# Patient Record
Sex: FEMALE | Race: WHITE | NOT HISPANIC OR LATINO | Employment: OTHER | URBAN - NONMETROPOLITAN AREA
[De-identification: names, ages, dates, MRNs, and addresses within clinical notes are randomized per-mention and may not be internally consistent; named-entity substitution may affect disease eponyms.]

---

## 2023-11-05 ENCOUNTER — HOSPITAL ENCOUNTER (EMERGENCY)
Facility: HOSPITAL | Age: 63
Discharge: HOME | End: 2023-11-05
Attending: EMERGENCY MEDICINE
Payer: MEDICARE

## 2023-11-05 ENCOUNTER — APPOINTMENT (EMERGENCY)
Dept: RADIOLOGY | Facility: HOSPITAL | Age: 63
End: 2023-11-05
Payer: MEDICARE

## 2023-11-05 VITALS
RESPIRATION RATE: 23 BRPM | SYSTOLIC BLOOD PRESSURE: 113 MMHG | OXYGEN SATURATION: 99 % | TEMPERATURE: 97.8 F | HEART RATE: 93 BPM | DIASTOLIC BLOOD PRESSURE: 59 MMHG

## 2023-11-05 DIAGNOSIS — J44.1 COPD EXACERBATION (HCC): Primary | ICD-10-CM

## 2023-11-05 LAB
ALBUMIN SERPL BCP-MCNC: 3.8 G/DL (ref 3.5–5)
ALP SERPL-CCNC: 107 U/L (ref 34–104)
ALT SERPL W P-5'-P-CCNC: 21 U/L (ref 7–52)
ANION GAP SERPL CALCULATED.3IONS-SCNC: 9 MMOL/L
AST SERPL W P-5'-P-CCNC: 17 U/L (ref 13–39)
BASOPHILS # BLD AUTO: 0.06 THOUSANDS/ÂΜL (ref 0–0.1)
BASOPHILS NFR BLD AUTO: 0 % (ref 0–1)
BILIRUB SERPL-MCNC: 0.5 MG/DL (ref 0.2–1)
BNP SERPL-MCNC: 86 PG/ML (ref 0–100)
BUN SERPL-MCNC: 5 MG/DL (ref 5–25)
CALCIUM SERPL-MCNC: 8.9 MG/DL (ref 8.4–10.2)
CARDIAC TROPONIN I PNL SERPL HS: 7 NG/L
CHLORIDE SERPL-SCNC: 101 MMOL/L (ref 96–108)
CO2 SERPL-SCNC: 29 MMOL/L (ref 21–32)
CREAT SERPL-MCNC: 0.46 MG/DL (ref 0.6–1.3)
EOSINOPHIL # BLD AUTO: 0.01 THOUSAND/ÂΜL (ref 0–0.61)
EOSINOPHIL NFR BLD AUTO: 0 % (ref 0–6)
ERYTHROCYTE [DISTWIDTH] IN BLOOD BY AUTOMATED COUNT: 14.6 % (ref 11.6–15.1)
FLUAV RNA RESP QL NAA+PROBE: NEGATIVE
FLUBV RNA RESP QL NAA+PROBE: NEGATIVE
GFR SERPL CREATININE-BSD FRML MDRD: 104 ML/MIN/1.73SQ M
GLUCOSE SERPL-MCNC: 172 MG/DL (ref 65–140)
HCT VFR BLD AUTO: 38 % (ref 34.8–46.1)
HGB BLD-MCNC: 12.1 G/DL (ref 11.5–15.4)
IMM GRANULOCYTES # BLD AUTO: 0.03 THOUSAND/UL (ref 0–0.2)
IMM GRANULOCYTES NFR BLD AUTO: 0 % (ref 0–2)
LYMPHOCYTES # BLD AUTO: 1.94 THOUSANDS/ÂΜL (ref 0.6–4.47)
LYMPHOCYTES NFR BLD AUTO: 14 % (ref 14–44)
MCH RBC QN AUTO: 29.4 PG (ref 26.8–34.3)
MCHC RBC AUTO-ENTMCNC: 31.8 G/DL (ref 31.4–37.4)
MCV RBC AUTO: 93 FL (ref 82–98)
MONOCYTES # BLD AUTO: 1.13 THOUSAND/ÂΜL (ref 0.17–1.22)
MONOCYTES NFR BLD AUTO: 8 % (ref 4–12)
NEUTROPHILS # BLD AUTO: 10.94 THOUSANDS/ÂΜL (ref 1.85–7.62)
NEUTS SEG NFR BLD AUTO: 78 % (ref 43–75)
NRBC BLD AUTO-RTO: 0 /100 WBCS
PLATELET # BLD AUTO: 235 THOUSANDS/UL (ref 149–390)
PMV BLD AUTO: 11.8 FL (ref 8.9–12.7)
POTASSIUM SERPL-SCNC: 2.7 MMOL/L (ref 3.5–5.3)
PROT SERPL-MCNC: 6.5 G/DL (ref 6.4–8.4)
RBC # BLD AUTO: 4.11 MILLION/UL (ref 3.81–5.12)
RSV RNA RESP QL NAA+PROBE: NEGATIVE
SARS-COV-2 RNA RESP QL NAA+PROBE: NEGATIVE
SODIUM SERPL-SCNC: 139 MMOL/L (ref 135–147)
WBC # BLD AUTO: 14.11 THOUSAND/UL (ref 4.31–10.16)

## 2023-11-05 PROCEDURE — 96365 THER/PROPH/DIAG IV INF INIT: CPT

## 2023-11-05 PROCEDURE — 87040 BLOOD CULTURE FOR BACTERIA: CPT | Performed by: EMERGENCY MEDICINE

## 2023-11-05 PROCEDURE — 36415 COLL VENOUS BLD VENIPUNCTURE: CPT | Performed by: EMERGENCY MEDICINE

## 2023-11-05 PROCEDURE — 96368 THER/DIAG CONCURRENT INF: CPT

## 2023-11-05 PROCEDURE — 99285 EMERGENCY DEPT VISIT HI MDM: CPT | Performed by: EMERGENCY MEDICINE

## 2023-11-05 PROCEDURE — 93005 ELECTROCARDIOGRAM TRACING: CPT

## 2023-11-05 PROCEDURE — 80053 COMPREHEN METABOLIC PANEL: CPT | Performed by: EMERGENCY MEDICINE

## 2023-11-05 PROCEDURE — 83880 ASSAY OF NATRIURETIC PEPTIDE: CPT | Performed by: EMERGENCY MEDICINE

## 2023-11-05 PROCEDURE — 96366 THER/PROPH/DIAG IV INF ADDON: CPT

## 2023-11-05 PROCEDURE — 84484 ASSAY OF TROPONIN QUANT: CPT | Performed by: EMERGENCY MEDICINE

## 2023-11-05 PROCEDURE — 85025 COMPLETE CBC W/AUTO DIFF WBC: CPT | Performed by: EMERGENCY MEDICINE

## 2023-11-05 PROCEDURE — 0241U HB NFCT DS VIR RESP RNA 4 TRGT: CPT | Performed by: EMERGENCY MEDICINE

## 2023-11-05 PROCEDURE — 71046 X-RAY EXAM CHEST 2 VIEWS: CPT

## 2023-11-05 PROCEDURE — 96375 TX/PRO/DX INJ NEW DRUG ADDON: CPT

## 2023-11-05 PROCEDURE — 99285 EMERGENCY DEPT VISIT HI MDM: CPT

## 2023-11-05 RX ORDER — MAGNESIUM SULFATE HEPTAHYDRATE 40 MG/ML
2 INJECTION, SOLUTION INTRAVENOUS ONCE
Status: COMPLETED | OUTPATIENT
Start: 2023-11-05 | End: 2023-11-05

## 2023-11-05 RX ORDER — IPRATROPIUM BROMIDE AND ALBUTEROL SULFATE 2.5; .5 MG/3ML; MG/3ML
3 SOLUTION RESPIRATORY (INHALATION) ONCE
Status: COMPLETED | OUTPATIENT
Start: 2023-11-05 | End: 2023-11-05

## 2023-11-05 RX ORDER — METHYLPREDNISOLONE SODIUM SUCCINATE 125 MG/2ML
125 INJECTION, POWDER, LYOPHILIZED, FOR SOLUTION INTRAMUSCULAR; INTRAVENOUS ONCE
Status: COMPLETED | OUTPATIENT
Start: 2023-11-05 | End: 2023-11-05

## 2023-11-05 RX ORDER — AZITHROMYCIN 250 MG/1
TABLET, FILM COATED ORAL
Qty: 6 TABLET | Refills: 0 | Status: SHIPPED | OUTPATIENT
Start: 2023-11-05 | End: 2023-11-09

## 2023-11-05 RX ORDER — AMOXICILLIN AND CLAVULANATE POTASSIUM 500; 125 MG/1; MG/1
1 TABLET, FILM COATED ORAL EVERY 12 HOURS SCHEDULED
Qty: 14 TABLET | Refills: 0 | Status: SHIPPED | OUTPATIENT
Start: 2023-11-05 | End: 2023-11-12

## 2023-11-05 RX ORDER — AZITHROMYCIN 250 MG/1
500 TABLET, FILM COATED ORAL ONCE
Status: COMPLETED | OUTPATIENT
Start: 2023-11-05 | End: 2023-11-05

## 2023-11-05 RX ADMIN — IPRATROPIUM BROMIDE AND ALBUTEROL SULFATE 3 ML: 2.5; .5 SOLUTION RESPIRATORY (INHALATION) at 20:51

## 2023-11-05 RX ADMIN — MAGNESIUM SULFATE HEPTAHYDRATE 2 G: 40 INJECTION, SOLUTION INTRAVENOUS at 19:06

## 2023-11-05 RX ADMIN — METHYLPREDNISOLONE SODIUM SUCCINATE 125 MG: 125 INJECTION, POWDER, FOR SOLUTION INTRAMUSCULAR; INTRAVENOUS at 19:06

## 2023-11-05 RX ADMIN — IPRATROPIUM BROMIDE AND ALBUTEROL SULFATE 3 ML: 2.5; .5 SOLUTION RESPIRATORY (INHALATION) at 19:06

## 2023-11-05 RX ADMIN — AZITHROMYCIN 500 MG: 250 TABLET, FILM COATED ORAL at 20:51

## 2023-11-05 RX ADMIN — AMPICILLIN SODIUM AND SULBACTAM SODIUM 3 G: 2; 1 INJECTION, POWDER, FOR SOLUTION INTRAMUSCULAR; INTRAVENOUS at 20:54

## 2023-11-05 NOTE — ED PROVIDER NOTES
History  Chief Complaint   Patient presents with    Shortness of Breath     Pt reports worsening sob with intermittent fevers and body aches for a few days, hx of copd, wears 2L O2 at all times     Hx COPD on 2L NC c/o flu like sx as below x 2 days      History provided by:  Patient   used: No    Flu Symptoms  Presenting symptoms: cough, fatigue, fever, myalgias, nausea and shortness of breath    Presenting symptoms: no diarrhea, no headaches, no rhinorrhea, no sore throat and no vomiting    Severity:  Moderate  Onset quality:  Gradual  Duration:  2 days  Progression:  Unchanged  Chronicity:  New  Relieved by:  Nothing  Worsened by:  Nothing  Ineffective treatments:  None tried  Associated symptoms: chills and decreased appetite    Associated symptoms: no ear pain, no neck stiffness and no syncope        None       Past Medical History:   Diagnosis Date    COPD (chronic obstructive pulmonary disease) ([address])     Hiatal hernia        Past Surgical History:   Procedure Laterality Date    CHOLECYSTECTOMY         History reviewed. No pertinent family history. I have reviewed and agree with the history as documented. E-Cigarette/Vaping    E-Cigarette Use Never User      E-Cigarette/Vaping Substances     Social History     Tobacco Use    Smoking status: Every Day     Packs/day: 0.25     Types: Cigarettes    Smokeless tobacco: Never   Vaping Use    Vaping Use: Never used   Substance Use Topics    Alcohol use: Never    Drug use: Never       Review of Systems   Constitutional:  Positive for chills, decreased appetite, fatigue and fever. HENT:  Negative for ear pain, hearing loss, rhinorrhea, sore throat, trouble swallowing and voice change. Eyes:  Negative for pain and discharge. Respiratory:  Positive for cough and shortness of breath. Negative for wheezing. Cardiovascular:  Negative for chest pain and palpitations. Gastrointestinal:  Positive for nausea.  Negative for abdominal pain, blood in stool, constipation, diarrhea and vomiting. Genitourinary:  Negative for dysuria, flank pain, frequency and hematuria. Musculoskeletal:  Positive for myalgias. Negative for joint swelling, neck pain and neck stiffness. Skin:  Negative for rash and wound. Neurological:  Negative for dizziness, seizures, syncope, facial asymmetry and headaches. Psychiatric/Behavioral:  Negative for hallucinations, self-injury and suicidal ideas. All other systems reviewed and are negative. Physical Exam  Physical Exam  Vitals and nursing note reviewed. Constitutional:       General: She is not in acute distress. Appearance: She is well-developed. HENT:      Head: Normocephalic and atraumatic. Right Ear: External ear normal.      Left Ear: External ear normal.   Eyes:      General: No scleral icterus. Right eye: No discharge. Left eye: No discharge. Extraocular Movements: Extraocular movements intact. Conjunctiva/sclera: Conjunctivae normal.   Cardiovascular:      Rate and Rhythm: Normal rate and regular rhythm. Heart sounds: Normal heart sounds. No murmur heard. Pulmonary:      Effort: Tachypnea, accessory muscle usage and prolonged expiration present. Breath sounds: Decreased breath sounds and wheezing present. No rhonchi or rales. Abdominal:      General: Bowel sounds are normal. There is no distension. Palpations: Abdomen is soft. Tenderness: There is no abdominal tenderness. There is no guarding or rebound. Musculoskeletal:         General: No deformity. Normal range of motion. Cervical back: Normal range of motion and neck supple. Skin:     General: Skin is warm and dry. Findings: No rash. Neurological:      General: No focal deficit present. Mental Status: She is alert and oriented to person, place, and time. Cranial Nerves: No cranial nerve deficit.    Psychiatric:         Mood and Affect: Mood normal.         Behavior: Behavior normal.         Thought Content: Thought content normal.         Judgment: Judgment normal.         Vital Signs  ED Triage Vitals [11/05/23 1854]   Temperature Pulse Respirations Blood Pressure SpO2   97.8 °F (36.6 °C) 105 20 152/74 92 %      Temp Source Heart Rate Source Patient Position - Orthostatic VS BP Location FiO2 (%)   Oral Monitor Sitting Right arm --      Pain Score       6           Vitals:    11/05/23 1854 11/05/23 1930   BP: 152/74 113/59   Pulse: 105 93   Patient Position - Orthostatic VS: Sitting          Visual Acuity      ED Medications  Medications   ipratropium-albuterol (DUO-NEB) 0.5-2.5 mg/3 mL inhalation solution 3 mL (3 mL Nebulization Given 11/5/23 1906)   methylPREDNISolone sodium succinate (Solu-MEDROL) injection 125 mg (125 mg Intravenous Given 11/5/23 1906)   magnesium sulfate 2 g/50 mL IVPB (premix) 2 g (0 g Intravenous Stopped 11/5/23 2116)   ampicillin-sulbactam (UNASYN) 3 g in sodium chloride 0.9 % 100 mL IVPB (0 g Intravenous Stopped 11/5/23 2116)   azithromycin (ZITHROMAX) tablet 500 mg (500 mg Oral Given 11/5/23 2051)   ipratropium-albuterol (DUO-NEB) 0.5-2.5 mg/3 mL inhalation solution 3 mL (3 mL Nebulization Given 11/5/23 2051)       Diagnostic Studies  Results Reviewed       Procedure Component Value Units Date/Time    B-Type Natriuretic Peptide(BNP) [[id number]]  (Normal) Collected: 11/05/23 1904    Lab Status: Final result Specimen: Blood from Arm, Right Updated: 11/05/23 2005     BNP 86 pg/mL     COVID19, Influenza A/B, RSV PCR, [facility] [[id number]]  (Normal) Collected: 11/05/23 1904    Lab Status: Final result Specimen: Nares from Nose Updated: 11/05/23 1959     SARS-CoV-2 Negative     INFLUENZA A PCR Negative     INFLUENZA B PCR Negative     RSV PCR Negative    Narrative:      FOR PEDIATRIC PATIENTS - copy/paste COVID Guidelines URL to browser: [email or web address]    SARS-CoV-2 assay is a Nucleic Acid Amplification assay intended for the  qualitative detection of nucleic acid from SARS-CoV-2 in nasopharyngeal  swabs. Results are for the presumptive identification of SARS-CoV-2 RNA. Positive results are indicative of infection with SARS-CoV-2, the virus  causing COVID-19, but do not rule out bacterial infection or co-infection  with other viruses. Laboratories within the [facility] and its  territories are required to report all positive results to the appropriate  public health authorities. Negative results do not preclude SARS-CoV-2  infection and should not be used as the sole basis for treatment or other  patient management decisions. Negative results must be combined with  clinical observations, patient history, and epidemiological information. This test has not been FDA cleared or approved. This test has been authorized by FDA under an Emergency Use Authorization  (EUA). This test is only authorized for the duration of time the  declaration that circumstances exist justifying the authorization of the  emergency use of an in vitro diagnostic tests for detection of SARS-CoV-2  virus and/or diagnosis of COVID-19 infection under section 564(b)(1) of  the Act, 21 U. S.C. 552HGR-8(W)(6), unless the authorization is terminated  or revoked sooner. The test has been validated but independent review by FDA  and CLIA is pending. Test performed using [employer] GeneXpert: This RT-PCR assay targets N2,  a region unique to SARS-CoV-2. A conserved region in the E-gene was chosen  for pan-Sarbecovirus detection which includes SARS-CoV-2. According to CMS-2020-01-R, this platform meets the definition of high-throughput technology.     Comprehensive metabolic panel [[id number]]  (Abnormal) Collected: 11/05/23 1904    Lab Status: Final result Specimen: Blood from Arm, Right Updated: 11/05/23 1950     Sodium 139 mmol/L      Potassium 2.7 mmol/L      Chloride 101 mmol/L      CO2 29 mmol/L      ANION GAP 9 mmol/L BUN 5 mg/dL      Creatinine 0.46 mg/dL      Glucose 172 mg/dL      Calcium 8.9 mg/dL      AST 17 U/L      ALT 21 U/L      Alkaline Phosphatase 107 U/L      Total Protein 6.5 g/dL      Albumin 3.8 g/dL      Total Bilirubin 0.50 mg/dL      eGFR 104 ml/min/1.73sq m     Narrative:      [facility] guidelines for Chronic Kidney Disease (CKD):     Stage 1 with normal or high GFR (GFR > 90 mL/min/1.73 square meters)    Stage 2 Mild CKD (GFR = 60-89 mL/min/1.73 square meters)    Stage 3A Moderate CKD (GFR = 45-59 mL/min/1.73 square meters)    Stage 3B Moderate CKD (GFR = 30-44 mL/min/1.73 square meters)    Stage 4 Severe CKD (GFR = 15-29 mL/min/1.73 square meters)    Stage 5 End Stage CKD (GFR <15 mL/min/1.73 square meters)  Note: GFR calculation is accurate only with a steady state creatinine    HS Troponin 0hr (reflex protocol) [[id number]]  (Normal) Collected: 11/05/23 1904    Lab Status: Final result Specimen: Blood from Arm, Right Updated: 11/05/23 1944     hs TnI 0hr 7 ng/L     CBC and differential [[id number]]  (Abnormal) Collected: 11/05/23 1904    Lab Status: Final result Specimen: Blood from Arm, Right Updated: 11/05/23 1919     WBC 14.11 Thousand/uL      RBC 4.11 Million/uL      Hemoglobin 12.1 g/dL      Hematocrit 38.0 %      MCV 93 fL      MCH 29.4 pg      MCHC 31.8 g/dL      RDW 14.6 %      MPV 11.8 fL      Platelets 791 Thousands/uL      nRBC 0 /100 WBCs      Neutrophils Relative 78 %      Immat GRANS % 0 %      Lymphocytes Relative 14 %      Monocytes Relative 8 %      Eosinophils Relative 0 %      Basophils Relative 0 %      Neutrophils Absolute 10.94 Thousands/µL      Immature Grans Absolute 0.03 Thousand/uL      Lymphocytes Absolute 1.94 Thousands/µL      Monocytes Absolute 1.13 Thousand/µL      Eosinophils Absolute 0.01 Thousand/µL      Basophils Absolute 0.06 Thousands/µL     Blood culture #2 [[id number]] Collected: 11/05/23 1904    Lab Status:  In process Specimen: Blood from Arm, Right Updated: 11/05/23 1918    Blood culture #1 [[id number]] Collected: 11/05/23 1904    Lab Status: In process Specimen: Blood from Arm, Left Updated: 11/05/23 1918                   XR chest pa & lateral   ED Interpretation by Toby Petersen MD (11/06 0007)   No acute finding, questionable early right lower lobe infiltrate                 Procedures  ECG 12 Lead Documentation Only    Date/Time: 11/5/2023 6:59 PM    Performed by: Toby Petersen MD  Authorized by: Toby Petersen MD    ECG reviewed by me, the ED Provider: yes    Patient location:  ED  Previous ECG:     Previous ECG:  Unavailable  Interpretation:     Interpretation: normal    Rate:     ECG rate:  100    ECG rate assessment: tachycardic    Rhythm:     Rhythm: sinus tachycardia    Ectopy:     Ectopy: none    QRS:     QRS axis:  Normal    QRS intervals:  Normal  Conduction:     Conduction: normal    ST segments:     ST segments:  Normal  T waves:     T waves: normal             ED Course                               SBIRT 22yo+      Flowsheet Row Most Recent Value   Initial Alcohol Screen: US AUDIT-C     1. How often do you have a drink containing alcohol? 0 Filed at: 11/05/2023 1858   2. How many drinks containing alcohol do you have on a typical day you are drinking? 0 Filed at: 11/05/2023 1858   3b. FEMALE Any Age, or MALE 65+: How often do you have 4 or more drinks on one occassion? 0 Filed at: 11/05/2023 1858   Audit-C Score 0 Filed at: 11/05/2023 1858   LANDY: How many times in the past year have you. .. Used an illegal drug or used a prescription medication for non-medical reasons? Never Filed at: 11/05/2023 1858                      Medical Decision Making  Based on the history and medical screening exam performed the diagnostic considerations include but are not limited to COVID/flu/RSV, pneumonia, COPD exacerbation, congestive heart failure.     Based on the work-up performed in the emergency room which includes physical examination, and which may include laboratory studies and imaging as warranted including advanced imaging such as CT scan or ultrasound, the differential diagnosis is narrowed to exclude limb or life-threatening process. The patient is stable for discharge. Questionable right lower lobe infiltrate. Patient offered admission vs discharge. She is slightly improved after nebulization and steroid. She was offered admission but prefers to go home. She received first dose of antibiotic in the emergency department and further antibiotic therapy was prescribed to her pharmacy. She was given careful return instructions including to return for high fevers or significantly worsening shortness of breath. She is agreeable    Amount and/or Complexity of Data Reviewed  Labs: ordered. Decision-making details documented in ED Course. Details: Benign, other than white count 14, mild hyponatremia  Radiology: ordered and independent interpretation performed. Decision-making details documented in ED Course. Details: Questionable early right lower lobe infiltrate  ECG/medicine tests: ordered and independent interpretation performed. Decision-making details documented in ED Course. Details: Sinus rhythm rate 100    Risk  Prescription drug management. Disposition  Final diagnoses:   COPD exacerbation ([address])     Time reflects when diagnosis was documented in both MDM as applicable and the Disposition within this note       Time User Action Codes Description Comment    11/5/2023  8:43 PM John Field Add [J44.1] COPD exacerbation [facility])           ED Disposition       ED Disposition   Discharge    Condition   Stable    Date/Time   Sun Nov 5, 2023 2043    Comment   Shannon Delacruz discharge to home/self care. Follow-up Information       Follow up With Specialties Details Why Contact Info    Lisset Forrest DO Family Medicine   [address] [facility]  [address] [facility]  [phone] Discharge Medication List as of 11/5/2023  8:44 PM        START taking these medications    Details   amoxicillin-clavulanate (Augmentin) 500-125 mg per tablet Take 1 tablet by mouth every 12 (twelve) hours for 7 days, Starting Sun 11/5/2023, Until Sun 11/12/2023, Normal      azithromycin (ZITHROMAX) 250 mg tablet Take 2 tablets today then 1 tablet daily x 4 days, Normal             No discharge procedures on file.     PDMP Review       None            ED Provider  Electronically Signed by             Celena Stewart MD  11/06/23 0009

## 2023-11-07 LAB
ATRIAL RATE: 100 BPM
P AXIS: 58 DEGREES
PR INTERVAL: 96 MS
QRS AXIS: 61 DEGREES
QRSD INTERVAL: 100 MS
QT INTERVAL: 336 MS
QTC INTERVAL: 433 MS
T WAVE AXIS: 59 DEGREES
VENTRICULAR RATE: 100 BPM

## 2023-11-07 PROCEDURE — 93010 ELECTROCARDIOGRAM REPORT: CPT | Performed by: INTERNAL MEDICINE

## 2023-11-11 LAB
BACTERIA BLD CULT: NORMAL
BACTERIA BLD CULT: NORMAL

## 2024-10-04 ENCOUNTER — APPOINTMENT (EMERGENCY)
Dept: RADIOLOGY | Facility: HOSPITAL | Age: 64
DRG: 177 | End: 2024-10-04
Payer: MEDICARE

## 2024-10-04 ENCOUNTER — HOSPITAL ENCOUNTER (INPATIENT)
Facility: HOSPITAL | Age: 64
LOS: 5 days | Discharge: HOME | DRG: 177 | End: 2024-10-09
Attending: EMERGENCY MEDICINE | Admitting: INTERNAL MEDICINE
Payer: MEDICARE

## 2024-10-04 DIAGNOSIS — J44.1 COPD EXACERBATION (HCC): ICD-10-CM

## 2024-10-04 DIAGNOSIS — R13.10 DYSPHAGIA: ICD-10-CM

## 2024-10-04 DIAGNOSIS — I48.91 RAPID ATRIAL FIBRILLATION (HCC): ICD-10-CM

## 2024-10-04 DIAGNOSIS — J18.9 PNEUMONIA: Primary | ICD-10-CM

## 2024-10-04 PROBLEM — E83.42 HYPOMAGNESEMIA: Status: ACTIVE | Noted: 2024-10-04

## 2024-10-04 PROBLEM — J44.0 CHRONIC OBSTRUCTIVE PULMONARY DISEASE WITH ACUTE LOWER RESPIRATORY INFECTION (HCC): Status: ACTIVE | Noted: 2024-10-04

## 2024-10-04 PROBLEM — E87.20 LACTIC ACIDOSIS: Status: ACTIVE | Noted: 2024-10-04

## 2024-10-04 PROBLEM — A31.9 MYCOBACTERIAL INFECTION, NON-TB: Status: ACTIVE | Noted: 2024-10-04

## 2024-10-04 LAB
ALBUMIN SERPL BCG-MCNC: 4.2 G/DL (ref 3.5–5)
ALP SERPL-CCNC: 83 U/L (ref 34–104)
ALT SERPL W P-5'-P-CCNC: 29 U/L (ref 7–52)
ANION GAP SERPL CALCULATED.3IONS-SCNC: 9 MMOL/L (ref 4–13)
APTT PPP: 24 SECONDS (ref 23–34)
AST SERPL W P-5'-P-CCNC: 32 U/L (ref 13–39)
BASOPHILS # BLD AUTO: 0.01 THOUSANDS/ΜL (ref 0–0.1)
BASOPHILS NFR BLD AUTO: 0 % (ref 0–1)
BILIRUB SERPL-MCNC: 0.28 MG/DL (ref 0.2–1)
BNP SERPL-MCNC: 34 PG/ML (ref 0–100)
BUN SERPL-MCNC: 12 MG/DL (ref 5–25)
CALCIUM SERPL-MCNC: 9 MG/DL (ref 8.4–10.2)
CARDIAC TROPONIN I PNL SERPL HS: 6 NG/L
CHLORIDE SERPL-SCNC: 99 MMOL/L (ref 96–108)
CO2 SERPL-SCNC: 28 MMOL/L (ref 21–32)
CREAT SERPL-MCNC: 0.56 MG/DL (ref 0.6–1.3)
D DIMER PPP FEU-MCNC: 0.34 UG/ML FEU
EOSINOPHIL # BLD AUTO: 0 THOUSAND/ΜL (ref 0–0.61)
EOSINOPHIL NFR BLD AUTO: 0 % (ref 0–6)
ERYTHROCYTE [DISTWIDTH] IN BLOOD BY AUTOMATED COUNT: 13.6 % (ref 11.6–15.1)
FLUAV AG UPPER RESP QL IA.RAPID: NEGATIVE
FLUBV AG UPPER RESP QL IA.RAPID: NEGATIVE
GFR SERPL CREATININE-BSD FRML MDRD: 97 ML/MIN/1.73SQ M
GLUCOSE SERPL-MCNC: 155 MG/DL (ref 65–140)
GLUCOSE SERPL-MCNC: 181 MG/DL (ref 65–140)
HCT VFR BLD AUTO: 43.1 % (ref 34.8–46.1)
HGB BLD-MCNC: 13.8 G/DL (ref 11.5–15.4)
IMM GRANULOCYTES # BLD AUTO: 0.02 THOUSAND/UL (ref 0–0.2)
IMM GRANULOCYTES NFR BLD AUTO: 0 % (ref 0–2)
INR PPP: 0.94 (ref 0.85–1.19)
LACTATE SERPL-SCNC: 1.9 MMOL/L (ref 0.5–2)
LACTATE SERPL-SCNC: 3.1 MMOL/L (ref 0.5–2)
LYMPHOCYTES # BLD AUTO: 0.46 THOUSANDS/ΜL (ref 0.6–4.47)
LYMPHOCYTES NFR BLD AUTO: 9 % (ref 14–44)
MAGNESIUM SERPL-MCNC: 1.6 MG/DL (ref 1.9–2.7)
MCH RBC QN AUTO: 29.1 PG (ref 26.8–34.3)
MCHC RBC AUTO-ENTMCNC: 32 G/DL (ref 31.4–37.4)
MCV RBC AUTO: 91 FL (ref 82–98)
MONOCYTES # BLD AUTO: 0.29 THOUSAND/ΜL (ref 0.17–1.22)
MONOCYTES NFR BLD AUTO: 6 % (ref 4–12)
NEUTROPHILS # BLD AUTO: 4.24 THOUSANDS/ΜL (ref 1.85–7.62)
NEUTS SEG NFR BLD AUTO: 85 % (ref 43–75)
NRBC BLD AUTO-RTO: 0 /100 WBCS
PLATELET # BLD AUTO: 165 THOUSANDS/UL (ref 149–390)
PMV BLD AUTO: 11.4 FL (ref 8.9–12.7)
POTASSIUM SERPL-SCNC: 4 MMOL/L (ref 3.5–5.3)
PROCALCITONIN SERPL-MCNC: <0.05 NG/ML
PROT SERPL-MCNC: 7.2 G/DL (ref 6.4–8.4)
PROTHROMBIN TIME: 12.9 SECONDS (ref 12.3–15)
RBC # BLD AUTO: 4.75 MILLION/UL (ref 3.81–5.12)
SARS-COV+SARS-COV-2 AG RESP QL IA.RAPID: NEGATIVE
SODIUM SERPL-SCNC: 136 MMOL/L (ref 135–147)
WBC # BLD AUTO: 5.02 THOUSAND/UL (ref 4.31–10.16)

## 2024-10-04 PROCEDURE — 84145 PROCALCITONIN (PCT): CPT | Performed by: FAMILY MEDICINE

## 2024-10-04 PROCEDURE — 85610 PROTHROMBIN TIME: CPT | Performed by: EMERGENCY MEDICINE

## 2024-10-04 PROCEDURE — 99285 EMERGENCY DEPT VISIT HI MDM: CPT

## 2024-10-04 PROCEDURE — 87804 INFLUENZA ASSAY W/OPTIC: CPT | Performed by: EMERGENCY MEDICINE

## 2024-10-04 PROCEDURE — 85730 THROMBOPLASTIN TIME PARTIAL: CPT | Performed by: EMERGENCY MEDICINE

## 2024-10-04 PROCEDURE — 83735 ASSAY OF MAGNESIUM: CPT | Performed by: EMERGENCY MEDICINE

## 2024-10-04 PROCEDURE — 87811 SARS-COV-2 COVID19 W/OPTIC: CPT | Performed by: EMERGENCY MEDICINE

## 2024-10-04 PROCEDURE — 93005 ELECTROCARDIOGRAM TRACING: CPT

## 2024-10-04 PROCEDURE — 71046 X-RAY EXAM CHEST 2 VIEWS: CPT

## 2024-10-04 PROCEDURE — 80053 COMPREHEN METABOLIC PANEL: CPT | Performed by: EMERGENCY MEDICINE

## 2024-10-04 PROCEDURE — 83605 ASSAY OF LACTIC ACID: CPT | Performed by: FAMILY MEDICINE

## 2024-10-04 PROCEDURE — 87040 BLOOD CULTURE FOR BACTERIA: CPT | Performed by: EMERGENCY MEDICINE

## 2024-10-04 PROCEDURE — 94640 AIRWAY INHALATION TREATMENT: CPT

## 2024-10-04 PROCEDURE — 99285 EMERGENCY DEPT VISIT HI MDM: CPT | Performed by: EMERGENCY MEDICINE

## 2024-10-04 PROCEDURE — 99223 1ST HOSP IP/OBS HIGH 75: CPT | Performed by: FAMILY MEDICINE

## 2024-10-04 PROCEDURE — 94760 N-INVAS EAR/PLS OXIMETRY 1: CPT

## 2024-10-04 PROCEDURE — 83880 ASSAY OF NATRIURETIC PEPTIDE: CPT | Performed by: EMERGENCY MEDICINE

## 2024-10-04 PROCEDURE — 82948 REAGENT STRIP/BLOOD GLUCOSE: CPT

## 2024-10-04 PROCEDURE — 85025 COMPLETE CBC W/AUTO DIFF WBC: CPT | Performed by: EMERGENCY MEDICINE

## 2024-10-04 PROCEDURE — 85379 FIBRIN DEGRADATION QUANT: CPT | Performed by: EMERGENCY MEDICINE

## 2024-10-04 PROCEDURE — 84484 ASSAY OF TROPONIN QUANT: CPT | Performed by: EMERGENCY MEDICINE

## 2024-10-04 PROCEDURE — 83036 HEMOGLOBIN GLYCOSYLATED A1C: CPT | Performed by: FAMILY MEDICINE

## 2024-10-04 PROCEDURE — 94664 DEMO&/EVAL PT USE INHALER: CPT

## 2024-10-04 PROCEDURE — 36415 COLL VENOUS BLD VENIPUNCTURE: CPT | Performed by: EMERGENCY MEDICINE

## 2024-10-04 PROCEDURE — 83605 ASSAY OF LACTIC ACID: CPT | Performed by: EMERGENCY MEDICINE

## 2024-10-04 RX ORDER — METHYLPREDNISOLONE SODIUM SUCCINATE 40 MG/ML
40 INJECTION, POWDER, LYOPHILIZED, FOR SOLUTION INTRAMUSCULAR; INTRAVENOUS EVERY 8 HOURS
Status: DISCONTINUED | OUTPATIENT
Start: 2024-10-04 | End: 2024-10-09

## 2024-10-04 RX ORDER — MAGNESIUM SULFATE HEPTAHYDRATE 40 MG/ML
2 INJECTION, SOLUTION INTRAVENOUS ONCE
Status: COMPLETED | OUTPATIENT
Start: 2024-10-04 | End: 2024-10-05

## 2024-10-04 RX ORDER — DOCUSATE SODIUM 100 MG/1
100 CAPSULE, LIQUID FILLED ORAL 2 TIMES DAILY
Status: DISCONTINUED | OUTPATIENT
Start: 2024-10-04 | End: 2024-10-09 | Stop reason: HOSPADM

## 2024-10-04 RX ORDER — ONDANSETRON 2 MG/ML
4 INJECTION INTRAMUSCULAR; INTRAVENOUS EVERY 6 HOURS PRN
Status: DISCONTINUED | OUTPATIENT
Start: 2024-10-04 | End: 2024-10-09 | Stop reason: HOSPADM

## 2024-10-04 RX ORDER — AZITHROMYCIN 500 MG/1
250 TABLET, FILM COATED ORAL DAILY
COMMUNITY

## 2024-10-04 RX ORDER — INSULIN LISPRO 100 [IU]/ML
1-5 INJECTION, SOLUTION INTRAVENOUS; SUBCUTANEOUS
Status: DISCONTINUED | OUTPATIENT
Start: 2024-10-05 | End: 2024-10-09 | Stop reason: HOSPADM

## 2024-10-04 RX ORDER — RIFABUTIN 150 MG/1
300 CAPSULE ORAL DAILY
COMMUNITY

## 2024-10-04 RX ORDER — AZITHROMYCIN 250 MG/1
500 TABLET, FILM COATED ORAL EVERY 24 HOURS
Status: COMPLETED | OUTPATIENT
Start: 2024-10-04 | End: 2024-10-06

## 2024-10-04 RX ORDER — FAMOTIDINE 10 MG/ML
20 INJECTION, SOLUTION INTRAVENOUS ONCE
Status: COMPLETED | OUTPATIENT
Start: 2024-10-04 | End: 2024-10-05

## 2024-10-04 RX ORDER — ALBUTEROL SULFATE 90 UG/1
2 INHALANT RESPIRATORY (INHALATION) EVERY 6 HOURS PRN
COMMUNITY
End: 2024-10-09

## 2024-10-04 RX ORDER — CEFTRIAXONE 1 G/50ML
1000 INJECTION, SOLUTION INTRAVENOUS ONCE
Status: COMPLETED | OUTPATIENT
Start: 2024-10-04 | End: 2024-10-04

## 2024-10-04 RX ORDER — OMEPRAZOLE 40 MG/1
40 CAPSULE, DELAYED RELEASE ORAL DAILY
COMMUNITY

## 2024-10-04 RX ORDER — SODIUM CHLORIDE FOR INHALATION 0.9 %
3 VIAL, NEBULIZER (ML) INHALATION
Status: DISCONTINUED | OUTPATIENT
Start: 2024-10-04 | End: 2024-10-05

## 2024-10-04 RX ORDER — GUAIFENESIN 600 MG/1
600 TABLET, EXTENDED RELEASE ORAL 2 TIMES DAILY
Status: DISCONTINUED | OUTPATIENT
Start: 2024-10-04 | End: 2024-10-09 | Stop reason: HOSPADM

## 2024-10-04 RX ORDER — LEVALBUTEROL INHALATION SOLUTION 1.25 MG/3ML
1.25 SOLUTION RESPIRATORY (INHALATION)
Status: DISCONTINUED | OUTPATIENT
Start: 2024-10-04 | End: 2024-10-09 | Stop reason: HOSPADM

## 2024-10-04 RX ORDER — INSULIN LISPRO 100 [IU]/ML
1-5 INJECTION, SOLUTION INTRAVENOUS; SUBCUTANEOUS
Status: DISCONTINUED | OUTPATIENT
Start: 2024-10-04 | End: 2024-10-09 | Stop reason: HOSPADM

## 2024-10-04 RX ORDER — CEFTRIAXONE 1 G/50ML
1000 INJECTION, SOLUTION INTRAVENOUS EVERY 24 HOURS
Status: DISCONTINUED | OUTPATIENT
Start: 2024-10-05 | End: 2024-10-08

## 2024-10-04 RX ORDER — BUDESONIDE 0.5 MG/2ML
0.5 INHALANT ORAL
Status: DISCONTINUED | OUTPATIENT
Start: 2024-10-04 | End: 2024-10-09 | Stop reason: HOSPADM

## 2024-10-04 RX ORDER — ONDANSETRON 2 MG/ML
4 INJECTION INTRAMUSCULAR; INTRAVENOUS ONCE
Status: DISCONTINUED | OUTPATIENT
Start: 2024-10-04 | End: 2024-10-09 | Stop reason: HOSPADM

## 2024-10-04 RX ORDER — FLUTICASONE FUROATE, UMECLIDINIUM BROMIDE AND VILANTEROL TRIFENATATE 100; 62.5; 25 UG/1; UG/1; UG/1
1 POWDER RESPIRATORY (INHALATION) DAILY
COMMUNITY

## 2024-10-04 RX ORDER — POLYETHYLENE GLYCOL 3350 17 G/17G
17 POWDER, FOR SOLUTION ORAL DAILY
Status: DISCONTINUED | OUTPATIENT
Start: 2024-10-05 | End: 2024-10-09 | Stop reason: HOSPADM

## 2024-10-04 RX ORDER — SODIUM CHLORIDE 9 MG/ML
125 INJECTION, SOLUTION INTRAVENOUS CONTINUOUS
Status: DISCONTINUED | OUTPATIENT
Start: 2024-10-04 | End: 2024-10-07

## 2024-10-04 RX ORDER — ENOXAPARIN SODIUM 100 MG/ML
40 INJECTION SUBCUTANEOUS DAILY
Status: DISCONTINUED | OUTPATIENT
Start: 2024-10-05 | End: 2024-10-06

## 2024-10-04 RX ORDER — ETHAMBUTOL HYDROCHLORIDE 400 MG/1
800 TABLET, FILM COATED ORAL DAILY
COMMUNITY

## 2024-10-04 RX ORDER — ACETAMINOPHEN 325 MG/1
650 TABLET ORAL EVERY 6 HOURS PRN
Status: DISCONTINUED | OUTPATIENT
Start: 2024-10-04 | End: 2024-10-08

## 2024-10-04 RX ORDER — NICOTINE 21 MG/24HR
1 PATCH, TRANSDERMAL 24 HOURS TRANSDERMAL DAILY
Status: DISCONTINUED | OUTPATIENT
Start: 2024-10-05 | End: 2024-10-09 | Stop reason: HOSPADM

## 2024-10-04 RX ADMIN — AZITHROMYCIN 500 MG: 250 TABLET, FILM COATED ORAL at 23:59

## 2024-10-04 RX ADMIN — MAGNESIUM SULFATE HEPTAHYDRATE 2 G: 40 INJECTION, SOLUTION INTRAVENOUS at 21:27

## 2024-10-04 RX ADMIN — LEVALBUTEROL HYDROCHLORIDE 1.25 MG: 1.25 SOLUTION RESPIRATORY (INHALATION) at 22:55

## 2024-10-04 RX ADMIN — IPRATROPIUM BROMIDE 0.5 MG: 0.5 SOLUTION RESPIRATORY (INHALATION) at 22:54

## 2024-10-04 RX ADMIN — CEFTRIAXONE 1000 MG: 1 INJECTION, SOLUTION INTRAVENOUS at 21:03

## 2024-10-04 RX ADMIN — GUAIFENESIN 600 MG: 600 TABLET ORAL at 23:59

## 2024-10-04 RX ADMIN — SODIUM CHLORIDE 500 ML: 0.9 INJECTION, SOLUTION INTRAVENOUS at 23:36

## 2024-10-04 RX ADMIN — SODIUM CHLORIDE 1000 ML: 0.9 INJECTION, SOLUTION INTRAVENOUS at 20:34

## 2024-10-04 RX ADMIN — METHYLPREDNISOLONE SODIUM SUCCINATE 40 MG: 40 INJECTION, POWDER, FOR SOLUTION INTRAMUSCULAR; INTRAVENOUS at 23:36

## 2024-10-04 RX ADMIN — INSULIN LISPRO 1 UNITS: 100 INJECTION, SOLUTION INTRAVENOUS; SUBCUTANEOUS at 23:56

## 2024-10-04 RX ADMIN — Medication 3 ML: at 22:54

## 2024-10-04 RX ADMIN — BUDESONIDE INHALATION 0.5 MG: 0.5 SUSPENSION RESPIRATORY (INHALATION) at 22:55

## 2024-10-05 ENCOUNTER — APPOINTMENT (INPATIENT)
Dept: CT IMAGING | Facility: HOSPITAL | Age: 64
DRG: 177 | End: 2024-10-05
Payer: MEDICARE

## 2024-10-05 PROBLEM — E87.20 LACTIC ACIDOSIS: Status: RESOLVED | Noted: 2024-10-04 | Resolved: 2024-10-05

## 2024-10-05 PROBLEM — R13.10 DYSPHAGIA: Status: ACTIVE | Noted: 2024-10-05

## 2024-10-05 PROBLEM — E11.9 TYPE 2 DIABETES MELLITUS WITHOUT COMPLICATION, WITHOUT LONG-TERM CURRENT USE OF INSULIN (HCC): Status: ACTIVE | Noted: 2024-10-05

## 2024-10-05 LAB
EST. AVERAGE GLUCOSE BLD GHB EST-MCNC: 137 MG/DL
GLUCOSE SERPL-MCNC: 127 MG/DL (ref 65–140)
GLUCOSE SERPL-MCNC: 138 MG/DL (ref 65–140)
GLUCOSE SERPL-MCNC: 148 MG/DL (ref 65–140)
GLUCOSE SERPL-MCNC: 161 MG/DL (ref 65–140)
HBA1C MFR BLD: 6.4 %
L PNEUMO1 AG UR QL IA.RAPID: NEGATIVE
MAGNESIUM SERPL-MCNC: 2.1 MG/DL (ref 1.9–2.7)
PROCALCITONIN SERPL-MCNC: <0.05 NG/ML
S PNEUM AG UR QL: NEGATIVE

## 2024-10-05 PROCEDURE — 84145 PROCALCITONIN (PCT): CPT | Performed by: FAMILY MEDICINE

## 2024-10-05 PROCEDURE — 87449 NOS EACH ORGANISM AG IA: CPT | Performed by: FAMILY MEDICINE

## 2024-10-05 PROCEDURE — 94640 AIRWAY INHALATION TREATMENT: CPT

## 2024-10-05 PROCEDURE — 82948 REAGENT STRIP/BLOOD GLUCOSE: CPT

## 2024-10-05 PROCEDURE — 87081 CULTURE SCREEN ONLY: CPT | Performed by: FAMILY MEDICINE

## 2024-10-05 PROCEDURE — 83735 ASSAY OF MAGNESIUM: CPT | Performed by: FAMILY MEDICINE

## 2024-10-05 PROCEDURE — 94760 N-INVAS EAR/PLS OXIMETRY 1: CPT

## 2024-10-05 PROCEDURE — 70490 CT SOFT TISSUE NECK W/O DYE: CPT

## 2024-10-05 PROCEDURE — 99232 SBSQ HOSP IP/OBS MODERATE 35: CPT | Performed by: FAMILY MEDICINE

## 2024-10-05 RX ORDER — ALBUTEROL SULFATE 90 UG/1
2 INHALANT RESPIRATORY (INHALATION) EVERY 6 HOURS PRN
Status: DISCONTINUED | OUTPATIENT
Start: 2024-10-05 | End: 2024-10-09 | Stop reason: HOSPADM

## 2024-10-05 RX ORDER — RIFABUTIN 150 MG/1
300 CAPSULE ORAL DAILY
Status: DISCONTINUED | OUTPATIENT
Start: 2024-10-05 | End: 2024-10-09 | Stop reason: HOSPADM

## 2024-10-05 RX ORDER — PREDNISONE 20 MG/1
20 TABLET ORAL DAILY
Status: ON HOLD | COMMUNITY
End: 2024-10-09

## 2024-10-05 RX ORDER — ETHAMBUTOL HYDROCHLORIDE 400 MG/1
800 TABLET, FILM COATED ORAL DAILY
Status: DISCONTINUED | OUTPATIENT
Start: 2024-10-05 | End: 2024-10-09 | Stop reason: HOSPADM

## 2024-10-05 RX ORDER — FLUTICASONE FUROATE AND VILANTEROL 100; 25 UG/1; UG/1
1 POWDER RESPIRATORY (INHALATION) DAILY
Status: DISCONTINUED | OUTPATIENT
Start: 2024-10-05 | End: 2024-10-05

## 2024-10-05 RX ORDER — LEVOFLOXACIN 750 MG/1
750 TABLET, FILM COATED ORAL EVERY 24 HOURS
COMMUNITY
End: 2024-10-09

## 2024-10-05 RX ORDER — PANTOPRAZOLE SODIUM 40 MG/1
40 TABLET, DELAYED RELEASE ORAL
Status: DISCONTINUED | OUTPATIENT
Start: 2024-10-06 | End: 2024-10-09 | Stop reason: HOSPADM

## 2024-10-05 RX ADMIN — CEFTRIAXONE 1000 MG: 1 INJECTION, SOLUTION INTRAVENOUS at 08:18

## 2024-10-05 RX ADMIN — UMECLIDINIUM 1 PUFF: 62.5 AEROSOL, POWDER ORAL at 14:34

## 2024-10-05 RX ADMIN — FAMOTIDINE 20 MG: 10 INJECTION, SOLUTION INTRAVENOUS at 08:20

## 2024-10-05 RX ADMIN — GUAIFENESIN 600 MG: 600 TABLET ORAL at 17:01

## 2024-10-05 RX ADMIN — IPRATROPIUM BROMIDE 0.5 MG: 0.5 SOLUTION RESPIRATORY (INHALATION) at 19:15

## 2024-10-05 RX ADMIN — LEVALBUTEROL HYDROCHLORIDE 1.25 MG: 1.25 SOLUTION RESPIRATORY (INHALATION) at 13:33

## 2024-10-05 RX ADMIN — SODIUM CHLORIDE 125 ML/HR: 0.9 INJECTION, SOLUTION INTRAVENOUS at 20:43

## 2024-10-05 RX ADMIN — ENOXAPARIN SODIUM 40 MG: 40 INJECTION SUBCUTANEOUS at 08:19

## 2024-10-05 RX ADMIN — LEVALBUTEROL HYDROCHLORIDE 1.25 MG: 1.25 SOLUTION RESPIRATORY (INHALATION) at 19:15

## 2024-10-05 RX ADMIN — METHYLPREDNISOLONE SODIUM SUCCINATE 40 MG: 40 INJECTION, POWDER, FOR SOLUTION INTRAMUSCULAR; INTRAVENOUS at 08:23

## 2024-10-05 RX ADMIN — BUDESONIDE INHALATION 0.5 MG: 0.5 SUSPENSION RESPIRATORY (INHALATION) at 07:04

## 2024-10-05 RX ADMIN — METHYLPREDNISOLONE SODIUM SUCCINATE 40 MG: 40 INJECTION, POWDER, FOR SOLUTION INTRAMUSCULAR; INTRAVENOUS at 17:01

## 2024-10-05 RX ADMIN — BUDESONIDE INHALATION 0.5 MG: 0.5 SUSPENSION RESPIRATORY (INHALATION) at 19:15

## 2024-10-05 RX ADMIN — INSULIN LISPRO 1 UNITS: 100 INJECTION, SOLUTION INTRAVENOUS; SUBCUTANEOUS at 20:42

## 2024-10-05 RX ADMIN — SODIUM CHLORIDE 125 ML/HR: 0.9 INJECTION, SOLUTION INTRAVENOUS at 09:59

## 2024-10-05 RX ADMIN — GUAIFENESIN 600 MG: 600 TABLET ORAL at 08:18

## 2024-10-05 RX ADMIN — IPRATROPIUM BROMIDE 0.5 MG: 0.5 SOLUTION RESPIRATORY (INHALATION) at 07:04

## 2024-10-05 RX ADMIN — NICOTINE 1 PATCH: 14 PATCH, EXTENDED RELEASE TRANSDERMAL at 08:18

## 2024-10-05 RX ADMIN — SODIUM CHLORIDE 125 ML/HR: 0.9 INJECTION, SOLUTION INTRAVENOUS at 01:46

## 2024-10-05 RX ADMIN — LEVALBUTEROL HYDROCHLORIDE 1.25 MG: 1.25 SOLUTION RESPIRATORY (INHALATION) at 07:04

## 2024-10-05 RX ADMIN — FLUTICASONE FUROATE AND VILANTEROL TRIFENATATE 1 PUFF: 100; 25 POWDER RESPIRATORY (INHALATION) at 14:34

## 2024-10-05 RX ADMIN — AZITHROMYCIN 500 MG: 250 TABLET, FILM COATED ORAL at 20:41

## 2024-10-05 RX ADMIN — IPRATROPIUM BROMIDE 0.5 MG: 0.5 SOLUTION RESPIRATORY (INHALATION) at 13:33

## 2024-10-05 NOTE — CASE MANAGEMENT
Case Management Assessment & Discharge Planning Note    Patient name Ileana Seaman  Location /-01 MRN [id number]  : 1960 Date 10/5/2024       Current Admission Date: 10/4/2024  Current Admission Diagnosis:Community acquired pneumonia of right upper lobe of lung   Patient Active Problem List    Diagnosis Date Noted Date Diagnosed    Type 2 diabetes mellitus without complication, without long-term current use of insulin (HCC) 10/05/2024     Dysphagia 10/05/2024     Community acquired pneumonia of right upper lobe of lung 10/04/2024     Chronic obstructive pulmonary disease with acute lower respiratory infection (HCC) 10/04/2024     Mycobacterial infection, 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     Hypomagnesemia 10/04/2024       LOS (days): 1  Geometric Mean LOS (GMLOS) (days):   Days to GMLOS:     OBJECTIVE:    Risk of Unplanned Readmission Score: 10.91         Current admission status: Inpatient  Referral Reason: Medication Assistance, VNA, DME    Preferred Pharmacy:   [facility] [place] [place] - [address]  [address]  [place] [address]  Phone: [phone] Fax: [phone]    Primary Care Provider: Pop Schwarz DO    Primary Insurance: MEDICARE  Secondary Insurance: [facility]    ASSESSMENT:  Active Health Care Proxies    There are no active Health Care Proxies on file.       Advance Directives  Does patient have a Health Care POA?: No  Was patient offered paperwork?: Yes  Does patient currently have a Health Care decision maker?: Yes, please see Health Care Proxy section  Does patient have Advance Directives?: No  Was patient offered paperwork?: Yes  Primary Contact: Lamar (Daughter)         Readmission Root Cause  30 Day Readmission: No    Patient Information  Admitted from:: Home  Mental Status: Alert  During Assessment patient was accompanied by: Not accompanied during assessment  Assessment information provided by::  Patient  Primary Caregiver: Self  Support Systems: Daughter, Children  [facility] Residence: [facility]  What [facility] do you live in?: [place]  Type of Current Residence: 3 story home  Upon entering residence, is there a bedroom on the main floor (no further steps)?: No  A bedroom is located on the following floor levels of residence (select all that apply):: 2nd Floor  Upon entering residence, is there a bathroom on the main floor (no further steps)?: No  Indicate which floors of current residence have a bathroom (select all the apply):: 2nd Floor  Number of steps to 2nd floor from main floor: One Flight  Living Arrangements: Lives Alone  Is patient a ?: Yes (20 years [employer])  Is patient active with [facility]: No (paperwork is JEFE)    Activities of Daily Living Prior to Admission  Functional Status: Independent  Completes ADLs independently?: Yes  Ambulates independently?: Yes  Does patient use assisted devices?: Yes  Assisted Devices (DME) used: Home Oxygen concentrator, Portable Oxygen tanks, Nebulizer  DME Company Name (respiratory supplies): [employer]  O2 Rate(s): 2L Continious  Does patient currently own DME?: Yes  What DME does the patient currently own?: Portable Oxygen tanks, Home Oxygen concentrator, Nebulizer  Does patient have a history of Outpatient Therapy (PT/OT)?: No  Does the patient have a history of Short-Term Rehab?: No  Does patient have a history of HHC?: No         Patient Information Continued  Income Source: SSI/SSD  Does patient have prescription coverage?: Yes  Does patient receive dialysis treatments?: No  Does patient have a history of substance abuse?: No  Does patient have a history of Mental Health Diagnosis?: No         Means of Transportation  Means of Transport to [facility]:: Family transport      Social Determinants of Health (SDOH)      Flowsheet Row Most Recent Value   Housing Stability    In the last 12 months, was there a time when you were not able to pay the  mortgage or rent on time? N   At any time in the past 12 months, were you homeless or living in a shelter (including now)? N   Transportation Needs    In the past 12 months, has lack of transportation kept you from medical appointments or from getting medications? no   In the past 12 months, has lack of transportation kept you from meetings, work, or from getting things needed for daily living? No   Food Insecurity    Within the past 12 months, you worried that your food would run out before you got the money to buy more. Sometimes   Within the past 12 months, the food you bought just didn't last and you didn't have money to get more. Sometimes   Utilities    In the past 12 months has the electric, gas, oil, or water company threatened to shut off services in your home? No            DISCHARGE DETAILS:    Discharge planning discussed with:: Patient  Freedom of Choice: Yes  Comments - Freedom of Choice: Not want HHC or STR at discharge  CM contacted family/caregiver?: No- see comments (declined)   Other Referral/Resources/Interventions Provided:  Interventions: Meals on Wheels         CM met with patient at the bedside,baseline information  was obtained. CM discussed the role of CM in helping the patient develop a discharge plan and assist the patient in carry out their plan.  Patient was IPTA, uses no DME at baseline for ambulation, and has no hx of rehab/therapy.   Patient ha O2 thorugh [employer] and owns a nebulizer.   Patient does verbalize some struggled with food - her daughter helps and she has FS, [employer] and [employer]. She is interested in Food Delivery programs.  CM reviewed pending therapy assessments - patient is not interested in HHC or STR if recommended.     CM emailed food delivery resource via Email through find help.    CM to follow patient's care and discharge needs.

## 2024-10-05 NOTE — ASSESSMENT & PLAN NOTE
Lab Results   Component Value Date    HGBA1C 5.9 (H) 05/22/2023       Recent Labs     10/04/24  2305 10/05/24  0711 10/05/24  1058   POCGLU 155* 148* 127       Blood Sugar Average: Last 72 hrs:  (P) [id number]  Per chart review, back in 22 September, A1c was running 6.6  Recent A1c which was done in May 2023-came 5.9  Due to recent illness, patient is not eating enough, per daughter, patient also has history of hiatal hernia-concern for hypoglycemia  Continue to monitor.

## 2024-10-05 NOTE — ASSESSMENT & PLAN NOTE
Per chart review, patient does follow-up with [facility] infectious disease,  Last note from infectious disease reviewed from telemedicine on 8/27/2024-Per note, patient supposed to take Rifabutin 300 mg once a day, ethambutol 800 mg once a day and azithromycin 250 mg once a day.     Per patient, she is not taking this medication since last week when she started feeling sick  Consider to discuss with infectious disease about the above treatment

## 2024-10-05 NOTE — H&P
H&P - Hospitalist   Name: Ileana Seaman 64 y.o. female I MRN: [id number]  Unit/Bed#: TR 13B I Date of Admission: 10/4/2024   Date of Service: 10/4/2024 I Hospital Day: 0     Assessment & Plan  Community acquired pneumonia of right lower lobe of lung  Patient recently visited different hospital on 10/3/2024, was discharged with Levaquin-did not feel any change, rather than patient is feeling more weak and short of breath  Chest x-ray shows haziness in right lower lobe  Continue IV antibiotic, IV hydration  Follow blood culture, procalcitonin, urine Legionella, pneumonia  COVID/flu panel negative  Patient has history of COPD as well as active smoker.  Chronic obstructive pulmonary disease with acute lower respiratory infection (HCC)  Continue IV antibiotic for pneumonia.  As well as azithromycin per COPD protocol  Continue IV steroid, inhaled nebulizer  Follow respiratory protocol  Patient does use 2 L of oxygen chronically-will continue  Mycobacterial infection, non-TB  Per chart review, patient does follow-up with [facility] infectious disease,  Last note from infectious disease reviewed from telemedicine on 8/27/2024-Per note, patient supposed to take Rifabutin 300 mg once a day, ethambutol 800 mg once a day and azithromycin 250 mg once a day.     Per patient, she is not taking this medication since last week when she started feeling sick  Consider to discuss with infectious disease about the above treatment        Hypomagnesemia  On arrival, magnesium is 1.6, status post IV supplement, recheck  Lactic acidosis  Lactic acid was found 3.1, patient is getting treatment for pneumonia-most likely the cause.  Continue IV hydration and recheck.  Type 2 diabetes mellitus without complication, without long-term current use of insulin ([facility])  Lab Results   Component Value Date    HGBA1C 5.9 (H) 05/22/2023       Recent Labs     10/04/24  2305   POCGLU 155*       Blood Sugar Average: Last 72 hrs:  (P) 155  Per chart  review, back in 22 September, A1c was running 6.6  Recent A1c which was done in May 2023-came 5.9  Due to recent illness, patient is not eating enough, per daughter, patient also has history of hiatal hernia-concern for hypoglycemia  Continue to monitor.      VTE Pharmacologic Prophylaxis: VTE Score: 5 High Risk (Score >/= 5) - Pharmacological DVT Prophylaxis Ordered: enoxaparin (Lovenox). Sequential Compression Devices Ordered.  Code Status: Level 1 - Full Code per patient  Discussion with family: Updated  (daughter) at bedside.    Anticipated Length of Stay: Patient will be admitted on an inpatient basis with an anticipated length of stay of greater than 2 midnights secondary to monitor above conditions.    History of Present Illness   Chief Complaint: Pt dx with Pna yesterday after lunchtime in ED. Pt unable to keep food down. Pt feels like her esophagus is blocked by something. Pt having body cramps and shaking all over. Pt is on 2L O2 chronically at home. PMHX: COPD, DM     Ileana Seaman is a 64 y.o. female with a PMH of COPD, diabetes, hiatal hernia who presents with shortness of breath.  Per patient and patient daughter on the bedside, patient recently visited in different hospital due to difficulty breathing, patient reports she received breathing treatment in the ER and has some x-ray and sent back home with antibiotic and steroid.  After going home, patient was not feeling good, still having chest congestion and tightness with breathing difficulty and came back to this hospital.  Denies any recent sick contact.  He still smokes.  Does have pets at home, patient has carpet.    Review of Systems   Constitutional:  Positive for activity change, appetite change, chills and fatigue. Negative for diaphoresis, fever and unexpected weight change.   HENT:  Negative for congestion, dental problem and sore throat.    Respiratory:  Positive for cough and shortness of breath. Negative for apnea.     Cardiovascular:  Negative for chest pain, palpitations and leg swelling.   Gastrointestinal:  Negative for abdominal distention, abdominal pain, constipation, diarrhea, nausea and rectal pain.   Genitourinary:  Negative for difficulty urinating, dyspareunia and dysuria.   Musculoskeletal:  Negative for arthralgias, joint swelling and myalgias.   Skin:  Negative for color change and pallor.   Neurological:  Negative for dizziness, syncope and facial asymmetry.   Psychiatric/Behavioral:  Negative for behavioral problems and confusion.    All other systems reviewed and are negative.      Historical Information   Past Medical History:   Diagnosis Date    COPD (chronic obstructive pulmonary disease) (HCC)     Hiatal hernia      Past Surgical History:   Procedure Laterality Date    CHOLECYSTECTOMY       Social History     Tobacco Use    Smoking status: Every Day     Current packs/day: 0.25     Types: Cigarettes    Smokeless tobacco: Never   Vaping Use    Vaping status: Never Used   Substance and Sexual Activity    Alcohol use: Never    Drug use: Never    Sexual activity: Not on file     E-Cigarette/Vaping    E-Cigarette Use Never User      E-Cigarette/Vaping Substances     Family history non-contributory  Social History:  Marital Status: Single   Occupation: Unknown  Patient Pre-hospital Living Situation: Home  Patient Pre-hospital Level of Mobility: walks  Patient Pre-hospital Diet Restrictions: Cardiac/diabetic diet    Objective :  Temp:  [99.7 °F (37.6 °C)] 99.7 °F (37.6 °C)  HR:  [] 97  BP: (127)/(77) 127/77  Resp:  [18] 18  SpO2:  [97 %] 97 %  O2 Device: Nasal cannula  Nasal Cannula O2 Flow Rate (L/min):  [2 L/min] 2 L/min    Physical Exam  Vitals and nursing note reviewed.   Constitutional:       Appearance: She is ill-appearing. She is not diaphoretic.   Eyes:      General: No scleral icterus.        Left eye: No discharge.      Extraocular Movements: Extraocular movements intact.      Conjunctiva/sclera:  Conjunctivae normal.      Pupils: Pupils are equal, round, and reactive to light.   Cardiovascular:      Rate and Rhythm: Normal rate.      Heart sounds: No murmur heard.     No friction rub. No gallop.   Pulmonary:      Breath sounds: Wheezing, rhonchi and rales present.   Abdominal:      General: There is no distension.      Palpations: There is no mass.      Tenderness: There is no abdominal tenderness.      Hernia: No hernia is present.   Musculoskeletal:      Right lower leg: No edema.      Left lower leg: No edema.   Neurological:      Mental Status: She is alert and oriented to person, place, and time.      Cranial Nerves: No cranial nerve deficit.      Sensory: No sensory deficit.      Motor: No weakness.      Coordination: Coordination normal.         Lines/Drains:            Lab Results: I have reviewed the following results:  Results from last 7 days   Lab Units 10/04/24  2019   WBC Thousand/uL 5.02   HEMOGLOBIN g/dL 13.8   HEMATOCRIT % 43.1   PLATELETS Thousands/uL 165   SEGS PCT % 85*   LYMPHO PCT % 9*   MONO PCT % 6   EOS PCT % 0     Results from last 7 days   Lab Units 10/04/24  2019   SODIUM mmol/L 136   POTASSIUM mmol/L 4.0   CHLORIDE mmol/L 99   CO2 mmol/L 28   BUN mg/dL 12   CREATININE mg/dL 0.56*   ANION GAP mmol/L 9   CALCIUM mg/dL 9.0   ALBUMIN g/dL 4.2   TOTAL BILIRUBIN mg/dL 0.28   ALK PHOS U/L 83   ALT U/L 29   AST U/L 32   GLUCOSE RANDOM mg/dL 181*     Results from last 7 days   Lab Units 10/04/24  2019   INR  0.94         Lab Results   Component Value Date    HGBA1C 5.9 (H) 05/22/2023    HGBA1C 6.1 (H) 01/19/2023    HGBA1C 6.6 (H) 09/27/2022     Results from last 7 days   Lab Units 10/04/24  2019   LACTIC ACID mmol/L 3.1*       Imaging Results Review: I reviewed radiology reports from this admission including: chest xray.  Other Study Results Review: No additional pertinent studies reviewed.    Administrative Statements       ** Please Note: This note has been constructed using a voice  recognition system. **

## 2024-10-05 NOTE — RESPIRATORY THERAPY NOTE
RT Protocol Note  Ileana Seaman 64 y.o. female MRN: [id number]  Unit/Bed#: -01 Encounter: [id number]    Assessment    Principal Problem:    Community acquired pneumonia of right lower lobe of lung  Active Problems:    Chronic obstructive pulmonary disease with acute lower respiratory infection (HCC)    Mycobacterial infection, non-TB    Hypomagnesemia    Lactic acidosis    Type 2 diabetes mellitus without complication, without long-term current use of insulin (HCC)      Home Pulmonary Medications:    Home Devices/Therapy: Other (Comment), Home O2 (Trelogy inhaler/Albuterol nebs and rescue inhaler)    Past Medical History:   Diagnosis Date    COPD (chronic obstructive pulmonary disease) (HCC)     Hiatal hernia      Social History     Socioeconomic History    Marital status: Single     Spouse name: None    Number of children: None    Years of education: None    Highest education level: None   Occupational History    None   Tobacco Use    Smoking status: Every Day     Current packs/day: 0.25     Types: Cigarettes    Smokeless tobacco: Never   Vaping Use    Vaping status: Never Used   Substance and Sexual Activity    Alcohol use: Never    Drug use: Never    Sexual activity: None   Other Topics Concern    None   Social History Narrative    None     Social Determinants of Health     Financial Resource Strain: Not on file   Food Insecurity: Not on file   Transportation Needs: Not on file   Physical Activity: Not on file   Stress: Not on file   Social Connections: Unknown (6/18/2024)    Received from [facility]     How often do you feel lonely or isolated from those around you? (Adult - for ages 18 years and over): Not on file   Intimate Partner Violence: Not on file   Housing Stability: Not on file       Subjective         Objective    Physical Exam:   Assessment Type: (P) During-treatment  General Appearance: (P) Alert, Awake  Respiratory Pattern: (P) Normal  Chest Assessment: (P) Chest  "expansion symmetrical  Bilateral Breath Sounds: (P) Diminished  Cough: (P) Non-productive    Vitals:  Blood pressure 122/75, pulse 80, temperature (!) 97.2 °F (36.2 °C), resp. rate 18, height 5' 1\" (1.549 m), weight 48.1 kg (106 lb), SpO2 99%.          Imaging and other studies: Results Review Statement: I personally reviewed the following image studies/reports in PACS and discussed pertinent findings with Radiology:   and chest xray. My interpretation of the radiology images/reports is:  .          Plan    Respiratory Plan: Mild Distress pathway        Resp Comments: (P) pt stated she \"had a rough night\". denies any respiratory distress, currently remains on her 2L she uses. no resp distress.   "

## 2024-10-05 NOTE — ASSESSMENT & PLAN NOTE
Patient recently visited different hospital on 10/3/2024, was discharged with Levaquin-did not feel any change, rather than patient is feeling more weak and short of breath  Chest x-ray shows haziness in right lower lobe  Continue IV antibiotic, IV hydration  Follow blood culture, procalcitonin, urine Legionella, pneumonia  COVID/flu panel negative  Patient has history of COPD as well as active smoker.

## 2024-10-05 NOTE — RESPIRATORY THERAPY NOTE
10/04/24 2300   Respiratory Protocol   Protocol Initiated? Yes   Protocol Selection Respiratory   Language Barrier? No   Medical & Social History Reviewed? Yes   Diagnostic Studies Reviewed? Yes   Physical Assessment Performed? Yes   Home Devices/Therapy Other (Comment);Home O2  (Trelogy inhaler/Albuterol nebs and rescue inhaler)   Respiratory Plan Mild Distress pathway   Respiratory Assessment   Assessment Type Pre-treatment   General Appearance Alert;Awake   Respiratory Pattern Normal   Chest Assessment Chest expansion symmetrical   Resp Comments Patient has hx of COPD dx 2yrs ago.Hx of bacteria infection in lungs and was placed on 3 antibiotics 1mo ago.Uses bronchodilators and oxygen at home.Patient speaking in full sentences without incident at this time.

## 2024-10-05 NOTE — ASSESSMENT & PLAN NOTE
As above  Continue IV antibiotic for pneumonia.  As well as azithromycin per COPD protocol  Continue IV steroid, inhaled nebulizer  Follow respiratory protocol  Patient does use 2 L of oxygen chronically-will continue

## 2024-10-05 NOTE — ASSESSMENT & PLAN NOTE
Patient presented last evening (10/4) with complaints of persistent/progressive fatigue, decreased appetite, difficulty swallowing, cough, wheezing, shortness of breath  Notably, had presented to outside facility 2 days prior and was diagnosed with right upper lobe pneumonia-discharged with Levaquin and prednisone  However, patient notes persistent/progressive symptoms    In ED, hemodynamically stable, maintained on baseline oxygen status of 2 L without significant desaturation, negative SIRS criteria  However, patient with episodes of tachycardia and use of accessory respiratory muscles prompting request for admission with index suspicion for right upper lobe pneumonia causing exacerbation of underlying COPD/emphysema    Continue Rocephin Zithromax  Obtain urine for strep and Legionella antigens  Sputum culture if able  Mucolytic's and antitussives  COVID, flu, RSV negative  Treat underlying COPD/emphysema (see below)  Monitor for progressive hypoxia    Notably, patient with history of MAC-is to be maintained on Rifabutin 300 mg once a day, ethambutol 800 mg once a day and azithromycin 250 mg once a day.  Has not been taking as has been feeling ill

## 2024-10-05 NOTE — ASSESSMENT & PLAN NOTE
Continue IV antibiotic for pneumonia.  As well as azithromycin per COPD protocol  Continue IV steroid, inhaled nebulizer  Follow respiratory protocol  Patient does use 2 L of oxygen chronically-will continue

## 2024-10-05 NOTE — ASSESSMENT & PLAN NOTE
Lactic acid was found 3.1, patient is getting treatment for pneumonia-most likely the cause.  Continue IV hydration and recheck.

## 2024-10-05 NOTE — PLAN OF CARE
Problem: PAIN - ADULT  Goal: Verbalizes/displays adequate comfort level or baseline comfort level  Description: Interventions:  - Encourage patient to monitor pain and request assistance  - Assess pain using appropriate pain scale  - Administer analgesics based on type and severity of pain and evaluate response  - Implement non-pharmacological measures as appropriate and evaluate response  - Consider cultural and social influences on pain and pain management  - Notify physician/advanced practitioner if interventions unsuccessful or patient reports new pain  Outcome: Progressing     Problem: INFECTION - ADULT  Goal: Absence or prevention of progression during hospitalization  Description: INTERVENTIONS:  - Assess and monitor for signs and symptoms of infection  - Monitor lab/diagnostic results  - Monitor all insertion sites, i.e. indwelling lines, tubes, and drains  - Monitor endotracheal if appropriate and nasal secretions for changes in amount and color  - [place] appropriate cooling/warming therapies per order  - Administer medications as ordered  - Instruct and encourage patient and family to use good hand hygiene technique  - Identify and instruct in appropriate isolation precautions for identified infection/condition  Outcome: Progressing     Problem: SAFETY ADULT  Goal: Patient will remain free of falls  Description: INTERVENTIONS:  - Educate patient/family on patient safety including physical limitations  - Instruct patient to call for assistance with activity   - Consult OT/PT to assist with strengthening/mobility   - Keep Call bell within reach  - Keep bed low and locked with side rails adjusted as appropriate  - Keep care items and personal belongings within reach  - Initiate and maintain comfort rounds  - Make Fall Risk Sign visible to staff  - Offer Toileting every 2 Hours, in advance of need  - Initiate/Maintain bed/chair alarm  - Apply yellow socks and bracelet for high fall risk patients  -  Consider moving patient to room near nurses station  Outcome: Progressing  Goal: Maintain or return to baseline ADL function  Description: INTERVENTIONS:  -  Assess patient's ability to carry out ADLs; assess patient's baseline for ADL function and identify physical deficits which impact ability to perform ADLs (bathing, care of mouth/teeth, toileting, grooming, dressing, etc.)  - Assess/evaluate cause of self-care deficits   - Assess range of motion  - Assess patient's mobility; develop plan if impaired  - Assess patient's need for assistive devices and provide as appropriate  - Encourage maximum independence but intervene and supervise when necessary  - Involve family in performance of ADLs  - Assess for home care needs following discharge   - Consider OT consult to assist with ADL evaluation and planning for discharge  - Provide patient education as appropriate  Outcome: Progressing  Goal: Maintains/Returns to pre admission functional level  Description: INTERVENTIONS:  - Perform AM-PAC 6 Click Basic Mobility/ Daily Activity assessment daily.  - Set and communicate daily mobility goal to care team and patient/family/caregiver.   - Collaborate with rehabilitation services on mobility goals if consulted  - Perform Range of Motion 4 times a day.  - Reposition patient every 2 hours.  - Stand patient 5 times a day  - Out of bed to chair 3 times a day   - Out of bed for toileting  - Record patient progress and toleration of activity level   Outcome: Progressing     Problem: DISCHARGE PLANNING  Goal: Discharge to home or other facility with appropriate resources  Description: INTERVENTIONS:  - Identify barriers to discharge w/patient and caregiver  - Arrange for needed discharge resources and transportation as appropriate  - Identify discharge learning needs (meds, wound care, etc.)  - Arrange for interpretive services to assist at discharge as needed  - Refer to Case Management Department for coordinating discharge  planning if the patient needs post-hospital services based on physician/advanced practitioner order or complex needs related to functional status, cognitive ability, or social support system  Outcome: Progressing     Problem: Knowledge Deficit  Goal: Patient/family/caregiver demonstrates understanding of disease process, treatment plan, medications, and discharge instructions  Description: Complete learning assessment and assess knowledge base.  Interventions:  - Provide teaching at level of understanding  - Provide teaching via preferred learning methods  Outcome: Progressing     Problem: RESPIRATORY - ADULT  Goal: Achieves optimal ventilation and oxygenation  Description: INTERVENTIONS:  - Assess for changes in respiratory status  - Assess for changes in mentation and behavior  - Position to facilitate oxygenation and minimize respiratory effort  - Oxygen administered by appropriate delivery if ordered  - Initiate smoking cessation education as indicated  - Encourage broncho-pulmonary hygiene including cough, deep breathe, Incentive Spirometry  - Assess the need for suctioning and aspirate as needed  - Assess and instruct to report SOB or any respiratory difficulty  - Respiratory Therapy support as indicated  Outcome: Progressing     Problem: METABOLIC, FLUID AND ELECTROLYTES - ADULT  Goal: Glucose maintained within target range  Description: INTERVENTIONS:  - Monitor Blood Glucose as ordered  - Assess for signs and symptoms of hyperglycemia and hypoglycemia  - Administer ordered medications to maintain glucose within target range  - Assess nutritional intake and initiate nutrition service referral as needed  Outcome: Progressing

## 2024-10-05 NOTE — PROGRESS NOTES
Progress Note - Hospitalist   Name: Ileana Seaman 64 y.o. female I MRN: [id number]  Unit/Bed#: -01 I Date of Admission: 10/4/2024   Date of Service: 10/5/2024 I Hospital Day: 1    Assessment & Plan  Community acquired pneumonia of right upper lobe of lung  Patient presented last evening (10/4) with complaints of persistent/progressive fatigue, decreased appetite, difficulty swallowing, cough, wheezing, shortness of breath  Notably, had presented to outside facility 2 days prior and was diagnosed with right upper lobe pneumonia-discharged with Levaquin and prednisone  However, patient notes persistent/progressive symptoms    In ED, hemodynamically stable, maintained on baseline oxygen status of 2 L without significant desaturation, negative SIRS criteria  However, patient with episodes of tachycardia and use of accessory respiratory muscles prompting request for admission with index suspicion for right upper lobe pneumonia causing exacerbation of underlying COPD/emphysema    Continue Rocephin Zithromax  Obtain urine for strep and Legionella antigens  Sputum culture if able  Mucolytic's and antitussives  COVID, flu, RSV negative  Treat underlying COPD/emphysema (see below)  Monitor for progressive hypoxia    Notably, patient with history of MAC-is to be maintained on Rifabutin 300 mg once a day, ethambutol 800 mg once a day and azithromycin 250 mg once a day.  Has not been taking as has been feeling ill  Chronic obstructive pulmonary disease with acute lower respiratory infection (HCC)  As above  Continue IV antibiotic for pneumonia.  As well as azithromycin per COPD protocol  Continue IV steroid, inhaled nebulizer  Follow respiratory protocol  Patient does use 2 L of oxygen chronically-will continue  Mycobacterial infection, non-TB  Per chart review, patient does follow-up with [facility] infectious disease,  Last note from infectious disease reviewed from telemedicine on 8/27/2024-Per note, patient  "supposed to take Rifabutin 300 mg once a day, ethambutol 800 mg once a day and azithromycin 250 mg once a day.     Per patient, she is not taking this medication since last week when she started feeling sick  Consider to discuss with infectious disease about the above treatment        Hypomagnesemia  On arrival, magnesium is 1.6, status post IV supplement, recheck  Type 2 diabetes mellitus without complication, without long-term current use of insulin ([facility])  Lab Results   Component Value Date    HGBA1C 5.9 (H) 05/22/2023       Recent Labs     10/04/24  2305 10/05/24  0711 10/05/24  1058   POCGLU 155* 148* 127       Blood Sugar Average: Last 72 hrs:  (P) [id number]  Per chart review, back in 22 September, A1c was running 6.6  Recent A1c which was done in May 2023-came 5.9  Due to recent illness, patient is not eating enough, per daughter, patient also has history of hiatal hernia-concern for hypoglycemia  Continue to monitor.  Dysphagia  Patient states that she has been experiencing dysphagia over the past 3 to 4 days  Notes that her appetite had been low over the past several days is not feeling well with ongoing cough  However, states symptoms have changed over the past 3 to 4 days she is \"unable to swallow\"  Patient notes that she attempted to eat chicken noodle soup in the noodles \"got stuck in her throat\"  Patient denies history of dysphagia  Patient does have known hiatal hernia  Will proceed with imaging of the soft tissues of the neck  Have consulted speech therapy  ENT/GI consultation if necessary  Patient with ability to protect airway    VTE Pharmacologic Prophylaxis: VTE Score: 5 Moderate Risk (Score 3-4) - Pharmacological DVT Prophylaxis Ordered: enoxaparin (Lovenox).    Mobility:   -[facility] Achieved: 4: Move to chair/commode  -[facility] Goal achieved. Continue to encourage appropriate mobility.    Patient Centered Rounds: I performed bedside rounds with nursing staff today.   Discussions with " "Specialists or Other Care Team Provider: Speech therapy    Education and Discussions with Family / Patient: Attempted to update  (daughter) via phone. Unable to contact.    Current Length of Stay: 1 day(s)  Current Patient Status: Inpatient   Certification Statement: The patient will continue to require additional inpatient hospital stay due to dysphagia/COPD exacerbation  Discharge Plan: Anticipate discharge in 48 hrs to home.    Code Status: Level 1 - Full Code    Subjective   Patient states that her breathing is improved however she is still having difficulty swallowing-states that she tolerated a liquid breakfast however she still feels that \"things are getting stuck\" denies current cough or expectoration of sputum.    Objective :  Temp:  [97.2 °F (36.2 °C)-99.7 °F (37.6 °C)] 97.2 °F (36.2 °C)  HR:  [] 80  BP: (118-127)/(75-80) 122/75  Resp:  [18-20] 18  SpO2:  [94 %-99 %] 99 %  O2 Device: Nasal cannula  Nasal Cannula O2 Flow Rate (L/min):  [2 L/min] 2 L/min    Body mass index is 20.03 kg/m².     Input and Output Summary (last 24 hours):     Intake/Output Summary (Last 24 hours) at 10/5/2024 1252  Last data filed at 10/5/2024 1242  Gross per 24 hour   Intake 762.5 ml   Output 2400 ml   Net -1637.5 ml       Physical Exam  Constitutional:       General: She is not in acute distress.     Appearance: She is well-groomed and underweight. She is ill-appearing. She is not toxic-appearing or diaphoretic.   HENT:      Head: Normocephalic and atraumatic.      Right Ear: External ear normal.      Left Ear: External ear normal.      Nose: Nose normal.      Mouth/Throat:      Mouth: Mucous membranes are dry.      Pharynx: Oropharynx is clear.   Eyes:      General: No scleral icterus.     Conjunctiva/sclera: Conjunctivae normal.   Cardiovascular:      Rate and Rhythm: Normal rate and regular rhythm.      Pulses: Normal pulses.      Heart sounds: No murmur heard.  Pulmonary:      Effort: Tachypnea, " accessory muscle usage and prolonged expiration present. No respiratory distress.      Breath sounds: Decreased air movement and transmitted upper airway sounds present. No stridor. Examination of the right-upper field reveals rhonchi. Examination of the right-middle field reveals decreased breath sounds, wheezing and rhonchi. Examination of the left-middle field reveals wheezing. Examination of the right-lower field reveals decreased breath sounds and wheezing. Examination of the left-lower field reveals decreased breath sounds and wheezing. Decreased breath sounds, wheezing and rhonchi present. No rales.   Abdominal:      General: Abdomen is flat. Bowel sounds are normal.      Palpations: Abdomen is soft.   Musculoskeletal:         General: No swelling or deformity. Normal range of motion.      Cervical back: Normal range of motion.      Right lower leg: No edema.      Left lower leg: No edema.   Skin:     General: Skin is warm.      Capillary Refill: Capillary refill takes less than 2 seconds.      Coloration: Skin is not jaundiced.      Findings: No bruising or lesion.   Neurological:      General: No focal deficit present.      Mental Status: She is alert and easily aroused. Mental status is at baseline.      Cranial Nerves: No cranial nerve deficit.      Motor: Weakness present.   Psychiatric:         Mood and Affect: Mood normal.         Behavior: Behavior normal. Behavior is cooperative.         Thought Content: Thought content normal.         Judgment: Judgment normal.           Lines/Drains:              Lab Results: I have reviewed the following results:   Results from last 7 days   Lab Units 10/04/24  2019   WBC Thousand/uL 5.02   HEMOGLOBIN g/dL 13.8   HEMATOCRIT % 43.1   PLATELETS Thousands/uL 165   SEGS PCT % 85*   LYMPHO PCT % 9*   MONO PCT % 6   EOS PCT % 0     Results from last 7 days   Lab Units 10/04/24  2019   SODIUM mmol/L 136   POTASSIUM mmol/L 4.0   CHLORIDE mmol/L 99   CO2 mmol/L 28   BUN  mg/dL 12   CREATININE mg/dL 0.56*   ANION GAP mmol/L 9   CALCIUM mg/dL 9.0   ALBUMIN g/dL 4.2   TOTAL BILIRUBIN mg/dL 0.28   ALK PHOS U/L 83   ALT U/L 29   AST U/L 32   GLUCOSE RANDOM mg/dL 181*     Results from last 7 days   Lab Units 10/04/24  2019   INR  0.94     Results from last 7 days   Lab Units 10/05/24  1058 10/05/24  0711 10/04/24  2305   POC GLUCOSE mg/dl 127 148* 155*         Results from last 7 days   Lab Units 10/05/24  0442 10/04/24  2312 10/04/24  2019   LACTIC ACID mmol/L  --  1.9 3.1*   PROCALCITONIN ng/ml <0.05 <0.05  --        Recent Cultures (last 7 days):           Other Study Results Review: EKG was reviewed.     Last 24 Hours Medication List:     Current Facility-Administered Medications:     acetaminophen (TYLENOL) tablet 650 mg, Q6H PRN    azithromycin (ZITHROMAX) tablet 500 mg, Q24H    budesonide (PULMICORT) inhalation solution 0.5 mg, Q12H    cefTRIAXone (ROCEPHIN) IVPB (premix in dextrose) 1,000 mg 50 mL, Q24H, Last Rate: 1,000 mg (10/05/24 0818)    docusate sodium (COLACE) capsule 100 mg, BID    enoxaparin (LOVENOX) subcutaneous injection 40 mg, Daily    guaiFENesin (MUCINEX) 12 hr tablet 600 mg, BID    insulin lispro (HumALOG/ADMELOG) 100 units/mL subcutaneous injection 1-5 Units, TID AC **AND** Fingerstick Glucose (POCT), TID AC    insulin lispro (HumALOG/ADMELOG) 100 units/mL subcutaneous injection 1-5 Units, HS    ipratropium (ATROVENT) 0.02 % inhalation solution 0.5 mg, TID    levalbuterol (XOPENEX) inhalation solution 1.25 mg, TID **AND** [DISCONTINUED] sodium chloride 0.9 % inhalation solution 3 mL, TID    methylPREDNISolone sodium succinate (Solu-MEDROL) injection 40 mg, Q8H    nicotine (NICODERM CQ) 14 mg/24hr TD 24 hr patch 1 patch, Daily    ondansetron (ZOFRAN) injection 4 mg, Once    ondansetron (ZOFRAN) injection 4 mg, Q6H PRN    polyethylene glycol (MIRALAX) packet 17 g, Daily    sodium chloride 0.9 % infusion, Continuous, Last Rate: 125 mL/hr (10/05/24  3577)    Administrative Statements   Today, Patient Was Seen By: Raoul Koehler, DO  I have spent a total time of 35   minutes in caring for this patient on the day of the visit/encounter including Diagnostic results, Prognosis, Patient and family education, Impressions, and Documenting in the medical record.    **Please Note: This note may have been constructed using a voice recognition system.**

## 2024-10-05 NOTE — ED PROVIDER NOTES
Final diagnoses:   Pneumonia   COPD exacerbation (HCC)     ED Disposition       ED Disposition   Admit    Condition   Stable    Date/Time   Fri Oct 4, 2024  9:01 PM    Comment   Case was discussed with Dr Parker and the patient's admission status was agreed to be Admission Status: inpatient status to the service of Dr. Parker .               Assessment & Plan       Medical Decision Making  This patient presents with symptoms suspicious for likely pneumonia.  Based on history and physical doubt sinusitis.  Nasal swab was sent for COVID, RSV and influenza testing which is negative.  Do not suspect any underlying cardiopulmonary process.   Patient with history of COPD, chronically on oxygen, with worsening shortness of breath, generalized weakness and fatigue.  Also known history of MAC, supposed to be on triple antibiotic therapy but she stopped taking it last week when she started feeling unwell.  Therefore admission advised, patient in agreement, discussed with hospitalist who will accept for admission    Problems Addressed:  COPD exacerbation (HCC): chronic illness or injury with exacerbation, progression, or side effects of treatment  Pneumonia: acute illness or injury    Amount and/or Complexity of Data Reviewed  Labs: ordered. Decision-making details documented in ED Course.  Radiology: ordered and independent interpretation performed. Decision-making details documented in ED Course.  ECG/medicine tests: ordered and independent interpretation performed. Decision-making details documented in ED Course.    Risk  Prescription drug management.  Decision regarding hospitalization.             Medications   Famotidine (PF) (PEPCID) injection 20 mg (20 mg Intravenous Not Given 10/4/24 2050)   ondansetron (ZOFRAN) injection 4 mg (4 mg Intravenous Not Given 10/4/24 2036)   sodium chloride 0.9 % bolus 1,000 mL (1,000 mL Intravenous New Bag 10/4/24 2034)   magnesium sulfate 2 g/50 mL IVPB (premix) 2 g (has no administration  in time range)   cefTRIAXone (ROCEPHIN) IVPB (premix in dextrose) 1,000 mg 50 mL (1,000 mg Intravenous New Bag 10/4/24 2103)   sodium chloride 0.9 % bolus 500 mL (has no administration in time range)       ED Risk Strat Scores                           SBIRT 20yo+      Flowsheet Row Most Recent Value   Initial Alcohol Screen: US AUDIT-C     1. How often do you have a drink containing alcohol? 0 Filed at: 10/04/2024 2050   2. How many drinks containing alcohol do you have on a typical day you are drinking?  0 Filed at: 10/04/2024 2050   3a. Male UNDER 65: How often do you have five or more drinks on one occasion? 0 Filed at: 10/04/2024 2050   3b. FEMALE Any Age, or MALE 65+: How often do you have 4 or more drinks on one occassion? 0 Filed at: 10/04/2024 2050   Audit-C Score 0 Filed at: 10/04/2024 2050   LANDY: How many times in the past year have you...    Used an illegal drug or used a prescription medication for non-medical reasons? Never Filed at: 10/04/2024 2050                            History of Present Illness       Chief Complaint   Patient presents with    Medical Problem     Pt dx with Pna yesterday after lunchtime in ED. Pt unable to keep food down. Pt feels like her esophagus is blocked by something. Pt having body cramps and shaking all over. Pt is on 2L O2 chronically at  home. PMHX: COPD, DM       Past Medical History:   Diagnosis Date    COPD (chronic obstructive pulmonary disease) (HCC)     Hiatal hernia       Past Surgical History:   Procedure Laterality Date    CHOLECYSTECTOMY        History reviewed. No pertinent family history.   Social History     Tobacco Use    Smoking status: Every Day     Current packs/day: 0.25     Types: Cigarettes    Smokeless tobacco: Never   Vaping Use    Vaping status: Never Used   Substance Use Topics    Alcohol use: Never    Drug use: Never      E-Cigarette/Vaping    E-Cigarette Use Never User       E-Cigarette/Vaping Substances      I have reviewed and agree with  the history as documented.     Patient is a 64-year-old female presenting to the emergency department today complaining of generalized fatigue, body aches, shortness of breath, cough and congestion, nausea and vomiting, was seen at outside hospital yesterday and diagnosed with pneumonia, currently taking Levaquin and oral steroids, however reports feeling worse today        Review of Systems   Constitutional:  Positive for chills, fatigue and fever.   HENT:  Positive for congestion.    Eyes: Negative.    Respiratory:  Positive for cough and shortness of breath.    Cardiovascular: Negative.    Gastrointestinal:  Positive for nausea and vomiting.   Endocrine: Negative.    Genitourinary: Negative.    Musculoskeletal:  Positive for myalgias.   Skin: Negative.    Allergic/Immunologic: Negative.    Neurological: Negative.    Hematological: Negative.    Psychiatric/Behavioral: Negative.             Objective       ED Triage Vitals [10/04/24 1954]   Temperature Pulse Blood Pressure Respirations SpO2 Patient Position - Orthostatic VS   99.7 °F (37.6 °C) (!) 111 127/77 18 97 % --      Temp Source Heart Rate Source BP Location FiO2 (%) Pain Score    Temporal Monitor -- -- --      Vitals      Date and Time Temp Pulse SpO2 Resp BP Pain Score FACES Pain Rating User   10/04/24 1954 99.7 °F (37.6 °C) 111 97 % 18 127/77 -- -- RG            Physical Exam  Constitutional:       Appearance: She is well-developed. She is ill-appearing.   HENT:      Head: Normocephalic and atraumatic.      Mouth/Throat:      Mouth: Mucous membranes are dry.   Eyes:      Conjunctiva/sclera: Conjunctivae normal.      Pupils: Pupils are equal, round, and reactive to light.   Cardiovascular:      Rate and Rhythm: Tachycardia present.   Pulmonary:      Effort: Pulmonary effort is normal.   Abdominal:      Palpations: Abdomen is soft.      Tenderness: There is abdominal tenderness in the epigastric area.   Musculoskeletal:         General: Normal range of  motion.      Cervical back: Normal range of motion and neck supple.   Skin:     General: Skin is warm and dry.   Neurological:      Mental Status: She is alert and oriented to person, place, and time.         Results Reviewed       Procedure Component Value Units Date/Time    HS Troponin 0hr (reflex protocol) [[id number]]  (Normal) Collected: 10/04/24 2019    Lab Status: Final result Specimen: Blood from Arm, Left Updated: 10/04/24 2055     hs TnI 0hr 6 ng/L     B-Type Natriuretic Peptide(BNP) [[id number]]  (Normal) Collected: 10/04/24 2019    Lab Status: Final result Specimen: Blood from Arm, Left Updated: 10/04/24 2055     BNP 34 pg/mL     Comprehensive metabolic panel [[id number]]  (Abnormal) Collected: 10/04/24 2019    Lab Status: Final result Specimen: Blood from Arm, Left Updated: 10/04/24 2052     Sodium 136 mmol/L      Potassium 4.0 mmol/L      Chloride 99 mmol/L      CO2 28 mmol/L      ANION GAP 9 mmol/L      BUN 12 mg/dL      Creatinine 0.56 mg/dL      Glucose 181 mg/dL      Calcium 9.0 mg/dL      AST 32 U/L      ALT 29 U/L      Alkaline Phosphatase 83 U/L      Total Protein 7.2 g/dL      Albumin 4.2 g/dL      Total Bilirubin 0.28 mg/dL      eGFR 97 ml/min/1.73sq m     Narrative:      National Kidney Disease Foundation guidelines for Chronic Kidney Disease (CKD):     Stage 1 with normal or high GFR (GFR > 90 mL/min/1.73 square meters)    Stage 2 Mild CKD (GFR = 60-89 mL/min/1.73 square meters)    Stage 3A Moderate CKD (GFR = 45-59 mL/min/1.73 square meters)    Stage 3B Moderate CKD (GFR = 30-44 mL/min/1.73 square meters)    Stage 4 Severe CKD (GFR = 15-29 mL/min/1.73 square meters)    Stage 5 End Stage CKD (GFR <15 mL/min/1.73 square meters)  Note: GFR calculation is accurate only with a steady state creatinine    Magnesium [[id number]]  (Abnormal) Collected: 10/04/24 2019    Lab Status: Final result Specimen: Blood from Arm, Left Updated: 10/04/24 2052     Magnesium 1.6 mg/dL     Lactic acid, plasma (w/reflex  if result > 2.0) [[id number]]  (Abnormal) Collected: 10/04/24 2019    Lab Status: Final result Specimen: Blood from Arm, Left Updated: 10/04/24 2052     LACTIC ACID 3.1 mmol/L     Narrative:      Result may be elevated if tourniquet was used during collection.    Lactic acid 2 Hours [[id number]]     Lab Status: No result Specimen: Blood     D-Dimer [[id number]]  (Normal) Collected: 10/04/24 2019    Lab Status: Final result Specimen: Blood from Arm, Left Updated: 10/04/24 2046     D-Dimer, Quant 0.34 ug/ml FEU     Narrative:      In the evaluation for possible pulmonary embolism, in the appropriate (Well's Score of 4 or less) patient, the age adjusted d-dimer cutoff for this patient can be calculated as:    Age x 0.01 (in ug/mL) for Age-adjusted D-dimer exclusion threshold for a patient over 50 years.    FLU/COVID Rapid Antigen (30 min. TAT) - Preferred screening test in ED [[id number]]  (Normal) Collected: 10/04/24 2019    Lab Status: Final result Specimen: Nares from Nose Updated: 10/04/24 2045     SARS COV Rapid Antigen Negative     Influenza A Rapid Antigen Negative     Influenza B Rapid Antigen Negative    Narrative:      This test has been performed using the Quidel Rhea 2 FLU+SARS Antigen test under the Emergency Use Authorization (EUA). This test has been validated by the  and verified by the performing laboratory. The Rhea uses lateral flow immunofluorescent sandwich assay to detect SARS-COV, Influenza A and Influenza B Antigen.     The Quidel Rhea 2 SARS Antigen test does not differentiate between SARS-CoV and SARS-CoV-2.     Negative results are presumptive and may be confirmed with a molecular assay, if necessary, for patient management. Negative results do not rule out SARS-CoV-2 or influenza infection and should not be used as the sole basis for treatment or patient management decisions. A negative test result may occur if the level of antigen in a sample is below the limit of detection of  this test.     Positive results are indicative of the presence of viral antigens, but do not rule out bacterial infection or co-infection with other viruses.     All test results should be used as an adjunct to clinical observations and other information available to the provider.    FOR PEDIATRIC PATIENTS - copy/paste COVID Guidelines URL to browser: [email or web address]    Protime-INR [[id number]]  (Normal) Collected: 10/04/24 2019    Lab Status: Final result Specimen: Blood from Arm, Left Updated: 10/04/24 2044     Protime 12.9 seconds      INR 0.94    Narrative:      INR Therapeutic Range    Indication                                             INR Range      Atrial Fibrillation                                               2.0-3.0  Hypercoagulable State                                    2.0.2.3  Left Ventricular Asist Device                            2.0-3.0  Mechanical Heart Valve                                  -    Aortic(with afib, MI, embolism, HF, LA enlargement,    and/or coagulopathy)                                     2.0-3.0 (2.5-3.5)     Mitral                                                             2.5-3.5  Prosthetic/Bioprosthetic Heart Valve               2.0-3.0  Venous thromboembolism (VTE: VT, PE        2.0-3.0    APTT [[id number]]  (Normal) Collected: 10/04/24 2019    Lab Status: Final result Specimen: Blood from Arm, Left Updated: 10/04/24 2044     PTT 24 seconds     Blood culture #2 [[id number]] Collected: 10/04/24 2035    Lab Status: In process Specimen: Blood from Arm, Left Updated: 10/04/24 2038    CBC and differential [[id number]]  (Abnormal) Collected: 10/04/24 2019    Lab Status: Final result Specimen: Blood from Arm, Left Updated: 10/04/24 2029     WBC 5.02 Thousand/uL      RBC 4.75 Million/uL      Hemoglobin 13.8 g/dL      Hematocrit 43.1 %      MCV 91 fL      MCH 29.1 pg      MCHC 32.0 g/dL      RDW 13.6 %      MPV 11.4 fL       Platelets 165 Thousands/uL      nRBC 0 /100 WBCs      Segmented % 85 %      Immature Grans % 0 %      Lymphocytes % 9 %      Monocytes % 6 %      Eosinophils Relative 0 %      Basophils Relative 0 %      Absolute Neutrophils 4.24 Thousands/µL      Absolute Immature Grans 0.02 Thousand/uL      Absolute Lymphocytes 0.46 Thousands/µL      Absolute Monocytes 0.29 Thousand/µL      Eosinophils Absolute 0.00 Thousand/µL      Basophils Absolute 0.01 Thousands/µL     Blood culture #1 [[id number]] Collected: 10/04/24 2019    Lab Status: In process Specimen: Blood from Arm, Left Updated: 10/04/24 2026            XR chest 2 views   ED Interpretation by Laney Marcos DO (10/04 2030)   Patchy right lower lobe infiltrates          ECG 12 Lead Documentation Only    Date/Time: 10/4/2024 8:59 PM    Performed by: Laney Marcos DO  Authorized by: Laney Marcos DO    Indications / Diagnosis:  Shortness of breath  ECG reviewed by me, the ED Provider: yes    Patient location:  ED  Previous ECG:     Comparison to cardiac monitor: Yes    Interpretation:     Interpretation: abnormal    Rate:     ECG rate:  103    ECG rate assessment: tachycardic    Rhythm:     Rhythm: sinus tachycardia    Ectopy:     Ectopy: none    QRS:     QRS intervals:  Normal  Conduction:     Conduction: normal    ST segments:     ST segments:  Normal  T waves:     T waves: normal        ED Medication and Procedure Management   None     Patient's Medications    No medications on file     No discharge procedures on file.  ED SEPSIS DOCUMENTATION   Time reflects when diagnosis was documented in both MDM as applicable and the Disposition within this note       Time User Action Codes Description Comment    10/4/2024  9:01 PM Laney Marcos [J18.9] Pneumonia     10/4/2024  9:01 PM Laney Marcos [J44.1] COPD exacerbation (HCC)                  Laney Marcos DO  10/04/24 2119

## 2024-10-05 NOTE — ASSESSMENT & PLAN NOTE
"Patient states that she has been experiencing dysphagia over the past 3 to 4 days  Notes that her appetite had been low over the past several days is not feeling well with ongoing cough  However, states symptoms have changed over the past 3 to 4 days she is \"unable to swallow\"  Patient notes that she attempted to eat chicken noodle soup in the noodles \"got stuck in her throat\"  Patient denies history of dysphagia  Patient does have known hiatal hernia  Will proceed with imaging of the soft tissues of the neck  Have consulted speech therapy  ENT/GI consultation if necessary  Patient with ability to protect airway  "

## 2024-10-05 NOTE — ASSESSMENT & PLAN NOTE
Per chart review, patient does follow-up with [facility] infectious disease,  Last note from infectious disease reviewed from telemedicine on 8/27/2024-Per note, patient supposed to take Rifabutin 300 mg once a day, ethambutol 800 mg once a day and azithromycin 250 mg once a day.     Per patient, she is not taking this medication since last week when she started feeling sick  Consider to discuss with infectious disease about the above treatment

## 2024-10-05 NOTE — ASSESSMENT & PLAN NOTE
Lab Results   Component Value Date    HGBA1C 5.9 (H) 05/22/2023       Recent Labs     10/04/24  2305   POCGLU 155*       Blood Sugar Average: Last 72 hrs:  (P) 155  Per chart review, back in 22 September, A1c was running 6.6  Recent A1c which was done in May 2023-came 5.9  Due to recent illness, patient is not eating enough, per daughter, patient also has history of hiatal hernia-concern for hypoglycemia  Continue to monitor.

## 2024-10-06 PROBLEM — I48.91 NEW ONSET ATRIAL FIBRILLATION (HCC): Status: ACTIVE | Noted: 2024-10-06

## 2024-10-06 LAB
ANION GAP SERPL CALCULATED.3IONS-SCNC: 3 MMOL/L (ref 4–13)
APTT PPP: 27 SECONDS (ref 23–34)
APTT PPP: 55 SECONDS (ref 23–34)
APTT PPP: 58 SECONDS (ref 23–34)
BACTERIA SPT RESP CULT: ABNORMAL
BUN SERPL-MCNC: 10 MG/DL (ref 5–25)
CALCIUM SERPL-MCNC: 8.8 MG/DL (ref 8.4–10.2)
CHLORIDE SERPL-SCNC: 107 MMOL/L (ref 96–108)
CO2 SERPL-SCNC: 31 MMOL/L (ref 21–32)
CREAT SERPL-MCNC: 0.53 MG/DL (ref 0.6–1.3)
ERYTHROCYTE [DISTWIDTH] IN BLOOD BY AUTOMATED COUNT: 14.1 % (ref 11.6–15.1)
GFR SERPL CREATININE-BSD FRML MDRD: 99 ML/MIN/1.73SQ M
GLUCOSE SERPL-MCNC: 132 MG/DL (ref 65–140)
GLUCOSE SERPL-MCNC: 133 MG/DL (ref 65–140)
GLUCOSE SERPL-MCNC: 144 MG/DL (ref 65–140)
GLUCOSE SERPL-MCNC: 170 MG/DL (ref 65–140)
GLUCOSE SERPL-MCNC: 197 MG/DL (ref 65–140)
GRAM STN SPEC: ABNORMAL
HCT VFR BLD AUTO: 40.2 % (ref 34.8–46.1)
HGB BLD-MCNC: 12.5 G/DL (ref 11.5–15.4)
INR PPP: 1.22 (ref 0.85–1.19)
MAGNESIUM SERPL-MCNC: 1.9 MG/DL (ref 1.9–2.7)
MCH RBC QN AUTO: 28.7 PG (ref 26.8–34.3)
MCHC RBC AUTO-ENTMCNC: 31.1 G/DL (ref 31.4–37.4)
MCV RBC AUTO: 92 FL (ref 82–98)
MRSA NOSE QL CULT: NORMAL
PLATELET # BLD AUTO: 181 THOUSANDS/UL (ref 149–390)
PMV BLD AUTO: 12.3 FL (ref 8.9–12.7)
POTASSIUM SERPL-SCNC: 4.2 MMOL/L (ref 3.5–5.3)
PROTHROMBIN TIME: 15.8 SECONDS (ref 12.3–15)
RBC # BLD AUTO: 4.35 MILLION/UL (ref 3.81–5.12)
SODIUM SERPL-SCNC: 141 MMOL/L (ref 135–147)
TSH SERPL DL<=0.05 MIU/L-ACNC: 0.47 UIU/ML (ref 0.45–4.5)
WBC # BLD AUTO: 7.39 THOUSAND/UL (ref 4.31–10.16)

## 2024-10-06 PROCEDURE — 85730 THROMBOPLASTIN TIME PARTIAL: CPT | Performed by: FAMILY MEDICINE

## 2024-10-06 PROCEDURE — 94760 N-INVAS EAR/PLS OXIMETRY 1: CPT

## 2024-10-06 PROCEDURE — 94640 AIRWAY INHALATION TREATMENT: CPT

## 2024-10-06 PROCEDURE — 99233 SBSQ HOSP IP/OBS HIGH 50: CPT | Performed by: FAMILY MEDICINE

## 2024-10-06 PROCEDURE — 85610 PROTHROMBIN TIME: CPT | Performed by: FAMILY MEDICINE

## 2024-10-06 PROCEDURE — 85027 COMPLETE CBC AUTOMATED: CPT | Performed by: FAMILY MEDICINE

## 2024-10-06 PROCEDURE — 84443 ASSAY THYROID STIM HORMONE: CPT | Performed by: FAMILY MEDICINE

## 2024-10-06 PROCEDURE — 93005 ELECTROCARDIOGRAM TRACING: CPT

## 2024-10-06 PROCEDURE — 92610 EVALUATE SWALLOWING FUNCTION: CPT

## 2024-10-06 PROCEDURE — 82948 REAGENT STRIP/BLOOD GLUCOSE: CPT

## 2024-10-06 PROCEDURE — 87205 SMEAR GRAM STAIN: CPT | Performed by: FAMILY MEDICINE

## 2024-10-06 PROCEDURE — 83735 ASSAY OF MAGNESIUM: CPT | Performed by: FAMILY MEDICINE

## 2024-10-06 PROCEDURE — 80048 BASIC METABOLIC PNL TOTAL CA: CPT | Performed by: FAMILY MEDICINE

## 2024-10-06 RX ORDER — METOPROLOL TARTRATE 25 MG/1
25 TABLET, FILM COATED ORAL EVERY 12 HOURS SCHEDULED
Status: DISCONTINUED | OUTPATIENT
Start: 2024-10-06 | End: 2024-10-09 | Stop reason: HOSPADM

## 2024-10-06 RX ORDER — HEPARIN SODIUM 1000 [USP'U]/ML
2700 INJECTION, SOLUTION INTRAVENOUS; SUBCUTANEOUS ONCE
Status: COMPLETED | OUTPATIENT
Start: 2024-10-06 | End: 2024-10-06

## 2024-10-06 RX ORDER — METOPROLOL TARTRATE 1 MG/ML
5 INJECTION, SOLUTION INTRAVENOUS ONCE
Status: COMPLETED | OUTPATIENT
Start: 2024-10-06 | End: 2024-10-06

## 2024-10-06 RX ORDER — DILTIAZEM HYDROCHLORIDE 30 MG/1
30 TABLET, FILM COATED ORAL EVERY 6 HOURS SCHEDULED
Status: DISCONTINUED | OUTPATIENT
Start: 2024-10-06 | End: 2024-10-07

## 2024-10-06 RX ORDER — HEPARIN SODIUM 1000 [USP'U]/ML
1350 INJECTION, SOLUTION INTRAVENOUS; SUBCUTANEOUS EVERY 6 HOURS PRN
Status: DISCONTINUED | OUTPATIENT
Start: 2024-10-06 | End: 2024-10-07

## 2024-10-06 RX ORDER — HEPARIN SODIUM 1000 [USP'U]/ML
2700 INJECTION, SOLUTION INTRAVENOUS; SUBCUTANEOUS EVERY 6 HOURS PRN
Status: DISCONTINUED | OUTPATIENT
Start: 2024-10-06 | End: 2024-10-07

## 2024-10-06 RX ORDER — HEPARIN SODIUM 10000 [USP'U]/100ML
3-20 INJECTION, SOLUTION INTRAVENOUS
Status: DISCONTINUED | OUTPATIENT
Start: 2024-10-06 | End: 2024-10-07

## 2024-10-06 RX ADMIN — LEVALBUTEROL HYDROCHLORIDE 1.25 MG: 1.25 SOLUTION RESPIRATORY (INHALATION) at 13:30

## 2024-10-06 RX ADMIN — GUAIFENESIN 600 MG: 600 TABLET ORAL at 17:36

## 2024-10-06 RX ADMIN — IPRATROPIUM BROMIDE 0.5 MG: 0.5 SOLUTION RESPIRATORY (INHALATION) at 07:13

## 2024-10-06 RX ADMIN — HEPARIN SODIUM 1350 UNITS: 1000 INJECTION INTRAVENOUS; SUBCUTANEOUS at 23:34

## 2024-10-06 RX ADMIN — METOPROLOL TARTRATE 5 MG: 5 INJECTION INTRAVENOUS at 08:42

## 2024-10-06 RX ADMIN — IPRATROPIUM BROMIDE 0.5 MG: 0.5 SOLUTION RESPIRATORY (INHALATION) at 13:30

## 2024-10-06 RX ADMIN — LEVALBUTEROL HYDROCHLORIDE 1.25 MG: 1.25 SOLUTION RESPIRATORY (INHALATION) at 07:13

## 2024-10-06 RX ADMIN — FLUTICASONE FUROATE, UMECLIDINIUM BROMIDE AND VILANTEROL TRIFENATATE 1 PUFF: 100; 62.5; 25 POWDER RESPIRATORY (INHALATION) at 09:18

## 2024-10-06 RX ADMIN — INSULIN LISPRO 1 UNITS: 100 INJECTION, SOLUTION INTRAVENOUS; SUBCUTANEOUS at 08:31

## 2024-10-06 RX ADMIN — HEPARIN SODIUM 1350 UNITS: 1000 INJECTION INTRAVENOUS; SUBCUTANEOUS at 16:38

## 2024-10-06 RX ADMIN — METOPROLOL TARTRATE 25 MG: 25 TABLET, FILM COATED ORAL at 09:18

## 2024-10-06 RX ADMIN — METHYLPREDNISOLONE SODIUM SUCCINATE 40 MG: 40 INJECTION, POWDER, FOR SOLUTION INTRAMUSCULAR; INTRAVENOUS at 16:38

## 2024-10-06 RX ADMIN — BUDESONIDE INHALATION 0.5 MG: 0.5 SUSPENSION RESPIRATORY (INHALATION) at 07:13

## 2024-10-06 RX ADMIN — CEFTRIAXONE 1000 MG: 1 INJECTION, SOLUTION INTRAVENOUS at 09:18

## 2024-10-06 RX ADMIN — SODIUM CHLORIDE 125 ML/HR: 0.9 INJECTION, SOLUTION INTRAVENOUS at 05:14

## 2024-10-06 RX ADMIN — INSULIN LISPRO 1 UNITS: 100 INJECTION, SOLUTION INTRAVENOUS; SUBCUTANEOUS at 22:31

## 2024-10-06 RX ADMIN — GUAIFENESIN 600 MG: 600 TABLET ORAL at 09:19

## 2024-10-06 RX ADMIN — NICOTINE 1 PATCH: 14 PATCH, EXTENDED RELEASE TRANSDERMAL at 09:19

## 2024-10-06 RX ADMIN — METHYLPREDNISOLONE SODIUM SUCCINATE 40 MG: 40 INJECTION, POWDER, FOR SOLUTION INTRAMUSCULAR; INTRAVENOUS at 00:48

## 2024-10-06 RX ADMIN — SODIUM CHLORIDE 125 ML/HR: 0.9 INJECTION, SOLUTION INTRAVENOUS at 13:14

## 2024-10-06 RX ADMIN — IPRATROPIUM BROMIDE 0.5 MG: 0.5 SOLUTION RESPIRATORY (INHALATION) at 19:27

## 2024-10-06 RX ADMIN — HEPARIN SODIUM 2700 UNITS: 1000 INJECTION INTRAVENOUS; SUBCUTANEOUS at 09:37

## 2024-10-06 RX ADMIN — BUDESONIDE INHALATION 0.5 MG: 0.5 SUSPENSION RESPIRATORY (INHALATION) at 19:27

## 2024-10-06 RX ADMIN — AZITHROMYCIN 500 MG: 250 TABLET, FILM COATED ORAL at 22:30

## 2024-10-06 RX ADMIN — HEPARIN SODIUM 12 UNITS/KG/HR: 10000 INJECTION, SOLUTION INTRAVENOUS at 09:36

## 2024-10-06 RX ADMIN — PANTOPRAZOLE SODIUM 40 MG: 40 TABLET, DELAYED RELEASE ORAL at 04:51

## 2024-10-06 RX ADMIN — METOPROLOL TARTRATE 25 MG: 25 TABLET, FILM COATED ORAL at 22:30

## 2024-10-06 RX ADMIN — DOCUSATE SODIUM 100 MG: 100 CAPSULE, LIQUID FILLED ORAL at 17:40

## 2024-10-06 RX ADMIN — LEVALBUTEROL HYDROCHLORIDE 1.25 MG: 1.25 SOLUTION RESPIRATORY (INHALATION) at 19:27

## 2024-10-06 RX ADMIN — METHYLPREDNISOLONE SODIUM SUCCINATE 40 MG: 40 INJECTION, POWDER, FOR SOLUTION INTRAMUSCULAR; INTRAVENOUS at 09:18

## 2024-10-06 NOTE — PLAN OF CARE
Problem: PAIN - ADULT  Goal: Verbalizes/displays adequate comfort level or baseline comfort level  Description: Interventions:  - Encourage patient to monitor pain and request assistance  - Assess pain using appropriate pain scale  - Administer analgesics based on type and severity of pain and evaluate response  - Implement non-pharmacological measures as appropriate and evaluate response  - Consider cultural and social influences on pain and pain management  - Notify physician/advanced practitioner if interventions unsuccessful or patient reports new pain  10/6/2024 0734 by Mallika Schwartz RN  Outcome: Progressing  10/5/2024 1846 by Mallika Schwartz RN  Outcome: Progressing     Problem: INFECTION - ADULT  Goal: Absence or prevention of progression during hospitalization  Description: INTERVENTIONS:  - Assess and monitor for signs and symptoms of infection  - Monitor lab/diagnostic results  - Monitor all insertion sites, i.e. indwelling lines, tubes, and drains  - Monitor endotracheal if appropriate and nasal secretions for changes in amount and color  - Emmett appropriate cooling/warming therapies per order  - Administer medications as ordered  - Instruct and encourage patient and family to use good hand hygiene technique  - Identify and instruct in appropriate isolation precautions for identified infection/condition  10/6/2024 0734 by Mallika Schwartz RN  Outcome: Progressing  10/5/2024 1846 by Mallika Schwartz RN  Outcome: Progressing     Problem: SAFETY ADULT  Goal: Patient will remain free of falls  Description: INTERVENTIONS:  - Educate patient/family on patient safety including physical limitations  - Instruct patient to call for assistance with activity   - Consult OT/PT to assist with strengthening/mobility   - Keep Call bell within reach  - Keep bed low and locked with side rails adjusted as appropriate  - Keep care items and personal belongings within reach  - Initiate and maintain comfort rounds  -  Make Fall Risk Sign visible to staff  - Offer Toileting every 2 Hours, in advance of need  - Initiate/Maintain bed/chair alarm  - Apply yellow socks and bracelet for high fall risk patients  - Consider moving patient to room near nurses station  10/6/2024 0734 by Mallika Schwartz RN  Outcome: Progressing  10/5/2024 1846 by Mallika Schwartz RN  Outcome: Progressing  Goal: Maintain or return to baseline ADL function  Description: INTERVENTIONS:  -  Assess patient's ability to carry out ADLs; assess patient's baseline for ADL function and identify physical deficits which impact ability to perform ADLs (bathing, care of mouth/teeth, toileting, grooming, dressing, etc.)  - Assess/evaluate cause of self-care deficits   - Assess range of motion  - Assess patient's mobility; develop plan if impaired  - Assess patient's need for assistive devices and provide as appropriate  - Encourage maximum independence but intervene and supervise when necessary  - Involve family in performance of ADLs  - Assess for home care needs following discharge   - Consider OT consult to assist with ADL evaluation and planning for discharge  - Provide patient education as appropriate  10/6/2024 0734 by Mallika Schwartz RN  Outcome: Progressing  10/5/2024 1846 by Mallika Schwartz RN  Outcome: Progressing  Goal: Maintains/Returns to pre admission functional level  Description: INTERVENTIONS:  - Perform AM-PAC 6 Click Basic Mobility/ Daily Activity assessment daily.  - Set and communicate daily mobility goal to care team and patient/family/caregiver.   - Collaborate with rehabilitation services on mobility goals if consulted  - Perform Range of Motion 4 times a day.  - Reposition patient every 2 hours.  - Stand patient 5 times a day  - Out of bed to chair 3 times a day   - Out of bed for toileting  - Record patient progress and toleration of activity level   10/6/2024 0734 by Mallika Schwartz RN  Outcome: Progressing  10/5/2024 1846 by Mallika  MARIAELENA Schwartz  Outcome: Progressing     Problem: DISCHARGE PLANNING  Goal: Discharge to home or other facility with appropriate resources  Description: INTERVENTIONS:  - Identify barriers to discharge w/patient and caregiver  - Arrange for needed discharge resources and transportation as appropriate  - Identify discharge learning needs (meds, wound care, etc.)  - Arrange for interpretive services to assist at discharge as needed  - Refer to Case Management Department for coordinating discharge planning if the patient needs post-hospital services based on physician/advanced practitioner order or complex needs related to functional status, cognitive ability, or social support system  10/6/2024 0734 by Mallika Schwartz RN  Outcome: Progressing  10/5/2024 1846 by Mallika Schwartz RN  Outcome: Progressing     Problem: Knowledge Deficit  Goal: Patient/family/caregiver demonstrates understanding of disease process, treatment plan, medications, and discharge instructions  Description: Complete learning assessment and assess knowledge base.  Interventions:  - Provide teaching at level of understanding  - Provide teaching via preferred learning methods  10/6/2024 0734 by Mallika Schwartz RN  Outcome: Progressing  10/5/2024 1846 by Mallika Schwartz RN  Outcome: Progressing     Problem: RESPIRATORY - ADULT  Goal: Achieves optimal ventilation and oxygenation  Description: INTERVENTIONS:  - Assess for changes in respiratory status  - Assess for changes in mentation and behavior  - Position to facilitate oxygenation and minimize respiratory effort  - Oxygen administered by appropriate delivery if ordered  - Initiate smoking cessation education as indicated  - Encourage broncho-pulmonary hygiene including cough, deep breathe, Incentive Spirometry  - Assess the need for suctioning and aspirate as needed  - Assess and instruct to report SOB or any respiratory difficulty  - Respiratory Therapy support as indicated  10/6/2024 0734 by  Mallika Schwartz RN  Outcome: Progressing  10/5/2024 1846 by Mallika Schwartz RN  Outcome: Progressing     Problem: METABOLIC, FLUID AND ELECTROLYTES - ADULT  Goal: Glucose maintained within target range  Description: INTERVENTIONS:  - Monitor Blood Glucose as ordered  - Assess for signs and symptoms of hyperglycemia and hypoglycemia  - Administer ordered medications to maintain glucose within target range  - Assess nutritional intake and initiate nutrition service referral as needed  10/6/2024 0734 by Mallika Schwartz RN  Outcome: Progressing  10/5/2024 1846 by Mallika Schwartz RN  Outcome: Progressing

## 2024-10-06 NOTE — ASSESSMENT & PLAN NOTE
Lab Results   Component Value Date    HGBA1C 6.4 (H) 10/04/2024       Recent Labs     10/05/24  1552 10/05/24  2024 10/06/24  0712 10/06/24  1052   POCGLU 138 161* 170* 132       Blood Sugar Average: Last 72 hrs:  (P) 147.6328398658010796  Per chart review, back in 22 September, A1c was running 6.6  Recent A1c which was done in May 2023-came 5.9  Due to recent illness, patient is not eating enough, per daughter, patient also has history of hiatal hernia-concern for hypoglycemia  Continue to monitor.

## 2024-10-06 NOTE — RESPIRATORY THERAPY NOTE
RT Protocol Note  Ileana Seaman 64 y.o. female MRN: [id number]  Unit/Bed#: -01 Encounter: [id number]    Assessment    Principal Problem:    Community acquired pneumonia of right upper lobe of lung  Active Problems:    Chronic obstructive pulmonary disease with acute lower respiratory infection (HCC)    Mycobacterial infection, non-TB    Hypomagnesemia    Type 2 diabetes mellitus without complication, without long-term current use of insulin (HCC)    Dysphagia      Home Pulmonary Medications:    Home Devices/Therapy: Other (Comment), Home O2 (Trelogy inhaler/Albuterol nebs and rescue inhaler)    Past Medical History:   Diagnosis Date    COPD (chronic obstructive pulmonary disease) (HCC)     Hiatal hernia      Social History     Socioeconomic History    Marital status: Single     Spouse name: None    Number of children: None    Years of education: None    Highest education level: None   Occupational History    None   Tobacco Use    Smoking status: Every Day     Current packs/day: 0.25     Types: Cigarettes    Smokeless tobacco: Never   Vaping Use    Vaping status: Never Used   Substance and Sexual Activity    Alcohol use: Never    Drug use: Never    Sexual activity: None   Other Topics Concern    None   Social History Narrative    None     Social Determinants of Health     Financial Resource Strain: Not on file   Food Insecurity: Food Insecurity Present (10/5/2024)    Hunger Vital Sign     Worried About Running Out of Food in the Last Year: Sometimes true     Ran Out of Food in the Last Year: Sometimes true   Transportation Needs: No Transportation Needs (10/5/2024)    PRAPARE - Transportation     Lack of Transportation (Medical): No     Lack of Transportation (Non-Medical): No   Physical Activity: Not on file   Stress: Not on file   Social Connections: Unknown (6/18/2024)    Received from [employer]    Social Connections     How often do you feel lonely or isolated from those around you? (Adult - for ages  "18 years and over): Not on file   Intimate Partner Violence: Not on file   Housing Stability: Unknown (10/5/2024)    Housing Stability Vital Sign     Unable to Pay for Housing in the Last Year: No     Number of Times Moved in the Last Year: Not on file     Homeless in the Last Year: No       Subjective         Objective    Physical Exam:   Assessment Type: (P) Post-treatment  General Appearance: (P) Alert, Awake  Respiratory Pattern: (P) Normal  Chest Assessment: (P) Chest expansion symmetrical  Bilateral Breath Sounds: (P) Diminished  Cough: (P) None  O2 Device: (P) 3L    Vitals:  Blood pressure 107/75, pulse 90, temperature (!) 97 °F (36.1 °C), resp. rate 18, height 5' 1\" (1.549 m), weight 48.1 kg (106 lb), SpO2 96%.          Imaging and other studies: Results Review Statement: No pertinent imaging studies reviewed.    O2 Device: (P) 3L     Plan    Respiratory Plan: Mild Distress pathway        Resp Comments: (P) Pt cont on her home 2 L. no resp distress bs diminished   "

## 2024-10-06 NOTE — ASSESSMENT & PLAN NOTE
Patient presented last evening (10/4) with complaints of persistent/progressive fatigue, decreased appetite, difficulty swallowing, cough, wheezing, shortness of breath  Notably, had presented to outside facility 2 days prior and was diagnosed with right upper lobe pneumonia-discharged with Levaquin and prednisone  However, patient notes persistent/progressive symptoms    In ED, hemodynamically stable, maintained on baseline oxygen status of 2 L without significant desaturation, negative SIRS criteria  However, patient with episodes of tachycardia and use of accessory respiratory muscles prompting request for admission with index suspicion for right upper lobe pneumonia causing exacerbation of underlying COPD/emphysema    Continue Rocephin Zithromax  urine for strep and Legionella antigens-negative  Sputum culture if able-pending  Continue mucolytic's and antitussives  COVID, flu, RSV negative  Treat underlying COPD/emphysema (see below)  Monitor for progressive hypoxia    Notably, patient with history of MAC-is to be maintained on Rifabutin 300 mg once a day, ethambutol 800 mg once a day and azithromycin 250 mg once a day.  Has not been taking as has been feeling ill

## 2024-10-06 NOTE — PROGRESS NOTES
Progress Note - Hospitalist   Name: Ileana Seaman 64 y.o. female I MRN: [id number]  Unit/Bed#: -01 I Date of Admission: 10/4/2024   Date of Service: 10/6/2024 I Hospital Day: 2    Assessment & Plan  Community acquired pneumonia of right upper lobe of lung  Patient presented last evening (10/4) with complaints of persistent/progressive fatigue, decreased appetite, difficulty swallowing, cough, wheezing, shortness of breath  Notably, had presented to outside facility 2 days prior and was diagnosed with right upper lobe pneumonia-discharged with Levaquin and prednisone  However, patient notes persistent/progressive symptoms    In ED, hemodynamically stable, maintained on baseline oxygen status of 2 L without significant desaturation, negative SIRS criteria  However, patient with episodes of tachycardia and use of accessory respiratory muscles prompting request for admission with index suspicion for right upper lobe pneumonia causing exacerbation of underlying COPD/emphysema    Continue Rocephin Zithromax  urine for strep and Legionella antigens-negative  Sputum culture if able-pending  Continue mucolytic's and antitussives  COVID, flu, RSV negative  Treat underlying COPD/emphysema (see below)  Monitor for progressive hypoxia    Notably, patient with history of MAC-is to be maintained on Rifabutin 300 mg once a day, ethambutol 800 mg once a day and azithromycin 250 mg once a day.  Has not been taking as has been feeling ill  Chronic obstructive pulmonary disease with acute lower respiratory infection (HCC)  As above  Continue IV antibiotic for pneumonia.  As well as azithromycin per COPD protocol  Continue IV steroid, inhaled nebulizer  Follow respiratory protocol  Patient does use 2 L of oxygen chronically-will continue  Mycobacterial infection, non-TB  Per chart review, patient does follow-up with [facility] infectious disease,  Last note from infectious disease reviewed from telemedicine on  "8/27/2024-Per note, patient supposed to take Rifabutin 300 mg once a day, ethambutol 800 mg once a day and azithromycin 250 mg once a day.     Per patient, she is not taking this medication since last week when she started feeling sick  Consider to discuss with infectious disease about the above treatment        Hypomagnesemia  On arrival, magnesium is 1.6, status post IV supplement, recheck  Type 2 diabetes mellitus without complication, without long-term current use of insulin ([facility])  Lab Results   Component Value Date    HGBA1C 6.4 (H) 10/04/2024       Recent Labs     10/05/24  1552 10/05/24  2024 10/06/24  0712 10/06/24  1052   POCGLU 138 161* 170* 132       Blood Sugar Average: Last 72 hrs:  (P) [id number]  Per chart review, back in 22 September, A1c was running 6.6  Recent A1c which was done in May 2023-came 5.9  Due to recent illness, patient is not eating enough, per daughter, patient also has history of hiatal hernia-concern for hypoglycemia  Continue to monitor.  Dysphagia  Patient states that she has been experiencing dysphagia over the past 3 to 4 days  Notes that her appetite had been low over the past several days is not feeling well with ongoing cough  However, states symptoms have changed over the past 3 to 4 days she is \"unable to swallow\"  Patient notes that she attempted to eat chicken noodle soup in the noodles \"got stuck in her throat\"  Patient denies history of dysphagia  Patient does have known hiatal hernia  CT soft tissues of the neck (10/5) without evidence of soft tissue swelling/obstruction  speech therapy following with plan for MBS tomorrow (10/7)  ENT/GI consultation if necessary  Patient with ability to protect airway  New onset atrial fibrillation (HCC)  Patient presented initially (10/4) with complaints of shortness of breath and dysphagia-see below  This morning, patient received breathing treatment and afterward complaint of palpitations  Subsequent telemetry/EKG revealed " new onset atrial fibrillation with accelerated ventricular response  IV beta-blocker administered x 2 with better rate control however still with atrial fibrillation  No history of atrial fibrillation  Patient remained hemodynamically stable with MAP greater than 65-no progression of hypoxemic respiratory failure    Plan:  Consultation placed to cardiology  Initial ventricular response 120s to 130s-mitigated by intravenous beta-blocker  Initiate oral beta-blocker and oral calcium channel blocker if able  Check TTE 9 patient with elevated [id number] score: 3-require systemic anticoagulation-heparin infusion initiated  Check TSH free T4  If patient enters accelerated ventricular response refractory to oral regimen/intermittent intravenous beta-blockade-may require initiation of Cardizem infusion  Stable to remain on MedSurg/telemetry at this juncture    VTE Pharmacologic Prophylaxis: VTE Score: 5 High Risk (Score >/= 5) - Pharmacological DVT Prophylaxis Ordered: heparin drip. Sequential Compression Devices Ordered.    Mobility:   Basic Mobility Inpatient Raw Score: 19  JH-HLM Goal: 6: Walk 10 steps or more  JH-HLM Achieved: 4: Move to chair/commode  JH-HLM Goal achieved. Continue to encourage appropriate mobility.    Patient Centered Rounds: I performed bedside rounds with nursing staff today.   Discussions with Specialists or Other Care Team Provider:     Education and Discussions with Family / Patient: Patient declined call to .     Current Length of Stay: 2 day(s)  Current Patient Status: Inpatient   Certification Statement: The patient will continue to require additional inpatient hospital stay due to fibrillation  Discharge Plan: Anticipate discharge in 24-48 hrs to home.    Code Status: Level 1 - Full Code    Subjective   Patient examined at bedside this morning at the time of new onset atrial fibrillation-reports palpitations however no chest pressure or increased shortness of  breath    Objective :  Temp:  [97 °F (36.1 °C)-97.6 °F (36.4 °C)] 97.6 °F (36.4 °C)  HR:  [] 116  BP: ()/(61-79) 102/64  Resp:  [18-19] 18  SpO2:  [91 %-98 %] 93 %  O2 Device: Nasal cannula  Nasal Cannula O2 Flow Rate (L/min):  [2 L/min] 2 L/min    Body mass index is 20.03 kg/m².     Input and Output Summary (last 24 hours):     Intake/Output Summary (Last 24 hours) at 10/6/2024 1227  Last data filed at 10/6/2024 1139  Gross per 24 hour   Intake 2122.08 ml   Output 2700 ml   Net -577.92 ml       Physical Exam  Constitutional:       General: She is not in acute distress.     Appearance: She is ill-appearing. She is not toxic-appearing or diaphoretic.   HENT:      Head: Normocephalic and atraumatic.      Right Ear: External ear normal.      Left Ear: External ear normal.      Mouth/Throat:      Mouth: Mucous membranes are dry.      Pharynx: Oropharynx is clear.   Eyes:      General: No scleral icterus.     Conjunctiva/sclera: Conjunctivae normal.   Cardiovascular:      Rate and Rhythm: Tachycardia present. Rhythm irregular. Frequent Extrasystoles are present.     Chest Wall: PMI is not displaced. No thrill.      Pulses: Normal pulses.      Heart sounds: Heart sounds are distant.      No friction rub.   Pulmonary:      Effort: Tachypnea, accessory muscle usage and prolonged expiration present.      Breath sounds: Decreased air movement and transmitted upper airway sounds present. No stridor. Examination of the left-upper field reveals rhonchi. Examination of the right-middle field reveals decreased breath sounds and rhonchi. Examination of the right-lower field reveals decreased breath sounds, wheezing and rhonchi. Examination of the left-lower field reveals decreased breath sounds, wheezing and rhonchi. Decreased breath sounds, wheezing and rhonchi present.   Musculoskeletal:         General: Normal range of motion.      Cervical back: Normal range of motion.      Right lower leg: No edema.      Left lower  leg: No edema.   Skin:     General: Skin is warm.      Capillary Refill: Capillary refill takes less than 2 seconds.      Findings: No lesion.   Neurological:      Mental Status: She is alert. Mental status is at baseline.   Psychiatric:         Mood and Affect: Mood normal.         Behavior: Behavior normal.         Thought Content: Thought content normal.         Judgment: Judgment normal.           Lines/Drains:        Telemetry:  Telemetry Orders (From admission, onward)               24 Hour Telemetry Monitoring  Continuous x 24 Hours (Telem)        Question:  Reason for 24 Hour Telemetry  Answer:  Arrhythmias requiring acute medical intervention / PPM or ICD malfunction                     Telemetry Reviewed: Atrial fibrillation. HR averaging 110  Indication for Continued Telemetry Use: Arrthymias requiring medical therapy               Lab Results: I have reviewed the following results:   Results from last 7 days   Lab Units 10/06/24  0456 10/04/24  2019   WBC Thousand/uL 7.39 5.02   HEMOGLOBIN g/dL 12.5 13.8   HEMATOCRIT % 40.2 43.1   PLATELETS Thousands/uL 181 165   SEGS PCT %  --  85*   LYMPHO PCT %  --  9*   MONO PCT %  --  6   EOS PCT %  --  0     Results from last 7 days   Lab Units 10/06/24  0456 10/04/24  2019   SODIUM mmol/L 141 136   POTASSIUM mmol/L 4.2 4.0   CHLORIDE mmol/L 107 99   CO2 mmol/L 31 28   BUN mg/dL 10 12   CREATININE mg/dL 0.53* 0.56*   ANION GAP mmol/L 3* 9   CALCIUM mg/dL 8.8 9.0   ALBUMIN g/dL  --  4.2   TOTAL BILIRUBIN mg/dL  --  0.28   ALK PHOS U/L  --  83   ALT U/L  --  29   AST U/L  --  32   GLUCOSE RANDOM mg/dL 144* 181*     Results from last 7 days   Lab Units 10/06/24  0922   INR  1.22*     Results from last 7 days   Lab Units 10/06/24  1052 10/06/24  0712 10/05/24  2024 10/05/24  1552 10/05/24  1058 10/05/24  0711 10/04/24  2305   POC GLUCOSE mg/dl 132 170* 161* 138 127 148* 155*     Results from last 7 days   Lab Units 10/04/24  2312   HEMOGLOBIN A1C % 6.4*     Results  from last 7 days   Lab Units 10/05/24  0442 10/04/24  2312 10/04/24  2019   LACTIC ACID mmol/L  --  1.9 3.1*   PROCALCITONIN ng/ml <0.05 <0.05  --        Recent Cultures (last 7 days):   Results from last 7 days   Lab Units 10/05/24  0003 10/04/24  2035 10/04/24  2019   BLOOD CULTURE   --  Received in Microbiology Lab. Culture in Progress. Received in Microbiology Lab. Culture in Progress.   LEGIONELLA URINARY ANTIGEN  Negative  --   --        Imaging Results Review: I personally reviewed the following image studies/reports in PACS and discussed pertinent findings with Radiology:   and chest xray. My interpretation of the radiology images/reports is:  .  Other Study Results Review: EKG was reviewed.     Last 24 Hours Medication List:     Current Facility-Administered Medications:     acetaminophen (TYLENOL) tablet 650 mg, Q6H PRN    albuterol (PROVENTIL HFA,VENTOLIN HFA) inhaler 2 puff, Q6H PRN    azithromycin (ZITHROMAX) tablet 500 mg, Q24H    budesonide (PULMICORT) inhalation solution 0.5 mg, Q12H    cefTRIAXone (ROCEPHIN) IVPB (premix in dextrose) 1,000 mg 50 mL, Q24H, Last Rate: 1,000 mg (10/06/24 0918)    diltiazem (CARDIZEM) tablet 30 mg, Q6H MAYCO    docusate sodium (COLACE) capsule 100 mg, BID    ethambutol (MYAMBUTOL) tablet 800 mg, Daily    fluticasone-umeclidinium-vilanterol 100-62.5-25 mcg/actuation inhaler 1 puff, Daily    guaiFENesin (MUCINEX) 12 hr tablet 600 mg, BID    heparin (porcine) 25,000 units in 0.45% NaCl 250 mL infusion (premix), Titrated, Last Rate: 12 Units/kg/hr (10/06/24 0936)    heparin (porcine) injection 1,350 Units, Q6H PRN    heparin (porcine) injection 2,700 Units, Q6H PRN    insulin lispro (HumALOG/ADMELOG) 100 units/mL subcutaneous injection 1-5 Units, TID AC **AND** Fingerstick Glucose (POCT), TID AC    insulin lispro (HumALOG/ADMELOG) 100 units/mL subcutaneous injection 1-5 Units, HS    ipratropium (ATROVENT) 0.02 % inhalation solution 0.5 mg, TID    levalbuterol (XOPENEX)  inhalation solution 1.25 mg, TID **AND** [DISCONTINUED] sodium chloride 0.9 % inhalation solution 3 mL, TID    methylPREDNISolone sodium succinate (Solu-MEDROL) injection 40 mg, Q8H    metoprolol tartrate (LOPRESSOR) tablet 25 mg, Q12H MAYCO    nicotine (NICODERM CQ) 14 mg/24hr TD 24 hr patch 1 patch, Daily    ondansetron (ZOFRAN) injection 4 mg, Once    ondansetron (ZOFRAN) injection 4 mg, Q6H PRN    pantoprazole (PROTONIX) EC tablet 40 mg, Early Morning    polyethylene glycol (MIRALAX) packet 17 g, Daily    rifabutin (MYCOBUTIN) capsule 300 mg, Daily    sodium chloride 0.9 % infusion, Continuous, Last Rate: 125 mL/hr (10/06/24 0514)    Administrative Statements   Today, Patient Was Seen By: Raoul Koehler, DO  I have spent a total time of 45 minutes in caring for this patient on the day of the visit/encounter including Risks and benefits of tx options, Patient and family education, Risk factor reductions, Counseling / Coordination of care, Reviewing / ordering tests, medicine, procedures  , and Obtaining or reviewing history  .    **Please Note: This note may have been constructed using a voice recognition system.**

## 2024-10-06 NOTE — PLAN OF CARE
Problem: PAIN - ADULT  Goal: Verbalizes/displays adequate comfort level or baseline comfort level  Description: Interventions:  - Encourage patient to monitor pain and request assistance  - Assess pain using appropriate pain scale  - Administer analgesics based on type and severity of pain and evaluate response  - Implement non-pharmacological measures as appropriate and evaluate response  - Consider cultural and social influences on pain and pain management  - Notify physician/advanced practitioner if interventions unsuccessful or patient reports new pain  Outcome: Progressing     Problem: INFECTION - ADULT  Goal: Absence or prevention of progression during hospitalization  Description: INTERVENTIONS:  - Assess and monitor for signs and symptoms of infection  - Monitor lab/diagnostic results  - Monitor all insertion sites, i.e. indwelling lines, tubes, and drains  - Monitor endotracheal if appropriate and nasal secretions for changes in amount and color  - [place] appropriate cooling/warming therapies per order  - Administer medications as ordered  - Instruct and encourage patient and family to use good hand hygiene technique  - Identify and instruct in appropriate isolation precautions for identified infection/condition  Outcome: Progressing     Problem: SAFETY ADULT  Goal: Patient will remain free of falls  Description: INTERVENTIONS:  - Educate patient/family on patient safety including physical limitations  - Instruct patient to call for assistance with activity   - Consult OT/PT to assist with strengthening/mobility   - Keep Call bell within reach  - Keep bed low and locked with side rails adjusted as appropriate  - Keep care items and personal belongings within reach  - Initiate and maintain comfort rounds  - Make Fall Risk Sign visible to staff  - Offer Toileting every 2 Hours, in advance of need  - Initiate/Maintain bed/chair alarm  - Apply yellow socks and bracelet for high fall risk patients  -  Consider moving patient to room near nurses station  Outcome: Progressing  Goal: Maintain or return to baseline ADL function  Description: INTERVENTIONS:  -  Assess patient's ability to carry out ADLs; assess patient's baseline for ADL function and identify physical deficits which impact ability to perform ADLs (bathing, care of mouth/teeth, toileting, grooming, dressing, etc.)  - Assess/evaluate cause of self-care deficits   - Assess range of motion  - Assess patient's mobility; develop plan if impaired  - Assess patient's need for assistive devices and provide as appropriate  - Encourage maximum independence but intervene and supervise when necessary  - Involve family in performance of ADLs  - Assess for home care needs following discharge   - Consider OT consult to assist with ADL evaluation and planning for discharge  - Provide patient education as appropriate  Outcome: Progressing  Goal: Maintains/Returns to pre admission functional level  Description: INTERVENTIONS:  - Perform AM-PAC 6 Click Basic Mobility/ Daily Activity assessment daily.  - Set and communicate daily mobility goal to care team and patient/family/caregiver.   - Collaborate with rehabilitation services on mobility goals if consulted  - Perform Range of Motion 4 times a day.  - Reposition patient every 2 hours.  - Stand patient 5 times a day  - Out of bed to chair 3 times a day   - Out of bed for toileting  - Record patient progress and toleration of activity level   Outcome: Progressing     Problem: DISCHARGE PLANNING  Goal: Discharge to home or other facility with appropriate resources  Description: INTERVENTIONS:  - Identify barriers to discharge w/patient and caregiver  - Arrange for needed discharge resources and transportation as appropriate  - Identify discharge learning needs (meds, wound care, etc.)  - Arrange for interpretive services to assist at discharge as needed  - Refer to Case Management Department for coordinating discharge  planning if the patient needs post-hospital services based on physician/advanced practitioner order or complex needs related to functional status, cognitive ability, or social support system  Outcome: Progressing     Problem: Knowledge Deficit  Goal: Patient/family/caregiver demonstrates understanding of disease process, treatment plan, medications, and discharge instructions  Description: Complete learning assessment and assess knowledge base.  Interventions:  - Provide teaching at level of understanding  - Provide teaching via preferred learning methods  Outcome: Progressing     Problem: RESPIRATORY - ADULT  Goal: Achieves optimal ventilation and oxygenation  Description: INTERVENTIONS:  - Assess for changes in respiratory status  - Assess for changes in mentation and behavior  - Position to facilitate oxygenation and minimize respiratory effort  - Oxygen administered by appropriate delivery if ordered  - Initiate smoking cessation education as indicated  - Encourage broncho-pulmonary hygiene including cough, deep breathe, Incentive Spirometry  - Assess the need for suctioning and aspirate as needed  - Assess and instruct to report SOB or any respiratory difficulty  - Respiratory Therapy support as indicated  Outcome: Progressing     Problem: METABOLIC, FLUID AND ELECTROLYTES - ADULT  Goal: Glucose maintained within target range  Description: INTERVENTIONS:  - Monitor Blood Glucose as ordered  - Assess for signs and symptoms of hyperglycemia and hypoglycemia  - Administer ordered medications to maintain glucose within target range  - Assess nutritional intake and initiate nutrition service referral as needed  Outcome: Progressing

## 2024-10-06 NOTE — RESPIRATORY THERAPY NOTE
RT Protocol Note  Ileana Seaman 64 y.o. female MRN: [id number]  Unit/Bed#: -01 Encounter: [id number]    Assessment    Principal Problem:    Community acquired pneumonia of right upper lobe of lung  Active Problems:    Chronic obstructive pulmonary disease with acute lower respiratory infection (HCC)    Mycobacterial infection, non-TB    Hypomagnesemia    Type 2 diabetes mellitus without complication, without long-term current use of insulin (HCC)    Dysphagia      Home Pulmonary Medications:    Home Devices/Therapy: Other (Comment), Home O2 (Trelogy inhaler/Albuterol nebs and rescue inhaler)    Past Medical History:   Diagnosis Date    COPD (chronic obstructive pulmonary disease) (HCC)     Hiatal hernia      Social History     Socioeconomic History    Marital status: Single     Spouse name: None    Number of children: None    Years of education: None    Highest education level: None   Occupational History    None   Tobacco Use    Smoking status: Every Day     Current packs/day: 0.25     Types: Cigarettes    Smokeless tobacco: Never   Vaping Use    Vaping status: Never Used   Substance and Sexual Activity    Alcohol use: Never    Drug use: Never    Sexual activity: None   Other Topics Concern    None   Social History Narrative    None     Social Determinants of Health     Financial Resource Strain: Not on file   Food Insecurity: Food Insecurity Present (10/5/2024)    Hunger Vital Sign     Worried About Running Out of Food in the Last Year: Sometimes true     Ran Out of Food in the Last Year: Sometimes true   Transportation Needs: No Transportation Needs (10/5/2024)    PRAPARE - Transportation     Lack of Transportation (Medical): No     Lack of Transportation (Non-Medical): No   Physical Activity: Not on file   Stress: Not on file   Social Connections: Unknown (6/18/2024)    Received from [facility]    Social Connections     How often do you feel lonely or isolated from those around you? (Adult - for ages  "18 years and over): Not on file   Intimate Partner Violence: Not on file   Housing Stability: Unknown (10/5/2024)    Housing Stability Vital Sign     Unable to Pay for Housing in the Last Year: No     Number of Times Moved in the Last Year: Not on file     Homeless in the Last Year: No       Subjective         Objective    Physical Exam:   Assessment Type: During-treatment  General Appearance: Alert, Awake  Respiratory Pattern: Normal  Chest Assessment: Chest expansion symmetrical  Bilateral Breath Sounds: Diminished  Cough: Non-productive  O2 Device: 2lpm    Vitals:  Blood pressure 110/71, pulse 95, temperature 97.5 °F (36.4 °C), resp. rate 18, height 5' 1\" (1.549 m), weight 48.1 kg (106 lb), SpO2 95%.          Imaging and other studies: Results Review Statement: No pertinent imaging studies reviewed.    O2 Device: 2lpm     Plan    Respiratory Plan: Mild Distress pathway        Resp Comments: Pt continues on 2lpm NC, scheduled neb tx provided, respiratory pattern unlabored.   "

## 2024-10-06 NOTE — ASSESSMENT & PLAN NOTE
"Patient states that she has been experiencing dysphagia over the past 3 to 4 days  Notes that her appetite had been low over the past several days is not feeling well with ongoing cough  However, states symptoms have changed over the past 3 to 4 days she is \"unable to swallow\"  Patient notes that she attempted to eat chicken noodle soup in the noodles \"got stuck in her throat\"  Patient denies history of dysphagia  Patient does have known hiatal hernia  CT soft tissues of the neck (10/5) without evidence of soft tissue swelling/obstruction  speech therapy following with plan for MBS tomorrow (10/7)  ENT/GI consultation if necessary  Patient with ability to protect airway  "

## 2024-10-06 NOTE — SPEECH THERAPY NOTE
Speech Language/Pathology  Speech-Language Pathology Bedside Swallow Evaluation      Patient Name: Ileana Seaman    Today's Date: 10/6/2024     Summary   Consult received for bedside swallow assessment. Pt admitted w/ RLL PNA and dysphagia. PMHx includes COPD, hiatal hernia, DM2. Currently on 2L. On level 3 dental soft w/ thin liquids. Pt reports dysphagia to solids yesterday, felt like something got stuck in her throat and experienced ongoing globus sensation. Pt reports this is not frequent for her but she does frequently have PNA. Denies coughing or choking with foods. Has never had a VBS. Reports no hx of esophageal issues besides a known hiatal hernia. Takes meds whole with a lot of water.   Reports only able to tolerate liquids yesterday, today can do soft solids. Seen with soft pears and thin liquids. Pt reports she cannot tolerate the eggs. Mastication and clearance were adequate of soft solids. Offered a softer diet however pt declined, reported she would rather choose softer foods than have a restricted diet. No overt coughing or s/s aspiration w/ intake observed by SLP, however suspect ongoing dysphagia possibly esophageal in nature.    Recommend VBS, pt agreeable- will schedule for tomorrow morning.    Risk/s for Aspiration: Moderate     Recommended Diet: regular diet and thin liquids   Recommended Form of Meds: whole with liquid   Aspiration precautions and swallowing strategies: upright posture  Other Recommendations: Continue frequent oral care        Current Medical Status    Ileana Seaman is a 64 y.o. female with a PMH of COPD, diabetes, hiatal hernia who presents with shortness of breath.  Per patient and patient daughter on the bedside, patient recently visited in different hospital due to difficulty breathing, patient reports she received breathing treatment in the ER and has some x-ray and sent back home with antibiotic and steroid.  After going home, patient was not feeling good, still  having chest congestion and tightness with breathing difficulty and came back to this hospital.  Denies any recent sick contact.  He still smokes.  Does have pets at home, patient has carpet.     Current Precautions:  Fall  Aspiration     Allergies:  No known food allergies    Past medical history:  Please see H&P for details    Special Studies:  CXR:  Emphysema. Right upper lobe pneumonia.     CT soft tissue neck:  No suspicious neck mass or cervical adenopathy.  Nodular opacities left upper lobe and right upper lobe as above. Given severe emphysema, findings are suspicious for bronchogenic malignancy and further work-up with chest CT is advised. Discoid opacity right upper lobe could represent scarring or   pneumonia. Clinical correlation is advised.    Social/Education/Vocational Hx:  Pt lives with family    Swallow Information   Current Risks for Dysphagia & Aspiration: known history of dysphagia  Current Symptoms/Concerns: change in respiratory status  Current Diet: regular diet and thin liquids   Baseline Diet: regular diet and thin liquids      Baseline Assessment   Behavior/Cognition: alert  Speech/Language Status: able to participate in conversation  Patient Positioning: upright in bed  Pain Status/Interventions/Response to Interventions:   No report of or nonverbal indications of pain.       Swallow Mechanism Exam  Facial: symmetrical  Labial: WFL  Lingual: WFL  Velum: symmetrical  Mandible: adequate ROM  Dentition: adequate  Vocal quality:clear/adequate   Volitional Cough: strong/productive   Respiratory Status: on 2L      Consistencies Assessed and Performance   Consistencies Administered: thin liquids, puree, and mechanical soft solids    Oral Stage: WFL  Mastication was adequate with the materials administered today.  Bolus formation and transfer were functional with no significant oral residue noted.  No overt s/s reduced oral control.    Pharyngeal Stage: suspected  Swallow Mechanics:  Swallowing  initiation appeared prompt.  Laryngeal rise was palpated and judged to be within functional limits.  No coughing, throat clearing, change in vocal quality or respiratory status noted today.     Esophageal Concerns: globus sensation      Summary and Recommendations (see above)    Results Reviewed with: patient and MD     Treatment Recommended: VBS     Frequency of treatment: TBD    Dysphagia LTG  -Patient will demonstrate safe and effective oral intake (without overt s/s significant oral/pharyngeal dysphagia including s/s penetration or aspiration) for the highest appropriate diet level.     Short Term Goals:  -Pt will tolerate regular and thin liquid with no significant s/s oral or pharyngeal dysphagia across 1-3 diagnostic session/s    -Patient will tolerate trials of upgraded food and/or liquid texture with no significant s/s of oral or pharyngeal dysphagia including aspiration across 1-3 diagnostic sessions     -Patient will comply with a Video/Modified Barium Swallow study for more complete assessment of swallowing anatomy/physiology/aspiration risk and to assess efficacy of treatment techniques so as to best guide treatment plan        Speech Therapy Prognosis   Prognosis: fair    Prognosis Considerations: medical status    Leann Mendoza MS CCC-SLP  10/6/2024

## 2024-10-06 NOTE — ASSESSMENT & PLAN NOTE
Patient presented initially (10/4) with complaints of shortness of breath and dysphagia-see below  This morning, patient received breathing treatment and afterward complaint of palpitations  Subsequent telemetry/EKG revealed new onset atrial fibrillation with accelerated ventricular response  IV beta-blocker administered x 2 with better rate control however still with atrial fibrillation  No history of atrial fibrillation  Patient remained hemodynamically stable with MAP greater than 65-no progression of hypoxemic respiratory failure    Plan:  Consultation placed to cardiology  Initial ventricular response 120s to 130s-mitigated by intravenous beta-blocker  Initiate oral beta-blocker and oral calcium channel blocker if able  Check TTE 9 patient with elevated [id number] score: 3-require systemic anticoagulation-heparin infusion initiated  Check TSH free T4  If patient enters accelerated ventricular response refractory to oral regimen/intermittent intravenous beta-blockade-may require initiation of Cardizem infusion  Stable to remain on MedSurg/telemetry at this juncture

## 2024-10-06 NOTE — ASSESSMENT & PLAN NOTE
Per chart review, patient does follow-up with [facility] infectious disease,  Last note from infectious disease reviewed from telemedicine on 8/27/2024-Per note, patient supposed to take Rifabutin 300 mg once a day, ethambutol 800 mg once a day and azithromycin 250 mg once a day.     Per patient, she is not taking this medication since last week when she started feeling sick  Consider to discuss with infectious disease about the above treatment

## 2024-10-07 ENCOUNTER — APPOINTMENT (INPATIENT)
Dept: NON INVASIVE DIAGNOSTICS | Facility: HOSPITAL | Age: 64
DRG: 177 | End: 2024-10-07
Payer: MEDICARE

## 2024-10-07 ENCOUNTER — APPOINTMENT (INPATIENT)
Dept: RADIOLOGY | Facility: HOSPITAL | Age: 64
DRG: 177 | End: 2024-10-07
Payer: MEDICARE

## 2024-10-07 LAB
ALBUMIN SERPL BCG-MCNC: 3.5 G/DL (ref 3.5–5)
ALP SERPL-CCNC: 59 U/L (ref 34–104)
ALT SERPL W P-5'-P-CCNC: 64 U/L (ref 7–52)
ANION GAP SERPL CALCULATED.3IONS-SCNC: 3 MMOL/L (ref 4–13)
AORTIC ROOT: 2.6 CM
APICAL FOUR CHAMBER EJECTION FRACTION: 62 %
APTT PPP: 94 SECONDS (ref 23–34)
ASCENDING AORTA: 2.8 CM
AST SERPL W P-5'-P-CCNC: 31 U/L (ref 13–39)
ATRIAL RATE: 103 BPM
BILIRUB SERPL-MCNC: 0.36 MG/DL (ref 0.2–1)
BSA FOR ECHO PROCEDURE: 1.44 M2
BUN SERPL-MCNC: 17 MG/DL (ref 5–25)
CALCIUM SERPL-MCNC: 8.8 MG/DL (ref 8.4–10.2)
CHLORIDE SERPL-SCNC: 105 MMOL/L (ref 96–108)
CO2 SERPL-SCNC: 30 MMOL/L (ref 21–32)
CREAT SERPL-MCNC: 0.57 MG/DL (ref 0.6–1.3)
E WAVE DECELERATION TIME: 202 MS
E/A RATIO: 0.89
FRACTIONAL SHORTENING: 45 (ref 28–44)
GFR SERPL CREATININE-BSD FRML MDRD: 96 ML/MIN/1.73SQ M
GLUCOSE SERPL-MCNC: 138 MG/DL (ref 65–140)
GLUCOSE SERPL-MCNC: 148 MG/DL (ref 65–140)
GLUCOSE SERPL-MCNC: 149 MG/DL (ref 65–140)
GLUCOSE SERPL-MCNC: 181 MG/DL (ref 65–140)
GLUCOSE SERPL-MCNC: 207 MG/DL (ref 65–140)
INTERVENTRICULAR SEPTUM IN DIASTOLE (PARASTERNAL SHORT AXIS VIEW): 1 CM
INTERVENTRICULAR SEPTUM: 1 CM (ref 0.6–1.1)
LAAS-AP2: 11.1 CM2
LAAS-AP4: 8.4 CM2
LEFT ATRIUM SIZE: 2.6 CM
LEFT ATRIUM VOLUME (MOD BIPLANE): 19 ML
LEFT ATRIUM VOLUME INDEX (MOD BIPLANE): 13.2 ML/M2
LEFT INTERNAL DIMENSION IN SYSTOLE: 2.2 CM (ref 2.1–4)
LEFT VENTRICLE DIASTOLIC VOLUME (MOD BIPLANE): 41 ML
LEFT VENTRICLE DIASTOLIC VOLUME INDEX (MOD BIPLANE): 28.5 ML/M2
LEFT VENTRICLE SYSTOLIC VOLUME (MOD BIPLANE): 14 ML
LEFT VENTRICLE SYSTOLIC VOLUME INDEX (MOD BIPLANE): 9.7 ML/M2
LEFT VENTRICULAR INTERNAL DIMENSION IN DIASTOLE: 4 CM (ref 3.5–6)
LEFT VENTRICULAR POSTERIOR WALL IN END DIASTOLE: 0.8 CM
LEFT VENTRICULAR STROKE VOLUME: 55 ML
LV EF: 65 %
LVSV (TEICH): 55 ML
MAGNESIUM SERPL-MCNC: 1.8 MG/DL (ref 1.9–2.7)
MV E'TISSUE VEL-LAT: 8 CM/S
MV E'TISSUE VEL-SEP: 8 CM/S
MV PEAK A VEL: 0.81 M/S
MV PEAK E VEL: 72 CM/S
MV STENOSIS PRESSURE HALF TIME: 59 MS
MV VALVE AREA P 1/2 METHOD: 3.73
P AXIS: 80 DEGREES
PHOSPHATE SERPL-MCNC: 3.5 MG/DL (ref 2.3–4.1)
POTASSIUM SERPL-SCNC: 4.4 MMOL/L (ref 3.5–5.3)
PR INTERVAL: 130 MS
PROT SERPL-MCNC: 6 G/DL (ref 6.4–8.4)
QRS AXIS: 71 DEGREES
QRS AXIS: 74 DEGREES
QRSD INTERVAL: 74 MS
QRSD INTERVAL: 82 MS
QT INTERVAL: 270 MS
QT INTERVAL: 340 MS
QTC INTERVAL: 422 MS
QTC INTERVAL: 445 MS
RIGHT ATRIUM AREA SYSTOLE A4C: 8.1 CM2
RIGHT VENTRICLE ID DIMENSION: 3.3 CM
SL CV LEFT ATRIUM LENGTH A2C: 4.2 CM
SL CV PED ECHO LEFT VENTRICLE DIASTOLIC VOLUME (MOD BIPLANE) 2D: 71 ML
SL CV PED ECHO LEFT VENTRICLE SYSTOLIC VOLUME (MOD BIPLANE) 2D: 16 ML
SODIUM SERPL-SCNC: 138 MMOL/L (ref 135–147)
T WAVE AXIS: 48 DEGREES
T WAVE AXIS: 70 DEGREES
TR MAX PG: 37 MMHG
TR PEAK VELOCITY: 3 M/S
TRICUSPID ANNULAR PLANE SYSTOLIC EXCURSION: 1.6 CM
TRICUSPID VALVE PEAK REGURGITATION VELOCITY: 3.03 M/S
VENTRICULAR RATE: 103 BPM
VENTRICULAR RATE: 147 BPM

## 2024-10-07 PROCEDURE — 99232 SBSQ HOSP IP/OBS MODERATE 35: CPT | Performed by: FAMILY MEDICINE

## 2024-10-07 PROCEDURE — 94664 DEMO&/EVAL PT USE INHALER: CPT

## 2024-10-07 PROCEDURE — 99222 1ST HOSP IP/OBS MODERATE 55: CPT | Performed by: INTERNAL MEDICINE

## 2024-10-07 PROCEDURE — 93306 TTE W/DOPPLER COMPLETE: CPT | Performed by: INTERNAL MEDICINE

## 2024-10-07 PROCEDURE — 74230 X-RAY XM SWLNG FUNCJ C+: CPT

## 2024-10-07 PROCEDURE — 97166 OT EVAL MOD COMPLEX 45 MIN: CPT

## 2024-10-07 PROCEDURE — 93010 ELECTROCARDIOGRAM REPORT: CPT | Performed by: INTERNAL MEDICINE

## 2024-10-07 PROCEDURE — 85730 THROMBOPLASTIN TIME PARTIAL: CPT | Performed by: FAMILY MEDICINE

## 2024-10-07 PROCEDURE — 93306 TTE W/DOPPLER COMPLETE: CPT

## 2024-10-07 PROCEDURE — 94640 AIRWAY INHALATION TREATMENT: CPT

## 2024-10-07 PROCEDURE — 92611 MOTION FLUOROSCOPY/SWALLOW: CPT

## 2024-10-07 PROCEDURE — 84145 PROCALCITONIN (PCT): CPT | Performed by: INTERNAL MEDICINE

## 2024-10-07 PROCEDURE — 97162 PT EVAL MOD COMPLEX 30 MIN: CPT

## 2024-10-07 PROCEDURE — 83735 ASSAY OF MAGNESIUM: CPT | Performed by: FAMILY MEDICINE

## 2024-10-07 PROCEDURE — 82948 REAGENT STRIP/BLOOD GLUCOSE: CPT

## 2024-10-07 PROCEDURE — 84100 ASSAY OF PHOSPHORUS: CPT | Performed by: FAMILY MEDICINE

## 2024-10-07 PROCEDURE — 94760 N-INVAS EAR/PLS OXIMETRY 1: CPT

## 2024-10-07 PROCEDURE — 80053 COMPREHEN METABOLIC PANEL: CPT | Performed by: FAMILY MEDICINE

## 2024-10-07 RX ADMIN — FLUTICASONE FUROATE, UMECLIDINIUM BROMIDE AND VILANTEROL TRIFENATATE 1 PUFF: 100; 62.5; 25 POWDER RESPIRATORY (INHALATION) at 08:14

## 2024-10-07 RX ADMIN — LEVALBUTEROL HYDROCHLORIDE 1.25 MG: 1.25 SOLUTION RESPIRATORY (INHALATION) at 13:24

## 2024-10-07 RX ADMIN — IPRATROPIUM BROMIDE 0.5 MG: 0.5 SOLUTION RESPIRATORY (INHALATION) at 13:24

## 2024-10-07 RX ADMIN — IPRATROPIUM BROMIDE 0.5 MG: 0.5 SOLUTION RESPIRATORY (INHALATION) at 19:33

## 2024-10-07 RX ADMIN — METOPROLOL TARTRATE 25 MG: 25 TABLET, FILM COATED ORAL at 08:06

## 2024-10-07 RX ADMIN — GUAIFENESIN 600 MG: 600 TABLET ORAL at 08:06

## 2024-10-07 RX ADMIN — GUAIFENESIN 600 MG: 600 TABLET ORAL at 17:17

## 2024-10-07 RX ADMIN — METHYLPREDNISOLONE SODIUM SUCCINATE 40 MG: 40 INJECTION, POWDER, FOR SOLUTION INTRAMUSCULAR; INTRAVENOUS at 15:42

## 2024-10-07 RX ADMIN — BUDESONIDE INHALATION 0.5 MG: 0.5 SUSPENSION RESPIRATORY (INHALATION) at 19:33

## 2024-10-07 RX ADMIN — CEFTRIAXONE 1000 MG: 1 INJECTION, SOLUTION INTRAVENOUS at 07:51

## 2024-10-07 RX ADMIN — LEVALBUTEROL HYDROCHLORIDE 1.25 MG: 1.25 SOLUTION RESPIRATORY (INHALATION) at 07:36

## 2024-10-07 RX ADMIN — IPRATROPIUM BROMIDE 0.5 MG: 0.5 SOLUTION RESPIRATORY (INHALATION) at 07:36

## 2024-10-07 RX ADMIN — POLYETHYLENE GLYCOL 3350 17 G: 17 POWDER, FOR SOLUTION ORAL at 08:10

## 2024-10-07 RX ADMIN — DOCUSATE SODIUM 100 MG: 100 CAPSULE, LIQUID FILLED ORAL at 17:17

## 2024-10-07 RX ADMIN — INSULIN LISPRO 1 UNITS: 100 INJECTION, SOLUTION INTRAVENOUS; SUBCUTANEOUS at 16:34

## 2024-10-07 RX ADMIN — METHYLPREDNISOLONE SODIUM SUCCINATE 40 MG: 40 INJECTION, POWDER, FOR SOLUTION INTRAMUSCULAR; INTRAVENOUS at 23:52

## 2024-10-07 RX ADMIN — METHYLPREDNISOLONE SODIUM SUCCINATE 40 MG: 40 INJECTION, POWDER, FOR SOLUTION INTRAMUSCULAR; INTRAVENOUS at 00:48

## 2024-10-07 RX ADMIN — INSULIN LISPRO 1 UNITS: 100 INJECTION, SOLUTION INTRAVENOUS; SUBCUTANEOUS at 21:13

## 2024-10-07 RX ADMIN — NICOTINE 1 PATCH: 14 PATCH, EXTENDED RELEASE TRANSDERMAL at 08:06

## 2024-10-07 RX ADMIN — METHYLPREDNISOLONE SODIUM SUCCINATE 40 MG: 40 INJECTION, POWDER, FOR SOLUTION INTRAMUSCULAR; INTRAVENOUS at 08:26

## 2024-10-07 RX ADMIN — DOCUSATE SODIUM 100 MG: 100 CAPSULE, LIQUID FILLED ORAL at 08:06

## 2024-10-07 RX ADMIN — LEVALBUTEROL HYDROCHLORIDE 1.25 MG: 1.25 SOLUTION RESPIRATORY (INHALATION) at 19:33

## 2024-10-07 RX ADMIN — BUDESONIDE INHALATION 0.5 MG: 0.5 SUSPENSION RESPIRATORY (INHALATION) at 07:36

## 2024-10-07 RX ADMIN — PANTOPRAZOLE SODIUM 40 MG: 40 TABLET, DELAYED RELEASE ORAL at 06:49

## 2024-10-07 RX ADMIN — DILTIAZEM HYDROCHLORIDE 30 MG: 30 TABLET ORAL at 06:49

## 2024-10-07 NOTE — PROCEDURES
Video Swallow Study  Speech Pathology Videofluoroscopic Swallow Study (VFSS/VBSS/MBSS)      Patient Name: Ileana Seaman    Today's Date: 10/7/2024       General Information;  Pt is a 64 y.o. female with a PMH remarkable for COPD, mycobacterial infection, DM2, AFIB.    Current concerns for dysphagia include RLL PNA and hx of dysphagia w/ globus sensation.   A VFSS was recommended to assess oropharyngeal stage swallowing skills at this time. Pt was viewed in lateral position and assessed with thin and nectar thick liquid (by teaspoon, single and successive cup/straw sips), puree, soft moist food (sandwich) and solid food (cracker) and a13mm pill with thin liquid.      Oral stage:  Pt presented with minimal oral stage dysphagia.    Lip closure:  no escape  Mastication: slow prolonged with complete re-collection  Bolus Transport/Lingual Motion: brisk  Oral residue:   at least mild residue on oral structures,   Tongue Control:  posterior escape of less than half of the bolus  Swallow Initiation: bolus head in pyriforms    Pharyngeal stage:  Pt presented with WFL pharyngeal dysphagia.     Soft palate elevation:  no bolus between soft palate and pharyngeal wall   Laryngeal elevation: complete    Anterior hyoid excursion:  complete   Epiglottic movement:  complete  Laryngeal vestibule closure:   complete   Tongue base retraction:  narrow  column of contrast/air between TB and PW  Pharyngeal Stripping: complete   Pharyngeal Contraction: did not complete AP view  PES opening:    partial with partial obstruction to flow    Pharyngeal Residue:  No significant pharyngeal residue    Management of food/liquid/barium tablet follows:   All food, liquid and the barium tablet passed through the pharynx with no laryngeal penetration, aspiration or significant pharyngeal residue noted today.    Significant esophageal retention noted    Penetration/Aspiration:  Thin: PAS - 1  Puree: PAS- 1  Solid:  PAS- 1  Response to Aspiration: N/A           8-Point Penetration-Aspiration Scale   1 Material does not enter the airway   2 Material enters the airway, remains above the vocal folds, and is ejected  from the  airway    3 Material enters the airway, remains above the vocal folds, and is not ejected from the airway   4 Material enters the airway, contacts the vocal folds, and is ejected from the airway   5 Material enters the airway, contacts the vocal folds, and is not ejected from the airway    6 Material enters the airway, passes below the vocal folds and is ejected into the larynx or out of the airway    7 Material enters the airway, passes below the vocal folds, and is not ejected from the trachea despite effort    8 Material enters the airway, passes below the vocal folds, and no effort is made to eject         Strategies and Efficacy: Required liquid wash following solids to decrease globus sensation/ esophageal retention    Aspiration Response and Efficacy:  N/A    Esophageal stage:  Brief view of esophagus was completed.  Re: esophageal clearance -esophageal retention is mid esophagus with retrograde flow below the PES     Assessment Summary:    Pt presents with minimal oropharyngeal dysphagia characterized by impaired BOT strength resulting in posterior spillage of liquids to the pyriforms. Laryngeal elevation and excursion and epiglottic inversion were all WFL. Airway protection was adequate  There was no aspiration or penetration on the study. No significant pharyngeal residuals however pt did utilize piecemeal deglutition IND.  Stasis noted mid-esophagus and slow passage of solids/pill noted. Mild retrograde movement below PES with thin liquids. Pt reports globus sensation in pharynx when barium pill was lodged mid sternum. Eventually cleared w/ subsequent liquid washes.    .  Note: Images are available for review in PACS as desired.      Recommendations:   Recommended Diet:  soft/level 3 diet and thin  liquids   Recommended Form of Medications: whole with puree   Aspiration precautions and compensatory swallowing strategies: upright posture and alternating bites and sips  Consider referral to:  GI consult  SLP Dysphagia therapy recommended: None at this time    Results Reviewed with: patient   Pt/Family Education: Completed. Education completed w/ images from VBS. Pt expressed understanding and reported she would like to remain on altered diet at this time    Leann Mendoza, MS CCC-SLP  10/7/2024

## 2024-10-07 NOTE — PROGRESS NOTES
Progress Note - Hospitalist   Name: Ileana Seaman 64 y.o. female I MRN: [id number]  Unit/Bed#: -01 I Date of Admission: 10/4/2024   Date of Service: 10/7/2024 I Hospital Day: 3    Assessment & Plan  Community acquired pneumonia of right upper lobe of lung  Patient presented  (10/4) with complaints of persistent/progressive fatigue, decreased appetite, difficulty swallowing, cough, wheezing, shortness of breath  Notably, had presented to outside facility 2 days prior and was diagnosed with right upper lobe pneumonia-discharged with Levaquin and prednisone  However, patient notes persistent/progressive symptoms    In ED, hemodynamically stable, maintained on baseline oxygen status of 2 L without significant desaturation, negative SIRS criteria  However, patient with episodes of tachycardia and use of accessory respiratory muscles prompting request for admission with index suspicion for right upper lobe pneumonia causing exacerbation of underlying COPD/emphysema    Continue Rocephin Zithromax  urine for strep and Legionella antigens-negative  Sputum culture if able-pending  Continue mucolytic's and antitussives  COVID, flu, RSV negative  Treat underlying COPD/emphysema (see below)  Monitor for progressive hypoxia    Notably, patient with history of MAC-is to be maintained on Rifabutin 300 mg once a day, ethambutol 800 mg once a day and azithromycin 250 mg once a day.  Has not been taking as has been feeling ill  Chronic obstructive pulmonary disease with acute lower respiratory infection (HCC)  Likely, in exacerbation  As above  Continue IV antibiotic for pneumonia.  As well as azithromycin per COPD protocol  Continue IV steroid, inhaled nebulizer  Follow respiratory protocol  Patient does use 2 L of oxygen chronically-will continue  Mycobacterial infection, non-TB  Per chart review, patient does follow-up with [facility] infectious disease,  Last note from infectious disease reviewed from telemedicine on  "8/27/2024-Per note, patient supposed to take Rifabutin 300 mg once a day, ethambutol 800 mg once a day and azithromycin 250 mg once a day.     Per patient, she is not taking this medication since last week when she started feeling sick  Consider to discuss with infectious disease about the above treatment        Hypomagnesemia  On arrival, magnesium is 1.6, status post IV supplement, recheck  Type 2 diabetes mellitus without complication, without long-term current use of insulin ([facility])  Lab Results   Component Value Date    HGBA1C 6.4 (H) 10/04/2024       Recent Labs     10/06/24  1559 10/06/24  2051 10/07/24  0751 10/07/24  1112   POCGLU 133 197* 138 149*       Blood Sugar Average: Last 72 hrs:  (P) [id number]  Per chart review, back in 22 September, A1c was running 6.6  Recent A1c which was done in May 2023-came 5.9  Due to recent illness, patient is not eating enough, per daughter, patient also has history of hiatal hernia-concern for hypoglycemia  Continue to monitor.  Dysphagia  Patient states that she has been experiencing dysphagia over the past 3 to 4 days  Notes that her appetite had been low over the past several days is not feeling well with ongoing cough  However, states symptoms have changed over the past 3 to 4 days she is \"unable to swallow\"  Patient notes that she attempted to eat chicken noodle soup in the noodles \"got stuck in her throat\"  Patient denies history of dysphagia  Patient does have known hiatal hernia  CT soft tissues of the neck (10/5) without evidence of soft tissue swelling/obstruction  speech therapy following with plan for MBS today (10/7)-pending  Patient with ability to protect airway  New onset atrial fibrillation (HCC)  Patient presented initially (10/4) with complaints of shortness of breath and dysphagia-see below  (10/6) patient received breathing treatment and afterward complaint of palpitations  Subsequent telemetry/EKG revealed new onset atrial fibrillation with " accelerated ventricular response (120s to 130s)  IV beta-blocker administered x 2 with better rate control however still with atrial fibrillation  Patient remained with accelerated ventricular response refractory to oral/IV BB-oral CCB administered with return to normal sinus rhythm  Patient was in atrial fibrillation for only approximately 6 to 7 hours  Was initiated yesterday (10/6) on heparin infusion at the time of accelerated ventricular response    Today (10/7) patient remains in normal sinus rhythm  Has been evaluated by cardiology-Case discussed-will discontinue oral CCB-proceed with oral BB  Given transient episode of accelerated ventricular response/atrial fibrillation-hold on systemic anticoagulation at this juncture  Proceed with Lovenox over the course of hospitalization  Continue on telemetry for the time being  Echocardiogram ordered and pending    VTE Pharmacologic Prophylaxis: VTE Score: 5 High Risk (Score >/= 5) - Pharmacological DVT Prophylaxis Contraindicated. Sequential Compression Devices Ordered.    Mobility:   Basic Mobility Inpatient Raw Score: 19  JH-HLM Goal: 6: Walk 10 steps or more  JH-HLM Achieved: 6: Walk 10 steps or more  JH-HLM Goal achieved. Continue to encourage appropriate mobility.    Patient Centered Rounds: I performed bedside rounds with nursing staff today.   Discussions with Specialists or Other Care Team Provider: Cardiology    Education and Discussions with Family / Patient: Patient declined call to .     Current Length of Stay: 3 day(s)  Current Patient Status: Inpatient   Certification Statement: The patient will continue to require additional inpatient hospital stay due to new onset atrial fibrillation-cardiac monitoring-modified barium swallow study secondary to dysphagia  Discharge Plan: Anticipate discharge in 24-48 hrs to home with home services.    Code Status: Level 1 - Full Code    Subjective   Patient examined at bedside.  Without complaints of  palpitations or chest pressure today.  States that her breathing is improved.  She is less anxious.    Objective :  Temp:  [96.8 °F (36 °C)-97.7 °F (36.5 °C)] 96.8 °F (36 °C)  HR:  [54-88] 72  BP: (102-136)/(57-82) 120/73  Resp:  [18] 18  SpO2:  [87 %-99 %] 89 %  O2 Device: None (Room air)  Nasal Cannula O2 Flow Rate (L/min):  [2 L/min] 2 L/min    Body mass index is 20.03 kg/m².     Input and Output Summary (last 24 hours):     Intake/Output Summary (Last 24 hours) at 10/7/2024 1121  Last data filed at 10/7/2024 0900  Gross per 24 hour   Intake 1985.54 ml   Output 2375 ml   Net -389.46 ml       Physical Exam  Constitutional:       General: She is not in acute distress.     Appearance: Normal appearance. She is obese. She is not ill-appearing, toxic-appearing or diaphoretic.   HENT:      Head: Normocephalic and atraumatic.      Right Ear: External ear normal.      Left Ear: External ear normal.      Nose: Nose normal.      Mouth/Throat:      Mouth: Mucous membranes are dry.      Pharynx: Oropharynx is clear.   Eyes:      General: No scleral icterus.     Conjunctiva/sclera: Conjunctivae normal.   Cardiovascular:      Rate and Rhythm: Normal rate and regular rhythm.      Pulses: Normal pulses.      Heart sounds: No murmur heard.     No gallop.   Pulmonary:      Effort: Tachypnea and accessory muscle usage present. No respiratory distress.      Breath sounds: Decreased air movement present. No stridor. Examination of the right-upper field reveals rhonchi. Examination of the left-upper field reveals rhonchi. Examination of the right-middle field reveals wheezing and rhonchi. Examination of the right-lower field reveals decreased breath sounds and wheezing. Examination of the left-lower field reveals decreased breath sounds and wheezing. Decreased breath sounds, wheezing and rhonchi present. No rales.   Musculoskeletal:         General: Normal range of motion.      Cervical back: Normal range of motion.      Right lower  leg: No edema.      Left lower leg: No edema.   Lymphadenopathy:      Cervical: No cervical adenopathy.   Skin:     Capillary Refill: Capillary refill takes less than 2 seconds.      Findings: No lesion.   Neurological:      Mental Status: She is alert. Mental status is at baseline.      Gait: Gait normal.   Psychiatric:         Mood and Affect: Mood normal.         Behavior: Behavior normal.         Thought Content: Thought content normal.         Judgment: Judgment normal.           Lines/Drains:        Telemetry:  Telemetry Orders (From admission, onward)               24 Hour Telemetry Monitoring  Continuous x 24 Hours (Telem)        Question:  Reason for 24 Hour Telemetry  Answer:  Arrhythmias requiring acute medical intervention / PPM or ICD malfunction                     Telemetry Reviewed: Normal Sinus Rhythm  Indication for Continued Telemetry Use: Arrthymias requiring medical therapy               Lab Results: I have reviewed the following results:   Results from last 7 days   Lab Units 10/06/24  0456 10/04/24  2019   WBC Thousand/uL 7.39 5.02   HEMOGLOBIN g/dL 12.5 13.8   HEMATOCRIT % 40.2 43.1   PLATELETS Thousands/uL 181 165   SEGS PCT %  --  85*   LYMPHO PCT %  --  9*   MONO PCT %  --  6   EOS PCT %  --  0     Results from last 7 days   Lab Units 10/07/24  0443   SODIUM mmol/L 138   POTASSIUM mmol/L 4.4   CHLORIDE mmol/L 105   CO2 mmol/L 30   BUN mg/dL 17   CREATININE mg/dL 0.57*   ANION GAP mmol/L 3*   CALCIUM mg/dL 8.8   ALBUMIN g/dL 3.5   TOTAL BILIRUBIN mg/dL 0.36   ALK PHOS U/L 59   ALT U/L 64*   AST U/L 31   GLUCOSE RANDOM mg/dL 148*     Results from last 7 days   Lab Units 10/06/24  0922   INR  1.22*     Results from last 7 days   Lab Units 10/07/24  1112 10/07/24  0751 10/06/24  2051 10/06/24  1559 10/06/24  1052 10/06/24  0712 10/05/24  2024 10/05/24  1552 10/05/24  1058 10/05/24  0711 10/04/24  2305   POC GLUCOSE mg/dl 149* 138 197* 133 132 170* 161* 138 127 148* 155*     Results from last 7  days   Lab Units 10/04/24  2312   HEMOGLOBIN A1C % 6.4*     Results from last 7 days   Lab Units 10/05/24  0442 10/04/24  2312 10/04/24  2019   LACTIC ACID mmol/L  --  1.9 3.1*   PROCALCITONIN ng/ml <0.05 <0.05  --        Recent Cultures (last 7 days):   Results from last 7 days   Lab Units 10/06/24  1248 10/05/24  0003 10/04/24  2035 10/04/24  2019   BLOOD CULTURE   --   --  No Growth at 24 hrs. No Growth at 24 hrs.   SPUTUM CULTURE  Test not performed. Suggest repeat specimen.  --   --   --    GRAM STAIN RESULT  3+ Epithelial cells per low power field*  3+ Yeast*  2+ Gram negative rods*  No polys seen*  --   --   --    LEGIONELLA URINARY ANTIGEN   --  Negative  --   --        Imaging Results Review: No pertinent imaging studies reviewed.  Other Study Results Review: No additional pertinent studies reviewed.    Last 24 Hours Medication List:     Current Facility-Administered Medications:     acetaminophen (TYLENOL) tablet 650 mg, Q6H PRN    albuterol (PROVENTIL HFA,VENTOLIN HFA) inhaler 2 puff, Q6H PRN    budesonide (PULMICORT) inhalation solution 0.5 mg, Q12H    cefTRIAXone (ROCEPHIN) IVPB (premix in dextrose) 1,000 mg 50 mL, Q24H, Last Rate: 1,000 mg (10/07/24 0751)    docusate sodium (COLACE) capsule 100 mg, BID    ethambutol (MYAMBUTOL) tablet 800 mg, Daily    fluticasone-umeclidinium-vilanterol 100-62.5-25 mcg/actuation inhaler 1 puff, Daily    guaiFENesin (MUCINEX) 12 hr tablet 600 mg, BID    insulin lispro (HumALOG/ADMELOG) 100 units/mL subcutaneous injection 1-5 Units, TID AC **AND** Fingerstick Glucose (POCT), TID AC    insulin lispro (HumALOG/ADMELOG) 100 units/mL subcutaneous injection 1-5 Units, HS    ipratropium (ATROVENT) 0.02 % inhalation solution 0.5 mg, TID    levalbuterol (XOPENEX) inhalation solution 1.25 mg, TID **AND** [DISCONTINUED] sodium chloride 0.9 % inhalation solution 3 mL, TID    methylPREDNISolone sodium succinate (Solu-MEDROL) injection 40 mg, Q8H    metoprolol tartrate (LOPRESSOR)  tablet 25 mg, Q12H MAYCO    nicotine (NICODERM CQ) 14 mg/24hr TD 24 hr patch 1 patch, Daily    ondansetron (ZOFRAN) injection 4 mg, Once    ondansetron (ZOFRAN) injection 4 mg, Q6H PRN    pantoprazole (PROTONIX) EC tablet 40 mg, Early Morning    polyethylene glycol (MIRALAX) packet 17 g, Daily    rifabutin (MYCOBUTIN) capsule 300 mg, Daily    Administrative Statements   Today, Patient Was Seen By: Raoul Koehler, DO  I have spent a total time of 39 minutes in caring for this patient on the day of the visit/encounter including Diagnostic results, Patient and family education, Counseling / Coordination of care, and Obtaining or reviewing history  .    **Please Note: This note may have been constructed using a voice recognition system.**

## 2024-10-07 NOTE — OCCUPATIONAL THERAPY NOTE
Occupational Therapy Evaluation     Patient Name: Ileana Seaman  Today's Date: 10/7/2024  Problem List  Principal Problem:    New onset atrial fibrillation (HCC)  Active Problems:    Community acquired pneumonia of right upper lobe of lung    Chronic obstructive pulmonary disease with acute lower respiratory infection (HCC)    Mycobacterial infection, non-TB    Hypomagnesemia    Type 2 diabetes mellitus without complication, without long-term current use of insulin (HCC)    Dysphagia    Past Medical History  Past Medical History:   Diagnosis Date    COPD (chronic obstructive pulmonary disease) (HCC)     Hiatal hernia      Past Surgical History  Past Surgical History:   Procedure Laterality Date    CHOLECYSTECTOMY        10/07/24 1250   Note Type   Note type Evaluation   Pain Assessment   Pain Assessment Tool 0-10   Pain Score 6   Pain Location/Orientation Orientation: Left;Location: Hip   Restrictions/Precautions   Other Precautions O2;Telemetry;Fall Risk;Pain;Bed Alarm;Chair Alarm  (chronic 2lpm)   Home Living   Type of Home House  (2 BENJAMIN BHR)   Home Layout Two level;Bed/bath upstairs  (FF BHR to 2nd floor bed/bath)   Bathroom Shower/Tub Tub/shower unit   Bathroom Toilet Standard   Bathroom Equipment Grab bars in shower   Bathroom Accessibility Accessible   Home Equipment Quad cane   Additional Comments Pt reports living alone in a 2SH. No AD PTA.   Prior Function   Level of [place] Independent with ADLs;Independent with functional mobility;Independent with IADLS   Lives With Alone   Receives Help From Family   IADLs Independent with meal prep;Independent with medication management;Family/Friend/Other provides transportation   Falls in the last 6 months 0   ADL   Grooming Assistance 6  Modified Independent   Grooming Deficit Wash/dry hands   UB Dressing Assistance 6  Modified independent   LB Dressing Assistance 6  Modified independent   Toileting Assistance  6  Modified independent   Additional  Comments UB ADLs @ Mod I while seated. LB ADLs @ Mod I. Pt able to don socks while seated at EOB. Toileting tasks and clothing management while standing @ Mod I.   Bed Mobility   Supine to Sit 6  Modified independent   Additional items HOB elevated   Additional Comments Pt supine in bed at beginning of session. Supine to sit @ Mod I.   Transfers   Sit to Stand 6  Modified independent   Stand to Sit 6  Modified independent   Stand pivot 5  Supervision   Toilet transfer 6  Modified independent   Additional Comments No AD   Functional Mobility   Additional Comments Pt able to complete short distance functional mobility to/from bathroom and in hallway without AD @ S. O2 decreased to 78% on 2lpm, requiring increased time and cues for pursed lip breathing to recover.   Balance   Static Sitting Normal   Dynamic Sitting Good   Static Standing Fair +   Dynamic Standing Fair   Activity Tolerance   Activity Tolerance Patient tolerated treatment well   Medical Staff Made Aware Spoke with PT Jesi   Nurse Made Aware Spoke with PANCHO Gaviria   RUE Assessment   RUE Assessment WFL   LUE Assessment   LUE Assessment WFL   Hand Function   Gross Motor Coordination Functional   Fine Motor Coordination Functional   Cognition   Overall Cognitive Status WFL   Arousal/Participation Alert;Responsive;Cooperative   Attention Within functional limits   Orientation Level Oriented X4   Memory Within functional limits   Following Commands Follows all commands and directions without difficulty   Assessment   Limitation Decreased UE strength;Decreased endurance   Prognosis Good   Assessment Pt is a 64 y.o. female, admitted to [facility] 10/4/2024 d/t experiencing chest congestion and SOB. Dx: new onset A-Fib. Pt with PMHx impacting their performance during ADL tasks, including: COPD, hiatal hernia, diabetes. Prior to admission to the hospital Pt was performing ADLs without physical assistance. IADLs without physical assistance. Functional  transfers/ambulation without physical assistance. Cognitive status was PTA was Intact. OT order placed to assess Pt's ADLs, cognitive status, and performance during functional tasks in order to maximize safety and independence while making most appropriate d/c recommendations. Pt's clinical presentation is currently evolving given new onset deficits that effect Pt's occupational performance and ability to safely return to OF including decrease endurance and decrease activity tolerance combined with medical complications of abnormal renal lab values, abnormal blood sugars, low SpO2 values, and need for input for mobility technique/safety. Personal factors affecting Pt at time of initial evaluation include: step(s) to enter environment, multi-level environment, and limited home support. Pt reporting they are near their baseline function and have no concerns regarding ADLs, IADLs or functional mobility upon return to home, therefore do not require acute OT services at this time.   Plan   OT Frequency Eval only   Discharge Recommendation   Rehab Resource Intensity Level, OT No post-acute rehabilitation needs   AM-PAC Daily Activity Inpatient   Lower Body Dressing 3   Bathing 3   Toileting 3   Upper Body Dressing 4   Grooming 3   Eating 4   Daily Activity Raw Score 20   Daily Activity Standardized Score (Calc for Raw Score >=11) 42.03   AM-PAC Applied Cognition Inpatient   Following a Speech/Presentation 4   Understanding Ordinary Conversation 4   Taking Medications 4   Remembering Where Things Are Placed or Put Away 4   Remembering List of 4-5 Errands 4   Taking Care of Complicated Tasks 4   Applied Cognition Raw Score 24   Applied Cognition Standardized Score 62.21   End of Consult   Patient Position at End of Consult Bedside chair;All needs within reach;Bed/Chair alarm activated     The patient's raw score on the AM-PAC Daily Activity Inpatient Short Form is 20. A raw score of greater than or equal to 19 suggests  the patient may benefit from discharge to home. Pt reporting they are near their baseline function and have no concerns regarding ADLs, IADLs or functional mobility upon return to home, therefore do not require acute OT services at this time. D/C OT effective 10/7/2024. If new concerns arise, please re-consult.    Dorcas Huffman, OTR/L

## 2024-10-07 NOTE — ASSESSMENT & PLAN NOTE
She is undergoing evaluation by GI for her recent complaints of dysphagia.  Based on current data, she is low risk from cardiac perspective for any planned EGD.

## 2024-10-07 NOTE — ASSESSMENT & PLAN NOTE
Patient did have atrial fibrillation yesterday but by this morning she is back in normal sinus rhythm on current dose of diltiazem.  I think it would be reasonable to discontinue the diltiazem and continue low-dose metoprolol for now.  As far as oral anticoagulation is concerned, since her atrial fibrillation episodes are very brief and if there is no recurrence while she is in the hospital, I think we can hold off on oral anticoagulation for now.  Heparin can be changed from tomorrow to DVT prophylaxis dose.  I am also ordering an echocardiogram for her today to see her left ventricular function and valves.  Previous echo and February 2023 from [facility] and reported normal left ventricular systolic function with no valvular pathology.

## 2024-10-07 NOTE — ASSESSMENT & PLAN NOTE
Likely, in exacerbation  As above  Continue IV antibiotic for pneumonia.  As well as azithromycin per COPD protocol  Continue IV steroid, inhaled nebulizer  Follow respiratory protocol  Patient does use 2 L of oxygen chronically-will continue

## 2024-10-07 NOTE — ASSESSMENT & PLAN NOTE
Patient presented initially (10/4) with complaints of shortness of breath and dysphagia-see below  (10/6) patient received breathing treatment and afterward complaint of palpitations  Subsequent telemetry/EKG revealed new onset atrial fibrillation with accelerated ventricular response (120s to 130s)  IV beta-blocker administered x 2 with better rate control however still with atrial fibrillation  Patient remained with accelerated ventricular response refractory to oral/IV BB-oral CCB administered with return to normal sinus rhythm  Patient was in atrial fibrillation for only approximately 6 to 7 hours  Was initiated yesterday (10/6) on heparin infusion at the time of accelerated ventricular response    Today (10/7) patient remains in normal sinus rhythm  Has been evaluated by cardiology-Case discussed-will discontinue oral CCB-proceed with oral BB  Given transient episode of accelerated ventricular response/atrial fibrillation-hold on systemic anticoagulation at this juncture  Proceed with Lovenox over the course of hospitalization  Continue on telemetry for the time being  Echocardiogram ordered and pending

## 2024-10-07 NOTE — ASSESSMENT & PLAN NOTE
Lab Results   Component Value Date    HGBA1C 6.4 (H) 10/04/2024       Recent Labs     10/06/24  1052 10/06/24  1559 10/06/24  2051 10/07/24  0751   POCGLU 132 133 197* 138       Blood Sugar Average: Last 72 hrs:  (P) 149.9

## 2024-10-07 NOTE — ASSESSMENT & PLAN NOTE
"Patient states that she has been experiencing dysphagia over the past 3 to 4 days  Notes that her appetite had been low over the past several days is not feeling well with ongoing cough  However, states symptoms have changed over the past 3 to 4 days she is \"unable to swallow\"  Patient notes that she attempted to eat chicken noodle soup in the noodles \"got stuck in her throat\"  Patient denies history of dysphagia  Patient does have known hiatal hernia  CT soft tissues of the neck (10/5) without evidence of soft tissue swelling/obstruction  speech therapy following with plan for MBS today (10/7)-pending  Patient with ability to protect airway  "

## 2024-10-07 NOTE — ASSESSMENT & PLAN NOTE
Lab Results   Component Value Date    HGBA1C 6.4 (H) 10/04/2024       Recent Labs     10/06/24  1559 10/06/24  2051 10/07/24  0751 10/07/24  1112   POCGLU 133 197* 138 149*       Blood Sugar Average: Last 72 hrs:  (P) [id number]  Per chart review, back in 22 September, A1c was running 6.6  Recent A1c which was done in May 2023-came 5.9  Due to recent illness, patient is not eating enough, per daughter, patient also has history of hiatal hernia-concern for hypoglycemia  Continue to monitor.

## 2024-10-07 NOTE — PLAN OF CARE
Problem: PAIN - ADULT  Goal: Verbalizes/displays adequate comfort level or baseline comfort level  Description: Interventions:  - Encourage patient to monitor pain and request assistance  - Assess pain using appropriate pain scale  - Administer analgesics based on type and severity of pain and evaluate response  - Implement non-pharmacological measures as appropriate and evaluate response  - Consider cultural and social influences on pain and pain management  - Notify physician/advanced practitioner if interventions unsuccessful or patient reports new pain  Outcome: Progressing     Problem: INFECTION - ADULT  Goal: Absence or prevention of progression during hospitalization  Description: INTERVENTIONS:  - Assess and monitor for signs and symptoms of infection  - Monitor lab/diagnostic results  - Monitor all insertion sites, i.e. indwelling lines, tubes, and drains  - Monitor endotracheal if appropriate and nasal secretions for changes in amount and color  - [place] appropriate cooling/warming therapies per order  - Administer medications as ordered  - Instruct and encourage patient and family to use good hand hygiene technique  - Identify and instruct in appropriate isolation precautions for identified infection/condition  Outcome: Progressing     Problem: SAFETY ADULT  Goal: Patient will remain free of falls  Description: INTERVENTIONS:  - Educate patient/family on patient safety including physical limitations  - Instruct patient to call for assistance with activity   - Consult OT/PT to assist with strengthening/mobility   - Keep Call bell within reach  - Keep bed low and locked with side rails adjusted as appropriate  - Keep care items and personal belongings within reach  - Initiate and maintain comfort rounds  - Make Fall Risk Sign visible to staff  - Offer Toileting every 2 Hours, in advance of need  - Initiate/Maintain bed/chair alarm  - Apply yellow socks and bracelet for high fall risk patients  -  Consider moving patient to room near nurses station  Outcome: Progressing     Problem: RESPIRATORY - ADULT  Goal: Achieves optimal ventilation and oxygenation  Description: INTERVENTIONS:  - Assess for changes in respiratory status  - Assess for changes in mentation and behavior  - Position to facilitate oxygenation and minimize respiratory effort  - Oxygen administered by appropriate delivery if ordered  - Initiate smoking cessation education as indicated  - Encourage broncho-pulmonary hygiene including cough, deep breathe, Incentive Spirometry  - Assess the need for suctioning and aspirate as needed  - Assess and instruct to report SOB or any respiratory difficulty  - Respiratory Therapy support as indicated  Outcome: Progressing     Problem: CARDIOVASCULAR - ADULT  Goal: Absence of cardiac dysrhythmias or at baseline rhythm  Description: INTERVENTIONS:  - Continuous cardiac monitoring, vital signs, obtain 12 lead EKG if ordered  - Administer antiarrhythmic and heart rate control medications as ordered  - Monitor electrolytes and administer replacement therapy as ordered  Outcome: Progressing

## 2024-10-07 NOTE — PHYSICAL THERAPY NOTE
PHYSICAL THERAPY EVALUATION      NAME:  Ileana Seaman  DATE: 10/07/24    AGE:   64 y.o.  Mrn:   [id number]  ADMIT DX:  Pneumonia [J18.9]  COPD exacerbation (HCC) [J44.1]  Known medical problems [Z78.9]    Past Medical History:   Diagnosis Date    COPD (chronic obstructive pulmonary disease) (HCC)     Hiatal hernia      Length Of Stay: 3  Performed at least 2 patient identifiers during session: Name and Birthday           PHYSICAL THERAPY EVALUATION:       10/07/24 1249   PT Last Visit   PT Visit Date 10/07/24   Note Type   Note type Evaluation   Pain Assessment   Pain Assessment Tool 0-10   Pain Score 6   Pain Location/Orientation Orientation: Left;Location: Hip   Restrictions/Precautions   Weight Bearing Precautions Per Order No   Other Precautions Telemetry;O2;Fall Risk;Pain;Chair Alarm;Bed Alarm  (2L O2)   Home Living   Type of Home House   Home Layout Multi-level;Bed/bath upstairs  (2 BENJAMIN B rails; FF stairs to B&B with B rails)   Bathroom Shower/Tub Tub/shower unit   Bathroom Toilet Standard   Bathroom Equipment Grab bars in shower   Bathroom Accessibility Accessible   Home Equipment Quad cane  (Home O2 (2L))   Prior Function   Level of [place] Independent with ADLs;Independent with functional mobility;Needs assistance with IADLS   Lives With Alone   Receives Help From Family   IADLs Family/Friend/Other provides transportation;Independent with meal prep;Independent with medication management  (one of her children drive)   Falls in the last 6 months 0   Comments IND no AD   General   Additional Pertinent History Recently at another facility on 10/3 with similar dx of pneumonia   Family/Caregiver Present No   Cognition   Overall Cognitive Status WFL   Arousal/Participation Cooperative   Attention Within functional limits   Orientation Level Oriented X4   Memory Within functional limits   RLE Assessment   RLE Assessment WFL   LLE Assessment   LLE Assessment WFL   Bed Mobility   Supine to Sit 6  Modified  independent   Transfers   Sit to Stand 6  Modified independent   Stand to Sit 6  Modified independent   Stand pivot 5  Supervision   Additional Comments No AD   Ambulation/Elevation   Gait pattern Improper Weight shift;Inconsistent wayne;Excessively slow   Gait Assistance 5  Supervision   Additional items Verbal cues   Assistive Device None   Distance 255 ft   Stair Management Assistance 5  Supervision   Stair Management Technique Two rails;Alternating pattern   Number of Stairs 5   Balance   Static Sitting Normal   Dynamic Sitting Good   Static Standing Fair +   Dynamic Standing Fair   Ambulatory Fair   Endurance Deficit   Endurance Deficit Yes   Endurance Deficit Description Required increase to 3L O2 for exertion due to SpO2 dec to 81% with exertion on 2L   Activity Tolerance   Activity Tolerance Patient limited by fatigue   Medical Staff Made Aware OT Dorcas   Assessment   Prognosis Good   Problem List Decreased strength;Decreased endurance;Impaired balance;Decreased mobility   Barriers to Discharge None   Goals   STG Expiration Date 10/21/24   PT Treatment Day 0   Plan   Treatment/Interventions Functional transfer training;LE strengthening/ROM;Elevations;Therapeutic exercise;Endurance training;Gait training;Patient/family training   PT Frequency 1-2x/wk   Discharge Recommendation   Rehab Resource Intensity Level, PT III (Minimum Resource Intensity)   AM-PAC Basic Mobility Inpatient   Turning in Flat Bed Without Bedrails 4   Lying on Back to Sitting on Edge of Flat Bed Without Bedrails 4   Moving Bed to Chair 3   Standing Up From Chair Using Arms 4   Walk in Room 3   Climb 3-5 Stairs With Railing 3   Basic Mobility Inpatient Raw Score 21   Basic Mobility Standardized Score 45.55   [facility] Highest Level Of Mobility   -HLM Goal 6: Walk 10 steps or more   JH-HLM Achieved 8: Walk 250 feet ot more   End of Consult   Patient Position at End of Consult Bedside chair;Bed/Chair alarm activated;All needs within  reach  (3L O2 with SpO2 at 96%)     (Please find full objective findings from PT assessment regarding body systems outlined above).     Assessment: Pt is a 64 y.o. female seen for PT evaluation s/p admission to [facility] on 10/4/2024 with New onset atrial fibrillation (HCC).  Order placed for PT services.  Upon evaluation: Pt is presenting with impaired functional mobility due to decreased strength, decreased endurance, impaired balance, gait deviations, and fall risk requiring  SPV assistance for ambulation with no AD . Pt's clinical presentation is currently evolving given the functional mobility deficits above, especially decreased endurance, coupled with fall risks as indicated by AM-PAC 6-Clicks: 21/24 as well as medical complications of abnormal renal lab values, abnormal blood sugars, readmission to hospital, and need for input for mobility technique/safety.  Pt's PMHx and comorbidities that may affect physical performance and progress include: A fib, COPD, and DM. Personal factors affecting pt at time of IE include: questionable non-compliance, inability to perform IADLs, and tobacco use. Pt will benefit from continued skilled PT services to address deficits as defined above and to maximize level of functional mobility to facilitate return toward PLOF and improved QOL. From PT/mobility standpoint, recommendation at time of d/c would be Level III (Minimum Resource Intensity) pending progress in order to reduce fall risk and maximize pt's functional independence and consistency with mobility in order to facilitate return to PLOF.  Recommend ther ex next 1-2 sessions.     The patient's AM-PAC Basic Mobility Inpatient Short Form Raw Score is 21. A Raw score of greater than 16 suggests the patient may benefit from discharge to home. Please also refer to the recommendation of the Physical Therapist for safe discharge planning.       Goals: Pt will ambulate with 250 ft for >/= independently to  improve activity tolerance and to access home environment. Pt will complete >/= 12 steps with with bilateral handrails with modified I to return to home with BENJAMIN and return to [facility] home.          Cornelio Ramos, PT,DPT

## 2024-10-07 NOTE — ASSESSMENT & PLAN NOTE
Per chart review, patient does follow-up with [facility] infectious disease,  Last note from infectious disease reviewed from telemedicine on 8/27/2024-Per note, patient supposed to take Rifabutin 300 mg once a day, ethambutol 800 mg once a day and azithromycin 250 mg once a day.     Per patient, she is not taking this medication since last week when she started feeling sick  Consider to discuss with infectious disease about the above treatment

## 2024-10-07 NOTE — CONSULTS
Patients Name: Ileana Seaman   YOB: 1960   [id number]   [facility] Cardiology Consult    ASSESSMENT AND RECOMMENDATIONS:    Assessment & Plan  New onset atrial fibrillation (HCC)  Patient did have atrial fibrillation yesterday but by this morning she is back in normal sinus rhythm on current dose of diltiazem.  I think it would be reasonable to discontinue the diltiazem and continue low-dose metoprolol for now.  As far as oral anticoagulation is concerned, since her atrial fibrillation episodes are very brief and if there is no recurrence while she is in the hospital, I think we can hold off on oral anticoagulation for now.  Heparin can be changed from tomorrow to DVT prophylaxis dose.  I am also ordering an echocardiogram for her today to see her left ventricular function and valves.  Previous echo and February 2023 from [facility] and reported normal left ventricular systolic function with no valvular pathology.  Community acquired pneumonia of right upper lobe of lung  Patient is on appropriate broad-spectrum antibiotics per primary care team.  Chronic obstructive pulmonary disease with acute lower respiratory infection (HCC)  Patient is on appropriate therapy with steroid inhalers.  Mycobacterial infection, non-TB  Patient is on appropriate antibiotic therapy per infectious disease.  Hypomagnesemia  Needs more aggressive replacement of magnesium.  Type 2 diabetes mellitus without complication, without long-term current use of insulin ([facility])  Lab Results   Component Value Date    HGBA1C 6.4 (H) 10/04/2024       Recent Labs     10/06/24  1052 10/06/24  1559 10/06/24  2051 10/07/24  0751   POCGLU 132 133 197* 138       Blood Sugar Average: Last 72 hrs:  (P) 149.9    Dysphagia  She is undergoing evaluation by GI for her recent complaints of dysphagia.  Based on current data, she is low risk from cardiac perspective for any planned EGD.    CC:   Atrial fibrillation  HPI:   64-year-old female with past  medical history significant for chronic cigarette smoking, COPD, nontuberculous mycobacterial infection, type 2 diabetes mellitus, who was hospitalized with community-acquired pneumonia and worsening fatigue reduced appetite difficulty swallowing and shortness of breath with wheezing.  Yesterday morning patient complained of palpitations after getting breathing treatment and was noted to be in rapid atrial fibrillation.  She denies any prior cardiac history.  She denies any chest pain, syncope or palpitations prior to this hospitalization.  She lives alone and admits to being not very active but still takes care of her personal chores.  She has been getting care with infectious disease as well as pulmonary at [facility] but states that she did not have a good experience when she went to [facility] in [place] and hence decided to come here.    Past Medical History:   Diagnosis Date    COPD (chronic obstructive pulmonary disease) (HCC)     Hiatal hernia        Past Surgical History:   Procedure Laterality Date    CHOLECYSTECTOMY          Social History     Tobacco Use    Smoking status: Every Day     Current packs/day: 0.25     Types: Cigarettes    Smokeless tobacco: Never   Vaping Use    Vaping status: Never Used   Substance Use Topics    Alcohol use: Never    Drug use: Never        No Known Allergies     History reviewed. No pertinent family history.       Current Facility-Administered Medications:     acetaminophen (TYLENOL) tablet 650 mg, 650 mg, Oral, Q6H PRN, Rolando Parker MD    albuterol (PROVENTIL HFA,VENTOLIN HFA) inhaler 2 puff, 2 puff, Inhalation, Q6H PRN, Raoul Koehler DO    budesonide (PULMICORT) inhalation solution 0.5 mg, 0.5 mg, Nebulization, Q12H, Rolando Parker MD, 0.5 mg at 10/07/24 0736    cefTRIAXone (ROCEPHIN) IVPB (premix in dextrose) 1,000 mg 50 mL, 1,000 mg, Intravenous, Q24H, Rolando Parker MD, Last Rate: 100 mL/hr at 10/07/24 0751, 1,000 mg at 10/07/24 0751    diltiazem  (CARDIZEM) tablet 30 mg, 30 mg, Oral, Q6H MAYCO, Raoul Koehler DO, 30 mg at 10/07/24 0649    docusate sodium (COLACE) capsule 100 mg, 100 mg, Oral, BID, Rolando Parker MD, 100 mg at 10/07/24 0806    ethambutol (MYAMBUTOL) tablet 800 mg, 800 mg, Oral, Daily, Raoul Koehler DO    fluticasone-umeclidinium-vilanterol 100-62.5-25 mcg/actuation inhaler 1 puff, 1 puff, Inhalation, Daily, Raoul Koehler DO, 1 puff at 10/07/24 0814    guaiFENesin (MUCINEX) 12 hr tablet 600 mg, 600 mg, Oral, BID, Rolando Parker MD, 600 mg at 10/07/24 0806    heparin (porcine) 25,000 units in 0.45% NaCl 250 mL infusion (premix), 3-20 Units/kg/hr (Order-Specific), Intravenous, Titrated, Raoul Koehler DO, Last Rate: 6.3 mL/hr at 10/06/24 1638, 14 Units/kg/hr at 10/06/24 1638    heparin (porcine) injection 1,350 Units, 1,350 Units, Intravenous, Q6H PRN, Raoul Koehler DO, 1,350 Units at 10/06/24 2334    heparin (porcine) injection 2,700 Units, 2,700 Units, Intravenous, Q6H PRN, Raoul Koehler DO    insulin lispro (HumALOG/ADMELOG) 100 units/mL subcutaneous injection 1-5 Units, 1-5 Units, Subcutaneous, TID AC, 1 Units at 10/06/24 0831 **AND** Fingerstick Glucose (POCT), , , TID AC, Raoul Koehler DO    insulin lispro (HumALOG/ADMELOG) 100 units/mL subcutaneous injection 1-5 Units, 1-5 Units, Subcutaneous, HS, Raoul Koehler DO, 1 Units at 10/06/24 2231    ipratropium (ATROVENT) 0.02 % inhalation solution 0.5 mg, 0.5 mg, Nebulization, TID, Rolando Parker MD, 0.5 mg at 10/07/24 0736    levalbuterol (XOPENEX) inhalation solution 1.25 mg, 1.25 mg, Nebulization, TID, 1.25 mg at 10/07/24 0736 **AND** [DISCONTINUED] sodium chloride 0.9 % inhalation solution 3 mL, 3 mL, Nebulization, TID, Rolando Parker MD, 3 mL at 10/04/24 2466    methylPREDNISolone sodium succinate (Solu-MEDROL) injection 40 mg, 40 mg, Intravenous, Q8H, Rolando Parker MD, 40 mg at 10/07/24 0826    metoprolol tartrate  (LOPRESSOR) tablet 25 mg, 25 mg, Oral, Q12H MAYCO, Raoul Koehler DO, 25 mg at 10/07/24 0806    nicotine (NICODERM CQ) 14 mg/24hr TD 24 hr patch 1 patch, 1 patch, Transdermal, Daily, Rolando Parker MD, 1 patch at 10/07/24 0806    ondansetron (ZOFRAN) injection 4 mg, 4 mg, Intravenous, Once, Rolando Parker MD    ondansetron (ZOFRAN) injection 4 mg, 4 mg, Intravenous, Q6H PRN, Rolando Parker MD    pantoprazole (PROTONIX) EC tablet 40 mg, 40 mg, Oral, Early Morning, Raoul Koehler DO, 40 mg at 10/07/24 0649    polyethylene glycol (MIRALAX) packet 17 g, 17 g, Oral, Daily, Rolando Parker MD, 17 g at 10/07/24 0810    rifabutin (MYCOBUTIN) capsule 300 mg, 300 mg, Oral, Daily, Raoul Koehler DO    sodium chloride 0.9 % infusion, 125 mL/hr, Intravenous, Continuous, Rolando Parker MD, Last Rate: 125 mL/hr at 10/06/24 1314, 125 mL/hr at 10/06/24 1314     Lab Results   Component Value Date    CREATININE 0.57 (L) 10/07/2024    SODIUM 138 10/07/2024    K 4.4 10/07/2024    TSH 1.40 09/27/2022         Results for orders placed or performed during the hospital encounter of 10/04/24   ECG 12 lead   Result Value    Ventricular Rate 103    Atrial Rate 103    CT Interval 130    QRSD Interval 82    QT Interval 340    QTC Interval 445    P Axis 80    QRS Axis 71    T Wave Axis 70    Narrative    Sinus tachycardia  Otherwise normal ECG  When compared with ECG of 05-NOV-2023 18:59,  No significant change was found  Confirmed by Jovon Gandara ([id number]) on 10/7/2024 8:07:25 AM       I have personally reviewed the ECG from October 6, 2024 which showed atrial fibrillation with rapid ventricular rate and no significant ST segment abnormalities.    REVIEW OF SYSTEMS   Positive for: Dyspnea, dysphagia, cough  Negative for: All remaining as reviewed below and in HPI.    SYSTEM SYMPTOMS REVIEWED:  General--weight change, fever, night sweats  Respiratory--cough, wheezing, shortness of breath, sputum  production  Cardiovascular--chest pain, syncope, dyspnea on exertion, edema, decline in exercise tolerance, claudication   Gastrointestinal--persistent vomiting, diarrhea, abdominal distention, blood in stool   Muscular or skeletal--joint pain or swelling   Neurologic--headaches, syncope, abnormal movement  Hematologic--history of easy bruising and bleeding   Endocrine--thyroid enlargement, heat or cold intolerance, polyuria   Psychiatric--anxiety, depression     General physical examination:    General appearance: Alert, no acute distress, appears older than stated age, normal weight  HEENT: Mucous membranes are moist.  No obvious abnormality noted.  Neck: Supple with no lymphadenopathy.  No JVD.  Carotid pulses are intact.  No carotid bruit.  Cardiovascular system: Regular rhythm.  Distant heart sounds.  No murmurs.  No rubs or gallops. Extremities: No edema. No cyanosis.  Pulmonary: Respirations unlabored.  Reduced air entry bilaterally.  Scattered rhonchi.    Gastrointestinal: Abdomen is soft and nontender.  Bowel sounds are positive.  Musculoskeletal: Upper Extremities: Normal upper motor strength. Lower Extremity: Normal motor strength.   Skin: Skin is warm. No rashes or lesions.  Neurological: Patient is alert and oriented with no gross motor deficits.  Psychiatric: Mood is anxious.  Behavior is normal.    Vitals:    10/07/24 0308 10/07/24 0648 10/07/24 0736 10/07/24 0749   BP: 129/69 136/82  120/73   Pulse: (!) 54 72  72   Resp:   18 18   Temp:    (!) 96.8 °F (36 °C)   TempSrc:       SpO2: 96% 91%  99%   Weight:       Height:           Body mass index is 20.03 kg/m².       Intake/Output Summary (Last 24 hours) at 10/7/2024 0847  Last data filed at 10/7/2024 0605  Gross per 24 hour   Intake 1985.54 ml   Output 1925 ml   Net 60.54 ml             Thank you for this consult. Please call for any further questions.  Jovon Gandara MD, FACC, MONIAC  Attending Cardiologist    Portions of the record  have been created  "with voice recognition software.  Occasional grammatical mistakes or wrong word or \"sound alike\" substitutions may have occurred due to the inherent limitations of voice recognition software. Please reach out to me directly for any clarifications.     "

## 2024-10-07 NOTE — RESPIRATORY THERAPY NOTE
RT Protocol Note  Ileana Seaman 64 y.o. female MRN: [id number]  Unit/Bed#: -01 Encounter: [id number]    Assessment    Principal Problem:    New onset atrial fibrillation (HCC)  Active Problems:    Community acquired pneumonia of right upper lobe of lung    Chronic obstructive pulmonary disease with acute lower respiratory infection (HCC)    Mycobacterial infection, non-TB    Hypomagnesemia    Type 2 diabetes mellitus without complication, without long-term current use of insulin (HCC)    Dysphagia      Home Pulmonary Medications:    Home Devices/Therapy: Other (Comment), Home O2 (Trelogy inhaler/Albuterol nebs and rescue inhaler)    Past Medical History:   Diagnosis Date    COPD (chronic obstructive pulmonary disease) (HCC)     Hiatal hernia      Social History     Socioeconomic History    Marital status: Single     Spouse name: None    Number of children: None    Years of education: None    Highest education level: None   Occupational History    None   Tobacco Use    Smoking status: Every Day     Current packs/day: 0.25     Types: Cigarettes    Smokeless tobacco: Never   Vaping Use    Vaping status: Never Used   Substance and Sexual Activity    Alcohol use: Never    Drug use: Never    Sexual activity: None   Other Topics Concern    None   Social History Narrative    None     Social Determinants of Health     Financial Resource Strain: Not on file   Food Insecurity: Food Insecurity Present (10/5/2024)    Hunger Vital Sign     Worried About Running Out of Food in the Last Year: Sometimes true     Ran Out of Food in the Last Year: Sometimes true   Transportation Needs: No Transportation Needs (10/5/2024)    PRAPARE - Transportation     Lack of Transportation (Medical): No     Lack of Transportation (Non-Medical): No   Physical Activity: Not on file   Stress: Not on file   Social Connections: Unknown (6/18/2024)    Received from [facility]    Social Connections     How often do you feel lonely or isolated  "from those around you? (Adult - for ages 18 years and over): Not on file   Intimate Partner Violence: Not on file   Housing Stability: Unknown (10/5/2024)    Housing Stability Vital Sign     Unable to Pay for Housing in the Last Year: No     Number of Times Moved in the Last Year: Not on file     Homeless in the Last Year: No       Subjective         Objective    Physical Exam:   Assessment Type: During-treatment  General Appearance: Awake, Alert  Respiratory Pattern: Normal  Chest Assessment: Chest expansion symmetrical  Bilateral Breath Sounds: Diminished  Cough: None  O2 Device: 3L    Vitals:  Blood pressure 109/68, pulse 77, temperature 97.7 °F (36.5 °C), temperature source Temporal, resp. rate 18, height 5' 1\" (1.549 m), weight 48.1 kg (106 lb), SpO2 97%.          Imaging and other studies:     O2 Device: 3L     Plan    Respiratory Plan: Mild Distress pathway        Resp Comments: Scheduled neb txs provided, no distress, pt continues on 2lpm NC.   "

## 2024-10-07 NOTE — PLAN OF CARE
Problem: PHYSICAL THERAPY ADULT  Goal: Performs mobility at highest level of function for planned discharge setting.  See evaluation for individualized goals.  Description: Treatment/Interventions: Functional transfer training, LE strengthening/ROM, Elevations, Therapeutic exercise, Endurance training, Gait training, Patient/family training          See flowsheet documentation for full assessment, interventions and recommendations.  Note: Prognosis: Good  Problem List: Decreased strength, Decreased endurance, Impaired balance, Decreased mobility  Assessment: Pt is a 64 y.o. female seen for PT [facility] with New onset atrial fibrillation (HCC).  Order placed for PT services.  Upon evaluation: Pt is presenting with impaired functional mobility due to decreased strength, decreased endurance, impaired balance, gait deviations, and fall risk requiring  SPV assistance for ambulation with no AD . Pt's clinical presentation is currently evolving given the functional mobility deficits above, especially decreased endurance, coupled with fall risks as indicated by AM-PAC 6-Clicks: 21/24 as well as medical complications of abnormal renal lab values, abnormal blood sugars, readmission to hospital, and need for input for mobility technique/safety.  Pt's PMHx and comorbidities that may affect physical performance and progress include: A fib, COPD, and DM. Personal factors affecting pt at time of IE include: questionable non-compliance, inability to perform IADLs, and tobacco use. Pt will benefit from continued skilled PT services to address deficits as defined above and to maximize level of functional mobility to facilitate return toward PLOF and improved QOL. From PT/mobility standpoint, recommendation at time of d/c would be Level III (Minimum Resource Intensity) pending progress in order to reduce fall risk and maximize pt's functional independence and consistency  with mobility in order to facilitate return to PLOF.  Recommend ther ex next 1-2 sessions.  Barriers to Discharge: None     Rehab Resource Intensity Level, PT: III (Minimum Resource Intensity)    See flowsheet documentation for full assessment.

## 2024-10-07 NOTE — PLAN OF CARE
Problem: PAIN - ADULT  Goal: Verbalizes/displays adequate comfort level or baseline comfort level  Description: Interventions:  - Encourage patient to monitor pain and request assistance  - Assess pain using appropriate pain scale  - Administer analgesics based on type and severity of pain and evaluate response  - Implement non-pharmacological measures as appropriate and evaluate response  - Consider cultural and social influences on pain and pain management  - Notify physician/advanced practitioner if interventions unsuccessful or patient reports new pain  Outcome: Progressing     Problem: INFECTION - ADULT  Goal: Absence or prevention of progression during hospitalization  Description: INTERVENTIONS:  - Assess and monitor for signs and symptoms of infection  - Monitor lab/diagnostic results  - Monitor all insertion sites, i.e. indwelling lines, tubes, and drains  - Monitor endotracheal if appropriate and nasal secretions for changes in amount and color  - [place] appropriate cooling/warming therapies per order  - Administer medications as ordered  - Instruct and encourage patient and family to use good hand hygiene technique  - Identify and instruct in appropriate isolation precautions for identified infection/condition  Outcome: Progressing     Problem: SAFETY ADULT  Goal: Patient will remain free of falls  Description: INTERVENTIONS:  - Educate patient/family on patient safety including physical limitations  - Instruct patient to call for assistance with activity   - Consult OT/PT to assist with strengthening/mobility   - Keep Call bell within reach  - Keep bed low and locked with side rails adjusted as appropriate  - Keep care items and personal belongings within reach  - Initiate and maintain comfort rounds  - Make Fall Risk Sign visible to staff  - Offer Toileting every 2 Hours, in advance of need  - Initiate/Maintain bed/chair alarm  - Apply yellow socks and bracelet for high fall risk patients  -  Consider moving patient to room near nurses station  Outcome: Progressing  Goal: Maintain or return to baseline ADL function  Description: INTERVENTIONS:  -  Assess patient's ability to carry out ADLs; assess patient's baseline for ADL function and identify physical deficits which impact ability to perform ADLs (bathing, care of mouth/teeth, toileting, grooming, dressing, etc.)  - Assess/evaluate cause of self-care deficits   - Assess range of motion  - Assess patient's mobility; develop plan if impaired  - Assess patient's need for assistive devices and provide as appropriate  - Encourage maximum independence but intervene and supervise when necessary  - Involve family in performance of ADLs  - Assess for home care needs following discharge   - Consider OT consult to assist with ADL evaluation and planning for discharge  - Provide patient education as appropriate  Outcome: Progressing  Goal: Maintains/Returns to pre admission functional level  Description: INTERVENTIONS:  - Perform AM-PAC 6 Click Basic Mobility/ Daily Activity assessment daily.  - Set and communicate daily mobility goal to care team and patient/family/caregiver.   - Collaborate with rehabilitation services on mobility goals if consulted  - Perform Range of Motion 4 times a day.  - Reposition patient every 2 hours.  - Stand patient 5 times a day  - Out of bed to chair 3 times a day   - Out of bed for toileting  - Record patient progress and toleration of activity level   Outcome: Progressing     Problem: DISCHARGE PLANNING  Goal: Discharge to home or other facility with appropriate resources  Description: INTERVENTIONS:  - Identify barriers to discharge w/patient and caregiver  - Arrange for needed discharge resources and transportation as appropriate  - Identify discharge learning needs (meds, wound care, etc.)  - Arrange for interpretive services to assist at discharge as needed  - Refer to Case Management Department for coordinating discharge  planning if the patient needs post-hospital services based on physician/advanced practitioner order or complex needs related to functional status, cognitive ability, or social support system  Outcome: Progressing     Problem: Knowledge Deficit  Goal: Patient/family/caregiver demonstrates understanding of disease process, treatment plan, medications, and discharge instructions  Description: Complete learning assessment and assess knowledge base.  Interventions:  - Provide teaching at level of understanding  - Provide teaching via preferred learning methods  Outcome: Progressing     Problem: RESPIRATORY - ADULT  Goal: Achieves optimal ventilation and oxygenation  Description: INTERVENTIONS:  - Assess for changes in respiratory status  - Assess for changes in mentation and behavior  - Position to facilitate oxygenation and minimize respiratory effort  - Oxygen administered by appropriate delivery if ordered  - Initiate smoking cessation education as indicated  - Encourage broncho-pulmonary hygiene including cough, deep breathe, Incentive Spirometry  - Assess the need for suctioning and aspirate as needed  - Assess and instruct to report SOB or any respiratory difficulty  - Respiratory Therapy support as indicated  Outcome: Progressing     Problem: METABOLIC, FLUID AND ELECTROLYTES - ADULT  Goal: Glucose maintained within target range  Description: INTERVENTIONS:  - Monitor Blood Glucose as ordered  - Assess for signs and symptoms of hyperglycemia and hypoglycemia  - Administer ordered medications to maintain glucose within target range  - Assess nutritional intake and initiate nutrition service referral as needed  Outcome: Progressing     Problem: CARDIOVASCULAR - ADULT  Goal: Absence of cardiac dysrhythmias or at baseline rhythm  Description: INTERVENTIONS:  - Continuous cardiac monitoring, vital signs, obtain 12 lead EKG if ordered  - Administer antiarrhythmic and heart rate control medications as ordered  - Monitor  electrolytes and administer replacement therapy as ordered  Outcome: Progressing

## 2024-10-08 ENCOUNTER — APPOINTMENT (INPATIENT)
Dept: CT IMAGING | Facility: HOSPITAL | Age: 64
DRG: 177 | End: 2024-10-08
Payer: MEDICARE

## 2024-10-08 LAB
ATRIAL RATE: 107 BPM
ATRIAL RATE: 114 BPM
BASOPHILS # BLD AUTO: 0.01 THOUSANDS/ΜL (ref 0–0.1)
BASOPHILS NFR BLD AUTO: 0 % (ref 0–1)
EOSINOPHIL # BLD AUTO: 0 THOUSAND/ΜL (ref 0–0.61)
EOSINOPHIL NFR BLD AUTO: 0 % (ref 0–6)
ERYTHROCYTE [DISTWIDTH] IN BLOOD BY AUTOMATED COUNT: 13.7 % (ref 11.6–15.1)
GLUCOSE SERPL-MCNC: 125 MG/DL (ref 65–140)
GLUCOSE SERPL-MCNC: 167 MG/DL (ref 65–140)
GLUCOSE SERPL-MCNC: 185 MG/DL (ref 65–140)
GLUCOSE SERPL-MCNC: 196 MG/DL (ref 65–140)
HCT VFR BLD AUTO: 41 % (ref 34.8–46.1)
HGB BLD-MCNC: 13.2 G/DL (ref 11.5–15.4)
IMM GRANULOCYTES # BLD AUTO: 0.12 THOUSAND/UL (ref 0–0.2)
IMM GRANULOCYTES NFR BLD AUTO: 1 % (ref 0–2)
LYMPHOCYTES # BLD AUTO: 0.79 THOUSANDS/ΜL (ref 0.6–4.47)
LYMPHOCYTES NFR BLD AUTO: 8 % (ref 14–44)
MCH RBC QN AUTO: 29 PG (ref 26.8–34.3)
MCHC RBC AUTO-ENTMCNC: 32.2 G/DL (ref 31.4–37.4)
MCV RBC AUTO: 90 FL (ref 82–98)
MONOCYTES # BLD AUTO: 0.47 THOUSAND/ΜL (ref 0.17–1.22)
MONOCYTES NFR BLD AUTO: 5 % (ref 4–12)
NEUTROPHILS # BLD AUTO: 8.37 THOUSANDS/ΜL (ref 1.85–7.62)
NEUTS SEG NFR BLD AUTO: 86 % (ref 43–75)
NRBC BLD AUTO-RTO: 0 /100 WBCS
P AXIS: 103 DEGREES
P AXIS: 76 DEGREES
PLATELET # BLD AUTO: 218 THOUSANDS/UL (ref 149–390)
PMV BLD AUTO: 12.2 FL (ref 8.9–12.7)
PR INTERVAL: 104 MS
PR INTERVAL: 118 MS
PROCALCITONIN SERPL-MCNC: <0.05 NG/ML
QRS AXIS: 116 DEGREES
QRS AXIS: 71 DEGREES
QRSD INTERVAL: 76 MS
QRSD INTERVAL: 76 MS
QT INTERVAL: 308 MS
QT INTERVAL: 338 MS
QTC INTERVAL: 424 MS
QTC INTERVAL: 451 MS
RBC # BLD AUTO: 4.55 MILLION/UL (ref 3.81–5.12)
T WAVE AXIS: 110 DEGREES
T WAVE AXIS: 80 DEGREES
VENTRICULAR RATE: 107 BPM
VENTRICULAR RATE: 114 BPM
WBC # BLD AUTO: 9.76 THOUSAND/UL (ref 4.31–10.16)

## 2024-10-08 PROCEDURE — 99223 1ST HOSP IP/OBS HIGH 75: CPT | Performed by: INTERNAL MEDICINE

## 2024-10-08 PROCEDURE — NC001 PR NO CHARGE: Performed by: PHYSICIAN ASSISTANT

## 2024-10-08 PROCEDURE — 99232 SBSQ HOSP IP/OBS MODERATE 35: CPT | Performed by: INTERNAL MEDICINE

## 2024-10-08 PROCEDURE — 99239 HOSP IP/OBS DSCHRG MGMT >30: CPT | Performed by: INTERNAL MEDICINE

## 2024-10-08 PROCEDURE — 94760 N-INVAS EAR/PLS OXIMETRY 1: CPT

## 2024-10-08 PROCEDURE — 82948 REAGENT STRIP/BLOOD GLUCOSE: CPT

## 2024-10-08 PROCEDURE — 93005 ELECTROCARDIOGRAM TRACING: CPT

## 2024-10-08 PROCEDURE — 94640 AIRWAY INHALATION TREATMENT: CPT

## 2024-10-08 PROCEDURE — 71275 CT ANGIOGRAPHY CHEST: CPT

## 2024-10-08 PROCEDURE — 85025 COMPLETE CBC W/AUTO DIFF WBC: CPT | Performed by: INTERNAL MEDICINE

## 2024-10-08 PROCEDURE — 93010 ELECTROCARDIOGRAM REPORT: CPT | Performed by: INTERNAL MEDICINE

## 2024-10-08 RX ORDER — AZITHROMYCIN 250 MG/1
250 TABLET, FILM COATED ORAL EVERY 24 HOURS
Status: DISCONTINUED | OUTPATIENT
Start: 2024-10-08 | End: 2024-10-09 | Stop reason: HOSPADM

## 2024-10-08 RX ORDER — LORAZEPAM 2 MG/ML
0.5 INJECTION INTRAMUSCULAR ONCE
Status: COMPLETED | OUTPATIENT
Start: 2024-10-08 | End: 2024-10-08

## 2024-10-08 RX ORDER — ACETAMINOPHEN 325 MG/1
975 TABLET ORAL EVERY 8 HOURS SCHEDULED
Status: DISCONTINUED | OUTPATIENT
Start: 2024-10-08 | End: 2024-10-09 | Stop reason: HOSPADM

## 2024-10-08 RX ORDER — LIDOCAINE 50 MG/G
1 PATCH TOPICAL DAILY
Status: DISCONTINUED | OUTPATIENT
Start: 2024-10-09 | End: 2024-10-09 | Stop reason: HOSPADM

## 2024-10-08 RX ADMIN — LEVALBUTEROL HYDROCHLORIDE 1.25 MG: 1.25 SOLUTION RESPIRATORY (INHALATION) at 13:37

## 2024-10-08 RX ADMIN — GUAIFENESIN 600 MG: 600 TABLET ORAL at 17:14

## 2024-10-08 RX ADMIN — METHYLPREDNISOLONE SODIUM SUCCINATE 40 MG: 40 INJECTION, POWDER, FOR SOLUTION INTRAMUSCULAR; INTRAVENOUS at 16:05

## 2024-10-08 RX ADMIN — INSULIN LISPRO 1 UNITS: 100 INJECTION, SOLUTION INTRAVENOUS; SUBCUTANEOUS at 16:31

## 2024-10-08 RX ADMIN — INSULIN LISPRO 1 UNITS: 100 INJECTION, SOLUTION INTRAVENOUS; SUBCUTANEOUS at 21:14

## 2024-10-08 RX ADMIN — METOPROLOL TARTRATE 25 MG: 25 TABLET, FILM COATED ORAL at 08:31

## 2024-10-08 RX ADMIN — ACETAMINOPHEN 325MG 975 MG: 325 TABLET ORAL at 17:14

## 2024-10-08 RX ADMIN — PANTOPRAZOLE SODIUM 40 MG: 40 TABLET, DELAYED RELEASE ORAL at 04:59

## 2024-10-08 RX ADMIN — INSULIN LISPRO 1 UNITS: 100 INJECTION, SOLUTION INTRAVENOUS; SUBCUTANEOUS at 11:41

## 2024-10-08 RX ADMIN — NICOTINE 1 PATCH: 14 PATCH, EXTENDED RELEASE TRANSDERMAL at 08:31

## 2024-10-08 RX ADMIN — CEFTRIAXONE 1000 MG: 1 INJECTION, SOLUTION INTRAVENOUS at 08:30

## 2024-10-08 RX ADMIN — METOPROLOL TARTRATE 25 MG: 25 TABLET, FILM COATED ORAL at 20:46

## 2024-10-08 RX ADMIN — IPRATROPIUM BROMIDE 0.5 MG: 0.5 SOLUTION RESPIRATORY (INHALATION) at 07:27

## 2024-10-08 RX ADMIN — POLYETHYLENE GLYCOL 3350 17 G: 17 POWDER, FOR SOLUTION ORAL at 08:30

## 2024-10-08 RX ADMIN — MORPHINE SULFATE 2 MG: 2 INJECTION, SOLUTION INTRAMUSCULAR; INTRAVENOUS at 04:55

## 2024-10-08 RX ADMIN — ACETAMINOPHEN 325MG 975 MG: 325 TABLET ORAL at 20:46

## 2024-10-08 RX ADMIN — LEVALBUTEROL HYDROCHLORIDE 1.25 MG: 1.25 SOLUTION RESPIRATORY (INHALATION) at 19:44

## 2024-10-08 RX ADMIN — LORAZEPAM 0.5 MG: 2 INJECTION INTRAMUSCULAR; INTRAVENOUS at 04:55

## 2024-10-08 RX ADMIN — BUDESONIDE INHALATION 0.5 MG: 0.5 SUSPENSION RESPIRATORY (INHALATION) at 07:27

## 2024-10-08 RX ADMIN — IPRATROPIUM BROMIDE 0.5 MG: 0.5 SOLUTION RESPIRATORY (INHALATION) at 19:44

## 2024-10-08 RX ADMIN — DOCUSATE SODIUM 100 MG: 100 CAPSULE, LIQUID FILLED ORAL at 17:14

## 2024-10-08 RX ADMIN — BUDESONIDE INHALATION 0.5 MG: 0.5 SUSPENSION RESPIRATORY (INHALATION) at 19:44

## 2024-10-08 RX ADMIN — AZITHROMYCIN 250 MG: 250 TABLET, FILM COATED ORAL at 14:45

## 2024-10-08 RX ADMIN — LEVALBUTEROL HYDROCHLORIDE 1.25 MG: 1.25 SOLUTION RESPIRATORY (INHALATION) at 07:27

## 2024-10-08 RX ADMIN — IOHEXOL 50 ML: 350 INJECTION, SOLUTION INTRAVENOUS at 10:25

## 2024-10-08 RX ADMIN — IPRATROPIUM BROMIDE 0.5 MG: 0.5 SOLUTION RESPIRATORY (INHALATION) at 13:37

## 2024-10-08 RX ADMIN — GUAIFENESIN 600 MG: 600 TABLET ORAL at 08:31

## 2024-10-08 RX ADMIN — DOCUSATE SODIUM 100 MG: 100 CAPSULE, LIQUID FILLED ORAL at 08:31

## 2024-10-08 RX ADMIN — FLUTICASONE FUROATE, UMECLIDINIUM BROMIDE AND VILANTEROL TRIFENATATE 1 PUFF: 100; 62.5; 25 POWDER RESPIRATORY (INHALATION) at 08:40

## 2024-10-08 RX ADMIN — METHYLPREDNISOLONE SODIUM SUCCINATE 40 MG: 40 INJECTION, POWDER, FOR SOLUTION INTRAMUSCULAR; INTRAVENOUS at 07:43

## 2024-10-08 NOTE — PLAN OF CARE
Problem: PAIN - ADULT  Goal: Verbalizes/displays adequate comfort level or baseline comfort level  Description: Interventions:  - Encourage patient to monitor pain and request assistance  - Assess pain using appropriate pain scale  - Administer analgesics based on type and severity of pain and evaluate response  - Implement non-pharmacological measures as appropriate and evaluate response  - Consider cultural and social influences on pain and pain management  - Notify physician/advanced practitioner if interventions unsuccessful or patient reports new pain  Outcome: Progressing     Problem: INFECTION - ADULT  Goal: Absence or prevention of progression during hospitalization  Description: INTERVENTIONS:  - Assess and monitor for signs and symptoms of infection  - Monitor lab/diagnostic results  - Monitor all insertion sites, i.e. indwelling lines, tubes, and drains  - Monitor endotracheal if appropriate and nasal secretions for changes in amount and color  - [place] appropriate cooling/warming therapies per order  - Administer medications as ordered  - Instruct and encourage patient and family to use good hand hygiene technique  - Identify and instruct in appropriate isolation precautions for identified infection/condition  Outcome: Progressing     Problem: SAFETY ADULT  Goal: Patient will remain free of falls  Description: INTERVENTIONS:  - Educate patient/family on patient safety including physical limitations  - Instruct patient to call for assistance with activity   - Consult OT/PT to assist with strengthening/mobility   - Keep Call bell within reach  - Keep bed low and locked with side rails adjusted as appropriate  - Keep care items and personal belongings within reach  - Initiate and maintain comfort rounds  - Make Fall Risk Sign visible to staff  - Offer Toileting every 2 Hours, in advance of need  - Initiate/Maintain bed/chair alarm  - Apply yellow socks and bracelet for high fall risk patients  -  Consider moving patient to room near nurses station  Outcome: Progressing  Goal: Maintain or return to baseline ADL function  Description: INTERVENTIONS:  -  Assess patient's ability to carry out ADLs; assess patient's baseline for ADL function and identify physical deficits which impact ability to perform ADLs (bathing, care of mouth/teeth, toileting, grooming, dressing, etc.)  - Assess/evaluate cause of self-care deficits   - Assess range of motion  - Assess patient's mobility; develop plan if impaired  - Assess patient's need for assistive devices and provide as appropriate  - Encourage maximum independence but intervene and supervise when necessary  - Involve family in performance of ADLs  - Assess for home care needs following discharge   - Consider OT consult to assist with ADL evaluation and planning for discharge  - Provide patient education as appropriate  Outcome: Progressing  Goal: Maintains/Returns to pre admission functional level  Description: INTERVENTIONS:  - Perform AM-PAC 6 Click Basic Mobility/ Daily Activity assessment daily.  - Set and communicate daily mobility goal to care team and patient/family/caregiver.   - Collaborate with rehabilitation services on mobility goals if consulted  - Perform Range of Motion 4 times a day.  - Reposition patient every 2 hours.  - Stand patient 5 times a day  - Out of bed to chair 3 times a day   - Out of bed for toileting  - Record patient progress and toleration of activity level   Outcome: Progressing     Problem: DISCHARGE PLANNING  Goal: Discharge to home or other facility with appropriate resources  Description: INTERVENTIONS:  - Identify barriers to discharge w/patient and caregiver  - Arrange for needed discharge resources and transportation as appropriate  - Identify discharge learning needs (meds, wound care, etc.)  - Arrange for interpretive services to assist at discharge as needed  - Refer to Case Management Department for coordinating discharge  planning if the patient needs post-hospital services based on physician/advanced practitioner order or complex needs related to functional status, cognitive ability, or social support system  Outcome: Progressing     Problem: Knowledge Deficit  Goal: Patient/family/caregiver demonstrates understanding of disease process, treatment plan, medications, and discharge instructions  Description: Complete learning assessment and assess knowledge base.  Interventions:  - Provide teaching at level of understanding  - Provide teaching via preferred learning methods  Outcome: Progressing     Problem: RESPIRATORY - ADULT  Goal: Achieves optimal ventilation and oxygenation  Description: INTERVENTIONS:  - Assess for changes in respiratory status  - Assess for changes in mentation and behavior  - Position to facilitate oxygenation and minimize respiratory effort  - Oxygen administered by appropriate delivery if ordered  - Initiate smoking cessation education as indicated  - Encourage broncho-pulmonary hygiene including cough, deep breathe, Incentive Spirometry  - Assess the need for suctioning and aspirate as needed  - Assess and instruct to report SOB or any respiratory difficulty  - Respiratory Therapy support as indicated  Outcome: Progressing     Problem: METABOLIC, FLUID AND ELECTROLYTES - ADULT  Goal: Glucose maintained within target range  Description: INTERVENTIONS:  - Monitor Blood Glucose as ordered  - Assess for signs and symptoms of hyperglycemia and hypoglycemia  - Administer ordered medications to maintain glucose within target range  - Assess nutritional intake and initiate nutrition service referral as needed  Outcome: Progressing     Problem: CARDIOVASCULAR - ADULT  Goal: Absence of cardiac dysrhythmias or at baseline rhythm  Description: INTERVENTIONS:  - Continuous cardiac monitoring, vital signs, obtain 12 lead EKG if ordered  - Administer antiarrhythmic and heart rate control medications as ordered  - Monitor  electrolytes and administer replacement therapy as ordered  Outcome: Progressing     Problem: Nutrition/Hydration-ADULT  Goal: Nutrient/Hydration intake appropriate for improving, restoring or maintaining nutritional needs  Description: Monitor and assess patient's nutrition/hydration status for malnutrition. Collaborate with interdisciplinary team and initiate plan and interventions as ordered.  Monitor patient's weight and dietary intake as ordered or per policy. Utilize nutrition screening tool and intervene as necessary. Determine patient's food preferences and provide high-protein, high-caloric foods as appropriate.     INTERVENTIONS:  - Monitor oral intake, urinary output, labs, and treatment plans  - Assess nutrition and hydration status and recommend course of action  - Evaluate amount of meals eaten  - Assist patient with eating if necessary   - Allow adequate time for meals  - Recommend/ encourage appropriate diets, oral nutritional supplements, and vitamin/mineral supplements  - Order, calculate, and assess calorie counts as needed  - Recommend, monitor, and adjust tube feedings and TPN/PPN based on assessed needs  - Assess need for intravenous fluids  - Provide specific nutrition/hydration education as appropriate  - Include patient/family/caregiver in decisions related to nutrition  Outcome: Progressing

## 2024-10-08 NOTE — DISCHARGE SUMMARY
Discharge Summary - Hospitalist   Name: Ileana Seaman 64 y.o. female I MRN: [id number]  Unit/Bed#: -01 I Date of Admission: 10/4/2024   Date of Service: 10/8/2024 I Hospital Day: 4     Assessment & Plan  Abnormal CT chest  Patient presented  (10/4) with complaints of persistent/progressive fatigue, decreased appetite, difficulty swallowing, cough, wheezing, shortness of breath  Notably, had presented to outside facility 2 days prior and was diagnosed with right upper lobe pneumonia-discharged with Levaquin and prednisone  However, patient notes persistent/progressive symptoms    In ED, hemodynamically stable, maintained on baseline oxygen status of 2 L without significant desaturation, negative SIRS criteria  However, patient with episodes of tachycardia and use of accessory respiratory muscles prompting request for admission with index suspicion for right upper lobe pneumonia causing exacerbation of underlying COPD/emphysema    On admission started on Rocephin Zithromax  urine for strep and Legionella antigens-negative  Sputum culture if able-pending  COVID, flu, RSV negative  Treat underlying COPD/emphysema (see below)  CTA chest today shows No pulmonary embolus.     Severe emphysema with redemonstrated mass in the superior segment of the right lower lobe and masslike opacity left upper lobe, which remain concerning for bronchogenic malignancy. There are additional suspicious nodules in the lingula and right lower lobe as discussed. Further work-up with PET/CT and/or tissue sampling is advised.  Discoid right upper lobe opacity is similar in appearance to recent neck CT and may represent chronic scarring. Superimposed infection felt less likely but not excluded and correlation with clinical exam is necessary.  Patient had robotic bronchoscopy with BAL(5/24) and biopsy, pathology was negative for malignancy and diagnosed with MAC    notably, patient with history of MAC-is to be maintained on Rifabutin  300 mg once a day, ethambutol 800 mg once a day and azithromycin 250 mg once a day.  Has not been taking as has been feeling ill.  [facility]  Pulmonology input appreciated.  Discontinued antibiotics.  Continue with MAC treatment and tapering steroids.  Outpatient follow-up with pulmonology and infectious disease.  Chronic obstructive pulmonary disease with acute lower respiratory infection (HCC)  Likely, in exacerbation  Has severe emphysema.  Likely with acute exacerbation.  Currently maintained on high-dose IV steroids along with scheduled nebulizer treatment and antibiotics.  Pulmonology consulted in the setting of underlying MAC infection-input appreciated  Transition from IV steroids to p.o. tapering steroids.  Continue with outpatient inhaler treatment.  Close outpatient pulmonology follow-up  Mycobacterial infection, non-TB  Per chart review, patient does follow-up with [facility] infectious disease,  Last note from infectious disease reviewed from telemedicine on 8/27/2024-Per note, patient supposed to take Rifabutin 300 mg once a day, ethambutol 800 mg once a day and azithromycin 250 mg once a day.     Per patient, she is not taking this medication since last week when she started feeling sick  OutPatient ID follow-up on discharge  Resume triple therapy with rifabutin, ethambutol and azithromycin.        Hypomagnesemia  On arrival, magnesium is 1.6, status post IV supplement, recheck  Type 2 diabetes mellitus without complication, without long-term current use of insulin ([facility])  Lab Results   Component Value Date    HGBA1C 6.4 (H) 10/04/2024       Recent Labs     10/07/24  2100 10/08/24  0442 10/08/24  1104 10/08/24  1530   POCGLU 181* 125 167* 185*       Blood Sugar Average: Last 72 hrs:  (P) 157.2  Per chart review, back in 22 September, A1c was running 6.6  Recent A1c which was done in May 2023-came 5.9  Due to recent illness, patient is not eating enough, per daughter,  "patient also has history of hiatal hernia-concern for hypoglycemia  Continue to monitor.  Dysphagia  Patient states that she has been experiencing dysphagia over the past 3 to 4 days  Notes that her appetite had been low over the past several days is not feeling well with ongoing cough  However, states symptoms have changed over the past 3 to 4 days she is \"unable to swallow\"  Patient notes that she attempted to eat chicken noodle soup in the noodles \"got stuck in her throat\"  Patient denies history of dysphagia  Patient does have known hiatal hernia  CT soft tissues of the neck (10/5) without evidence of soft tissue swelling/obstruction  speech therapy following with plan for MBS today (10/7)-pending  Patient with ability to protect airway  Outpatient GI and speech therapy follow-up  New onset atrial fibrillation (HCC)  Patient presented initially (10/4) with complaints of shortness of breath and dysphagia-see below  (10/6) patient received breathing treatment and afterward complaint of palpitations  Subsequent telemetry/EKG revealed new onset atrial fibrillation with accelerated ventricular response (120s to 130s)  IV beta-blocker administered x 2 with better rate control however still with atrial fibrillation  Patient remained with accelerated ventricular response refractory to oral/IV BB-oral CCB administered with return to normal sinus rhythm  Patient was in atrial fibrillation for only approximately 6 to 7 hours  Was initiated yesterday (10/6) on heparin infusion at the time of accelerated ventricular response  Today (10/7) patient remains in normal sinus rhythm  Has been evaluated by cardiology-Case discussed-will discontinue oral CCB-proceed with oral BB  Given transient episode of accelerated ventricular response/atrial fibrillation-hold on systemic anticoagulation at this juncture  Proceed with Lovenox over the course of hospitalization  Discontinue telemetry.  Echocardiogram normal ejection fraction with " grade 1 diastolic dysfunction.     Medical Problems       Resolved Problems  Never Reviewed            Resolved    Lactic acidosis 10/5/2024     Resolved by  Raoul Koehler DO        Discharging Physician / Practitioner: Juana Santana MD  PCP: Pop Schwarz DO  Admission Date:   Admission Orders (From admission, onward)       Ordered        10/04/24 2101  INPATIENT ADMISSION  Once                          Discharge Date: 10/09/24    Consultations During Hospital Stay:  Pulmonology    Procedures Performed:   CTA chestNo pulmonary embolus.     Severe emphysema with redemonstrated mass in the superior segment of the right lower lobe and masslike opacity left upper lobe, which remain concerning for bronchogenic malignancy. There are additional suspicious nodules in the lingula and right lower   lobe as discussed. Further work-up with PET/CT and/or tissue sampling is advised.     Discoid right upper lobe opacity is similar in appearance to recent neck CT and may represent chronic scarring. Superimposed infection felt less likely but not excluded and correlation with clinical exam is necessary.    Significant Findings / Test Results:     See above  Incidental Findings:   NA       Test Results Pending at Discharge (will require follow up):   na     Outpatient Tests Requested:  Continue to follow with infectious disease and pulmonology at [facility] for ongoing management of MAC     Complications:  NONE     Reason for Admission: Shortness of breath    Hospital Course:   Ileana Seaman is a 64 y.o. female patient who originally presented to the hospital on 10/4/2024 due to shortness of breath.  CTA chest was negative for pulmonary embolism showed large cavitary lesions which she is being worked up for MAC lung infection.  Was found to have COPD exacerbation and managed with high-dose IV steroids scheduled nebulizer treatment and antitussive therapy.  Upon improvement of symptoms was switched to p.o. prednisone to  "complete 5 days of treatment along with scheduled DuoNebs and antitussive therapy with close outpatient follow-up with pulmonology infectious disease at [facility].  Patient is being discharged in stable condition.  She remained on home dose of 2 L supplemental oxygen.          Please see above list of diagnoses and related plan for additional information.     Condition at Discharge: stable    Discharge Day Visit / Exam:   Subjective: Seen and examined at bedside.  Denies any worsening shortness of breath.  No acute events overnight.  Vitals: Blood Pressure: 99/57 (10/09/24 0955)  Pulse: 91 (10/09/24 0955)  Temperature: (!) 97.2 °F (36.2 °C) (10/09/24 0645)  Temp Source: Temporal (10/06/24 1418)  Respirations: 18 (10/09/24 0645)  Height: 5' 1\" (154.9 cm) (10/07/24 1545)  Weight - Scale: 48.1 kg (106 lb) (10/07/24 1545)  SpO2: 97 % (10/09/24 0800)  Physical Exam  Constitutional:       General: She is not in acute distress.  HENT:      Head: Normocephalic and atraumatic.      Mouth/Throat:      Mouth: Mucous membranes are moist.   Eyes:      Pupils: Pupils are equal, round, and reactive to light.   Cardiovascular:      Rate and Rhythm: Normal rate and regular rhythm.      Pulses: Normal pulses.   Pulmonary:      Effort: Pulmonary effort is normal.      Breath sounds: Normal breath sounds.   Abdominal:      General: Abdomen is flat. Bowel sounds are normal.      Palpations: Abdomen is soft.   Musculoskeletal:      Right lower leg: No edema.      Left lower leg: No edema.   Skin:     General: Skin is warm.   Neurological:      General: No focal deficit present.      Mental Status: She is alert and oriented to person, place, and time. Mental status is at baseline.   Psychiatric:         Mood and Affect: Mood normal.          Discussion with Family: Patient declined call to .     Discharge instructions/Information to patient and family:   See after visit summary for information provided to patient and family.  "     Provisions for Follow-Up Care:  See after visit summary for information related to follow-up care and any pertinent home health orders.      Mobility at time of Discharge:   Basic Mobility Inpatient Raw Score: 20  JH-HLM Goal: 6: Walk 10 steps or more  JH-HLM Achieved: 7: Walk 25 feet or more  HLM Goal achieved. Continue to encourage appropriate mobility.     Disposition:   Home    Planned Readmission: nO    Discharge Medications:  See after visit summary for reconciled discharge medications provided to patient and/or family.      Administrative Statements   Discharge Statement:  I have spent a total time of >30 minutes in caring for this patient on the day of the visit/encounter. >30 minutes of time was spent on: Patient and family education, Counseling / Coordination of care, Documenting in the medical record, Reviewing / ordering tests, medicine, procedures  , and Communicating with other healthcare professionals .    **Please Note: This note may have been constructed using a voice recognition system**

## 2024-10-08 NOTE — RAPID RESPONSE
Rapid Response Note  Ileana Seaman 64 y.o. female MRN: [id number]  Unit/Bed#: -01 Encounter: [id number]    Rapid Response Notification(s):   Response called date/time:  10/8/2024 4:40 AM  Response team arrival date/time:  10/8/2024 4:41 AM  Response end date/time:  10/8/2024 4:51 AM  Level of care:  [facility]  Rapid response location:  [facility] unit  Primary reason for rapid response call:  Acute change in heart rhythm, acute change in O2 sat, acute change in heart rate and other (comment) (Left lower back pain)    Rapid Response Intervention(s):   Airway:  None  Breathing:  None  Circulation:  None  Fluids administered:  None  Medications administered:  Other (comment) (Ativan 0.5 mg IV, morphine 2 mg IV)       Assessment:   Left lower, back pain  Anxiety  Sinus tachycardia    Plan:   Ativan 0.5 mg IV x1  Morphine 2 mg IV x1     Rapid Response Outcome:   Transfer:  Remain on floor  Primary service notified of transfer: Yes    Code Status: Level 1 (Full Code)      Family notified: NA     Background/Situation:   Ileana Seaman is a 64 y.o. female who developed sudden onset left, lower back pain, tachycardia and hypertension.  Upon arrival, she appeared anxious.  After a few moments of speaking to Dr. Chapis Ayala, she started to calm.  EKG was performed and showed NSR.  Her HR improved to NSR and BP returned to normal. She was ordered ativan and morphine.    Review of Systems   Cardiovascular:  Positive for palpitations.   Musculoskeletal:  Positive for back pain (Left lower).   Psychiatric/Behavioral:          Anxious       Objective:   Vitals:    10/08/24 0448 10/08/24 0448 10/08/24 0448 10/08/24 0451   BP: (!) 164/105 (!) 164/105     Pulse: 100  98 59   Resp:       Temp:       TempSrc:       SpO2: (!) 88%   92%   Weight:       Height:         Physical Exam  Constitutional:       General: She is in acute distress (Anxious).   HENT:      Head: Normocephalic and atraumatic.      Nose: Nose normal.   Eyes:       General:         Right eye: No discharge.         Left eye: No discharge.      Extraocular Movements: Extraocular movements intact.      Pupils: Pupils are equal, round, and reactive to light.   Cardiovascular:      Rate and Rhythm: Regular rhythm. Tachycardia present.   Pulmonary:      Effort: Pulmonary effort is normal.   Abdominal:      General: Abdomen is flat.   Musculoskeletal:         General: Tenderness (Left, lower, posterior) present. Normal range of motion.   Skin:     General: Skin is warm and dry.      Coloration: Skin is not jaundiced or pale.      Findings: No rash.   Neurological:      General: No focal deficit present.      Mental Status: She is alert and oriented to person, place, and time.

## 2024-10-08 NOTE — RESPIRATORY THERAPY NOTE
10/08/24 0728   Respiratory Assessment   Respiratory Pattern Normal   Chest Assessment Chest expansion symmetrical   Bilateral Breath Sounds Diminished   Resp Comments pt on 3 L NC. no c/o SOB.  pt was a RR early this am.   Additional Assessments   SpO2 (!) 88 %

## 2024-10-08 NOTE — ASSESSMENT & PLAN NOTE
Lab Results   Component Value Date    HGBA1C 6.4 (H) 10/04/2024       Recent Labs     10/07/24  1630 10/07/24  2100 10/08/24  0442 10/08/24  1104   POCGLU 207* 181* 125 167*       Blood Sugar Average: Last 72 hrs:  (P) 155.8672390759497699  Per chart review, back in 22 September, A1c was running 6.6  Recent A1c which was done in May 2023-came 5.9  Due to recent illness, patient is not eating enough, per daughter, patient also has history of hiatal hernia-concern for hypoglycemia  Continue to monitor.

## 2024-10-08 NOTE — ASSESSMENT & PLAN NOTE
"Patient states that she has been experiencing dysphagia over the past 3 to 4 days  Notes that her appetite had been low over the past several days is not feeling well with ongoing cough  However, states symptoms have changed over the past 3 to 4 days she is \"unable to swallow\"  Patient notes that she attempted to eat chicken noodle soup in the noodles \"got stuck in her throat\"  Patient denies history of dysphagia  Patient does have known hiatal hernia  CT soft tissues of the neck (10/5) without evidence of soft tissue swelling/obstruction  speech therapy following with plan for MBS today (10/7)-pending  Patient with ability to protect airway  Outpatient GI and speech therapy follow-up  "

## 2024-10-08 NOTE — PROGRESS NOTES
Progress Note - Hospitalist   Name: Ileana Seaman 64 y.o. female I MRN: [id number]  Unit/Bed#: -01 I Date of Admission: 10/4/2024   Date of Service: 10/8/2024 I Hospital Day: 4    Assessment & Plan  Abnormal CT chest  Patient presented  (10/4) with complaints of persistent/progressive fatigue, decreased appetite, difficulty swallowing, cough, wheezing, shortness of breath  Notably, had presented to outside facility 2 days prior and was diagnosed with right upper lobe pneumonia-discharged with Levaquin and prednisone  However, patient notes persistent/progressive symptoms    In ED, hemodynamically stable, maintained on baseline oxygen status of 2 L without significant desaturation, negative SIRS criteria  However, patient with episodes of tachycardia and use of accessory respiratory muscles prompting request for admission with index suspicion for right upper lobe pneumonia causing exacerbation of underlying COPD/emphysema    On admission started on Rocephin Zithromax  urine for strep and Legionella antigens-negative  Sputum culture if able-pending  COVID, flu, RSV negative  Treat underlying COPD/emphysema (see below)  CTA chest today shows No pulmonary embolus.     Severe emphysema with redemonstrated mass in the superior segment of the right lower lobe and masslike opacity left upper lobe, which remain concerning for bronchogenic malignancy. There are additional suspicious nodules in the lingula and right lower lobe as discussed. Further work-up with PET/CT and/or tissue sampling is advised.  Discoid right upper lobe opacity is similar in appearance to recent neck CT and may represent chronic scarring. Superimposed infection felt less likely but not excluded and correlation with clinical exam is necessary.  Patient had robotic bronchoscopy with BAL(5/24) and biopsy, pathology was negative for malignancy and diagnosed with MAC    notably, patient with history of MAC-is to be maintained on Rifabutin 300 mg  once a day, ethambutol 800 mg once a day and azithromycin 250 mg once a day.  Has not been taking as has been feeling ill.  Follows up with infectious disease . [facility]  Will defer to pulmonology regarding further antibiotics  Chronic obstructive pulmonary disease with acute lower respiratory infection (HCC)  Likely, in exacerbation  Has severe emphysema.  Likely with acute exacerbation.  Currently maintained on high-dose IV steroids along with scheduled nebulizer treatment and antibiotics.  Pulmonology consulted in the setting of underlying MAC infection  Mycobacterial infection, non-TB  Per chart review, patient does follow-up with [facility] infectious disease,  Last note from infectious disease reviewed from telemedicine on 8/27/2024-Per note, patient supposed to take Rifabutin 300 mg once a day, ethambutol 800 mg once a day and azithromycin 250 mg once a day.     Per patient, she is not taking this medication since last week when she started feeling sick  OutPatient ID follow-up on discharge        Hypomagnesemia  On arrival, magnesium is 1.6, status post IV supplement, recheck  Type 2 diabetes mellitus without complication, without long-term current use of insulin (Former[facility])  Lab Results   Component Value Date    HGBA1C 6.4 (H) 10/04/2024       Recent Labs     10/07/24  1630 10/07/24  2100 10/08/24  0442 10/08/24  1104   POCGLU 207* 181* 125 167*       Blood Sugar Average: Last 72 hrs:  (P) [id number]  Per chart review, back in 22 September, A1c was running 6.6  Recent A1c which was done in May 2023-came 5.9  Due to recent illness, patient is not eating enough, per daughter, patient also has history of hiatal hernia-concern for hypoglycemia  Continue to monitor.  Dysphagia  Patient states that she has been experiencing dysphagia over the past 3 to 4 days  Notes that her appetite had been low over the past several days is not feeling well with ongoing cough  However, states symptoms have changed over the  "past 3 to 4 days she is \"unable to swallow\"  Patient notes that she attempted to eat chicken noodle soup in the noodles \"got stuck in her throat\"  Patient denies history of dysphagia  Patient does have known hiatal hernia  CT soft tissues of the neck (10/5) without evidence of soft tissue swelling/obstruction  speech therapy following with plan for MBS today (10/7)-pending  Patient with ability to protect airway  New onset atrial fibrillation (HCC)  Patient presented initially (10/4) with complaints of shortness of breath and dysphagia-see below  (10/6) patient received breathing treatment and afterward complaint of palpitations  Subsequent telemetry/EKG revealed new onset atrial fibrillation with accelerated ventricular response (120s to 130s)  IV beta-blocker administered x 2 with better rate control however still with atrial fibrillation  Patient remained with accelerated ventricular response refractory to oral/IV BB-oral CCB administered with return to normal sinus rhythm  Patient was in atrial fibrillation for only approximately 6 to 7 hours  Was initiated yesterday (10/6) on heparin infusion at the time of accelerated ventricular response  Today (10/7) patient remains in normal sinus rhythm  Has been evaluated by cardiology-Case discussed-will discontinue oral CCB-proceed with oral BB  Given transient episode of accelerated ventricular response/atrial fibrillation-hold on systemic anticoagulation at this juncture  Proceed with Lovenox over the course of hospitalization  Discontinue telemetry.  Echocardiogram normal ejection fraction with grade 1 diastolic dysfunction.    VTE Pharmacologic Prophylaxis: VTE Score: 5 High Risk (Score >/= 5) - Pharmacological DVT Prophylaxis Ordered: enoxaparin (Lovenox). Sequential Compression Devices Ordered.    Mobility:   Basic Mobility Inpatient Raw Score: 21  JH-HLM Goal: 6: Walk 10 steps or more  JH-HLM Achieved: 7: Walk 25 feet or more  JH-HLM Goal achieved. Continue to " encourage appropriate mobility.    Patient Centered Rounds: I performed bedside rounds with nursing staff today.   Discussions with Specialists or Other Care Team Provider: Discussed with pulmonology    Education and Discussions with Family / Patient: Patient declined call to .     Current Length of Stay: 4 day(s)  Current Patient Status: Inpatient   Certification Statement: The patient will continue to require additional inpatient hospital stay due to ongoing IV antibiotics and steroid  Discharge Plan: Anticipate discharge in 24-48 hrs to home.    Code Status: Level 1 - Full Code    Subjective   Seen and examined at bedside.  Events of this morning noted.  Had worsening shortness of breath and pleuritic chest pain.  Currently remains on baseline oxygen     Objective :  Temp:  [96.8 °F (36 °C)-97.6 °F (36.4 °C)] 97.3 °F (36.3 °C)  HR:  [] 74  BP: (101-193)/() 146/97  Resp:  [16-18] 18  SpO2:  [75 %-99 %] 75 %  O2 Device: Nasal cannula  Nasal Cannula O2 Flow Rate (L/min):  [2 L/min-3 L/min] 2 L/min    Body mass index is 20.03 kg/m².     Input and Output Summary (last 24 hours):     Intake/Output Summary (Last 24 hours) at 10/8/2024 1435  Last data filed at 10/8/2024 1411  Gross per 24 hour   Intake 980 ml   Output 2750 ml   Net -1770 ml       Physical Exam  Constitutional:       General: She is not in acute distress.     Appearance: She is ill-appearing.   HENT:      Head: Normocephalic.      Nose: Nose normal.      Mouth/Throat:      Mouth: Mucous membranes are moist.   Eyes:      Extraocular Movements: Extraocular movements intact.      Pupils: Pupils are equal, round, and reactive to light.   Cardiovascular:      Rate and Rhythm: Normal rate and regular rhythm.   Pulmonary:      Effort: No respiratory distress.      Breath sounds: Wheezing present.      Comments: Scattered wheezing left lung field.  Abdominal:      General: Abdomen is flat. Bowel sounds are normal.      Palpations:  Abdomen is soft.   Musculoskeletal:      Cervical back: Neck supple.      Right lower leg: No edema.      Left lower leg: No edema.   Neurological:      General: No focal deficit present.      Mental Status: She is alert and oriented to person, place, and time. Mental status is at baseline.           Lines/Drains:        Telemetry:  Telemetry Orders (From admission, onward)               24 Hour Telemetry Monitoring  Continuous x 24 Hours (Telem)        Question:  Reason for 24 Hour Telemetry  Answer:  Arrhythmias requiring acute medical intervention / PPM or ICD malfunction                     Telemetry Reviewed: Normal Sinus Rhythm  Indication for Continued Telemetry Use: No indication for continued use. Will discontinue.                Lab Results: I have reviewed the following results:   Results from last 7 days   Lab Units 10/08/24  0907   WBC Thousand/uL 9.76   HEMOGLOBIN g/dL 13.2   HEMATOCRIT % 41.0   PLATELETS Thousands/uL 218   SEGS PCT % 86*   LYMPHO PCT % 8*   MONO PCT % 5   EOS PCT % 0     Results from last 7 days   Lab Units 10/07/24  0443   SODIUM mmol/L 138   POTASSIUM mmol/L 4.4   CHLORIDE mmol/L 105   CO2 mmol/L 30   BUN mg/dL 17   CREATININE mg/dL 0.57*   ANION GAP mmol/L 3*   CALCIUM mg/dL 8.8   ALBUMIN g/dL 3.5   TOTAL BILIRUBIN mg/dL 0.36   ALK PHOS U/L 59   ALT U/L 64*   AST U/L 31   GLUCOSE RANDOM mg/dL 148*     Results from last 7 days   Lab Units 10/06/24  0922   INR  1.22*     Results from last 7 days   Lab Units 10/08/24  1104 10/08/24  0442 10/07/24  2100 10/07/24  1630 10/07/24  1112 10/07/24  0751 10/06/24  2051 10/06/24  1559 10/06/24  1052 10/06/24  0712 10/05/24  2024 10/05/24  1552   POC GLUCOSE mg/dl 167* 125 181* 207* 149* 138 197* 133 132 170* 161* 138     Results from last 7 days   Lab Units 10/04/24  2312   HEMOGLOBIN A1C % 6.4*     Results from last 7 days   Lab Units 10/07/24  0443 10/05/24  0442 10/04/24  2312 10/04/24  2019   LACTIC ACID mmol/L  --   --  1.9 3.1*    PROCALCITONIN ng/ml <0.05 <0.05 <0.05  --        Recent Cultures (last 7 days):   Results from last 7 days   Lab Units 10/06/24  1248 10/05/24  0003 10/04/24  2035 10/04/24  2019   BLOOD CULTURE   --   --  No Growth at 48 hrs. No Growth at 48 hrs.   SPUTUM CULTURE  Test not performed. Suggest repeat specimen.  --   --   --    GRAM STAIN RESULT  3+ Epithelial cells per low power field*  3+ Yeast*  2+ Gram negative rods*  No polys seen*  --   --   --    LEGIONELLA URINARY ANTIGEN   --  Negative  --   --              Last 24 Hours Medication List:     Current Facility-Administered Medications:     acetaminophen (TYLENOL) tablet 650 mg, Q6H PRN    albuterol (PROVENTIL HFA,VENTOLIN HFA) inhaler 2 puff, Q6H PRN    azithromycin (ZITHROMAX) tablet 250 mg, Q24H    barium sulfate tablet 1 tablet, Once in imaging    budesonide (PULMICORT) inhalation solution 0.5 mg, Q12H    cefTRIAXone (ROCEPHIN) IVPB (premix in dextrose) 1,000 mg 50 mL, Q24H, Last Rate: 1,000 mg (10/08/24 0830)    docusate sodium (COLACE) capsule 100 mg, BID    ethambutol (MYAMBUTOL) tablet 800 mg, Daily    fluticasone-umeclidinium-vilanterol 100-62.5-25 mcg/actuation inhaler 1 puff, Daily    guaiFENesin (MUCINEX) 12 hr tablet 600 mg, BID    insulin lispro (HumALOG/ADMELOG) 100 units/mL subcutaneous injection 1-5 Units, TID AC **AND** Fingerstick Glucose (POCT), TID AC    insulin lispro (HumALOG/ADMELOG) 100 units/mL subcutaneous injection 1-5 Units, HS    ipratropium (ATROVENT) 0.02 % inhalation solution 0.5 mg, TID    levalbuterol (XOPENEX) inhalation solution 1.25 mg, TID **AND** [DISCONTINUED] sodium chloride 0.9 % inhalation solution 3 mL, TID    methylPREDNISolone sodium succinate (Solu-MEDROL) injection 40 mg, Q8H    metoprolol tartrate (LOPRESSOR) tablet 25 mg, Q12H MAYCO    nicotine (NICODERM CQ) 14 mg/24hr TD 24 hr patch 1 patch, Daily    ondansetron (ZOFRAN) injection 4 mg, Once    ondansetron (ZOFRAN) injection 4 mg, Q6H PRN    pantoprazole  (PROTONIX) EC tablet 40 mg, Early Morning    polyethylene glycol (MIRALAX) packet 17 g, Daily    rifabutin (MYCOBUTIN) capsule 300 mg, Daily    Administrative Statements   Today, Patient Was Seen By: Juana Santana MD      **Please Note: This note may have been constructed using a voice recognition system.**

## 2024-10-08 NOTE — CONSULTS
Pulmonary Medicine-Consultation  Ileana Seaman 64 y.o. female MRN: [id number]  Unit/Bed#: -01 Encounter: [id number]      Assessment:  Acute hypoxic respiratory failure  improved, now at baseline oxygen needs  Possibly from COPD exacerbation on admission, now improved, does not appear to be superimposed pneumonia, had negative PCT x 3, no new infiltrate on chest imaging  Multiple pulmonary nodules  Compared to chest imaging at [facility] in 5/2024  RUL opacity used to be 5.8 x 3.7 cm now just a small discoid lesion with small cavitation  RLL lesion was 2.1 x 1.5 cm>> 1.0 x 0.8 cm  This was biopsied by bronchoscopy/and BAL and proven to be positive for MAC  Change in size is likely response to treatment  Mycobacterium AVM infection  On triple therapy, seemed to have a good treatment response as above however only taking the ethambutol/rifabutin which is not available since admission  Active tobacco abuse  At least 40-pack-year, actively smokes 0.5 PPD  COPD/emphysema-possibly had exacerbation admission, currently resolved    Recommendations/discussion:  Pt currently offers no respiratory complaints, feels back to baseline  Doubt pneumonia as above, CT chest as above compared to prior imaging shows improvement of the infiltrate/nodules and response to treatment for MAC  May switch steroid to prednisone 40 mg from tomorrow for 5 days then stop  No current indication for antibiotics/ceftriaxone  Resume triple therapy for MAC when available  Continue Atrovent/Xopenex/budesonide  Nicotine patches    On discharge  --Switch to home inhalers Trelegy  --Provide nebulizer/supplies with DuoNeb every 6 as needed  --She will continue to follow with ID/pulmonary at [facility]    Pulmonary will sign off, please do not hesitate to call with any questions      Physician Requesting Consult: Juana Santana MD    Reason for Consult / Principal Problem: COPD/respiratory failure     HPI:   64 y.o. female with a h/o hiatal hernia, COPD,  active tobacco abuse, acid reflux, NTM/MAC lung infection, chronic hypoxic respiratory failure on 2 LPM at baseline.    Follows with pulmonary at [facility], for COPD noted to have multiple nodularities/cavitary lesion at the RUL on lung cancer screening CT.  Underwent a bronchoscopy 5/2024, 3 nodules of biopsies that were negative for cancer, noted necrotizing granulomas at RUL/RLL and grew Mycobacterium intracellular.  Evaluated by infectious disease, due to cavitary lesion, she recommended to start triple therapy with Reph Albutein, ethambutol, and azithromycin    Presented to the ER 10/4 with shortness of breath, also reported fatigue, cough, and chills.  Chest x-ray showed RUL opacity/and emphysema, noted mild lactic acidosis 3.1, negative troponin/BNP and D-dimer PCT was normal x 2.  Admitted to  IM, started on IV antibiotics/hydration, she was started on ceftriaxone, azithromycin, Xopenex/Atrovent via nebulizer, IV steroids Solu-Medrol 40 mg q8hrs. during the course, developed A-fib/RVR.    For dysphagia Speech pathology consulted.  No signs of penetration/aspiration    She was recently discharged on Levaquin from another hospital without any significant improvement.      On my assessment, patient is resting in chair, reports feeling left flank/back pain, worse with touch.  Reports feeling easier to breathe compared to 5 days ago when she went to the other hospital.  Cough is minimal.  Still getting intermittent sweating/chills at night.  No significant wheezing, dyspnea at rest or hemoptysis.  Admits to continue to smoking, about 0.5 PPD, has not smoked since coming to the hospital.    Inpatient consult to Pulmonology  Consult performed by: Abhishek Rodriguez MD  Consult ordered by: Juana Santana MD          Review of Systems  As per hpi, all other systems reviewed and were negative      Studies:    Imaging Studies: I have personally reviewed pertinent films in PACS    CT chest 5/20/2024-RUL mass  "5.8 x 3.7 cm, RLL nodule 2.1 x 1.5 cm multiple other nodules 7 mm.    CTA chest 10/8/2024-bilateral emphysema.  Multiple pulmonary nodules largest 1.0 x 0.8 cm at RLL, 1.1 cm x 1.1 cm at ABHINAV.  Multiple others below 1 cm.      EKG, Pathology, and Other Studies: I have personally reviewed pertinent films in PACS    Pulmonary Results (PFTs, PSG): None in file    Historical Information   Past Medical History:   Diagnosis Date    COPD (chronic obstructive pulmonary disease) (HCC)     Hiatal hernia      Past Surgical History:   Procedure Laterality Date    CHOLECYSTECTOMY       Social History   Social History     Substance and Sexual Activity   Alcohol Use Never     Social History     Substance and Sexual Activity   Drug Use Never     Social History     Tobacco Use   Smoking Status Every Day    Current packs/day: 0.25    Types: Cigarettes   Smokeless Tobacco Never     E-Cigarette/Vaping    E-Cigarette Use Never User      E-Cigarette/Vaping Substances         Family History: History reviewed. No pertinent family history.    Meds/Allergies   all current active meds have been reviewed    No Known Allergies    Objective   Vitals: Blood pressure 146/97, pulse 74, temperature (!) 97.3 °F (36.3 °C), resp. rate 18, height 5' 1\" (1.549 m), weight 48.1 kg (106 lb), SpO2 95%.,Body mass index is 20.03 kg/m².    Intake/Output Summary (Last 24 hours) at 10/8/2024 1433  Last data filed at 10/8/2024 1411  Gross per 24 hour   Intake 980 ml   Output 2750 ml   Net -1770 ml     Invasive Devices       Peripheral Intravenous Line  Duration             Peripheral IV 10/04/24 Distal;Dorsal (posterior);Left Forearm 3 days                    Physical Exam  Body mass index is 20.03 kg/m².   Gen: not in acute distress, thin  Neck/Eyes: supple, PERRL  Ear: normal appearance, no significant hearing impairment  Nose:  normal nasal mucosa, no drainage  Mouth:  unremarkable/normal appearance of lips  Oropharynx: mucosa is moist, no focal lesions or " "erythema  Salivary glands: soft nontender  Chest: distant heart sounds, normal respiratory efforts, diminished air movement, no wheezing, crackles or rhonci   CV: RRR, no murmurs appreciated, no edema  Abdomen: soft, non tender  Extremities:  No observed deformity   Neuro: AAO X3, no focal motor deficit       Lab Results: I have personally reviewed pertinent lab results.          None    Portions of the record may have been created with voice recognition software.  Occasional wrong word or \"sound a like\" substitutions may have occurred due to the inherent limitations of voice recognition software.  Read the chart carefully and recognize, using context, where substitutions have occurred.    "

## 2024-10-08 NOTE — ASSESSMENT & PLAN NOTE
Patient presented  (10/4) with complaints of persistent/progressive fatigue, decreased appetite, difficulty swallowing, cough, wheezing, shortness of breath  Notably, had presented to outside facility 2 days prior and was diagnosed with right upper lobe pneumonia-discharged with Levaquin and prednisone  However, patient notes persistent/progressive symptoms    In ED, hemodynamically stable, maintained on baseline oxygen status of 2 L without significant desaturation, negative SIRS criteria  However, patient with episodes of tachycardia and use of accessory respiratory muscles prompting request for admission with index suspicion for right upper lobe pneumonia causing exacerbation of underlying COPD/emphysema    On admission started on Rocephin Zithromax  urine for strep and Legionella antigens-negative  Sputum culture if able-pending  COVID, flu, RSV negative  Treat underlying COPD/emphysema (see below)  CTA chest today shows No pulmonary embolus.     Severe emphysema with redemonstrated mass in the superior segment of the right lower lobe and masslike opacity left upper lobe, which remain concerning for bronchogenic malignancy. There are additional suspicious nodules in the lingula and right lower lobe as discussed. Further work-up with PET/CT and/or tissue sampling is advised.  Discoid right upper lobe opacity is similar in appearance to recent neck CT and may represent chronic scarring. Superimposed infection felt less likely but not excluded and correlation with clinical exam is necessary.  Patient had robotic bronchoscopy with BAL(5/24) and biopsy, pathology was negative for malignancy and diagnosed with MAC    notably, patient with history of MAC-is to be maintained on Rifabutin 300 mg once a day, ethambutol 800 mg once a day and azithromycin 250 mg once a day.  Has not been taking as has been feeling ill.  [facility]  Pulmonology input appreciated.  Discontinued antibiotics.   Continue with MAC treatment and tapering steroids.  Outpatient follow-up with pulmonology and infectious disease.

## 2024-10-08 NOTE — ASSESSMENT & PLAN NOTE
Assessment/Plan:    No problem-specific Assessment & Plan notes found for this encounter  Diagnoses and all orders for this visit:    Encounter for counseling regarding contraception  -     norgestimate-ethinyl estradiol (Sprintec 28) 0 25-35 MG-MCG per tablet; Take 1 tablet by mouth daily  No contraindications to OCP's  Reviewed risks, side effects, benefits, efficacy, back up method, ACHES, and  missed pills  Pt desires to start on Sunday with next menses   RTO in 3 months for a pill check  Screening for STD (sexually transmitted disease)  -     Chlamydia/GC amplified DNA by PCR    PCOS (polycystic ovarian syndrome)  -     norgestimate-ethinyl estradiol (Sprintec 28) 0 25-35 MG-MCG per tablet; Take 1 tablet by mouth daily  -     HEMOGLOBIN A1C W/ EAG ESTIMATION; Future  -     Lipid panel; Future  Information on PCOS on AVS        Subjective:      Patient ID: Matilda Cain is a 21 y o  female  HPI 17-year-old G0  new patient here for contraception counseling  She is accompanied by a friend  She currently is sexually active with 1 partner and uses condoms  She consents to testing for [facility] and CT today  Her menses have always been regular, x  7 days  Menses can be heavy for the 3 days, changes every 3-4 hours  Her cramping can be severe  She states she was told she had PCOS while she lived in [place], had too much testosterone and cysts on her ovaries  She states she has always had facial hair on cheeks and chin and on her abdomen  She has never been on contraception for her PCOS  We discussed the possible lonterm  PCOS risks of HTN, heart disease,  diabetes, uterine hyperplasia  Recommend screening for DM and lipidemia  Reviewed weight loss may help reduce symptoms  We reviewed treatment with a low androgen pill and may see change in symptoms within 6 months     The following portions of the patient's history were reviewed and updated as appropriate: allergies, current medications, past family history, Per chart review, patient does follow-up with [facility] infectious disease,  Last note from infectious disease reviewed from telemedicine on 8/27/2024-Per note, patient supposed to take Rifabutin 300 mg once a day, ethambutol 800 mg once a day and azithromycin 250 mg once a day.     Per patient, she is not taking this medication since last week when she started feeling sick  OutPatient ID follow-up on discharge  Resume triple therapy with rifabutin, ethambutol and azithromycin.         past medical history, past social history, past surgical history and problem list     Review of Systems   Constitutional: Negative for chills and fever  Respiratory: Negative  Cardiovascular: Negative  Genitourinary: Negative for dysuria, menstrual problem and vaginal discharge  Objective:      /73 (BP Location: Right arm, Patient Position: Sitting, Cuff Size: Adult)   Pulse 88   Resp 18   Ht 5' 7" (1 702 m)   Wt 96 6 kg (213 lb)   LMP 02/12/2023 (Exact Date)   BMI 33 36 kg/m²          Physical Exam  Constitutional:       Appearance: Normal appearance  She is obese  Cardiovascular:      Rate and Rhythm: Normal rate and regular rhythm  Pulmonary:      Effort: Pulmonary effort is normal       Breath sounds: Normal breath sounds  Abdominal:      Palpations: Abdomen is soft  Tenderness: There is no abdominal tenderness  Skin:     General: Skin is warm and dry  Neurological:      Mental Status: She is oriented to person, place, and time  Psychiatric:         Mood and Affect: Mood normal          Behavior: Behavior normal        Face- pt shaves hair on chin, upper lip, jaw line  Shaves abdomen

## 2024-10-08 NOTE — ASSESSMENT & PLAN NOTE
Likely, in exacerbation  Has severe emphysema.  Likely with acute exacerbation.  Currently maintained on high-dose IV steroids along with scheduled nebulizer treatment and antibiotics.  Pulmonology consulted in the setting of underlying MAC infection

## 2024-10-08 NOTE — ASSESSMENT & PLAN NOTE
Likely, in exacerbation  Has severe emphysema.  Likely with acute exacerbation.  Currently maintained on high-dose IV steroids along with scheduled nebulizer treatment and antibiotics.  Pulmonology consulted in the setting of underlying MAC infection-input appreciated  Transition from IV steroids to p.o. tapering steroids.  Continue with outpatient inhaler treatment.  Close outpatient pulmonology follow-up

## 2024-10-08 NOTE — ASSESSMENT & PLAN NOTE
Patient presented  (10/4) with complaints of persistent/progressive fatigue, decreased appetite, difficulty swallowing, cough, wheezing, shortness of breath  Notably, had presented to outside facility 2 days prior and was diagnosed with right upper lobe pneumonia-discharged with Levaquin and prednisone  However, patient notes persistent/progressive symptoms    In ED, hemodynamically stable, maintained on baseline oxygen status of 2 L without significant desaturation, negative SIRS criteria  However, patient with episodes of tachycardia and use of accessory respiratory muscles prompting request for admission with index suspicion for right upper lobe pneumonia causing exacerbation of underlying COPD/emphysema    On admission started on Rocephin Zithromax  urine for strep and Legionella antigens-negative  Sputum culture if able-pending  COVID, flu, RSV negative  Treat underlying COPD/emphysema (see below)  CTA chest today shows No pulmonary embolus.     Severe emphysema with redemonstrated mass in the superior segment of the right lower lobe and masslike opacity left upper lobe, which remain concerning for bronchogenic malignancy. There are additional suspicious nodules in the lingula and right lower lobe as discussed. Further work-up with PET/CT and/or tissue sampling is advised.  Discoid right upper lobe opacity is similar in appearance to recent neck CT and may represent chronic scarring. Superimposed infection felt less likely but not excluded and correlation with clinical exam is necessary.  Patient had robotic bronchoscopy with BAL(5/24) and biopsy, pathology was negative for malignancy and diagnosed with MAC    notably, patient with history of MAC-is to be maintained on Rifabutin 300 mg once a day, ethambutol 800 mg once a day and azithromycin 250 mg once a day.  Has not been taking as has been feeling ill.  Follows up with infectious disease . [facility]  Will defer to pulmonology regarding further antibiotics

## 2024-10-08 NOTE — CASE MANAGEMENT
Case Management Discharge Planning Note    Patient name Ileana Seaman  Location /-01 MRN [id number]  : 1960 Date 10/8/2024       Current Admission Date: 10/4/2024  Current Admission Diagnosis:New onset atrial fibrillation (HCC)   Patient Active Problem List    Diagnosis Date Noted Date Diagnosed    New onset atrial fibrillation (HCC) 10/06/2024     Type 2 diabetes mellitus without complication, without long-term current use of insulin (HCC) 10/05/2024     Dysphagia 10/05/2024     Community acquired pneumonia of right upper lobe of lung 10/04/2024     Chronic obstructive pulmonary disease with acute lower respiratory infection (HCC) 10/04/2024     Mycobacterial infection, 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     Hypomagnesemia 10/04/2024       LOS (days): 4  Geometric Mean LOS (GMLOS) (days): 2.8  Days to GMLOS:-0.8     OBJECTIVE:  Risk of Unplanned Readmission Score: 13.45         Current admission status: Inpatient   Preferred Pharmacy:   [id number] - [place] - [address]  [address]  [place] [address]  Phone: [phone] Fax: [phone]    Primary Care Provider: Pop Schwarz DO    Primary Insurance: MEDICARE  Secondary Insurance: [facility]    DISCHARGE DETAILS:        Chart reviewed aware of medical management. Case was discussed in multidisciplinary discharge meeting.    Clinical information supporting hospitalization: CAP, SOB, COPD, Afib, mycobacterial infection     Barriers to discharge:  - medical management    Discharge plan:home, pt was cleared by therapy for outpatient PT    CM will continue to follow plan of care.                               Requested Home Health Care         Is the patient interested in HHC at discharge?: No    DME Referral Provided  Referral made for DME?: No              Treatment Team Recommendation: Home  Discharge Destination Plan:: Home

## 2024-10-08 NOTE — ASSESSMENT & PLAN NOTE
Per chart review, patient does follow-up with [facility] infectious disease,  Last note from infectious disease reviewed from telemedicine on 8/27/2024-Per note, patient supposed to take Rifabutin 300 mg once a day, ethambutol 800 mg once a day and azithromycin 250 mg once a day.     Per patient, she is not taking this medication since last week when she started feeling sick  OutPatient ID follow-up on discharge

## 2024-10-08 NOTE — ASSESSMENT & PLAN NOTE
Lab Results   Component Value Date    HGBA1C 6.4 (H) 10/04/2024       Recent Labs     10/07/24  2100 10/08/24  0442 10/08/24  1104 10/08/24  1530   POCGLU 181* 125 167* 185*       Blood Sugar Average: Last 72 hrs:  (P) 157.2  Per chart review, back in 22 September, A1c was running 6.6  Recent A1c which was done in May 2023-came 5.9  Due to recent illness, patient is not eating enough, per daughter, patient also has history of hiatal hernia-concern for hypoglycemia  Continue to monitor.

## 2024-10-08 NOTE — ASSESSMENT & PLAN NOTE
Patient presented initially (10/4) with complaints of shortness of breath and dysphagia-see below  (10/6) patient received breathing treatment and afterward complaint of palpitations  Subsequent telemetry/EKG revealed new onset atrial fibrillation with accelerated ventricular response (120s to 130s)  IV beta-blocker administered x 2 with better rate control however still with atrial fibrillation  Patient remained with accelerated ventricular response refractory to oral/IV BB-oral CCB administered with return to normal sinus rhythm  Patient was in atrial fibrillation for only approximately 6 to 7 hours  Was initiated yesterday (10/6) on heparin infusion at the time of accelerated ventricular response  Today (10/7) patient remains in normal sinus rhythm  Has been evaluated by cardiology-Case discussed-will discontinue oral CCB-proceed with oral BB  Given transient episode of accelerated ventricular response/atrial fibrillation-hold on systemic anticoagulation at this juncture  Proceed with Lovenox over the course of hospitalization  Discontinue telemetry.  Echocardiogram normal ejection fraction with grade 1 diastolic dysfunction.

## 2024-10-09 VITALS
RESPIRATION RATE: 18 BRPM | BODY MASS INDEX: 20.01 KG/M2 | TEMPERATURE: 97.2 F | OXYGEN SATURATION: 97 % | HEART RATE: 91 BPM | DIASTOLIC BLOOD PRESSURE: 57 MMHG | SYSTOLIC BLOOD PRESSURE: 99 MMHG | HEIGHT: 61 IN | WEIGHT: 106 LBS

## 2024-10-09 LAB
GLUCOSE SERPL-MCNC: 120 MG/DL (ref 65–140)
GLUCOSE SERPL-MCNC: 137 MG/DL (ref 65–140)

## 2024-10-09 PROCEDURE — 94760 N-INVAS EAR/PLS OXIMETRY 1: CPT

## 2024-10-09 PROCEDURE — 94664 DEMO&/EVAL PT USE INHALER: CPT

## 2024-10-09 PROCEDURE — 99239 HOSP IP/OBS DSCHRG MGMT >30: CPT | Performed by: INTERNAL MEDICINE

## 2024-10-09 PROCEDURE — 82948 REAGENT STRIP/BLOOD GLUCOSE: CPT

## 2024-10-09 PROCEDURE — 94640 AIRWAY INHALATION TREATMENT: CPT

## 2024-10-09 RX ORDER — GUAIFENESIN 600 MG/1
600 TABLET, EXTENDED RELEASE ORAL 2 TIMES DAILY
Qty: 10 TABLET | Refills: 0 | Status: SHIPPED | OUTPATIENT
Start: 2024-10-09 | End: 2024-10-14

## 2024-10-09 RX ORDER — LIDOCAINE 50 MG/G
1 PATCH TOPICAL DAILY
Qty: 7 PATCH | Refills: 0 | Status: SHIPPED | OUTPATIENT
Start: 2024-10-10 | End: 2024-10-14

## 2024-10-09 RX ORDER — PREDNISONE 20 MG/1
40 TABLET ORAL DAILY
Qty: 8 TABLET | Refills: 0 | Status: SHIPPED | OUTPATIENT
Start: 2024-10-09 | End: 2024-10-13

## 2024-10-09 RX ORDER — METOPROLOL TARTRATE 25 MG/1
25 TABLET, FILM COATED ORAL EVERY 12 HOURS SCHEDULED
Qty: 60 TABLET | Refills: 0 | Status: SHIPPED | OUTPATIENT
Start: 2024-10-09

## 2024-10-09 RX ORDER — PREDNISONE 20 MG/1
40 TABLET ORAL DAILY
Status: DISCONTINUED | OUTPATIENT
Start: 2024-10-09 | End: 2024-10-09 | Stop reason: HOSPADM

## 2024-10-09 RX ORDER — ACETAMINOPHEN 325 MG/1
975 TABLET ORAL EVERY 8 HOURS SCHEDULED
Qty: 30 TABLET | Refills: 0 | Status: SHIPPED | OUTPATIENT
Start: 2024-10-09 | End: 2024-10-16

## 2024-10-09 RX ORDER — IPRATROPIUM BROMIDE AND ALBUTEROL SULFATE 2.5; .5 MG/3ML; MG/3ML
3 SOLUTION RESPIRATORY (INHALATION) 4 TIMES DAILY
Qty: 90 ML | Refills: 0 | Status: SHIPPED | OUTPATIENT
Start: 2024-10-09

## 2024-10-09 RX ORDER — NICOTINE 21 MG/24HR
1 PATCH, TRANSDERMAL 24 HOURS TRANSDERMAL DAILY
Qty: 28 PATCH | Refills: 0 | Status: SHIPPED | OUTPATIENT
Start: 2024-10-10

## 2024-10-09 RX ADMIN — LIDOCAINE 5% 1 PATCH: 700 PATCH TOPICAL at 09:55

## 2024-10-09 RX ADMIN — FLUTICASONE FUROATE, UMECLIDINIUM BROMIDE AND VILANTEROL TRIFENATATE 1 PUFF: 100; 62.5; 25 POWDER RESPIRATORY (INHALATION) at 10:00

## 2024-10-09 RX ADMIN — PANTOPRAZOLE SODIUM 40 MG: 40 TABLET, DELAYED RELEASE ORAL at 05:20

## 2024-10-09 RX ADMIN — METHYLPREDNISOLONE SODIUM SUCCINATE 40 MG: 40 INJECTION, POWDER, FOR SOLUTION INTRAMUSCULAR; INTRAVENOUS at 00:06

## 2024-10-09 RX ADMIN — ACETAMINOPHEN 325MG 975 MG: 325 TABLET ORAL at 05:20

## 2024-10-09 RX ADMIN — LEVALBUTEROL HYDROCHLORIDE 1.25 MG: 1.25 SOLUTION RESPIRATORY (INHALATION) at 07:35

## 2024-10-09 RX ADMIN — NICOTINE 1 PATCH: 14 PATCH, EXTENDED RELEASE TRANSDERMAL at 09:59

## 2024-10-09 RX ADMIN — BUDESONIDE INHALATION 0.5 MG: 0.5 SUSPENSION RESPIRATORY (INHALATION) at 07:35

## 2024-10-09 RX ADMIN — DOCUSATE SODIUM 100 MG: 100 CAPSULE, LIQUID FILLED ORAL at 09:55

## 2024-10-09 RX ADMIN — IPRATROPIUM BROMIDE 0.5 MG: 0.5 SOLUTION RESPIRATORY (INHALATION) at 07:35

## 2024-10-09 RX ADMIN — PREDNISONE 40 MG: 20 TABLET ORAL at 09:55

## 2024-10-09 RX ADMIN — GUAIFENESIN 600 MG: 600 TABLET ORAL at 09:55

## 2024-10-09 NOTE — PLAN OF CARE
Problem: PAIN - ADULT  Goal: Verbalizes/displays adequate comfort level or baseline comfort level  Description: Interventions:  - Encourage patient to monitor pain and request assistance  - Assess pain using appropriate pain scale  - Administer analgesics based on type and severity of pain and evaluate response  - Implement non-pharmacological measures as appropriate and evaluate response  - Consider cultural and social influences on pain and pain management  - Notify physician/advanced practitioner if interventions unsuccessful or patient reports new pain  10/9/2024 1329 by Fina Matamoros RN  Outcome: Adequate for Discharge  10/9/2024 1140 by Fina Matamoros RN  Outcome: Progressing     Problem: INFECTION - ADULT  Goal: Absence or prevention of progression during hospitalization  Description: INTERVENTIONS:  - Assess and monitor for signs and symptoms of infection  - Monitor lab/diagnostic results  - Monitor all insertion sites, i.e. indwelling lines, tubes, and drains  - Monitor endotracheal if appropriate and nasal secretions for changes in amount and color  - [place] appropriate cooling/warming therapies per order  - Administer medications as ordered  - Instruct and encourage patient and family to use good hand hygiene technique  - Identify and instruct in appropriate isolation precautions for identified infection/condition  10/9/2024 1329 by Fina Matamoros RN  Outcome: Adequate for Discharge  10/9/2024 1140 by Fina Matamoros RN  Outcome: Progressing     Problem: SAFETY ADULT  Goal: Patient will remain free of falls  Description: INTERVENTIONS:  - Educate patient/family on patient safety including physical limitations  - Instruct patient to call for assistance with activity   - Consult OT/PT to assist with strengthening/mobility   - Keep Call bell within reach  - Keep bed low and locked with side rails adjusted as appropriate  - Keep care items and personal belongings within reach  - Initiate and maintain comfort  rounds  - Make Fall Risk Sign visible to staff  - Offer Toileting every 2 Hours, in advance of need  - Initiate/Maintain bed/chair alarm  - Apply yellow socks and bracelet for high fall risk patients  - Consider moving patient to room near nurses station  10/9/2024 1329 by Fina Matamoros RN  Outcome: Adequate for Discharge  10/9/2024 1140 by Fina Matamoros RN  Outcome: Progressing  Goal: Maintain or return to baseline ADL function  Description: INTERVENTIONS:  -  Assess patient's ability to carry out ADLs; assess patient's baseline for ADL function and identify physical deficits which impact ability to perform ADLs (bathing, care of mouth/teeth, toileting, grooming, dressing, etc.)  - Assess/evaluate cause of self-care deficits   - Assess range of motion  - Assess patient's mobility; develop plan if impaired  - Assess patient's need for assistive devices and provide as appropriate  - Encourage maximum independence but intervene and supervise when necessary  - Involve family in performance of ADLs  - Assess for home care needs following discharge   - Consider OT consult to assist with ADL evaluation and planning for discharge  - Provide patient education as appropriate  10/9/2024 1329 by Fina Matamoros RN  Outcome: Adequate for Discharge  10/9/2024 1140 by Fina Matamoros RN  Outcome: Progressing  Goal: Maintains/Returns to pre admission functional level  Description: INTERVENTIONS:  - Perform AM-PAC 6 Click Basic Mobility/ Daily Activity assessment daily.  - Set and communicate daily mobility goal to care team and patient/family/caregiver.   - Collaborate with rehabilitation services on mobility goals if consulted  - Perform Range of Motion 4 times a day.  - Reposition patient every 2 hours.  - Stand patient 5 times a day  - Out of bed to chair 3 times a day   - Out of bed for toileting  - Record patient progress and toleration of activity level   10/9/2024 1329 by Fina Matamoros RN  Outcome: Adequate for Discharge  10/9/2024 1140  by Fina Matamoros RN  Outcome: Progressing     Problem: DISCHARGE PLANNING  Goal: Discharge to home or other facility with appropriate resources  Description: INTERVENTIONS:  - Identify barriers to discharge w/patient and caregiver  - Arrange for needed discharge resources and transportation as appropriate  - Identify discharge learning needs (meds, wound care, etc.)  - Arrange for interpretive services to assist at discharge as needed  - Refer to Case Management Department for coordinating discharge planning if the patient needs post-hospital services based on physician/advanced practitioner order or complex needs related to functional status, cognitive ability, or social support system  10/9/2024 1329 by Fina Matamoros RN  Outcome: Adequate for Discharge  10/9/2024 1140 by Fina Matamoros RN  Outcome: Progressing     Problem: Knowledge Deficit  Goal: Patient/family/caregiver demonstrates understanding of disease process, treatment plan, medications, and discharge instructions  Description: Complete learning assessment and assess knowledge base.  Interventions:  - Provide teaching at level of understanding  - Provide teaching via preferred learning methods  10/9/2024 1329 by Fina Matamoros RN  Outcome: Adequate for Discharge  10/9/2024 1140 by Fina Matamoros RN  Outcome: Progressing     Problem: RESPIRATORY - ADULT  Goal: Achieves optimal ventilation and oxygenation  Description: INTERVENTIONS:  - Assess for changes in respiratory status  - Assess for changes in mentation and behavior  - Position to facilitate oxygenation and minimize respiratory effort  - Oxygen administered by appropriate delivery if ordered  - Initiate smoking cessation education as indicated  - Encourage broncho-pulmonary hygiene including cough, deep breathe, Incentive Spirometry  - Assess the need for suctioning and aspirate as needed  - Assess and instruct to report SOB or any respiratory difficulty  - Respiratory Therapy support as indicated  10/9/2024  1329 by Fina Matamoros RN  Outcome: Adequate for Discharge  10/9/2024 1140 by Fina Matamoros RN  Outcome: Progressing     Problem: METABOLIC, FLUID AND ELECTROLYTES - ADULT  Goal: Glucose maintained within target range  Description: INTERVENTIONS:  - Monitor Blood Glucose as ordered  - Assess for signs and symptoms of hyperglycemia and hypoglycemia  - Administer ordered medications to maintain glucose within target range  - Assess nutritional intake and initiate nutrition service referral as needed  10/9/2024 1329 by Fina Matamoros RN  Outcome: Adequate for Discharge  10/9/2024 1140 by Fina Matamoros RN  Outcome: Progressing     Problem: CARDIOVASCULAR - ADULT  Goal: Absence of cardiac dysrhythmias or at baseline rhythm  Description: INTERVENTIONS:  - Continuous cardiac monitoring, vital signs, obtain 12 lead EKG if ordered  - Administer antiarrhythmic and heart rate control medications as ordered  - Monitor electrolytes and administer replacement therapy as ordered  10/9/2024 1329 by Fina Matamoros RN  Outcome: Adequate for Discharge  10/9/2024 1140 by Fina Matamoros RN  Outcome: Progressing     Problem: Nutrition/Hydration-ADULT  Goal: Nutrient/Hydration intake appropriate for improving, restoring or maintaining nutritional needs  Description: Monitor and assess patient's nutrition/hydration status for malnutrition. Collaborate with interdisciplinary team and initiate plan and interventions as ordered.  Monitor patient's weight and dietary intake as ordered or per policy. Utilize nutrition screening tool and intervene as necessary. Determine patient's food preferences and provide high-protein, high-caloric foods as appropriate.     INTERVENTIONS:  - Monitor oral intake, urinary output, labs, and treatment plans  - Assess nutrition and hydration status and recommend course of action  - Evaluate amount of meals eaten  - Assist patient with eating if necessary   - Allow adequate time for meals  - Recommend/ encourage appropriate  diets, oral nutritional supplements, and vitamin/mineral supplements  - Order, calculate, and assess calorie counts as needed  - Recommend, monitor, and adjust tube feedings and TPN/PPN based on assessed needs  - Assess need for intravenous fluids  - Provide specific nutrition/hydration education as appropriate  - Include patient/family/caregiver in decisions related to nutrition  10/9/2024 1329 by Fina Matamoros, RN  Outcome: Adequate for Discharge  10/9/2024 1140 by Fina Matamoros RN  Outcome: Progressing

## 2024-10-09 NOTE — RESPIRATORY THERAPY NOTE
RT Protocol Note  Ileana Seaman 64 y.o. female MRN: [id number]  Unit/Bed#: -01 Encounter: [id number]    Assessment    Principal Problem:    New onset atrial fibrillation (HCC)  Active Problems:    Abnormal CT chest    Chronic obstructive pulmonary disease with acute lower respiratory infection (HCC)    Mycobacterial infection, non-TB    Hypomagnesemia    Type 2 diabetes mellitus without complication, without long-term current use of insulin (HCC)    Dysphagia      Home Pulmonary Medications:  Albuterol MDI PRN, Trelegy Ellipta, Albuterol UDN PRN  Home Devices/Therapy: (P) Home O2 (2LPM NC ATC)    Past Medical History:   Diagnosis Date    COPD (chronic obstructive pulmonary disease) (HCC)     Hiatal hernia      Social History     Socioeconomic History    Marital status: Single     Spouse name: None    Number of children: None    Years of education: None    Highest education level: None   Occupational History    None   Tobacco Use    Smoking status: Every Day     Current packs/day: 0.25     Types: Cigarettes    Smokeless tobacco: Never   Vaping Use    Vaping status: Never Used   Substance and Sexual Activity    Alcohol use: Never    Drug use: Never    Sexual activity: None   Other Topics Concern    None   Social History Narrative    None     Social Determinants of Health     Financial Resource Strain: Not on file   Food Insecurity: Food Insecurity Present (10/5/2024)    Hunger Vital Sign     Worried About Running Out of Food in the Last Year: Sometimes true     Ran Out of Food in the Last Year: Sometimes true   Transportation Needs: No Transportation Needs (10/5/2024)    PRAPARE - Transportation     Lack of Transportation (Medical): No     Lack of Transportation (Non-Medical): No   Physical Activity: Not on file   Stress: Not on file   Social Connections: Unknown (6/18/2024)    Received from [facility]    Social Connections     How often do you feel lonely or isolated from those around you? (Adult - for ages  "18 years and over): Not on file   Intimate Partner Violence: Not on file   Housing Stability: Unknown (10/5/2024)    Housing Stability Vital Sign     Unable to Pay for Housing in the Last Year: No     Number of Times Moved in the Last Year: Not on file     Homeless in the Last Year: No       Subjective         Objective    Physical Exam:   Assessment Type: During-treatment  General Appearance: Alert, Awake  Respiratory Pattern: Normal  Chest Assessment: Chest expansion symmetrical  Bilateral Breath Sounds: Diminished  Cough: Non-productive  O2 Device: 2LPM NC    Vitals:  Blood pressure 120/76, pulse 73, temperature (!) 97.2 °F (36.2 °C), resp. rate 18, height 5' 1\" (1.549 m), weight 48.1 kg (106 lb), SpO2 95%.          Imaging and other studies: Results Review Statement: I personally reviewed the following image studies/reports in PACS and discussed pertinent findings with Radiology: chest xray. My interpretation of the radiology images/reports is:  .    O2 Device: 2LPM NC     Plan    Respiratory Plan: Mild Distress pathway        Resp Comments: Pt stable on baseline 2LPM NC.   "

## 2024-10-09 NOTE — CASE MANAGEMENT
Case Management Discharge Planning Note    Patient name Ileana Seaman  Location /-01 MRN [id number]  : 1960 Date 10/9/2024       Current Admission Date: 10/4/2024  Current Admission Diagnosis:New onset atrial fibrillation (HCC)   Patient Active Problem List    Diagnosis Date Noted Date Diagnosed    New onset atrial fibrillation (HCC) 10/06/2024     Type 2 diabetes mellitus without complication, without long-term current use of insulin (HCC) 10/05/2024     Dysphagia 10/05/2024     Abnormal CT chest 10/04/2024     Chronic obstructive pulmonary disease with acute lower respiratory infection (HCC) 10/04/2024     Mycobacterial infection, 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     Hypomagnesemia 10/04/2024       LOS (days): 5  Geometric Mean LOS (GMLOS) (days): 2.8  Days to GMLOS:-1.8     OBJECTIVE:  Risk of Unplanned Readmission Score: 13.48         Current admission status: Inpatient   Preferred Pharmacy:   [id number] - [place], [place] - [address]  [address]  [place] [address]  Phone: [phone] Fax: [phone]    Primary Care Provider: Pop Schwarz DO    Primary Insurance: MEDICARE  Secondary Insurance: [facility]    DISCHARGE DETAILS:    Discharge planning discussed with:: patient           Were Treatment Team discharge recommendations reviewed with patient/caregiver?: Yes  Did patient/caregiver verbalize understanding of patient care needs?: Yes                                      Transport at Discharge : Family                             IMM Given (Date):: 10/09/24  IMM Given to:: Patient  Family notified:: appeal rights provided to patient                 CM met with patient to review discharge home today.  CM provided patient with pamphlet for Meals on Wheels and [employer] Meals. CM suggested patient call to apply for either program and assess eligibility. Patient thankful.    CM spoke to patient at the bedside, reviewed DC IMM with  patient and informed that patient can stay an additional 4 hours for reconsidering appealing the discharge as the medicare rights were review on the day of discharge. Pt verbalized understanding and feels ready to go home and does not intend to stay 4 hours to reconsider.  IMM placed in bin for filing.    Patient indicated between her daughter, son, DIL, she will have ride home later today and individual will go to patients home and secure O2 tank for discharge needs.

## 2024-10-09 NOTE — PLAN OF CARE
Problem: PAIN - ADULT  Goal: Verbalizes/displays adequate comfort level or baseline comfort level  Description: Interventions:  - Encourage patient to monitor pain and request assistance  - Assess pain using appropriate pain scale  - Administer analgesics based on type and severity of pain and evaluate response  - Implement non-pharmacological measures as appropriate and evaluate response  - Consider cultural and social influences on pain and pain management  - Notify physician/advanced practitioner if interventions unsuccessful or patient reports new pain  10/9/2024 1330 by Fina Matamoros RN  Outcome: Adequate for Discharge  10/9/2024 1329 by Fina Matamoros RN  Outcome: Adequate for Discharge  10/9/2024 1140 by Fina Matamoros RN  Outcome: Progressing     Problem: INFECTION - ADULT  Goal: Absence or prevention of progression during hospitalization  Description: INTERVENTIONS:  - Assess and monitor for signs and symptoms of infection  - Monitor lab/diagnostic results  - Monitor all insertion sites, i.e. indwelling lines, tubes, and drains  - Monitor endotracheal if appropriate and nasal secretions for changes in amount and color  - [place] appropriate cooling/warming therapies per order  - Administer medications as ordered  - Instruct and encourage patient and family to use good hand hygiene technique  - Identify and instruct in appropriate isolation precautions for identified infection/condition  10/9/2024 1330 by Fina Matamoros RN  Outcome: Adequate for Discharge  10/9/2024 1329 by Fina Matamoros RN  Outcome: Adequate for Discharge  10/9/2024 1140 by Fina Matamoros RN  Outcome: Progressing     Problem: SAFETY ADULT  Goal: Patient will remain free of falls  Description: INTERVENTIONS:  - Educate patient/family on patient safety including physical limitations  - Instruct patient to call for assistance with activity   - Consult OT/PT to assist with strengthening/mobility   - Keep Call bell within reach  - Keep bed low and locked  with side rails adjusted as appropriate  - Keep care items and personal belongings within reach  - Initiate and maintain comfort rounds  - Make Fall Risk Sign visible to staff  - Offer Toileting every 2 Hours, in advance of need  - Initiate/Maintain bed/chair alarm  - Apply yellow socks and bracelet for high fall risk patients  - Consider moving patient to room near nurses station  10/9/2024 1330 by Fina Matamoros RN  Outcome: Adequate for Discharge  10/9/2024 1329 by Fina Matamoros RN  Outcome: Adequate for Discharge  10/9/2024 1140 by Fina Matamoros RN  Outcome: Progressing  Goal: Maintain or return to baseline ADL function  Description: INTERVENTIONS:  -  Assess patient's ability to carry out ADLs; assess patient's baseline for ADL function and identify physical deficits which impact ability to perform ADLs (bathing, care of mouth/teeth, toileting, grooming, dressing, etc.)  - Assess/evaluate cause of self-care deficits   - Assess range of motion  - Assess patient's mobility; develop plan if impaired  - Assess patient's need for assistive devices and provide as appropriate  - Encourage maximum independence but intervene and supervise when necessary  - Involve family in performance of ADLs  - Assess for home care needs following discharge   - Consider OT consult to assist with ADL evaluation and planning for discharge  - Provide patient education as appropriate  10/9/2024 1330 by Fina Matamoros RN  Outcome: Adequate for Discharge  10/9/2024 1329 by Fina Matamoros RN  Outcome: Adequate for Discharge  10/9/2024 1140 by Fina Matamoros RN  Outcome: Progressing  Goal: Maintains/Returns to pre admission functional level  Description: INTERVENTIONS:  - Perform AM-PAC 6 Click Basic Mobility/ Daily Activity assessment daily.  - Set and communicate daily mobility goal to care team and patient/family/caregiver.   - Collaborate with rehabilitation services on mobility goals if consulted  - Perform Range of Motion 4 times a day.  - Reposition  patient every 2 hours.  - Stand patient 5 times a day  - Out of bed to chair 3 times a day   - Out of bed for toileting  - Record patient progress and toleration of activity level   10/9/2024 1330 by Fina Matamoros RN  Outcome: Adequate for Discharge  10/9/2024 1329 by Fina Matamoros RN  Outcome: Adequate for Discharge  10/9/2024 1140 by Fina Matamoros RN  Outcome: Progressing     Problem: DISCHARGE PLANNING  Goal: Discharge to home or other facility with appropriate resources  Description: INTERVENTIONS:  - Identify barriers to discharge w/patient and caregiver  - Arrange for needed discharge resources and transportation as appropriate  - Identify discharge learning needs (meds, wound care, etc.)  - Arrange for interpretive services to assist at discharge as needed  - Refer to Case Management Department for coordinating discharge planning if the patient needs post-hospital services based on physician/advanced practitioner order or complex needs related to functional status, cognitive ability, or social support system  10/9/2024 1330 by Fina Matamoros RN  Outcome: Adequate for Discharge  10/9/2024 1329 by Fina Matamoros RN  Outcome: Adequate for Discharge  10/9/2024 1140 by Fina Matamoros RN  Outcome: Progressing     Problem: Knowledge Deficit  Goal: Patient/family/caregiver demonstrates understanding of disease process, treatment plan, medications, and discharge instructions  Description: Complete learning assessment and assess knowledge base.  Interventions:  - Provide teaching at level of understanding  - Provide teaching via preferred learning methods  10/9/2024 1330 by Fina Matamoros RN  Outcome: Adequate for Discharge  10/9/2024 1329 by Fina Matamoros RN  Outcome: Adequate for Discharge  10/9/2024 1140 by Fina Matamoros RN  Outcome: Progressing     Problem: RESPIRATORY - ADULT  Goal: Achieves optimal ventilation and oxygenation  Description: INTERVENTIONS:  - Assess for changes in respiratory status  - Assess for changes in mentation  and behavior  - Position to facilitate oxygenation and minimize respiratory effort  - Oxygen administered by appropriate delivery if ordered  - Initiate smoking cessation education as indicated  - Encourage broncho-pulmonary hygiene including cough, deep breathe, Incentive Spirometry  - Assess the need for suctioning and aspirate as needed  - Assess and instruct to report SOB or any respiratory difficulty  - Respiratory Therapy support as indicated  10/9/2024 1330 by Fina Matamoros RN  Outcome: Adequate for Discharge  10/9/2024 1329 by Fina Matamoros RN  Outcome: Adequate for Discharge  10/9/2024 1140 by Fina Matamoros RN  Outcome: Progressing     Problem: METABOLIC, FLUID AND ELECTROLYTES - ADULT  Goal: Glucose maintained within target range  Description: INTERVENTIONS:  - Monitor Blood Glucose as ordered  - Assess for signs and symptoms of hyperglycemia and hypoglycemia  - Administer ordered medications to maintain glucose within target range  - Assess nutritional intake and initiate nutrition service referral as needed  10/9/2024 1330 by Fina Matamoros RN  Outcome: Adequate for Discharge  10/9/2024 1329 by Fina Matamoros RN  Outcome: Adequate for Discharge  10/9/2024 1140 by Fina Matamoros RN  Outcome: Progressing     Problem: CARDIOVASCULAR - ADULT  Goal: Absence of cardiac dysrhythmias or at baseline rhythm  Description: INTERVENTIONS:  - Continuous cardiac monitoring, vital signs, obtain 12 lead EKG if ordered  - Administer antiarrhythmic and heart rate control medications as ordered  - Monitor electrolytes and administer replacement therapy as ordered  10/9/2024 1330 by Fina Matamoros RN  Outcome: Adequate for Discharge  10/9/2024 1329 by Fina Matamoros RN  Outcome: Adequate for Discharge  10/9/2024 1140 by Fina Matamoros RN  Outcome: Progressing     Problem: Nutrition/Hydration-ADULT  Goal: Nutrient/Hydration intake appropriate for improving, restoring or maintaining nutritional needs  Description: Monitor and assess patient's  nutrition/hydration status for malnutrition. Collaborate with interdisciplinary team and initiate plan and interventions as ordered.  Monitor patient's weight and dietary intake as ordered or per policy. Utilize nutrition screening tool and intervene as necessary. Determine patient's food preferences and provide high-protein, high-caloric foods as appropriate.     INTERVENTIONS:  - Monitor oral intake, urinary output, labs, and treatment plans  - Assess nutrition and hydration status and recommend course of action  - Evaluate amount of meals eaten  - Assist patient with eating if necessary   - Allow adequate time for meals  - Recommend/ encourage appropriate diets, oral nutritional supplements, and vitamin/mineral supplements  - Order, calculate, and assess calorie counts as needed  - Recommend, monitor, and adjust tube feedings and TPN/PPN based on assessed needs  - Assess need for intravenous fluids  - Provide specific nutrition/hydration education as appropriate  - Include patient/family/caregiver in decisions related to nutrition  10/9/2024 1330 by Fina Matamoros RN  Outcome: Adequate for Discharge  10/9/2024 1329 by Fina Matamoros RN  Outcome: Adequate for Discharge  10/9/2024 1140 by Fina Matamoros RN  Outcome: Progressing     Problem: PHYSICAL THERAPY ADULT  Goal: Performs mobility at highest level of function for planned discharge setting.  See evaluation for individualized goals.  Description: Treatment/Interventions: Functional transfer training, LE strengthening/ROM, Elevations, Therapeutic exercise, Endurance training, Gait training, Patient/family training          See flowsheet documentation for full assessment, interventions and recommendations.  Outcome: Adequate for Discharge

## 2024-10-09 NOTE — PLAN OF CARE
Problem: PAIN - ADULT  Goal: Verbalizes/displays adequate comfort level or baseline comfort level  Description: Interventions:  - Encourage patient to monitor pain and request assistance  - Assess pain using appropriate pain scale  - Administer analgesics based on type and severity of pain and evaluate response  - Implement non-pharmacological measures as appropriate and evaluate response  - Consider cultural and social influences on pain and pain management  - Notify physician/advanced practitioner if interventions unsuccessful or patient reports new pain  Outcome: Progressing     Problem: INFECTION - ADULT  Goal: Absence or prevention of progression during hospitalization  Description: INTERVENTIONS:  - Assess and monitor for signs and symptoms of infection  - Monitor lab/diagnostic results  - Monitor all insertion sites, i.e. indwelling lines, tubes, and drains  - Monitor endotracheal if appropriate and nasal secretions for changes in amount and color  - [place] appropriate cooling/warming therapies per order  - Administer medications as ordered  - Instruct and encourage patient and family to use good hand hygiene technique  - Identify and instruct in appropriate isolation precautions for identified infection/condition  Outcome: Progressing     Problem: SAFETY ADULT  Goal: Patient will remain free of falls  Description: INTERVENTIONS:  - Educate patient/family on patient safety including physical limitations  - Instruct patient to call for assistance with activity   - Consult OT/PT to assist with strengthening/mobility   - Keep Call bell within reach  - Keep bed low and locked with side rails adjusted as appropriate  - Keep care items and personal belongings within reach  - Initiate and maintain comfort rounds  - Make Fall Risk Sign visible to staff  - Offer Toileting every 2 Hours, in advance of need  - Initiate/Maintain bed/chair alarm  - Apply yellow socks and bracelet for high fall risk patients  -  Consider moving patient to room near nurses station  Outcome: Progressing  Goal: Maintain or return to baseline ADL function  Description: INTERVENTIONS:  -  Assess patient's ability to carry out ADLs; assess patient's baseline for ADL function and identify physical deficits which impact ability to perform ADLs (bathing, care of mouth/teeth, toileting, grooming, dressing, etc.)  - Assess/evaluate cause of self-care deficits   - Assess range of motion  - Assess patient's mobility; develop plan if impaired  - Assess patient's need for assistive devices and provide as appropriate  - Encourage maximum independence but intervene and supervise when necessary  - Involve family in performance of ADLs  - Assess for home care needs following discharge   - Consider OT consult to assist with ADL evaluation and planning for discharge  - Provide patient education as appropriate  Outcome: Progressing  Goal: Maintains/Returns to pre admission functional level  Description: INTERVENTIONS:  - Perform AM-PAC 6 Click Basic Mobility/ Daily Activity assessment daily.  - Set and communicate daily mobility goal to care team and patient/family/caregiver.   - Collaborate with rehabilitation services on mobility goals if consulted  - Perform Range of Motion 4 times a day.  - Reposition patient every 2 hours.  - Stand patient 5 times a day  - Out of bed to chair 3 times a day   - Out of bed for toileting  - Record patient progress and toleration of activity level   Outcome: Progressing     Problem: DISCHARGE PLANNING  Goal: Discharge to home or other facility with appropriate resources  Description: INTERVENTIONS:  - Identify barriers to discharge w/patient and caregiver  - Arrange for needed discharge resources and transportation as appropriate  - Identify discharge learning needs (meds, wound care, etc.)  - Arrange for interpretive services to assist at discharge as needed  - Refer to Case Management Department for coordinating discharge  planning if the patient needs post-hospital services based on physician/advanced practitioner order or complex needs related to functional status, cognitive ability, or social support system  Outcome: Progressing     Problem: Knowledge Deficit  Goal: Patient/family/caregiver demonstrates understanding of disease process, treatment plan, medications, and discharge instructions  Description: Complete learning assessment and assess knowledge base.  Interventions:  - Provide teaching at level of understanding  - Provide teaching via preferred learning methods  Outcome: Progressing     Problem: RESPIRATORY - ADULT  Goal: Achieves optimal ventilation and oxygenation  Description: INTERVENTIONS:  - Assess for changes in respiratory status  - Assess for changes in mentation and behavior  - Position to facilitate oxygenation and minimize respiratory effort  - Oxygen administered by appropriate delivery if ordered  - Initiate smoking cessation education as indicated  - Encourage broncho-pulmonary hygiene including cough, deep breathe, Incentive Spirometry  - Assess the need for suctioning and aspirate as needed  - Assess and instruct to report SOB or any respiratory difficulty  - Respiratory Therapy support as indicated  Outcome: Progressing     Problem: METABOLIC, FLUID AND ELECTROLYTES - ADULT  Goal: Glucose maintained within target range  Description: INTERVENTIONS:  - Monitor Blood Glucose as ordered  - Assess for signs and symptoms of hyperglycemia and hypoglycemia  - Administer ordered medications to maintain glucose within target range  - Assess nutritional intake and initiate nutrition service referral as needed  Outcome: Progressing     Problem: CARDIOVASCULAR - ADULT  Goal: Absence of cardiac dysrhythmias or at baseline rhythm  Description: INTERVENTIONS:  - Continuous cardiac monitoring, vital signs, obtain 12 lead EKG if ordered  - Administer antiarrhythmic and heart rate control medications as ordered  - Monitor  electrolytes and administer replacement therapy as ordered  Outcome: Progressing     Problem: Nutrition/Hydration-ADULT  Goal: Nutrient/Hydration intake appropriate for improving, restoring or maintaining nutritional needs  Description: Monitor and assess patient's nutrition/hydration status for malnutrition. Collaborate with interdisciplinary team and initiate plan and interventions as ordered.  Monitor patient's weight and dietary intake as ordered or per policy. Utilize nutrition screening tool and intervene as necessary. Determine patient's food preferences and provide high-protein, high-caloric foods as appropriate.     INTERVENTIONS:  - Monitor oral intake, urinary output, labs, and treatment plans  - Assess nutrition and hydration status and recommend course of action  - Evaluate amount of meals eaten  - Assist patient with eating if necessary   - Allow adequate time for meals  - Recommend/ encourage appropriate diets, oral nutritional supplements, and vitamin/mineral supplements  - Order, calculate, and assess calorie counts as needed  - Recommend, monitor, and adjust tube feedings and TPN/PPN based on assessed needs  - Assess need for intravenous fluids  - Provide specific nutrition/hydration education as appropriate  - Include patient/family/caregiver in decisions related to nutrition  Outcome: Progressing

## 2024-10-10 LAB
BACTERIA BLD CULT: NORMAL
BACTERIA BLD CULT: NORMAL

## 2024-11-07 ENCOUNTER — APPOINTMENT (EMERGENCY)
Dept: CT IMAGING | Facility: HOSPITAL | Age: 64
End: 2024-11-07
Payer: MEDICARE

## 2024-11-07 ENCOUNTER — HOSPITAL ENCOUNTER (OUTPATIENT)
Facility: HOSPITAL | Age: 64
Setting detail: OBSERVATION
Discharge: HOME | End: 2024-11-08
Attending: EMERGENCY MEDICINE | Admitting: FAMILY MEDICINE
Payer: MEDICARE

## 2024-11-07 DIAGNOSIS — R10.13 EPIGASTRIC PAIN: Primary | ICD-10-CM

## 2024-11-07 DIAGNOSIS — R11.2 NAUSEA AND VOMITING: ICD-10-CM

## 2024-11-07 DIAGNOSIS — R06.2 WHEEZING: ICD-10-CM

## 2024-11-07 PROBLEM — J18.9 COMMUNITY ACQUIRED PNEUMONIA OF RIGHT UPPER LOBE OF LUNG: Status: RESOLVED | Noted: 2024-10-04 | Resolved: 2024-11-07

## 2024-11-07 LAB
ALBUMIN SERPL BCG-MCNC: 4.5 G/DL (ref 3.5–5)
ALP SERPL-CCNC: 95 U/L (ref 34–104)
ALT SERPL W P-5'-P-CCNC: 11 U/L (ref 7–52)
ANION GAP SERPL CALCULATED.3IONS-SCNC: 7 MMOL/L (ref 4–13)
AST SERPL W P-5'-P-CCNC: 13 U/L (ref 13–39)
ATRIAL RATE: 103 BPM
BASOPHILS # BLD AUTO: 0.04 THOUSANDS/ÂΜL (ref 0–0.1)
BASOPHILS NFR BLD AUTO: 1 % (ref 0–1)
BILIRUB SERPL-MCNC: 0.33 MG/DL (ref 0.2–1)
BUN SERPL-MCNC: 8 MG/DL (ref 5–25)
CALCIUM SERPL-MCNC: 9.4 MG/DL (ref 8.4–10.2)
CARDIAC TROPONIN I PNL SERPL HS: 8 NG/L
CHLORIDE SERPL-SCNC: 104 MMOL/L (ref 96–108)
CO2 SERPL-SCNC: 30 MMOL/L (ref 21–32)
CREAT SERPL-MCNC: 0.47 MG/DL (ref 0.6–1.3)
EOSINOPHIL # BLD AUTO: 0.03 THOUSAND/ÂΜL (ref 0–0.61)
EOSINOPHIL NFR BLD AUTO: 0 % (ref 0–6)
ERYTHROCYTE [DISTWIDTH] IN BLOOD BY AUTOMATED COUNT: 13.3 % (ref 11.6–15.1)
GFR SERPL CREATININE-BSD FRML MDRD: 103 ML/MIN/1.73SQ M
GLUCOSE SERPL-MCNC: 104 MG/DL (ref 65–140)
GLUCOSE SERPL-MCNC: 196 MG/DL (ref 65–140)
HCT VFR BLD AUTO: 40.8 % (ref 34.8–46.1)
HGB BLD-MCNC: 13.2 G/DL (ref 11.5–15.4)
IMM GRANULOCYTES # BLD AUTO: 0.02 THOUSAND/UL (ref 0–0.2)
IMM GRANULOCYTES NFR BLD AUTO: 0 % (ref 0–2)
LACTATE SERPL-SCNC: 0.8 MMOL/L (ref 0.5–2)
LIPASE SERPL-CCNC: 16 U/L (ref 11–82)
LYMPHOCYTES # BLD AUTO: 1.89 THOUSANDS/ÂΜL (ref 0.6–4.47)
LYMPHOCYTES NFR BLD AUTO: 24 % (ref 14–44)
MCH RBC QN AUTO: 29.5 PG (ref 26.8–34.3)
MCHC RBC AUTO-ENTMCNC: 32.4 G/DL (ref 31.4–37.4)
MCV RBC AUTO: 91 FL (ref 82–98)
MONOCYTES # BLD AUTO: 0.63 THOUSAND/ÂΜL (ref 0.17–1.22)
MONOCYTES NFR BLD AUTO: 8 % (ref 4–12)
NEUTROPHILS # BLD AUTO: 5.31 THOUSANDS/ÂΜL (ref 1.85–7.62)
NEUTS SEG NFR BLD AUTO: 67 % (ref 43–75)
NRBC BLD AUTO-RTO: 0 /100 WBCS
P AXIS: 78 DEGREES
PLATELET # BLD AUTO: 297 THOUSANDS/UL (ref 149–390)
PMV BLD AUTO: 11.9 FL (ref 8.9–12.7)
POTASSIUM SERPL-SCNC: 3.9 MMOL/L (ref 3.5–5.3)
PR INTERVAL: 128 MS
PROT SERPL-MCNC: 7.5 G/DL (ref 6.4–8.4)
QRS AXIS: 54 DEGREES
QRSD INTERVAL: 74 MS
QT INTERVAL: 342 MS
QTC INTERVAL: 448 MS
RBC # BLD AUTO: 4.48 MILLION/UL (ref 3.81–5.12)
SODIUM SERPL-SCNC: 141 MMOL/L (ref 135–147)
T WAVE AXIS: 72 DEGREES
VENTRICULAR RATE: 103 BPM
WBC # BLD AUTO: 7.92 THOUSAND/UL (ref 4.31–10.16)

## 2024-11-07 PROCEDURE — 93010 ELECTROCARDIOGRAM REPORT: CPT | Performed by: INTERNAL MEDICINE

## 2024-11-07 PROCEDURE — 99285 EMERGENCY DEPT VISIT HI MDM: CPT

## 2024-11-07 PROCEDURE — 83605 ASSAY OF LACTIC ACID: CPT | Performed by: PHYSICIAN ASSISTANT

## 2024-11-07 PROCEDURE — 74177 CT ABD & PELVIS W/CONTRAST: CPT

## 2024-11-07 PROCEDURE — 83690 ASSAY OF LIPASE: CPT | Performed by: PHYSICIAN ASSISTANT

## 2024-11-07 PROCEDURE — 93005 ELECTROCARDIOGRAM TRACING: CPT

## 2024-11-07 PROCEDURE — 99223 1ST HOSP IP/OBS HIGH 75: CPT | Performed by: FAMILY MEDICINE

## 2024-11-07 PROCEDURE — 96374 THER/PROPH/DIAG INJ IV PUSH: CPT

## 2024-11-07 PROCEDURE — 36415 COLL VENOUS BLD VENIPUNCTURE: CPT | Performed by: PHYSICIAN ASSISTANT

## 2024-11-07 PROCEDURE — 96375 TX/PRO/DX INJ NEW DRUG ADDON: CPT

## 2024-11-07 PROCEDURE — 99285 EMERGENCY DEPT VISIT HI MDM: CPT | Performed by: PHYSICIAN ASSISTANT

## 2024-11-07 PROCEDURE — 85025 COMPLETE CBC W/AUTO DIFF WBC: CPT | Performed by: PHYSICIAN ASSISTANT

## 2024-11-07 PROCEDURE — 84484 ASSAY OF TROPONIN QUANT: CPT | Performed by: PHYSICIAN ASSISTANT

## 2024-11-07 PROCEDURE — 94664 DEMO&/EVAL PT USE INHALER: CPT

## 2024-11-07 PROCEDURE — 96361 HYDRATE IV INFUSION ADD-ON: CPT

## 2024-11-07 PROCEDURE — 94760 N-INVAS EAR/PLS OXIMETRY 1: CPT

## 2024-11-07 PROCEDURE — 94640 AIRWAY INHALATION TREATMENT: CPT

## 2024-11-07 PROCEDURE — 82948 REAGENT STRIP/BLOOD GLUCOSE: CPT

## 2024-11-07 PROCEDURE — 94644 CONT INHLJ TX 1ST HOUR: CPT

## 2024-11-07 PROCEDURE — 80053 COMPREHEN METABOLIC PANEL: CPT | Performed by: PHYSICIAN ASSISTANT

## 2024-11-07 PROCEDURE — 71260 CT THORAX DX C+: CPT

## 2024-11-07 RX ORDER — METHYLPREDNISOLONE SODIUM SUCCINATE 125 MG/2ML
125 INJECTION, POWDER, LYOPHILIZED, FOR SOLUTION INTRAMUSCULAR; INTRAVENOUS ONCE
Status: COMPLETED | OUTPATIENT
Start: 2024-11-07 | End: 2024-11-07

## 2024-11-07 RX ORDER — MORPHINE SULFATE 4 MG/ML
4 INJECTION, SOLUTION INTRAMUSCULAR; INTRAVENOUS ONCE
Status: DISCONTINUED | OUTPATIENT
Start: 2024-11-07 | End: 2024-11-07

## 2024-11-07 RX ORDER — FAMOTIDINE 20 MG/1
20 TABLET, FILM COATED ORAL ONCE
Status: DISCONTINUED | OUTPATIENT
Start: 2024-11-07 | End: 2024-11-07

## 2024-11-07 RX ORDER — SUCRALFATE 1 G/1
1 TABLET ORAL ONCE
Status: COMPLETED | OUTPATIENT
Start: 2024-11-07 | End: 2024-11-07

## 2024-11-07 RX ORDER — INSULIN LISPRO 100 [IU]/ML
1-6 INJECTION, SOLUTION INTRAVENOUS; SUBCUTANEOUS
Status: DISCONTINUED | OUTPATIENT
Start: 2024-11-07 | End: 2024-11-08 | Stop reason: HOSPADM

## 2024-11-07 RX ORDER — IPRATROPIUM BROMIDE AND ALBUTEROL SULFATE 2.5; .5 MG/3ML; MG/3ML
3 SOLUTION RESPIRATORY (INHALATION) 4 TIMES DAILY
Status: DISCONTINUED | OUTPATIENT
Start: 2024-11-07 | End: 2024-11-07

## 2024-11-07 RX ORDER — ONDANSETRON 2 MG/ML
4 INJECTION INTRAMUSCULAR; INTRAVENOUS EVERY 6 HOURS PRN
Status: DISCONTINUED | OUTPATIENT
Start: 2024-11-07 | End: 2024-11-08 | Stop reason: HOSPADM

## 2024-11-07 RX ORDER — ALBUTEROL SULFATE 5 MG/ML
10 SOLUTION RESPIRATORY (INHALATION) ONCE
Status: COMPLETED | OUTPATIENT
Start: 2024-11-07 | End: 2024-11-07

## 2024-11-07 RX ORDER — AZITHROMYCIN 250 MG/1
250 TABLET, FILM COATED ORAL
Status: DISCONTINUED | OUTPATIENT
Start: 2024-11-07 | End: 2024-11-08

## 2024-11-07 RX ORDER — NICOTINE 21 MG/24HR
1 PATCH, TRANSDERMAL 24 HOURS TRANSDERMAL DAILY
Status: DISCONTINUED | OUTPATIENT
Start: 2024-11-08 | End: 2024-11-08 | Stop reason: HOSPADM

## 2024-11-07 RX ORDER — MORPHINE SULFATE 4 MG/ML
4 INJECTION, SOLUTION INTRAMUSCULAR; INTRAVENOUS ONCE
Status: COMPLETED | OUTPATIENT
Start: 2024-11-07 | End: 2024-11-07

## 2024-11-07 RX ORDER — ONDANSETRON 2 MG/ML
4 INJECTION INTRAMUSCULAR; INTRAVENOUS ONCE
Status: COMPLETED | OUTPATIENT
Start: 2024-11-07 | End: 2024-11-07

## 2024-11-07 RX ORDER — SUCRALFATE 1 G/1
1 TABLET ORAL ONCE
Status: DISCONTINUED | OUTPATIENT
Start: 2024-11-07 | End: 2024-11-07

## 2024-11-07 RX ORDER — ETHAMBUTOL HYDROCHLORIDE 400 MG/1
800 TABLET, FILM COATED ORAL DAILY
Status: DISCONTINUED | OUTPATIENT
Start: 2024-11-08 | End: 2024-11-08 | Stop reason: HOSPADM

## 2024-11-07 RX ORDER — RIFABUTIN 150 MG/1
300 CAPSULE ORAL DAILY
Status: DISCONTINUED | OUTPATIENT
Start: 2024-11-08 | End: 2024-11-08 | Stop reason: HOSPADM

## 2024-11-07 RX ORDER — PANTOPRAZOLE SODIUM 40 MG/10ML
40 INJECTION, POWDER, LYOPHILIZED, FOR SOLUTION INTRAVENOUS EVERY 12 HOURS SCHEDULED
Status: DISCONTINUED | OUTPATIENT
Start: 2024-11-07 | End: 2024-11-08 | Stop reason: HOSPADM

## 2024-11-07 RX ORDER — SODIUM CHLORIDE FOR INHALATION 0.9 %
12 VIAL, NEBULIZER (ML) INHALATION ONCE
Status: COMPLETED | OUTPATIENT
Start: 2024-11-07 | End: 2024-11-07

## 2024-11-07 RX ORDER — METOPROLOL TARTRATE 25 MG/1
25 TABLET, FILM COATED ORAL EVERY 12 HOURS SCHEDULED
Status: DISCONTINUED | OUTPATIENT
Start: 2024-11-07 | End: 2024-11-08 | Stop reason: HOSPADM

## 2024-11-07 RX ORDER — FLUTICASONE FUROATE AND VILANTEROL 200; 25 UG/1; UG/1
1 POWDER RESPIRATORY (INHALATION) DAILY
Status: DISCONTINUED | OUTPATIENT
Start: 2024-11-08 | End: 2024-11-08 | Stop reason: HOSPADM

## 2024-11-07 RX ORDER — ACETAMINOPHEN 325 MG/1
650 TABLET ORAL EVERY 6 HOURS PRN
Status: DISCONTINUED | OUTPATIENT
Start: 2024-11-07 | End: 2024-11-08 | Stop reason: HOSPADM

## 2024-11-07 RX ORDER — IPRATROPIUM BROMIDE AND ALBUTEROL SULFATE 2.5; .5 MG/3ML; MG/3ML
3 SOLUTION RESPIRATORY (INHALATION) 4 TIMES DAILY
Status: DISCONTINUED | OUTPATIENT
Start: 2024-11-07 | End: 2024-11-08

## 2024-11-07 RX ORDER — HEPARIN SODIUM 5000 [USP'U]/ML
5000 INJECTION, SOLUTION INTRAVENOUS; SUBCUTANEOUS EVERY 8 HOURS SCHEDULED
Status: DISCONTINUED | OUTPATIENT
Start: 2024-11-07 | End: 2024-11-08 | Stop reason: HOSPADM

## 2024-11-07 RX ORDER — INSULIN LISPRO 100 [IU]/ML
1-6 INJECTION, SOLUTION INTRAVENOUS; SUBCUTANEOUS
Status: DISCONTINUED | OUTPATIENT
Start: 2024-11-08 | End: 2024-11-08 | Stop reason: HOSPADM

## 2024-11-07 RX ADMIN — SODIUM CHLORIDE 500 ML: 0.9 INJECTION, SOLUTION INTRAVENOUS at 14:04

## 2024-11-07 RX ADMIN — AZITHROMYCIN 250 MG: 250 TABLET, FILM COATED ORAL at 20:08

## 2024-11-07 RX ADMIN — SODIUM CHLORIDE 1000 ML: 0.9 INJECTION, SOLUTION INTRAVENOUS at 18:46

## 2024-11-07 RX ADMIN — PANTOPRAZOLE SODIUM 40 MG: 40 INJECTION, POWDER, LYOPHILIZED, FOR SOLUTION INTRAVENOUS at 20:08

## 2024-11-07 RX ADMIN — IOHEXOL 100 ML: 350 INJECTION, SOLUTION INTRAVENOUS at 16:07

## 2024-11-07 RX ADMIN — ONDANSETRON 4 MG: 2 INJECTION INTRAMUSCULAR; INTRAVENOUS at 14:03

## 2024-11-07 RX ADMIN — ALBUTEROL SULFATE 10 MG: 2.5 SOLUTION RESPIRATORY (INHALATION) at 14:21

## 2024-11-07 RX ADMIN — ISODIUM CHLORIDE 12 ML: 0.03 SOLUTION RESPIRATORY (INHALATION) at 14:21

## 2024-11-07 RX ADMIN — METHYLPREDNISOLONE SODIUM SUCCINATE 125 MG: 125 INJECTION, POWDER, FOR SOLUTION INTRAMUSCULAR; INTRAVENOUS at 14:02

## 2024-11-07 RX ADMIN — PANTOPRAZOLE SODIUM 80 MG: 40 INJECTION, POWDER, LYOPHILIZED, FOR SOLUTION INTRAVENOUS at 16:41

## 2024-11-07 RX ADMIN — SUCRALFATE 1 G: 1 TABLET ORAL at 14:16

## 2024-11-07 RX ADMIN — MORPHINE SULFATE 4 MG: 4 INJECTION INTRAVENOUS at 14:03

## 2024-11-07 RX ADMIN — IPRATROPIUM BROMIDE 1 MG: 0.5 SOLUTION RESPIRATORY (INHALATION) at 14:21

## 2024-11-07 NOTE — H&P
H&P - Hospitalist   Name: Ileana Seaman 64 y.o. 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  Per patient, reports she might have a history of hiatal hernia  Imaging did not reveal any obstruction  Continue IV hydration, IV Protonix, pain medication  Patient n.p.o. after midnight for possible EGD by gastroenterology  Chronic obstructive pulmonary disease with acute lower respiratory infection (HCC)  Continue respiratory protocol.  Patient has multiple pulmonary nodules which remain stable per recent imaging.  LUNGS: No convincing change in the platelike/triangular opacity within the posterior right upper lobe which measures 3.5 (sagittal) x 4.2 (AP) x 3.0 (transverse) cm. This has not clearly changed since the prior chest CT. There is advanced underlying   centrilobular emphysema. There are multiple pulmonary nodules, which will be described on series 3:   Image 73, left upper lobe, 7 x 10 mm; no convincing change.   Image 83, right lower lobe, 8 x 10 mm; no convincing change   Image 87, left upper lobe, 6-7 mm; no convincing change   Image 104, left upper lobe, 7 mm; no convincing change   Image 105, left upper lobe, 5 mm; no convincing change.   Image 109, left lower lobe, 5-6 mm, no convincing change.   Image 118, left upper lobe, 7 mm, no convincing change.   Image 158, right lower lobe, 8 x 8 mm, no convincing change.   Mycobacterial infection, non-TB  Patient does follows up with infectious disease at [facility], last note  "reviewed from October 28, 2024-Per note, patient supposed to take azithromycin 250 mg every other day, continue rifabutin and ethambutol.  Type 2 diabetes mellitus without complication, without long-term current use of insulin (HCC)  Lab Results   Component Value Date    HGBA1C 6.4 (H) 10/04/2024       No results for input(s): \"POCGLU\" in the last 72 hours.    Blood Sugar Average: Last 72 hrs:    Diabetic diet, insulin, hypoglycemia precaution  New onset atrial fibrillation (HCC)  Chart review, in previous admission at this hospital, patient was found new onset A-fib.  At that time, patient was in A-fib for approximately 6 to 7 hours, discussion has done with cardiology at that time, does not recommend any anticoagulation for transient A-fib, current but recommendation continue beta-blocker.  Echocardiogram normal ejection fraction with grade 1 diastolic dysfunction.      VTE Pharmacologic Prophylaxis: VTE Score: 3 Moderate Risk (Score 3-4) - Pharmacological DVT Prophylaxis Ordered: heparin.  Code Status: Level 1 - Full Code per patient  Discussion with family:  With patient.     Anticipated Length of Stay: Patient will be admitted on an observation basis with an anticipated length of stay of less than 2 midnights secondary to monitor above condition.    History of Present Illness   Chief Complaint: Nausea vomiting abdominal pain    Ileana Seaman is a 64 y.o. female with a PMH of MAC on treatment, diabetes, COPD who presents with abdominal pain, located in epigastric area, nonradiating, burning/sharp, constant belching, associated with nausea and vomiting.  Denies any diarrhea.  Denies any weight loss.  Smoker.  Does not drink alcohol..    Review of Systems   Constitutional:  Negative for activity change, appetite change, chills, diaphoresis, fatigue and fever.   Respiratory:  Negative for apnea, cough, chest tightness, shortness of breath and stridor.    Cardiovascular:  Negative for chest pain and " palpitations.   Gastrointestinal:  Positive for abdominal pain, nausea and vomiting. Negative for abdominal distention.   Skin:  Negative for color change, pallor and rash.   Neurological:  Negative for dizziness, facial asymmetry and headaches.   Psychiatric/Behavioral:  Negative for agitation, behavioral problems and confusion.    All other systems reviewed and are negative.      Historical Information   Past Medical History:   Diagnosis Date    COPD (chronic obstructive pulmonary disease) (HCC)     Hiatal hernia      Past Surgical History:   Procedure Laterality Date    CHOLECYSTECTOMY       Social History     Tobacco Use    Smoking status: Every Day     Current packs/day: 0.50     Types: Cigarettes    Smokeless tobacco: Never   Vaping Use    Vaping status: Never Used   Substance and Sexual Activity    Alcohol use: Never    Drug use: Never    Sexual activity: Not on file     E-Cigarette/Vaping    E-Cigarette Use Never User      E-Cigarette/Vaping Substances     History reviewed. No pertinent family history.  Social History:  Marital Status: Single   Occupation: Unknown  Patient Pre-hospital Living Situation: Home  Patient Pre-hospital Level of Mobility: walks  Patient Pre-hospital Diet Restrictions: Cardiac/diabetic diet    Meds/Allergies   I have reviewed home medications with patient personally.  Prior to Admission medications    Medication Sig Start Date End Date Taking? Authorizing Provider   azithromycin (ZITHROMAX) 500 MG tablet Take 250 mg by mouth daily    Historical Provider, MD   ethambutol (MYAMBUTOL) 400 mg tablet Take 800 mg by mouth daily    Historical Provider, MD   fluticasone-umeclidinium-vilanterol (Trelegy Ellipta) 100-62.5-25 mcg/actuation inhaler Inhale 1 puff daily Rinse mouth after use.    Historical Provider, MD   ipratropium-albuterol (DUO-NEB) 0.5-2.5 mg/3 mL nebulizer solution Take 3 mL by nebulization 4 (four) times a day 10/9/24   Juana Santana MD   lidocaine (LIDODERM) 5 % Apply 1  patch topically over 12 hours daily for 4 days Remove & Discard patch within 12 hours or as directed by MD 10/10/24 10/14/24  Juana Santana MD   metoprolol tartrate (LOPRESSOR) 25 mg tablet Take 1 tablet (25 mg total) by mouth every 12 (twelve) hours 10/9/24   Juana Santana MD   nicotine (NICODERM CQ) 14 mg/24hr TD 24 hr patch Place 1 patch on the skin over 24 hours daily 10/10/24   Juana Santana MD   omeprazole (PriLOSEC) 40 MG capsule Take 40 mg by mouth daily    Historical Provider, MD   rifabutin (MYCOBUTIN) 150 mg capsule Take 300 mg by mouth daily    Historical Provider, MD     No Known Allergies    Objective :  Temp:  [98.6 °F (37 °C)] 98.6 °F (37 °C)  HR:  [] 115  BP: (112-165)/() 125/68  Resp:  [18-30] 30  SpO2:  [93 %-99 %] 93 %  O2 Device: Nasal cannula  Nasal Cannula O2 Flow Rate (L/min):  [2 L/min] 2 L/min    Physical Exam  Vitals and nursing note reviewed.   Constitutional:       Appearance: She is not ill-appearing or diaphoretic.   HENT:      Mouth/Throat:      Mouth: Mucous membranes are moist.   Eyes:      Extraocular Movements: Extraocular movements intact.      Conjunctiva/sclera: Conjunctivae normal.      Pupils: Pupils are equal, round, and reactive to light.   Cardiovascular:      Rate and Rhythm: Tachycardia present.      Heart sounds: No murmur heard.     No friction rub. No gallop.   Pulmonary:      Effort: No respiratory distress.      Breath sounds: No stridor. No wheezing or rhonchi.   Abdominal:      Tenderness: There is abdominal tenderness. There is no left CVA tenderness, guarding or rebound.   Neurological:      Mental Status: She is alert and oriented to person, place, and time.      Cranial Nerves: No cranial nerve deficit.      Sensory: No sensory deficit.      Motor: No weakness.      Coordination: Coordination normal.          Lines/Drains:            Lab Results: I have reviewed the following results:  Results from last 7 days   Lab Units 11/07/24  1403   WBC  Thousand/uL 7.92   HEMOGLOBIN g/dL 13.2   HEMATOCRIT % 40.8   PLATELETS Thousands/uL 297   SEGS PCT % 67   LYMPHO PCT % 24   MONO PCT % 8   EOS PCT % 0     Results from last 7 days   Lab Units 11/07/24  1403   SODIUM mmol/L 141   POTASSIUM mmol/L 3.9   CHLORIDE mmol/L 104   CO2 mmol/L 30   BUN mg/dL 8   CREATININE mg/dL 0.47*   ANION GAP mmol/L 7   CALCIUM mg/dL 9.4   ALBUMIN g/dL 4.5   TOTAL BILIRUBIN mg/dL 0.33   ALK PHOS U/L 95   ALT U/L 11   AST U/L 13   GLUCOSE RANDOM mg/dL 104             Lab Results   Component Value Date    HGBA1C 6.4 (H) 10/04/2024    HGBA1C 5.9 (H) 05/22/2023    HGBA1C 6.1 (H) 01/19/2023     Results from last 7 days   Lab Units 11/07/24  1403   LACTIC ACID mmol/L 0.8       Imaging Results Review: I reviewed radiology reports from this admission including: CT abdomen/pelvis.  Other Study Results Review: No additional pertinent studies reviewed.    Administrative Statements       ** Please Note: This note has been constructed using a voice recognition system. **

## 2024-11-07 NOTE — ASSESSMENT & PLAN NOTE
Continue respiratory protocol.  Patient has multiple pulmonary nodules which remain stable per recent imaging.  LUNGS: No convincing change in the platelike/triangular opacity within the posterior right upper lobe which measures 3.5 (sagittal) x 4.2 (AP) x 3.0 (transverse) cm. This has not clearly changed since the prior chest CT. There is advanced underlying   centrilobular emphysema. There are multiple pulmonary nodules, which will be described on series 3:   Image 73, left upper lobe, 7 x 10 mm; no convincing change.   Image 83, right lower lobe, 8 x 10 mm; no convincing change   Image 87, left upper lobe, 6-7 mm; no convincing change   Image 104, left upper lobe, 7 mm; no convincing change   Image 105, left upper lobe, 5 mm; no convincing change.   Image 109, left lower lobe, 5-6 mm, no convincing change.   Image 118, left upper lobe, 7 mm, no convincing change.   Image 158, right lower lobe, 8 x 8 mm, no convincing change.

## 2024-11-07 NOTE — ED PROVIDER NOTES
"Time reflects when diagnosis was documented in both MDM as applicable and the Disposition within this note       Time User Action Codes Description Comment    11/7/2024  6:30 PM Carlos Turcios [R10.13] Epigastric pain     11/7/2024  6:30 PM Carlos Turcios [R11.2] Nausea and vomiting     11/7/2024  6:30 PM Carlos Turcios [R06.2] Wheezing           ED Disposition       ED Disposition   Admit    Condition   Stable    Date/Time   u Nov 7, 2024  6:31 PM    Comment   Case was discussed with candie and the patient's admission status was agreed to be Admission Status: observation status to the service of Dr. schreiber .               Assessment & Plan       Medical Decision Making  Is a 64-year-old female with a past medical history of COPD on chronic oxygen who presents with a complaint of worsening \"burning\".  States that she has had abdominal pain/burning since last night.  States that she has a history of this occurring and in occurred before in the beginning of October.  She was admitted for pneumonia at that time.  Patient states that her breathing has not been \"as good as it could be\".  Patient states that she is on azithromycin every other day per her infectious disease doctor.  Patient states that the burning is in the epigastrium.  Has been having nausea and vomiting.  She attempted to take her omeprazole which she takes every day without relief.  She believes she has a hiatal hernia.    Patient clarified that the physician Dr. Ponce diagnosed her with a hiatal hernia on a scope many years ago.    Did not see this on any imaging.  Lab work overall normal.  Patient's breathing did improve with the breathing treatment.  The patient's burning in her abdomen was treated several times but seem to recur.  Patient was not able to tolerate p.o.  Only could tolerate a few sips of water or ice but could not tolerate any food/ crackers patient kept having episodes of nausea and belching with vomiting at home.  Episodes " of nausea and belching in the emergency department as well.  Due to the patient's persistent symptoms did discuss this case with Dr. Plunkett with GI did offer outpatient versus inpatient, ability to scope tomorrow n.p.o. at night tonight.  Discussed with mary for observation overnight due to the patient's persistent symptoms and patient felt uncomfortable going home in this state.  Patient will be observed by Dr. Parker overnight for the scope tomorrow    Amount and/or Complexity of Data Reviewed  Labs: ordered.  Radiology: ordered.    Risk  Prescription drug management.  Decision regarding hospitalization.             Medications   morphine injection 4 mg (has no administration in time range)   sodium chloride 0.9 % bolus 1,000 mL (has no administration in time range)   famotidine (PEPCID) tablet 20 mg (has no administration in time range)   pantoprazole (PROTONIX) 80 mg in sodium chloride 0.9 % 100 mL infusion (has no administration in time range)   sodium chloride 0.9 % bolus 500 mL (0 mL Intravenous Stopped 11/7/24 1504)   ondansetron (ZOFRAN) injection 4 mg (4 mg Intravenous Given 11/7/24 1403)   morphine injection 4 mg (4 mg Intravenous Given 11/7/24 1403)   albuterol inhalation solution 10 mg (10 mg Nebulization Given 11/7/24 1421)   ipratropium (ATROVENT) 0.02 % inhalation solution 1 mg (1 mg Nebulization Given 11/7/24 1421)   sodium chloride 0.9 % inhalation solution 12 mL (12 mL Nebulization Given 11/7/24 1421)   methylPREDNISolone sodium succinate (Solu-MEDROL) injection 125 mg (125 mg Intravenous Given 11/7/24 1402)   sucralfate (CARAFATE) tablet 1 g (1 g Oral Given 11/7/24 1416)   iohexol (OMNIPAQUE) 350 MG/ML injection (MULTI-DOSE) 100 mL (100 mL Intravenous Given 11/7/24 1607)   pantoprazole (PROTONIX) 80 mg in sodium chloride 0.9 % 100 mL IVPB (0 mg Intravenous Stopped 11/7/24 1656)       ED Risk Strat Scores                           SBIRT 22yo+      Flowsheet Row Most Recent Value   Initial Alcohol  "Screen: US AUDIT-C     1. How often do you have a drink containing alcohol? 0 Filed at: 11/07/2024 1343   2. How many drinks containing alcohol do you have on a typical day you are drinking?  0 Filed at: 11/07/2024 1343   3b. FEMALE Any Age, or MALE 65+: How often do you have 4 or more drinks on one occassion? 0 Filed at: 11/07/2024 1343   Audit-C Score 0 Filed at: 11/07/2024 1343   LANDY: How many times in the past year have you...    Used an illegal drug or used a prescription medication for non-medical reasons? Never Filed at: 11/07/2024 1343                            History of Present Illness       Chief Complaint   Patient presents with    Abdominal Pain     Patient reports abdominal pain since last evening. Believes pain is from her hiatal hernia. Patient is unable to eat due to acid reflux, endorses chest pain, denies vomiting and diarrhea. Wears 2L NC normally         Past Medical History:   Diagnosis Date    COPD (chronic obstructive pulmonary disease) (HCC)     Hiatal hernia       Past Surgical History:   Procedure Laterality Date    CHOLECYSTECTOMY        History reviewed. No pertinent family history.   Social History     Tobacco Use    Smoking status: Every Day     Current packs/day: 0.50     Types: Cigarettes    Smokeless tobacco: Never   Vaping Use    Vaping status: Never Used   Substance Use Topics    Alcohol use: Never    Drug use: Never      E-Cigarette/Vaping    E-Cigarette Use Never User       E-Cigarette/Vaping Substances      I have reviewed and agree with the history as documented.     Is a 64-year-old female with a past medical history of COPD on chronic oxygen who presents with a complaint of worsening \"burning\".  States that she has had abdominal pain/burning since last night.  States that she has a history of this occurring and in occurred before in the beginning of October.  She was admitted for pneumonia at that time.  Patient states that her breathing has not been \"as good as it could " "be\".  Patient states that she is on azithromycin every other day per her infectious disease doctor.  Patient states that the burning is in the epigastrium.  Has been having nausea and vomiting.  She attempted to take her omeprazole which she takes every day without relief.  She believes she has a hiatal hernia.          Review of Systems   All other systems reviewed and are negative.          Objective       ED Triage Vitals [11/07/24 1341]   Temperature Pulse Blood Pressure Respirations SpO2 Patient Position - Orthostatic VS   98.6 °F (37 °C) (!) 107 165/64 18 97 % Sitting      Temp Source Heart Rate Source BP Location FiO2 (%) Pain Score    Temporal Monitor Right arm -- 8      Vitals      Date and Time Temp Pulse SpO2 Resp BP Pain Score FACES Pain Rating User   11/07/24 1730 -- 115 93 % 30 125/68 -- -- SB   11/07/24 1700 -- 110 95 % 20 112/58 -- -- SB   11/07/24 1630 -- 102 97 % 20 121/64 -- -- SB   11/07/24 1530 -- 101 97 % 20 138/63 -- -- SB   11/07/24 1515 -- 93 99 % 20 134/63 -- -- SB   11/07/24 1430 -- 91 98 % 18 150/101 -- -- SB   11/07/24 1427 -- -- 99 % -- -- -- -- MBK   11/07/24 1403 -- -- -- -- -- 7 -- SB   11/07/24 1341 98.6 °F (37 °C) 107 97 % 18 165/64 8 -- SL            Physical Exam  Vitals and nursing note reviewed.   Constitutional:       General: She is in acute distress.      Appearance: She is well-developed.   HENT:      Head: Normocephalic and atraumatic.      Right Ear: External ear normal.      Left Ear: External ear normal.   Eyes:      Pupils: Pupils are equal, round, and reactive to light.   Cardiovascular:      Rate and Rhythm: Regular rhythm. Tachycardia present.      Heart sounds: No murmur heard.  Pulmonary:      Effort: Pulmonary effort is normal. No respiratory distress.      Breath sounds: Wheezing present.   Abdominal:      General: Bowel sounds are normal.      Palpations: Abdomen is soft. There is no mass.      Tenderness: There is abdominal tenderness in the epigastric area. " There is no rebound.      Hernia: No hernia is present.   Musculoskeletal:      Cervical back: Normal range of motion and neck supple.   Skin:     General: Skin is warm and dry.      Capillary Refill: Capillary refill takes less than 2 seconds.   Neurological:      Mental Status: She is alert and oriented to person, place, and time.      Coordination: Coordination normal.   Psychiatric:         Behavior: Behavior normal.         Results Reviewed       Procedure Component Value Units Date/Time    HS Troponin 0hr (reflex protocol) [[id number]]  (Normal) Collected: 11/07/24 1403    Lab Status: Final result Specimen: Blood from Arm, Left Updated: 11/07/24 1447     hs TnI 0hr 8 ng/L     Comprehensive metabolic panel [[id number]]  (Abnormal) Collected: 11/07/24 1403    Lab Status: Final result Specimen: Blood from Arm, Left Updated: 11/07/24 1441     Sodium 141 mmol/L      Potassium 3.9 mmol/L      Chloride 104 mmol/L      CO2 30 mmol/L      ANION GAP 7 mmol/L      BUN 8 mg/dL      Creatinine 0.47 mg/dL      Glucose 104 mg/dL      Calcium 9.4 mg/dL      AST 13 U/L      ALT 11 U/L      Alkaline Phosphatase 95 U/L      Total Protein 7.5 g/dL      Albumin 4.5 g/dL      Total Bilirubin 0.33 mg/dL      eGFR 103 ml/min/1.73sq m     Narrative:      National Kid[employer] Disease Foundation guidelines for Chronic Kidney Disease (CKD):     Stage 1 with normal or high GFR (GFR > 90 mL/min/1.73 square meters)    Stage 2 Mild CKD (GFR = 60-89 mL/min/1.73 square meters)    Stage 3A Moderate CKD (GFR = 45-59 mL/min/1.73 square meters)    Stage 3B Moderate CKD (GFR = 30-44 mL/min/1.73 square meters)    Stage 4 Severe CKD (GFR = 15-29 mL/min/1.73 square meters)    Stage 5 End Stage CKD (GFR <15 mL/min/1.73 square meters)  Note: GFR calculation is accurate only with a steady state creatinine    Lipase [[id number]]  (Normal) Collected: 11/07/24 1403    Lab Status: Final result Specimen: Blood from Arm, Left Updated: 11/07/24 1441     Lipase 16 u/L      Lactic acid, plasma (w/reflex if result > 2.0) [[id number]]  (Normal) Collected: 11/07/24 1403    Lab Status: Final result Specimen: Blood from Arm, Left Updated: 11/07/24 1440     LACTIC ACID 0.8 mmol/L     Narrative:      Result may be elevated if tourniquet was used during collection.    CBC and differential [[id number]] Collected: 11/07/24 1403    Lab Status: Final result Specimen: Blood from Arm, Left Updated: 11/07/24 1421     WBC 7.92 Thousand/uL      RBC 4.48 Million/uL      Hemoglobin 13.2 g/dL      Hematocrit 40.8 %      MCV 91 fL      MCH 29.5 pg      MCHC 32.4 g/dL      RDW 13.3 %      MPV 11.9 fL      Platelets 297 Thousands/uL      nRBC 0 /100 WBCs      Segmented % 67 %      Immature Grans % 0 %      Lymphocytes % 24 %      Monocytes % 8 %      Eosinophils Relative 0 %      Basophils Relative 1 %      Absolute Neutrophils 5.31 Thousands/µL      Absolute Immature Grans 0.02 Thousand/uL      Absolute Lymphocytes 1.89 Thousands/µL      Absolute Monocytes 0.63 Thousand/µL      Eosinophils Absolute 0.03 Thousand/µL      Basophils Absolute 0.04 Thousands/µL             CT chest abdomen pelvis w contrast   Final Interpretation by Jonny Trimble MD (11/07 1715)      1.  No identifiable acute abnormality to account for the patient's clinical presentation.   2.  No definitive change in the irregular platelike opacity within the right upper lobe and multiple bilateral pulmonary nodules. The right upper lobe opacity again may represent evolving postinfectious scarring an additional follow-up in January 2025 is    recommended to assess for potential 3 months changes from the initial CTA. There is advanced underlying emphysema and malignancy cannot be excluded.               Workstation performed: [id number]             Procedures    ED Medication and Procedure Management   Prior to Admission Medications   Prescriptions Last Dose Informant Patient Reported? Taking?   azithromycin (ZITHROMAX) 500 MG tablet    Yes No   Sig: Take 250 mg by mouth daily   ethambutol (MYAMBUTOL) 400 mg tablet   Yes No   Sig: Take 800 mg by mouth daily   fluticasone-umeclidinium-vilanterol (Trelegy Ellipta) 100-62.5-25 mcg/actuation inhaler   Yes No   Sig: Inhale 1 puff daily Rinse mouth after use.   ipratropium-albuterol (DUO-NEB) 0.5-2.5 mg/3 mL nebulizer solution   No No   Sig: Take 3 mL by nebulization 4 (four) times a day   lidocaine (LIDODERM) 5 %   No No   Sig: Apply 1 patch topically over 12 hours daily for 4 days Remove & Discard patch within 12 hours or as directed by MD   metoprolol tartrate (LOPRESSOR) 25 mg tablet   No No   Sig: Take 1 tablet (25 mg total) by mouth every 12 (twelve) hours   nicotine (NICODERM CQ) 14 mg/24hr TD 24 hr patch   No No   Sig: Place 1 patch on the skin over 24 hours daily   omeprazole (PriLOSEC) 40 MG capsule   Yes No   Sig: Take 40 mg by mouth daily   rifabutin (MYCOBUTIN) 150 mg capsule   Yes No   Sig: Take 300 mg by mouth daily      Facility-Administered Medications: None     Patient's Medications   Discharge Prescriptions    No medications on file     No discharge procedures on file.  ED SEPSIS DOCUMENTATION   Time reflects when diagnosis was documented in both MDM as applicable and the Disposition within this note       Time User Action Codes Description Comment    11/7/2024  6:30 PM Carlos Turcios [R10.13] Epigastric pain     11/7/2024  6:30 PM Carlos Turcios Add [R11.2] Nausea and vomiting     11/7/2024  6:30 PM Carlos Turcios [R06.2] Wheezing                  Carlos Turcios PA-C  11/07/24 1838

## 2024-11-07 NOTE — ASSESSMENT & PLAN NOTE
"Lab Results   Component Value Date    HGBA1C 6.4 (H) 10/04/2024       No results for input(s): \"POCGLU\" in the last 72 hours.    Blood Sugar Average: Last 72 hrs:    Diabetic diet, insulin, hypoglycemia precaution  "

## 2024-11-07 NOTE — ASSESSMENT & PLAN NOTE
Chart review, in previous admission at this hospital, patient was found new onset A-fib.  At that time, patient was in A-fib for approximately 6 to 7 hours, discussion has done with cardiology at that time, does not recommend any anticoagulation for transient A-fib, current but recommendation continue beta-blocker.  Echocardiogram normal ejection fraction with grade 1 diastolic dysfunction.

## 2024-11-07 NOTE — RESPIRATORY THERAPY NOTE
RT Protocol Note  Ileana Seaman 64 y.o. female MRN: [id number]  Unit/Bed#: ED 12 Encounter: [id number]    Assessment    Principal Problem:    Epigastric pain  Active Problems:    Chronic obstructive pulmonary disease with acute lower respiratory infection (HCC)    Mycobacterial infection, non-TB    Type 2 diabetes mellitus without complication, without long-term current use of insulin (HCC)    New onset atrial fibrillation (HCC)      Home Pulmonary Medications:         Past Medical History:   Diagnosis Date    COPD (chronic obstructive pulmonary disease) (HCC)     Hiatal hernia      Social History     Socioeconomic History    Marital status: Single     Spouse name: None    Number of children: None    Years of education: None    Highest education level: None   Occupational History    None   Tobacco Use    Smoking status: Every Day     Current packs/day: 0.50     Types: Cigarettes    Smokeless tobacco: Never   Vaping Use    Vaping status: Never Used   Substance and Sexual Activity    Alcohol use: Never    Drug use: Never    Sexual activity: None   Other Topics Concern    None   Social History Narrative    None     Social Determinants of Health     Financial Resource Strain: Not on file   Food Insecurity: Food Insecurity Present (10/5/2024)    Nursing - Inadequate Food Risk Classification     Worried About Running Out of Food in the Last Year: Sometimes true     Ran Out of Food in the Last Year: Sometimes true     Ran Out of Food in the Last Year: Not on file   Transportation Needs: No Transportation Needs (10/5/2024)    PRAPARE - Transportation     Lack of Transportation (Medical): No     Lack of Transportation (Non-Medical): No   Physical Activity: Not on file   Stress: Not on file   Social Connections: Unknown (6/18/2024)    Received from [email or web address]    Social Connections     How often do you feel lonely or isolated from those around you? (Adult - for ages 18 years and over): Not on file   Intimate Partner  Violence: Not on file   Housing Stability: Unknown (10/5/2024)    Housing Stability Vital Sign     Unable to Pay for Housing in the Last Year: No     Number of Times Moved in the Last Year: Not on file     Homeless in the Last Year: No       Subjective         Objective    Physical Exam:   Assessment Type: During-treatment  General Appearance: Awake, Alert  Respiratory Pattern: Dyspnea with exertion, Dyspnea at rest  Chest Assessment: Chest expansion symmetrical  Bilateral Breath Sounds: Diminished  O2 Device: 2L    Vitals:  Blood pressure 125/68, pulse (!) 115, temperature 98.6 °F (37 °C), temperature source Temporal, resp. rate (!) 30, SpO2 93%.          Imaging and other studies:     O2 Device: 2L     Plan    Respiratory Plan: Mild Distress pathway        Resp Comments: Pt is current smoker admitted to the ed w/ SOB recieved an hr long tx  2l O2 ATC. Duoneb txs a few times daily as she needs. BS are diminished bilaterally

## 2024-11-07 NOTE — ASSESSMENT & PLAN NOTE
Complaining of burning epigastric pain-and constant belching  Came with epigastric pain with nausea and vomiting and affecting oral intake  Per chart review, patient does have a history of MAC, getting treatment by ID from [facility]  Denies any weight loss, any blood-but patient reports only 1 time she noticed some blood with the stool few days ago  Still smokes.  Per patient, reports she might have a history of hiatal hernia  Imaging did not reveal any obstruction  Continue IV hydration, IV Protonix, pain medication  Patient n.p.o. after midnight for possible EGD by gastroenterology

## 2024-11-07 NOTE — ASSESSMENT & PLAN NOTE
Patient does follows up with infectious disease at [facility], last note reviewed from October 28, 2024-Per note, patient supposed to take azithromycin 250 mg every other day, continue rifabutin and ethambutol.

## 2024-11-08 ENCOUNTER — ANESTHESIA (OUTPATIENT)
Dept: GASTROENTEROLOGY | Facility: HOSPITAL | Age: 64
End: 2024-11-08
Payer: MEDICARE

## 2024-11-08 ENCOUNTER — ANESTHESIA EVENT (OUTPATIENT)
Dept: GASTROENTEROLOGY | Facility: HOSPITAL | Age: 64
End: 2024-11-08
Payer: MEDICARE

## 2024-11-08 ENCOUNTER — APPOINTMENT (OUTPATIENT)
Dept: GASTROENTEROLOGY | Facility: HOSPITAL | Age: 64
End: 2024-11-08
Payer: MEDICARE

## 2024-11-08 VITALS
RESPIRATION RATE: 18 BRPM | OXYGEN SATURATION: 99 % | TEMPERATURE: 97.7 F | BODY MASS INDEX: 19.07 KG/M2 | DIASTOLIC BLOOD PRESSURE: 64 MMHG | HEART RATE: 79 BPM | HEIGHT: 61 IN | WEIGHT: 101 LBS | SYSTOLIC BLOOD PRESSURE: 102 MMHG

## 2024-11-08 LAB
GLUCOSE SERPL-MCNC: 103 MG/DL (ref 65–140)
GLUCOSE SERPL-MCNC: 117 MG/DL (ref 65–140)

## 2024-11-08 PROCEDURE — 94664 DEMO&/EVAL PT USE INHALER: CPT

## 2024-11-08 PROCEDURE — 88305 TISSUE EXAM BY PATHOLOGIST: CPT | Performed by: PATHOLOGY

## 2024-11-08 PROCEDURE — 82948 REAGENT STRIP/BLOOD GLUCOSE: CPT

## 2024-11-08 PROCEDURE — 99239 HOSP IP/OBS DSCHRG MGMT >30: CPT | Performed by: FAMILY MEDICINE

## 2024-11-08 PROCEDURE — 94640 AIRWAY INHALATION TREATMENT: CPT

## 2024-11-08 PROCEDURE — 94760 N-INVAS EAR/PLS OXIMETRY 1: CPT

## 2024-11-08 RX ORDER — SODIUM CHLORIDE, SODIUM LACTATE, POTASSIUM CHLORIDE, CALCIUM CHLORIDE 600; 310; 30; 20 MG/100ML; MG/100ML; MG/100ML; MG/100ML
125 INJECTION, SOLUTION INTRAVENOUS CONTINUOUS
Status: DISCONTINUED | OUTPATIENT
Start: 2024-11-08 | End: 2024-11-08 | Stop reason: HOSPADM

## 2024-11-08 RX ORDER — IPRATROPIUM BROMIDE AND ALBUTEROL SULFATE 2.5; .5 MG/3ML; MG/3ML
3 SOLUTION RESPIRATORY (INHALATION)
Status: DISCONTINUED | OUTPATIENT
Start: 2024-11-08 | End: 2024-11-08 | Stop reason: HOSPADM

## 2024-11-08 RX ORDER — OMEPRAZOLE 40 MG/1
40 CAPSULE, DELAYED RELEASE ORAL 2 TIMES DAILY
Qty: 60 CAPSULE | Refills: 0 | Status: SHIPPED | OUTPATIENT
Start: 2024-11-08 | End: 2024-12-08

## 2024-11-08 RX ORDER — PROPOFOL 10 MG/ML
INJECTION, EMULSION INTRAVENOUS AS NEEDED
Status: DISCONTINUED | OUTPATIENT
Start: 2024-11-08 | End: 2024-11-08

## 2024-11-08 RX ORDER — LIDOCAINE HYDROCHLORIDE 20 MG/ML
INJECTION, SOLUTION EPIDURAL; INFILTRATION; INTRACAUDAL; PERINEURAL AS NEEDED
Status: DISCONTINUED | OUTPATIENT
Start: 2024-11-08 | End: 2024-11-08

## 2024-11-08 RX ORDER — SODIUM CHLORIDE, SODIUM LACTATE, POTASSIUM CHLORIDE, CALCIUM CHLORIDE 600; 310; 30; 20 MG/100ML; MG/100ML; MG/100ML; MG/100ML
INJECTION, SOLUTION INTRAVENOUS CONTINUOUS PRN
Status: DISCONTINUED | OUTPATIENT
Start: 2024-11-08 | End: 2024-11-08

## 2024-11-08 RX ADMIN — NICOTINE 1 PATCH: 14 PATCH, EXTENDED RELEASE TRANSDERMAL at 08:55

## 2024-11-08 RX ADMIN — PROPOFOL 70 MG: 10 INJECTION, EMULSION INTRAVENOUS at 13:25

## 2024-11-08 RX ADMIN — UMECLIDINIUM 1 PUFF: 62.5 AEROSOL, POWDER ORAL at 08:54

## 2024-11-08 RX ADMIN — IPRATROPIUM BROMIDE AND ALBUTEROL SULFATE 3 ML: .5; 3 SOLUTION RESPIRATORY (INHALATION) at 17:04

## 2024-11-08 RX ADMIN — PANTOPRAZOLE SODIUM 40 MG: 40 INJECTION, POWDER, LYOPHILIZED, FOR SOLUTION INTRAVENOUS at 08:54

## 2024-11-08 RX ADMIN — METOPROLOL TARTRATE 25 MG: 25 TABLET, FILM COATED ORAL at 08:54

## 2024-11-08 RX ADMIN — PROPOFOL 30 MG: 10 INJECTION, EMULSION INTRAVENOUS at 13:26

## 2024-11-08 RX ADMIN — IPRATROPIUM BROMIDE AND ALBUTEROL SULFATE 3 ML: .5; 3 SOLUTION RESPIRATORY (INHALATION) at 07:43

## 2024-11-08 RX ADMIN — SODIUM CHLORIDE, SODIUM LACTATE, POTASSIUM CHLORIDE, AND CALCIUM CHLORIDE: .6; .31; .03; .02 INJECTION, SOLUTION INTRAVENOUS at 13:19

## 2024-11-08 RX ADMIN — LIDOCAINE HYDROCHLORIDE 100 MG: 20 INJECTION, SOLUTION EPIDURAL; INFILTRATION; INTRACAUDAL; PERINEURAL at 13:25

## 2024-11-08 RX ADMIN — IPRATROPIUM BROMIDE AND ALBUTEROL SULFATE 3 ML: .5; 3 SOLUTION RESPIRATORY (INHALATION) at 12:19

## 2024-11-08 RX ADMIN — FLUTICASONE FUROATE AND VILANTEROL TRIFENATATE 1 PUFF: 200; 25 POWDER RESPIRATORY (INHALATION) at 08:54

## 2024-11-08 RX ADMIN — PROPOFOL 20 MG: 10 INJECTION, EMULSION INTRAVENOUS at 13:28

## 2024-11-08 NOTE — CASE MANAGEMENT
Case Management Assessment & Discharge Planning Note    Patient name Ileana Seaman  Location /-01 MRN [id number]  : 1960 Date 2024       Current Admission Date: 2024  Current Admission Diagnosis:Epigastric pain   Patient Active Problem List    Diagnosis Date Noted Date Diagnosed    Epigastric pain 2024     New onset atrial fibrillation (HCC) 10/06/2024     Type 2 diabetes mellitus without complication, without long-term current use of insulin (HCC) 10/05/2024     Dysphagia 10/05/2024     Chronic obstructive pulmonary disease with acute lower respiratory infection (HCC) 10/04/2024     Mycobacterial infection, 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     Hypomagnesemia 10/04/2024       LOS (days): 0  Geometric Mean LOS (GMLOS) (days):   Days to GMLOS:     OBJECTIVE:              Current admission status: Observation       Preferred Pharmacy:   [id number] - [place], [place] - [address]  [address]  [place] [address]  Phone: [phone] Fax: [phone]    Primary Care Provider: Pop Schwarz DO    Primary Insurance: MEDICARE  Secondary Insurance: [facility]    ASSESSMENT:  Active Health Care Proxies    There are no active Health Care Proxies on file.       Advance Directives  Does patient have a Health Care POA?: No  Was patient offered paperwork?: Yes (declined)  Does patient currently have a Health Care decision maker?: No  Does patient have Advance Directives?: No  Was patient offered paperwork?: Yes (declined)  Primary Contact: daughterAyde              Patient Information  Admitted from:: Home  Mental Status: Alert  During Assessment patient was accompanied by: Not accompanied during assessment  Assessment information provided by:: Patient  Primary Caregiver: Self  Support Systems: Daughter, Children  County [facility] Residence: [facility]  Type of Current Residence: [address] home  Upon entering residence, is there a bedroom  on the main floor (no further steps)?: No  A bedroom is located on the following floor levels of residence (select all that apply):: 2nd Floor  Upon entering residence, is there a bathroom on the main floor (no further steps)?: No  Indicate which floors of current residence have a bathroom (select all the apply):: 2nd Floor  Number of steps to 2nd floor from main floor: One Flight  Living Arrangements: Lives Alone  Is patient a ?: No    Activities of Daily Living Prior to Admission  Functional Status: Independent  Completes ADLs independently?: Yes  Ambulates independently?: Yes  Does patient use assisted devices?: No  Does patient currently own DME?: Yes  What DME does the patient currently own?: [employer]  Does patient have a history of Outpatient Therapy (PT/OT)?: No  Does the patient have a history of Short-Term Rehab?: No  Does patient have a history of HHC?: No  Does patient currently have HHC?: No         Patient Information Continued  Income Source: SSI/SSD  Does patient have prescription coverage?: Yes  Does patient receive dialysis treatments?: No  Does patient have a history of substance abuse?: No         Means of Transportation  Means of Transport to Appts:: Family transport          DISCHARGE DETAILS:    Discharge planning discussed with:: patient  Freedom of Choice: Yes  Comments - Freedom of Choice: pt sts she is IPTA, wishes to return home  CM contacted family/caregiver?: No- see comments (declinmed)  Were Treatment Team discharge recommendations reviewed with patient/caregiver?: Yes  Did patient/caregiver verbalize understanding of patient care needs?: Yes  Were patient/caregiver advised of the risks associated with not following Treatment Team discharge recommendations?: Yes    Contacts  Patient Contacts: daughter, Ayde  Relationship to Patient:: Family                   Would you like to participate in our [facility] service program?  : No - Declined                          Pt  "from home, lives alone.  Pt sts her daughter is available to assist her at home.  P sts she recently had to retire RT chronic illness, pt sts she is \"usually always on the move but now I just can't, its hard for me, my mind wants to go go go , but my body wont co operate.\"    Pt utilized O2 2L at all times, through [employer]                                            "

## 2024-11-08 NOTE — PLAN OF CARE
Problem: PAIN - ADULT  Goal: Verbalizes/displays adequate comfort level or baseline comfort level  Description: Interventions:  - Encourage patient to monitor pain and request assistance  - Assess pain using appropriate pain scale  - Administer analgesics based on type and severity of pain and evaluate response  - Implement non-pharmacological measures as appropriate and evaluate response  - Consider cultural and social influences on pain and pain management  - Notify physician/advanced practitioner if interventions unsuccessful or patient reports new pain  Outcome: Progressing     Problem: INFECTION - ADULT  Goal: Absence or prevention of progression during hospitalization  Description: INTERVENTIONS:  - Assess and monitor for signs and symptoms of infection  - Monitor lab/diagnostic results  - Monitor all insertion sites, i.e. indwelling lines, tubes, and drains  - Monitor endotracheal if appropriate and nasal secretions for changes in amount and color  - [place] appropriate cooling/warming therapies per order  - Administer medications as ordered  - Instruct and encourage patient and family to use good hand hygiene technique  - Identify and instruct in appropriate isolation precautions for identified infection/condition  Outcome: Progressing  Goal: Absence of fever/infection during neutropenic period  Description: INTERVENTIONS:  - Monitor WBC    Outcome: Progressing     Problem: SAFETY ADULT  Goal: Patient will remain free of falls  Description: INTERVENTIONS:  - Educate patient/family on patient safety including physical limitations  - Instruct patient to call for assistance with activity   - Consult OT/PT to assist with strengthening/mobility   - Keep Call bell within reach  - Keep bed low and locked with side rails adjusted as appropriate  - Keep care items and personal belongings within reach  - Initiate and maintain comfort rounds  - Make Fall Risk Sign visible to staff  - Offer Toileting every 2 Hours,  in advance of need  - Initiate/Maintain bed/chair alarm  - Obtain necessary fall risk management equipment: alarms  - Apply yellow socks and bracelet for high fall risk patients  - Consider moving patient to room near nurses station  Outcome: Progressing  Goal: Maintain or return to baseline ADL function  Description: INTERVENTIONS:  -  Assess patient's ability to carry out ADLs; assess patient's baseline for ADL function and identify physical deficits which impact ability to perform ADLs (bathing, care of mouth/teeth, toileting, grooming, dressing, etc.)  - Assess/evaluate cause of self-care deficits   - Assess range of motion  - Assess patient's mobility; develop plan if impaired  - Assess patient's need for assistive devices and provide as appropriate  - Encourage maximum independence but intervene and supervise when necessary  - Involve family in performance of ADLs  - Assess for home care needs following discharge   - Consider OT consult to assist with ADL evaluation and planning for discharge  - Provide patient education as appropriate  Outcome: Progressing  Goal: Maintains/Returns to pre admission functional level  Description: INTERVENTIONS:  - Perform AM-PAC 6 Click Basic Mobility/ Daily Activity assessment daily.  - Set and communicate daily mobility goal to care team and patient/family/caregiver.   - Collaborate with rehabilitation services on mobility goals if consulted  - Perform Range of Motion 3 times a day.  - Reposition patient every 2 hours.  - Dangle patient 3 times a day  - Stand patient 3 times a day  - Ambulate patient 3 times a day  - Out of bed to chair 3 times a day   - Out of bed for meals 3 times a day  - Out of bed for toileting  - Record patient progress and toleration of activity level   Outcome: Progressing     Problem: DISCHARGE PLANNING  Goal: Discharge to home or other facility with appropriate resources  Description: INTERVENTIONS:  - Identify barriers to discharge w/patient and  caregiver  - Arrange for needed discharge resources and transportation as appropriate  - Identify discharge learning needs (meds, wound care, etc.)  - Arrange for interpretive services to assist at discharge as needed  - Refer to Case Management Department for coordinating discharge planning if the patient needs post-hospital services based on physician/advanced practitioner order or complex needs related to functional status, cognitive ability, or social support system  Outcome: Progressing     Problem: Knowledge Deficit  Goal: Patient/family/caregiver demonstrates understanding of disease process, treatment plan, medications, and discharge instructions  Description: Complete learning assessment and assess knowledge base.  Interventions:  - Provide teaching at level of understanding  - Provide teaching via preferred learning methods  Outcome: Progressing     Problem: RESPIRATORY - ADULT  Goal: Achieves optimal ventilation and oxygenation  Description: INTERVENTIONS:  - Assess for changes in respiratory status  - Assess for changes in mentation and behavior  - Position to facilitate oxygenation and minimize respiratory effort  - Oxygen administered by appropriate delivery if ordered  - Initiate smoking cessation education as indicated  - Encourage broncho-pulmonary hygiene including cough, deep breathe, Incentive Spirometry  - Assess the need for suctioning and aspirate as needed  - Assess and instruct to report SOB or any respiratory difficulty  - Respiratory Therapy support as indicated  Outcome: Progressing     Problem: GASTROINTESTINAL - ADULT  Goal: Minimal or absence of nausea and/or vomiting  Description: INTERVENTIONS:  - Administer IV fluids if ordered to ensure adequate hydration  - Maintain NPO status until nausea and vomiting are resolved  - Nasogastric tube if ordered  - Administer ordered antiemetic medications as needed  - Provide nonpharmacologic comfort measures as appropriate  - Advance diet as  tolerated, if ordered  - Consider nutrition services referral to assist patient with adequate nutrition and appropriate food choices  Outcome: Progressing  Goal: Maintains or returns to baseline bowel function  Description: INTERVENTIONS:  - Assess bowel function  - Encourage oral fluids to ensure adequate hydration  - Administer IV fluids if ordered to ensure adequate hydration  - Administer ordered medications as needed  - Encourage mobilization and activity  - Consider nutritional services referral to assist patient with adequate nutrition and appropriate food choices  Outcome: Progressing  Goal: Maintains adequate nutritional intake  Description: INTERVENTIONS:  - Monitor percentage of each meal consumed  - Identify factors contributing to decreased intake, treat as appropriate  - Assist with meals as needed  - Monitor I&O, weight, and lab values if indicated  - Obtain nutrition services referral as needed  Outcome: Progressing

## 2024-11-08 NOTE — ASSESSMENT & PLAN NOTE
Patient does follows up with infectious disease at [facility], last note reviewed from October 28, 2024-Per note, patient supposed to take azithromycin 250 mg every other day, continue rifabutin and ethambutol.

## 2024-11-08 NOTE — CONSULTS
Consultation - [facility]  Ileana Seaman 64 y.o. female MRN: [id number]  Unit/Bed#: -01 Encounter: [id number]      ASSESSMENT & PLAN  Epigastric pain  Esophageal dysphagia  Nausea  Belching  Suspect GERD/peptic disease with possible esophageal ring. Pt on omeprazole outpatient prior to admission  -will plan on EGD today, keep NPO  -continue PPI , further recommendations depending on EGD findings  -Consider mesenteric doppler if pain persists due to advanced atherosclerotic dz noted on CT. GES could also be considered  -recommend routine outpatient screening colonoscopy  -recommended tobacco cessation    Reason for Consult / Principal Problem:     HPI: Ileana Seaman is a 64 y.o. year old female with a PMHx COPD on 2L O2 at baseline, MAC, Afib not on anticoagulation, HH, GERD who presents with epigastric pain. She reports a long hx of acid reflux, epigastric pain, nausea, distal esophageal dysphagia going back at least 2-3 years. Symptoms worse during eating and immediately after eating. She reports an EGD with Dr Howard at that time showing stomach ulcers per pt. She has been on omeprazole since then but feels that it has not been helping with her symptoms in recent months.     She also reports distal esophageal dysphagia with solid foods only which comes and goes. She was admitted in Oct and had dysphagia then but this seemed to improve for several weeks before returning again. She has to remi down each bite with water. No choking/bolus episodes she could not clear.     Denies black or bloody BMs.    Denies nsaid use  Active smoker- 1/2 PPD for 50+ years.     CT C/A/P noting stable lung lesions consistent with MAC, no acute abdominal findings to explain her GI symptoms.     Speech swallow 10/7/24 minimal oropharyngeal dysphagia without aspiration or penetration. Stasis noted mid-esophagus with slow passage of solids/pill with mild retrograde movement. Pts globus sensation  corresponded to delayed esophageal transit.    Unable to locate prior EGD report  No prior colonoscopy    Endoscopic risk assessment:  Anticoagulation use:n  Diabetes medication:n  CVS history:COPD, atherosclerosclerosis, a fib  Abdominal surgeries:CCY  Sleep apnea:n  O2 use: 2L at baseline    Past Medical History:   Diagnosis Date    COPD (chronic obstructive pulmonary disease) (HCC)     Hiatal hernia      Past Surgical History:   Procedure Laterality Date    CHOLECYSTECTOMY       Social History   Social History     Substance and Sexual Activity   Alcohol Use Never     Social History     Substance and Sexual Activity   Drug Use Never     Social History     Tobacco Use   Smoking Status Every Day    Current packs/day: 0.50    Types: Cigarettes   Smokeless Tobacco Never       History reviewed. No pertinent family history.      Medications Prior to Admission:     azithromycin (ZITHROMAX) 500 MG tablet    ethambutol (MYAMBUTOL) 400 mg tablet    fluticasone-umeclidinium-vilanterol (Trelegy Ellipta) 100-62.5-25 mcg/actuation inhaler    ipratropium-albuterol (DUO-NEB) 0.5-2.5 mg/3 mL nebulizer solution    nicotine (NICODERM CQ) 14 mg/24hr TD 24 hr patch    omeprazole (PriLOSEC) 40 MG capsule    rifabutin (MYCOBUTIN) 150 mg capsule    lidocaine (LIDODERM) 5 %    metoprolol tartrate (LOPRESSOR) 25 mg tablet    Current Facility-Administered Medications:     acetaminophen (TYLENOL) tablet 650 mg, Q6H PRN    azithromycin (ZITHROMAX) tablet 250 mg, Q48H    ethambutol (MYAMBUTOL) tablet 800 mg, Daily    fluticasone-vilanterol 200-25 mcg/actuation 1 puff, Daily **AND** umeclidinium 62.5 mcg/actuation inhaler AEPB 1 puff, Daily    heparin (porcine) subcutaneous injection 5,000 Units, Q8H MAYCO    insulin lispro (HumALOG/ADMELOG) 100 units/mL subcutaneous injection 1-6 Units, TID AC **AND** Fingerstick Glucose (POCT), TID AC    insulin lispro (HumALOG/ADMELOG) 100 units/mL subcutaneous injection 1-6 Units, HS    ipratropium-albuterol  (DUO-NEB) 0.5-2.5 mg/3 mL inhalation solution 3 mL, 4x Daily    metoprolol tartrate (LOPRESSOR) tablet 25 mg, Q12H MAYCO    nicotine (NICODERM CQ) 14 mg/24hr TD 24 hr patch 1 patch, Daily    ondansetron (ZOFRAN) injection 4 mg, Q6H PRN    pantoprazole (PROTONIX) injection 40 mg, Q12H MAYCO    rifabutin (MYCOBUTIN) capsule 300 mg, Daily  No Known Allergies    Physical Exam  Constitutional:       General: She is not in acute distress.     Appearance: She is not ill-appearing.      Interventions: Nasal cannula in place.   HENT:      Head: Normocephalic and atraumatic.   Eyes:      Conjunctiva/sclera: Conjunctivae normal.   Pulmonary:      Effort: Pulmonary effort is normal.   Abdominal:      General: Abdomen is flat. There is no distension.      Palpations: Abdomen is soft.      Tenderness: There is abdominal tenderness.   Skin:     General: Skin is warm and dry.      Coloration: Skin is not jaundiced.   Neurological:      Mental Status: She is alert and oriented to person, place, and time.   Psychiatric:         Mood and Affect: Mood normal.         Behavior: Behavior normal.       Most Recent Vital Signs:  Vitals:    11/07/24 2136 11/07/24 2136 11/08/24 0043 11/08/24 0750   BP: (!) 88/58 (!) 88/58 100/59 118/63   BP Location:       Pulse: 85 86 95 71   Resp:    18   Temp:    97.5 °F (36.4 °C)   TempSrc:       SpO2: 96% 96% 96% 99%   Weight:       Height:           Intake/Output Summary (Last 24 hours) at 11/8/2024 0838  Last data filed at 11/8/2024 0808  Gross per 24 hour   Intake 600 ml   Output 1000 ml   Net -400 ml       LABS/IMAGING  Lab Results: I have reviewed all relevant lab results during this hospitalization.    Imaging Studies:  I have reviewed all the relevant images during this hospitalizations    Counseling / Coordination of Care  Total time spent today 45 minutes. Greater than 50% of total time was spent with the patient and / or family counseling and / or coordination of care.    Roland Bailey,  SUE

## 2024-11-08 NOTE — DISCHARGE SUMMARY
Discharge Summary - Hospitalist   Name: Ileana Seaman 64 y.o. 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  Per patient, reports she might have a history of hiatal hernia  Imaging did not reveal any obstruction  Status post EGD with gastroenterology, showing gastritis..  GI is recommending outpatient pH testing.  Patient is getting discharged back home.  Chronic obstructive pulmonary disease with acute lower respiratory infection (HCC)  Continue respiratory protocol.  Patient has multiple pulmonary nodules which remain stable per recent imaging.  LUNGS: No convincing change in the platelike/triangular opacity within the posterior right upper lobe which measures 3.5 (sagittal) x 4.2 (AP) x 3.0 (transverse) cm. This has not clearly changed since the prior chest CT. There is advanced underlying   centrilobular emphysema. There are multiple pulmonary nodules, which will be described on series 3:   Image 73, left upper lobe, 7 x 10 mm; no convincing change.   Image 83, right lower lobe, 8 x 10 mm; no convincing change   Image 87, left upper lobe, 6-7 mm; no convincing change   Image 104, left upper lobe, 7 mm; no convincing change   Image 105, left upper lobe, 5 mm; no convincing change.   Image 109, left lower lobe, 5-6 mm, no convincing change.   Image 118, left upper lobe, 7 mm, no convincing change.   Image 158, right lower lobe, 8 x 8 mm, no convincing change.   Mycobacterial infection, non-TB  Patient does follows up with  infectious disease at [facility], last note reviewed from October 28, 2024-Per note, patient supposed to take azithromycin 250 mg every other day, continue rifabutin and ethambutol.  Type 2 diabetes mellitus without complication, without long-term current use of insulin (HCC)  Lab Results   Component Value Date    HGBA1C 6.4 (H) 10/04/2024       Recent Labs     11/07/24  2107 11/08/24  0752   POCGLU 196* 103       Blood Sugar Average: Last 72 hrs:  (P) 149.5  Diabetic diet, insulin, hypoglycemia precaution  New onset atrial fibrillation (HCC)  Chart review, in previous admission at this hospital, patient was found new onset A-fib.  At that time, patient was in A-fib for approximately 6 to 7 hours, discussion has done with cardiology at that time, does not recommend any anticoagulation for transient A-fib, current but recommendation continue beta-blocker.  Echocardiogram normal ejection fraction with grade 1 diastolic dysfunction.     Discharging Physician / Practitioner: Rolando Parker MD  PCP: Pop Schwarz DO  Admission Date:   Admission Orders (From admission, onward)       Ordered        11/07/24 1831  Place in Observation  Once                          Discharge Date: 11/08/24    Medical Problems        Consultations During Hospital Stay:  Gastroenterology    Procedures Performed:   CT chest abdomen pelvis w contrast   Final Result by Jonny Trimble MD (11/07 1715)      1.  No identifiable acute abnormality to account for the patient's clinical presentation.   2.  No definitive change in the irregular platelike opacity within the right upper lobe and multiple bilateral pulmonary nodules. The right upper lobe opacity again may represent evolving postinfectious scarring an additional follow-up in January 2025 is    recommended to assess for potential 3 months changes from the initial CTA. There is advanced underlying emphysema and malignancy cannot be excluded.               Workstation performed:  [id number]           EGD:The esophagus appeared normal.  Small type I hiatal hernia  Mild erythematous mucosa, consistent with gastritis in the stomach and duodenal bulb; performed cold forceps biopsy  The 2nd part of the duodenum appeared normal.    Significant Findings / Test Results:   Lab Results   Component Value Date    WBC 7.92 11/07/2024    HGB 13.2 11/07/2024    HCT 40.8 11/07/2024    MCV 91 11/07/2024     11/07/2024     Lab Results   Component Value Date    SODIUM 141 11/07/2024    K 3.9 11/07/2024     11/07/2024    CO2 30 11/07/2024    AGAP 7 11/07/2024    BUN 8 11/07/2024    CREATININE 0.47 (L) 11/07/2024    GLUC 104 11/07/2024    CALCIUM 9.4 11/07/2024    AST 13 11/07/2024    ALT 11 11/07/2024    ALKPHOS 95 11/07/2024    TP 7.5 11/07/2024    TBILI 0.33 11/07/2024    EGFR 103 11/07/2024         Incidental Findings:   As mentioned above    Test Results Pending at Discharge (will require follow up):   none     Outpatient Tests Requested:  none    Complications:  none    Reason for Admission: Epigastric pain    Hospital Course:     Ileana Seaman is a 64 y.o. female patient who originally presented to the hospital on 11/7/2024 due to epigastric pain most likely secondary to gastritis.  Status post EGD showing gastritis.  No ulcer.  Per GI recommendation, patient with outpatient pH testing.  In the meantime, continue PPI.    Patient is getting discharged back home with follow-up with PCP and gastroenterology outpatient basis.    Please see above list of diagnoses and related plan for additional information.     Condition at Discharge: stable     Discharge Day Visit / Exam:     Subjective: Seen and evaluated during the run.  Resting comfortably.  During the morning round, patient sitting upright position, awaiting for EGD.    Later afternoon, patient came back from EGD.  Vitals: Blood Pressure: 122/56 (11/08/24 1315)  Pulse: 75 (11/08/24 1315)  Temperature: 98.2 °F (36.8 °C) (11/08/24  "1315)  Temp Source: Oral (11/08/24 1315)  Respirations: 18 (11/08/24 1315)  Height: 5' 1\" (154.9 cm) (11/08/24 1315)  Weight - Scale: 45.8 kg (101 lb) (11/08/24 1315)  SpO2: 95 % (11/08/24 1315)  Exam:   Physical Exam  Vitals and nursing note reviewed.   Constitutional:       Appearance: She is not ill-appearing or diaphoretic.   Eyes:      General: No scleral icterus.        Left eye: No discharge.      Extraocular Movements: Extraocular movements intact.      Conjunctiva/sclera: Conjunctivae normal.      Pupils: Pupils are equal, round, and reactive to light.   Cardiovascular:      Rate and Rhythm: Normal rate.      Heart sounds: No murmur heard.     No friction rub. No gallop.   Pulmonary:      Effort: Pulmonary effort is normal. No respiratory distress.      Breath sounds: No stridor. No wheezing or rhonchi.   Abdominal:      Tenderness: There is abdominal tenderness (epigastric). There is no rebound.      Hernia: No hernia is present.   Musculoskeletal:      Right lower leg: No edema.      Left lower leg: No edema.   Neurological:      Mental Status: She is alert and oriented to person, place, and time.      Cranial Nerves: No cranial nerve deficit.      Sensory: No sensory deficit.      Motor: No weakness.      Coordination: Coordination normal.         Discussion with Family: with patient    Discharge instructions/Information to patient and family:   See after visit summary for information provided to patient and family.      Provisions for Follow-Up Care:  See after visit summary for information related to follow-up care and any pertinent home health orders.      Disposition:     Home    For Discharges to [facility] SNF:   Not Applicable to this Patient - Not Applicable to this Patient    Planned Readmission: If condition get worse     Discharge Statement:  Greater than 50% of the total time was spent examining patient, answering all patient questions, arranging and discussing plan of care with " patient as well as directly providing post-discharge instructions.  Additional time then spent on discharge activities.    Discharge Medications:  See after visit summary for reconciled discharge medications provided to patient and family.      ** Please Note: This note has been constructed using a voice recognition system **

## 2024-11-08 NOTE — ANESTHESIA PREPROCEDURE EVALUATION
Procedure:  EGD    Relevant Problems   CARDIO   (+) New onset atrial fibrillation (HCC)      ENDO   (+) Type 2 diabetes mellitus without complication, without long-term current use of insulin (HCC)      GI/HEPATIC   (+) Dysphagia      PULMONARY   (+) Chronic obstructive pulmonary disease with acute lower respiratory infection (HCC)             Anesthesia Plan  ASA Score- 3     Anesthesia Type- IV sedation with anesthesia with ASA Monitors.         Additional Monitors:     Airway Plan:            Plan Factors-    Chart reviewed.                      Induction- intravenous.    Postoperative Plan-     Perioperative Resuscitation Plan - Level 1 - Full Code.       Informed Consent- Anesthetic plan and risks discussed with patient.  I personally reviewed this patient with the CRNA. Discussed and agreed on the Anesthesia Plan with the CRNA..

## 2024-11-08 NOTE — ASSESSMENT & PLAN NOTE
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  Per patient, reports she might have a history of hiatal hernia  Imaging did not reveal any obstruction  Status post EGD with gastroenterology, showing gastritis..  GI is recommending outpatient pH testing.  Patient is getting discharged back home.

## 2024-11-08 NOTE — PLAN OF CARE
Problem: PAIN - ADULT  Goal: Verbalizes/displays adequate comfort level or baseline comfort level  Description: Interventions:  - Encourage patient to monitor pain and request assistance  - Assess pain using appropriate pain scale  - Administer analgesics based on type and severity of pain and evaluate response  - Implement non-pharmacological measures as appropriate and evaluate response  - Consider cultural and social influences on pain and pain management  - Notify physician/advanced practitioner if interventions unsuccessful or patient reports new pain  Outcome: Progressing     Problem: INFECTION - ADULT  Goal: Absence or prevention of progression during hospitalization  Description: INTERVENTIONS:  - Assess and monitor for signs and symptoms of infection  - Monitor lab/diagnostic results  - Monitor all insertion sites, i.e. indwelling lines, tubes, and drains  - Monitor endotracheal if appropriate and nasal secretions for changes in amount and color  - [place] appropriate cooling/warming therapies per order  - Administer medications as ordered  - Instruct and encourage patient and family to use good hand hygiene technique  - Identify and instruct in appropriate isolation precautions for identified infection/condition  Outcome: Progressing  Goal: Absence of fever/infection during neutropenic period  Description: INTERVENTIONS:  - Monitor WBC    Outcome: Progressing     Problem: SAFETY ADULT  Goal: Patient will remain free of falls  Description: INTERVENTIONS:  - Educate patient/family on patient safety including physical limitations  - Instruct patient to call for assistance with activity   - Consult OT/PT to assist with strengthening/mobility   - Keep Call bell within reach  - Keep bed low and locked with side rails adjusted as appropriate  - Keep care items and personal belongings within reach  - Initiate and maintain comfort rounds  - Make Fall Risk Sign visible to staff  - Apply yellow socks and bracelet  for high fall risk patients  - Consider moving patient to room near nurses station  Outcome: Progressing  Goal: Maintain or return to baseline ADL function  Description: INTERVENTIONS:  -  Assess patient's ability to carry out ADLs; assess patient's baseline for ADL function and identify physical deficits which impact ability to perform ADLs (bathing, care of mouth/teeth, toileting, grooming, dressing, etc.)  - Assess/evaluate cause of self-care deficits   - Assess range of motion  - Assess patient's mobility; develop plan if impaired  - Assess patient's need for assistive devices and provide as appropriate  - Encourage maximum independence but intervene and supervise when necessary  - Involve family in performance of ADLs  - Assess for home care needs following discharge   - Consider OT consult to assist with ADL evaluation and planning for discharge  - Provide patient education as appropriate  Outcome: Progressing  Goal: Maintains/Returns to pre admission functional level  Description: INTERVENTIONS:  - Perform AM-PAC 6 Click Basic Mobility/ Daily Activity assessment daily.  - Set and communicate daily mobility goal to care team and patient/family/caregiver.   - Collaborate with rehabilitation services on mobility goals if consulted  - Record patient progress and toleration of activity level   Outcome: Progressing     Problem: DISCHARGE PLANNING  Goal: Discharge to home or other facility with appropriate resources  Description: INTERVENTIONS:  - Identify barriers to discharge w/patient and caregiver  - Arrange for needed discharge resources and transportation as appropriate  - Identify discharge learning needs (meds, wound care, etc.)  - Arrange for interpretive services to assist at discharge as needed  - Refer to Case Management Department for coordinating discharge planning if the patient needs post-hospital services based on physician/advanced practitioner order or complex needs related to functional status,  cognitive ability, or social support system  Outcome: Progressing     Problem: Knowledge Deficit  Goal: Patient/family/caregiver demonstrates understanding of disease process, treatment plan, medications, and discharge instructions  Description: Complete learning assessment and assess knowledge base.  Interventions:  - Provide teaching at level of understanding  - Provide teaching via preferred learning methods  Outcome: Progressing     Problem: RESPIRATORY - ADULT  Goal: Achieves optimal ventilation and oxygenation  Description: INTERVENTIONS:  - Assess for changes in respiratory status  - Assess for changes in mentation and behavior  - Position to facilitate oxygenation and minimize respiratory effort  - Oxygen administered by appropriate delivery if ordered  - Initiate smoking cessation education as indicated  - Encourage broncho-pulmonary hygiene including cough, deep breathe, Incentive Spirometry  - Assess the need for suctioning and aspirate as needed  - Assess and instruct to report SOB or any respiratory difficulty  - Respiratory Therapy support as indicated  Outcome: Progressing     Problem: GASTROINTESTINAL - ADULT  Goal: Minimal or absence of nausea and/or vomiting  Description: INTERVENTIONS:  - Administer IV fluids if ordered to ensure adequate hydration  - Maintain NPO status until nausea and vomiting are resolved  - Nasogastric tube if ordered  - Administer ordered antiemetic medications as needed  - Provide nonpharmacologic comfort measures as appropriate  - Advance diet as tolerated, if ordered  - Consider nutrition services referral to assist patient with adequate nutrition and appropriate food choices  Outcome: Progressing  Goal: Maintains or returns to baseline bowel function  Description: INTERVENTIONS:  - Assess bowel function  - Encourage oral fluids to ensure adequate hydration  - Administer IV fluids if ordered to ensure adequate hydration  - Administer ordered medications as needed  -  Encourage mobilization and activity  - Consider nutritional services referral to assist patient with adequate nutrition and appropriate food choices  Outcome: Progressing  Goal: Maintains adequate nutritional intake  Description: INTERVENTIONS:  - Monitor percentage of each meal consumed  - Identify factors contributing to decreased intake, treat as appropriate  - Assist with meals as needed  - Monitor I&O, weight, and lab values if indicated  - Obtain nutrition services referral as needed  Outcome: Progressing

## 2024-11-08 NOTE — ASSESSMENT & PLAN NOTE
Lab Results   Component Value Date    HGBA1C 6.4 (H) 10/04/2024       Recent Labs     11/07/24 2107 11/08/24  0752   POCGLU 196* 103       Blood Sugar Average: Last 72 hrs:  (P) 149.5  Diabetic diet, insulin, hypoglycemia precaution

## 2024-11-08 NOTE — ANESTHESIA POSTPROCEDURE EVALUATION
Post-Op Assessment Note    CV Status:  Stable    Pain management: adequate       Mental Status:  Alert and awake   Hydration Status:  Euvolemic   PONV Controlled:  Controlled   Airway Patency:  Patent     Post Op Vitals Reviewed: Yes    No anethesia notable event occurred.    Staff: Anesthesiologist           Last Filed PACU Vitals:  Vitals Value Taken Time   Temp 97.7 °F (36.5 °C) 11/08/24 1405   Pulse 70 11/08/24 1413   /57 11/08/24 1411   Resp 19 11/08/24 1413   SpO2 98 % 11/08/24 1413   Vitals shown include unfiled device data.    Modified [place]:  Activity: 2 (11/8/2024  2:05 PM)  Respiration: 2 (11/8/2024  2:05 PM)  Circulation: 2 (11/8/2024  2:05 PM)  Consciousness: 2 (11/8/2024  2:05 PM)  Oxygen Saturation: 1 (11/8/2024  2:05 PM)  Modified [place] Score: 9 (11/8/2024  2:05 PM)        
Post-Op Assessment Note    CV Status:  Stable  Pain Score: 0    Pain management: adequate       Mental Status:  Alert and awake   Hydration Status:  Stable   PONV Controlled:  Controlled   Airway Patency:  Patent     Post Op Vitals Reviewed: Yes    No anethesia notable event occurred.    Staff: CRNA           Last Filed PACU Vitals:  Vitals Value Taken Time   Temp 97.9    Pulse 69    /56    Resp 24    SpO2 99        Modified [place]:  Activity: 2 (11/8/2024  1:15 PM)  Respiration: 2 (11/8/2024  1:15 PM)  Circulation: 2 (11/8/2024  1:15 PM)  Consciousness: 2 (11/8/2024  1:15 PM)  Oxygen Saturation: 2 (11/8/2024  1:15 PM)  Modified [place] Score: 10 (11/8/2024  1:15 PM)        
No

## 2024-11-14 PROCEDURE — 88305 TISSUE EXAM BY PATHOLOGIST: CPT | Performed by: PATHOLOGY

## 2024-11-18 ENCOUNTER — APPOINTMENT (EMERGENCY)
Dept: RADIOLOGY | Facility: HOSPITAL | Age: 64
DRG: 191 | End: 2024-11-18
Payer: MEDICARE

## 2024-11-18 ENCOUNTER — HOSPITAL ENCOUNTER (INPATIENT)
Facility: HOSPITAL | Age: 64
LOS: 4 days | Discharge: HOME | DRG: 191 | End: 2024-11-23
Attending: EMERGENCY MEDICINE | Admitting: FAMILY MEDICINE
Payer: MEDICARE

## 2024-11-18 DIAGNOSIS — F41.9 ANXIETY: ICD-10-CM

## 2024-11-18 DIAGNOSIS — R50.9 FEVER: ICD-10-CM

## 2024-11-18 DIAGNOSIS — J44.1 COPD EXACERBATION (HCC): Primary | ICD-10-CM

## 2024-11-18 DIAGNOSIS — J40 BRONCHITIS: ICD-10-CM

## 2024-11-18 DIAGNOSIS — E83.42 HYPOMAGNESEMIA: ICD-10-CM

## 2024-11-18 DIAGNOSIS — R00.0 TACHYCARDIA: ICD-10-CM

## 2024-11-18 LAB
ALBUMIN SERPL BCG-MCNC: 4.5 G/DL (ref 3.5–5)
ALP SERPL-CCNC: 90 U/L (ref 34–104)
ALT SERPL W P-5'-P-CCNC: 10 U/L (ref 7–52)
ANION GAP SERPL CALCULATED.3IONS-SCNC: 11 MMOL/L (ref 4–13)
APTT PPP: 29 SECONDS (ref 23–34)
AST SERPL W P-5'-P-CCNC: 14 U/L (ref 13–39)
BASE EX.OXY STD BLDV CALC-SCNC: 72.2 % (ref 60–80)
BASE EXCESS BLDV CALC-SCNC: 0.7 MMOL/L
BASOPHILS # BLD AUTO: 0.06 THOUSANDS/ÂΜL (ref 0–0.1)
BASOPHILS NFR BLD AUTO: 0 % (ref 0–1)
BILIRUB SERPL-MCNC: 0.46 MG/DL (ref 0.2–1)
BNP SERPL-MCNC: 48 PG/ML (ref 0–100)
BUN SERPL-MCNC: 8 MG/DL (ref 5–25)
CALCIUM SERPL-MCNC: 9.5 MG/DL (ref 8.4–10.2)
CARDIAC TROPONIN I PNL SERPL HS: 6 NG/L (ref ?–50)
CHLORIDE SERPL-SCNC: 100 MMOL/L (ref 96–108)
CO2 SERPL-SCNC: 25 MMOL/L (ref 21–32)
CREAT SERPL-MCNC: 0.54 MG/DL (ref 0.6–1.3)
CRP SERPL QL: 63.6 MG/L
EOSINOPHIL # BLD AUTO: 0.02 THOUSAND/ÂΜL (ref 0–0.61)
EOSINOPHIL NFR BLD AUTO: 0 % (ref 0–6)
ERYTHROCYTE [DISTWIDTH] IN BLOOD BY AUTOMATED COUNT: 13.4 % (ref 11.6–15.1)
FLUAV AG UPPER RESP QL IA.RAPID: NEGATIVE
FLUBV AG UPPER RESP QL IA.RAPID: NEGATIVE
GFR SERPL CREATININE-BSD FRML MDRD: 98 ML/MIN/1.73SQ M
GLUCOSE SERPL-MCNC: 123 MG/DL (ref 65–140)
HCO3 BLDV-SCNC: 26.7 MMOL/L (ref 24–30)
HCT VFR BLD AUTO: 44.3 % (ref 34.8–46.1)
HGB BLD-MCNC: 14.2 G/DL (ref 11.5–15.4)
IMM GRANULOCYTES # BLD AUTO: 0.05 THOUSAND/UL (ref 0–0.2)
IMM GRANULOCYTES NFR BLD AUTO: 0 % (ref 0–2)
INR PPP: 1 (ref 0.85–1.19)
LYMPHOCYTES # BLD AUTO: 1.84 THOUSANDS/ÂΜL (ref 0.6–4.47)
LYMPHOCYTES NFR BLD AUTO: 11 % (ref 14–44)
MAGNESIUM SERPL-MCNC: 1.6 MG/DL (ref 1.9–2.7)
MCH RBC QN AUTO: 29.6 PG (ref 26.8–34.3)
MCHC RBC AUTO-ENTMCNC: 32.1 G/DL (ref 31.4–37.4)
MCV RBC AUTO: 93 FL (ref 82–98)
MONOCYTES # BLD AUTO: 1.2 THOUSAND/ÂΜL (ref 0.17–1.22)
MONOCYTES NFR BLD AUTO: 7 % (ref 4–12)
NEUTROPHILS # BLD AUTO: 13.5 THOUSANDS/ÂΜL (ref 1.85–7.62)
NEUTS SEG NFR BLD AUTO: 82 % (ref 43–75)
NRBC BLD AUTO-RTO: 0 /100 WBCS
O2 CT BLDV-SCNC: 15.3 ML/DL
PCO2 BLDV: 47.6 MM HG (ref 42–50)
PH BLDV: 7.37 [PH] (ref 7.3–7.4)
PLATELET # BLD AUTO: 207 THOUSANDS/UL (ref 149–390)
PMV BLD AUTO: 12.6 FL (ref 8.9–12.7)
PO2 BLDV: 38.8 MM HG (ref 35–45)
POTASSIUM SERPL-SCNC: 3.8 MMOL/L (ref 3.5–5.3)
PROCALCITONIN SERPL-MCNC: <0.05 NG/ML
PROT SERPL-MCNC: 7.6 G/DL (ref 6.4–8.4)
PROTHROMBIN TIME: 13.6 SECONDS (ref 12.3–15)
RBC # BLD AUTO: 4.79 MILLION/UL (ref 3.81–5.12)
SARS-COV+SARS-COV-2 AG RESP QL IA.RAPID: NEGATIVE
SODIUM SERPL-SCNC: 136 MMOL/L (ref 135–147)
WBC # BLD AUTO: 16.67 THOUSAND/UL (ref 4.31–10.16)

## 2024-11-18 PROCEDURE — 87040 BLOOD CULTURE FOR BACTERIA: CPT | Performed by: EMERGENCY MEDICINE

## 2024-11-18 PROCEDURE — 99285 EMERGENCY DEPT VISIT HI MDM: CPT | Performed by: EMERGENCY MEDICINE

## 2024-11-18 PROCEDURE — 87811 SARS-COV-2 COVID19 W/OPTIC: CPT | Performed by: EMERGENCY MEDICINE

## 2024-11-18 PROCEDURE — 85025 COMPLETE CBC W/AUTO DIFF WBC: CPT | Performed by: EMERGENCY MEDICINE

## 2024-11-18 PROCEDURE — 87804 INFLUENZA ASSAY W/OPTIC: CPT | Performed by: EMERGENCY MEDICINE

## 2024-11-18 PROCEDURE — 99285 EMERGENCY DEPT VISIT HI MDM: CPT

## 2024-11-18 PROCEDURE — 86140 C-REACTIVE PROTEIN: CPT | Performed by: EMERGENCY MEDICINE

## 2024-11-18 PROCEDURE — 85610 PROTHROMBIN TIME: CPT | Performed by: EMERGENCY MEDICINE

## 2024-11-18 PROCEDURE — 96361 HYDRATE IV INFUSION ADD-ON: CPT

## 2024-11-18 PROCEDURE — 84145 PROCALCITONIN (PCT): CPT | Performed by: EMERGENCY MEDICINE

## 2024-11-18 PROCEDURE — 80053 COMPREHEN METABOLIC PANEL: CPT | Performed by: EMERGENCY MEDICINE

## 2024-11-18 PROCEDURE — 85730 THROMBOPLASTIN TIME PARTIAL: CPT | Performed by: EMERGENCY MEDICINE

## 2024-11-18 PROCEDURE — 83735 ASSAY OF MAGNESIUM: CPT | Performed by: EMERGENCY MEDICINE

## 2024-11-18 PROCEDURE — 96365 THER/PROPH/DIAG IV INF INIT: CPT

## 2024-11-18 PROCEDURE — 83880 ASSAY OF NATRIURETIC PEPTIDE: CPT | Performed by: EMERGENCY MEDICINE

## 2024-11-18 PROCEDURE — 36415 COLL VENOUS BLD VENIPUNCTURE: CPT | Performed by: EMERGENCY MEDICINE

## 2024-11-18 PROCEDURE — 71045 X-RAY EXAM CHEST 1 VIEW: CPT

## 2024-11-18 PROCEDURE — 84484 ASSAY OF TROPONIN QUANT: CPT | Performed by: EMERGENCY MEDICINE

## 2024-11-18 PROCEDURE — 82805 BLOOD GASES W/O2 SATURATION: CPT | Performed by: EMERGENCY MEDICINE

## 2024-11-18 PROCEDURE — 93005 ELECTROCARDIOGRAM TRACING: CPT

## 2024-11-18 PROCEDURE — 96375 TX/PRO/DX INJ NEW DRUG ADDON: CPT

## 2024-11-18 PROCEDURE — 94640 AIRWAY INHALATION TREATMENT: CPT

## 2024-11-18 RX ORDER — IPRATROPIUM BROMIDE AND ALBUTEROL SULFATE 2.5; .5 MG/3ML; MG/3ML
3 SOLUTION RESPIRATORY (INHALATION) ONCE
Status: COMPLETED | OUTPATIENT
Start: 2024-11-18 | End: 2024-11-18

## 2024-11-18 RX ORDER — SODIUM CHLORIDE FOR INHALATION 0.9 %
12 VIAL, NEBULIZER (ML) INHALATION ONCE
Status: COMPLETED | OUTPATIENT
Start: 2024-11-18 | End: 2024-11-19

## 2024-11-18 RX ORDER — ALBUTEROL SULFATE 5 MG/ML
10 SOLUTION RESPIRATORY (INHALATION) ONCE
Status: COMPLETED | OUTPATIENT
Start: 2024-11-18 | End: 2024-11-19

## 2024-11-18 RX ORDER — ACETAMINOPHEN 325 MG/1
975 TABLET ORAL ONCE
Status: COMPLETED | OUTPATIENT
Start: 2024-11-18 | End: 2024-11-18

## 2024-11-18 RX ORDER — MAGNESIUM SULFATE 1 G/100ML
1 INJECTION INTRAVENOUS ONCE
Status: COMPLETED | OUTPATIENT
Start: 2024-11-18 | End: 2024-11-19

## 2024-11-18 RX ORDER — METHYLPREDNISOLONE SODIUM SUCCINATE 125 MG/2ML
80 INJECTION, POWDER, LYOPHILIZED, FOR SOLUTION INTRAMUSCULAR; INTRAVENOUS ONCE
Status: COMPLETED | OUTPATIENT
Start: 2024-11-18 | End: 2024-11-18

## 2024-11-18 RX ADMIN — MAGNESIUM SULFATE HEPTAHYDRATE 1 G: 1 INJECTION, SOLUTION INTRAVENOUS at 23:59

## 2024-11-18 RX ADMIN — SODIUM CHLORIDE 500 ML: 0.9 INJECTION, SOLUTION INTRAVENOUS at 22:59

## 2024-11-18 RX ADMIN — IPRATROPIUM BROMIDE AND ALBUTEROL SULFATE 3 ML: .5; 3 SOLUTION RESPIRATORY (INHALATION) at 22:58

## 2024-11-18 RX ADMIN — ACETAMINOPHEN 325MG 975 MG: 325 TABLET ORAL at 23:02

## 2024-11-18 RX ADMIN — METHYLPREDNISOLONE SODIUM SUCCINATE 80 MG: 125 INJECTION, POWDER, FOR SOLUTION INTRAMUSCULAR; INTRAVENOUS at 22:59

## 2024-11-19 PROBLEM — J44.1 COPD EXACERBATION (HCC): Status: ACTIVE | Noted: 2024-11-19

## 2024-11-19 PROBLEM — I48.91 ATRIAL FIBRILLATION (HCC): Status: ACTIVE | Noted: 2024-11-19

## 2024-11-19 PROBLEM — J96.11 CHRONIC HYPOXIC RESPIRATORY FAILURE (HCC): Status: ACTIVE | Noted: 2024-11-19

## 2024-11-19 LAB
2HR DELTA HS TROPONIN: 1 NG/L
ANION GAP SERPL CALCULATED.3IONS-SCNC: 12 MMOL/L (ref 4–13)
ATRIAL RATE: 126 BPM
BUN SERPL-MCNC: 8 MG/DL (ref 5–25)
CALCIUM SERPL-MCNC: 8.3 MG/DL (ref 8.4–10.2)
CARDIAC TROPONIN I PNL SERPL HS: 7 NG/L (ref ?–50)
CHLORIDE SERPL-SCNC: 103 MMOL/L (ref 96–108)
CO2 SERPL-SCNC: 22 MMOL/L (ref 21–32)
CREAT SERPL-MCNC: 0.7 MG/DL (ref 0.6–1.3)
ERYTHROCYTE [DISTWIDTH] IN BLOOD BY AUTOMATED COUNT: 13.5 % (ref 11.6–15.1)
GFR SERPL CREATININE-BSD FRML MDRD: 90 ML/MIN/1.73SQ M
GLUCOSE SERPL-MCNC: 126 MG/DL (ref 65–140)
GLUCOSE SERPL-MCNC: 170 MG/DL (ref 65–140)
GLUCOSE SERPL-MCNC: 195 MG/DL (ref 65–140)
GLUCOSE SERPL-MCNC: 213 MG/DL (ref 65–140)
GLUCOSE SERPL-MCNC: 307 MG/DL (ref 65–140)
HCT VFR BLD AUTO: 36.1 % (ref 34.8–46.1)
HGB BLD-MCNC: 11.3 G/DL (ref 11.5–15.4)
LACTATE SERPL-SCNC: 0.7 MMOL/L (ref 0.5–2)
MAGNESIUM SERPL-MCNC: 2.6 MG/DL (ref 1.9–2.7)
MCH RBC QN AUTO: 29.4 PG (ref 26.8–34.3)
MCHC RBC AUTO-ENTMCNC: 31.3 G/DL (ref 31.4–37.4)
MCV RBC AUTO: 94 FL (ref 82–98)
P AXIS: 88 DEGREES
PLATELET # BLD AUTO: 179 THOUSANDS/UL (ref 149–390)
PMV BLD AUTO: 12.1 FL (ref 8.9–12.7)
POTASSIUM SERPL-SCNC: 3.2 MMOL/L (ref 3.5–5.3)
PR INTERVAL: 156 MS
QRS AXIS: 92 DEGREES
QRSD INTERVAL: 76 MS
QT INTERVAL: 282 MS
QTC INTERVAL: 408 MS
RBC # BLD AUTO: 3.84 MILLION/UL (ref 3.81–5.12)
SODIUM SERPL-SCNC: 137 MMOL/L (ref 135–147)
T WAVE AXIS: 73 DEGREES
VENTRICULAR RATE: 126 BPM
WBC # BLD AUTO: 13.54 THOUSAND/UL (ref 4.31–10.16)

## 2024-11-19 PROCEDURE — 92610 EVALUATE SWALLOWING FUNCTION: CPT

## 2024-11-19 PROCEDURE — 83735 ASSAY OF MAGNESIUM: CPT | Performed by: FAMILY MEDICINE

## 2024-11-19 PROCEDURE — 99222 1ST HOSP IP/OBS MODERATE 55: CPT | Performed by: FAMILY MEDICINE

## 2024-11-19 PROCEDURE — 85027 COMPLETE CBC AUTOMATED: CPT | Performed by: FAMILY MEDICINE

## 2024-11-19 PROCEDURE — 94760 N-INVAS EAR/PLS OXIMETRY 1: CPT

## 2024-11-19 PROCEDURE — 80048 BASIC METABOLIC PNL TOTAL CA: CPT | Performed by: FAMILY MEDICINE

## 2024-11-19 PROCEDURE — 94640 AIRWAY INHALATION TREATMENT: CPT

## 2024-11-19 PROCEDURE — 36415 COLL VENOUS BLD VENIPUNCTURE: CPT | Performed by: EMERGENCY MEDICINE

## 2024-11-19 PROCEDURE — 94644 CONT INHLJ TX 1ST HOUR: CPT

## 2024-11-19 PROCEDURE — 82948 REAGENT STRIP/BLOOD GLUCOSE: CPT

## 2024-11-19 PROCEDURE — 93005 ELECTROCARDIOGRAM TRACING: CPT

## 2024-11-19 PROCEDURE — 84484 ASSAY OF TROPONIN QUANT: CPT | Performed by: EMERGENCY MEDICINE

## 2024-11-19 PROCEDURE — 83605 ASSAY OF LACTIC ACID: CPT | Performed by: EMERGENCY MEDICINE

## 2024-11-19 PROCEDURE — 93010 ELECTROCARDIOGRAM REPORT: CPT | Performed by: INTERNAL MEDICINE

## 2024-11-19 RX ORDER — SODIUM CHLORIDE FOR INHALATION 0.9 %
3 VIAL, NEBULIZER (ML) INHALATION
Status: DISCONTINUED | OUTPATIENT
Start: 2024-11-19 | End: 2024-11-19

## 2024-11-19 RX ORDER — MAGNESIUM SULFATE HEPTAHYDRATE 40 MG/ML
2 INJECTION, SOLUTION INTRAVENOUS ONCE
Status: COMPLETED | OUTPATIENT
Start: 2024-11-19 | End: 2024-11-19

## 2024-11-19 RX ORDER — METHYLPREDNISOLONE SODIUM SUCCINATE 40 MG/ML
40 INJECTION, POWDER, LYOPHILIZED, FOR SOLUTION INTRAMUSCULAR; INTRAVENOUS EVERY 8 HOURS
Status: DISCONTINUED | OUTPATIENT
Start: 2024-11-19 | End: 2024-11-21

## 2024-11-19 RX ORDER — NICOTINE 21 MG/24HR
21 PATCH, TRANSDERMAL 24 HOURS TRANSDERMAL DAILY
Status: DISCONTINUED | OUTPATIENT
Start: 2024-11-19 | End: 2024-11-23 | Stop reason: HOSPADM

## 2024-11-19 RX ORDER — AZITHROMYCIN 250 MG/1
250 TABLET, FILM COATED ORAL EVERY OTHER DAY
Status: DISCONTINUED | OUTPATIENT
Start: 2024-11-19 | End: 2024-11-23 | Stop reason: HOSPADM

## 2024-11-19 RX ORDER — DOCUSATE SODIUM 100 MG/1
100 CAPSULE, LIQUID FILLED ORAL 2 TIMES DAILY
Status: DISCONTINUED | OUTPATIENT
Start: 2024-11-19 | End: 2024-11-23 | Stop reason: HOSPADM

## 2024-11-19 RX ORDER — LEVALBUTEROL INHALATION SOLUTION 1.25 MG/3ML
1.25 SOLUTION RESPIRATORY (INHALATION)
Status: DISCONTINUED | OUTPATIENT
Start: 2024-11-19 | End: 2024-11-23 | Stop reason: HOSPADM

## 2024-11-19 RX ORDER — GUAIFENESIN 600 MG/1
600 TABLET, EXTENDED RELEASE ORAL 2 TIMES DAILY
Status: DISCONTINUED | OUTPATIENT
Start: 2024-11-19 | End: 2024-11-23 | Stop reason: HOSPADM

## 2024-11-19 RX ORDER — POTASSIUM CHLORIDE 20MEQ/15ML
40 LIQUID (ML) ORAL ONCE
Status: COMPLETED | OUTPATIENT
Start: 2024-11-19 | End: 2024-11-19

## 2024-11-19 RX ORDER — PANTOPRAZOLE SODIUM 40 MG/1
40 TABLET, DELAYED RELEASE ORAL
Status: DISCONTINUED | OUTPATIENT
Start: 2024-11-20 | End: 2024-11-19

## 2024-11-19 RX ORDER — ETHAMBUTOL HYDROCHLORIDE 400 MG/1
800 TABLET, FILM COATED ORAL DAILY
Status: DISCONTINUED | OUTPATIENT
Start: 2024-11-19 | End: 2024-11-23 | Stop reason: HOSPADM

## 2024-11-19 RX ORDER — RIFABUTIN 150 MG/1
300 CAPSULE ORAL DAILY
Status: DISCONTINUED | OUTPATIENT
Start: 2024-11-19 | End: 2024-11-23 | Stop reason: HOSPADM

## 2024-11-19 RX ORDER — PANTOPRAZOLE SODIUM 40 MG/1
40 TABLET, DELAYED RELEASE ORAL
Status: DISCONTINUED | OUTPATIENT
Start: 2024-11-19 | End: 2024-11-19

## 2024-11-19 RX ORDER — SODIUM CHLORIDE, SODIUM GLUCONATE, SODIUM ACETATE, POTASSIUM CHLORIDE, MAGNESIUM CHLORIDE, SODIUM PHOSPHATE, DIBASIC, AND POTASSIUM PHOSPHATE .53; .5; .37; .037; .03; .012; .00082 G/100ML; G/100ML; G/100ML; G/100ML; G/100ML; G/100ML; G/100ML
100 INJECTION, SOLUTION INTRAVENOUS CONTINUOUS
Status: DISCONTINUED | OUTPATIENT
Start: 2024-11-19 | End: 2024-11-23 | Stop reason: HOSPADM

## 2024-11-19 RX ORDER — PANTOPRAZOLE SODIUM 40 MG/1
40 TABLET, DELAYED RELEASE ORAL
Status: DISCONTINUED | OUTPATIENT
Start: 2024-11-19 | End: 2024-11-23 | Stop reason: HOSPADM

## 2024-11-19 RX ORDER — NICOTINE 21 MG/24HR
1 PATCH, TRANSDERMAL 24 HOURS TRANSDERMAL DAILY
Status: DISCONTINUED | OUTPATIENT
Start: 2024-11-19 | End: 2024-11-19

## 2024-11-19 RX ORDER — POTASSIUM CHLORIDE 1500 MG/1
40 TABLET, EXTENDED RELEASE ORAL ONCE
Status: DISCONTINUED | OUTPATIENT
Start: 2024-11-19 | End: 2024-11-19

## 2024-11-19 RX ORDER — HEPARIN SODIUM 5000 [USP'U]/ML
5000 INJECTION, SOLUTION INTRAVENOUS; SUBCUTANEOUS EVERY 8 HOURS SCHEDULED
Status: DISCONTINUED | OUTPATIENT
Start: 2024-11-19 | End: 2024-11-23 | Stop reason: HOSPADM

## 2024-11-19 RX ORDER — INSULIN LISPRO 100 [IU]/ML
1-5 INJECTION, SOLUTION INTRAVENOUS; SUBCUTANEOUS
Status: DISCONTINUED | OUTPATIENT
Start: 2024-11-19 | End: 2024-11-23 | Stop reason: HOSPADM

## 2024-11-19 RX ORDER — METOPROLOL TARTRATE 25 MG/1
25 TABLET, FILM COATED ORAL EVERY 12 HOURS SCHEDULED
Status: DISCONTINUED | OUTPATIENT
Start: 2024-11-19 | End: 2024-11-23 | Stop reason: HOSPADM

## 2024-11-19 RX ORDER — DOCUSATE SODIUM 100 MG/1
100 CAPSULE, LIQUID FILLED ORAL 2 TIMES DAILY
Status: ON HOLD | COMMUNITY
Start: 2024-11-13

## 2024-11-19 RX ORDER — SIMETHICONE 80 MG
80 TABLET,CHEWABLE ORAL ONCE
Status: COMPLETED | OUTPATIENT
Start: 2024-11-19 | End: 2024-11-19

## 2024-11-19 RX ADMIN — HEPARIN SODIUM 5000 UNITS: 5000 INJECTION, SOLUTION INTRAVENOUS; SUBCUTANEOUS at 06:24

## 2024-11-19 RX ADMIN — GUAIFENESIN 600 MG: 600 TABLET ORAL at 08:46

## 2024-11-19 RX ADMIN — IPRATROPIUM BROMIDE 1 MG: 0.5 SOLUTION RESPIRATORY (INHALATION) at 00:02

## 2024-11-19 RX ADMIN — ETHAMBUTOL HYDROCHLORIDE 800 MG: 400 TABLET, FILM COATED ORAL at 08:46

## 2024-11-19 RX ADMIN — INSULIN LISPRO 1 UNITS: 100 INJECTION, SOLUTION INTRAVENOUS; SUBCUTANEOUS at 08:46

## 2024-11-19 RX ADMIN — SODIUM CHLORIDE, SODIUM GLUCONATE, SODIUM ACETATE, POTASSIUM CHLORIDE, MAGNESIUM CHLORIDE, SODIUM PHOSPHATE, DIBASIC, AND POTASSIUM PHOSPHATE 100 ML/HR: .53; .5; .37; .037; .03; .012; .00082 INJECTION, SOLUTION INTRAVENOUS at 02:29

## 2024-11-19 RX ADMIN — PANTOPRAZOLE SODIUM 40 MG: 40 TABLET, DELAYED RELEASE ORAL at 06:26

## 2024-11-19 RX ADMIN — METHYLPREDNISOLONE SODIUM SUCCINATE 40 MG: 40 INJECTION, POWDER, FOR SOLUTION INTRAMUSCULAR; INTRAVENOUS at 06:18

## 2024-11-19 RX ADMIN — DOXYCYCLINE 100 MG: 100 INJECTION, POWDER, LYOPHILIZED, FOR SOLUTION INTRAVENOUS at 00:45

## 2024-11-19 RX ADMIN — ISODIUM CHLORIDE 12 ML: 0.03 SOLUTION RESPIRATORY (INHALATION) at 00:02

## 2024-11-19 RX ADMIN — DOCUSATE SODIUM 100 MG: 100 CAPSULE, LIQUID FILLED ORAL at 09:19

## 2024-11-19 RX ADMIN — RIFABUTIN 300 MG: 150 CAPSULE ORAL at 09:19

## 2024-11-19 RX ADMIN — HEPARIN SODIUM 5000 UNITS: 5000 INJECTION, SOLUTION INTRAVENOUS; SUBCUTANEOUS at 15:56

## 2024-11-19 RX ADMIN — PANTOPRAZOLE SODIUM 40 MG: 40 TABLET, DELAYED RELEASE ORAL at 16:21

## 2024-11-19 RX ADMIN — INSULIN LISPRO 1 UNITS: 100 INJECTION, SOLUTION INTRAVENOUS; SUBCUTANEOUS at 11:54

## 2024-11-19 RX ADMIN — NICOTINE 21 MG: 21 PATCH, EXTENDED RELEASE TRANSDERMAL at 03:34

## 2024-11-19 RX ADMIN — METHYLPREDNISOLONE SODIUM SUCCINATE 40 MG: 40 INJECTION, POWDER, FOR SOLUTION INTRAMUSCULAR; INTRAVENOUS at 23:02

## 2024-11-19 RX ADMIN — HEPARIN SODIUM 5000 UNITS: 5000 INJECTION, SOLUTION INTRAVENOUS; SUBCUTANEOUS at 21:17

## 2024-11-19 RX ADMIN — GUAIFENESIN 600 MG: 600 TABLET ORAL at 17:20

## 2024-11-19 RX ADMIN — SODIUM CHLORIDE, SODIUM GLUCONATE, SODIUM ACETATE, POTASSIUM CHLORIDE, MAGNESIUM CHLORIDE, SODIUM PHOSPHATE, DIBASIC, AND POTASSIUM PHOSPHATE 100 ML/HR: .53; .5; .37; .037; .03; .012; .00082 INJECTION, SOLUTION INTRAVENOUS at 23:04

## 2024-11-19 RX ADMIN — MAGNESIUM SULFATE HEPTAHYDRATE 2 G: 40 INJECTION, SOLUTION INTRAVENOUS at 01:25

## 2024-11-19 RX ADMIN — METHYLPREDNISOLONE SODIUM SUCCINATE 40 MG: 40 INJECTION, POWDER, FOR SOLUTION INTRAMUSCULAR; INTRAVENOUS at 15:56

## 2024-11-19 RX ADMIN — DOXYCYCLINE 100 MG: 100 INJECTION, POWDER, LYOPHILIZED, FOR SOLUTION INTRAVENOUS at 12:03

## 2024-11-19 RX ADMIN — LEVALBUTEROL HYDROCHLORIDE 1.25 MG: 1.25 SOLUTION RESPIRATORY (INHALATION) at 09:11

## 2024-11-19 RX ADMIN — POTASSIUM CHLORIDE 40 MEQ: 20 SOLUTION ORAL at 06:28

## 2024-11-19 RX ADMIN — DOCUSATE SODIUM 100 MG: 100 CAPSULE, LIQUID FILLED ORAL at 17:20

## 2024-11-19 RX ADMIN — SIMETHICONE 80 MG: 80 TABLET, CHEWABLE ORAL at 16:21

## 2024-11-19 RX ADMIN — SODIUM CHLORIDE, SODIUM GLUCONATE, SODIUM ACETATE, POTASSIUM CHLORIDE, MAGNESIUM CHLORIDE, SODIUM PHOSPHATE, DIBASIC, AND POTASSIUM PHOSPHATE 100 ML/HR: .53; .5; .37; .037; .03; .012; .00082 INJECTION, SOLUTION INTRAVENOUS at 11:53

## 2024-11-19 RX ADMIN — ALBUTEROL SULFATE 10 MG: 2.5 SOLUTION RESPIRATORY (INHALATION) at 00:01

## 2024-11-19 RX ADMIN — LEVALBUTEROL HYDROCHLORIDE 1.25 MG: 1.25 SOLUTION RESPIRATORY (INHALATION) at 20:53

## 2024-11-19 RX ADMIN — LEVALBUTEROL HYDROCHLORIDE 1.25 MG: 1.25 SOLUTION RESPIRATORY (INHALATION) at 14:36

## 2024-11-19 RX ADMIN — AZITHROMYCIN 250 MG: 250 TABLET, FILM COATED ORAL at 08:46

## 2024-11-19 RX ADMIN — INSULIN LISPRO 1 UNITS: 100 INJECTION, SOLUTION INTRAVENOUS; SUBCUTANEOUS at 21:49

## 2024-11-19 RX ADMIN — METOPROLOL TARTRATE 25 MG: 25 TABLET, FILM COATED ORAL at 21:17

## 2024-11-19 NOTE — ED PROVIDER NOTES
Time reflects when diagnosis was documented in both MDM as applicable and the Disposition within this note       Time User Action Codes Description Comment    11/18/2024 11:35 PM LilianmiroslavaRey nieves Add [J44.1] COPD exacerbation (HCC)     11/18/2024 11:35 PM Rey Jenkins Add [J40] Bronchitis     11/18/2024 11:36 PM Rey Jenkins Add [R50.9] Fever     11/18/2024 11:37 PM Rey Jenkins Add [R00.0] Tachycardia     11/19/2024 12:15 AM Rey Jenkins Add [E83.42] Hypomagnesemia           ED Disposition       ED Disposition   Admit    Condition   Stable    Date/Time   Mon Nov 18, 2024 11:36 PM    Comment   Case was discussed with Dr. Olivera and the patient's admission status was agreed to be Admission Status: inpatient status to the service of Dr. Olivera.               Assessment & Plan       Medical Decision Making  Problems Addressed:  Bronchitis: acute illness or injury  COPD exacerbation (HCC): acute illness or injury  Fever: acute illness or injury  Hypomagnesemia: acute illness or injury  Tachycardia: acute illness or injury    Amount and/or Complexity of Data Reviewed  Labs: ordered. Decision-making details documented in ED Course.  Radiology: ordered and independent interpretation performed. Decision-making details documented in ED Course.  ECG/medicine tests: ordered and independent interpretation performed. Decision-making details documented in ED Course.    Risk  OTC drugs.  Prescription drug management.  Decision regarding hospitalization.        ED Course as of 11/19/24 0023   Mon Nov 18, 2024   2336 Patient reevaluated still feels very short of breath does not feel comfortable with returning home reports last time she was feeling similar and tried to go home and got much worse.  Will treat with antibiotics and additional breathing treatment hour-long now.   e Nov 19, 2024   0022 Spoke with Dr. Olivera hospitalist on-call reviewed case and findings in the emergency department and management thus far accepts for  admission.         Medications   magnesium sulfate IVPB (premix) SOLN 1 g (1 g Intravenous New Bag 11/18/24 4519)   doxycycline (VIBRAMYCIN) 100 mg in sodium chloride 0.9 % 100 mL IVPB (has no administration in time range)   methylPREDNISolone sodium succinate (Solu-MEDROL) injection 80 mg (80 mg Intravenous Given 11/18/24 2259)   ipratropium-albuterol (DUO-NEB) 0.5-2.5 mg/3 mL inhalation solution 3 mL (3 mL Nebulization Given 11/18/24 2258)   acetaminophen (TYLENOL) tablet 975 mg (975 mg Oral Given 11/18/24 2302)   sodium chloride 0.9 % bolus 500 mL (500 mL Intravenous New Bag 11/18/24 2259)   albuterol inhalation solution 10 mg (10 mg Nebulization Given 11/19/24 0001)   ipratropium (ATROVENT) 0.02 % inhalation solution 1 mg (1 mg Nebulization Given 11/19/24 0002)   sodium chloride 0.9 % inhalation solution 12 mL (12 mL Nebulization Given 11/19/24 0002)       ED Risk Strat Scores                                               History of Present Illness       Chief Complaint   Patient presents with    Shortness of Breath     Pt reports SOB that started this afternoon and worsened this evening. PMHx of COPD. Wears 2L/NC chronically. Also c/o a productive cough. Reports she was admitted for something similar to this a month ago when she was diagnosed with pneumonia.        Past Medical History:   Diagnosis Date    COPD (chronic obstructive pulmonary disease) (HCC)     Hiatal hernia       Past Surgical History:   Procedure Laterality Date    CHOLECYSTECTOMY        History reviewed. No pertinent family history.   Social History     Tobacco Use    Smoking status: Every Day     Current packs/day: 0.50     Types: Cigarettes    Smokeless tobacco: Never   Vaping Use    Vaping status: Never Used   Substance Use Topics    Alcohol use: Never    Drug use: Never      E-Cigarette/Vaping    E-Cigarette Use Never User       E-Cigarette/Vaping Substances      I have reviewed and agree with the history as documented.     Patient is a  64-year-old female history of COPD hospitalization for pneumonia a month ago presents the emergency department due to cough and fever and shortness of breath started earlier today still present worsening.        History provided by:  Patient  Shortness of Breath  Severity:  Moderate  Onset quality:  Gradual  Duration:  1 day  Timing:  Constant  Progression:  Worsening  Chronicity:  New  Associated symptoms: cough and fever    Associated symptoms: no abdominal pain, no chest pain, no headaches and no vomiting        Review of Systems   Constitutional:  Positive for fever.   HENT:  Negative for congestion.    Respiratory:  Positive for cough and shortness of breath.    Cardiovascular:  Negative for chest pain.   Gastrointestinal:  Negative for abdominal pain, diarrhea, nausea and vomiting.   Neurological:  Negative for weakness, numbness and headaches.   All other systems reviewed and are negative.          Objective       ED Triage Vitals   Temperature Pulse Blood Pressure Respirations SpO2 Patient Position - Orthostatic VS   11/18/24 2237 11/18/24 2237 11/18/24 2238 11/18/24 2237 11/18/24 2238 11/18/24 2238   (!) 100.9 °F (38.3 °C) (!) 129 134/74 (!) 26 95 % Lying      Temp Source Heart Rate Source BP Location FiO2 (%) Pain Score    11/18/24 2237 11/18/24 2237 11/18/24 2238 -- 11/18/24 2302    Temporal Monitor Left arm  Med Not Given for Pain - for MAR use only      Vitals      Date and Time Temp Pulse SpO2 Resp BP Pain Score FACES Pain Rating User   11/19/24 0005 -- -- 96 % -- -- -- -- KE   11/19/24 0000 99.2 °F (37.3 °C) 106 96 % 30 93/53 -- --    11/18/24 2302 -- -- -- -- -- Med Not Given for Pain - for MAR use only --    11/18/24 2238 -- -- 95 % -- 134/74 -- -- AR   11/18/24 2237 100.9 °F (38.3 °C) 129 -- 26 -- -- -- AR            Physical Exam  Vitals and nursing note reviewed.   Constitutional:       General: She is not in acute distress.     Appearance: Normal appearance.   HENT:      Head: Normocephalic  and atraumatic.      Nose: Nose normal.   Eyes:      Conjunctiva/sclera: Conjunctivae normal.   Cardiovascular:      Rate and Rhythm: Regular rhythm. Tachycardia present.   Pulmonary:      Effort: Pulmonary effort is normal. Tachypnea present. No respiratory distress.      Breath sounds: Decreased breath sounds, wheezing and rhonchi present.   Skin:     General: Skin is dry.   Neurological:      General: No focal deficit present.      Mental Status: She is alert and oriented to person, place, and time.         Results Reviewed       Procedure Component Value Units Date/Time    Lactic acid, plasma (w/reflex if result > 2.0) [[id number]] Collected: 11/19/24 0007    Lab Status: In process Specimen: Blood from Arm, Right Updated: 11/19/24 0011    HS Troponin I 2hr [[id number]] Collected: 11/19/24 0007    Lab Status: In process Specimen: Blood from Arm, Right Updated: 11/19/24 0011    HS Troponin I 4hr [[id number]]     Lab Status: No result Specimen: Blood     B-Type Natriuretic Peptide(BNP) [[id number]]  (Normal) Collected: 11/18/24 2250    Lab Status: Final result Specimen: Blood from Arm, Right Updated: 11/18/24 2351     BNP 48 pg/mL     Procalcitonin [[id number]]  (Normal) Collected: 11/18/24 2250    Lab Status: Final result Specimen: Blood from Arm, Right Updated: 11/18/24 2329     Procalcitonin <0.05 ng/ml     HS Troponin 0hr (reflex protocol) [[id number]]  (Normal) Collected: 11/18/24 2250    Lab Status: Final result Specimen: Blood from Arm, Right Updated: 11/18/24 2329     hs TnI 0hr 6 ng/L     Comprehensive metabolic panel [[id number]]  (Abnormal) Collected: 11/18/24 2250    Lab Status: Final result Specimen: Blood from Arm, Right Updated: 11/18/24 2320     Sodium 136 mmol/L      Potassium 3.8 mmol/L      Chloride 100 mmol/L      CO2 25 mmol/L      ANION GAP 11 mmol/L      BUN 8 mg/dL      Creatinine 0.54 mg/dL      Glucose 123 mg/dL      Calcium 9.5 mg/dL      AST 14 U/L      ALT 10 U/L      Alkaline Phosphatase 90  U/L      Total Protein 7.6 g/dL      Albumin 4.5 g/dL      Total Bilirubin 0.46 mg/dL      eGFR 98 ml/min/1.73sq m     Narrative:      National Kid[employer] Disease Foundation guidelines for Chronic Kidney Disease (CKD):     Stage 1 with normal or high GFR (GFR > 90 mL/min/1.73 square meters)    Stage 2 Mild CKD (GFR = 60-89 mL/min/1.73 square meters)    Stage 3A Moderate CKD (GFR = 45-59 mL/min/1.73 square meters)    Stage 3B Moderate CKD (GFR = 30-44 mL/min/1.73 square meters)    Stage 4 Severe CKD (GFR = 15-29 mL/min/1.73 square meters)    Stage 5 End Stage CKD (GFR <15 mL/min/1.73 square meters)  Note: GFR calculation is accurate only with a steady state creatinine    Magnesium [[id number]]  (Abnormal) Collected: 11/18/24 2250    Lab Status: Final result Specimen: Blood from Arm, Right Updated: 11/18/24 2320     Magnesium 1.6 mg/dL     C-reactive protein [[id number]]  (Abnormal) Collected: 11/18/24 2250    Lab Status: Final result Specimen: Blood from Arm, Right Updated: 11/18/24 2320     CRP 63.6 mg/L     FLU/COVID Rapid Antigen (30 min. TAT) - Preferred screening test in ED [[id number]]  (Normal) Collected: 11/18/24 2250    Lab Status: Final result Specimen: Nares from Nose Updated: 11/18/24 2319     SARS COV Rapid Antigen Negative     Influenza A Rapid Antigen Negative     Influenza B Rapid Antigen Negative    Narrative:      This test has been performed using the Quidel Rhea 2 FLU+SARS Antigen test under the Emergency Use Authorization (EUA). This test has been validated by the  and verified by the performing laboratory. The Rhea uses lateral flow immunofluorescent sandwich assay to detect SARS-COV, Influenza A and Influenza B Antigen.     The Quidel Rhea 2 SARS Antigen test does not differentiate between SARS-CoV and SARS-CoV-2.     Negative results are presumptive and may be confirmed with a molecular assay, if necessary, for patient management. Negative results do not rule out SARS-CoV-2 or  influenza infection and should not be used as the sole basis for treatment or patient management decisions. A negative test result may occur if the level of antigen in a sample is below the limit of detection of this test.     Positive results are indicative of the presence of viral antigens, but do not rule out bacterial infection or co-infection with other viruses.     All test results should be used as an adjunct to clinical observations and other information available to the provider.    FOR PEDIATRIC PATIENTS - copy/paste COVID Guidelines URL to browser: [email or web address]    Protime-INR [[id number]]  (Normal) Collected: 11/18/24 2250    Lab Status: Final result Specimen: Blood from Arm, Right Updated: 11/18/24 2315     Protime 13.6 seconds      INR 1.00    Narrative:      INR Therapeutic Range    Indication                                             INR Range      Atrial Fibrillation                                               2.0-3.0  Hypercoagulable State                                    2.0.2.3  Left Ventricular Asist Device                            2.0-3.0  Mechanical Heart Valve                                  -    Aortic(with afib, MI, embolism, HF, LA enlargement,    and/or coagulopathy)                                     2.0-3.0 (2.5-3.5)     Mitral                                                             2.5-3.5  Prosthetic/Bioprosthetic Heart Valve               2.0-3.0  Venous thromboembolism (VTE: VT, PE        2.0-3.0    APTT [[id number]]  (Normal) Collected: 11/18/24 2250    Lab Status: Final result Specimen: Blood from Arm, Right Updated: 11/18/24 2315     PTT 29 seconds     CBC and differential [[id number]]  (Abnormal) Collected: 11/18/24 2250    Lab Status: Final result Specimen: Blood from Arm, Right Updated: 11/18/24 2308     WBC 16.67 Thousand/uL      RBC 4.79 Million/uL      Hemoglobin 14.2 g/dL      Hematocrit 44.3 %       MCV 93 fL      MCH 29.6 pg      MCHC 32.1 g/dL      RDW 13.4 %      MPV 12.6 fL      Platelets 207 Thousands/uL      nRBC 0 /100 WBCs      Segmented % 82 %      Immature Grans % 0 %      Lymphocytes % 11 %      Monocytes % 7 %      Eosinophils Relative 0 %      Basophils Relative 0 %      Absolute Neutrophils 13.50 Thousands/µL      Absolute Immature Grans 0.05 Thousand/uL      Absolute Lymphocytes 1.84 Thousands/µL      Absolute Monocytes 1.20 Thousand/µL      Eosinophils Absolute 0.02 Thousand/µL      Basophils Absolute 0.06 Thousands/µL     Blood gas, venous [[id number]] Collected: 11/18/24 2250    Lab Status: Final result Specimen: Blood from Arm, Right Updated: 11/18/24 2306     pH, Terence 7.367     pCO2, Terence 47.6 mm Hg      pO2, Terence 38.8 mm Hg      HCO3, Terence 26.7 mmol/L      Base Excess, Terence 0.7 mmol/L      O2 Content, Terence 15.3 ml/dL      O2 HGB, VENOUS 72.2 %     Blood culture #2 [[id number]] Collected: 11/18/24 2250    Lab Status: In process Specimen: Blood from Arm, Right Updated: 11/18/24 2255    Blood culture #1 [[id number]] Collected: 11/18/24 2250    Lab Status: In process Specimen: Blood from Arm, Left Updated: 11/18/24 2255            XR chest 1 view portable   ED Interpretation by Rey Jenkins DO (11/18 2320)   Emphysema improving right upper lobe infiltrate          ECG 12 Lead Documentation Only    Date/Time: 11/18/2024 10:42 PM    Performed by: Rey Jenkins DO  Authorized by: Rey Jenkins DO    ECG reviewed by me, the ED Provider: yes    Patient location:  ED  Previous ECG:     Comparison to cardiac monitor: Yes    Quality:     Tracing quality:  Limited by artifact  Rate:     ECG rate:  126    ECG rate assessment: tachycardic    Rhythm:     Rhythm: sinus tachycardia    QRS:     QRS axis:  Right    QRS intervals:  Normal  Conduction:     Conduction: normal    ST segments:     ST segments:  Normal  T waves:     T waves: normal        ED Medication and Procedure Management   Prior to Admission  Medications   Prescriptions Last Dose Informant Patient Reported? Taking?   azithromycin (ZITHROMAX) 500 MG tablet   Yes No   Sig: Take 250 mg by mouth daily   ethambutol (MYAMBUTOL) 400 mg tablet   Yes No   Sig: Take 800 mg by mouth daily   fluticasone-umeclidinium-vilanterol (Trelegy Ellipta) 100-62.5-25 mcg/actuation inhaler   Yes No   Sig: Inhale 1 puff daily Rinse mouth after use.   ipratropium-albuterol (DUO-NEB) 0.5-2.5 mg/3 mL nebulizer solution   No No   Sig: Take 3 mL by nebulization 4 (four) times a day   lidocaine (LIDODERM) 5 %   No No   Sig: Apply 1 patch topically over 12 hours daily for 4 days Remove & Discard patch within 12 hours or as directed by MD   metoprolol tartrate (LOPRESSOR) 25 mg tablet   No No   Sig: Take 1 tablet (25 mg total) by mouth every 12 (twelve) hours   nicotine (NICODERM CQ) 14 mg/24hr TD 24 hr patch   No No   Sig: Place 1 patch on the skin over 24 hours daily   omeprazole (PriLOSEC) 40 MG capsule   No No   Sig: Take 1 capsule (40 mg total) by mouth 2 (two) times a day 30 minutes before meal   rifabutin (MYCOBUTIN) 150 mg capsule   Yes No   Sig: Take 300 mg by mouth daily      Facility-Administered Medications: None     Patient's Medications   Discharge Prescriptions    No medications on file     No discharge procedures on file.  ED SEPSIS DOCUMENTATION   Time reflects when diagnosis was documented in both MDM as applicable and the Disposition within this note       Time User Action Codes Description Comment    11/18/2024 11:35 PM Rey Jenkins [J44.1] COPD exacerbation (HCC)     11/18/2024 11:35 PM Rey Jenkins [J40] Bronchitis     11/18/2024 11:36 PM Rey Jenkins [R50.9] Fever     11/18/2024 11:37 PM Rey Jenkins [R00.0] Tachycardia     11/19/2024 12:15 AM Rey Jenkins [E83.42] Hypomagnesemia                  Rey Jenkins DO  11/19/24 0023

## 2024-11-19 NOTE — CASE MANAGEMENT
Case Management Assessment & Discharge Planning Note    Patient name Ileana Seaman  Location /-01 MRN [id number]  : 1960 Date 2024       Current Admission Date: 2024  Current Admission Diagnosis:COPD exacerbation (HCC)   Patient Active Problem List    Diagnosis Date Noted Date Diagnosed    COPD exacerbation (HCC) 2024     Atrial fibrillation (HCC) 2024     Chronic hypoxic respiratory failure (HCC) 2024     Epigastric pain 2024     New onset atrial fibrillation (HCC) 10/06/2024     Type 2 diabetes mellitus without complication, without long-term current use of insulin (HCC) 10/05/2024     Dysphagia 10/05/2024     Chronic obstructive pulmonary disease with acute lower respiratory infection (HCC) 10/04/2024     Mycobacterial infection, 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     Hypomagnesemia 10/04/2024       LOS (days): 0  Geometric Mean LOS (GMLOS) (days): 2.7  Days to GMLOS:2.3     OBJECTIVE:    Risk of Unplanned Readmission Score: 18.37         Current admission status: Inpatient  Referral Reason:  (dc planning)    Preferred Pharmacy:   [id number] - [place], [place] - [address]  [address]  [place] [address]  Phone: [phone] Fax: [phone]    Primary Care Provider: Pop Schwarz DO    Primary Insurance: MEDICARE  Secondary Insurance: [facility]    CM met with patient at the bedside,baseline information  was obtained. CM discussed the role of CM in helping the patient develop a discharge plan and assist the patient in carry out their plan.      ASSESSMENT:  Active Health Care Proxies    There are no active Health Care Proxies on file.       Advance Directives  Does patient have a Health Care POA?: No  Was patient offered paperwork?: Yes (provided)  Does patient currently have a Health Care decision maker?: Yes, please see Health Care Proxy section  Does patient have Advance Directives?: No  Was  patient offered paperwork?: Yes (provided)  Primary Contact: Ayde Seaman (Daughter)  [phone]       Provided pt Advance directive information/ POA and notary contact number.     Readmission Root Cause  30 Day Readmission: No    Patient Information  Admitted from:: Home  Mental Status: Alert  During Assessment patient was accompanied by: Not accompanied during assessment  Assessment information provided by:: Patient  Primary Caregiver: Self  Support Systems: Daughter, Family members  County of Residence: [facility]  What [facility] do you live in?: [place]  Home entry access options. Select all that apply.: Stairs  Number of steps to enter home.: 1  Type of Current Residence: 3 story home  Upon entering residence, is there a bedroom on the main floor (no further steps)?: No  A bedroom is located on the following floor levels of residence (select all that apply):: 2nd Floor  Upon entering residence, is there a bathroom on the main floor (no further steps)?: No  Indicate which floors of current residence have a bathroom (select all the apply):: 2nd Floor  Number of steps to 2nd floor from main floor: One Flight  Living Arrangements: Lives Alone  Is patient a ?: No    Activities of Daily Living Prior to Admission  Functional Status: Independent  Completes ADLs independently?: Yes  Ambulates independently?: Yes  Does patient use assisted devices?: Yes  Assisted Devices (DME) used: Portable Oxygen concentrator, Portable Oxygen tanks, Nebulizer  DME Company Name (respiratory supplies): [employer], 2 liters  O2 Rate(s): 2 liters continuously  Does patient currently own DME?: Yes  What DME does the patient currently own?: Nebulizer  Does patient have a history of Outpatient Therapy (PT/OT)?: Yes  Does the patient have a history of Short-Term Rehab?: No  Does patient have a history of HHC?: No  Does patient currently have HHC?: No         Patient Information Continued  Income Source: Pension/prison  Does patient  have prescription coverage?: Yes  Does patient receive dialysis treatments?: No  Does patient have a history of substance abuse?: No  Does patient have a history of Mental Health Diagnosis?: No         Means of Transportation  Means of Transport to [facility]:: Family transport    Pt resides alone, in a 3 story home, has 3 adult children.  Pt is using 2 liters of 02 continuously and is going to discuss POC with Pulmonary Md in the future.  _ CM did follow up with [facility] about the process of obtaining a POC for patient, information was placed on the AVS.    DISCHARGE DETAILS:    Discharge planning discussed with:: patient  Freedom of Choice: Yes     CM contacted family/caregiver?: No- see comments        Requested Home Health Care         Is the patient interested in HHC at discharge?: No    DME Referral Provided  Referral made for DME?: No       Treatment Team Recommendation: Home  Discharge Destination Plan:: Home  Transport at Discharge : Other (Comment) family         CM will follow for dc needs.

## 2024-11-19 NOTE — SPEECH THERAPY NOTE
Speech Language/Pathology  Speech-Language Pathology Bedside Swallow Evaluation      Patient Name: Ileana Seaman    Today's Date: 11/19/2024     Summary   Consult received for bedside swallow assessment. Pt admitted w/ COPD exacerbation and mycobacterial infection. PMHx includes DM2, hypomagnesemia, AFIB. Pt hx of dysphagia, had VBS on last admission which demo'd significant esophageal retention. EGD completed a month later, reported to be overall normal w/ increase in meds.  Pt reports the increase in meds has not helped and she still has significant burning in her esophagus. Denies eating acidic foods- reported she did trial pizza yesterday and couldn't take more than a bite due to significant burning in her esophagus.   However, overall pt tolerated texture of regular textures and thin liquids. No overt s/s aspiration.    Recommend GI referral, possibly manometry vs further GI testing as EGD was clear.     Risk/s for Aspiration: Low     Recommended Diet: regular diet and thin liquids   Recommended Form of Meds: whole with puree   Aspiration precautions and swallowing strategies: upright posture  Other Recommendations: Continue frequent oral care        Current Medical Status  Ileana Seaman is a 64 y.o. female with a PMH of COPD, chronic hypoxic respiratory failure who presents with shortness of breath that started afternoon and worsened by the evening.  Patient wears 2 L of oxygen chronically.  She has also productive cough with yellow mucus production.  The patient is followed by pulmonary and also infectious disease.  She has been treated for mild Bactrim infection, non-TB.  She is companied by her daughter    Current Precautions:  Fall  Aspiration     Allergies:  No known food allergies    Past medical history:  Please see H&P for details    Special Studies:  CXR:  Chronic patchy density in the right upper lobe. No new pulmonary findings.     EGD November 2024:  The esophagus appeared normal.  Small  type I hiatal hernia  Mild erythematous mucosa, consistent with gastritis in the stomach and duodenal bulb; performed cold forceps biopsy  The 2nd part of the duodenum appeared normal.        RECOMMENDATION:  Follow up with GI Clinic   Return to the floor for ongoing care.  Consider outpatient Esophageal pH impedance testing  Continue GERD lifestyle & Diet modifications.  Avoid NSAIDs- Ibuprofen or similar OTC pain and antiinflammatory.  Avoid alcohol use.  Avoid smoking.  Continue PPI (eg:Pantoprazole or omeprazole).  Plan of care discussed with Primary team.     [facility] Oct 2024:     General Information;  Pt is a 64 y.o. female with a PMH remarkable for COPD, mycobacterial infection, DM2, AFIB.    Current concerns for dysphagia include RLL PNA and hx of dysphagia w/ globus sensation.   A VFSS was recommended to assess oropharyngeal stage swallowing skills at this time. Pt was viewed in lateral position and assessed with thin and nectar thick liquid (by teaspoon, single and successive cup/straw sips), puree, soft moist food (sandwich) and solid food (cracker) and a13mm pill with thin liquid.       Oral stage:  Pt presented with minimal oral stage dysphagia.     Lip closure:  no escape  Mastication: slow prolonged with complete re-collection  Bolus Transport/Lingual Motion: brisk  Oral residue:   at least mild residue on oral structures,   Tongue Control:  posterior escape of less than half of the bolus  Swallow Initiation: bolus head in pyriforms     Pharyngeal stage:  Pt presented with WFL pharyngeal dysphagia.      Soft palate elevation:  no bolus between soft palate and pharyngeal wall   Laryngeal elevation: complete    Anterior hyoid excursion:  complete   Epiglottic movement:  complete  Laryngeal vestibule closure:   complete   Tongue base retraction:  narrow  column of contrast/air between TB and PW  Pharyngeal Stripping: complete   Pharyngeal Contraction: did not complete AP view  PES opening:    partial with  partial obstruction to flow    Pharyngeal Residue:  No significant pharyngeal residue     Management of food/liquid/barium tablet follows:   All food, liquid and the barium tablet passed through the pharynx with no laryngeal penetration, aspiration or significant pharyngeal residue noted today.    Significant esophageal retention noted     Penetration/Aspiration:  Thin: PAS - 1  Puree: PAS- 1  Solid: PAS- 1  Response to Aspiration: N/A           8-Point Penetration-Aspiration Scale   1 Material does not enter the airway   2 Material enters the airway, remains above the vocal folds, and is ejected  from the  airway    3 Material enters the airway, remains above the vocal folds, and is not ejected from the airway   4 Material enters the airway, contacts the vocal folds, and is ejected from the airway   5 Material enters the airway, contacts the vocal folds, and is not ejected from the airway    6 Material enters the airway, passes below the vocal folds and is ejected into the larynx or out of the airway    7 Material enters the airway, passes below the vocal folds, and is not ejected from the trachea despite effort    8 Material enters the airway, passes below the vocal folds, and no effort is made to eject          Strategies and Efficacy: Required liquid wash following solids to decrease globus sensation/ esophageal retention     Aspiration Response and Efficacy:  N/A     Esophageal stage:  Brief view of esophagus was completed.  Re: esophageal clearance -esophageal retention is mid esophagus with retrograde flow below the PES      Assessment Summary:    Pt presents with minimal oropharyngeal dysphagia characterized by impaired BOT strength resulting in posterior spillage of liquids to the pyriforms. Laryngeal elevation and excursion and epiglottic inversion were all WFL. Airway protection was adequate  There was no aspiration or penetration on the study. No significant pharyngeal residuals however pt did utilize  piecemeal deglutition IND.  Stasis noted mid-esophagus and slow passage of solids/pill noted. Mild retrograde movement below PES with thin liquids. Pt reports globus sensation in pharynx when barium pill was lodged mid sternum. Eventually cleared w/ subsequent liquid washes.     .  Note: Images are available for review in PACS as desired.        Recommendations:   Recommended Diet:  soft/level 3 diet and thin liquids   Recommended Form of Medications: whole with puree   Aspiration precautions and compensatory swallowing strategies: upright posture and alternating bites and sips  Consider referral to:  GI consult  SLP Dysphagia therapy recommended: None at this time    Results Reviewed with: patient   Pt/Family Education: Completed. Education completed w/ images from VBS. Pt expressed understanding and reported she would like to remain on altered diet at this time    Social/Education/Vocational Hx:  Pt lives with family    Swallow Information   Current Risks for Dysphagia & Aspiration: known history of dysphagia  Current Symptoms/Concerns:  acid reflux concerns  Current Diet: regular diet and thin liquids   Baseline Diet: regular diet and thin liquids      Baseline Assessment   Behavior/Cognition: alert  Speech/Language Status: able to participate in conversation  Patient Positioning: upright in bed  Pain Status/Interventions/Response to Interventions:   No report of or nonverbal indications of pain.       Swallow Mechanism Exam  Facial: symmetrical  Labial: WFL  Lingual: WFL  Velum: symmetrical  Mandible: adequate ROM  Dentition: adequate  Vocal quality:clear/adequate   Volitional Cough: strong/productive   Respiratory Status: on 3L      Consistencies Assessed and Performance   Consistencies Administered: thin liquids and hard solids    Oral Stage: WFL  Mastication was adequate with the materials administered today.  Bolus formation and transfer were functional with no significant oral residue noted.  No overt s/s  reduced oral control.    Pharyngeal Stage: WFL  Swallow Mechanics:  Swallowing initiation appeared prompt.  Laryngeal rise was palpated and judged to be within functional limits.  No coughing, throat clearing, change in vocal quality or respiratory status noted today.     Esophageal Concerns: globus sensation and c/o burning    Summary and Recommendations (see above)    Results Reviewed with: patient     Treatment Recommended: None at this time    Leann Mendoza MS CCC-SLP  11/19/2024

## 2024-11-19 NOTE — ASSESSMENT & PLAN NOTE
Currently not on a medications  Will place on ISS  Follow up with PCP       Lab Results   Component Value Date    HGBA1C 6.5 (H) 11/13/2024

## 2024-11-19 NOTE — QUICK NOTE
"[facility] Internal Medicine Post Admit Check:     Date of visit: 11/19/24    The patient was admitted earlier to the [facility] Internal Medicine Service.  Please see initial intake history and physical for detailed admission history.  This is a supplemental update following a post admit checkup.    Subjective: Patient seen and examined at bedside.  She reports that she feels improved compared admission.  She reports that she still feels like her \"chest is tight\".  She was concerned that she did not receive her Trelegy inhaler on admission.  No other acute complaints at this time.    Exam: Physical Exam  Constitutional:       General: She is not in acute distress.     Appearance: Normal appearance. She is obese. She is not ill-appearing or toxic-appearing.   HENT:      Head: Normocephalic and atraumatic.   Eyes:      Extraocular Movements: Extraocular movements intact.      Pupils: Pupils are equal, round, and reactive to light.   Cardiovascular:      Rate and Rhythm: Normal rate and regular rhythm.   Pulmonary:      Effort: Pulmonary effort is normal.      Breath sounds: Wheezing present.      Comments: Expiratory wheezing L>R, diminished BS, on 2L NC  Abdominal:      General: Abdomen is flat.   Musculoskeletal:         General: Normal range of motion.   Skin:     General: Skin is warm.   Neurological:      General: No focal deficit present.      Mental Status: She is alert and oriented to person, place, and time. Mental status is at baseline.   Psychiatric:         Mood and Affect: Mood normal.         Behavior: Behavior normal.           Assessment and Plan:   COPD exacerbation  Continue IV Solu-Medrol 40 mg every 8 hours  Pulmonary toileting  DuoNebs   Mucinex  Incentive spirometry   Doxycycline 100 g twice daily  Patient is chronic O2 dependent, currently requiring 2 L nasal cannula.  Continue supplemental oxygen as needed to maintain O2 saturation greater than 88%.    For the rest of the plan please refer to " H&P    Mary Altamirano MD

## 2024-11-19 NOTE — SEPSIS NOTE
Sepsis Note   Ileana Seaman 64 y.o. female MRN: [id number]  Unit/Bed#: -01 Encounter: [id number]       Initial Sepsis Screening       Row Name 11/19/24 0722                Is the patient's history suggestive of a new or worsening infection? No  -SG                  User Key  (r) = Recorded By, (t) = Taken By, (c) = Cosigned By      Initials Name Provider Type    SG Kadie Davila PA-C Physician Assistant                        Body mass index is 19.23 kg/m².  Wt Readings from Last 1 Encounters:   11/19/24 46.2 kg (101 lb 12.8 oz)     IBW (Ideal Body Weight): 47.8 kg    Ideal body weight: 47.8 kg (105 lb 6.1 oz)    Flagging for sepsis, patient is meeting SIRS criteria with tachycardia and tachypnea. No evidence of infection noted at this time, treating for COPD exacerbation. Continue with abx for COPD exacerbation. Continue to monitor for signs of infection.     Kadie Davila PA-C

## 2024-11-19 NOTE — PLAN OF CARE
Problem: Nutrition/Hydration-ADULT  Goal: Nutrient/Hydration intake appropriate for improving, restoring or maintaining nutritional needs  Description: Monitor and assess patient's nutrition/hydration status for malnutrition. Collaborate with interdisciplinary team and initiate plan and interventions as ordered.  Monitor patient's weight and dietary intake as ordered or per policy. Utilize nutrition screening tool and intervene as necessary. Determine patient's food preferences and provide high-protein, high-caloric foods as appropriate.     INTERVENTIONS:  - Monitor oral intake, urinary output, labs, and treatment plans  - Assess nutrition and hydration status and recommend course of action  - Evaluate amount of meals eaten  - Assist patient with eating if necessary   - Allow adequate time for meals  - Recommend/ encourage appropriate diets, oral nutritional supplements, and vitamin/mineral supplements  - Order, calculate, and assess calorie counts as needed  - Recommend, monitor, and adjust tube feedings and TPN/PPN based on assessed needs  - Assess need for intravenous fluids  - Provide specific nutrition/hydration education as appropriate  - Include patient/family/caregiver in decisions related to nutrition  Outcome: Progressing     Problem: PAIN - ADULT  Goal: Verbalizes/displays adequate comfort level or baseline comfort level  Description: Interventions:  - Encourage patient to monitor pain and request assistance  - Assess pain using appropriate pain scale  - Administer analgesics based on type and severity of pain and evaluate response  - Implement non-pharmacological measures as appropriate and evaluate response  - Consider cultural and social influences on pain and pain management  - Notify physician/advanced practitioner if interventions unsuccessful or patient reports new pain  Outcome: Progressing     Problem: INFECTION - ADULT  Goal: Absence or prevention of progression during  hospitalization  Description: INTERVENTIONS:  - Assess and monitor for signs and symptoms of infection  - Monitor lab/diagnostic results  - Monitor all insertion sites, i.e. indwelling lines, tubes, and drains  - Monitor endotracheal if appropriate and nasal secretions for changes in amount and color  - [place] appropriate cooling/warming therapies per order  - Administer medications as ordered  - Instruct and encourage patient and family to use good hand hygiene technique  - Identify and instruct in appropriate isolation precautions for identified infection/condition  Outcome: Progressing  Goal: Absence of fever/infection during neutropenic period  Description: INTERVENTIONS:  - Monitor WBC    Outcome: Progressing     Problem: SAFETY ADULT  Goal: Patient will remain free of falls  Description: INTERVENTIONS:  - Educate patient/family on patient safety including physical limitations  - Instruct patient to call for assistance with activity   - Consult OT/PT to assist with strengthening/mobility   - Keep Call bell within reach  - Keep bed low and locked with side rails adjusted as appropriate  - Keep care items and personal belongings within reach  - Initiate and maintain comfort rounds  - Make Fall Risk Sign visible to staff  - Offer Toileting every 2 Hours, in advance of need  - Initiate/Maintain bed/chair alarm  - Obtain necessary fall risk management equipment: nonskid socks  - Apply yellow socks and bracelet for high fall risk patients  - Consider moving patient to room near nurses station  Outcome: Progressing  Goal: Maintain or return to baseline ADL function  Description: INTERVENTIONS:  -  Assess patient's ability to carry out ADLs; assess patient's baseline for ADL function and identify physical deficits which impact ability to perform ADLs (bathing, care of mouth/teeth, toileting, grooming, dressing, etc.)  - Assess/evaluate cause of self-care deficits   - Assess range of motion  - Assess patient's  mobility; develop plan if impaired  - Assess patient's need for assistive devices and provide as appropriate  - Encourage maximum independence but intervene and supervise when necessary  - Involve family in performance of ADLs  - Assess for home care needs following discharge   - Consider OT consult to assist with ADL evaluation and planning for discharge  - Provide patient education as appropriate  Outcome: Progressing  Goal: Maintains/Returns to pre admission functional level  Description: INTERVENTIONS:  - Perform AM-PAC 6 Click Basic Mobility/ Daily Activity assessment daily.  - Set and communicate daily mobility goal to care team and patient/family/caregiver.   - Collaborate with rehabilitation services on mobility goals if consulted  - Perform Range of Motion 3 times a day.  - Reposition patient every 2 hours.  - Dangle patient 3 times a day  - Stand patient 3 times a day  - Ambulate patient 3 times a day  - Out of bed to chair 3 times a day   - Out of bed for meals 3 times a day  - Out of bed for toileting  - Record patient progress and toleration of activity level   Outcome: Progressing     Problem: DISCHARGE PLANNING  Goal: Discharge to home or other facility with appropriate resources  Description: INTERVENTIONS:  - Identify barriers to discharge w/patient and caregiver  - Arrange for needed discharge resources and transportation as appropriate  - Identify discharge learning needs (meds, wound care, etc.)  - Arrange for interpretive services to assist at discharge as needed  - Refer to Case Management Department for coordinating discharge planning if the patient needs post-hospital services based on physician/advanced practitioner order or complex needs related to functional status, cognitive ability, or social support system  Outcome: Progressing     Problem: Knowledge Deficit  Goal: Patient/family/caregiver demonstrates understanding of disease process, treatment plan, medications, and discharge  instructions  Description: Complete learning assessment and assess knowledge base.  Interventions:  - Provide teaching at level of understanding  - Provide teaching via preferred learning methods  Outcome: Progressing     Problem: RESPIRATORY - ADULT  Goal: Achieves optimal ventilation and oxygenation  Description: INTERVENTIONS:  - Assess for changes in respiratory status  - Assess for changes in mentation and behavior  - Position to facilitate oxygenation and minimize respiratory effort  - Oxygen administered by appropriate delivery if ordered  - Initiate smoking cessation education as indicated  - Encourage broncho-pulmonary hygiene including cough, deep breathe, Incentive Spirometry  - Assess the need for suctioning and aspirate as needed  - Assess and instruct to report SOB or any respiratory difficulty  - Respiratory Therapy support as indicated  Outcome: Progressing     Problem: METABOLIC, FLUID AND ELECTROLYTES - ADULT  Goal: Electrolytes maintained within normal limits  Description: INTERVENTIONS:  - Monitor labs and assess patient for signs and symptoms of electrolyte imbalances  - Administer electrolyte replacement as ordered  - Monitor response to electrolyte replacements, including repeat lab results as appropriate  - Instruct patient on fluid and nutrition as appropriate  Outcome: Progressing  Goal: Fluid balance maintained  Description: INTERVENTIONS:  - Monitor labs   - Monitor I/O and WT  - Instruct patient on fluid and nutrition as appropriate  - Assess for signs & symptoms of volume excess or deficit  Outcome: Progressing  Goal: Glucose maintained within target range  Description: INTERVENTIONS:  - Monitor Blood Glucose as ordered  - Assess for signs and symptoms of hyperglycemia and hypoglycemia  - Administer ordered medications to maintain glucose within target range  - Assess nutritional intake and initiate nutrition service referral as needed  Outcome: Progressing     Problem: SKIN/TISSUE  INTEGRITY - ADULT  Goal: Skin Integrity remains intact(Skin Breakdown Prevention)  Description: Assess:  -Perform Myles assessment every shift  -Clean and moisturize skin every 2 hrs  -Inspect skin when repositioning, toileting, and assisting with ADLS  -Assess under medical devices  -Assess extremities for adequate circulation and sensation     Bed Management:  -Have minimal linens on bed & keep smooth, unwrinkled  -Change linens as needed when moist or perspiring  -Avoid sitting or lying in one position for more than 2 hours while in bed  -Keep HOB at 30 degrees     Toileting:  -Offer bedside commode  -Assess for incontinence every 2 hrs  -Use incontinent care products after each incontinent episode    Activity:  -Mobilize patient 3 times a day  -Encourage activity and walks on unit  -Encourage or provide ROM exercises   -Turn and reposition patient every 2 Hours  -Use appropriate equipment to lift or move patient in bed  -Instruct/ Assist with weight shifting every 60 mins when out of bed in chair  -Consider limitation of chair time 2 hour intervals    Skin Care:  -Avoid use of baby powder, tape, friction and shearing, hot water or constrictive clothing  -Relieve pressure over bony prominences using silicone cream, pillows, waffle cushion for protection  -Do not massage red bony areas    Next Steps:  -Teach patient strategies to minimize risks  -Consider consults to  interdisciplinary teams  Outcome: Progressing  Goal: Incision(s), wounds(s) or drain site(s) healing without S/S of infection  Description: INTERVENTIONS  - Assess and document dressing, incision, wound bed, drain sites and surrounding tissue  - Provide patient and family education  - Perform skin care/dressing changes every shift  Outcome: Progressing  Goal: Pressure injury heals and does not worsen  Description: Interventions:  - Implement low air loss mattress or specialty surface (Criteria met)  - Apply silicone foam dressing  - Instruct/assist  with weight shifting every 60 minutes when in chair   - Limit chair time to 2 hour intervals  - Use special pressure reducing interventions such as waffle cushion when in chair   - Apply fecal or urinary incontinence containment device   - Perform passive or active ROM  - Turn and reposition patient & offload bony prominences every 2 hours   - Utilize friction reducing device or surface for transfers   - Consider consults to  interdisciplinary teams  - Use incontinent care products after each incontinent episode  - Consider nutrition services referral as needed  Outcome: Progressing     Problem: MUSCULOSKELETAL - ADULT  Goal: Maintain or return mobility to safest level of function  Description: INTERVENTIONS:  - Assess patient's ability to carry out ADLs; assess patient's baseline for ADL function and identify physical deficits which impact ability to perform ADLs (bathing, care of mouth/teeth, toileting, grooming, dressing, etc.)  - Assess/evaluate cause of self-care deficits   - Assess range of motion  - Assess patient's mobility  - Assess patient's need for assistive devices and provide as appropriate  - Encourage maximum independence but intervene and supervise when necessary  - Involve family in performance of ADLs  - Assess for home care needs following discharge   - Consider OT consult to assist with ADL evaluation and planning for discharge  - Provide patient education as appropriate  Outcome: Progressing  Goal: Maintain proper alignment of affected body part  Description: INTERVENTIONS:  - Support, maintain and protect limb and body alignment  - Provide patient/ family with appropriate education  Outcome: Progressing

## 2024-11-19 NOTE — H&P
H&P - Hospitalist   Name: Ileana Seaman 64 y.o. female I MRN: [id number]  Unit/Bed#: ED 07 I Date of Admission: 11/18/2024   Date of Service: 11/19/2024 I Hospital Day: 0     Assessment & Plan  COPD exacerbation (HCC)  Doxycycline 100 mg twice daily  IV Solu-Medrol 40 mg every 8 hours  Breathing treatments  Mucinex  Mycobacterial infection, non-TB  Follows with [facility] infectious disease  Continue Zithromax every other day, rifampin and Ethambutol daily   Type 2 diabetes mellitus without complication, without long-term current use of insulin (HCC)  Currently not on a medications  Will place on ISS  Follow up with PCP       Lab Results   Component Value Date    HGBA1C 6.5 (H) 11/13/2024     Hypomagnesemia  Magnesium: Is 1.6  She received 1 g of IV mag for her COPD exacerbation.  I will give her another 2 g IV mag  Atrial fibrillation (HCC)  Continue on Lopressor  Not on anticoagulation  Chronic hypoxic respiratory failure (HCC)  On home oxygen at 2 L      VTE Pharmacologic Prophylaxis: VTE Score: 4 Moderate Risk (Score 3-4) - Pharmacological DVT Prophylaxis Ordered: heparin.  Code Status: Level 1 - Full Code   Discussion with family: Updated  (daughter) at bedside.    Anticipated Length of Stay: Patient will be admitted on an inpatient basis with an anticipated length of stay of greater than 2 midnights secondary to COPD exacerbation.    History of Present Illness   Chief Complaint: Shortness of breath    Ileana Seaman is a 64 y.o. female with a PMH of COPD, chronic hypoxic respiratory failure who presents with shortness of breath that started afternoon and worsened by the evening.  Patient wears 2 L of oxygen chronically.  She has also productive cough with yellow mucus production.  The patient is followed by pulmonary and also infectious disease.  She has been treated for mild Bactrim infection, non-TB.  She is companied by her daughter    Review of Systems   Constitutional: Negative.    HENT:  Negative.     Respiratory:  Positive for cough, chest tightness and shortness of breath.    Cardiovascular: Negative.    Gastrointestinal: Negative.    Genitourinary: Negative.    Musculoskeletal: Negative.    Neurological:  Positive for weakness.       Historical Information   Past Medical History:   Diagnosis Date    COPD (chronic obstructive pulmonary disease) (HCC)     Hiatal hernia      Past Surgical History:   Procedure Laterality Date    CHOLECYSTECTOMY       Social History     Tobacco Use    Smoking status: Every Day     Current packs/day: 0.50     Types: Cigarettes    Smokeless tobacco: Never   Vaping Use    Vaping status: Never Used   Substance and Sexual Activity    Alcohol use: Never    Drug use: Never    Sexual activity: Not on file     E-Cigarette/Vaping    E-Cigarette Use Never User      E-Cigarette/Vaping Substances     Family history non-contributory  Social History:  Marital Status: Single   Occupation:       Meds/Allergies   I have reviewed home medications using recent Epic encounter.  Prior to Admission medications    Medication Sig Start Date End Date Taking? Authorizing Provider   azithromycin (ZITHROMAX) 500 MG tablet Take 250 mg by mouth daily    Historical Provider, MD   ethambutol (MYAMBUTOL) 400 mg tablet Take 800 mg by mouth daily    Historical Provider, MD   fluticasone-umeclidinium-vilanterol (Trelegy Ellipta) 100-62.5-25 mcg/actuation inhaler Inhale 1 puff daily Rinse mouth after use.    Historical Provider, MD   ipratropium-albuterol (DUO-NEB) 0.5-2.5 mg/3 mL nebulizer solution Take 3 mL by nebulization 4 (four) times a day 10/9/24   Juana Santana MD   lidocaine (LIDODERM) 5 % Apply 1 patch topically over 12 hours daily for 4 days Remove & Discard patch within 12 hours or as directed by MD 10/10/24 10/14/24  Juana Santana MD   metoprolol tartrate (LOPRESSOR) 25 mg tablet Take 1 tablet (25 mg total) by mouth every 12 (twelve) hours 10/9/24   Juana Santana MD   nicotine (NICODERM  CQ) 14 mg/24hr TD 24 hr patch Place 1 patch on the skin over 24 hours daily 10/10/24   Juana Santana MD   omeprazole (PriLOSEC) 40 MG capsule Take 1 capsule (40 mg total) by mouth 2 (two) times a day 30 minutes before meal 11/8/24 12/8/24  Rolando Parker MD   rifabutin (MYCOBUTIN) 150 mg capsule Take 300 mg by mouth daily    Historical Provider, MD     No Known Allergies    Objective :  Temp:  [99.2 °F (37.3 °C)-100.9 °F (38.3 °C)] 99.2 °F (37.3 °C)  HR:  [100-129] 103  BP: ()/(46-74) 92/46  Resp:  [19-30] 24  SpO2:  [95 %-98 %] 98 %  O2 Device: Nasal cannula  Nasal Cannula O2 Flow Rate (L/min):  [2 L/min] 2 L/min    Physical Exam  Constitutional:       Comments: Older than stated age, chronically ill and frail   HENT:      Head: Normocephalic and atraumatic.      Mouth/Throat:      Mouth: Mucous membranes are moist.   Eyes:      Extraocular Movements: Extraocular movements intact.      Conjunctiva/sclera: Conjunctivae normal.   Cardiovascular:      Rate and Rhythm: Tachycardia present.      Pulses: Normal pulses.      Heart sounds: Normal heart sounds.   Pulmonary:      Comments: Decreased breath sounds, bilateral rhonchi and wheezing  Abdominal:      General: There is no distension.      Palpations: Abdomen is soft.      Tenderness: There is no abdominal tenderness. There is no guarding.   Musculoskeletal:         General: Normal range of motion.      Right lower leg: No edema.      Left lower leg: No edema.   Skin:     General: Skin is warm and dry.   Neurological:      General: No focal deficit present.      Mental Status: She is oriented to person, place, and time. Mental status is at baseline.   Psychiatric:         Mood and Affect: Mood normal.         Behavior: Behavior normal.         Thought Content: Thought content normal.                 Lab Results: I have reviewed the following results:  Results from last 7 days   Lab Units 11/18/24  2250   WBC Thousand/uL 16.67*   HEMOGLOBIN g/dL 14.2    HEMATOCRIT % 44.3   PLATELETS Thousands/uL 207   SEGS PCT % 82*   LYMPHO PCT % 11*   MONO PCT % 7   EOS PCT % 0     Results from last 7 days   Lab Units 11/18/24  2250   SODIUM mmol/L 136   POTASSIUM mmol/L 3.8   CHLORIDE mmol/L 100   CO2 mmol/L 25   BUN mg/dL 8   CREATININE mg/dL 0.54*   ANION GAP mmol/L 11   CALCIUM mg/dL 9.5   ALBUMIN g/dL 4.5   TOTAL BILIRUBIN mg/dL 0.46   ALK PHOS U/L 90   ALT U/L 10   AST U/L 14   GLUCOSE RANDOM mg/dL 123     Results from last 7 days   Lab Units 11/18/24  2250   INR  1.00         Lab Results   Component Value Date    HGBA1C 6.5 (H) 11/13/2024    HGBA1C 6.4 (H) 10/04/2024    HGBA1C 5.9 (H) 05/22/2023     Results from last 7 days   Lab Units 11/19/24  0007 11/18/24  2250   LACTIC ACID mmol/L 0.7  --    PROCALCITONIN ng/ml  --  <0.05       Imaging Results Review: I reviewed radiology reports from this admission including: CT chest.  Other Study Results Review: EKG was personally reviewed and my interpretation is: Sinus Tachycardia. ..    Administrative Statements     Medical decision making: Moderate  Diagnosis addressed: Chronic exacerbation of COPD  Data:   Reviewed  CBC, CMP, VBG, mag, troponin, lactic acid, Pro-Yehuda  Ordered CBC, BMP, mag  Reviewed external notes from pulmonary infectious disease  Independent interpretation of testing EKG/telemetry: Sinus tachycardia  Discussion of management with ER provider: Started on IV doxycycline, breathing treatment, steroids        Risk:  Prescription drug management: IV doxycycline, IV steroids  Discussion for hospitalization with ER provider: Requires hospitalization for COPD exacerbation,        ** Please Note: This note has been constructed using a voice recognition system. **

## 2024-11-19 NOTE — ASSESSMENT & PLAN NOTE
Follows with [facility] infectious disease  Continue Zithromax every other day, rifampin and Ethambutol daily

## 2024-11-19 NOTE — ASSESSMENT & PLAN NOTE
Magnesium: Is 1.6  She received 1 g of IV mag for her COPD exacerbation.  I will give her another 2 g IV mag

## 2024-11-19 NOTE — DISCHARGE INSTR - OTHER ORDERS
"Portable 02 Concentrator:  We contacted [facility] about obtaining you a Portable 02 concentrator (POC), they requested that you follow up with your Pulmonary doctor as an outpatient.  You need an outside provider to order the POC.  The process is Pulm needs to write an order \" evaluate for conserving device.\"   You would take this order to [facility], as they will do the testing to assure your 02 needs are being met with the POC and then they will work with your Pulmonary MD.  "

## 2024-11-20 PROBLEM — E44.0 MODERATE PROTEIN-CALORIE MALNUTRITION (HCC): Status: ACTIVE | Noted: 2024-11-20

## 2024-11-20 LAB
ANION GAP SERPL CALCULATED.3IONS-SCNC: 4 MMOL/L (ref 4–13)
ATRIAL RATE: 71 BPM
BUN SERPL-MCNC: 15 MG/DL (ref 5–25)
CALCIUM SERPL-MCNC: 8.9 MG/DL (ref 8.4–10.2)
CHLORIDE SERPL-SCNC: 107 MMOL/L (ref 96–108)
CO2 SERPL-SCNC: 29 MMOL/L (ref 21–32)
CREAT SERPL-MCNC: 0.57 MG/DL (ref 0.6–1.3)
GFR SERPL CREATININE-BSD FRML MDRD: 96 ML/MIN/1.73SQ M
GLUCOSE SERPL-MCNC: 105 MG/DL (ref 65–140)
GLUCOSE SERPL-MCNC: 121 MG/DL (ref 65–140)
GLUCOSE SERPL-MCNC: 133 MG/DL (ref 65–140)
GLUCOSE SERPL-MCNC: 151 MG/DL (ref 65–140)
GLUCOSE SERPL-MCNC: 175 MG/DL (ref 65–140)
P AXIS: 69 DEGREES
POTASSIUM SERPL-SCNC: 4.9 MMOL/L (ref 3.5–5.3)
PR INTERVAL: 166 MS
QRS AXIS: 76 DEGREES
QRSD INTERVAL: 92 MS
QT INTERVAL: 474 MS
QTC INTERVAL: 515 MS
SODIUM SERPL-SCNC: 140 MMOL/L (ref 135–147)
T WAVE AXIS: 71 DEGREES
VENTRICULAR RATE: 71 BPM

## 2024-11-20 PROCEDURE — 94760 N-INVAS EAR/PLS OXIMETRY 1: CPT

## 2024-11-20 PROCEDURE — 99232 SBSQ HOSP IP/OBS MODERATE 35: CPT | Performed by: STUDENT IN AN ORGANIZED HEALTH CARE EDUCATION/TRAINING PROGRAM

## 2024-11-20 PROCEDURE — 94664 DEMO&/EVAL PT USE INHALER: CPT

## 2024-11-20 PROCEDURE — 93010 ELECTROCARDIOGRAM REPORT: CPT | Performed by: INTERNAL MEDICINE

## 2024-11-20 PROCEDURE — 94668 MNPJ CHEST WALL SBSQ: CPT

## 2024-11-20 PROCEDURE — 82948 REAGENT STRIP/BLOOD GLUCOSE: CPT

## 2024-11-20 PROCEDURE — 94640 AIRWAY INHALATION TREATMENT: CPT

## 2024-11-20 PROCEDURE — 80048 BASIC METABOLIC PNL TOTAL CA: CPT | Performed by: STUDENT IN AN ORGANIZED HEALTH CARE EDUCATION/TRAINING PROGRAM

## 2024-11-20 RX ORDER — LORAZEPAM 0.5 MG/1
0.5 TABLET ORAL EVERY 8 HOURS PRN
Status: COMPLETED | OUTPATIENT
Start: 2024-11-20 | End: 2024-11-20

## 2024-11-20 RX ORDER — ALBUTEROL SULFATE 90 UG/1
2 INHALANT RESPIRATORY (INHALATION) EVERY 4 HOURS PRN
Status: DISCONTINUED | OUTPATIENT
Start: 2024-11-20 | End: 2024-11-23 | Stop reason: HOSPADM

## 2024-11-20 RX ORDER — DOXYCYCLINE 100 MG/1
100 CAPSULE ORAL EVERY 12 HOURS SCHEDULED
Status: DISCONTINUED | OUTPATIENT
Start: 2024-11-20 | End: 2024-11-23 | Stop reason: HOSPADM

## 2024-11-20 RX ORDER — IPRATROPIUM BROMIDE AND ALBUTEROL SULFATE 2.5; .5 MG/3ML; MG/3ML
3 SOLUTION RESPIRATORY (INHALATION) ONCE
Status: DISCONTINUED | OUTPATIENT
Start: 2024-11-20 | End: 2024-11-20

## 2024-11-20 RX ADMIN — INSULIN LISPRO 1 UNITS: 100 INJECTION, SOLUTION INTRAVENOUS; SUBCUTANEOUS at 08:18

## 2024-11-20 RX ADMIN — IPRATROPIUM BROMIDE 0.5 MG: 0.5 SOLUTION RESPIRATORY (INHALATION) at 14:00

## 2024-11-20 RX ADMIN — HEPARIN SODIUM 5000 UNITS: 5000 INJECTION, SOLUTION INTRAVENOUS; SUBCUTANEOUS at 05:47

## 2024-11-20 RX ADMIN — GUAIFENESIN 600 MG: 600 TABLET ORAL at 08:18

## 2024-11-20 RX ADMIN — GUAIFENESIN 600 MG: 600 TABLET ORAL at 17:06

## 2024-11-20 RX ADMIN — METHYLPREDNISOLONE SODIUM SUCCINATE 40 MG: 40 INJECTION, POWDER, FOR SOLUTION INTRAMUSCULAR; INTRAVENOUS at 05:50

## 2024-11-20 RX ADMIN — NICOTINE 21 MG: 21 PATCH, EXTENDED RELEASE TRANSDERMAL at 08:18

## 2024-11-20 RX ADMIN — IPRATROPIUM BROMIDE 0.5 MG: 0.5 SOLUTION RESPIRATORY (INHALATION) at 20:12

## 2024-11-20 RX ADMIN — DOXYCYCLINE 100 MG: 100 INJECTION, POWDER, LYOPHILIZED, FOR SOLUTION INTRAVENOUS at 02:02

## 2024-11-20 RX ADMIN — ETHAMBUTOL HYDROCHLORIDE 800 MG: 400 TABLET, FILM COATED ORAL at 08:18

## 2024-11-20 RX ADMIN — METHYLPREDNISOLONE SODIUM SUCCINATE 40 MG: 40 INJECTION, POWDER, FOR SOLUTION INTRAMUSCULAR; INTRAVENOUS at 14:18

## 2024-11-20 RX ADMIN — DOXYCYCLINE 100 MG: 100 CAPSULE ORAL at 20:41

## 2024-11-20 RX ADMIN — SODIUM CHLORIDE, SODIUM GLUCONATE, SODIUM ACETATE, POTASSIUM CHLORIDE, MAGNESIUM CHLORIDE, SODIUM PHOSPHATE, DIBASIC, AND POTASSIUM PHOSPHATE 100 ML/HR: .53; .5; .37; .037; .03; .012; .00082 INJECTION, SOLUTION INTRAVENOUS at 20:41

## 2024-11-20 RX ADMIN — DOCUSATE SODIUM 100 MG: 100 CAPSULE, LIQUID FILLED ORAL at 08:18

## 2024-11-20 RX ADMIN — METOPROLOL TARTRATE 25 MG: 25 TABLET, FILM COATED ORAL at 08:18

## 2024-11-20 RX ADMIN — LEVALBUTEROL HYDROCHLORIDE 1.25 MG: 1.25 SOLUTION RESPIRATORY (INHALATION) at 07:25

## 2024-11-20 RX ADMIN — PANTOPRAZOLE SODIUM 40 MG: 40 TABLET, DELAYED RELEASE ORAL at 16:24

## 2024-11-20 RX ADMIN — HEPARIN SODIUM 5000 UNITS: 5000 INJECTION, SOLUTION INTRAVENOUS; SUBCUTANEOUS at 13:22

## 2024-11-20 RX ADMIN — HEPARIN SODIUM 5000 UNITS: 5000 INJECTION, SOLUTION INTRAVENOUS; SUBCUTANEOUS at 22:41

## 2024-11-20 RX ADMIN — LEVALBUTEROL HYDROCHLORIDE 1.25 MG: 1.25 SOLUTION RESPIRATORY (INHALATION) at 20:12

## 2024-11-20 RX ADMIN — LORAZEPAM 0.5 MG: 0.5 TABLET ORAL at 21:21

## 2024-11-20 RX ADMIN — DOCUSATE SODIUM 100 MG: 100 CAPSULE, LIQUID FILLED ORAL at 17:06

## 2024-11-20 RX ADMIN — METOPROLOL TARTRATE 25 MG: 25 TABLET, FILM COATED ORAL at 20:41

## 2024-11-20 RX ADMIN — DOXYCYCLINE 100 MG: 100 CAPSULE ORAL at 14:18

## 2024-11-20 RX ADMIN — METHYLPREDNISOLONE SODIUM SUCCINATE 40 MG: 40 INJECTION, POWDER, FOR SOLUTION INTRAMUSCULAR; INTRAVENOUS at 22:41

## 2024-11-20 RX ADMIN — RIFABUTIN 300 MG: 150 CAPSULE ORAL at 08:18

## 2024-11-20 RX ADMIN — PANTOPRAZOLE SODIUM 40 MG: 40 TABLET, DELAYED RELEASE ORAL at 05:46

## 2024-11-20 RX ADMIN — LEVALBUTEROL HYDROCHLORIDE 1.25 MG: 1.25 SOLUTION RESPIRATORY (INHALATION) at 14:00

## 2024-11-20 NOTE — PLAN OF CARE
Problem: Nutrition/Hydration-ADULT  Goal: Nutrient/Hydration intake appropriate for improving, restoring or maintaining nutritional needs  Description: Monitor and assess patient's nutrition/hydration status for malnutrition. Collaborate with interdisciplinary team and initiate plan and interventions as ordered.  Monitor patient's weight and dietary intake as ordered or per policy. Utilize nutrition screening tool and intervene as necessary. Determine patient's food preferences and provide high-protein, high-caloric foods as appropriate.     INTERVENTIONS:  - Monitor oral intake, urinary output, labs, and treatment plans  - Assess nutrition and hydration status and recommend course of action  - Evaluate amount of meals eaten  - Assist patient with eating if necessary   - Allow adequate time for meals  - Recommend/ encourage appropriate diets, oral nutritional supplements, and vitamin/mineral supplements  - Order, calculate, and assess calorie counts as needed  - Recommend, monitor, and adjust tube feedings and TPN/PPN based on assessed needs  - Assess need for intravenous fluids  - Provide specific nutrition/hydration education as appropriate  - Include patient/family/caregiver in decisions related to nutrition  Outcome: Progressing     Problem: PAIN - ADULT  Goal: Verbalizes/displays adequate comfort level or baseline comfort level  Description: Interventions:  - Encourage patient to monitor pain and request assistance  - Assess pain using appropriate pain scale  - Administer analgesics based on type and severity of pain and evaluate response  - Implement non-pharmacological measures as appropriate and evaluate response  - Consider cultural and social influences on pain and pain management  - Notify physician/advanced practitioner if interventions unsuccessful or patient reports new pain  Outcome: Progressing     Problem: INFECTION - ADULT  Goal: Absence or prevention of progression during  hospitalization  Description: INTERVENTIONS:  - Assess and monitor for signs and symptoms of infection  - Monitor lab/diagnostic results  - Monitor all insertion sites, i.e. indwelling lines, tubes, and drains  - Monitor endotracheal if appropriate and nasal secretions for changes in amount and color  - [place] appropriate cooling/warming therapies per order  - Administer medications as ordered  - Instruct and encourage patient and family to use good hand hygiene technique  - Identify and instruct in appropriate isolation precautions for identified infection/condition  Outcome: Progressing  Goal: Absence of fever/infection during neutropenic period  Description: INTERVENTIONS:  - Monitor WBC    Outcome: Progressing     Problem: SAFETY ADULT  Goal: Patient will remain free of falls  Description: INTERVENTIONS:  - Educate patient/family on patient safety including physical limitations  - Instruct patient to call for assistance with activity   - Consult OT/PT to assist with strengthening/mobility   - Keep Call bell within reach  - Keep bed low and locked with side rails adjusted as appropriate  - Keep care items and personal belongings within reach  - Initiate and maintain comfort rounds  - Make Fall Risk Sign visible to staff  - Offer Toileting every 2 Hours, in advance of need  - Initiate/Maintain bed/chair alarm  - Obtain necessary fall risk management equipment: nonskid socks  - Apply yellow socks and bracelet for high fall risk patients  - Consider moving patient to room near nurses station  Outcome: Progressing  Goal: Maintain or return to baseline ADL function  Description: INTERVENTIONS:  -  Assess patient's ability to carry out ADLs; assess patient's baseline for ADL function and identify physical deficits which impact ability to perform ADLs (bathing, care of mouth/teeth, toileting, grooming, dressing, etc.)  - Assess/evaluate cause of self-care deficits   - Assess range of motion  - Assess patient's  mobility; develop plan if impaired  - Assess patient's need for assistive devices and provide as appropriate  - Encourage maximum independence but intervene and supervise when necessary  - Involve family in performance of ADLs  - Assess for home care needs following discharge   - Consider OT consult to assist with ADL evaluation and planning for discharge  - Provide patient education as appropriate  Outcome: Progressing  Goal: Maintains/Returns to pre admission functional level  Description: INTERVENTIONS:  - Perform AM-PAC 6 Click Basic Mobility/ Daily Activity assessment daily.  - Set and communicate daily mobility goal to care team and patient/family/caregiver.   - Collaborate with rehabilitation services on mobility goals if consulted  - Perform Range of Motion 3 times a day.  - Reposition patient every 2 hours.  - Dangle patient 3 times a day  - Stand patient 3 times a day  - Ambulate patient 3 times a day  - Out of bed to chair 3 times a day   - Out of bed for meals 3 times a day  - Out of bed for toileting  - Record patient progress and toleration of activity level   Outcome: Progressing     Problem: DISCHARGE PLANNING  Goal: Discharge to home or other facility with appropriate resources  Description: INTERVENTIONS:  - Identify barriers to discharge w/patient and caregiver  - Arrange for needed discharge resources and transportation as appropriate  - Identify discharge learning needs (meds, wound care, etc.)  - Arrange for interpretive services to assist at discharge as needed  - Refer to Case Management Department for coordinating discharge planning if the patient needs post-hospital services based on physician/advanced practitioner order or complex needs related to functional status, cognitive ability, or social support system  Outcome: Progressing     Problem: Knowledge Deficit  Goal: Patient/family/caregiver demonstrates understanding of disease process, treatment plan, medications, and discharge  instructions  Description: Complete learning assessment and assess knowledge base.  Interventions:  - Provide teaching at level of understanding  - Provide teaching via preferred learning methods  Outcome: Progressing

## 2024-11-20 NOTE — PROGRESS NOTES
Progress Note - Hospitalist   Name: Ileana Seaman 64 y.o. female I MRN: [id number]  Unit/Bed#: -01 I Date of Admission: 11/18/2024   Date of Service: 11/20/2024 I Hospital Day: 1    Assessment & Plan  COPD exacerbation (HCC)  Doxycycline 100 mg twice daily  IV Solu-Medrol 40 mg every 8 hours  Breathing treatments  Mucinex  Incentive spirometry  Mycobacterial infection, non-TB  Follows with [facility] infectious disease  Continue Zithromax every other day, rifampin and Ethambutol daily   Type 2 diabetes mellitus without complication, without long-term current use of insulin (HCC)  Currently not on a medications  Will place on ISS  Follow up with PCP       Lab Results   Component Value Date    HGBA1C 6.5 (H) 11/13/2024     Hypomagnesemia  Resolved  She received 1 g of IV mag for her COPD exacerbation.    Continue monitor and replete as necessary  Atrial fibrillation (HCC)  Continue on Lopressor  Not on anticoagulation  Chronic hypoxic respiratory failure (HCC)  On home oxygen at 2 L  Moderate protein-calorie malnutrition (HCC)  Malnutrition Findings:   Adult Malnutrition type: Chronic illness  Adult Degree of Malnutrition: Malnutrition of moderate degree  Malnutrition Characteristics: Muscle loss, Fat loss, Inadequate energy                  360 Statement: Chronic moderate malnutrition related to inadequate oral intake, GI intolerance, COPD/decreased appetite as evidenced by < 75% estimated energy intake > 1 mo; moderate fat loss to orbitals; moderate muscle loss to interosseous muscles. Treated with: Continue regular diet, +Glucerna TID noting type 2 DM with steroid use. Recommend daily weights for nutrition monitoring. GI consult as needed.    BMI Findings:           Body mass index is 19.23 kg/m².       VTE Pharmacologic Prophylaxis: VTE Score: 4 Moderate Risk (Score 3-4) - Pharmacological DVT Prophylaxis Ordered: heparin.    Mobility:   Basic Mobility Inpatient Raw Score: 22  -[facility] Goal: 7: Walk 25 feet or  "more  JH-HLM Achieved: 7: Walk 25 feet or more  JH-HLM Goal achieved. Continue to encourage appropriate mobility.    Patient Centered Rounds: I performed bedside rounds with nursing staff today.   Discussions with Specialists or Other Care Team Provider: None    Education and Discussions with Family / Patient: Patient declined call to .     Current Length of Stay: 1 day(s)  Current Patient Status: Inpatient   Certification Statement: The patient will continue to require additional inpatient hospital stay due to COPD exacerbation  Discharge Plan: Anticipate discharge in 24-48 hrs to home.    Code Status: Level 1 - Full Code    Subjective   Patient seen and examined at bedside.  No acute events overnight.  Patient reports that she still feels \"tight in her lungs\".  She does complain of GERD at home, for which she takes PPI twice daily.  Seen by nutrition today who recommend dietary supplements.  Patient reports she is not using incentive spirometry.  This was ordered for her.  No other acute complaints at this time.    Objective :  Temp:  [97 °F (36.1 °C)-97.5 °F (36.4 °C)] 97 °F (36.1 °C)  HR:  [] 79  BP: (128-148)/(71-93) 128/71  Resp:  [18-22] 18  SpO2:  [95 %-98 %] 95 %  O2 Device: Nasal cannula  Nasal Cannula O2 Flow Rate (L/min):  [2 L/min] 2 L/min    Body mass index is 19.23 kg/m².     Input and Output Summary (last 24 hours):     Intake/Output Summary (Last 24 hours) at 11/20/2024 1736  Last data filed at 11/20/2024 1641  Gross per 24 hour   Intake 738 ml   Output 3790 ml   Net -3052 ml       Physical Exam  Constitutional:       General: She is not in acute distress.     Appearance: Normal appearance. She is obese. She is not ill-appearing or toxic-appearing.   HENT:      Head: Normocephalic and atraumatic.   Eyes:      Extraocular Movements: Extraocular movements intact.      Pupils: Pupils are equal, round, and reactive to light.   Cardiovascular:      Rate and Rhythm: Normal rate and " regular rhythm.   Pulmonary:      Effort: Pulmonary effort is normal.      Comments:  diminished BS, on 2L NC  Abdominal:      General: Abdomen is flat.   Musculoskeletal:         General: Normal range of motion.   Skin:     General: Skin is warm.   Neurological:      General: No focal deficit present.      Mental Status: She is alert and oriented to person, place, and time. Mental status is at baseline.   Psychiatric:         Mood and Affect: Mood normal.         Behavior: Behavior normal.        Lines/Drains:              Lab Results: I have reviewed the following results:   Results from last 7 days   Lab Units 11/19/24  0509 11/18/24  2250   WBC Thousand/uL 13.54* 16.67*   HEMOGLOBIN g/dL 11.3* 14.2   HEMATOCRIT % 36.1 44.3   PLATELETS Thousands/uL 179 207   SEGS PCT %  --  82*   LYMPHO PCT %  --  11*   MONO PCT %  --  7   EOS PCT %  --  0     Results from last 7 days   Lab Units 11/20/24  0852 11/19/24  0509 11/18/24  2250   SODIUM mmol/L 140   < > 136   POTASSIUM mmol/L 4.9   < > 3.8   CHLORIDE mmol/L 107   < > 100   CO2 mmol/L 29   < > 25   BUN mg/dL 15   < > 8   CREATININE mg/dL 0.57*   < > 0.54*   ANION GAP mmol/L 4   < > 11   CALCIUM mg/dL 8.9   < > 9.5   ALBUMIN g/dL  --   --  4.5   TOTAL BILIRUBIN mg/dL  --   --  0.46   ALK PHOS U/L  --   --  90   ALT U/L  --   --  10   AST U/L  --   --  14   GLUCOSE RANDOM mg/dL 175*   < > 123    < > = values in this interval not displayed.     Results from last 7 days   Lab Units 11/18/24  2250   INR  1.00     Results from last 7 days   Lab Units 11/20/24  1601 11/20/24  1115 11/20/24  0727 11/19/24  2110 11/19/24  1557 11/19/24  1136 11/19/24  0718   POC GLUCOSE mg/dl 133 105 151* 170* 126 195* 213*         Results from last 7 days   Lab Units 11/19/24  0007 11/18/24  2250   LACTIC ACID mmol/L 0.7  --    PROCALCITONIN ng/ml  --  <0.05       Recent Cultures (last 7 days):   Results from last 7 days   Lab Units 11/18/24  2250   BLOOD CULTURE  No Growth at 24 hrs.  No  Growth at 24 hrs.             Last 24 Hours Medication List:     Current Facility-Administered Medications:     azithromycin (ZITHROMAX) tablet 250 mg, Every Other Day    docusate sodium (COLACE) capsule 100 mg, BID    doxycycline hyclate (VIBRAMYCIN) capsule 100 mg, Q12H MAYCO    ethambutol (MYAMBUTOL) tablet 800 mg, Daily    guaiFENesin (MUCINEX) 12 hr tablet 600 mg, BID    heparin (porcine) subcutaneous injection 5,000 Units, Q8H MAYCO    insulin lispro (HumALOG/ADMELOG) 100 units/mL subcutaneous injection 1-5 Units, 4x Daily (with meals and at bedtime) **AND** Fingerstick Glucose (POCT), 4x Daily AC and at bedtime    ipratropium (ATROVENT) 0.02 % inhalation solution 0.5 mg, TID    levalbuterol (XOPENEX) inhalation solution 1.25 mg, TID **AND** [DISCONTINUED] sodium chloride 0.9 % inhalation solution 3 mL, TID    methylPREDNISolone sodium succinate (Solu-MEDROL) injection 40 mg, Q8H    metoprolol tartrate (LOPRESSOR) tablet 25 mg, Q12H MAYCO    multi-electrolyte (PLASMALYTE-A/ISOLYTE-S PH 7.4) IV solution, Continuous, Last Rate: 100 mL/hr (11/19/24 1834)    nicotine (NICODERM CQ) 21 mg/24 hr TD 24 hr patch 21 mg, Daily    pantoprazole (PROTONIX) EC tablet 40 mg, BID AC    rifabutin (MYCOBUTIN) capsule 300 mg, Daily    Administrative Statements   Today, Patient Was Seen By: Mary Altamirano MD      **Please Note: This note may have been constructed using a voice recognition system.**

## 2024-11-20 NOTE — MALNUTRITION/BMI
This medical record reflects one or more clinical indicators suggestive of malnutrition.    Malnutrition Findings:   Adult Malnutrition type: Chronic illness  Adult Degree of Malnutrition: Malnutrition of moderate degree  Malnutrition Characteristics: Muscle loss, Fat loss, Inadequate energy                360 Statement: Chronic moderate malnutrition related to inadequate oral intake, GI intolerance, COPD/decreased appetite as evidenced by < 75% estimated energy intake > 1 mo; moderate fat loss to orbitals; moderate muscle loss to interosseous muscles. Treated with: Continue regular diet, +Glucerna TID noting type 2 DM with steroid use. Recommend daily weights for nutrition monitoring. GI consult as needed.    BMI Findings:           Body mass index is 19.23 kg/m².     See Nutrition note dated 11/20/24 for additional details.  Completed nutrition assessment is viewable in the nutrition documentation.

## 2024-11-20 NOTE — PLAN OF CARE
Problem: Nutrition/Hydration-ADULT  Goal: Nutrient/Hydration intake appropriate for improving, restoring or maintaining nutritional needs  Description: Monitor and assess patient's nutrition/hydration status for malnutrition. Collaborate with interdisciplinary team and initiate plan and interventions as ordered.  Monitor patient's weight and dietary intake as ordered or per policy. Utilize nutrition screening tool and intervene as necessary. Determine patient's food preferences and provide high-protein, high-caloric foods as appropriate.     INTERVENTIONS:  - Monitor oral intake, urinary output, labs, and treatment plans  - Assess nutrition and hydration status and recommend course of action  - Evaluate amount of meals eaten  - Assist patient with eating if necessary   - Allow adequate time for meals  - Recommend/ encourage appropriate diets, oral nutritional supplements, and vitamin/mineral supplements  - Order, calculate, and assess calorie counts as needed  - Recommend, monitor, and adjust tube feedings and TPN/PPN based on assessed needs  - Assess need for intravenous fluids  - Provide specific nutrition/hydration education as appropriate  - Include patient/family/caregiver in decisions related to nutrition  Outcome: Progressing     Problem: PAIN - ADULT  Goal: Verbalizes/displays adequate comfort level or baseline comfort level  Description: Interventions:  - Encourage patient to monitor pain and request assistance  - Assess pain using appropriate pain scale  - Administer analgesics based on type and severity of pain and evaluate response  - Implement non-pharmacological measures as appropriate and evaluate response  - Consider cultural and social influences on pain and pain management  - Notify physician/advanced practitioner if interventions unsuccessful or patient reports new pain  Outcome: Progressing     Problem: INFECTION - ADULT  Goal: Absence or prevention of progression during  hospitalization  Description: INTERVENTIONS:  - Assess and monitor for signs and symptoms of infection  - Monitor lab/diagnostic results  - Monitor all insertion sites, i.e. indwelling lines, tubes, and drains  - Monitor endotracheal if appropriate and nasal secretions for changes in amount and color  - [place] appropriate cooling/warming therapies per order  - Administer medications as ordered  - Instruct and encourage patient and family to use good hand hygiene technique  - Identify and instruct in appropriate isolation precautions for identified infection/condition  Outcome: Progressing  Goal: Absence of fever/infection during neutropenic period  Description: INTERVENTIONS:  - Monitor WBC    Outcome: Progressing     Problem: SAFETY ADULT  Goal: Patient will remain free of falls  Description: INTERVENTIONS:  - Educate patient/family on patient safety including physical limitations  - Instruct patient to call for assistance with activity   - Consult OT/PT to assist with strengthening/mobility   - Keep Call bell within reach  - Keep bed low and locked with side rails adjusted as appropriate  - Keep care items and personal belongings within reach  - Initiate and maintain comfort rounds  - Make Fall Risk Sign visible to staff  - Offer Toileting every 2 Hours, in advance of need  - Initiate/Maintain bed/chair alarm  - Obtain necessary fall risk management equipment: nonskid socks  - Apply yellow socks and bracelet for high fall risk patients  - Consider moving patient to room near nurses station  Outcome: Progressing  Goal: Maintain or return to baseline ADL function  Description: INTERVENTIONS:  -  Assess patient's ability to carry out ADLs; assess patient's baseline for ADL function and identify physical deficits which impact ability to perform ADLs (bathing, care of mouth/teeth, toileting, grooming, dressing, etc.)  - Assess/evaluate cause of self-care deficits   - Assess range of motion  - Assess patient's  mobility; develop plan if impaired  - Assess patient's need for assistive devices and provide as appropriate  - Encourage maximum independence but intervene and supervise when necessary  - Involve family in performance of ADLs  - Assess for home care needs following discharge   - Consider OT consult to assist with ADL evaluation and planning for discharge  - Provide patient education as appropriate  Outcome: Progressing  Goal: Maintains/Returns to pre admission functional level  Description: INTERVENTIONS:  - Perform AM-PAC 6 Click Basic Mobility/ Daily Activity assessment daily.  - Set and communicate daily mobility goal to care team and patient/family/caregiver.   - Collaborate with rehabilitation services on mobility goals if consulted  - Perform Range of Motion 3 times a day.  - Reposition patient every 2 hours.  - Dangle patient 3 times a day  - Stand patient 3 times a day  - Ambulate patient 3 times a day  - Out of bed to chair 3 times a day   - Out of bed for meals 3 times a day  - Out of bed for toileting  - Record patient progress and toleration of activity level   Outcome: Progressing     Problem: DISCHARGE PLANNING  Goal: Discharge to home or other facility with appropriate resources  Description: INTERVENTIONS:  - Identify barriers to discharge w/patient and caregiver  - Arrange for needed discharge resources and transportation as appropriate  - Identify discharge learning needs (meds, wound care, etc.)  - Arrange for interpretive services to assist at discharge as needed  - Refer to Case Management Department for coordinating discharge planning if the patient needs post-hospital services based on physician/advanced practitioner order or complex needs related to functional status, cognitive ability, or social support system  Outcome: Progressing     Problem: Knowledge Deficit  Goal: Patient/family/caregiver demonstrates understanding of disease process, treatment plan, medications, and discharge  instructions  Description: Complete learning assessment and assess knowledge base.  Interventions:  - Provide teaching at level of understanding  - Provide teaching via preferred learning methods  Outcome: Progressing     Problem: RESPIRATORY - ADULT  Goal: Achieves optimal ventilation and oxygenation  Description: INTERVENTIONS:  - Assess for changes in respiratory status  - Assess for changes in mentation and behavior  - Position to facilitate oxygenation and minimize respiratory effort  - Oxygen administered by appropriate delivery if ordered  - Initiate smoking cessation education as indicated  - Encourage broncho-pulmonary hygiene including cough, deep breathe, Incentive Spirometry  - Assess the need for suctioning and aspirate as needed  - Assess and instruct to report SOB or any respiratory difficulty  - Respiratory Therapy support as indicated  Outcome: Progressing     Problem: METABOLIC, FLUID AND ELECTROLYTES - ADULT  Goal: Electrolytes maintained within normal limits  Description: INTERVENTIONS:  - Monitor labs and assess patient for signs and symptoms of electrolyte imbalances  - Administer electrolyte replacement as ordered  - Monitor response to electrolyte replacements, including repeat lab results as appropriate  - Instruct patient on fluid and nutrition as appropriate  Outcome: Progressing  Goal: Fluid balance maintained  Description: INTERVENTIONS:  - Monitor labs   - Monitor I/O and WT  - Instruct patient on fluid and nutrition as appropriate  - Assess for signs & symptoms of volume excess or deficit  Outcome: Progressing  Goal: Glucose maintained within target range  Description: INTERVENTIONS:  - Monitor Blood Glucose as ordered  - Assess for signs and symptoms of hyperglycemia and hypoglycemia  - Administer ordered medications to maintain glucose within target range  - Assess nutritional intake and initiate nutrition service referral as needed  Outcome: Progressing     Problem: SKIN/TISSUE  INTEGRITY - ADULT  Goal: Skin Integrity remains intact(Skin Breakdown Prevention)  Description: Assess:  -Perform Myles assessment every shift  -Clean and moisturize skin every 2 hrs  -Inspect skin when repositioning, toileting, and assisting with ADLS  -Assess under medical devices  -Assess extremities for adequate circulation and sensation     Bed Management:  -Have minimal linens on bed & keep smooth, unwrinkled  -Change linens as needed when moist or perspiring  -Avoid sitting or lying in one position for more than 2 hours while in bed  -Keep HOB at 30 degrees     Toileting:  -Offer bedside commode  -Assess for incontinence every 2 hrs  -Use incontinent care products after each incontinent episode    Activity:  -Mobilize patient 3 times a day  -Encourage activity and walks on unit  -Encourage or provide ROM exercises   -Turn and reposition patient every 2 Hours  -Use appropriate equipment to lift or move patient in bed  -Instruct/ Assist with weight shifting every 60 mins when out of bed in chair  -Consider limitation of chair time 2 hour intervals    Skin Care:  -Avoid use of baby powder, tape, friction and shearing, hot water or constrictive clothing  -Relieve pressure over bony prominences using silicone cream, pillows, waffle cushion for protection  -Do not massage red bony areas    Next Steps:  -Teach patient strategies to minimize risks  -Consider consults to  interdisciplinary teams  Outcome: Progressing  Goal: Incision(s), wounds(s) or drain site(s) healing without S/S of infection  Description: INTERVENTIONS  - Assess and document dressing, incision, wound bed, drain sites and surrounding tissue  - Provide patient and family education  - Perform skin care/dressing changes every shift  Outcome: Progressing  Goal: Pressure injury heals and does not worsen  Description: Interventions:  - Implement low air loss mattress or specialty surface (Criteria met)  - Apply silicone foam dressing  - Instruct/assist  with weight shifting every 60 minutes when in chair   - Limit chair time to 2 hour intervals  - Use special pressure reducing interventions such as waffle cushion when in chair   - Apply fecal or urinary incontinence containment device   - Perform passive or active ROM  - Turn and reposition patient & offload bony prominences every 2 hours   - Utilize friction reducing device or surface for transfers   - Consider consults to  interdisciplinary teams  - Use incontinent care products after each incontinent episode  - Consider nutrition services referral as needed  Outcome: Progressing     Problem: MUSCULOSKELETAL - ADULT  Goal: Maintain or return mobility to safest level of function  Description: INTERVENTIONS:  - Assess patient's ability to carry out ADLs; assess patient's baseline for ADL function and identify physical deficits which impact ability to perform ADLs (bathing, care of mouth/teeth, toileting, grooming, dressing, etc.)  - Assess/evaluate cause of self-care deficits   - Assess range of motion  - Assess patient's mobility  - Assess patient's need for assistive devices and provide as appropriate  - Encourage maximum independence but intervene and supervise when necessary  - Involve family in performance of ADLs  - Assess for home care needs following discharge   - Consider OT consult to assist with ADL evaluation and planning for discharge  - Provide patient education as appropriate  Outcome: Progressing  Goal: Maintain proper alignment of affected body part  Description: INTERVENTIONS:  - Support, maintain and protect limb and body alignment  - Provide patient/ family with appropriate education  Outcome: Progressing     Problem: Prexisting or High Potential for Compromised Skin Integrity  Goal: Skin integrity is maintained or improved  Description: INTERVENTIONS:  - Identify patients at risk for skin breakdown  - Assess and monitor skin integrity  - Assess and monitor nutrition and hydration status  -  Monitor labs   - Assess for incontinence   - Turn and reposition patient  - Assist with mobility/ambulation  - Relieve pressure over bony prominences  - Avoid friction and shearing  - Provide appropriate hygiene as needed including keeping skin clean and dry  - Evaluate need for skin moisturizer/barrier cream  - Collaborate with interdisciplinary team   - Patient/family teaching  - Consider wound care consult   Outcome: Progressing

## 2024-11-20 NOTE — CONSULTS
Consult received for malnutrition.     Pt reports decreased oral intake due to hiatal hernia and reflux. Says she takes omeprazole twice per day at home which is helpful. If she is unable to eat a meal at home she drinks vanilla flavored ensure. Decreased appetite overall. Eating whatever she can at home so she can gain weight. Chat review of weight hx/limited: 10/4/24 106lb, 11/8/24 101lb, 11/19/24 101lb. 4.7% weight loss x 1 mo borderline significant. Pt with moderate fat and muscle loss noted.     Meets criteria for chronic moderate malnutrition. Continue regular diet, +Glucerna TID noting type 2 DM with steroid use. Recommend daily weights for nutrition monitoring. GI consult as needed.

## 2024-11-20 NOTE — ASSESSMENT & PLAN NOTE
Malnutrition Findings:   Adult Malnutrition type: Chronic illness  Adult Degree of Malnutrition: Malnutrition of moderate degree  Malnutrition Characteristics: Muscle loss, Fat loss, Inadequate energy                  360 Statement: Chronic moderate malnutrition related to inadequate oral intake, GI intolerance, COPD/decreased appetite as evidenced by < 75% estimated energy intake > 1 mo; moderate fat loss to orbitals; moderate muscle loss to interosseous muscles. Treated with: Continue regular diet, +Glucerna TID noting type 2 DM with steroid use. Recommend daily weights for nutrition monitoring. GI consult as needed.    BMI Findings:           Body mass index is 19.23 kg/m².

## 2024-11-20 NOTE — ASSESSMENT & PLAN NOTE
Resolved  She received 1 g of IV mag for her COPD exacerbation.    Continue monitor and replete as necessary

## 2024-11-20 NOTE — UTILIZATION REVIEW
Initial Clinical Review    Admission: Date/Time/Statement:   Admission Orders (From admission, onward)       Ordered        11/19/24 0111  INPATIENT ADMISSION  Once                          Orders Placed This Encounter   Procedures    INPATIENT ADMISSION     Standing Status:   Standing     Number of Occurrences:   1     Level of Care:   Med Surg [16]     Estimated length of stay:   More than 2 Midnights     Certification:   I certify that inpatient services are medically necessary for this patient for a duration of greater than two midnights. See H&P and MD Progress Notes for additional information about the patient's course of treatment.     ED Arrival Information       Expected   -    Arrival   11/18/2024 22:25    Acuity   Emergent              Means of arrival   Walk-In    Escorted by   Family Member    Service   Hospitalist    Admission type   Emergency              Arrival complaint   sob             Chief Complaint   Patient presents with    Shortness of Breath     Pt reports SOB that started this afternoon and worsened this evening. PMHx of COPD. Wears 2L/NC chronically. Also c/o a productive cough. Reports she was admitted for something similar to this a month ago when she was diagnosed with pneumonia.        Initial Presentation: 64 y.o. female to ED via walk-in from home  Present to ED with shortness of breath that started afternoon and worsened by the evening. Patient wears 2 L of oxygen chronically. She has also productive cough with yellow mucus production.   PMHX: COPD, chronic hypoxic respiratory failure   Admitted to [facility] with DX: COPD exacerbation   on exam: T 100.9; tachy; tachypnea; O2 @ 2L (baseline); WBC 16.67; Mg 1.6  PLAN: cont iv abx; cont ivf; cont solumedrol iv; cont DuoNebs; monitor labs      Anticipated Length of Stay/Certification Statement: Patient will be admitted on an inpatient basis with an anticipated length of stay of greater than 2 midnights secondary to COPD exacerbation.  "      Date: 11/20/24     Day 2   Patient reports that she still feels \"tight in her lungs\". Seen by nutrition today who recommend dietary supplements. Patient reports she is not using incentive spirometry. I/O net -3052; diminished BS, on 2L NC; Cr 0.57  Plan: cont iv abx; cont ivf; cont solumedrol iv; cont DuoNebs; monitor labs;encourage use of  I/S         ED Treatment-Medication Administration from 11/18/2024 2225 to 11/19/2024 0132         Date/Time Order Dose Route Action     11/18/2024 2259 methylPREDNISolone sodium succinate (Solu-MEDROL) injection 80 mg 80 mg Intravenous Given     11/18/2024 2258 ipratropium-albuterol (DUO-NEB) 0.5-2.5 mg/3 mL inhalation solution 3 mL 3 mL Nebulization Given     11/18/2024 2302 acetaminophen (TYLENOL) tablet 975 mg 975 mg Oral Given     11/18/2024 2259 sodium chloride 0.9 % bolus 500 mL 500 mL Intravenous New Bag     11/18/2024 2359 magnesium sulfate IVPB (premix) SOLN 1 g 1 g Intravenous New Bag     11/19/2024 0045 doxycycline (VIBRAMYCIN) 100 mg in sodium chloride 0.9 % 100 mL IVPB 100 mg Intravenous New Bag     11/19/2024 0001 albuterol inhalation solution 10 mg 10 mg Nebulization Given     11/19/2024 0002 ipratropium (ATROVENT) 0.02 % inhalation solution 1 mg 1 mg Nebulization Given     11/19/2024 0002 sodium chloride 0.9 % inhalation solution 12 mL 12 mL Nebulization Given     11/19/2024 0125 magnesium sulfate 2 g/50 mL IVPB (premix) 2 g 2 g Intravenous New Bag            Scheduled Medications:  azithromycin, 250 mg, Oral, Every Other Day  docusate sodium, 100 mg, Oral, BID  doxycycline, 100 mg, Intravenous, Q12H  ethambutol, 800 mg, Oral, Daily  guaiFENesin, 600 mg, Oral, BID  heparin (porcine), 5,000 Units, Subcutaneous, Q8H MAYCO  insulin lispro, 1-5 Units, Subcutaneous, 4x Daily (with meals and at bedtime)  ipratropium, 0.5 mg, Nebulization, TID  levalbuterol, 1.25 mg, Nebulization, TID  methylPREDNISolone sodium succinate, 40 mg, Intravenous, Q8H  metoprolol " tartrate, 25 mg, Oral, Q12H MAYCO  nicotine, 21 mg, Transdermal, Daily  pantoprazole, 40 mg, Oral, BID AC  rifabutin, 300 mg, Oral, Daily      Continuous IV Infusions:  multi-electrolyte, 100 mL/hr, Intravenous, Continuous      PRN Meds: None    albuterol, 2 puff, Inhalation, Q4H PRN      ED Triage Vitals   Temperature Pulse Respirations Blood Pressure SpO2 Pain Score   11/18/24 2237 11/18/24 2237 11/18/24 2237 11/18/24 2238 11/18/24 2238 11/18/24 2302   (!) 100.9 °F (38.3 °C) (!) 129 (!) 26 134/74 95 % Med Not Given for Pain - for MAR use only     Weight (last 2 days)       Date/Time Weight    11/20/24 1737 46.2 (101.8)    11/19/24 0143 46.2 (101.8)            Vital Signs (last 3 days)       Date/Time Temp Pulse Resp BP MAP (mmHg) SpO2 Calculated FIO2 (%) - Nasal Cannula Nasal Cannula O2 Flow Rate (L/min) O2 Device Patient Position - Orthostatic VS [place] Coma Scale Score Pain    11/20/24 22:07:29 97 °F (36.1 °C) 77 -- 145/88 107 96 % -- -- -- -- -- --    11/20/24 20:52:39 -- 125 -- 184/107 133 93 % 28 2 L/min -- -- 15 No Pain    11/20/24 2013 -- -- -- -- -- 96 % -- -- -- -- -- --    11/20/24 15:18:47 97 °F (36.1 °C) 79 -- 128/71 90 95 % -- -- -- -- -- --    11/20/24 0900 -- -- -- -- -- -- 28 2 L/min Nasal cannula -- -- No Pain    11/20/24 07:28:13 97 °F (36.1 °C) 77 18 136/83 101 98 % -- -- -- -- -- --    11/20/24 0725 -- -- -- -- -- -- 28 2 L/min Nasal cannula -- -- --    11/19/24 21:19:23 -- 105 -- 140/88 105 96 % -- -- -- -- -- --    11/19/24 2117 -- 105 -- 140/88 -- -- -- -- -- -- -- --    11/19/24 2054 -- -- -- -- -- 96 % 28 2 L/min Nasal cannula -- -- --    11/19/24 1935 -- -- -- -- -- -- -- -- -- -- 15 --    11/19/24 19:19:05 97.5 °F (36.4 °C) 111 22 148/93 111 96 % 28 2 L/min Nasal cannula Lying -- 3    11/19/24 1540 97.6 °F (36.4 °C) 111 22 119/57 82 96 % 28 2 L/min Nasal cannula Sitting -- 8    11/19/24 1441 -- -- -- -- -- 99 % 28 2 L/min Nasal cannula -- -- --    11/19/24 0916 -- -- -- -- -- 97 % 32 3  L/min Nasal cannula -- -- --    11/19/24 0846 -- 93 -- 100/59 76 96 % -- -- -- -- -- --    11/19/24 0800 -- -- -- -- -- -- -- -- -- -- 15 No Pain    11/19/24 0720 97.3 °F (36.3 °C) 91 22 89/52 68 97 % 32 3 L/min Nasal cannula Lying -- --    11/19/24 0145 -- -- -- -- -- -- -- -- -- -- 15 No Pain    11/19/24 0143 97.9 °F (36.6 °C) 116 24 92/55 71 97 % 32 3 L/min Nasal cannula Lying -- --    11/19/24 0115 -- 117 22 98/51 71 97 % 28 2 L/min -- Lying -- --    11/19/24 0100 -- 103 24 92/46 66 98 % -- -- -- -- -- --    11/19/24 0045 -- 106 24 94/50 70 97 % 28 2 L/min Nasal cannula -- -- --    11/19/24 0030 -- 104 19 91/50 67 97 % 28 2 L/min Nasal cannula -- -- --    11/19/24 0015 -- 100 25 94/55 68 98 % 28 2 L/min Nasal cannula -- -- --    11/19/24 0005 -- -- -- -- -- 96 % -- -- -- -- -- --    11/19/24 0000 99.2 °F (37.3 °C) 106 30 93/53 68 96 % -- -- -- -- -- --    11/18/24 2302 -- -- -- -- -- -- -- -- -- -- 15 Med Not Given for Pain - for MAR use only    11/18/24 2238 -- -- -- 134/74 96 95 % 28 2 L/min Nasal cannula Lying -- --    11/18/24 2237 100.9 °F (38.3 °C) 129 26 -- -- -- -- -- -- -- -- --              Pertinent Labs/Diagnostic Test Results:   Radiology:  XR chest 1 view portable   ED Interpretation by Rey Jenkins DO (11/18 4162)   Emphysema improving right upper lobe infiltrate      Final Interpretation by Rivas Stewart MD (11/19 1010)      Chronic patchy density in the right upper lobe. No new pulmonary findings.            Workstation performed: [id number]           Cardiology:  ECG 12 lead   Final Result by Jovon Gandara MD (11/20 7654)   Normal sinus rhythm   Prolonged QT   Abnormal ECG   When compared with ECG of 18-Nov-2024 22:39,   Vent. rate has decreased by  55 bpm   Confirmed by Jovon Gandara ([id number]) on 11/20/2024 7:40:49 AM      ECG 12 lead   Final Result by Jovon Gandara MD (11/19 0793)   Sinus tachycardia   Rightward axis   Borderline ECG   No previous ECGs available   Confirmed by Jovon Gandara  ([id number]) on 11/19/2024 7:28:51 AM          Results from last 7 days   Lab Units 11/19/24  0509 11/18/24  2250   WBC Thousand/uL 13.54* 16.67*   HEMOGLOBIN g/dL 11.3* 14.2   HEMATOCRIT % 36.1 44.3   PLATELETS Thousands/uL 179 207   TOTAL NEUT ABS Thousands/µL  --  13.50*        Results from last 7 days   Lab Units 11/20/24  0852 11/19/24  0509 11/18/24  2250   SODIUM mmol/L 140 137 136   POTASSIUM mmol/L 4.9 3.2* 3.8   CHLORIDE mmol/L 107 103 100   CO2 mmol/L 29 22 25   ANION GAP mmol/L 4 12 11   BUN mg/dL 15 8 8   CREATININE mg/dL 0.57* 0.70 0.54*   EGFR ml/min/1.73sq m 96 90 98   CALCIUM mg/dL 8.9 8.3* 9.5   MAGNESIUM mg/dL  --  2.6 1.6*     Results from last 7 days   Lab Units 11/18/24  2250   AST U/L 14   ALT U/L 10   ALK PHOS U/L 90   TOTAL PROTEIN g/dL 7.6   ALBUMIN g/dL 4.5   TOTAL BILIRUBIN mg/dL 0.46     Results from last 7 days   Lab Units 11/21/24  1107 11/21/24  0739 11/20/24  2128 11/20/24  1601 11/20/24  1115 11/20/24  0727 11/19/24  2110 11/19/24  1557 11/19/24  1136 11/19/24  0718   POC GLUCOSE mg/dl 204* 144* 121 133 105 151* 170* 126 195* 213*     Results from last 7 days   Lab Units 11/20/24  0852 11/19/24  0509 11/18/24  2250   GLUCOSE RANDOM mg/dL 175* 307* 123        Results from last 7 days   Lab Units 11/18/24  2250   PH KRISTI  7.367   PCO2 KRISTI mm Hg 47.6   PO2 KRISTI mm Hg 38.8   HCO3 KRISTI mmol/L 26.7   BASE EXC KRISTI mmol/L 0.7   O2 CONTENT KRISTI ml/dL 15.3   O2 HGB, VENOUS % 72.2        Results from last 7 days   Lab Units 11/19/24  0007 11/18/24  2250   HS TNI 0HR ng/L  --  6   HS TNI 2HR ng/L 7  --    HSTNI D2 ng/L 1  --         Results from last 7 days   Lab Units 11/18/24  2250   PROTIME seconds 13.6   INR  1.00   PTT seconds 29        Results from last 7 days   Lab Units 11/18/24  2250   PROCALCITONIN ng/ml <0.05     Results from last 7 days   Lab Units 11/19/24  0007   LACTIC ACID mmol/L 0.7        Results from last 7 days   Lab Units 11/18/24  2250   BNP pg/mL 48        Results from last 7 days    Lab Units 11/18/24  2250   CRP mg/L 63.6*         Past Medical History:   Diagnosis Date    COPD (chronic obstructive pulmonary disease) (HCC)     Hiatal hernia      Present on Admission:   COPD exacerbation (HCC)   Mycobacterial infection, non-TB   Type 2 diabetes mellitus without complication, without long-term current use of insulin (HCC)   Atrial fibrillation (HCC)   Hypomagnesemia   Chronic hypoxic respiratory failure (HCC)      Admitting Diagnosis: Hypomagnesemia [E83.42]  SOB (shortness of breath) [R06.02]  Tachycardia [R00.0]  Bronchitis [J40]  COPD exacerbation (HCC) [J44.1]  Fever [R50.9]  Age/Sex: 64 y.o. female    Network Utilization Review Department  ATTENTION: Please call with any questions or concerns to [phone] and carefully listen to the prompts so that you are directed to the right person. All voicemails are confidential.   For Discharge needs, contact Care Management DC Support Team at [phone] opt. 2  Send all requests for admission clinical reviews, approved or denied determinations and any other requests to dedicated fax number below belonging to the [facility] where the patient is receiving treatment. List of dedicated fax numbers for the Facilities:  FACILITY NAME UR FAX NUMBER   ADMISSION DENIALS (Administrative/Medical Necessity) [phone]   DISCHARGE SUPPORT TEAM (NETWORK) [phone]   PARENT CHILD HEALTH (Maternity/NICU/Pediatrics) [phone]   [facility] [phone]   [facility] [phone]   [facility] [phone]   [facility] [phone]   formerly [facility] [phone]   [facility] [phone]   [facility] [phone]   [facility] [phone]   [facility] [phone]   [facility]  [phone]   [facility] [phone]   [facility] [phone]

## 2024-11-20 NOTE — ASSESSMENT & PLAN NOTE
Doxycycline 100 mg twice daily  IV Solu-Medrol 40 mg every 8 hours  Breathing treatments  Mucinex  Incentive spirometry

## 2024-11-20 NOTE — CASE MANAGEMENT
Case Management Discharge Planning Note    Patient name Ileana Seaman  Location /-01 MRN [id number]  : 1960 Date 2024       Current Admission Date: 2024  Current Admission Diagnosis:COPD exacerbation (HCC)   Patient Active Problem List    Diagnosis Date Noted Date Diagnosed    Moderate protein-calorie malnutrition (HCC) 2024     COPD exacerbation (HCC) 2024     Atrial fibrillation (HCC) 2024     Chronic hypoxic respiratory failure (HCC) 2024     Epigastric pain 2024     New onset atrial fibrillation (HCC) 10/06/2024     Type 2 diabetes mellitus without complication, without long-term current use of insulin (HCC) 10/05/2024     Dysphagia 10/05/2024     Chronic obstructive pulmonary disease with acute lower respiratory infection (HCC) 10/04/2024     Mycobacterial infection, 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     Hypomagnesemia 10/04/2024       LOS (days): 1  Geometric Mean LOS (GMLOS) (days): 2.7  Days to GMLOS:1.1     OBJECTIVE:  Risk of Unplanned Readmission Score: 14.96         Current admission status: Inpatient   Preferred Pharmacy:   [facility] - [place], [place] - [address]  [address]  [place] [address]  Phone: [phone] Fax: [phone]    Primary Care Provider: Merline Dinero    Primary Insurance: MEDICARE  Secondary Insurance: [facility]    DISCHARGE DETAILS:     CM informed by Rona OP CM that Amna has spoken to Rubén after chart review and due to the patient seeing mostly [facility] physicians and that she is not KAC[employer] (just Medicare), they will have to decline this referral.      CM to follow patient's care and discharge needs.

## 2024-11-21 LAB
GLUCOSE SERPL-MCNC: 144 MG/DL (ref 65–140)
GLUCOSE SERPL-MCNC: 165 MG/DL (ref 65–140)
GLUCOSE SERPL-MCNC: 172 MG/DL (ref 65–140)
GLUCOSE SERPL-MCNC: 204 MG/DL (ref 65–140)

## 2024-11-21 PROCEDURE — 82948 REAGENT STRIP/BLOOD GLUCOSE: CPT

## 2024-11-21 PROCEDURE — 94664 DEMO&/EVAL PT USE INHALER: CPT

## 2024-11-21 PROCEDURE — 94668 MNPJ CHEST WALL SBSQ: CPT

## 2024-11-21 PROCEDURE — 94640 AIRWAY INHALATION TREATMENT: CPT

## 2024-11-21 PROCEDURE — 99232 SBSQ HOSP IP/OBS MODERATE 35: CPT | Performed by: STUDENT IN AN ORGANIZED HEALTH CARE EDUCATION/TRAINING PROGRAM

## 2024-11-21 PROCEDURE — 94760 N-INVAS EAR/PLS OXIMETRY 1: CPT

## 2024-11-21 RX ORDER — HYDROXYZINE HYDROCHLORIDE 25 MG/1
25 TABLET, FILM COATED ORAL ONCE
Status: COMPLETED | OUTPATIENT
Start: 2024-11-21 | End: 2024-11-21

## 2024-11-21 RX ORDER — METHYLPREDNISOLONE SODIUM SUCCINATE 40 MG/ML
40 INJECTION, POWDER, LYOPHILIZED, FOR SOLUTION INTRAMUSCULAR; INTRAVENOUS EVERY 12 HOURS SCHEDULED
Status: DISCONTINUED | OUTPATIENT
Start: 2024-11-22 | End: 2024-11-23 | Stop reason: HOSPADM

## 2024-11-21 RX ADMIN — INSULIN LISPRO 1 UNITS: 100 INJECTION, SOLUTION INTRAVENOUS; SUBCUTANEOUS at 21:15

## 2024-11-21 RX ADMIN — DOXYCYCLINE 100 MG: 100 CAPSULE ORAL at 12:00

## 2024-11-21 RX ADMIN — HEPARIN SODIUM 5000 UNITS: 5000 INJECTION, SOLUTION INTRAVENOUS; SUBCUTANEOUS at 14:27

## 2024-11-21 RX ADMIN — AZITHROMYCIN 250 MG: 250 TABLET, FILM COATED ORAL at 12:00

## 2024-11-21 RX ADMIN — NICOTINE 21 MG: 21 PATCH, EXTENDED RELEASE TRANSDERMAL at 09:34

## 2024-11-21 RX ADMIN — METHYLPREDNISOLONE SODIUM SUCCINATE 40 MG: 40 INJECTION, POWDER, FOR SOLUTION INTRAMUSCULAR; INTRAVENOUS at 06:10

## 2024-11-21 RX ADMIN — LEVALBUTEROL HYDROCHLORIDE 1.25 MG: 1.25 SOLUTION RESPIRATORY (INHALATION) at 13:32

## 2024-11-21 RX ADMIN — GUAIFENESIN 600 MG: 600 TABLET ORAL at 16:58

## 2024-11-21 RX ADMIN — IPRATROPIUM BROMIDE 0.5 MG: 0.5 SOLUTION RESPIRATORY (INHALATION) at 19:13

## 2024-11-21 RX ADMIN — DOXYCYCLINE 100 MG: 100 CAPSULE ORAL at 21:15

## 2024-11-21 RX ADMIN — METOPROLOL TARTRATE 25 MG: 25 TABLET, FILM COATED ORAL at 21:15

## 2024-11-21 RX ADMIN — HYDROXYZINE HYDROCHLORIDE 25 MG: 25 TABLET ORAL at 09:33

## 2024-11-21 RX ADMIN — PANTOPRAZOLE SODIUM 40 MG: 40 TABLET, DELAYED RELEASE ORAL at 06:10

## 2024-11-21 RX ADMIN — SODIUM CHLORIDE, SODIUM GLUCONATE, SODIUM ACETATE, POTASSIUM CHLORIDE, MAGNESIUM CHLORIDE, SODIUM PHOSPHATE, DIBASIC, AND POTASSIUM PHOSPHATE 100 ML/HR: .53; .5; .37; .037; .03; .012; .00082 INJECTION, SOLUTION INTRAVENOUS at 06:09

## 2024-11-21 RX ADMIN — RIFABUTIN 300 MG: 150 CAPSULE ORAL at 12:00

## 2024-11-21 RX ADMIN — HEPARIN SODIUM 5000 UNITS: 5000 INJECTION, SOLUTION INTRAVENOUS; SUBCUTANEOUS at 06:10

## 2024-11-21 RX ADMIN — IPRATROPIUM BROMIDE 0.5 MG: 0.5 SOLUTION RESPIRATORY (INHALATION) at 07:40

## 2024-11-21 RX ADMIN — IPRATROPIUM BROMIDE 0.5 MG: 0.5 SOLUTION RESPIRATORY (INHALATION) at 13:32

## 2024-11-21 RX ADMIN — DOCUSATE SODIUM 100 MG: 100 CAPSULE, LIQUID FILLED ORAL at 16:58

## 2024-11-21 RX ADMIN — HEPARIN SODIUM 5000 UNITS: 5000 INJECTION, SOLUTION INTRAVENOUS; SUBCUTANEOUS at 21:14

## 2024-11-21 RX ADMIN — INSULIN LISPRO 1 UNITS: 100 INJECTION, SOLUTION INTRAVENOUS; SUBCUTANEOUS at 16:00

## 2024-11-21 RX ADMIN — ETHAMBUTOL HYDROCHLORIDE 800 MG: 400 TABLET, FILM COATED ORAL at 12:00

## 2024-11-21 RX ADMIN — GUAIFENESIN 600 MG: 600 TABLET ORAL at 12:00

## 2024-11-21 RX ADMIN — METHYLPREDNISOLONE SODIUM SUCCINATE 40 MG: 40 INJECTION, POWDER, FOR SOLUTION INTRAMUSCULAR; INTRAVENOUS at 14:26

## 2024-11-21 RX ADMIN — INSULIN LISPRO 1 UNITS: 100 INJECTION, SOLUTION INTRAVENOUS; SUBCUTANEOUS at 12:00

## 2024-11-21 RX ADMIN — PANTOPRAZOLE SODIUM 40 MG: 40 TABLET, DELAYED RELEASE ORAL at 16:00

## 2024-11-21 RX ADMIN — LEVALBUTEROL HYDROCHLORIDE 1.25 MG: 1.25 SOLUTION RESPIRATORY (INHALATION) at 19:13

## 2024-11-21 RX ADMIN — LEVALBUTEROL HYDROCHLORIDE 1.25 MG: 1.25 SOLUTION RESPIRATORY (INHALATION) at 07:40

## 2024-11-21 RX ADMIN — METOPROLOL TARTRATE 25 MG: 25 TABLET, FILM COATED ORAL at 12:00

## 2024-11-21 RX ADMIN — DOCUSATE SODIUM 100 MG: 100 CAPSULE, LIQUID FILLED ORAL at 12:00

## 2024-11-21 NOTE — CASE MANAGEMENT
Case Management Discharge Planning Note    Patient name Ileana Seaman  Location /-01 MRN [id number]  : 1960 Date 2024       Current Admission Date: 2024  Current Admission Diagnosis:COPD exacerbation (HCC)   Patient Active Problem List    Diagnosis Date Noted Date Diagnosed    Moderate protein-calorie malnutrition (HCC) 2024     COPD exacerbation (HCC) 2024     Atrial fibrillation (HCC) 2024     Chronic hypoxic respiratory failure (HCC) 2024     Epigastric pain 2024     New onset atrial fibrillation (HCC) 10/06/2024     Type 2 diabetes mellitus without complication, without long-term current use of insulin (HCC) 10/05/2024     Dysphagia 10/05/2024     Chronic obstructive pulmonary disease with acute lower respiratory infection (HCC) 10/04/2024     Mycobacterial infection, 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     Hypomagnesemia 10/04/2024       LOS (days): 2  Geometric Mean LOS (GMLOS) (days): 2.7  Days to GMLOS:0.1     OBJECTIVE:  Risk of Unplanned Readmission Score: 15.55         Current admission status: Inpatient   Preferred Pharmacy:   [facility] - [place] - [address]  [address]  [place] [address]  Phone: [phone] Fax: [phone]    Primary Care Provider: Merline Dinero    Primary Insurance: MEDICARE  Secondary Insurance: [facility]    DISCHARGE DETAILS:     CM reviewed IMM with pt; No questions or concerns. Pt in agreement with rights, and is aware of the right to appeal d/c once medically stable if desired. IMM signed.    Patient denied any other needs from CM at this time.    CM to follow patient's care and discharge needs.                                                                                      IMM Given (Date):: 24  IMM Given to:: Patient                             Detail Level: Generalized General Sunscreen Counseling: I recommended a broad spectrum sunscreen with a SPF of 30 or higher.  I explained that SPF 30 sunscreens block approximately 97 percent of the sun's harmful rays.  Sunscreens should be applied at least 15 minutes prior to expected sun exposure and then every 2 hours after that as long as sun exposure continues. If swimming or exercising sunscreen should be reapplied every 45 minutes to an hour after getting wet or sweating.  One ounce, or the equivalent of a shot glass full of sunscreen, is adequate to protect the skin not covered by a bathing suit. I also recommended a lip balm with a sunscreen as well. Sun protective clothing can be used in lieu of sunscreen but must be worn the entire time you are exposed to the sun's rays.

## 2024-11-21 NOTE — PLAN OF CARE
Problem: Nutrition/Hydration-ADULT  Goal: Nutrient/Hydration intake appropriate for improving, restoring or maintaining nutritional needs  Description: Monitor and assess patient's nutrition/hydration status for malnutrition. Collaborate with interdisciplinary team and initiate plan and interventions as ordered.  Monitor patient's weight and dietary intake as ordered or per policy. Utilize nutrition screening tool and intervene as necessary. Determine patient's food preferences and provide high-protein, high-caloric foods as appropriate.     INTERVENTIONS:  - Monitor oral intake, urinary output, labs, and treatment plans  - Assess nutrition and hydration status and recommend course of action  - Evaluate amount of meals eaten  - Assist patient with eating if necessary   - Allow adequate time for meals  - Recommend/ encourage appropriate diets, oral nutritional supplements, and vitamin/mineral supplements  - Order, calculate, and assess calorie counts as needed  - Recommend, monitor, and adjust tube feedings and TPN/PPN based on assessed needs  - Assess need for intravenous fluids  - Provide specific nutrition/hydration education as appropriate  - Include patient/family/caregiver in decisions related to nutrition  Outcome: Progressing     Problem: PAIN - ADULT  Goal: Verbalizes/displays adequate comfort level or baseline comfort level  Description: Interventions:  - Encourage patient to monitor pain and request assistance  - Assess pain using appropriate pain scale  - Administer analgesics based on type and severity of pain and evaluate response  - Implement non-pharmacological measures as appropriate and evaluate response  - Consider cultural and social influences on pain and pain management  - Notify physician/advanced practitioner if interventions unsuccessful or patient reports new pain  Outcome: Progressing     Problem: INFECTION - ADULT  Goal: Absence or prevention of progression during  hospitalization  Description: INTERVENTIONS:  - Assess and monitor for signs and symptoms of infection  - Monitor lab/diagnostic results  - Monitor all insertion sites, i.e. indwelling lines, tubes, and drains  - Monitor endotracheal if appropriate and nasal secretions for changes in amount and color  - [place] appropriate cooling/warming therapies per order  - Administer medications as ordered  - Instruct and encourage patient and family to use good hand hygiene technique  - Identify and instruct in appropriate isolation precautions for identified infection/condition  Outcome: Progressing  Goal: Absence of fever/infection during neutropenic period  Description: INTERVENTIONS:  - Monitor WBC    Outcome: Progressing     Problem: SAFETY ADULT  Goal: Patient will remain free of falls  Description: INTERVENTIONS:  - Educate patient/family on patient safety including physical limitations  - Instruct patient to call for assistance with activity   - Consult OT/PT to assist with strengthening/mobility   - Keep Call bell within reach  - Keep bed low and locked with side rails adjusted as appropriate  - Keep care items and personal belongings within reach  - Initiate and maintain comfort rounds  - Make Fall Risk Sign visible to staff  - Offer Toileting every 2 Hours, in advance of need  - Initiate/Maintain bed/chair alarm  - Obtain necessary fall risk management equipment: nonskid socks  - Apply yellow socks and bracelet for high fall risk patients  - Consider moving patient to room near nurses station  Outcome: Progressing  Goal: Maintain or return to baseline ADL function  Description: INTERVENTIONS:  -  Assess patient's ability to carry out ADLs; assess patient's baseline for ADL function and identify physical deficits which impact ability to perform ADLs (bathing, care of mouth/teeth, toileting, grooming, dressing, etc.)  - Assess/evaluate cause of self-care deficits   - Assess range of motion  - Assess patient's  mobility; develop plan if impaired  - Assess patient's need for assistive devices and provide as appropriate  - Encourage maximum independence but intervene and supervise when necessary  - Involve family in performance of ADLs  - Assess for home care needs following discharge   - Consider OT consult to assist with ADL evaluation and planning for discharge  - Provide patient education as appropriate  Outcome: Progressing  Goal: Maintains/Returns to pre admission functional level  Description: INTERVENTIONS:  - Perform AM-PAC 6 Click Basic Mobility/ Daily Activity assessment daily.  - Set and communicate daily mobility goal to care team and patient/family/caregiver.   - Collaborate with rehabilitation services on mobility goals if consulted  - Perform Range of Motion 3 times a day.  - Reposition patient every 2 hours.  - Dangle patient 3 times a day  - Stand patient 3 times a day  - Ambulate patient 3 times a day  - Out of bed to chair 3 times a day   - Out of bed for meals 3 times a day  - Out of bed for toileting  - Record patient progress and toleration of activity level   Outcome: Progressing     Problem: DISCHARGE PLANNING  Goal: Discharge to home or other facility with appropriate resources  Description: INTERVENTIONS:  - Identify barriers to discharge w/patient and caregiver  - Arrange for needed discharge resources and transportation as appropriate  - Identify discharge learning needs (meds, wound care, etc.)  - Arrange for interpretive services to assist at discharge as needed  - Refer to Case Management Department for coordinating discharge planning if the patient needs post-hospital services based on physician/advanced practitioner order or complex needs related to functional status, cognitive ability, or social support system  Outcome: Progressing

## 2024-11-21 NOTE — UTILIZATION REVIEW
NOTIFICATION OF INPATIENT ADMISSION   AUTHORIZATION REQUEST   SERVICING FACILITY:   [place] [address]  Tax ID: [id number]  NPI: [id number] ATTENDING PROVIDER:  Attending Name and NPI#: Mary Altamirano Md [[id number]]  Address: [address] [place] [address]  Phone: [phone]   ADMISSION INFORMATION:  Place of Service: Inpatient [facility]  Place of Service Code: 21  Inpatient Admission Date/Time: 11/19/24  1:11 AM  Discharge Date/Time: No discharge date for patient encounter.  Admitting Diagnosis Code/Description:  Hypomagnesemia [E83.42]  SOB (shortness of breath) [R06.02]  Tachycardia [R00.0]  Bronchitis [J40]  COPD exacerbation (HCC) [J44.1]  Fever [R50.9]     UTILIZATION REVIEW CONTACT:  Hilda Justice, Utilization   Network Utilization Review Department  Phone: [phone]  Fax [phone]  Email: [email or web address]  Contact for approvals/pending authorizations, clinical reviews, and discharge.     PHYSICIAN ADVISORY SERVICES:  Medical Necessity Denial & [employer] Review  Phone: [phone]  Fax: [phone]  Email: [email or web address]     DISCHARGE SUPPORT TEAM:  For Patients Discharge Needs & Updates  Phone: [phone] opt. [phone] Fax: [phone]  Email: [email or web address]

## 2024-11-21 NOTE — PLAN OF CARE
Problem: Nutrition/Hydration-ADULT  Goal: Nutrient/Hydration intake appropriate for improving, restoring or maintaining nutritional needs  Description: Monitor and assess patient's nutrition/hydration status for malnutrition. Collaborate with interdisciplinary team and initiate plan and interventions as ordered.  Monitor patient's weight and dietary intake as ordered or per policy. Utilize nutrition screening tool and intervene as necessary. Determine patient's food preferences and provide high-protein, high-caloric foods as appropriate.     INTERVENTIONS:  - Monitor oral intake, urinary output, labs, and treatment plans  - Assess nutrition and hydration status and recommend course of action  - Evaluate amount of meals eaten  - Assist patient with eating if necessary   - Allow adequate time for meals  - Recommend/ encourage appropriate diets, oral nutritional supplements, and vitamin/mineral supplements  - Order, calculate, and assess calorie counts as needed  - Recommend, monitor, and adjust tube feedings and TPN/PPN based on assessed needs  - Assess need for intravenous fluids  - Provide specific nutrition/hydration education as appropriate  - Include patient/family/caregiver in decisions related to nutrition  Outcome: Progressing     Problem: PAIN - ADULT  Goal: Verbalizes/displays adequate comfort level or baseline comfort level  Description: Interventions:  - Encourage patient to monitor pain and request assistance  - Assess pain using appropriate pain scale  - Administer analgesics based on type and severity of pain and evaluate response  - Implement non-pharmacological measures as appropriate and evaluate response  - Consider cultural and social influences on pain and pain management  - Notify physician/advanced practitioner if interventions unsuccessful or patient reports new pain  Outcome: Progressing     Problem: INFECTION - ADULT  Goal: Absence or prevention of progression during  hospitalization  Description: INTERVENTIONS:  - Assess and monitor for signs and symptoms of infection  - Monitor lab/diagnostic results  - Monitor all insertion sites, i.e. indwelling lines, tubes, and drains  - Monitor endotracheal if appropriate and nasal secretions for changes in amount and color  - [place] appropriate cooling/warming therapies per order  - Administer medications as ordered  - Instruct and encourage patient and family to use good hand hygiene technique  - Identify and instruct in appropriate isolation precautions for identified infection/condition  Outcome: Progressing  Goal: Absence of fever/infection during neutropenic period  Description: INTERVENTIONS:  - Monitor WBC    Outcome: Progressing     Problem: SAFETY ADULT  Goal: Patient will remain free of falls  Description: INTERVENTIONS:  - Educate patient/family on patient safety including physical limitations  - Instruct patient to call for assistance with activity   - Consult OT/PT to assist with strengthening/mobility   - Keep Call bell within reach  - Keep bed low and locked with side rails adjusted as appropriate  - Keep care items and personal belongings within reach  - Initiate and maintain comfort rounds  - Make Fall Risk Sign visible to staff  - Offer Toileting every 2 Hours, in advance of need  - Initiate/Maintain bed/chair alarm  - Obtain necessary fall risk management equipment: nonskid socks  - Apply yellow socks and bracelet for high fall risk patients  - Consider moving patient to room near nurses station  Outcome: Progressing  Goal: Maintain or return to baseline ADL function  Description: INTERVENTIONS:  -  Assess patient's ability to carry out ADLs; assess patient's baseline for ADL function and identify physical deficits which impact ability to perform ADLs (bathing, care of mouth/teeth, toileting, grooming, dressing, etc.)  - Assess/evaluate cause of self-care deficits   - Assess range of motion  - Assess patient's  mobility; develop plan if impaired  - Assess patient's need for assistive devices and provide as appropriate  - Encourage maximum independence but intervene and supervise when necessary  - Involve family in performance of ADLs  - Assess for home care needs following discharge   - Consider OT consult to assist with ADL evaluation and planning for discharge  - Provide patient education as appropriate  Outcome: Progressing  Goal: Maintains/Returns to pre admission functional level  Description: INTERVENTIONS:  - Perform AM-PAC 6 Click Basic Mobility/ Daily Activity assessment daily.  - Set and communicate daily mobility goal to care team and patient/family/caregiver.   - Collaborate with rehabilitation services on mobility goals if consulted  - Perform Range of Motion 3 times a day.  - Reposition patient every 2 hours.  - Dangle patient 3 times a day  - Stand patient 3 times a day  - Ambulate patient 3 times a day  - Out of bed to chair 3 times a day   - Out of bed for meals 3 times a day  - Out of bed for toileting  - Record patient progress and toleration of activity level   Outcome: Progressing     Problem: DISCHARGE PLANNING  Goal: Discharge to home or other facility with appropriate resources  Description: INTERVENTIONS:  - Identify barriers to discharge w/patient and caregiver  - Arrange for needed discharge resources and transportation as appropriate  - Identify discharge learning needs (meds, wound care, etc.)  - Arrange for interpretive services to assist at discharge as needed  - Refer to Case Management Department for coordinating discharge planning if the patient needs post-hospital services based on physician/advanced practitioner order or complex needs related to functional status, cognitive ability, or social support system  Outcome: Progressing     Problem: Knowledge Deficit  Goal: Patient/family/caregiver demonstrates understanding of disease process, treatment plan, medications, and discharge  instructions  Description: Complete learning assessment and assess knowledge base.  Interventions:  - Provide teaching at level of understanding  - Provide teaching via preferred learning methods  Outcome: Progressing

## 2024-11-21 NOTE — ASSESSMENT & PLAN NOTE
Doxycycline 100 mg twice daily  Decrease frequency of IV Solu-Medrol 40 mg to every 12 hours  Breathing treatments  Mucinex  Incentive spirometry

## 2024-11-21 NOTE — ASSESSMENT & PLAN NOTE
Malnutrition Findings:   Adult Malnutrition type: Chronic illness  Adult Degree of Malnutrition: Malnutrition of moderate degree  Malnutrition Characteristics: Muscle loss, Fat loss, Inadequate energy                  360 Statement: Chronic moderate malnutrition related to inadequate oral intake, GI intolerance, COPD/decreased appetite as evidenced by < 75% estimated energy intake > 1 mo; moderate fat loss to orbitals; moderate muscle loss to interosseous muscles. Treated with: Continue regular diet, +Glucerna TID noting type 2 DM with steroid use. Recommend daily weights for nutrition monitoring. GI consult as needed.    BMI Findings:           Body mass index is 19.35 kg/m².

## 2024-11-21 NOTE — PROGRESS NOTES
Progress Note - Hospitalist   Name: Ileana Seaman 64 y.o. female I MRN: [id number]  Unit/Bed#: -01 I Date of Admission: 11/18/2024   Date of Service: 11/21/2024 I Hospital Day: 2    Assessment & Plan  COPD exacerbation (HCC)  Doxycycline 100 mg twice daily  Decrease frequency of IV Solu-Medrol 40 mg to every 12 hours  Breathing treatments  Mucinex  Incentive spirometry  Mycobacterial infection, non-TB  Follows with [facility] infectious disease  Continue Zithromax every other day, rifampin and Ethambutol daily   Type 2 diabetes mellitus without complication, without long-term current use of insulin (HCC)  Currently not on a medications  Will place on ISS  Follow up with PCP       Lab Results   Component Value Date    HGBA1C 6.5 (H) 11/13/2024     Hypomagnesemia  Resolved  She received 1 g of IV mag for her COPD exacerbation.    Continue monitor and replete as necessary  Atrial fibrillation (HCC)  Continue on Lopressor  Not on anticoagulation  Chronic hypoxic respiratory failure (HCC)  On home oxygen at 2 L  Moderate protein-calorie malnutrition (HCC)  Malnutrition Findings:   Adult Malnutrition type: Chronic illness  Adult Degree of Malnutrition: Malnutrition of moderate degree  Malnutrition Characteristics: Muscle loss, Fat loss, Inadequate energy                  360 Statement: Chronic moderate malnutrition related to inadequate oral intake, GI intolerance, COPD/decreased appetite as evidenced by < 75% estimated energy intake > 1 mo; moderate fat loss to orbitals; moderate muscle loss to interosseous muscles. Treated with: Continue regular diet, +Glucerna TID noting type 2 DM with steroid use. Recommend daily weights for nutrition monitoring. GI consult as needed.    BMI Findings:           Body mass index is 19.35 kg/m².       VTE Pharmacologic Prophylaxis: VTE Score: 4 Moderate Risk (Score 3-4) - Pharmacological DVT Prophylaxis Ordered: heparin.    Mobility:   Basic Mobility Inpatient Raw Score: 22  JH-M  Goal: 7: Walk 25 feet or more  JH-HLM Achieved: 7: Walk 25 feet or more  JH-HLM Goal achieved. Continue to encourage appropriate mobility.    Patient Centered Rounds: I performed bedside rounds with nursing staff today.   Discussions with Specialists or Other Care Team Provider: None    Education and Discussions with Family / Patient: Patient declined call to .     Current Length of Stay: 2 day(s)  Current Patient Status: Inpatient   Certification Statement: The patient will continue to require additional inpatient hospital stay due to COPD Exacerbation  Discharge Plan: Anticipate discharge in 24-48 hrs to home.    Code Status: Level 1 - Full Code    Subjective   Patient seen and examined at bedside.  No other acute events overnight.  Patient reports that she did not sleep well last night.  She reports feeling tired this morning.  She reports feeling anxious overnight and this morning.  She was given a dose of Atarax, and reports improvement in symptoms.  Patient reports that she does feel like she is breathing better.  Notes slight improvement compared admission.    Objective :  Temp:  [97 °F (36.1 °C)] 97 °F (36.1 °C)  HR:  [] 77  BP: (128-184)/() 145/88  SpO2:  [92 %-96 %] 92 %  O2 Device: Nasal cannula  Nasal Cannula O2 Flow Rate (L/min):  [2 L/min] 2 L/min    Body mass index is 19.35 kg/m².     Input and Output Summary (last 24 hours):     Intake/Output Summary (Last 24 hours) at 11/21/2024 1442  Last data filed at 11/21/2024 1300  Gross per 24 hour   Intake 5165 ml   Output 2850 ml   Net 2315 ml       Physical Exam  Constitutional:       Appearance: Normal appearance.   HENT:      Head: Normocephalic and atraumatic.   Eyes:      Extraocular Movements: Extraocular movements intact.      Pupils: Pupils are equal, round, and reactive to light.   Cardiovascular:      Rate and Rhythm: Normal rate and regular rhythm.   Pulmonary:      Effort: Pulmonary effort is normal.      Breath sounds:  Wheezing present.      Comments: Diminished BS - improved from yesterday  Abdominal:      General: Abdomen is flat.      Palpations: Abdomen is soft.   Musculoskeletal:         General: Normal range of motion.   Skin:     General: Skin is warm.   Neurological:      General: No focal deficit present.      Mental Status: She is alert and oriented to person, place, and time. Mental status is at baseline.   Psychiatric:         Mood and Affect: Mood normal.         Behavior: Behavior normal.           Lines/Drains:              Lab Results: I have reviewed the following results:   Results from last 7 days   Lab Units 11/19/24  0509 11/18/24  2250   WBC Thousand/uL 13.54* 16.67*   HEMOGLOBIN g/dL 11.3* 14.2   HEMATOCRIT % 36.1 44.3   PLATELETS Thousands/uL 179 207   SEGS PCT %  --  82*   LYMPHO PCT %  --  11*   MONO PCT %  --  7   EOS PCT %  --  0     Results from last 7 days   Lab Units 11/20/24  0852 11/19/24  0509 11/18/24  2250   SODIUM mmol/L 140   < > 136   POTASSIUM mmol/L 4.9   < > 3.8   CHLORIDE mmol/L 107   < > 100   CO2 mmol/L 29   < > 25   BUN mg/dL 15   < > 8   CREATININE mg/dL 0.57*   < > 0.54*   ANION GAP mmol/L 4   < > 11   CALCIUM mg/dL 8.9   < > 9.5   ALBUMIN g/dL  --   --  4.5   TOTAL BILIRUBIN mg/dL  --   --  0.46   ALK PHOS U/L  --   --  90   ALT U/L  --   --  10   AST U/L  --   --  14   GLUCOSE RANDOM mg/dL 175*   < > 123    < > = values in this interval not displayed.     Results from last 7 days   Lab Units 11/18/24  2250   INR  1.00     Results from last 7 days   Lab Units 11/21/24  1107 11/21/24  0739 11/20/24  2128 11/20/24  1601 11/20/24  1115 11/20/24  0727 11/19/24  2110 11/19/24  1557 11/19/24  1136 11/19/24  0718   POC GLUCOSE mg/dl 204* 144* 121 133 105 151* 170* 126 195* 213*         Results from last 7 days   Lab Units 11/19/24  0007 11/18/24  2250   LACTIC ACID mmol/L 0.7  --    PROCALCITONIN ng/ml  --  <0.05       Recent Cultures (last 7 days):   Results from last 7 days   Lab Units  11/18/24  2250   BLOOD CULTURE  No Growth at 48 hrs.  No Growth at 48 hrs.             Last 24 Hours Medication List:     Current Facility-Administered Medications:     albuterol (PROVENTIL HFA,VENTOLIN HFA) inhaler 2 puff, Q4H PRN    azithromycin (ZITHROMAX) tablet 250 mg, Every Other Day    docusate sodium (COLACE) capsule 100 mg, BID    doxycycline hyclate (VIBRAMYCIN) capsule 100 mg, Q12H MAYCO    ethambutol (MYAMBUTOL) tablet 800 mg, Daily    guaiFENesin (MUCINEX) 12 hr tablet 600 mg, BID    heparin (porcine) subcutaneous injection 5,000 Units, Q8H MAYCO    insulin lispro (HumALOG/ADMELOG) 100 units/mL subcutaneous injection 1-5 Units, 4x Daily (with meals and at bedtime) **AND** Fingerstick Glucose (POCT), 4x Daily AC and at bedtime    ipratropium (ATROVENT) 0.02 % inhalation solution 0.5 mg, TID    levalbuterol (XOPENEX) inhalation solution 1.25 mg, TID **AND** [DISCONTINUED] sodium chloride 0.9 % inhalation solution 3 mL, TID    methylPREDNISolone sodium succinate (Solu-MEDROL) injection 40 mg, Q8H    metoprolol tartrate (LOPRESSOR) tablet 25 mg, Q12H MAYCO    multi-electrolyte (PLASMALYTE-A/ISOLYTE-S PH 7.4) IV solution, Continuous, Last Rate: 100 mL/hr (11/21/24 0609)    nicotine (NICODERM CQ) 21 mg/24 hr TD 24 hr patch 21 mg, Daily    pantoprazole (PROTONIX) EC tablet 40 mg, BID AC    rifabutin (MYCOBUTIN) capsule 300 mg, Daily    Administrative Statements   Today, Patient Was Seen By: Mary Altamirano MD      **Please Note: This note may have been constructed using a voice recognition system.**

## 2024-11-22 LAB
GLUCOSE SERPL-MCNC: 110 MG/DL (ref 65–140)
GLUCOSE SERPL-MCNC: 134 MG/DL (ref 65–140)
GLUCOSE SERPL-MCNC: 141 MG/DL (ref 65–140)
GLUCOSE SERPL-MCNC: 77 MG/DL (ref 65–140)

## 2024-11-22 PROCEDURE — 94668 MNPJ CHEST WALL SBSQ: CPT

## 2024-11-22 PROCEDURE — 82948 REAGENT STRIP/BLOOD GLUCOSE: CPT

## 2024-11-22 PROCEDURE — 94760 N-INVAS EAR/PLS OXIMETRY 1: CPT

## 2024-11-22 PROCEDURE — 94640 AIRWAY INHALATION TREATMENT: CPT

## 2024-11-22 PROCEDURE — 99232 SBSQ HOSP IP/OBS MODERATE 35: CPT | Performed by: STUDENT IN AN ORGANIZED HEALTH CARE EDUCATION/TRAINING PROGRAM

## 2024-11-22 RX ORDER — ONDANSETRON 2 MG/ML
4 INJECTION INTRAMUSCULAR; INTRAVENOUS EVERY 6 HOURS PRN
Status: DISCONTINUED | OUTPATIENT
Start: 2024-11-22 | End: 2024-11-23 | Stop reason: HOSPADM

## 2024-11-22 RX ADMIN — LEVALBUTEROL HYDROCHLORIDE 1.25 MG: 1.25 SOLUTION RESPIRATORY (INHALATION) at 13:48

## 2024-11-22 RX ADMIN — DOXYCYCLINE 100 MG: 100 CAPSULE ORAL at 08:21

## 2024-11-22 RX ADMIN — PANTOPRAZOLE SODIUM 40 MG: 40 TABLET, DELAYED RELEASE ORAL at 05:16

## 2024-11-22 RX ADMIN — DOCUSATE SODIUM 100 MG: 100 CAPSULE, LIQUID FILLED ORAL at 08:22

## 2024-11-22 RX ADMIN — DOCUSATE SODIUM 100 MG: 100 CAPSULE, LIQUID FILLED ORAL at 17:14

## 2024-11-22 RX ADMIN — DOXYCYCLINE 100 MG: 100 CAPSULE ORAL at 21:46

## 2024-11-22 RX ADMIN — GUAIFENESIN 600 MG: 600 TABLET ORAL at 17:14

## 2024-11-22 RX ADMIN — METHYLPREDNISOLONE SODIUM SUCCINATE 40 MG: 40 INJECTION, POWDER, FOR SOLUTION INTRAMUSCULAR; INTRAVENOUS at 21:46

## 2024-11-22 RX ADMIN — METOPROLOL TARTRATE 25 MG: 25 TABLET, FILM COATED ORAL at 08:22

## 2024-11-22 RX ADMIN — GUAIFENESIN 600 MG: 600 TABLET ORAL at 08:22

## 2024-11-22 RX ADMIN — METHYLPREDNISOLONE SODIUM SUCCINATE 40 MG: 40 INJECTION, POWDER, FOR SOLUTION INTRAMUSCULAR; INTRAVENOUS at 08:21

## 2024-11-22 RX ADMIN — SODIUM CHLORIDE, SODIUM GLUCONATE, SODIUM ACETATE, POTASSIUM CHLORIDE, MAGNESIUM CHLORIDE, SODIUM PHOSPHATE, DIBASIC, AND POTASSIUM PHOSPHATE 100 ML/HR: .53; .5; .37; .037; .03; .012; .00082 INJECTION, SOLUTION INTRAVENOUS at 02:02

## 2024-11-22 RX ADMIN — ONDANSETRON 4 MG: 2 INJECTION INTRAMUSCULAR; INTRAVENOUS at 18:58

## 2024-11-22 RX ADMIN — SODIUM CHLORIDE, SODIUM GLUCONATE, SODIUM ACETATE, POTASSIUM CHLORIDE, MAGNESIUM CHLORIDE, SODIUM PHOSPHATE, DIBASIC, AND POTASSIUM PHOSPHATE 100 ML/HR: .53; .5; .37; .037; .03; .012; .00082 INJECTION, SOLUTION INTRAVENOUS at 21:46

## 2024-11-22 RX ADMIN — HEPARIN SODIUM 5000 UNITS: 5000 INJECTION, SOLUTION INTRAVENOUS; SUBCUTANEOUS at 05:16

## 2024-11-22 RX ADMIN — PANTOPRAZOLE SODIUM 40 MG: 40 TABLET, DELAYED RELEASE ORAL at 15:14

## 2024-11-22 RX ADMIN — NICOTINE 21 MG: 21 PATCH, EXTENDED RELEASE TRANSDERMAL at 08:22

## 2024-11-22 RX ADMIN — HEPARIN SODIUM 5000 UNITS: 5000 INJECTION, SOLUTION INTRAVENOUS; SUBCUTANEOUS at 13:09

## 2024-11-22 RX ADMIN — IPRATROPIUM BROMIDE 0.5 MG: 0.5 SOLUTION RESPIRATORY (INHALATION) at 13:48

## 2024-11-22 RX ADMIN — METOPROLOL TARTRATE 25 MG: 25 TABLET, FILM COATED ORAL at 21:46

## 2024-11-22 RX ADMIN — LEVALBUTEROL HYDROCHLORIDE 1.25 MG: 1.25 SOLUTION RESPIRATORY (INHALATION) at 19:47

## 2024-11-22 RX ADMIN — ETHAMBUTOL HYDROCHLORIDE 800 MG: 400 TABLET, FILM COATED ORAL at 08:24

## 2024-11-22 RX ADMIN — IPRATROPIUM BROMIDE 0.5 MG: 0.5 SOLUTION RESPIRATORY (INHALATION) at 19:47

## 2024-11-22 RX ADMIN — IPRATROPIUM BROMIDE 0.5 MG: 0.5 SOLUTION RESPIRATORY (INHALATION) at 07:32

## 2024-11-22 RX ADMIN — HEPARIN SODIUM 5000 UNITS: 5000 INJECTION, SOLUTION INTRAVENOUS; SUBCUTANEOUS at 21:46

## 2024-11-22 RX ADMIN — RIFABUTIN 300 MG: 150 CAPSULE ORAL at 08:23

## 2024-11-22 RX ADMIN — LEVALBUTEROL HYDROCHLORIDE 1.25 MG: 1.25 SOLUTION RESPIRATORY (INHALATION) at 07:32

## 2024-11-22 NOTE — PLAN OF CARE
Problem: Nutrition/Hydration-ADULT  Goal: Nutrient/Hydration intake appropriate for improving, restoring or maintaining nutritional needs  Description: Monitor and assess patient's nutrition/hydration status for malnutrition. Collaborate with interdisciplinary team and initiate plan and interventions as ordered.  Monitor patient's weight and dietary intake as ordered or per policy. Utilize nutrition screening tool and intervene as necessary. Determine patient's food preferences and provide high-protein, high-caloric foods as appropriate.     INTERVENTIONS:  - Monitor oral intake, urinary output, labs, and treatment plans  - Assess nutrition and hydration status and recommend course of action  - Evaluate amount of meals eaten  - Assist patient with eating if necessary   - Allow adequate time for meals  - Recommend/ encourage appropriate diets, oral nutritional supplements, and vitamin/mineral supplements  - Order, calculate, and assess calorie counts as needed  - Recommend, monitor, and adjust tube feedings and TPN/PPN based on assessed needs  - Assess need for intravenous fluids  - Provide specific nutrition/hydration education as appropriate  - Include patient/family/caregiver in decisions related to nutrition  Outcome: Progressing     Problem: PAIN - ADULT  Goal: Verbalizes/displays adequate comfort level or baseline comfort level  Description: Interventions:  - Encourage patient to monitor pain and request assistance  - Assess pain using appropriate pain scale  - Administer analgesics based on type and severity of pain and evaluate response  - Implement non-pharmacological measures as appropriate and evaluate response  - Consider cultural and social influences on pain and pain management  - Notify physician/advanced practitioner if interventions unsuccessful or patient reports new pain  Outcome: Progressing     Problem: INFECTION - ADULT  Goal: Absence or prevention of progression during  hospitalization  Description: INTERVENTIONS:  - Assess and monitor for signs and symptoms of infection  - Monitor lab/diagnostic results  - Monitor all insertion sites, i.e. indwelling lines, tubes, and drains  - Monitor endotracheal if appropriate and nasal secretions for changes in amount and color  - Bennett appropriate cooling/warming therapies per order  - Administer medications as ordered  - Instruct and encourage patient and family to use good hand hygiene technique  - Identify and instruct in appropriate isolation precautions for identified infection/condition  Outcome: Progressing  Goal: Absence of fever/infection during neutropenic period  Description: INTERVENTIONS:  - Monitor WBC    Outcome: Progressing     Problem: SAFETY ADULT  Goal: Patient will remain free of falls  Description: INTERVENTIONS:  - Educate patient/family on patient safety including physical limitations  - Instruct patient to call for assistance with activity   - Consult OT/PT to assist with strengthening/mobility   - Keep Call bell within reach  - Keep bed low and locked with side rails adjusted as appropriate  - Keep care items and personal belongings within reach  - Initiate and maintain comfort rounds  - Make Fall Risk Sign visible to staff  - Offer Toileting every 2 Hours, in advance of need  - Initiate/Maintain bed/chair alarm  - Obtain necessary fall risk management equipment: nonskid socks  - Apply yellow socks and bracelet for high fall risk patients  - Consider moving patient to room near nurses station  Outcome: Progressing  Goal: Maintain or return to baseline ADL function  Description: INTERVENTIONS:  -  Assess patient's ability to carry out ADLs; assess patient's baseline for ADL function and identify physical deficits which impact ability to perform ADLs (bathing, care of mouth/teeth, toileting, grooming, dressing, etc.)  - Assess/evaluate cause of self-care deficits   - Assess range of motion  - Assess patient's  mobility; develop plan if impaired  - Assess patient's need for assistive devices and provide as appropriate  - Encourage maximum independence but intervene and supervise when necessary  - Involve family in performance of ADLs  - Assess for home care needs following discharge   - Consider OT consult to assist with ADL evaluation and planning for discharge  - Provide patient education as appropriate  Outcome: Progressing  Goal: Maintains/Returns to pre admission functional level  Description: INTERVENTIONS:  - Perform AM-PAC 6 Click Basic Mobility/ Daily Activity assessment daily.  - Set and communicate daily mobility goal to care team and patient/family/caregiver.   - Collaborate with rehabilitation services on mobility goals if consulted  - Perform Range of Motion 3 times a day.  - Reposition patient every 2 hours.  - Dangle patient 3 times a day  - Stand patient 3 times a day  - Ambulate patient 3 times a day  - Out of bed to chair 3 times a day   - Out of bed for meals 3 times a day  - Out of bed for toileting  - Record patient progress and toleration of activity level   Outcome: Progressing     Problem: DISCHARGE PLANNING  Goal: Discharge to home or other facility with appropriate resources  Description: INTERVENTIONS:  - Identify barriers to discharge w/patient and caregiver  - Arrange for needed discharge resources and transportation as appropriate  - Identify discharge learning needs (meds, wound care, etc.)  - Arrange for interpretive services to assist at discharge as needed  - Refer to Case Management Department for coordinating discharge planning if the patient needs post-hospital services based on physician/advanced practitioner order or complex needs related to functional status, cognitive ability, or social support system  Outcome: Progressing     Problem: Knowledge Deficit  Goal: Patient/family/caregiver demonstrates understanding of disease process, treatment plan, medications, and discharge  instructions  Description: Complete learning assessment and assess knowledge base.  Interventions:  - Provide teaching at level of understanding  - Provide teaching via preferred learning methods  Outcome: Progressing     Problem: RESPIRATORY - ADULT  Goal: Achieves optimal ventilation and oxygenation  Description: INTERVENTIONS:  - Assess for changes in respiratory status  - Assess for changes in mentation and behavior  - Position to facilitate oxygenation and minimize respiratory effort  - Oxygen administered by appropriate delivery if ordered  - Initiate smoking cessation education as indicated  - Encourage broncho-pulmonary hygiene including cough, deep breathe, Incentive Spirometry  - Assess the need for suctioning and aspirate as needed  - Assess and instruct to report SOB or any respiratory difficulty  - Respiratory Therapy support as indicated  Outcome: Progressing     Problem: METABOLIC, FLUID AND ELECTROLYTES - ADULT  Goal: Electrolytes maintained within normal limits  Description: INTERVENTIONS:  - Monitor labs and assess patient for signs and symptoms of electrolyte imbalances  - Administer electrolyte replacement as ordered  - Monitor response to electrolyte replacements, including repeat lab results as appropriate  - Instruct patient on fluid and nutrition as appropriate  Outcome: Progressing  Goal: Fluid balance maintained  Description: INTERVENTIONS:  - Monitor labs   - Monitor I/O and WT  - Instruct patient on fluid and nutrition as appropriate  - Assess for signs & symptoms of volume excess or deficit  Outcome: Progressing  Goal: Glucose maintained within target range  Description: INTERVENTIONS:  - Monitor Blood Glucose as ordered  - Assess for signs and symptoms of hyperglycemia and hypoglycemia  - Administer ordered medications to maintain glucose within target range  - Assess nutritional intake and initiate nutrition service referral as needed  Outcome: Progressing     Problem: SKIN/TISSUE  INTEGRITY - ADULT  Goal: Skin Integrity remains intact(Skin Breakdown Prevention)  Description: Assess:  -Perform Myles assessment every shift  -Clean and moisturize skin every 2 hrs  -Inspect skin when repositioning, toileting, and assisting with ADLS  -Assess under medical devices  -Assess extremities for adequate circulation and sensation     Bed Management:  -Have minimal linens on bed & keep smooth, unwrinkled  -Change linens as needed when moist or perspiring  -Avoid sitting or lying in one position for more than 2 hours while in bed  -Keep HOB at 30 degrees     Toileting:  -Offer bedside commode  -Assess for incontinence every 2 hrs  -Use incontinent care products after each incontinent episode    Activity:  -Mobilize patient 3 times a day  -Encourage activity and walks on unit  -Encourage or provide ROM exercises   -Turn and reposition patient every 2 Hours  -Use appropriate equipment to lift or move patient in bed  -Instruct/ Assist with weight shifting every 60 mins when out of bed in chair  -Consider limitation of chair time 2 hour intervals    Skin Care:  -Avoid use of baby powder, tape, friction and shearing, hot water or constrictive clothing  -Relieve pressure over bony prominences using silicone cream, pillows, waffle cushion for protection  -Do not massage red bony areas    Next Steps:  -Teach patient strategies to minimize risks  -Consider consults to  interdisciplinary teams  Outcome: Progressing  Goal: Incision(s), wounds(s) or drain site(s) healing without S/S of infection  Description: INTERVENTIONS  - Assess and document dressing, incision, wound bed, drain sites and surrounding tissue  - Provide patient and family education  - Perform skin care/dressing changes every shift  Outcome: Progressing  Goal: Pressure injury heals and does not worsen  Description: Interventions:  - Implement low air loss mattress or specialty surface (Criteria met)  - Apply silicone foam dressing  - Instruct/assist  with weight shifting every 60 minutes when in chair   - Limit chair time to 2 hour intervals  - Use special pressure reducing interventions such as waffle cushion when in chair   - Apply fecal or urinary incontinence containment device   - Perform passive or active ROM  - Turn and reposition patient & offload bony prominences every 2 hours   - Utilize friction reducing device or surface for transfers   - Consider consults to  interdisciplinary teams  - Use incontinent care products after each incontinent episode  - Consider nutrition services referral as needed  Outcome: Progressing     Problem: MUSCULOSKELETAL - ADULT  Goal: Maintain or return mobility to safest level of function  Description: INTERVENTIONS:  - Assess patient's ability to carry out ADLs; assess patient's baseline for ADL function and identify physical deficits which impact ability to perform ADLs (bathing, care of mouth/teeth, toileting, grooming, dressing, etc.)  - Assess/evaluate cause of self-care deficits   - Assess range of motion  - Assess patient's mobility  - Assess patient's need for assistive devices and provide as appropriate  - Encourage maximum independence but intervene and supervise when necessary  - Involve family in performance of ADLs  - Assess for home care needs following discharge   - Consider OT consult to assist with ADL evaluation and planning for discharge  - Provide patient education as appropriate  Outcome: Progressing  Goal: Maintain proper alignment of affected body part  Description: INTERVENTIONS:  - Support, maintain and protect limb and body alignment  - Provide patient/ family with appropriate education  Outcome: Progressing     Problem: Prexisting or High Potential for Compromised Skin Integrity  Goal: Skin integrity is maintained or improved  Description: INTERVENTIONS:  - Identify patients at risk for skin breakdown  - Assess and monitor skin integrity  - Assess and monitor nutrition and hydration status  -  Monitor labs   - Assess for incontinence   - Turn and reposition patient  - Assist with mobility/ambulation  - Relieve pressure over bony prominences  - Avoid friction and shearing  - Provide appropriate hygiene as needed including keeping skin clean and dry  - Evaluate need for skin moisturizer/barrier cream  - Collaborate with interdisciplinary team   - Patient/family teaching  - Consider wound care consult   Outcome: Progressing

## 2024-11-22 NOTE — ASSESSMENT & PLAN NOTE
Malnutrition Findings:   Adult Malnutrition type: Chronic illness  Adult Degree of Malnutrition: Malnutrition of moderate degree  Malnutrition Characteristics: Muscle loss, Fat loss, Inadequate energy                  360 Statement: Chronic moderate malnutrition related to inadequate oral intake, GI intolerance, COPD/decreased appetite as evidenced by < 75% estimated energy intake > 1 mo; moderate fat loss to orbitals; moderate muscle loss to interosseous muscles. Treated with: Continue regular diet, +Glucerna TID noting type 2 DM with steroid use. Recommend daily weights for nutrition monitoring. GI consult as needed.    BMI Findings:           Body mass index is 19.61 kg/m².

## 2024-11-22 NOTE — PROGRESS NOTES
Progress Note - Hospitalist   Name: Ileana Seaman 64 y.o. female I MRN: [id number]  Unit/Bed#: -01 I Date of Admission: 11/18/2024   Date of Service: 11/22/2024 I Hospital Day: 3    Assessment & Plan  COPD exacerbation (HCC)  Doxycycline 100 mg twice daily  Decrease frequency of IV Solu-Medrol 40 mg to every 12 hours  Breathing treatments  Mucinex  Incentive spirometry  Mycobacterial infection, non-TB  Follows with [facility] infectious disease  Continue Zithromax every other day, rifampin and Ethambutol daily   Type 2 diabetes mellitus without complication, without long-term current use of insulin (HCC)  Currently not on a medications  Will place on ISS  Follow up with PCP       Lab Results   Component Value Date    HGBA1C 6.5 (H) 11/13/2024     Hypomagnesemia  Resolved  She received 1 g of IV mag for her COPD exacerbation.    Continue monitor and replete as necessary  Atrial fibrillation (HCC)  Continue on Lopressor  Not on anticoagulation  Chronic hypoxic respiratory failure (HCC)  On home oxygen at 2 L  Moderate protein-calorie malnutrition (HCC)  Malnutrition Findings:   Adult Malnutrition type: Chronic illness  Adult Degree of Malnutrition: Malnutrition of moderate degree  Malnutrition Characteristics: Muscle loss, Fat loss, Inadequate energy                  360 Statement: Chronic moderate malnutrition related to inadequate oral intake, GI intolerance, COPD/decreased appetite as evidenced by < 75% estimated energy intake > 1 mo; moderate fat loss to orbitals; moderate muscle loss to interosseous muscles. Treated with: Continue regular diet, +Glucerna TID noting type 2 DM with steroid use. Recommend daily weights for nutrition monitoring. GI consult as needed.    BMI Findings:           Body mass index is 19.61 kg/m².       VTE Pharmacologic Prophylaxis: VTE Score: 4 Moderate Risk (Score 3-4) - Pharmacological DVT Prophylaxis Ordered: heparin.    Mobility:   Basic Mobility Inpatient Raw Score: 22  -M  Goal: 7: Walk 25 feet or more  JH-HLM Achieved: 7: Walk 25 feet or more  JH-HLM Goal achieved. Continue to encourage appropriate mobility.    Patient Centered Rounds: I performed bedside rounds with nursing staff today.   Discussions with Specialists or Other Care Team Provider: none    Education and Discussions with Family / Patient: Patient declined call to .     Current Length of Stay: 3 day(s)  Current Patient Status: Inpatient   Certification Statement: The patient will continue to require additional inpatient hospital stay due to COPD exacerbation  Discharge Plan: Anticipate discharge in 24-48 hrs to home.    Code Status: Level 1 - Full Code    Subjective   Examined at bedside.  No acute events overnight.  Patient reports that her breathing is slightly improved, however complains of dyspnea on exertion stating that she feels short patient is short of breath and tired upon ambulation.  She also reports that she feels palpitations after breathing treatments.  She has no other acute complaints at this time.    Objective :  Temp:  [97 °F (36.1 °C)-97.3 °F (36.3 °C)] 97.3 °F (36.3 °C)  HR:  [76-96] 80  BP: (114-133)/(64-85) 131/78  Resp:  [17-18] 18  SpO2:  [91 %-98 %] 91 %  O2 Device: Nasal cannula  Nasal Cannula O2 Flow Rate (L/min):  [2 L/min] 2 L/min    Body mass index is 19.61 kg/m².     Input and Output Summary (last 24 hours):     Intake/Output Summary (Last 24 hours) at 11/22/2024 1344  Last data filed at 11/22/2024 1100  Gross per 24 hour   Intake 2628.33 ml   Output 2550 ml   Net 78.33 ml       Physical Exam  Constitutional:       Appearance: Normal appearance.   HENT:      Head: Normocephalic and atraumatic.   Eyes:      Extraocular Movements: Extraocular movements intact.      Pupils: Pupils are equal, round, and reactive to light.   Cardiovascular:      Rate and Rhythm: Normal rate and regular rhythm.   Pulmonary:      Effort: Pulmonary effort is normal.      Breath sounds: Wheezing  present.      Comments: Diminished BS - improved from yesterday  Abdominal:      General: Abdomen is flat.      Palpations: Abdomen is soft.   Musculoskeletal:         General: Normal range of motion.   Skin:     General: Skin is warm.   Neurological:      General: No focal deficit present.      Mental Status: She is alert and oriented to person, place, and time. Mental status is at baseline.   Psychiatric:         Mood and Affect: Mood normal.         Behavior: Behavior normal.           Lines/Drains:              Lab Results: I have reviewed the following results:   Results from last 7 days   Lab Units 11/19/24  0509 11/18/24  2250   WBC Thousand/uL 13.54* 16.67*   HEMOGLOBIN g/dL 11.3* 14.2   HEMATOCRIT % 36.1 44.3   PLATELETS Thousands/uL 179 207   SEGS PCT %  --  82*   LYMPHO PCT %  --  11*   MONO PCT %  --  7   EOS PCT %  --  0     Results from last 7 days   Lab Units 11/20/24  0852 11/19/24  0509 11/18/24  2250   SODIUM mmol/L 140   < > 136   POTASSIUM mmol/L 4.9   < > 3.8   CHLORIDE mmol/L 107   < > 100   CO2 mmol/L 29   < > 25   BUN mg/dL 15   < > 8   CREATININE mg/dL 0.57*   < > 0.54*   ANION GAP mmol/L 4   < > 11   CALCIUM mg/dL 8.9   < > 9.5   ALBUMIN g/dL  --   --  4.5   TOTAL BILIRUBIN mg/dL  --   --  0.46   ALK PHOS U/L  --   --  90   ALT U/L  --   --  10   AST U/L  --   --  14   GLUCOSE RANDOM mg/dL 175*   < > 123    < > = values in this interval not displayed.     Results from last 7 days   Lab Units 11/18/24  2250   INR  1.00     Results from last 7 days   Lab Units 11/22/24  1113 11/22/24  0722 11/21/24  2107 11/21/24  1540 11/21/24  1107 11/21/24  0739 11/20/24  2128 11/20/24  1601 11/20/24  1115 11/20/24  0727 11/19/24  2110 11/19/24  1557   POC GLUCOSE mg/dl 110 77 172* 165* 204* 144* 121 133 105 151* 170* 126         Results from last 7 days   Lab Units 11/19/24  0007 11/18/24  2250   LACTIC ACID mmol/L 0.7  --    PROCALCITONIN ng/ml  --  <0.05       Recent Cultures (last 7 days):   Results  from last 7 days   Lab Units 11/18/24  2250   BLOOD CULTURE  No Growth at 48 hrs.  No Growth at 48 hrs.             Last 24 Hours Medication List:     Current Facility-Administered Medications:     albuterol (PROVENTIL HFA,VENTOLIN HFA) inhaler 2 puff, Q4H PRN    azithromycin (ZITHROMAX) tablet 250 mg, Every Other Day    docusate sodium (COLACE) capsule 100 mg, BID    doxycycline hyclate (VIBRAMYCIN) capsule 100 mg, Q12H MAYCO    ethambutol (MYAMBUTOL) tablet 800 mg, Daily    guaiFENesin (MUCINEX) 12 hr tablet 600 mg, BID    heparin (porcine) subcutaneous injection 5,000 Units, Q8H MAYCO    insulin lispro (HumALOG/ADMELOG) 100 units/mL subcutaneous injection 1-5 Units, 4x Daily (with meals and at bedtime) **AND** Fingerstick Glucose (POCT), 4x Daily AC and at bedtime    ipratropium (ATROVENT) 0.02 % inhalation solution 0.5 mg, TID    levalbuterol (XOPENEX) inhalation solution 1.25 mg, TID **AND** [DISCONTINUED] sodium chloride 0.9 % inhalation solution 3 mL, TID    methylPREDNISolone sodium succinate (Solu-MEDROL) injection 40 mg, Q12H MAYCO    metoprolol tartrate (LOPRESSOR) tablet 25 mg, Q12H MAYCO    multi-electrolyte (PLASMALYTE-A/ISOLYTE-S PH 7.4) IV solution, Continuous, Last Rate: 100 mL/hr (11/22/24 0202)    nicotine (NICODERM CQ) 21 mg/24 hr TD 24 hr patch 21 mg, Daily    pantoprazole (PROTONIX) EC tablet 40 mg, BID AC    rifabutin (MYCOBUTIN) capsule 300 mg, Daily    Administrative Statements   Today, Patient Was Seen By: Mary Altamirano MD      **Please Note: This note may have been constructed using a voice recognition system.**

## 2024-11-22 NOTE — PLAN OF CARE
Problem: Nutrition/Hydration-ADULT  Goal: Nutrient/Hydration intake appropriate for improving, restoring or maintaining nutritional needs  Description: Monitor and assess patient's nutrition/hydration status for malnutrition. Collaborate with interdisciplinary team and initiate plan and interventions as ordered.  Monitor patient's weight and dietary intake as ordered or per policy. Utilize nutrition screening tool and intervene as necessary. Determine patient's food preferences and provide high-protein, high-caloric foods as appropriate.     INTERVENTIONS:  - Monitor oral intake, urinary output, labs, and treatment plans  - Assess nutrition and hydration status and recommend course of action  - Evaluate amount of meals eaten  - Assist patient with eating if necessary   - Allow adequate time for meals  - Recommend/ encourage appropriate diets, oral nutritional supplements, and vitamin/mineral supplements  - Order, calculate, and assess calorie counts as needed  - Recommend, monitor, and adjust tube feedings and TPN/PPN based on assessed needs  - Assess need for intravenous fluids  - Provide specific nutrition/hydration education as appropriate  - Include patient/family/caregiver in decisions related to nutrition  Outcome: Progressing     Problem: PAIN - ADULT  Goal: Verbalizes/displays adequate comfort level or baseline comfort level  Description: Interventions:  - Encourage patient to monitor pain and request assistance  - Assess pain using appropriate pain scale  - Administer analgesics based on type and severity of pain and evaluate response  - Implement non-pharmacological measures as appropriate and evaluate response  - Consider cultural and social influences on pain and pain management  - Notify physician/advanced practitioner if interventions unsuccessful or patient reports new pain  Outcome: Progressing     Problem: INFECTION - ADULT  Goal: Absence or prevention of progression during  hospitalization  Description: INTERVENTIONS:  - Assess and monitor for signs and symptoms of infection  - Monitor lab/diagnostic results  - Monitor all insertion sites, i.e. indwelling lines, tubes, and drains  - Monitor endotracheal if appropriate and nasal secretions for changes in amount and color  - [place] appropriate cooling/warming therapies per order  - Administer medications as ordered  - Instruct and encourage patient and family to use good hand hygiene technique  - Identify and instruct in appropriate isolation precautions for identified infection/condition  Outcome: Progressing  Goal: Absence of fever/infection during neutropenic period  Description: INTERVENTIONS:  - Monitor WBC    Outcome: Progressing     Problem: SAFETY ADULT  Goal: Patient will remain free of falls  Description: INTERVENTIONS:  - Educate patient/family on patient safety including physical limitations  - Instruct patient to call for assistance with activity   - Consult OT/PT to assist with strengthening/mobility   - Keep Call bell within reach  - Keep bed low and locked with side rails adjusted as appropriate  - Keep care items and personal belongings within reach  - Initiate and maintain comfort rounds  - Make Fall Risk Sign visible to staff  - Offer Toileting every 2 Hours, in advance of need  - Initiate/Maintain bed/chair alarm  - Obtain necessary fall risk management equipment: nonskid socks  - Apply yellow socks and bracelet for high fall risk patients  - Consider moving patient to room near nurses station  Outcome: Progressing  Goal: Maintain or return to baseline ADL function  Description: INTERVENTIONS:  -  Assess patient's ability to carry out ADLs; assess patient's baseline for ADL function and identify physical deficits which impact ability to perform ADLs (bathing, care of mouth/teeth, toileting, grooming, dressing, etc.)  - Assess/evaluate cause of self-care deficits   - Assess range of motion  - Assess patient's  mobility; develop plan if impaired  - Assess patient's need for assistive devices and provide as appropriate  - Encourage maximum independence but intervene and supervise when necessary  - Involve family in performance of ADLs  - Assess for home care needs following discharge   - Consider OT consult to assist with ADL evaluation and planning for discharge  - Provide patient education as appropriate  Outcome: Progressing  Goal: Maintains/Returns to pre admission functional level  Description: INTERVENTIONS:  - Perform AM-PAC 6 Click Basic Mobility/ Daily Activity assessment daily.  - Set and communicate daily mobility goal to care team and patient/family/caregiver.   - Collaborate with rehabilitation services on mobility goals if consulted  - Perform Range of Motion 3 times a day.  - Reposition patient every 2 hours.  - Dangle patient 3 times a day  - Stand patient 3 times a day  - Ambulate patient 3 times a day  - Out of bed to chair 3 times a day   - Out of bed for meals 3 times a day  - Out of bed for toileting  - Record patient progress and toleration of activity level   Outcome: Progressing     Problem: DISCHARGE PLANNING  Goal: Discharge to home or other facility with appropriate resources  Description: INTERVENTIONS:  - Identify barriers to discharge w/patient and caregiver  - Arrange for needed discharge resources and transportation as appropriate  - Identify discharge learning needs (meds, wound care, etc.)  - Arrange for interpretive services to assist at discharge as needed  - Refer to Case Management Department for coordinating discharge planning if the patient needs post-hospital services based on physician/advanced practitioner order or complex needs related to functional status, cognitive ability, or social support system  Outcome: Progressing     Problem: Knowledge Deficit  Goal: Patient/family/caregiver demonstrates understanding of disease process, treatment plan, medications, and discharge  instructions  Description: Complete learning assessment and assess knowledge base.  Interventions:  - Provide teaching at level of understanding  - Provide teaching via preferred learning methods  Outcome: Progressing     Problem: RESPIRATORY - ADULT  Goal: Achieves optimal ventilation and oxygenation  Description: INTERVENTIONS:  - Assess for changes in respiratory status  - Assess for changes in mentation and behavior  - Position to facilitate oxygenation and minimize respiratory effort  - Oxygen administered by appropriate delivery if ordered  - Initiate smoking cessation education as indicated  - Encourage broncho-pulmonary hygiene including cough, deep breathe, Incentive Spirometry  - Assess the need for suctioning and aspirate as needed  - Assess and instruct to report SOB or any respiratory difficulty  - Respiratory Therapy support as indicated  Outcome: Progressing     Problem: METABOLIC, FLUID AND ELECTROLYTES - ADULT  Goal: Electrolytes maintained within normal limits  Description: INTERVENTIONS:  - Monitor labs and assess patient for signs and symptoms of electrolyte imbalances  - Administer electrolyte replacement as ordered  - Monitor response to electrolyte replacements, including repeat lab results as appropriate  - Instruct patient on fluid and nutrition as appropriate  Outcome: Progressing  Goal: Fluid balance maintained  Description: INTERVENTIONS:  - Monitor labs   - Monitor I/O and WT  - Instruct patient on fluid and nutrition as appropriate  - Assess for signs & symptoms of volume excess or deficit  Outcome: Progressing  Goal: Glucose maintained within target range  Description: INTERVENTIONS:  - Monitor Blood Glucose as ordered  - Assess for signs and symptoms of hyperglycemia and hypoglycemia  - Administer ordered medications to maintain glucose within target range  - Assess nutritional intake and initiate nutrition service referral as needed  Outcome: Progressing     Problem: SKIN/TISSUE  INTEGRITY - ADULT  Goal: Skin Integrity remains intact(Skin Breakdown Prevention)  Description: Assess:  -Perform Myles assessment every shift  -Clean and moisturize skin every 2 hrs  -Inspect skin when repositioning, toileting, and assisting with ADLS  -Assess under medical devices  -Assess extremities for adequate circulation and sensation     Bed Management:  -Have minimal linens on bed & keep smooth, unwrinkled  -Change linens as needed when moist or perspiring  -Avoid sitting or lying in one position for more than 2 hours while in bed  -Keep HOB at 30 degrees     Toileting:  -Offer bedside commode  -Assess for incontinence every 2 hrs  -Use incontinent care products after each incontinent episode    Activity:  -Mobilize patient 3 times a day  -Encourage activity and walks on unit  -Encourage or provide ROM exercises   -Turn and reposition patient every 2 Hours  -Use appropriate equipment to lift or move patient in bed  -Instruct/ Assist with weight shifting every 60 mins when out of bed in chair  -Consider limitation of chair time 2 hour intervals    Skin Care:  -Avoid use of baby powder, tape, friction and shearing, hot water or constrictive clothing  -Relieve pressure over bony prominences using silicone cream, pillows, waffle cushion for protection  -Do not massage red bony areas    Next Steps:  -Teach patient strategies to minimize risks  -Consider consults to  interdisciplinary teams  Outcome: Progressing  Goal: Incision(s), wounds(s) or drain site(s) healing without S/S of infection  Description: INTERVENTIONS  - Assess and document dressing, incision, wound bed, drain sites and surrounding tissue  - Provide patient and family education  - Perform skin care/dressing changes every shift  Outcome: Progressing  Goal: Pressure injury heals and does not worsen  Description: Interventions:  - Implement low air loss mattress or specialty surface (Criteria met)  - Apply silicone foam dressing  - Instruct/assist  with weight shifting every 60 minutes when in chair   - Limit chair time to 2 hour intervals  - Use special pressure reducing interventions such as waffle cushion when in chair   - Apply fecal or urinary incontinence containment device   - Perform passive or active ROM  - Turn and reposition patient & offload bony prominences every 2 hours   - Utilize friction reducing device or surface for transfers   - Consider consults to  interdisciplinary teams  - Use incontinent care products after each incontinent episode  - Consider nutrition services referral as needed  Outcome: Progressing     Problem: MUSCULOSKELETAL - ADULT  Goal: Maintain or return mobility to safest level of function  Description: INTERVENTIONS:  - Assess patient's ability to carry out ADLs; assess patient's baseline for ADL function and identify physical deficits which impact ability to perform ADLs (bathing, care of mouth/teeth, toileting, grooming, dressing, etc.)  - Assess/evaluate cause of self-care deficits   - Assess range of motion  - Assess patient's mobility  - Assess patient's need for assistive devices and provide as appropriate  - Encourage maximum independence but intervene and supervise when necessary  - Involve family in performance of ADLs  - Assess for home care needs following discharge   - Consider OT consult to assist with ADL evaluation and planning for discharge  - Provide patient education as appropriate  Outcome: Progressing  Goal: Maintain proper alignment of affected body part  Description: INTERVENTIONS:  - Support, maintain and protect limb and body alignment  - Provide patient/ family with appropriate education  Outcome: Progressing     Problem: Prexisting or High Potential for Compromised Skin Integrity  Goal: Skin integrity is maintained or improved  Description: INTERVENTIONS:  - Identify patients at risk for skin breakdown  - Assess and monitor skin integrity  - Assess and monitor nutrition and hydration status  -  Monitor labs   - Assess for incontinence   - Turn and reposition patient  - Assist with mobility/ambulation  - Relieve pressure over bony prominences  - Avoid friction and shearing  - Provide appropriate hygiene as needed including keeping skin clean and dry  - Evaluate need for skin moisturizer/barrier cream  - Collaborate with interdisciplinary team   - Patient/family teaching  - Consider wound care consult   Outcome: Progressing

## 2024-11-23 VITALS
BODY MASS INDEX: 19.26 KG/M2 | RESPIRATION RATE: 18 BRPM | HEIGHT: 61 IN | DIASTOLIC BLOOD PRESSURE: 77 MMHG | OXYGEN SATURATION: 96 % | WEIGHT: 102 LBS | SYSTOLIC BLOOD PRESSURE: 120 MMHG | HEART RATE: 93 BPM | TEMPERATURE: 97 F

## 2024-11-23 LAB
GLUCOSE SERPL-MCNC: 119 MG/DL (ref 65–140)
GLUCOSE SERPL-MCNC: 91 MG/DL (ref 65–140)

## 2024-11-23 PROCEDURE — 82948 REAGENT STRIP/BLOOD GLUCOSE: CPT

## 2024-11-23 PROCEDURE — 99239 HOSP IP/OBS DSCHRG MGMT >30: CPT | Performed by: STUDENT IN AN ORGANIZED HEALTH CARE EDUCATION/TRAINING PROGRAM

## 2024-11-23 PROCEDURE — 94760 N-INVAS EAR/PLS OXIMETRY 1: CPT

## 2024-11-23 PROCEDURE — 94640 AIRWAY INHALATION TREATMENT: CPT

## 2024-11-23 RX ORDER — AZITHROMYCIN 250 MG/1
250 TABLET, FILM COATED ORAL EVERY OTHER DAY
Qty: 15 TABLET | Refills: 0 | Status: CANCELLED | OUTPATIENT
Start: 2024-11-25

## 2024-11-23 RX ORDER — IPRATROPIUM BROMIDE AND ALBUTEROL SULFATE 2.5; .5 MG/3ML; MG/3ML
3 SOLUTION RESPIRATORY (INHALATION) 4 TIMES DAILY
Qty: 90 ML | Refills: 0 | Status: ON HOLD | OUTPATIENT
Start: 2024-11-23

## 2024-11-23 RX ORDER — HYDROXYZINE HYDROCHLORIDE 25 MG/1
25 TABLET, FILM COATED ORAL AS NEEDED
Qty: 5 TABLET | Refills: 0 | Status: ON HOLD | OUTPATIENT
Start: 2024-11-23 | End: 2024-11-28

## 2024-11-23 RX ORDER — GUAIFENESIN 600 MG/1
600 TABLET, EXTENDED RELEASE ORAL 2 TIMES DAILY
Qty: 60 TABLET | Refills: 0 | Status: SHIPPED | OUTPATIENT
Start: 2024-11-23 | End: 2024-12-07

## 2024-11-23 RX ORDER — PREDNISONE 10 MG/1
TABLET ORAL
Qty: 30 TABLET | Refills: 0 | Status: SHIPPED | OUTPATIENT
Start: 2024-11-23 | End: 2024-11-23

## 2024-11-23 RX ORDER — METOPROLOL TARTRATE 25 MG/1
25 TABLET, FILM COATED ORAL EVERY 12 HOURS SCHEDULED
Qty: 60 TABLET | Refills: 0 | Status: ON HOLD | OUTPATIENT
Start: 2024-11-23

## 2024-11-23 RX ORDER — PREDNISONE 10 MG/1
TABLET ORAL
Qty: 30 TABLET | Refills: 0 | Status: SHIPPED | OUTPATIENT
Start: 2024-11-23 | End: 2024-12-07

## 2024-11-23 RX ORDER — DOXYCYCLINE 100 MG/1
100 CAPSULE ORAL ONCE
Qty: 1 CAPSULE | Refills: 0 | Status: SHIPPED | OUTPATIENT
Start: 2024-11-23 | End: 2024-11-23

## 2024-11-23 RX ORDER — DOXYCYCLINE 100 MG/1
100 CAPSULE ORAL EVERY 12 HOURS SCHEDULED
Qty: 1 CAPSULE | Refills: 0 | Status: SHIPPED | OUTPATIENT
Start: 2024-11-23 | End: 2024-11-23

## 2024-11-23 RX ADMIN — PANTOPRAZOLE SODIUM 40 MG: 40 TABLET, DELAYED RELEASE ORAL at 05:52

## 2024-11-23 RX ADMIN — IPRATROPIUM BROMIDE 0.5 MG: 0.5 SOLUTION RESPIRATORY (INHALATION) at 13:10

## 2024-11-23 RX ADMIN — NICOTINE 21 MG: 21 PATCH, EXTENDED RELEASE TRANSDERMAL at 08:55

## 2024-11-23 RX ADMIN — RIFABUTIN 300 MG: 150 CAPSULE ORAL at 08:59

## 2024-11-23 RX ADMIN — AZITHROMYCIN 250 MG: 250 TABLET, FILM COATED ORAL at 08:52

## 2024-11-23 RX ADMIN — IPRATROPIUM BROMIDE 0.5 MG: 0.5 SOLUTION RESPIRATORY (INHALATION) at 07:24

## 2024-11-23 RX ADMIN — METHYLPREDNISOLONE SODIUM SUCCINATE 40 MG: 40 INJECTION, POWDER, FOR SOLUTION INTRAMUSCULAR; INTRAVENOUS at 08:54

## 2024-11-23 RX ADMIN — LEVALBUTEROL HYDROCHLORIDE 1.25 MG: 1.25 SOLUTION RESPIRATORY (INHALATION) at 13:10

## 2024-11-23 RX ADMIN — GUAIFENESIN 600 MG: 600 TABLET ORAL at 08:54

## 2024-11-23 RX ADMIN — ETHAMBUTOL HYDROCHLORIDE 800 MG: 400 TABLET, FILM COATED ORAL at 08:59

## 2024-11-23 RX ADMIN — HEPARIN SODIUM 5000 UNITS: 5000 INJECTION, SOLUTION INTRAVENOUS; SUBCUTANEOUS at 05:52

## 2024-11-23 RX ADMIN — SODIUM CHLORIDE, SODIUM GLUCONATE, SODIUM ACETATE, POTASSIUM CHLORIDE, MAGNESIUM CHLORIDE, SODIUM PHOSPHATE, DIBASIC, AND POTASSIUM PHOSPHATE 100 ML/HR: .53; .5; .37; .037; .03; .012; .00082 INJECTION, SOLUTION INTRAVENOUS at 06:35

## 2024-11-23 RX ADMIN — METOPROLOL TARTRATE 25 MG: 25 TABLET, FILM COATED ORAL at 08:59

## 2024-11-23 RX ADMIN — DOXYCYCLINE 100 MG: 100 CAPSULE ORAL at 08:53

## 2024-11-23 RX ADMIN — LEVALBUTEROL HYDROCHLORIDE 1.25 MG: 1.25 SOLUTION RESPIRATORY (INHALATION) at 07:24

## 2024-11-23 RX ADMIN — DOCUSATE SODIUM 100 MG: 100 CAPSULE, LIQUID FILLED ORAL at 08:52

## 2024-11-23 NOTE — ASSESSMENT & PLAN NOTE
Currently not on a medications  Patient was placed on sliding scale insulin while hospitalized, however only required 1 to 2 units daily.  Not prescribed insulin on discharge.  Follow up with PCP       Lab Results   Component Value Date    HGBA1C 6.5 (H) 11/13/2024

## 2024-11-23 NOTE — ASSESSMENT & PLAN NOTE
Resolved.  Patient received Doxycycline 100 mg twice daily x 4.5 days, recommend 1 more dose on discharge for a total of 5 days  Patient received IV Solu-Medrol while hospitalized.  Discharged home on prednisone taper: Take 4 tabs (40 mg) daily x 3 days, then 3 tabs daily x 3 days, then 2 tabs daily x 3 days, then 1 tab daily x 3 days, then stop.   Continue Trelegy and DuoNebs on discharge  Patient reports that she has upcoming pulmonology follow-up next week.  Recommend close pulmonology follow-up.  Referral for outpatient pulmonary rehab placed  Strongly advised patient that she should stop smoking  Continue incentive spirometry at discharge.  Patient was given Acapella device on discharge.

## 2024-11-23 NOTE — ASSESSMENT & PLAN NOTE
Continue on Lopressor  Not on anticoagulation  CHADVASC score of 1 - consider initiating anticoagulation outpatient

## 2024-11-23 NOTE — ASSESSMENT & PLAN NOTE
Malnutrition Findings:   Adult Malnutrition type: Chronic illness  Adult Degree of Malnutrition: Malnutrition of moderate degree  Malnutrition Characteristics: Muscle loss, Fat loss, Inadequate energy                  360 Statement: Chronic moderate malnutrition related to inadequate oral intake, GI intolerance, COPD/decreased appetite as evidenced by < 75% estimated energy intake > 1 mo; moderate fat loss to orbitals; moderate muscle loss to interosseous muscles. Treated with: Continue regular diet, +Glucerna TID noting type 2 DM with steroid use. Recommend daily weights for nutrition monitoring. GI consult as needed.    BMI Findings:           Body mass index is 19.27 kg/m².

## 2024-11-23 NOTE — DISCHARGE SUMMARY
Discharge Summary - Hospitalist   Name: Ileana Seaman 64 y.o. female I MRN: [id number]  Unit/Bed#: [facility]-01 I Date of Admission: 11/18/2024   Date of Service: 11/23/2024 I Hospital Day: 4     Assessment & Plan  COPD exacerbation (HCC)  Resolved.  Patient received Doxycycline 100 mg twice daily x 4.5 days, recommend 1 more dose on discharge for a total of 5 days  Patient received IV Solu-Medrol while hospitalized.  Discharged home on prednisone taper: Take 4 tabs (40 mg) daily x 3 days, then 3 tabs daily x 3 days, then 2 tabs daily x 3 days, then 1 tab daily x 3 days, then stop.   Continue Trelegy and DuoNebs on discharge  Patient reports that she has upcoming pulmonology follow-up next week.  Recommend close pulmonology follow-up.  Referral for outpatient pulmonary rehab placed  Strongly advised patient that she should stop smoking  Continue incentive spirometry at discharge.  Patient was given Acapella device on discharge.  Mycobacterial infection, non-TB  Follows with [facility] infectious disease  Continue Zithromax, rifampin and Ethambutol daily   Type 2 diabetes mellitus without complication, without long-term current use of insulin (HCC)  Currently not on a medications  Patient was placed on sliding scale insulin while hospitalized, however only required 1 to 2 units daily.  Not prescribed insulin on discharge.  Follow up with PCP       Lab Results   Component Value Date    HGBA1C 6.5 (H) 11/13/2024     Hypomagnesemia  Resolved  She received 1 g of IV mag for her COPD exacerbation.    Atrial fibrillation (HCC)  Continue on Lopressor  Not on anticoagulation  CHADVASC score of 1 - consider initiating anticoagulation outpatient  Chronic hypoxic respiratory failure (HCC)  On home oxygen at 2 L  Moderate protein-calorie malnutrition (HCC)  Malnutrition Findings:   Adult Malnutrition type: Chronic illness  Adult Degree of Malnutrition: Malnutrition of moderate degree  Malnutrition Characteristics: Muscle loss, Fat  loss, Inadequate energy                  360 Statement: Chronic moderate malnutrition related to inadequate oral intake, GI intolerance, COPD/decreased appetite as evidenced by < 75% estimated energy intake > 1 mo; moderate fat loss to orbitals; moderate muscle loss to interosseous muscles. Treated with: Continue regular diet, +Glucerna TID noting type 2 DM with steroid use. Recommend daily weights for nutrition monitoring. GI consult as needed.    BMI Findings:           Body mass index is 19.27 kg/m².        Medical Problems        MESSAGE TO PCP (Merline Dinero) FOR FOLLOW UP:   Thank you for allowing us to participate in the care of your patient, Ileana Seaman, who was hospitalized from 11/18/2024 through 11/23/24 with the admitting diagnosis of COPD exacerbation, requiring IV steroids.  Patient is chronic home O2 dependent, and required baseline O2, did not require increasing O2.  Her condition improved and she was medically stable for discharge.  She was discharged home on prednisone taper.  Recommend close pulmonary follow-up outpatient, referral for pulmonary rehab placed.    Medication Changes:  Prednisone taper  Outpatient testing recommended:  None  If you have any additional questions or would like to discuss further, please feel free to contact me.  Mary Altamirano MD  [facility] Internal Medicine, Hospitalist, [phone]     Admission Date:   Admission Orders (From admission, onward)       Ordered        11/19/24 0111  INPATIENT ADMISSION  Once            11/19/24 0022  Place in Observation  Once,   Status:  Canceled                          Discharge Date: 11/23/24    Consultations During Hospital Stay:  None    Procedures Performed:   None    Significant Findings / Test Results:   XR chest 1 view portable   ED Interpretation by Rey Jenkins DO (11/18 9130)   Emphysema improving right upper lobe infiltrate      Final Result by Rivas Stewart MD (11/19 9616)      Chronic patchy density in the  right upper lobe. No new pulmonary findings.            Workstation performed: [id number]               Incidental Findings:   None    Test Results Pending at Discharge (will require follow up):   None     Complications: None    Reason for Admission: Shortness of breath    Hospital Course:   Ileana Seaman is a 64 y.o. female patient who originally presented to the hospital on 11/18/2024 due to shortness of breath.  Patient with history of COPD, chronic hypoxic respiratory failure.  She reported that she experienced worsening shortness of breath that began few hours prior to admission.  She also had associated productive cough with yellow mucus production.  Patient is followed by infectious disease at [facility] for mild Bactrim infection non-TTP.  She also follows with pulmonology outpatient.  Patient was admitted under the hospitalist service for further management for COPD exacerbation.  She was started on IV steroids, her condition continued to improve and she was tapered off IV steroids. Patient started on IV steroids and nebulizer treatment while hospitalized. Patient received 4.5 days of doxycycline therapy while hospitalized.   Patient discharged on tapering prednisone, DuoNebs, and inhaler therapy, with outpatient pulmonology follow-up. Recommended close outpatient pulmonology follow-up and pulmonary rehab referral was provided on discharge. Discussed with patient extensively on the importance of smoking cessation. Patient was medically stable  at the time of discharge.            Please see above list of diagnoses and related plan for additional information.     Condition at Discharge: stable    Discharge Day Visit / Exam:   Subjective: Patient seen and examined at bedside.  No acute events overnight.  Patient requests that she be given something for anxiety on discharge until she can see her PCP.  While hospitalized patient did receive 2 doses of Atarax.  Patient otherwise reports improvement in  "her breathing, she denies dyspnea on exertion, she is eager to be discharged.  No other acute complaints at this time.  Vitals: Blood Pressure: 120/77 (11/23/24 0858)  Pulse: 93 (11/23/24 0858)  Temperature: (!) 97 °F (36.1 °C) (11/23/24 0557)  Temp Source: Temporal (11/19/24 1919)  Respirations: 18 (11/23/24 0557)  Height: 5' 1\" (154.9 cm) (11/20/24 1016)  Weight - Scale: 46.3 kg (102 lb) (11/23/24 0552)  SpO2: 96 % (11/23/24 1311)  Physical Exam   Constitutional:       Appearance: Normal appearance.   HENT:      Head: Normocephalic and atraumatic.   Eyes:      Extraocular Movements: Extraocular movements intact.      Pupils: Pupils are equal, round, and reactive to light.   Cardiovascular:      Rate and Rhythm: Normal rate and regular rhythm.   Pulmonary:      Effort: Pulmonary effort is normal.      Breath sounds: Slight end expiratory wheezing present - improved from prior.      Comments: Diminished BS - improved from yesterday  Abdominal:      General: Abdomen is flat.      Palpations: Abdomen is soft.   Musculoskeletal:         General: Normal range of motion.   Skin:     General: Skin is warm.   Neurological:      General: No focal deficit present.      Mental Status: She is alert and oriented to person, place, and time. Mental status is at baseline.   Psychiatric:         Mood and Affect: Mood normal.         Behavior: Behavior normal.     Discussion with Family: Patient declined call to .     Discharge instructions/Information to patient and family:   See after visit summary for information provided to patient and family.      Provisions for Follow-Up Care:  See after visit summary for information related to follow-up care and any pertinent home health orders.      Mobility at time of Discharge:   Basic Mobility Inpatient Raw Score: 22  JH-HLM Goal: 7: Walk 25 feet or more  JH-HLM Achieved: 2: Bed activities/Dependent transfer  HLM Goal NOT achieved. Continue to encourage mobility in post " discharge setting.     Disposition:   Home    Planned Readmission: No    Discharge Medications:  See after visit summary for reconciled discharge medications provided to patient and/or family.      Administrative Statements       **Please Note: This note may have been constructed using a voice recognition system**

## 2024-11-23 NOTE — PLAN OF CARE
Problem: Nutrition/Hydration-ADULT  Goal: Nutrient/Hydration intake appropriate for improving, restoring or maintaining nutritional needs  Description: Monitor and assess patient's nutrition/hydration status for malnutrition. Collaborate with interdisciplinary team and initiate plan and interventions as ordered.  Monitor patient's weight and dietary intake as ordered or per policy. Utilize nutrition screening tool and intervene as necessary. Determine patient's food preferences and provide high-protein, high-caloric foods as appropriate.     INTERVENTIONS:  - Monitor oral intake, urinary output, labs, and treatment plans  - Assess nutrition and hydration status and recommend course of action  - Evaluate amount of meals eaten  - Assist patient with eating if necessary   - Allow adequate time for meals  - Recommend/ encourage appropriate diets, oral nutritional supplements, and vitamin/mineral supplements  - Order, calculate, and assess calorie counts as needed  - Recommend, monitor, and adjust tube feedings and TPN/PPN based on assessed needs  - Assess need for intravenous fluids  - Provide specific nutrition/hydration education as appropriate  - Include patient/family/caregiver in decisions related to nutrition  Outcome: Progressing     Problem: PAIN - ADULT  Goal: Verbalizes/displays adequate comfort level or baseline comfort level  Description: Interventions:  - Encourage patient to monitor pain and request assistance  - Assess pain using appropriate pain scale  - Administer analgesics based on type and severity of pain and evaluate response  - Implement non-pharmacological measures as appropriate and evaluate response  - Consider cultural and social influences on pain and pain management  - Notify physician/advanced practitioner if interventions unsuccessful or patient reports new pain  Outcome: Progressing     Problem: INFECTION - ADULT  Goal: Absence or prevention of progression during  hospitalization  Description: INTERVENTIONS:  - Assess and monitor for signs and symptoms of infection  - Monitor lab/diagnostic results  - Monitor all insertion sites, i.e. indwelling lines, tubes, and drains  - Monitor endotracheal if appropriate and nasal secretions for changes in amount and color  - [place] appropriate cooling/warming therapies per order  - Administer medications as ordered  - Instruct and encourage patient and family to use good hand hygiene technique  - Identify and instruct in appropriate isolation precautions for identified infection/condition  Outcome: Progressing  Goal: Absence of fever/infection during neutropenic period  Description: INTERVENTIONS:  - Monitor WBC    Outcome: Progressing     Problem: SAFETY ADULT  Goal: Patient will remain free of falls  Description: INTERVENTIONS:  - Educate patient/family on patient safety including physical limitations  - Instruct patient to call for assistance with activity   - Consult OT/PT to assist with strengthening/mobility   - Keep Call bell within reach  - Keep bed low and locked with side rails adjusted as appropriate  - Keep care items and personal belongings within reach  - Initiate and maintain comfort rounds  - Make Fall Risk Sign visible to staff  - Offer Toileting every 2 Hours, in advance of need  - Initiate/Maintain bed/chair alarm  - Obtain necessary fall risk management equipment: nonskid socks  - Apply yellow socks and bracelet for high fall risk patients  - Consider moving patient to room near nurses station  Outcome: Progressing  Goal: Maintain or return to baseline ADL function  Description: INTERVENTIONS:  -  Assess patient's ability to carry out ADLs; assess patient's baseline for ADL function and identify physical deficits which impact ability to perform ADLs (bathing, care of mouth/teeth, toileting, grooming, dressing, etc.)  - Assess/evaluate cause of self-care deficits   - Assess range of motion  - Assess patient's  mobility; develop plan if impaired  - Assess patient's need for assistive devices and provide as appropriate  - Encourage maximum independence but intervene and supervise when necessary  - Involve family in performance of ADLs  - Assess for home care needs following discharge   - Consider OT consult to assist with ADL evaluation and planning for discharge  - Provide patient education as appropriate  Outcome: Progressing  Goal: Maintains/Returns to pre admission functional level  Description: INTERVENTIONS:  - Perform AM-PAC 6 Click Basic Mobility/ Daily Activity assessment daily.  - Set and communicate daily mobility goal to care team and patient/family/caregiver.   - Collaborate with rehabilitation services on mobility goals if consulted  - Perform Range of Motion 3 times a day.  - Reposition patient every 2 hours.  - Dangle patient 3 times a day  - Stand patient 3 times a day  - Ambulate patient 3 times a day  - Out of bed to chair 3 times a day   - Out of bed for meals 3 times a day  - Out of bed for toileting  - Record patient progress and toleration of activity level   Outcome: Progressing     Problem: DISCHARGE PLANNING  Goal: Discharge to home or other facility with appropriate resources  Description: INTERVENTIONS:  - Identify barriers to discharge w/patient and caregiver  - Arrange for needed discharge resources and transportation as appropriate  - Identify discharge learning needs (meds, wound care, etc.)  - Arrange for interpretive services to assist at discharge as needed  - Refer to Case Management Department for coordinating discharge planning if the patient needs post-hospital services based on physician/advanced practitioner order or complex needs related to functional status, cognitive ability, or social support system  Outcome: Progressing     Problem: Knowledge Deficit  Goal: Patient/family/caregiver demonstrates understanding of disease process, treatment plan, medications, and discharge  instructions  Description: Complete learning assessment and assess knowledge base.  Interventions:  - Provide teaching at level of understanding  - Provide teaching via preferred learning methods  Outcome: Progressing     Problem: RESPIRATORY - ADULT  Goal: Achieves optimal ventilation and oxygenation  Description: INTERVENTIONS:  - Assess for changes in respiratory status  - Assess for changes in mentation and behavior  - Position to facilitate oxygenation and minimize respiratory effort  - Oxygen administered by appropriate delivery if ordered  - Initiate smoking cessation education as indicated  - Encourage broncho-pulmonary hygiene including cough, deep breathe, Incentive Spirometry  - Assess the need for suctioning and aspirate as needed  - Assess and instruct to report SOB or any respiratory difficulty  - Respiratory Therapy support as indicated  Outcome: Progressing     Problem: METABOLIC, FLUID AND ELECTROLYTES - ADULT  Goal: Electrolytes maintained within normal limits  Description: INTERVENTIONS:  - Monitor labs and assess patient for signs and symptoms of electrolyte imbalances  - Administer electrolyte replacement as ordered  - Monitor response to electrolyte replacements, including repeat lab results as appropriate  - Instruct patient on fluid and nutrition as appropriate  Outcome: Progressing  Goal: Fluid balance maintained  Description: INTERVENTIONS:  - Monitor labs   - Monitor I/O and WT  - Instruct patient on fluid and nutrition as appropriate  - Assess for signs & symptoms of volume excess or deficit  Outcome: Progressing  Goal: Glucose maintained within target range  Description: INTERVENTIONS:  - Monitor Blood Glucose as ordered  - Assess for signs and symptoms of hyperglycemia and hypoglycemia  - Administer ordered medications to maintain glucose within target range  - Assess nutritional intake and initiate nutrition service referral as needed  Outcome: Progressing     Problem: SKIN/TISSUE  INTEGRITY - ADULT  Goal: Skin Integrity remains intact(Skin Breakdown Prevention)  Description: Assess:  -Perform Myles assessment every shift  -Clean and moisturize skin every 2 hrs  -Inspect skin when repositioning, toileting, and assisting with ADLS  -Assess under medical devices  -Assess extremities for adequate circulation and sensation     Bed Management:  -Have minimal linens on bed & keep smooth, unwrinkled  -Change linens as needed when moist or perspiring  -Avoid sitting or lying in one position for more than 2 hours while in bed  -Keep HOB at 30 degrees     Toileting:  -Offer bedside commode  -Assess for incontinence every 2 hrs  -Use incontinent care products after each incontinent episode    Activity:  -Mobilize patient 3 times a day  -Encourage activity and walks on unit  -Encourage or provide ROM exercises   -Turn and reposition patient every 2 Hours  -Use appropriate equipment to lift or move patient in bed  -Instruct/ Assist with weight shifting every 60 mins when out of bed in chair  -Consider limitation of chair time 2 hour intervals    Skin Care:  -Avoid use of baby powder, tape, friction and shearing, hot water or constrictive clothing  -Relieve pressure over bony prominences using silicone cream, pillows, waffle cushion for protection  -Do not massage red bony areas    Next Steps:  -Teach patient strategies to minimize risks  -Consider consults to  interdisciplinary teams  Outcome: Progressing  Goal: Incision(s), wounds(s) or drain site(s) healing without S/S of infection  Description: INTERVENTIONS  - Assess and document dressing, incision, wound bed, drain sites and surrounding tissue  - Provide patient and family education  - Perform skin care/dressing changes every shift  Outcome: Progressing  Goal: Pressure injury heals and does not worsen  Description: Interventions:  - Implement low air loss mattress or specialty surface (Criteria met)  - Apply silicone foam dressing  - Instruct/assist  with weight shifting every 60 minutes when in chair   - Limit chair time to 2 hour intervals  - Use special pressure reducing interventions such as waffle cushion when in chair   - Apply fecal or urinary incontinence containment device   - Perform passive or active ROM  - Turn and reposition patient & offload bony prominences every 2 hours   - Utilize friction reducing device or surface for transfers   - Consider consults to  interdisciplinary teams  - Use incontinent care products after each incontinent episode  - Consider nutrition services referral as needed  Outcome: Progressing     Problem: MUSCULOSKELETAL - ADULT  Goal: Maintain or return mobility to safest level of function  Description: INTERVENTIONS:  - Assess patient's ability to carry out ADLs; assess patient's baseline for ADL function and identify physical deficits which impact ability to perform ADLs (bathing, care of mouth/teeth, toileting, grooming, dressing, etc.)  - Assess/evaluate cause of self-care deficits   - Assess range of motion  - Assess patient's mobility  - Assess patient's need for assistive devices and provide as appropriate  - Encourage maximum independence but intervene and supervise when necessary  - Involve family in performance of ADLs  - Assess for home care needs following discharge   - Consider OT consult to assist with ADL evaluation and planning for discharge  - Provide patient education as appropriate  Outcome: Progressing  Goal: Maintain proper alignment of affected body part  Description: INTERVENTIONS:  - Support, maintain and protect limb and body alignment  - Provide patient/ family with appropriate education  Outcome: Progressing     Problem: Prexisting or High Potential for Compromised Skin Integrity  Goal: Skin integrity is maintained or improved  Description: INTERVENTIONS:  - Identify patients at risk for skin breakdown  - Assess and monitor skin integrity  - Assess and monitor nutrition and hydration status  -  Monitor labs   - Assess for incontinence   - Turn and reposition patient  - Assist with mobility/ambulation  - Relieve pressure over bony prominences  - Avoid friction and shearing  - Provide appropriate hygiene as needed including keeping skin clean and dry  - Evaluate need for skin moisturizer/barrier cream  - Collaborate with interdisciplinary team   - Patient/family teaching  - Consider wound care consult   Outcome: Progressing

## 2024-11-23 NOTE — DISCHARGE INSTR - AVS FIRST PAGE
Take 4 tabs (40 mg) daily x 3 days, then 3 tabs daily x 3 days, then 2 tabs daily x 3 days, then 1 tab daily x 3 days, then stop.

## 2024-11-24 ENCOUNTER — APPOINTMENT (EMERGENCY)
Dept: RADIOLOGY | Facility: HOSPITAL | Age: 64
End: 2024-11-24
Payer: MEDICARE

## 2024-11-24 ENCOUNTER — HOSPITAL ENCOUNTER (EMERGENCY)
Facility: HOSPITAL | Age: 64
Discharge: HOME | End: 2024-11-24
Attending: EMERGENCY MEDICINE
Payer: MEDICARE

## 2024-11-24 VITALS
TEMPERATURE: 98.9 F | OXYGEN SATURATION: 100 % | WEIGHT: 102 LBS | RESPIRATION RATE: 18 BRPM | DIASTOLIC BLOOD PRESSURE: 53 MMHG | SYSTOLIC BLOOD PRESSURE: 90 MMHG | BODY MASS INDEX: 19.27 KG/M2 | HEART RATE: 77 BPM

## 2024-11-24 DIAGNOSIS — K21.9 GERD (GASTROESOPHAGEAL REFLUX DISEASE): ICD-10-CM

## 2024-11-24 DIAGNOSIS — F41.0 PANIC ATTACK: Primary | ICD-10-CM

## 2024-11-24 LAB
ANION GAP SERPL CALCULATED.3IONS-SCNC: 7 MMOL/L (ref 4–13)
BACTERIA BLD CULT: NORMAL
BACTERIA BLD CULT: NORMAL
BASOPHILS # BLD AUTO: 0.03 THOUSANDS/ΜL (ref 0–0.1)
BASOPHILS NFR BLD AUTO: 0 % (ref 0–1)
BNP SERPL-MCNC: 139 PG/ML (ref 0–100)
BUN SERPL-MCNC: 13 MG/DL (ref 5–25)
CALCIUM SERPL-MCNC: 9.5 MG/DL (ref 8.4–10.2)
CARDIAC TROPONIN I PNL SERPL HS: 6 NG/L (ref ?–50)
CHLORIDE SERPL-SCNC: 95 MMOL/L (ref 96–108)
CO2 SERPL-SCNC: 33 MMOL/L (ref 21–32)
CREAT SERPL-MCNC: 0.55 MG/DL (ref 0.6–1.3)
D DIMER PPP FEU-MCNC: 0.34 UG/ML FEU
EOSINOPHIL # BLD AUTO: 0.07 THOUSAND/ΜL (ref 0–0.61)
EOSINOPHIL NFR BLD AUTO: 1 % (ref 0–6)
ERYTHROCYTE [DISTWIDTH] IN BLOOD BY AUTOMATED COUNT: 13.2 % (ref 11.6–15.1)
GFR SERPL CREATININE-BSD FRML MDRD: 98 ML/MIN/1.73SQ M
GLUCOSE SERPL-MCNC: 200 MG/DL (ref 65–140)
HCT VFR BLD AUTO: 44.3 % (ref 34.8–46.1)
HGB BLD-MCNC: 14 G/DL (ref 11.5–15.4)
IMM GRANULOCYTES # BLD AUTO: 0.09 THOUSAND/UL (ref 0–0.2)
IMM GRANULOCYTES NFR BLD AUTO: 1 % (ref 0–2)
LYMPHOCYTES # BLD AUTO: 1.15 THOUSANDS/ΜL (ref 0.6–4.47)
LYMPHOCYTES NFR BLD AUTO: 9 % (ref 14–44)
MCH RBC QN AUTO: 28.9 PG (ref 26.8–34.3)
MCHC RBC AUTO-ENTMCNC: 31.6 G/DL (ref 31.4–37.4)
MCV RBC AUTO: 91 FL (ref 82–98)
MONOCYTES # BLD AUTO: 0.27 THOUSAND/ΜL (ref 0.17–1.22)
MONOCYTES NFR BLD AUTO: 2 % (ref 4–12)
NEUTROPHILS # BLD AUTO: 10.73 THOUSANDS/ΜL (ref 1.85–7.62)
NEUTS SEG NFR BLD AUTO: 87 % (ref 43–75)
NRBC BLD AUTO-RTO: 0 /100 WBCS
PLATELET # BLD AUTO: 248 THOUSANDS/UL (ref 149–390)
PMV BLD AUTO: 12.1 FL (ref 8.9–12.7)
POTASSIUM SERPL-SCNC: 4.2 MMOL/L (ref 3.5–5.3)
RBC # BLD AUTO: 4.85 MILLION/UL (ref 3.81–5.12)
SODIUM SERPL-SCNC: 135 MMOL/L (ref 135–147)
WBC # BLD AUTO: 12.34 THOUSAND/UL (ref 4.31–10.16)

## 2024-11-24 PROCEDURE — 96375 TX/PRO/DX INJ NEW DRUG ADDON: CPT

## 2024-11-24 PROCEDURE — 85025 COMPLETE CBC W/AUTO DIFF WBC: CPT | Performed by: EMERGENCY MEDICINE

## 2024-11-24 PROCEDURE — 80048 BASIC METABOLIC PNL TOTAL CA: CPT | Performed by: EMERGENCY MEDICINE

## 2024-11-24 PROCEDURE — 84484 ASSAY OF TROPONIN QUANT: CPT | Performed by: EMERGENCY MEDICINE

## 2024-11-24 PROCEDURE — 83880 ASSAY OF NATRIURETIC PEPTIDE: CPT | Performed by: EMERGENCY MEDICINE

## 2024-11-24 PROCEDURE — 85379 FIBRIN DEGRADATION QUANT: CPT | Performed by: EMERGENCY MEDICINE

## 2024-11-24 PROCEDURE — 71045 X-RAY EXAM CHEST 1 VIEW: CPT

## 2024-11-24 PROCEDURE — 96361 HYDRATE IV INFUSION ADD-ON: CPT

## 2024-11-24 PROCEDURE — 99285 EMERGENCY DEPT VISIT HI MDM: CPT

## 2024-11-24 PROCEDURE — 96374 THER/PROPH/DIAG INJ IV PUSH: CPT

## 2024-11-24 PROCEDURE — 99285 EMERGENCY DEPT VISIT HI MDM: CPT | Performed by: EMERGENCY MEDICINE

## 2024-11-24 PROCEDURE — 36415 COLL VENOUS BLD VENIPUNCTURE: CPT | Performed by: EMERGENCY MEDICINE

## 2024-11-24 PROCEDURE — 93005 ELECTROCARDIOGRAM TRACING: CPT

## 2024-11-24 RX ORDER — KETOROLAC TROMETHAMINE 30 MG/ML
15 INJECTION, SOLUTION INTRAMUSCULAR; INTRAVENOUS ONCE
Status: COMPLETED | OUTPATIENT
Start: 2024-11-24 | End: 2024-11-24

## 2024-11-24 RX ORDER — MAGNESIUM HYDROXIDE/ALUMINUM HYDROXICE/SIMETHICONE 120; 1200; 1200 MG/30ML; MG/30ML; MG/30ML
30 SUSPENSION ORAL ONCE
Status: DISCONTINUED | OUTPATIENT
Start: 2024-11-24 | End: 2024-11-24

## 2024-11-24 RX ORDER — SODIUM CHLORIDE 9 MG/ML
3 INJECTION INTRAVENOUS
Status: DISCONTINUED | OUTPATIENT
Start: 2024-11-24 | End: 2024-11-25 | Stop reason: HOSPADM

## 2024-11-24 RX ORDER — LORAZEPAM 2 MG/ML
0.5 INJECTION INTRAMUSCULAR ONCE
Status: COMPLETED | OUTPATIENT
Start: 2024-11-24 | End: 2024-11-24

## 2024-11-24 RX ORDER — MAGNESIUM HYDROXIDE/ALUMINUM HYDROXICE/SIMETHICONE 120; 1200; 1200 MG/30ML; MG/30ML; MG/30ML
30 SUSPENSION ORAL ONCE
Status: COMPLETED | OUTPATIENT
Start: 2024-11-24 | End: 2024-11-24

## 2024-11-24 RX ORDER — FAMOTIDINE 10 MG/ML
20 INJECTION, SOLUTION INTRAVENOUS ONCE
Status: COMPLETED | OUTPATIENT
Start: 2024-11-24 | End: 2024-11-24

## 2024-11-24 RX ADMIN — ALUMINUM HYDROXIDE, MAGNESIUM HYDROXIDE, AND SIMETHICONE 30 ML: 1200; 120; 1200 SUSPENSION ORAL at 19:31

## 2024-11-24 RX ADMIN — LORAZEPAM 0.5 MG: 2 INJECTION INTRAMUSCULAR; INTRAVENOUS at 19:29

## 2024-11-24 RX ADMIN — FAMOTIDINE 20 MG: 10 INJECTION, SOLUTION INTRAVENOUS at 19:29

## 2024-11-24 RX ADMIN — SODIUM CHLORIDE 1000 ML: 0.9 INJECTION, SOLUTION INTRAVENOUS at 21:24

## 2024-11-24 RX ADMIN — KETOROLAC TROMETHAMINE 15 MG: 30 INJECTION, SOLUTION INTRAMUSCULAR at 19:30

## 2024-11-25 ENCOUNTER — PATIENT OUTREACH (OUTPATIENT)
Dept: CASE MANAGEMENT | Facility: OTHER | Age: 64
End: 2024-11-25

## 2024-11-25 DIAGNOSIS — J44.9 CHRONIC OBSTRUCTIVE PULMONARY DISEASE, UNSPECIFIED COPD TYPE (HCC): Primary | ICD-10-CM

## 2024-11-25 NOTE — PROGRESS NOTES
OP RT CM received in basket message to outreach patient following her recent hospitalization for COPD.   ---------------------------------------------------------------------  OP RT CM called and left a VM requesting a return phone call. I will outreach in 1 week unless I hear back from patient prior.  ------------------------------------------------------------------

## 2024-11-25 NOTE — DISCHARGE INSTRUCTIONS
Return to the ER for worsening symptoms.  Please follow-up with your PCP and the gastroenterologist for further evaluation and management.  
patient

## 2024-11-25 NOTE — ED PROVIDER NOTES
Time reflects when diagnosis was documented in both MDM as applicable and the Disposition within this note       Time User Action Codes Description Comment    11/24/2024  9:20 PM Shantell Haji [F41.0] Panic attack     11/24/2024 10:02 PM Shantell Haji [K21.9] GERD (gastroesophageal reflux disease)           ED Disposition       ED Disposition   Discharge    Condition   Stable    Date/Time   Sun Nov 24, 2024 10:01 PM    Comment   Ileana Seaman discharge to home/self care.                   Assessment & Plan       Medical Decision Making  Differential diagnosis includes but not limited to: COPD exacerbation, anxiety, GERD    Amount and/or Complexity of Data Reviewed  Labs: ordered.  Radiology: ordered.    Risk  OTC drugs.  Prescription drug management.        ED Course as of 11/24/24 2203   [place]aughter Nov 24, 2024 2201 Patient is awake and alert and at her baseline.  Transient hypotension after receiving Ativan.  Given IV fluids and is back to baseline without any adverse outcome.  Advised to continue her hydroxyzine as needed and prescribed.       Medications   sodium chloride (PF) 0.9 % injection 3 mL (has no administration in time range)   sodium chloride 0.9 % bolus 1,000 mL (1,000 mL Intravenous New Bag 11/24/24 2124)   LORazepam (ATIVAN) injection 0.5 mg (0.5 mg Intravenous Given 11/24/24 1929)   Famotidine (PF) (PEPCID) injection 20 mg (20 mg Intravenous Given 11/24/24 1929)   aluminum-magnesium hydroxide-simethicone (MAALOX) oral suspension 30 mL (30 mL Oral Given 11/24/24 1931)   ketorolac (TORADOL) injection 15 mg (15 mg Intravenous Given 11/24/24 1930)       ED Risk Strat Scores                           SBIRT 20yo+      Flowsheet Row Most Recent Value   Initial Alcohol Screen: US AUDIT-C     1. How often do you have a drink containing alcohol? 0 Filed at: 11/24/2024 1751   2. How many drinks containing alcohol do you have on a typical day you are drinking?  0 Filed at: 11/24/2024 1751   3a. Male  UNDER 65: How often do you have five or more drinks on one occasion? 0 Filed at: 11/24/2024 1751   3b. FEMALE Any Age, or MALE 65+: How often do you have 4 or more drinks on one occassion? 0 Filed at: 11/24/2024 1751   Audit-C Score 0 Filed at: 11/24/2024 1751   LANDY: How many times in the past year have you...    Used an illegal drug or used a prescription medication for non-medical reasons? Never Filed at: 11/24/2024 1751                            History of Present Illness       Chief Complaint   Patient presents with    Panic Attack     PT took a breathing treatment which created her to feel anxious. PT wears 2L nasal canula chronically. PT was recently dc with COPD exacerbation     Shortness of Breath       Past Medical History:   Diagnosis Date    COPD (chronic obstructive pulmonary disease) (HCC)     Hiatal hernia       Past Surgical History:   Procedure Laterality Date    CHOLECYSTECTOMY        History reviewed. No pertinent family history.   Social History     Tobacco Use    Smoking status: Former     Current packs/day: 0.50     Types: Cigarettes    Smokeless tobacco: Never   Vaping Use    Vaping status: Never Used   Substance Use Topics    Alcohol use: Never    Drug use: Never      E-Cigarette/Vaping    E-Cigarette Use Never User       E-Cigarette/Vaping Substances      I have reviewed and agree with the history as documented.     This is a 64-year-old female presenting to the ED for evaluation of anxiety.  Patient states that she was recently diagnosed with hiatal hernia and has been having epigastric pain.  She states that she uses her oxygen chronically and takes nebulizer treatments.  Patient states that she did a treatment which made her anxious and she became concerned because at the same time that she experienced anxiety she also began having epigastric pain from her hiatal hernia.  Patient states that she became flustered and nervous and she recently was discharged from the hospital and given  hydroxyzine to manage her anxiety.  She states that she is having a hard time getting adjusted to the anxiety and feels that the medication that was given is insufficient/ineffective.  She did take hydroxyzine prior to coming to the emergency department but states that she felt like it was not helping.      Review of Systems   Constitutional:  Negative for chills and fever.   HENT:  Negative for ear pain and sore throat.    Eyes:  Negative for pain and visual disturbance.   Respiratory:  Positive for shortness of breath. Negative for cough.    Cardiovascular:  Negative for chest pain and palpitations.   Gastrointestinal:  Positive for abdominal pain. Negative for vomiting.   Genitourinary:  Negative for dysuria and hematuria.   Musculoskeletal:  Negative for arthralgias and back pain.   Skin:  Negative for color change and rash.   Neurological:  Negative for seizures and syncope.   Psychiatric/Behavioral:  The patient is nervous/anxious.    All other systems reviewed and are negative.          Objective       ED Triage Vitals [11/24/24 1747]   Temperature Pulse Blood Pressure Respirations SpO2 Patient Position - Orthostatic VS   98.9 °F (37.2 °C) (!) 108 154/89 20 93 % --      Temp Source Heart Rate Source BP Location FiO2 (%) Pain Score    Temporal Monitor Right arm -- 6      Vitals      Date and Time Temp Pulse SpO2 Resp BP Pain Score FACES Pain Rating User   11/24/24 2130 -- 89 98 % 18 97/53 -- -- [facility]   11/24/24 2100 -- 88 97 % 14 75/49 -- -- [facility]   11/24/24 2030 -- 92 95 % 16 94/54 -- -- [facility]   11/24/24 2000 -- 90 96 % 16 91/55 -- -- [facility]   11/24/24 1930 -- 95 95 % 18 114/57 5 -- [facility]   11/24/24 1747 98.9 °F (37.2 °C) 108 93 % 20 154/89 6 -- MY            Physical Exam  Vitals and nursing note reviewed.   Constitutional:       General: She is in acute distress.      Appearance: She is well-developed and normal weight.   HENT:      Head: Normocephalic and atraumatic.      Mouth/Throat:      Mouth: Mucous membranes  are moist.   Eyes:      Extraocular Movements: Extraocular movements intact.      Conjunctiva/sclera: Conjunctivae normal.   Cardiovascular:      Rate and Rhythm: Normal rate and regular rhythm.      Heart sounds: No murmur heard.  Pulmonary:      Effort: Pulmonary effort is normal. No tachypnea, accessory muscle usage or respiratory distress.      Breath sounds: Normal breath sounds. No decreased breath sounds, wheezing, rhonchi or rales.   Chest:      Chest wall: No mass, deformity, tenderness, crepitus or edema. There is no dullness to percussion.   Abdominal:      General: Bowel sounds are normal.      Palpations: Abdomen is soft.      Tenderness: There is abdominal tenderness. There is rebound. There is no guarding.   Musculoskeletal:         General: No swelling. Normal range of motion.      Cervical back: Normal range of motion and neck supple.      Right lower leg: No tenderness. No edema.      Left lower leg: No tenderness. No edema.   Skin:     General: Skin is warm and dry.      Capillary Refill: Capillary refill takes less than 2 seconds.   Neurological:      General: No focal deficit present.      Mental Status: She is alert and oriented to person, place, and time.      Comments: Mild tremors   Psychiatric:         Mood and Affect: Mood is anxious.      Comments: Tearful       Results Reviewed       Procedure Component Value Units Date/Time    HS Troponin 0hr (reflex protocol) [[id number]]  (Normal) Collected: 11/24/24 1830    Lab Status: Final result Specimen: Blood from Arm, Right Updated: 11/24/24 1911     hs TnI 0hr 6 ng/L     B-Type Natriuretic Peptide(BNP) [[id number]]  (Abnormal) Collected: 11/24/24 1830    Lab Status: Final result Specimen: Blood from Arm, Right Updated: 11/24/24 1910      pg/mL     Basic metabolic panel [[id number]]  (Abnormal) Collected: 11/24/24 1830    Lab Status: Final result Specimen: Blood from Arm, Right Updated: 11/24/24 1902     Sodium 135 mmol/L      Potassium 4.2  mmol/L      Chloride 95 mmol/L      CO2 33 mmol/L      ANION GAP 7 mmol/L      BUN 13 mg/dL      Creatinine 0.55 mg/dL      Glucose 200 mg/dL      Calcium 9.5 mg/dL      eGFR 98 ml/min/1.73sq m     Narrative:      [employer] guidelines for Chronic Kidney Disease (CKD):     Stage 1 with normal or high GFR (GFR > 90 mL/min/1.73 square meters)    Stage 2 Mild CKD (GFR = 60-89 mL/min/1.73 square meters)    Stage 3A Moderate CKD (GFR = 45-59 mL/min/1.73 square meters)    Stage 3B Moderate CKD (GFR = 30-44 mL/min/1.73 square meters)    Stage 4 Severe CKD (GFR = 15-29 mL/min/1.73 square meters)    Stage 5 End Stage CKD (GFR <15 mL/min/1.73 square meters)  Note: GFR calculation is accurate only with a steady state creatinine    D-dimer, quantitative [[id number]]  (Normal) Collected: 11/24/24 1830    Lab Status: Final result Specimen: Blood from Arm, Right Updated: 11/24/24 1900     D-Dimer, Quant 0.34 ug/ml FEU     Narrative:      In the evaluation for possible pulmonary embolism, in the appropriate (Well's Score of 4 or less) patient, the age adjusted d-dimer cutoff for this patient can be calculated as:    Age x 0.01 (in ug/mL) for Age-adjusted D-dimer exclusion threshold for a patient over 50 years.    CBC and differential [[id number]]  (Abnormal) Collected: 11/24/24 1830    Lab Status: Final result Specimen: Blood from Arm, Right Updated: 11/24/24 1839     WBC 12.34 Thousand/uL      RBC 4.85 Million/uL      Hemoglobin 14.0 g/dL      Hematocrit 44.3 %      MCV 91 fL      MCH 28.9 pg      MCHC 31.6 g/dL      RDW 13.2 %      MPV 12.1 fL      Platelets 248 Thousands/uL      nRBC 0 /100 WBCs      Segmented % 87 %      Immature Grans % 1 %      Lymphocytes % 9 %      Monocytes % 2 %      Eosinophils Relative 1 %      Basophils Relative 0 %      Absolute Neutrophils 10.73 Thousands/µL      Absolute Immature Grans 0.09 Thousand/uL      Absolute Lymphocytes 1.15 Thousands/µL      Absolute Monocytes 0.27  Thousand/µL      Eosinophils Absolute 0.07 Thousand/µL      Basophils Absolute 0.03 Thousands/µL             X-ray chest 1 view portable    (Results Pending)       ECG 12 Lead Documentation Only    Date/Time: 11/24/2024 5:56 PM    Performed by: Shantell Haji DO  Authorized by: Shantell Haji DO    Indications / Diagnosis:  Shortness of breath  ECG reviewed by me, the ED Provider: yes    Patient location:  ED  Previous ECG:     Previous ECG:  Unavailable  Interpretation:     Interpretation: abnormal    Rate:     ECG rate:  102    ECG rate assessment: tachycardic    Rhythm:     Rhythm: sinus tachycardia    Ectopy:     Ectopy: none    QRS:     QRS axis:  Normal    QRS intervals:  Normal  Conduction:     Conduction: normal    ST segments:     ST segments:  Normal  T waves:     T waves: normal        ED Medication and Procedure Management   Prior to Admission Medications   Prescriptions Last Dose Informant Patient Reported? Taking?   azithromycin (ZITHROMAX) 500 MG tablet   Yes No   Sig: Take 250 mg by mouth daily   docusate sodium (COLACE) 100 mg capsule   Yes No   Sig: Take 100 mg by mouth 2 (two) times a day   doxycycline hyclate (VIBRAMYCIN) 100 mg capsule   No No   Sig: Take 1 capsule (100 mg total) by mouth 1 (one) time for 1 dose   ethambutol (MYAMBUTOL) 400 mg tablet   Yes No   Sig: Take 800 mg by mouth daily   fluticasone-umeclidinium-vilanterol (Trelegy Ellipta) 100-62.5-25 mcg/actuation inhaler   Yes No   Sig: Inhale 1 puff daily Rinse mouth after use.   guaiFENesin (MUCINEX) 600 mg 12 hr tablet   No No   Sig: Take 1 tablet (600 mg total) by mouth 2 (two) times a day   hydrOXYzine HCL (ATARAX) 25 mg tablet   No No   Sig: Take 1 tablet (25 mg total) by mouth if needed for itching for up to 5 days   ipratropium-albuterol (DUO-NEB) 0.5-2.5 mg/3 mL nebulizer solution   No No   Sig: Take 3 mL by nebulization 4 (four) times a day   lidocaine (LIDODERM) 5 %   No No   Sig: Apply 1 patch topically  over 12 hours daily for 4 days Remove & Discard patch within 12 hours or as directed by MD   metoprolol tartrate (LOPRESSOR) 25 mg tablet   No No   Sig: Take 1 tablet (25 mg total) by mouth every 12 (twelve) hours   nicotine (NICODERM CQ) 14 mg/24hr TD 24 hr patch   No No   Sig: Place 1 patch on the skin over 24 hours daily   Patient not taking: Reported on 11/19/2024   omeprazole (PriLOSEC) 40 MG capsule   No No   Sig: Take 1 capsule (40 mg total) by mouth 2 (two) times a day 30 minutes before meal   predniSONE 10 mg tablet   No No   Sig: Take 4 tablets (40 mg total) by mouth daily for 3 days, THEN 3 tablets (30 mg total) daily for 3 days, THEN 2 tablets (20 mg total) daily for 3 days, THEN 1 tablet (10 mg total) daily for 3 days.   rifabutin (MYCOBUTIN) 150 mg capsule   Yes No   Sig: Take 300 mg by mouth daily      Facility-Administered Medications: None     Patient's Medications   Discharge Prescriptions    No medications on file     No discharge procedures on file.  ED SEPSIS DOCUMENTATION   Time reflects when diagnosis was documented in both MDM as applicable and the Disposition within this note       Time User Action Codes Description Comment    11/24/2024  9:20 PM Shantell Haji [F41.0] Panic attack     11/24/2024 10:02 PM Shantell Haji [K21.9] GERD (gastroesophageal reflux disease)                  Shantell Haji DO  11/24/24 4525

## 2024-11-26 LAB
ATRIAL RATE: 102 BPM
P AXIS: 83 DEGREES
PR INTERVAL: 112 MS
QRS AXIS: 78 DEGREES
QRSD INTERVAL: 70 MS
QT INTERVAL: 332 MS
QTC INTERVAL: 432 MS
T WAVE AXIS: 74 DEGREES
VENTRICULAR RATE: 102 BPM

## 2024-12-02 ENCOUNTER — PATIENT OUTREACH (OUTPATIENT)
Dept: CASE MANAGEMENT | Facility: OTHER | Age: 64
End: 2024-12-02

## 2024-12-02 NOTE — PROGRESS NOTES
OP RT CM received in [employer] message to outreach patient today for follow up   ------------------------------------------------------------------  OP RT CM called and left a VM requesting a return phone call. This is the second attempt to outreach patient without success. First call made 11/25/24. UTRL sent to patient home mailing address via standard mail. If no response OP RT CM will remove patient from outreach list.  -----------------------------------------------------------------  In basket message received to sent UTRL to patient home mailing address via standard mail.   ------------------------------------------------------------------  OP RT CM sent UTRL to patient home mailing address via standard mail.   --------------------------------------------------------------------

## 2024-12-06 ENCOUNTER — HOSPITAL ENCOUNTER (OUTPATIENT)
Facility: HOSPITAL | Age: 64
Setting detail: OBSERVATION
Discharge: HOME | DRG: 392 | End: 2024-12-07
Attending: STUDENT IN AN ORGANIZED HEALTH CARE EDUCATION/TRAINING PROGRAM | Admitting: FAMILY MEDICINE
Payer: MEDICARE

## 2024-12-06 ENCOUNTER — APPOINTMENT (EMERGENCY)
Dept: CT IMAGING | Facility: HOSPITAL | Age: 64
DRG: 392 | End: 2024-12-06
Payer: MEDICARE

## 2024-12-06 DIAGNOSIS — K52.9 GASTROENTERITIS: Primary | ICD-10-CM

## 2024-12-06 DIAGNOSIS — J44.9 CHRONIC OBSTRUCTIVE PULMONARY DISEASE, UNSPECIFIED COPD TYPE (HCC): ICD-10-CM

## 2024-12-06 DIAGNOSIS — Z13.9 ENCOUNTER FOR SCREENING INVOLVING SOCIAL DETERMINANTS OF HEALTH (SDOH): ICD-10-CM

## 2024-12-06 DIAGNOSIS — R91.1 PULMONARY NODULE: ICD-10-CM

## 2024-12-06 PROBLEM — R11.2 NAUSEA VOMITING AND DIARRHEA: Status: ACTIVE | Noted: 2024-12-06

## 2024-12-06 PROBLEM — R19.7 NAUSEA VOMITING AND DIARRHEA: Status: ACTIVE | Noted: 2024-12-06

## 2024-12-06 LAB
ALBUMIN SERPL BCG-MCNC: 4.1 G/DL (ref 3.5–5)
ALP SERPL-CCNC: 92 U/L (ref 34–104)
ALT SERPL W P-5'-P-CCNC: 17 U/L (ref 7–52)
ANION GAP SERPL CALCULATED.3IONS-SCNC: 8 MMOL/L (ref 4–13)
AST SERPL W P-5'-P-CCNC: 14 U/L (ref 13–39)
BASOPHILS # BLD AUTO: 0.1 THOUSANDS/ÂΜL (ref 0–0.1)
BASOPHILS NFR BLD AUTO: 1 % (ref 0–1)
BILIRUB SERPL-MCNC: 0.45 MG/DL (ref 0.2–1)
BILIRUB UR QL STRIP: NEGATIVE
BUN SERPL-MCNC: 3 MG/DL (ref 5–25)
CALCIUM SERPL-MCNC: 9.3 MG/DL (ref 8.4–10.2)
CHLORIDE SERPL-SCNC: 98 MMOL/L (ref 96–108)
CLARITY UR: CLEAR
CO2 SERPL-SCNC: 29 MMOL/L (ref 21–32)
COLOR UR: YELLOW
CREAT SERPL-MCNC: 0.56 MG/DL (ref 0.6–1.3)
EOSINOPHIL # BLD AUTO: 0.09 THOUSAND/ÂΜL (ref 0–0.61)
EOSINOPHIL NFR BLD AUTO: 1 % (ref 0–6)
ERYTHROCYTE [DISTWIDTH] IN BLOOD BY AUTOMATED COUNT: 13.1 % (ref 11.6–15.1)
FLUAV AG UPPER RESP QL IA.RAPID: NEGATIVE
FLUBV AG UPPER RESP QL IA.RAPID: NEGATIVE
GFR SERPL CREATININE-BSD FRML MDRD: 97 ML/MIN/1.73SQ M
GLUCOSE SERPL-MCNC: 117 MG/DL (ref 65–140)
GLUCOSE UR STRIP-MCNC: NEGATIVE MG/DL
HCT VFR BLD AUTO: 40.2 % (ref 34.8–46.1)
HGB BLD-MCNC: 13.1 G/DL (ref 11.5–15.4)
HGB UR QL STRIP.AUTO: NEGATIVE
IMM GRANULOCYTES # BLD AUTO: 0.04 THOUSAND/UL (ref 0–0.2)
IMM GRANULOCYTES NFR BLD AUTO: 0 % (ref 0–2)
KETONES UR STRIP-MCNC: NEGATIVE MG/DL
LACTATE SERPL-SCNC: 1.6 MMOL/L (ref 0.5–2)
LEUKOCYTE ESTERASE UR QL STRIP: NEGATIVE
LIPASE SERPL-CCNC: 16 U/L (ref 11–82)
LYMPHOCYTES # BLD AUTO: 2.73 THOUSANDS/ÂΜL (ref 0.6–4.47)
LYMPHOCYTES NFR BLD AUTO: 23 % (ref 14–44)
MAGNESIUM SERPL-MCNC: 2 MG/DL (ref 1.9–2.7)
MCH RBC QN AUTO: 29.5 PG (ref 26.8–34.3)
MCHC RBC AUTO-ENTMCNC: 32.6 G/DL (ref 31.4–37.4)
MCV RBC AUTO: 91 FL (ref 82–98)
MONOCYTES # BLD AUTO: 1.03 THOUSAND/ÂΜL (ref 0.17–1.22)
MONOCYTES NFR BLD AUTO: 9 % (ref 4–12)
NEUTROPHILS # BLD AUTO: 7.78 THOUSANDS/ÂΜL (ref 1.85–7.62)
NEUTS SEG NFR BLD AUTO: 66 % (ref 43–75)
NITRITE UR QL STRIP: NEGATIVE
NRBC BLD AUTO-RTO: 0 /100 WBCS
PH UR STRIP.AUTO: 7 [PH]
PLATELET # BLD AUTO: 222 THOUSANDS/UL (ref 149–390)
PMV BLD AUTO: 13.4 FL (ref 8.9–12.7)
POTASSIUM SERPL-SCNC: 3.9 MMOL/L (ref 3.5–5.3)
PROCALCITONIN SERPL-MCNC: <0.05 NG/ML
PROT SERPL-MCNC: 6.7 G/DL (ref 6.4–8.4)
PROT UR STRIP-MCNC: NEGATIVE MG/DL
RBC # BLD AUTO: 4.44 MILLION/UL (ref 3.81–5.12)
SARS-COV+SARS-COV-2 AG RESP QL IA.RAPID: NEGATIVE
SODIUM SERPL-SCNC: 135 MMOL/L (ref 135–147)
SP GR UR STRIP.AUTO: <=1.005 (ref 1–1.03)
UROBILINOGEN UR QL STRIP.AUTO: 0.2 E.U./DL
WBC # BLD AUTO: 11.77 THOUSAND/UL (ref 4.31–10.16)

## 2024-12-06 PROCEDURE — 96375 TX/PRO/DX INJ NEW DRUG ADDON: CPT

## 2024-12-06 PROCEDURE — 83735 ASSAY OF MAGNESIUM: CPT | Performed by: PHYSICIAN ASSISTANT

## 2024-12-06 PROCEDURE — 85025 COMPLETE CBC W/AUTO DIFF WBC: CPT | Performed by: PHYSICIAN ASSISTANT

## 2024-12-06 PROCEDURE — 99285 EMERGENCY DEPT VISIT HI MDM: CPT | Performed by: PHYSICIAN ASSISTANT

## 2024-12-06 PROCEDURE — 84145 PROCALCITONIN (PCT): CPT | Performed by: PHYSICIAN ASSISTANT

## 2024-12-06 PROCEDURE — 80053 COMPREHEN METABOLIC PANEL: CPT | Performed by: PHYSICIAN ASSISTANT

## 2024-12-06 PROCEDURE — 87811 SARS-COV-2 COVID19 W/OPTIC: CPT | Performed by: PHYSICIAN ASSISTANT

## 2024-12-06 PROCEDURE — 87040 BLOOD CULTURE FOR BACTERIA: CPT | Performed by: PHYSICIAN ASSISTANT

## 2024-12-06 PROCEDURE — 96361 HYDRATE IV INFUSION ADD-ON: CPT

## 2024-12-06 PROCEDURE — 99285 EMERGENCY DEPT VISIT HI MDM: CPT

## 2024-12-06 PROCEDURE — 81003 URINALYSIS AUTO W/O SCOPE: CPT | Performed by: PHYSICIAN ASSISTANT

## 2024-12-06 PROCEDURE — 83690 ASSAY OF LIPASE: CPT | Performed by: PHYSICIAN ASSISTANT

## 2024-12-06 PROCEDURE — 74177 CT ABD & PELVIS W/CONTRAST: CPT

## 2024-12-06 PROCEDURE — 36415 COLL VENOUS BLD VENIPUNCTURE: CPT | Performed by: PHYSICIAN ASSISTANT

## 2024-12-06 PROCEDURE — 96365 THER/PROPH/DIAG IV INF INIT: CPT

## 2024-12-06 PROCEDURE — 87804 INFLUENZA ASSAY W/OPTIC: CPT | Performed by: PHYSICIAN ASSISTANT

## 2024-12-06 PROCEDURE — 99223 1ST HOSP IP/OBS HIGH 75: CPT | Performed by: NURSE PRACTITIONER

## 2024-12-06 PROCEDURE — 83605 ASSAY OF LACTIC ACID: CPT | Performed by: PHYSICIAN ASSISTANT

## 2024-12-06 RX ORDER — ALBUTEROL SULFATE 0.83 MG/ML
2.5 SOLUTION RESPIRATORY (INHALATION) EVERY 4 HOURS PRN
Status: DISCONTINUED | OUTPATIENT
Start: 2024-12-06 | End: 2024-12-07 | Stop reason: HOSPADM

## 2024-12-06 RX ORDER — FAMOTIDINE 10 MG/ML
20 INJECTION, SOLUTION INTRAVENOUS ONCE
Status: COMPLETED | OUTPATIENT
Start: 2024-12-06 | End: 2024-12-06

## 2024-12-06 RX ORDER — RIFABUTIN 150 MG/1
300 CAPSULE ORAL DAILY
Status: DISCONTINUED | OUTPATIENT
Start: 2024-12-07 | End: 2024-12-07 | Stop reason: HOSPADM

## 2024-12-06 RX ORDER — SODIUM CHLORIDE 9 MG/ML
50 INJECTION, SOLUTION INTRAVENOUS CONTINUOUS
Status: DISCONTINUED | OUTPATIENT
Start: 2024-12-06 | End: 2024-12-07

## 2024-12-06 RX ORDER — LOPERAMIDE HYDROCHLORIDE 2 MG/1
2 CAPSULE ORAL 3 TIMES DAILY PRN
Status: DISCONTINUED | OUTPATIENT
Start: 2024-12-06 | End: 2024-12-07 | Stop reason: HOSPADM

## 2024-12-06 RX ORDER — METOPROLOL TARTRATE 25 MG/1
25 TABLET, FILM COATED ORAL EVERY 12 HOURS SCHEDULED
Status: DISCONTINUED | OUTPATIENT
Start: 2024-12-06 | End: 2024-12-07 | Stop reason: HOSPADM

## 2024-12-06 RX ORDER — HYDROXYZINE HYDROCHLORIDE 25 MG/1
25 TABLET, FILM COATED ORAL EVERY 6 HOURS PRN
Status: DISCONTINUED | OUTPATIENT
Start: 2024-12-06 | End: 2024-12-07 | Stop reason: HOSPADM

## 2024-12-06 RX ORDER — MORPHINE SULFATE 4 MG/ML
4 INJECTION, SOLUTION INTRAMUSCULAR; INTRAVENOUS ONCE
Status: COMPLETED | OUTPATIENT
Start: 2024-12-06 | End: 2024-12-06

## 2024-12-06 RX ORDER — ENOXAPARIN SODIUM 100 MG/ML
40 INJECTION SUBCUTANEOUS DAILY
Status: DISCONTINUED | OUTPATIENT
Start: 2024-12-07 | End: 2024-12-07 | Stop reason: HOSPADM

## 2024-12-06 RX ORDER — SUCRALFATE 1 G/1
1 TABLET ORAL 4 TIMES DAILY
Qty: 56 TABLET | Refills: 0 | Status: SHIPPED | OUTPATIENT
Start: 2024-12-06 | End: 2024-12-20

## 2024-12-06 RX ORDER — AZITHROMYCIN 250 MG/1
250 TABLET, FILM COATED ORAL DAILY
Status: DISCONTINUED | OUTPATIENT
Start: 2024-12-07 | End: 2024-12-07 | Stop reason: HOSPADM

## 2024-12-06 RX ORDER — MAGNESIUM HYDROXIDE/ALUMINUM HYDROXICE/SIMETHICONE 120; 1200; 1200 MG/30ML; MG/30ML; MG/30ML
30 SUSPENSION ORAL ONCE
Status: COMPLETED | OUTPATIENT
Start: 2024-12-06 | End: 2024-12-06

## 2024-12-06 RX ORDER — ETHAMBUTOL HYDROCHLORIDE 400 MG/1
800 TABLET, FILM COATED ORAL DAILY
Status: DISCONTINUED | OUTPATIENT
Start: 2024-12-07 | End: 2024-12-07 | Stop reason: HOSPADM

## 2024-12-06 RX ORDER — PANTOPRAZOLE SODIUM 40 MG/1
40 TABLET, DELAYED RELEASE ORAL
Status: DISCONTINUED | OUTPATIENT
Start: 2024-12-07 | End: 2024-12-07 | Stop reason: HOSPADM

## 2024-12-06 RX ORDER — FLUTICASONE FUROATE AND VILANTEROL 100; 25 UG/1; UG/1
1 POWDER RESPIRATORY (INHALATION) DAILY
Status: DISCONTINUED | OUTPATIENT
Start: 2024-12-07 | End: 2024-12-07 | Stop reason: HOSPADM

## 2024-12-06 RX ORDER — ONDANSETRON 2 MG/ML
4 INJECTION INTRAMUSCULAR; INTRAVENOUS ONCE
Status: COMPLETED | OUTPATIENT
Start: 2024-12-06 | End: 2024-12-06

## 2024-12-06 RX ORDER — SUCRALFATE 1 G/1
1 TABLET ORAL ONCE
Status: COMPLETED | OUTPATIENT
Start: 2024-12-06 | End: 2024-12-06

## 2024-12-06 RX ORDER — FAMOTIDINE 20 MG/1
20 TABLET, FILM COATED ORAL 2 TIMES DAILY
Qty: 30 TABLET | Refills: 0 | Status: SHIPPED | OUTPATIENT
Start: 2024-12-06

## 2024-12-06 RX ORDER — ONDANSETRON 4 MG/1
4 TABLET, FILM COATED ORAL EVERY 6 HOURS
Qty: 12 TABLET | Refills: 0 | Status: SHIPPED | OUTPATIENT
Start: 2024-12-06

## 2024-12-06 RX ORDER — ACETAMINOPHEN 325 MG/1
650 TABLET ORAL EVERY 6 HOURS PRN
Status: DISCONTINUED | OUTPATIENT
Start: 2024-12-06 | End: 2024-12-07 | Stop reason: HOSPADM

## 2024-12-06 RX ADMIN — SODIUM CHLORIDE 1000 ML: 0.9 INJECTION, SOLUTION INTRAVENOUS at 13:18

## 2024-12-06 RX ADMIN — ONDANSETRON 4 MG: 2 INJECTION INTRAMUSCULAR; INTRAVENOUS at 13:18

## 2024-12-06 RX ADMIN — SODIUM CHLORIDE 500 ML: 0.9 INJECTION, SOLUTION INTRAVENOUS at 22:47

## 2024-12-06 RX ADMIN — ALUMINUM HYDROXIDE, MAGNESIUM HYDROXIDE, AND SIMETHICONE 30 ML: 1200; 120; 1200 SUSPENSION ORAL at 17:27

## 2024-12-06 RX ADMIN — PANTOPRAZOLE SODIUM 80 MG: 40 INJECTION, POWDER, LYOPHILIZED, FOR SOLUTION INTRAVENOUS at 14:13

## 2024-12-06 RX ADMIN — SODIUM CHLORIDE 50 ML/HR: 0.9 INJECTION, SOLUTION INTRAVENOUS at 20:09

## 2024-12-06 RX ADMIN — SUCRALFATE 1 G: 1 TABLET ORAL at 17:26

## 2024-12-06 RX ADMIN — IOHEXOL 85 ML: 350 INJECTION, SOLUTION INTRAVENOUS at 13:53

## 2024-12-06 RX ADMIN — FAMOTIDINE 20 MG: 10 INJECTION, SOLUTION INTRAVENOUS at 13:18

## 2024-12-06 RX ADMIN — MORPHINE SULFATE 4 MG: 4 INJECTION INTRAVENOUS at 13:18

## 2024-12-06 NOTE — ASSESSMENT & PLAN NOTE
POA with abdominal pain, nausea, diarrhea for 3 days.  Unable to tolerate p.o. intake in ED due to nausea and abdominal pain.  CT abdomen pelvis with fluid contents throughout entire colon, compatible with diarrheal state  Mild dehydration, labs overall unremarkable  Start IV hydration  C. difficile pending  Suspect viral enteritis

## 2024-12-06 NOTE — ED PROVIDER NOTES
"Time reflects when diagnosis was documented in both MDM as applicable and the Disposition within this note       Time User Action Codes Description Comment    12/6/2024  3:55 PM Carlos Turcios Add [K52.9] Gastroenteritis     12/6/2024  7:31 PM Rubi Brar Add [Z13.9] Encounter for screening involving social determinants of health (SDoH)     12/7/2024  1:27 PM Rolando Parker Modify [Z13.9] Encounter for screening involving social determinants of health (SDoH)     12/7/2024  1:27 PM Rolando Parker Add [J44.9] Chronic obstructive pulmonary disease, unspecified COPD type (HCC)     12/7/2024  1:27 PM Rolando Parker [R91.1] Pulmonary nodule           ED Disposition       ED Disposition   Admit    Condition   Stable    Date/Time   Fri Dec 6, 2024  6:29 PM    Comment   Case was discussed with sylwia and the patient's admission status was agreed to be Admission Status: observation status to the service of Dr. dao .               Assessment & Plan       Medical Decision Making  The patient is a 64-year-old female who states \"my insides are on fire\" and she is having profuse diarrhea.  The patient states that she has a past medical history of COPD and is on chronic 2 L of cannula oxygen.  The patient is coming in due to the above complaints.  The patient states she has been on an antibiotic due to a \"bacterial infection in her lungs\".  She states that she has been going 9-10 times a day over the last 3 days.  She states this is worsening.  She feels overall very weak.    Patient's blood work overall was unremarkable.  The patient did have C. difficile testing ordered however patient was unable to give any C. difficile sample on the emergency department.  The patient imaging studies did not reveal any acute etiology.  Patient was seen in the past for similar symptoms and was diagnosed with gastritis after being scoped by GI.  Patient has been on Protonix place a day since being discharged previously.  The " "patient despite multiple attempts would not take p.o.  The patient did attempt a few sips and then would plaint of severe pain and or recurrent pain.  Symptoms were consistent with gastritis.  Due to the lack of diarrhea in the emergency department did lower my suspicion for C. difficile.    The patient adamantly afraid to go home.  Will attempts were made to try other medications or foods for her to be able to take in.  She again complained of pain.  Due to this failure she was discussed with medicine for observation due to the failed p.o. trials.  Do suspect gastritis.  Patient otherwise would have been able to be discharged.  Patient was educated multiple times on the suspicion of gastritis and how her condition is most similar to this and how we would typically treat this however the patient still did not seem to fully understand.  The patient symptoms most likely worsened due to this acute viral gastritis or etiology causing her to have the vomiting.    Amount and/or Complexity of Data Reviewed  Labs: ordered. Decision-making details documented in ED Course.  Radiology: ordered and independent interpretation performed. Decision-making details documented in ED Course.    Risk  OTC drugs.  Prescription drug management.  Decision regarding hospitalization.        ED Course as of 12/07/24 1947   Fri Dec 06, 2024   1545 PO challenge    1554 Patient will p.o. challenge with crackers.  No diarrhea in the Emergency Department.   1755 Has had recurrent pain, \" hasn't been able to eat or drink here\" per patient, in tears and frustrated \" I can't eat\"       Medications   sodium chloride 0.9 % bolus 1,000 mL (0 mL Intravenous Stopped 12/6/24 1352)   pantoprazole (PROTONIX) 80 mg in sodium chloride 0.9 % 100 mL IVPB (0 mg Intravenous Stopped 12/6/24 1509)   ondansetron (ZOFRAN) injection 4 mg (4 mg Intravenous Given 12/6/24 1318)   Famotidine (PF) (PEPCID) injection 20 mg (20 mg Intravenous Given 12/6/24 1318)   morphine " injection 4 mg (4 mg Intravenous Given 12/6/24 1318)   iohexol (OMNIPAQUE) 350 MG/ML injection (MULTI-DOSE) 85 mL (85 mL Intravenous Given 12/6/24 1353)   sucralfate (CARAFATE) tablet 1 g (1 g Oral Given 12/6/24 1726)   aluminum-magnesium hydroxide-simethicone (MAALOX) oral suspension 30 mL (30 mL Oral Given 12/6/24 1727)       ED Risk Strat Scores                           SBIRT 20yo+      Flowsheet Row Most Recent Value   Initial Alcohol Screen: US AUDIT-C     1. How often do you have a drink containing alcohol? 0 Filed at: 12/06/2024 1218   2. How many drinks containing alcohol do you have on a typical day you are drinking?  0 Filed at: 12/06/2024 1218   3a. Male UNDER 65: How often do you have five or more drinks on one occasion? 0 Filed at: 12/06/2024 1218   3b. FEMALE Any Age, or MALE 65+: How often do you have 4 or more drinks on one occassion? 0 Filed at: 12/06/2024 1218   Audit-C Score 0 Filed at: 12/06/2024 1218   LANDY: How many times in the past year have you...    Used an illegal drug or used a prescription medication for non-medical reasons? Never Filed at: 12/06/2024 1218                            History of Present Illness       Chief Complaint   Patient presents with    Diarrhea     Patient reports for 3 days.     Abdominal Pain     Patient reports for the past 3 days with nausea.       Past Medical History:   Diagnosis Date    COPD (chronic obstructive pulmonary disease) (HCC)     Hiatal hernia       Past Surgical History:   Procedure Laterality Date    CHOLECYSTECTOMY        History reviewed. No pertinent family history.   Social History     Tobacco Use    Smoking status: Former     Current packs/day: 0.25     Types: Cigarettes    Smokeless tobacco: Never   Vaping Use    Vaping status: Never Used   Substance Use Topics    Alcohol use: Never    Drug use: Never      E-Cigarette/Vaping    E-Cigarette Use Never User       E-Cigarette/Vaping Substances      I have reviewed and agree with the history as  "documented.     The patient is a 64-year-old female who states \"my insides are on fire\" and she is having profuse diarrhea.  The patient states that she has a past medical history of COPD and is on chronic 2 L of cannula oxygen.  The patient is coming in due to the above complaints.  The patient states she has been on an antibiotic (mycin )due to a \"bacterial infection in her lungs\".  She states that she has been going 9-10 times a day over the last 3 days.  She states this is worsening.  She feels overall very weak.          Review of Systems   All other systems reviewed and are negative.          Objective       ED Triage Vitals   Temperature Pulse Blood Pressure Respirations SpO2 Patient Position - Orthostatic VS   12/06/24 1217 12/06/24 1217 12/06/24 1217 12/06/24 1217 12/06/24 1217 12/06/24 1217   98.5 °F (36.9 °C) 88 132/63 20 96 % Sitting      Temp Source Heart Rate Source BP Location FiO2 (%) Pain Score    12/06/24 1217 12/06/24 1217 12/06/24 1415 -- 12/06/24 1217    Temporal Monitor Right arm  8      Vitals      Date and Time Temp Pulse SpO2 Resp BP Pain Score FACES Pain Rating User   12/07/24 0816 97.5 °F (36.4 °C) 90 96 % 18 113/66 -- -- DII   12/07/24 0016 97.2 °F (36.2 °C) 69 97 % 18 93/53 -- -- DII   12/06/24 2240 97 °F (36.1 °C) 76 97 % 18 95/54 -- -- DII   12/06/24 1931 -- -- 96 % -- -- -- -- MA   12/06/24 1918 -- -- -- -- -- 6 -- MA   12/06/24 1915 97.3 °F (36.3 °C) 73 95 % -- 91/54 -- -- DII   12/06/24 1915 97.3 °F (36.3 °C) 73 -- 18 91/54 -- -- MA   12/06/24 1830 -- 78 97 % 18 104/57 -- -- KW   12/06/24 1730 -- 80 97 % 18 104/58 -- --    12/06/24 1700 -- 78 100 % 18 97/55 -- --    12/06/24 1630 -- 77 100 % 18 99/57 -- --    12/06/24 1600 -- 81 99 % 18 104/65 -- --    12/06/24 1415 -- 79 100 % 18 125/84 -- --    12/06/24 1318 -- -- -- -- -- 7 --    12/06/24 1217 98.5 °F (36.9 °C) 88 96 % 20 132/63 8 -- AS            Physical Exam  Vitals and nursing note reviewed.   Constitutional:      "  General: She is not in acute distress.     Appearance: She is well-developed.   HENT:      Head: Normocephalic and atraumatic.      Right Ear: External ear normal.      Left Ear: External ear normal.   Eyes:      Extraocular Movements: Extraocular movements intact.      Pupils: Pupils are equal, round, and reactive to light.   Cardiovascular:      Rate and Rhythm: Normal rate and regular rhythm.      Heart sounds: No murmur heard.  Pulmonary:      Effort: Pulmonary effort is normal. No respiratory distress.      Breath sounds: Normal breath sounds. No wheezing.   Abdominal:      General: Bowel sounds are normal.      Palpations: Abdomen is soft. There is no mass.      Tenderness: There is abdominal tenderness in the epigastric area. There is no rebound.      Hernia: No hernia is present.   Musculoskeletal:      Cervical back: Normal range of motion and neck supple.   Skin:     General: Skin is warm and dry.      Capillary Refill: Capillary refill takes less than 2 seconds.   Neurological:      General: No focal deficit present.      Mental Status: She is alert and oriented to person, place, and time.      Coordination: Coordination normal.   Psychiatric:         Behavior: Behavior normal.         Results Reviewed       Procedure Component Value Units Date/Time    Blood culture #1 [[id number]] Collected: 12/06/24 1310    Lab Status: Preliminary result Specimen: Blood from Arm, Left Updated: 12/06/24 2101     Blood Culture Received in Microbiology Lab. Culture in Progress.    Blood culture #2 [[id number]] Collected: 12/06/24 1310    Lab Status: Preliminary result Specimen: Blood from Hand, Right Updated: 12/06/24 2101     Blood Culture Received in Microbiology Lab. Culture in Progress.    UA w Reflex to Microscopic w Reflex to Culture [[id number]] Collected: 12/06/24 1413    Lab Status: Final result Specimen: Urine, Clean Catch Updated: 12/06/24 1423     Color, UA Yellow     Clarity, UA Clear     Specific [place], UA  <=1.005     pH, UA 7.0     Leukocytes, UA Negative     Nitrite, UA Negative     Protein, UA Negative mg/dl      Glucose, UA Negative mg/dl      Ketones, UA Negative mg/dl      Urobilinogen, UA 0.2 E.U./dl      Bilirubin, UA Negative     Occult Blood, UA Negative    Procalcitonin [[id number]]  (Normal) Collected: 12/06/24 1310    Lab Status: Final result Specimen: Blood from Arm, Left Updated: 12/06/24 1349     Procalcitonin <0.05 ng/ml     Lactic acid, plasma (w/reflex if result > 2.0) [[id number]]  (Normal) Collected: 12/06/24 1307    Lab Status: Final result Specimen: Blood from Arm, Left Updated: 12/06/24 1341     LACTIC ACID 1.6 mmol/L     Narrative:      Result may be elevated if tourniquet was used during collection.    Comprehensive metabolic panel [[id number]]  (Abnormal) Collected: 12/06/24 1307    Lab Status: Final result Specimen: Blood from Arm, Left Updated: 12/06/24 1341     Sodium 135 mmol/L      Potassium 3.9 mmol/L      Chloride 98 mmol/L      CO2 29 mmol/L      ANION GAP 8 mmol/L      BUN 3 mg/dL      Creatinine 0.56 mg/dL      Glucose 117 mg/dL      Calcium 9.3 mg/dL      AST 14 U/L      ALT 17 U/L      Alkaline Phosphatase 92 U/L      Total Protein 6.7 g/dL      Albumin 4.1 g/dL      Total Bilirubin 0.45 mg/dL      eGFR 97 ml/min/1.73sq m     Narrative:      [employer] Kid[employer] guidelines for Chronic Kidney Disease (CKD):     Stage 1 with normal or high GFR (GFR > 90 mL/min/1.73 square meters)    Stage 2 Mild CKD (GFR = 60-89 mL/min/1.73 square meters)    Stage 3A Moderate CKD (GFR = 45-59 mL/min/1.73 square meters)    Stage 3B Moderate CKD (GFR = 30-44 mL/min/1.73 square meters)    Stage 4 Severe CKD (GFR = 15-29 mL/min/1.73 square meters)    Stage 5 End Stage CKD (GFR <15 mL/min/1.73 square meters)  Note: GFR calculation is accurate only with a steady state creatinine    Lipase [[id number]]  (Normal) Collected: 12/06/24 1307    Lab Status: Final result Specimen: Blood from Arm, Left  Updated: 12/06/24 1341     Lipase 16 u/L     Magnesium [[id number]]  (Normal) Collected: 12/06/24 1307    Lab Status: Final result Specimen: Blood from Arm, Left Updated: 12/06/24 1341     Magnesium 2.0 mg/dL     FLU/COVID Rapid Antigen (30 min. TAT) - Preferred screening test in ED [[id number]]  (Normal) Collected: 12/06/24 1310    Lab Status: Final result Specimen: Nares from Nose Updated: 12/06/24 1338     SARS COV Rapid Antigen Negative     Influenza A Rapid Antigen Negative     Influenza B Rapid Antigen Negative    Narrative:      This test has been performed using the [employer] Rhea 2 FLU+SARS Antigen test under the Emergency Use Authorization (EUA). This test has been validated by the  and verified by the performing laboratory. The Rhea uses lateral flow immunofluorescent sandwich assay to detect SARS-COV, Influenza A and Influenza B Antigen.     The [employer] Rhea 2 SARS Antigen test does not differentiate between SARS-CoV and SARS-CoV-2.     Negative results are presumptive and may be confirmed with a molecular assay, if necessary, for patient management. Negative results do not rule out SARS-CoV-2 or influenza infection and should not be used as the sole basis for treatment or patient management decisions. A negative test result may occur if the level of antigen in a sample is below the limit of detection of this test.     Positive results are indicative of the presence of viral antigens, but do not rule out bacterial infection or co-infection with other viruses.     All test results should be used as an adjunct to clinical observations and other information available to the provider.    FOR PEDIATRIC PATIENTS - copy/paste COVID Guidelines URL to browser: [email or web address]    CBC and differential [[id number]]  (Abnormal) Collected: 12/06/24 1307    Lab Status: Final result Specimen: Blood from Arm, Left Updated: 12/06/24 1322     WBC 11.77  Thousand/uL      RBC 4.44 Million/uL      Hemoglobin 13.1 g/dL      Hematocrit 40.2 %      MCV 91 fL      MCH 29.5 pg      MCHC 32.6 g/dL      RDW 13.1 %      MPV 13.4 fL      Platelets 222 Thousands/uL      nRBC 0 /100 WBCs      Segmented % 66 %      Immature Grans % 0 %      Lymphocytes % 23 %      Monocytes % 9 %      Eosinophils Relative 1 %      Basophils Relative 1 %      Absolute Neutrophils 7.78 Thousands/µL      Absolute Immature Grans 0.04 Thousand/uL      Absolute Lymphocytes 2.73 Thousands/µL      Absolute Monocytes 1.03 Thousand/µL      Eosinophils Absolute 0.09 Thousand/µL      Basophils Absolute 0.10 Thousands/µL     Clostridium difficile toxin by PCR with EIA [[id number]]     Lab Status: No result Specimen: Stool             CT abdomen pelvis with contrast   Final Interpretation by Wendy Rios MD (12/06 1452)      Fluid contents throughout the entire colon compatible with a diarrheal state.      Mild bilobar intrahepatic biliary ductal dilatation likely on the basis of prior cholecystectomy and unchanged.      8 mm right lower lobe pulmonary nodule. Recommend follow-up CT scan of the chest in approximately 6 to 12 months.         Imaging follow-up reminder notification scheduled in the electronic medical record.         Workstation performed: [id number]             Procedures    ED Medication and Procedure Management   Prior to Admission Medications   Prescriptions Last Dose Informant Patient Reported? Taking?   azithromycin (ZITHROMAX) 500 MG tablet 12/6/2024  Yes Yes   Sig: Take 250 mg by mouth daily   docusate sodium (COLACE) 100 mg capsule 12/6/2024  Yes Yes   Sig: Take 100 mg by mouth 2 (two) times a day   ethambutol (MYAMBUTOL) 400 mg tablet 12/6/2024  Yes Yes   Sig: Take 800 mg by mouth daily   fluticasone-umeclidinium-vilanterol (Trelegy Ellipta) 100-62.5-25 mcg/actuation inhaler 12/6/2024  Yes Yes   Sig: Inhale 1 puff daily Rinse mouth after use.   guaiFENesin (MUCINEX) 600 mg 12 hr  tablet Not Taking  No No   Sig: Take 1 tablet (600 mg total) by mouth 2 (two) times a day   Patient not taking: Reported on 12/6/2024   hydrOXYzine HCL (ATARAX) 25 mg tablet   No No   Sig: Take 1 tablet (25 mg total) by mouth if needed for itching for up to 5 days   ipratropium-albuterol (DUO-NEB) 0.5-2.5 mg/3 mL nebulizer solution 12/6/2024  No Yes   Sig: Take 3 mL by nebulization 4 (four) times a day   lidocaine (LIDODERM) 5 %   No No   Sig: Apply 1 patch topically over 12 hours daily for 4 days Remove & Discard patch within 12 hours or as directed by MD   metoprolol tartrate (LOPRESSOR) 25 mg tablet 12/6/2024  No Yes   Sig: Take 1 tablet (25 mg total) by mouth every 12 (twelve) hours   nicotine (NICODERM CQ) 14 mg/24hr TD 24 hr patch Not Taking  No Yes   Sig: Place 1 patch on the skin over 24 hours daily   omeprazole (PriLOSEC) 40 MG capsule 12/6/2024  No Yes   Sig: Take 1 capsule (40 mg total) by mouth 2 (two) times a day 30 minutes before meal   predniSONE 10 mg tablet   No No   Sig: Take 4 tablets (40 mg total) by mouth daily for 3 days, THEN 3 tablets (30 mg total) daily for 3 days, THEN 2 tablets (20 mg total) daily for 3 days, THEN 1 tablet (10 mg total) daily for 3 days.   rifabutin (MYCOBUTIN) 150 mg capsule 12/6/2024  Yes Yes   Sig: Take 300 mg by mouth daily      Facility-Administered Medications: None     Discharge Medication List as of 12/7/2024  1:50 PM        START taking these medications    Details   famotidine (PEPCID) 20 mg tablet Take 1 tablet (20 mg total) by mouth 2 (two) times a day, Starting Fri 12/6/2024, Normal      ondansetron (ZOFRAN) 4 mg tablet Take 1 tablet (4 mg total) by mouth every 6 (six) hours, Starting Fri 12/6/2024, Normal      sucralfate (CARAFATE) 1 g tablet Take 1 tablet (1 g total) by mouth 4 (four) times a day for 14 days, Starting Fri 12/6/2024, Until Fri 12/20/2024, Normal           CONTINUE these medications which have NOT CHANGED    Details   azithromycin (ZITHROMAX)  500 MG tablet Take 250 mg by mouth daily, Historical Med      docusate sodium (COLACE) 100 mg capsule Take 100 mg by mouth 2 (two) times a day, Starting Wed 11/13/2024, Historical Med      ethambutol (MYAMBUTOL) 400 mg tablet Take 800 mg by mouth daily, Historical Med      fluticasone-umeclidinium-vilanterol (Trelegy Ellipta) 100-62.5-25 mcg/actuation inhaler Inhale 1 puff daily Rinse mouth after use., Historical Med      ipratropium-albuterol (DUO-NEB) 0.5-2.5 mg/3 mL nebulizer solution Take 3 mL by nebulization 4 (four) times a day, Starting Sat 11/23/2024, Normal      metoprolol tartrate (LOPRESSOR) 25 mg tablet Take 1 tablet (25 mg total) by mouth every 12 (twelve) hours, Starting Sat 11/23/2024, Normal      nicotine (NICODERM CQ) 14 mg/24hr TD 24 hr patch Place 1 patch on the skin over 24 hours daily, Starting Thu 10/10/2024, Normal      omeprazole (PriLOSEC) 40 MG capsule Take 1 capsule (40 mg total) by mouth 2 (two) times a day 30 minutes before meal, Starting Fri 11/8/2024, Until Sun 12/8/2024, Normal      rifabutin (MYCOBUTIN) 150 mg capsule Take 300 mg by mouth daily, Historical Med      hydrOXYzine HCL (ATARAX) 25 mg tablet Take 1 tablet (25 mg total) by mouth if needed for itching for up to 5 days, Starting Sat 11/23/2024, Until Thu 11/28/2024 at 2359, Normal      lidocaine (LIDODERM) 5 % Apply 1 patch topically over 12 hours daily for 4 days Remove & Discard patch within 12 hours or as directed by MD, Starting Thu 10/10/2024, Until Mon 10/14/2024, Normal           STOP taking these medications       guaiFENesin (MUCINEX) 600 mg 12 hr tablet Comments:   Reason for Stopping:         predniSONE 10 mg tablet Comments:   Reason for Stopping:             Outpatient Discharge Orders   Ambulatory referral to Pulmonology   Standing Status: Future Standing Exp. Date: 12/07/25      Ambulatory Referral to Pulmonary Rehabilitation   Standing Status: Future Standing Exp. Date: 12/07/25      Discharge Diet      Activity as tolerated     Call provider for:  persistent nausea or vomiting     Call provider for:  severe uncontrolled pain     Call provider for:  redness, tenderness, or signs of infection (pain, swelling, redness, odor or green/yellow discharge around incision site)     Call provider for: active or persistent bleeding     Call provider for:  difficulty breathing, headache or visual disturbances     Call provider for:  hives     Call provider for:  persistent dizziness or light-headedness     Call provider for:  extreme fatigue     ED SEPSIS DOCUMENTATION   Time reflects when diagnosis was documented in both MDM as applicable and the Disposition within this note       Time User Action Codes Description Comment    12/6/2024  3:55 PM Carlos Turcios Add [K52.9] Gastroenteritis     12/6/2024  7:31 PM Rubi Brar Add [Z13.9] Encounter for screening involving social determinants of health (SDoH)     12/7/2024  1:27 PM Rolando Parker Modify [Z13.9] Encounter for screening involving social determinants of health (SDoH)     12/7/2024  1:27 PM Rolando Parker [J44.9] Chronic obstructive pulmonary disease, unspecified COPD type (HCC)     12/7/2024  1:27 PM Rolando Parker [R91.1] Pulmonary nodule                  Carlos Turcios PA-C  12/07/24 1947

## 2024-12-06 NOTE — ASSESSMENT & PLAN NOTE
Recently admitted for acute exacerbation was discharged home on prednisone taper  Continue home inhalers

## 2024-12-07 VITALS
OXYGEN SATURATION: 96 % | RESPIRATION RATE: 18 BRPM | DIASTOLIC BLOOD PRESSURE: 66 MMHG | HEIGHT: 61 IN | TEMPERATURE: 97.5 F | HEART RATE: 90 BPM | BODY MASS INDEX: 18.43 KG/M2 | SYSTOLIC BLOOD PRESSURE: 113 MMHG | WEIGHT: 97.6 LBS

## 2024-12-07 PROBLEM — R91.1 PULMONARY NODULE: Status: ACTIVE | Noted: 2024-12-07

## 2024-12-07 LAB
ANION GAP SERPL CALCULATED.3IONS-SCNC: 5 MMOL/L (ref 4–13)
BUN SERPL-MCNC: 2 MG/DL (ref 5–25)
CALCIUM SERPL-MCNC: 8 MG/DL (ref 8.4–10.2)
CHLORIDE SERPL-SCNC: 109 MMOL/L (ref 96–108)
CO2 SERPL-SCNC: 25 MMOL/L (ref 21–32)
CREAT SERPL-MCNC: 0.57 MG/DL (ref 0.6–1.3)
ERYTHROCYTE [DISTWIDTH] IN BLOOD BY AUTOMATED COUNT: 13.3 % (ref 11.6–15.1)
GFR SERPL CREATININE-BSD FRML MDRD: 96 ML/MIN/1.73SQ M
GLUCOSE SERPL-MCNC: 92 MG/DL (ref 65–140)
HCT VFR BLD AUTO: 34.2 % (ref 34.8–46.1)
HGB BLD-MCNC: 10.5 G/DL (ref 11.5–15.4)
MCH RBC QN AUTO: 29.5 PG (ref 26.8–34.3)
MCHC RBC AUTO-ENTMCNC: 30.7 G/DL (ref 31.4–37.4)
MCV RBC AUTO: 96 FL (ref 82–98)
PLATELET # BLD AUTO: 179 THOUSANDS/UL (ref 149–390)
PMV BLD AUTO: 12.7 FL (ref 8.9–12.7)
POTASSIUM SERPL-SCNC: 3.9 MMOL/L (ref 3.5–5.3)
RBC # BLD AUTO: 3.56 MILLION/UL (ref 3.81–5.12)
SODIUM SERPL-SCNC: 139 MMOL/L (ref 135–147)
WBC # BLD AUTO: 9.06 THOUSAND/UL (ref 4.31–10.16)

## 2024-12-07 PROCEDURE — 80048 BASIC METABOLIC PNL TOTAL CA: CPT | Performed by: NURSE PRACTITIONER

## 2024-12-07 PROCEDURE — 99239 HOSP IP/OBS DSCHRG MGMT >30: CPT | Performed by: FAMILY MEDICINE

## 2024-12-07 PROCEDURE — 85027 COMPLETE CBC AUTOMATED: CPT | Performed by: NURSE PRACTITIONER

## 2024-12-07 RX ADMIN — ENOXAPARIN SODIUM 40 MG: 40 INJECTION SUBCUTANEOUS at 09:50

## 2024-12-07 RX ADMIN — UMECLIDINIUM 1 PUFF: 62.5 AEROSOL, POWDER ORAL at 09:50

## 2024-12-07 RX ADMIN — AZITHROMYCIN DIHYDRATE 250 MG: 250 TABLET ORAL at 09:50

## 2024-12-07 RX ADMIN — RIFABUTIN 300 MG: 150 CAPSULE ORAL at 09:50

## 2024-12-07 RX ADMIN — PANTOPRAZOLE SODIUM 40 MG: 40 TABLET, DELAYED RELEASE ORAL at 06:03

## 2024-12-07 RX ADMIN — FLUTICASONE FUROATE AND VILANTEROL TRIFENATATE 1 PUFF: 100; 25 POWDER RESPIRATORY (INHALATION) at 09:50

## 2024-12-07 RX ADMIN — METOPROLOL TARTRATE 25 MG: 25 TABLET, FILM COATED ORAL at 09:50

## 2024-12-07 RX ADMIN — ETHAMBUTOL HYDROCHLORIDE 800 MG: 400 TABLET, FILM COATED ORAL at 09:50

## 2024-12-07 NOTE — CASE MANAGEMENT
Case Management Assessment & Discharge Planning Note    Patient name Ileana Seaman  Location /-01 MRN [id number]  : 1960 Date 2024       Current Admission Date: 2024  Current Admission Diagnosis:Nausea vomiting and diarrhea   Patient Active Problem List    Diagnosis Date Noted Date Diagnosed    Pulmonary nodule 2024     Nausea vomiting and diarrhea 2024     Moderate protein-calorie malnutrition (HCC) 2024     COPD (chronic obstructive pulmonary disease) (HCC) 2024     Atrial fibrillation (HCC) 2024     Chronic hypoxic respiratory failure (HCC) 2024     Epigastric pain 2024     New onset atrial fibrillation (HCC) 10/06/2024     Type 2 diabetes mellitus without complication, without long-term current use of insulin (HCC) 10/05/2024     Dysphagia 10/05/2024     Chronic obstructive pulmonary disease with acute lower respiratory infection (HCC) 10/04/2024     Mycobacterial infection, 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     Hypomagnesemia 10/04/2024       LOS (days): 0  Geometric Mean LOS (GMLOS) (days):   Days to GMLOS:     OBJECTIVE:              Current admission status: Observation  Referral Reason: Other (SDOH)    Preferred Pharmacy:   [id number] - [place], [place] - [address]  [address]  [place] [address]  Phone: [phone] Fax: [phone]    Primary Care Provider: Merline Dinero    Primary Insurance: MEDICARE  Secondary Insurance: [facility]    ASSESSMENT:  Active Health Care Proxies    There are no active Health Care Proxies on file.       Advance Directives  Does patient have a Health Care POA?: No  Was patient offered paperwork?: Yes (declined)  Does patient have Advance Directives?: No  Was patient offered paperwork?: Yes (declined)  Primary Contact: Ayde Seaman- daughter         Readmission [facility]  30 Day Readmission: No    Patient Information  Admitted from::  Home  Mental Status: Alert  During Assessment patient was accompanied by: Not accompanied during assessment  Assessment information provided by:: Patient  Primary Caregiver: Self  Support Systems: Self, Children  County of Residence: [facility]  What [facility] do you live in?: [place]  Living Arrangements: Lives Alone  Is patient a ?: Yes ([employer])  Is patient active with [facility] ( [facility])?: No    Activities of Daily Living Prior to Admission  Functional Status: Independent  Completes ADLs independently?: Yes  Ambulates independently?: Yes  Does patient use assisted devices?: No  Does patient currently own DME?: No  Does patient have a history of Outpatient Therapy (PT/OT)?: No  Does the patient have a history of Short-Term Rehab?: No  Does patient have a history of HHC?: No  Does patient currently have HHC?: No         Patient Information Continued  Income Source: Pension/[facility]  Does patient have prescription coverage?: Yes  Does patient receive dialysis treatments?: No  Does patient have a history of substance abuse?: No  Does patient have a history of Mental Health Diagnosis?: No         Means of Transportation  Means of Transport to [facility]:: Family transport      Social Determinants of Health (SDOH)      Flowsheet Row Most Recent Value   Housing Stability    In the last 12 months, was there a time when you were not able to pay the mortgage or rent on time? N   In the past 12 months, how many times have you moved where you were living? 0   At any time in the past 12 months, were you homeless or living in a shelter (including now)? N   Transportation Needs    In the past 12 months, has lack of transportation kept you from medical appointments or from getting medications? no   In the past 12 months, has lack of transportation kept you from meetings, work, or from getting things needed for daily living? No   Food Insecurity    Within the past 12 months, you worried that your food would run out  before you got the money to buy more. Never true   Within the past 12 months, the food you bought just didn't last and you didn't have money to get more. Never true   Utilities    In the past 12 months has the electric, gas, oil, or water company threatened to shut off services in your home? No            DISCHARGE DETAILS:    Discharge planning discussed with:: patient          CM met with pt to review role of CM and discuss any supports needed upon discharge. Patient independent with ADLs and ambulates without any assistive devices prior to admission to hospital. Patient cleared for discharge today, indicates no CM needs. Family to provide transportation to home.                                           Treatment Team Recommendation: Home  Discharge Destination Plan:: Home  Transport at Discharge : Family

## 2024-12-07 NOTE — PLAN OF CARE
Problem: Potential for Falls  Goal: Patient will remain free of falls  Description: INTERVENTIONS:  - Educate patient/family on patient safety including physical limitations  - Instruct patient to call for assistance with activity   - Consult OT/PT to assist with strengthening/mobility   - Keep Call bell within reach  - Keep bed low and locked with side rails adjusted as appropriate  - Keep care items and personal belongings within reach  - Initiate and maintain comfort rounds  - Make Fall Risk Sign visible to staff  - Offer Toileting every 2 Hours, in advance of need  - Initiate/Maintain alarm  - Obtain necessary fall risk management equipment  - Apply yellow socks and bracelet for high fall risk patients  - Consider moving patient to room near nurses station  Outcome: Progressing     Problem: PAIN - ADULT  Goal: Verbalizes/displays adequate comfort level or baseline comfort level  Description: Interventions:  - Encourage patient to monitor pain and request assistance  - Assess pain using appropriate pain scale  - Administer analgesics based on type and severity of pain and evaluate response  - Implement non-pharmacological measures as appropriate and evaluate response  - Consider cultural and social influences on pain and pain management  - Notify physician/advanced practitioner if interventions unsuccessful or patient reports new pain  Outcome: Progressing     Problem: INFECTION - ADULT  Goal: Absence or prevention of progression during hospitalization  Description: INTERVENTIONS:  - Assess and monitor for signs and symptoms of infection  - Monitor lab/diagnostic results  - Monitor all insertion sites, i.e. indwelling lines, tubes, and drains  - Monitor endotracheal if appropriate and nasal secretions for changes in amount and color  - [place] appropriate cooling/warming therapies per order  - Administer medications as ordered  - Instruct and encourage patient and family to use good hand hygiene technique  -  Identify and instruct in appropriate isolation precautions for identified infection/condition  Outcome: Progressing  Goal: Absence of fever/infection during neutropenic period  Description: INTERVENTIONS:  - Monitor WBC    Outcome: Progressing     Problem: SAFETY ADULT  Goal: Patient will remain free of falls  Description: INTERVENTIONS:  - Educate patient/family on patient safety including physical limitations  - Instruct patient to call for assistance with activity   - Consult OT/PT to assist with strengthening/mobility   - Keep Call bell within reach  - Keep bed low and locked with side rails adjusted as appropriate  - Keep care items and personal belongings within reach  - Initiate and maintain comfort rounds  - Make Fall Risk Sign visible to staff  - Offer Toileting every 2 Hours, in advance of need  - Initiate/Maintain alarm  - Obtain necessary fall risk management equipment  - Apply yellow socks and bracelet for high fall risk patients  - Consider moving patient to room near nurses station  Outcome: Progressing  Goal: Maintain or return to baseline ADL function  Description: INTERVENTIONS:  -  Assess patient's ability to carry out ADLs; assess patient's baseline for ADL function and identify physical deficits which impact ability to perform ADLs (bathing, care of mouth/teeth, toileting, grooming, dressing, etc.)  - Assess/evaluate cause of self-care deficits   - Assess range of motion  - Assess patient's mobility; develop plan if impaired  - Assess patient's need for assistive devices and provide as appropriate  - Encourage maximum independence but intervene and supervise when necessary  - Involve family in performance of ADLs  - Assess for home care needs following discharge   - Consider OT consult to assist with ADL evaluation and planning for discharge  - Provide patient education as appropriate  Outcome: Progressing  Goal: Maintains/Returns to pre admission functional level  Description: INTERVENTIONS:  -  Perform AM-PAC 6 Click Basic Mobility/ Daily Activity assessment daily.  - Set and communicate daily mobility goal to care team and patient/family/caregiver.   - Collaborate with rehabilitation services on mobility goals if consulted  - Perform Range of Motion 3 times a day.  - Reposition patient every 2 hours.  - Dangle patient 3 times a day  - Stand patient 3 times a day  - Ambulate patient 3 times a day  - Out of bed to chair 3 times a day   - Out of bed for meals 3 times a day  - Out of bed for toileting  - Record patient progress and toleration of activity level   Outcome: Progressing     Problem: DISCHARGE PLANNING  Goal: Discharge to home or other facility with appropriate resources  Description: INTERVENTIONS:  - Identify barriers to discharge w/patient and caregiver  - Arrange for needed discharge resources and transportation as appropriate  - Identify discharge learning needs (meds, wound care, etc.)  - Arrange for interpretive services to assist at discharge as needed  - Refer to Case Management Department for coordinating discharge planning if the patient needs post-hospital services based on physician/advanced practitioner order or complex needs related to functional status, cognitive ability, or social support system  Outcome: Progressing     Problem: Knowledge Deficit  Goal: Patient/family/caregiver demonstrates understanding of disease process, treatment plan, medications, and discharge instructions  Description: Complete learning assessment and assess knowledge base.  Interventions:  - Provide teaching at level of understanding  - Provide teaching via preferred learning methods  Outcome: Progressing     Problem: NEUROSENSORY - ADULT  Goal: Achieves stable or improved neurological status  Description: INTERVENTIONS  - Monitor and report changes in neurological status  - Monitor vital signs such as temperature, blood pressure, glucose, and any other labs ordered   - Initiate measures to prevent  increased intracranial pressure  - Monitor for seizure activity and implement precautions if appropriate      Outcome: Progressing  Goal: Remains free of injury related to seizures activity  Description: INTERVENTIONS  - Maintain airway, patient safety  and administer oxygen as ordered  - Monitor patient for seizure activity, document and report duration and description of seizure to physician/advanced practitioner  - If seizure occurs,  ensure patient safety during seizure  - Reorient patient post seizure  - Seizure pads on all 4 side rails  - Instruct patient/family to notify RN of any seizure activity including if an aura is experienced  - Instruct patient/family to call for assistance with activity based on nursing assessment  - Administer anti-seizure medications if ordered    Outcome: Progressing  Goal: Achieves maximal functionality and self care  Description: INTERVENTIONS  - Monitor swallowing and airway patency with patient fatigue and changes in neurological status  - Encourage and assist patient to increase activity and self care.   - Encourage visually impaired, hearing impaired and aphasic patients to use assistive/communication devices  Outcome: Progressing     Problem: CARDIOVASCULAR - ADULT  Goal: Maintains optimal cardiac output and hemodynamic stability  Description: INTERVENTIONS:  - Monitor I/O, vital signs and rhythm  - Monitor for S/S and trends of decreased cardiac output  - Administer and titrate ordered vasoactive medications to optimize hemodynamic stability  - Assess quality of pulses, skin color and temperature  - Assess for signs of decreased coronary artery perfusion  - Instruct patient to report change in severity of symptoms  Outcome: Progressing  Goal: Absence of cardiac dysrhythmias or at baseline rhythm  Description: INTERVENTIONS:  - Continuous cardiac monitoring, vital signs, obtain 12 lead EKG if ordered  - Administer antiarrhythmic and heart rate control medications as  ordered  - Monitor electrolytes and administer replacement therapy as ordered  Outcome: Progressing     Problem: RESPIRATORY - ADULT  Goal: Achieves optimal ventilation and oxygenation  Description: INTERVENTIONS:  - Assess for changes in respiratory status  - Assess for changes in mentation and behavior  - Position to facilitate oxygenation and minimize respiratory effort  - Oxygen administered by appropriate delivery if ordered  - Initiate smoking cessation education as indicated  - Encourage broncho-pulmonary hygiene including cough, deep breathe, Incentive Spirometry  - Assess the need for suctioning and aspirate as needed  - Assess and instruct to report SOB or any respiratory difficulty  - Respiratory Therapy support as indicated  Outcome: Progressing     Problem: GASTROINTESTINAL - ADULT  Goal: Minimal or absence of nausea and/or vomiting  Description: INTERVENTIONS:  - Administer IV fluids if ordered to ensure adequate hydration  - Maintain NPO status until nausea and vomiting are resolved  - Nasogastric tube if ordered  - Administer ordered antiemetic medications as needed  - Provide nonpharmacologic comfort measures as appropriate  - Advance diet as tolerated, if ordered  - Consider nutrition services referral to assist patient with adequate nutrition and appropriate food choices  Outcome: Progressing  Goal: Maintains or returns to baseline bowel function  Description: INTERVENTIONS:  - Assess bowel function  - Encourage oral fluids to ensure adequate hydration  - Administer IV fluids if ordered to ensure adequate hydration  - Administer ordered medications as needed  - Encourage mobilization and activity  - Consider nutritional services referral to assist patient with adequate nutrition and appropriate food choices  Outcome: Progressing  Goal: Maintains adequate nutritional intake  Description: INTERVENTIONS:  - Monitor percentage of each meal consumed  - Identify factors contributing to decreased intake,  treat as appropriate  - Assist with meals as needed  - Monitor I&O, weight, and lab values if indicated  - Obtain nutrition services referral as needed  Outcome: Progressing  Goal: Establish and maintain optimal ostomy function  Description: INTERVENTIONS:  - Assess bowel function  - Encourage oral fluids to ensure adequate hydration  - Administer IV fluids if ordered to ensure adequate hydration   - Administer ordered medications as needed  - Encourage mobilization and activity  - Nutrition services referral to assist patient with appropriate food choices  - Assess stoma site  - Consider wound care consult   Outcome: Progressing  Goal: Oral mucous membranes remain intact  Description: INTERVENTIONS  - Assess oral mucosa and hygiene practices  - Implement preventative oral hygiene regimen  - Implement oral medicated treatments as ordered  - Initiate Nutrition services referral as needed  Outcome: Progressing     Problem: METABOLIC, FLUID AND ELECTROLYTES - ADULT  Goal: Electrolytes maintained within normal limits  Description: INTERVENTIONS:  - Monitor labs and assess patient for signs and symptoms of electrolyte imbalances  - Administer electrolyte replacement as ordered  - Monitor response to electrolyte replacements, including repeat lab results as appropriate  - Instruct patient on fluid and nutrition as appropriate  Outcome: Progressing  Goal: Fluid balance maintained  Description: INTERVENTIONS:  - Monitor labs   - Monitor I/O and WT  - Instruct patient on fluid and nutrition as appropriate  - Assess for signs & symptoms of volume excess or deficit  Outcome: Progressing  Goal: Glucose maintained within target range  Description: INTERVENTIONS:  - Monitor Blood Glucose as ordered  - Assess for signs and symptoms of hyperglycemia and hypoglycemia  - Administer ordered medications to maintain glucose within target range  - Assess nutritional intake and initiate nutrition service referral as needed  Outcome:  Progressing     Problem: HEMATOLOGIC - ADULT  Goal: Maintains hematologic stability  Description: INTERVENTIONS  - Assess for signs and symptoms of bleeding or hemorrhage  - Monitor labs  - Administer supportive blood products/factors as ordered and appropriate  Outcome: Progressing     Problem: MUSCULOSKELETAL - ADULT  Goal: Maintain or return mobility to safest level of function  Description: INTERVENTIONS:  - Assess patient's ability to carry out ADLs; assess patient's baseline for ADL function and identify physical deficits which impact ability to perform ADLs (bathing, care of mouth/teeth, toileting, grooming, dressing, etc.)  - Assess/evaluate cause of self-care deficits   - Assess range of motion  - Assess patient's mobility  - Assess patient's need for assistive devices and provide as appropriate  - Encourage maximum independence but intervene and supervise when necessary  - Involve family in performance of ADLs  - Assess for home care needs following discharge   - Consider OT consult to assist with ADL evaluation and planning for discharge  - Provide patient education as appropriate  Outcome: Progressing  Goal: Maintain proper alignment of affected body part  Description: INTERVENTIONS:  - Support, maintain and protect limb and body alignment  - Provide patient/ family with appropriate education  Outcome: Progressing

## 2024-12-07 NOTE — H&P
H&P - Hospitalist   Name: Ileana Seaman 64 y.o. female I MRN: [id number]  Unit/Bed#: -01 I Date of Admission: 12/6/2024   Date of Service: 12/7/2024 I Hospital Day: 0     Assessment & Plan  Nausea vomiting and diarrhea  POA with abdominal pain, nausea, diarrhea for 3 days.  Unable to tolerate p.o. intake in ED due to nausea and abdominal pain.  CT abdomen pelvis with fluid contents throughout entire colon, compatible with diarrheal state  Mild dehydration, labs overall unremarkable  Start IV hydration  C. difficile pending  Suspect viral enteritis  COPD (chronic obstructive pulmonary disease) (HCC)  Recently admitted for acute exacerbation was discharged home on prednisone taper  Continue home inhalers  Atrial fibrillation (HCC)  Continue Lopressor  Not on AC at baseline  Chronic hypoxic respiratory failure (HCC)  Requiring 2 L O2 baseline      VTE Pharmacologic Prophylaxis:   Moderate Risk (Score 3-4) - Pharmacological DVT Prophylaxis Ordered: enoxaparin (Lovenox).  Code Status: Level 1 - Full Code   Discussion with family: Patient declined call to .     Anticipated Length of Stay: Patient will be admitted on an observation basis with an anticipated length of stay of less than 2 midnights secondary to diarrhea.    History of Present Illness   Chief Complaint: Diarrhea    Ileana Seaman is a 64 y.o. 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  Patient reports over the past few days with nausea and watery diarrhea even becoming incontinent of stool along with intense gastrointestinal spasms.  In the ED CT abdomen pelvis revealing fluid contents throughout the entire colon compatible with diarrheal state.  Hemodynamically stable at time of admission, admitted to the medical service for observation and IV  hydration.    Review of Systems   Constitutional:  Negative for chills and fever.   HENT:  Negative for ear pain and sore throat.    Eyes:  Negative for pain and visual disturbance.   Respiratory:  Negative for cough and shortness of breath.    Cardiovascular:  Negative for chest pain and palpitations.   Gastrointestinal:  Positive for abdominal pain, diarrhea and nausea. Negative for vomiting.   Genitourinary:  Negative for dysuria and hematuria.   Musculoskeletal:  Negative for arthralgias and back pain.   Skin:  Negative for color change and rash.   Neurological:  Negative for seizures and syncope.   All other systems reviewed and are negative.      Historical Information   Past Medical History:   Diagnosis Date    COPD (chronic obstructive pulmonary disease) (HCC)     Hiatal hernia      Past Surgical History:   Procedure Laterality Date    CHOLECYSTECTOMY       Social History     Tobacco Use    Smoking status: Former     Current packs/day: 0.25     Types: Cigarettes    Smokeless tobacco: Never   Vaping Use    Vaping status: Never Used   Substance and Sexual Activity    Alcohol use: Never    Drug use: Never    Sexual activity: Not on file     E-Cigarette/Vaping    E-Cigarette Use Never User      E-Cigarette/Vaping Substances     History reviewed. No pertinent family history.  Social History:  Marital Status: Single   Patient Pre-hospital Living Situation: Home  Patient Pre-hospital Level of Mobility: unable to be assessed at time of evaluation  Patient Pre-hospital Diet Restrictions: none    Meds/Allergies   I have reviewed home medications with patient personally.  Prior to Admission medications    Medication Sig Start Date End Date Taking? Authorizing Provider   azithromycin (ZITHROMAX) 500 MG tablet Take 250 mg by mouth daily   Yes Historical Provider, MD   docusate sodium (COLACE) 100 mg capsule Take 100 mg by mouth 2 (two) times a day 11/13/24  Yes Historical Provider, MD   ethambutol (MYAMBUTOL) 400 mg  tablet Take 800 mg by mouth daily   Yes Historical Provider, MD   famotidine (PEPCID) 20 mg tablet Take 1 tablet (20 mg total) by mouth 2 (two) times a day 12/6/24  Yes Carlos Turcios PA-C   fluticasone-umeclidinium-vilanterol (Trelegy Ellipta) 100-62.5-25 mcg/actuation inhaler Inhale 1 puff daily Rinse mouth after use.   Yes Historical Provider, MD   ipratropium-albuterol (DUO-NEB) 0.5-2.5 mg/3 mL nebulizer solution Take 3 mL by nebulization 4 (four) times a day 11/23/24  Yes Mary Altamirano MD   metoprolol tartrate (LOPRESSOR) 25 mg tablet Take 1 tablet (25 mg total) by mouth every 12 (twelve) hours 11/23/24  Yes Mary Altamirano MD   omeprazole (PriLOSEC) 40 MG capsule Take 1 capsule (40 mg total) by mouth 2 (two) times a day 30 minutes before meal 11/8/24 12/8/24 Yes Rolando Parker MD   ondansetron (ZOFRAN) 4 mg tablet Take 1 tablet (4 mg total) by mouth every 6 (six) hours 12/6/24  Yes Carlos Turcios PA-C   rifabutin (MYCOBUTIN) 150 mg capsule Take 300 mg by mouth daily   Yes Historical Provider, MD   sucralfate (CARAFATE) 1 g tablet Take 1 tablet (1 g total) by mouth 4 (four) times a day for 14 days 12/6/24 12/20/24 Yes Carlos Turcios PA-C   guaiFENesin (MUCINEX) 600 mg 12 hr tablet Take 1 tablet (600 mg total) by mouth 2 (two) times a day  Patient not taking: Reported on 12/6/2024 11/23/24   Mary Altamirano MD   hydrOXYzine HCL (ATARAX) 25 mg tablet Take 1 tablet (25 mg total) by mouth if needed for itching for up to 5 days 11/23/24 11/28/24  Mary Altamirano MD   lidocaine (LIDODERM) 5 % Apply 1 patch topically over 12 hours daily for 4 days Remove & Discard patch within 12 hours or as directed by MD 10/10/24 10/14/24  Juana Santana MD   nicotine (NICODERM CQ) 14 mg/24hr TD 24 hr patch Place 1 patch on the skin over 24 hours daily  Patient not taking: Reported on 12/6/2024 10/10/24   Juana Santana MD     No Known Allergies    Objective :  Temp:  [97 °F (36.1 °C)-98.5 °F (36.9 °C)]  97.2 °F (36.2 °C)  HR:  [69-88] 69  BP: ()/(53-84) 93/53  Resp:  [18-20] 18  SpO2:  [95 %-100 %] 97 %  O2 Device: Nasal cannula  Nasal Cannula O2 Flow Rate (L/min):  [2 L/min] 2 L/min    Physical Exam  Vitals and nursing note reviewed.   Constitutional:       General: She is not in acute distress.     Appearance: She is well-developed.   HENT:      Head: Normocephalic and atraumatic.      Mouth/Throat:      Mouth: Mucous membranes are dry.   Eyes:      Conjunctiva/sclera: Conjunctivae normal.   Cardiovascular:      Rate and Rhythm: Normal rate and regular rhythm.      Heart sounds: No murmur heard.  Pulmonary:      Effort: Pulmonary effort is normal. No respiratory distress.      Breath sounds: Rhonchi present.   Abdominal:      Palpations: Abdomen is soft.      Tenderness: There is no abdominal tenderness.   Musculoskeletal:         General: No swelling.      Cervical back: Neck supple.   Skin:     General: Skin is warm and dry.      Capillary Refill: Capillary refill takes less than 2 seconds.   Neurological:      General: No focal deficit present.      Mental Status: She is alert and oriented to person, place, and time.   Psychiatric:         Mood and Affect: Mood normal.         Behavior: Behavior normal.              Lab Results: I have reviewed the following results:  Results from last 7 days   Lab Units 12/06/24  1307   WBC Thousand/uL 11.77*   HEMOGLOBIN g/dL 13.1   HEMATOCRIT % 40.2   PLATELETS Thousands/uL 222   SEGS PCT % 66   LYMPHO PCT % 23   MONO PCT % 9   EOS PCT % 1     Results from last 7 days   Lab Units 12/06/24  1307   SODIUM mmol/L 135   POTASSIUM mmol/L 3.9   CHLORIDE mmol/L 98   CO2 mmol/L 29   BUN mg/dL 3*   CREATININE mg/dL 0.56*   ANION GAP mmol/L 8   CALCIUM mg/dL 9.3   ALBUMIN g/dL 4.1   TOTAL BILIRUBIN mg/dL 0.45   ALK PHOS U/L 92   ALT U/L 17   AST U/L 14   GLUCOSE RANDOM mg/dL 117             Lab Results   Component Value Date    HGBA1C 6.5 (H) 11/13/2024    HGBA1C 6.4 (H)  10/04/2024    HGBA1C 5.9 (H) 05/22/2023     Results from last 7 days   Lab Units 12/06/24  1310 12/06/24  1307   LACTIC ACID mmol/L  --  1.6   PROCALCITONIN ng/ml <0.05  --        Imaging Results Review: I reviewed radiology reports from this admission including: CT abdomen/pelvis.  Other Study Results Review: No additional pertinent studies reviewed.    ** Please Note: This note has been constructed using a voice recognition system. **

## 2024-12-07 NOTE — ASSESSMENT & PLAN NOTE
Imaging showin mm right lower lobe pulmonary nodule.   Patient advised to follow-up with PCP for further recommendations

## 2024-12-07 NOTE — ASSESSMENT & PLAN NOTE
POA with abdominal pain, nausea, diarrhea for 3 days.  Unable to tolerate p.o. intake in ED due to nausea and abdominal pain.  CT abdomen pelvis with fluid contents throughout entire colon, compatible with diarrheal state  Mild dehydration, labs overall unremarkable  Received IV hydration.  With the treatment, patient condition improved, patient able to tolerate 100% of diet.  Since came to the floor, patient have not had bowel movement.  At this time, patient will be discharged back home with follow-up with PCP.

## 2024-12-07 NOTE — DISCHARGE SUMMARY
Discharge Summary - Hospitalist   Name: Ileana Seaman 64 y.o. female I MRN: [id number]  Unit/Bed#: [facility]-01 I Date of Admission: 2024   Date of Service: 2024 I Hospital Day: 0     Assessment & Plan  Nausea vomiting and diarrhea  POA with abdominal pain, nausea, diarrhea for 3 days.  Unable to tolerate p.o. intake in ED due to nausea and abdominal pain.  CT abdomen pelvis with fluid contents throughout entire colon, compatible with diarrheal state  Mild dehydration, labs overall unremarkable  Received IV hydration.  With the treatment, patient condition improved, patient able to tolerate 100% of diet.  Since came to the floor, patient have not had bowel movement.  At this time, patient will be discharged back home with follow-up with PCP.  COPD (chronic obstructive pulmonary disease) (HCC)  Recently admitted for acute exacerbation was discharged home on prednisone taper  Continue home inhalers  Atrial fibrillation (HCC)  Continue Lopressor  Not on AC at baseline  Chronic hypoxic respiratory failure (HCC)  Requiring 2 L O2 baseline  Pulmonary nodule  Imaging showin mm right lower lobe pulmonary nodule.   Patient advised to follow-up with PCP for further recommendations     Discharging Physician / Practitioner: Rolando Parker MD  PCP: Merline Dinero  Admission Date:   Admission Orders (From admission, onward)       Ordered        24 1830  Place in Observation  Once                          Discharge Date: 24    Medical Problems        Consultations During Hospital Stay:  none    Procedures Performed:   CT abdomen pelvis with contrast   Final Result by Wendy Rios MD ( [id number])      Fluid contents throughout the entire colon compatible with a diarrheal state.      Mild bilobar intrahepatic biliary ductal dilatation likely on the basis of prior cholecystectomy and unchanged.      8 mm right lower lobe pulmonary nodule. Recommend follow-up CT scan of the chest in approximately 6  to 12 months.         Imaging follow-up reminder notification scheduled in the electronic medical record.         Workstation performed: [id number]               Significant Findings / Test Results:   Lab Results   Component Value Date    WBC 9.06 12/07/2024    HGB 10.5 (L) 12/07/2024    HCT 34.2 (L) 12/07/2024    MCV 96 12/07/2024     12/07/2024     Lab Results   Component Value Date    SODIUM 139 12/07/2024    K 3.9 12/07/2024     (H) 12/07/2024    CO2 25 12/07/2024    AGAP 5 12/07/2024    BUN 2 (L) 12/07/2024    CREATININE 0.57 (L) 12/07/2024    GLUC 92 12/07/2024    CALCIUM 8.0 (L) 12/07/2024    AST 14 12/06/2024    ALT 17 12/06/2024    ALKPHOS 92 12/06/2024    TP 6.7 12/06/2024    TBILI 0.45 12/06/2024    EGFR 96 12/07/2024         Incidental Findings:   As mentioned above    Test Results Pending at Discharge (will require follow up):   Blood culture     Outpatient Tests Requested:  None    Complications: None    Reason for Admission: Diarrhea    Hospital Course:     Ileana Seaman is a 64 y.o. female patient who originally presented to the hospital on 12/6/2024 due to diarrhea-most likely viral in origin.  Patient labs remain normal, patient observed overnight.  Tolerating diet.  Since came to the floor, did not have any bowel movement.  Denies any abdominal pain.  Since patient remained stable, patient will be discharged back home with follow-up with PCP.  Patient has incidental finding of pulmonary nodule, advised the patient to follow-up with PCP for further recommendations.    All lab results commending findings, treatment plan option discussed in details with patient.  Verbalized understanding agrees.    Please see above list of diagnoses and related plan for additional information.     Condition at Discharge: stable     Discharge Day Visit / Exam:     Subjective: Seen and evaluated during the run.  Resting comfortably.  Denies any significant complaint.  Denies any abdominal pain, nausea,  "vomiting.  Denies any bowel movement since came to the floor.  Vitals: Blood Pressure: 113/66 (12/07/24 0816)  Pulse: 90 (12/07/24 0816)  Temperature: 97.5 °F (36.4 °C) (12/07/24 0816)  Temp Source: Temporal (12/06/24 1915)  Respirations: 18 (12/07/24 0816)  Height: 5' 1\" (154.9 cm) (12/06/24 1915)  Weight - Scale: 44.3 kg (97 lb 9.6 oz) (12/06/24 1915)  SpO2: 96 % (12/07/24 0816)  Exam:   Physical Exam  Vitals and nursing note reviewed.   Constitutional:       Appearance: Normal appearance. She is not ill-appearing or diaphoretic.   Eyes:      General: No scleral icterus.        Left eye: No discharge.      Extraocular Movements: Extraocular movements intact.      Conjunctiva/sclera: Conjunctivae normal.      Pupils: Pupils are equal, round, and reactive to light.   Cardiovascular:      Rate and Rhythm: Normal rate.      Heart sounds: No murmur heard.     No friction rub. No gallop.   Pulmonary:      Effort: Pulmonary effort is normal. No respiratory distress.      Breath sounds: No stridor. No wheezing or rhonchi.   Abdominal:      General: There is no distension.      Palpations: There is no mass.      Tenderness: There is no abdominal tenderness.      Hernia: No hernia is present.   Musculoskeletal:      Right lower leg: No edema.      Left lower leg: No edema.   Neurological:      Mental Status: She is alert and oriented to person, place, and time.      Cranial Nerves: No cranial nerve deficit.      Sensory: No sensory deficit.      Motor: No weakness.      Coordination: Coordination normal.         Discussion with Family: With patient    Discharge instructions/Information to patient and family:   See after visit summary for information provided to patient and family.      Provisions for Follow-Up Care:  See after visit summary for information related to follow-up care and any pertinent home health orders.      Disposition:     Home    For Discharges to [facility]:   Not Applicable to this " Patient - Not Applicable to this Patient    Planned Readmission: Condition get worse     Discharge Statement:  Greater than 50% of the total time was spent examining patient, answering all patient questions, arranging and discussing plan of care with patient as well as directly providing post-discharge instructions.  Additional time then spent on discharge activities.    Discharge Medications:  See after visit summary for reconciled discharge medications provided to patient and family.      ** Please Note: This note has been constructed using a voice recognition system **

## 2024-12-08 ENCOUNTER — HOSPITAL ENCOUNTER (INPATIENT)
Facility: HOSPITAL | Age: 64
LOS: 8 days | Discharge: HOME | DRG: 392 | End: 2024-12-17
Attending: EMERGENCY MEDICINE | Admitting: FAMILY MEDICINE
Payer: MEDICARE

## 2024-12-08 ENCOUNTER — APPOINTMENT (EMERGENCY)
Dept: ULTRASOUND IMAGING | Facility: HOSPITAL | Age: 64
DRG: 392 | End: 2024-12-08
Payer: MEDICARE

## 2024-12-08 DIAGNOSIS — A31.9 MYCOBACTERIAL INFECTION, NON-TB: ICD-10-CM

## 2024-12-08 DIAGNOSIS — I77.4 CELIAC ARTERY STENOSIS (HCC): ICD-10-CM

## 2024-12-08 DIAGNOSIS — R74.8 ELEVATED LIVER ENZYMES: ICD-10-CM

## 2024-12-08 DIAGNOSIS — R79.89 ABNORMAL LFTS: ICD-10-CM

## 2024-12-08 DIAGNOSIS — F41.9 ANXIETY: ICD-10-CM

## 2024-12-08 DIAGNOSIS — K29.70 GASTRITIS WITHOUT BLEEDING, UNSPECIFIED CHRONICITY, UNSPECIFIED GASTRITIS TYPE: ICD-10-CM

## 2024-12-08 DIAGNOSIS — R10.10 PAIN OF UPPER ABDOMEN: Primary | ICD-10-CM

## 2024-12-08 DIAGNOSIS — R10.13 EPIGASTRIC PAIN: ICD-10-CM

## 2024-12-08 PROBLEM — Z76.5 DRUG-SEEKING BEHAVIOR: Status: ACTIVE | Noted: 2024-12-08

## 2024-12-08 LAB
ALBUMIN SERPL BCG-MCNC: 4.1 G/DL (ref 3.5–5)
ALP SERPL-CCNC: 210 U/L (ref 34–104)
ALT SERPL W P-5'-P-CCNC: 234 U/L (ref 7–52)
ANION GAP SERPL CALCULATED.3IONS-SCNC: 6 MMOL/L (ref 4–13)
APAP SERPL-MCNC: <2 UG/ML (ref 10–20)
AST SERPL W P-5'-P-CCNC: 269 U/L (ref 13–39)
BASOPHILS # BLD AUTO: 0.03 THOUSANDS/ÂΜL (ref 0–0.1)
BASOPHILS NFR BLD AUTO: 0 % (ref 0–1)
BILIRUB SERPL-MCNC: 2.34 MG/DL (ref 0.2–1)
BILIRUB UR QL STRIP: NEGATIVE
BUN SERPL-MCNC: 4 MG/DL (ref 5–25)
CALCIUM SERPL-MCNC: 9 MG/DL (ref 8.4–10.2)
CHLORIDE SERPL-SCNC: 100 MMOL/L (ref 96–108)
CLARITY UR: CLEAR
CO2 SERPL-SCNC: 30 MMOL/L (ref 21–32)
COLOR UR: YELLOW
CREAT SERPL-MCNC: 0.5 MG/DL (ref 0.6–1.3)
EOSINOPHIL # BLD AUTO: 0.06 THOUSAND/ÂΜL (ref 0–0.61)
EOSINOPHIL NFR BLD AUTO: 1 % (ref 0–6)
ERYTHROCYTE [DISTWIDTH] IN BLOOD BY AUTOMATED COUNT: 13 % (ref 11.6–15.1)
GFR SERPL CREATININE-BSD FRML MDRD: 101 ML/MIN/1.73SQ M
GLUCOSE SERPL-MCNC: 140 MG/DL (ref 65–140)
GLUCOSE UR STRIP-MCNC: NEGATIVE MG/DL
HCT VFR BLD AUTO: 39.7 % (ref 34.8–46.1)
HGB BLD-MCNC: 13 G/DL (ref 11.5–15.4)
HGB UR QL STRIP.AUTO: NEGATIVE
IMM GRANULOCYTES # BLD AUTO: 0.02 THOUSAND/UL (ref 0–0.2)
IMM GRANULOCYTES NFR BLD AUTO: 0 % (ref 0–2)
INR PPP: 1.12 (ref 0.85–1.19)
KETONES UR STRIP-MCNC: NEGATIVE MG/DL
LACTATE SERPL-SCNC: 0.6 MMOL/L (ref 0.5–2)
LEUKOCYTE ESTERASE UR QL STRIP: NEGATIVE
LIPASE SERPL-CCNC: 28 U/L (ref 11–82)
LYMPHOCYTES # BLD AUTO: 1.08 THOUSANDS/ÂΜL (ref 0.6–4.47)
LYMPHOCYTES NFR BLD AUTO: 12 % (ref 14–44)
MAGNESIUM SERPL-MCNC: 1.9 MG/DL (ref 1.9–2.7)
MCH RBC QN AUTO: 29.5 PG (ref 26.8–34.3)
MCHC RBC AUTO-ENTMCNC: 32.7 G/DL (ref 31.4–37.4)
MCV RBC AUTO: 90 FL (ref 82–98)
MONOCYTES # BLD AUTO: 0.9 THOUSAND/ÂΜL (ref 0.17–1.22)
MONOCYTES NFR BLD AUTO: 10 % (ref 4–12)
NEUTROPHILS # BLD AUTO: 7.17 THOUSANDS/ÂΜL (ref 1.85–7.62)
NEUTS SEG NFR BLD AUTO: 77 % (ref 43–75)
NITRITE UR QL STRIP: NEGATIVE
NRBC BLD AUTO-RTO: 0 /100 WBCS
PH UR STRIP.AUTO: 6.5 [PH]
PLATELET # BLD AUTO: 193 THOUSANDS/UL (ref 149–390)
PMV BLD AUTO: 12.7 FL (ref 8.9–12.7)
POTASSIUM SERPL-SCNC: 3.6 MMOL/L (ref 3.5–5.3)
PROT SERPL-MCNC: 6.8 G/DL (ref 6.4–8.4)
PROT UR STRIP-MCNC: NEGATIVE MG/DL
PROTHROMBIN TIME: 14.8 SECONDS (ref 12.3–15)
RBC # BLD AUTO: 4.4 MILLION/UL (ref 3.81–5.12)
SODIUM SERPL-SCNC: 136 MMOL/L (ref 135–147)
SP GR UR STRIP.AUTO: 1.01 (ref 1–1.03)
UROBILINOGEN UR QL STRIP.AUTO: 0.2 E.U./DL
WBC # BLD AUTO: 9.26 THOUSAND/UL (ref 4.31–10.16)

## 2024-12-08 PROCEDURE — 99285 EMERGENCY DEPT VISIT HI MDM: CPT

## 2024-12-08 PROCEDURE — 99223 1ST HOSP IP/OBS HIGH 75: CPT | Performed by: NURSE PRACTITIONER

## 2024-12-08 PROCEDURE — 83735 ASSAY OF MAGNESIUM: CPT | Performed by: EMERGENCY MEDICINE

## 2024-12-08 PROCEDURE — 81003 URINALYSIS AUTO W/O SCOPE: CPT | Performed by: EMERGENCY MEDICINE

## 2024-12-08 PROCEDURE — 96375 TX/PRO/DX INJ NEW DRUG ADDON: CPT

## 2024-12-08 PROCEDURE — 99285 EMERGENCY DEPT VISIT HI MDM: CPT | Performed by: EMERGENCY MEDICINE

## 2024-12-08 PROCEDURE — 36415 COLL VENOUS BLD VENIPUNCTURE: CPT | Performed by: EMERGENCY MEDICINE

## 2024-12-08 PROCEDURE — 85610 PROTHROMBIN TIME: CPT | Performed by: NURSE PRACTITIONER

## 2024-12-08 PROCEDURE — 83690 ASSAY OF LIPASE: CPT | Performed by: EMERGENCY MEDICINE

## 2024-12-08 PROCEDURE — 85025 COMPLETE CBC W/AUTO DIFF WBC: CPT | Performed by: EMERGENCY MEDICINE

## 2024-12-08 PROCEDURE — 96374 THER/PROPH/DIAG INJ IV PUSH: CPT

## 2024-12-08 PROCEDURE — 80143 DRUG ASSAY ACETAMINOPHEN: CPT | Performed by: NURSE PRACTITIONER

## 2024-12-08 PROCEDURE — 83605 ASSAY OF LACTIC ACID: CPT | Performed by: EMERGENCY MEDICINE

## 2024-12-08 PROCEDURE — 96376 TX/PRO/DX INJ SAME DRUG ADON: CPT

## 2024-12-08 PROCEDURE — 76705 ECHO EXAM OF ABDOMEN: CPT

## 2024-12-08 PROCEDURE — 96361 HYDRATE IV INFUSION ADD-ON: CPT

## 2024-12-08 PROCEDURE — 80053 COMPREHEN METABOLIC PANEL: CPT | Performed by: EMERGENCY MEDICINE

## 2024-12-08 RX ORDER — ETHAMBUTOL HYDROCHLORIDE 400 MG/1
800 TABLET, FILM COATED ORAL DAILY
Status: DISCONTINUED | OUTPATIENT
Start: 2024-12-09 | End: 2024-12-08

## 2024-12-08 RX ORDER — HYDROXYZINE HYDROCHLORIDE 25 MG/1
25 TABLET, FILM COATED ORAL EVERY 6 HOURS PRN
Status: DISCONTINUED | OUTPATIENT
Start: 2024-12-08 | End: 2024-12-17 | Stop reason: HOSPADM

## 2024-12-08 RX ORDER — FENTANYL CITRATE 50 UG/ML
25 INJECTION, SOLUTION INTRAMUSCULAR; INTRAVENOUS ONCE
Refills: 0 | Status: COMPLETED | OUTPATIENT
Start: 2024-12-08 | End: 2024-12-08

## 2024-12-08 RX ORDER — ENOXAPARIN SODIUM 100 MG/ML
40 INJECTION SUBCUTANEOUS DAILY
Status: DISCONTINUED | OUTPATIENT
Start: 2024-12-09 | End: 2024-12-09

## 2024-12-08 RX ORDER — FAMOTIDINE 10 MG/ML
20 INJECTION, SOLUTION INTRAVENOUS ONCE
Status: COMPLETED | OUTPATIENT
Start: 2024-12-08 | End: 2024-12-08

## 2024-12-08 RX ORDER — ALBUTEROL SULFATE 0.83 MG/ML
2.5 SOLUTION RESPIRATORY (INHALATION) EVERY 4 HOURS PRN
Status: DISCONTINUED | OUTPATIENT
Start: 2024-12-08 | End: 2024-12-17 | Stop reason: HOSPADM

## 2024-12-08 RX ORDER — RIFABUTIN 150 MG/1
300 CAPSULE ORAL DAILY
Status: DISCONTINUED | OUTPATIENT
Start: 2024-12-09 | End: 2024-12-08

## 2024-12-08 RX ORDER — FAMOTIDINE 20 MG/1
20 TABLET, FILM COATED ORAL 2 TIMES DAILY
Status: DISCONTINUED | OUTPATIENT
Start: 2024-12-08 | End: 2024-12-17 | Stop reason: HOSPADM

## 2024-12-08 RX ORDER — AZITHROMYCIN 250 MG/1
250 TABLET, FILM COATED ORAL DAILY
Status: DISCONTINUED | OUTPATIENT
Start: 2024-12-09 | End: 2024-12-08

## 2024-12-08 RX ORDER — METOPROLOL TARTRATE 25 MG/1
25 TABLET, FILM COATED ORAL EVERY 12 HOURS SCHEDULED
Status: DISCONTINUED | OUTPATIENT
Start: 2024-12-08 | End: 2024-12-17 | Stop reason: HOSPADM

## 2024-12-08 RX ORDER — IPRATROPIUM BROMIDE AND ALBUTEROL SULFATE 2.5; .5 MG/3ML; MG/3ML
3 SOLUTION RESPIRATORY (INHALATION) 4 TIMES DAILY
Status: DISCONTINUED | OUTPATIENT
Start: 2024-12-09 | End: 2024-12-09

## 2024-12-08 RX ORDER — ACETAMINOPHEN 325 MG/1
975 TABLET ORAL EVERY 8 HOURS SCHEDULED
Status: DISCONTINUED | OUTPATIENT
Start: 2024-12-08 | End: 2024-12-08

## 2024-12-08 RX ORDER — DICYCLOMINE HCL 20 MG
20 TABLET ORAL ONCE
Status: COMPLETED | OUTPATIENT
Start: 2024-12-08 | End: 2024-12-08

## 2024-12-08 RX ORDER — FLUTICASONE FUROATE AND VILANTEROL 100; 25 UG/1; UG/1
1 POWDER RESPIRATORY (INHALATION) DAILY
Status: DISCONTINUED | OUTPATIENT
Start: 2024-12-09 | End: 2024-12-17 | Stop reason: HOSPADM

## 2024-12-08 RX ORDER — ONDANSETRON 2 MG/ML
4 INJECTION INTRAMUSCULAR; INTRAVENOUS ONCE
Status: COMPLETED | OUTPATIENT
Start: 2024-12-08 | End: 2024-12-08

## 2024-12-08 RX ORDER — SUCRALFATE 1 G/1
1 TABLET ORAL
Status: DISCONTINUED | OUTPATIENT
Start: 2024-12-08 | End: 2024-12-15

## 2024-12-08 RX ORDER — PANTOPRAZOLE SODIUM 40 MG/1
40 TABLET, DELAYED RELEASE ORAL
Status: DISCONTINUED | OUTPATIENT
Start: 2024-12-09 | End: 2024-12-17 | Stop reason: HOSPADM

## 2024-12-08 RX ORDER — ACETAMINOPHEN 325 MG/1
975 TABLET ORAL EVERY 8 HOURS PRN
Status: DISCONTINUED | OUTPATIENT
Start: 2024-12-08 | End: 2024-12-08

## 2024-12-08 RX ORDER — PANTOPRAZOLE SODIUM 40 MG/10ML
40 INJECTION, POWDER, LYOPHILIZED, FOR SOLUTION INTRAVENOUS ONCE
Status: COMPLETED | OUTPATIENT
Start: 2024-12-08 | End: 2024-12-08

## 2024-12-08 RX ORDER — OXYCODONE HYDROCHLORIDE 5 MG/1
5 TABLET ORAL ONCE
Refills: 0 | Status: COMPLETED | OUTPATIENT
Start: 2024-12-08 | End: 2024-12-08

## 2024-12-08 RX ORDER — SUCRALFATE 1 G/1
1 TABLET ORAL ONCE
Status: COMPLETED | OUTPATIENT
Start: 2024-12-08 | End: 2024-12-08

## 2024-12-08 RX ADMIN — OXYCODONE HYDROCHLORIDE 5 MG: 5 TABLET ORAL at 17:59

## 2024-12-08 RX ADMIN — FAMOTIDINE 20 MG: 10 INJECTION, SOLUTION INTRAVENOUS at 10:40

## 2024-12-08 RX ADMIN — DICYCLOMINE HYDROCHLORIDE 20 MG: 20 TABLET ORAL at 14:51

## 2024-12-08 RX ADMIN — PANTOPRAZOLE SODIUM 40 MG: 40 INJECTION, POWDER, LYOPHILIZED, FOR SOLUTION INTRAVENOUS at 10:41

## 2024-12-08 RX ADMIN — SUCRALFATE 1 G: 1 TABLET ORAL at 10:40

## 2024-12-08 RX ADMIN — SODIUM CHLORIDE 1000 ML: 0.9 INJECTION, SOLUTION INTRAVENOUS at 14:52

## 2024-12-08 RX ADMIN — SODIUM CHLORIDE 1000 ML: 0.9 INJECTION, SOLUTION INTRAVENOUS at 10:35

## 2024-12-08 RX ADMIN — SUCRALFATE 1 G: 1 TABLET ORAL at 21:38

## 2024-12-08 RX ADMIN — FAMOTIDINE 20 MG: 20 TABLET, FILM COATED ORAL at 21:38

## 2024-12-08 RX ADMIN — FENTANYL CITRATE 25 MCG: 50 INJECTION, SOLUTION INTRAMUSCULAR; INTRAVENOUS at 14:51

## 2024-12-08 RX ADMIN — FENTANYL CITRATE 25 MCG: 50 INJECTION, SOLUTION INTRAMUSCULAR; INTRAVENOUS at 10:41

## 2024-12-08 RX ADMIN — ONDANSETRON 4 MG: 2 INJECTION INTRAMUSCULAR; INTRAVENOUS at 10:40

## 2024-12-08 NOTE — ED PROVIDER NOTES
"Time reflects when diagnosis was documented in both MDM as applicable and the Disposition within this note       Time User Action Codes Description Comment    12/8/2024  5:57 PM Mik Lamb [R10.10] Pain of upper abdomen     12/8/2024  5:57 PM Mik Lamb [K29.70] Gastritis without bleeding, unspecified chronicity, unspecified gastritis type     12/8/2024  5:57 PM Mik Lamb [R79.89] Abnormal LFTs           ED Disposition       None          Assessment & Plan       Medical Decision Making  Patient was seen and evaluated.  Presents with recurrent abdominal discomfort as described.  Differential diagnosis includes worsening gastritis, bowel obstruction, pancreatitis, dehydration, sepsis, UTI, other.  Workup initiated as shown.  Repeat CT imaging not ordered due to just having CT done 2 days ago.  Laboratory evaluation completed.  Blood count normal.  Renal function normal.  No concerning electrolyte abnormalities.  Elevated LFTs noted.  Consulted with gastroenterology, recommended right upper quadrant ultrasound.  Right upper quadrant ultrasound shows no choledocholithiasis or intrahepatic ductal dilatation.  Patient with persistent recurrence of pain, requiring repeat medication dosing including IV narcotic medications.  Based on ultrasound results, gastroenterology stated no reason for transfer for ERCP.  Could follow-up as an outpatient regarding test results.  If admitted for pain control, they would be willing to consult.  Consulted hospitalist regarding admission.  Recommendation made for attempt at oral pain medication.  Patient expressed concerns about \"waking up in the middle of the night with my insides of burning, and if they admit me, they could figure out why that is happening.\"  Patient ultimately willing to attempt pain relief with oral pain medication.  Care will be turned over to Dr. Marcos pending reassessment and disposition.    Amount and/or Complexity of Data " "Reviewed  Labs: ordered.  Radiology: ordered.    Risk  Prescription drug management.             Medications   sodium chloride 0.9 % bolus 1,000 mL (0 mL Intravenous Stopped 12/8/24 1135)   pantoprazole (PROTONIX) injection 40 mg (40 mg Intravenous Given 12/8/24 1041)   Famotidine (PF) (PEPCID) injection 20 mg (20 mg Intravenous Given 12/8/24 1040)   ondansetron (ZOFRAN) injection 4 mg (4 mg Intravenous Given 12/8/24 1040)   sucralfate (CARAFATE) tablet 1 g (1 g Oral Given 12/8/24 1040)   fentaNYL injection 25 mcg (25 mcg Intravenous Given 12/8/24 1041)   sodium chloride 0.9 % bolus 1,000 mL (1,000 mL Intravenous New Bag 12/8/24 1452)   dicyclomine (BENTYL) tablet 20 mg (20 mg Oral Given 12/8/24 1451)   fentaNYL injection 25 mcg (25 mcg Intravenous Given 12/8/24 1451)   oxyCODONE (ROXICODONE) IR tablet 5 mg (5 mg Oral Given 12/8/24 1759)       ED Risk Strat Scores                           SBIRT 20yo+      Flowsheet Row Most Recent Value   Initial Alcohol Screen: US AUDIT-C     1. How often do you have a drink containing alcohol? 0 Filed at: 12/08/2024 0924   2. How many drinks containing alcohol do you have on a typical day you are drinking?  0 Filed at: 12/08/2024 0924   3b. FEMALE Any Age, or MALE 65+: How often do you have 4 or more drinks on one occassion? 0 Filed at: 12/08/2024 0924   Audit-C Score 0 Filed at: 12/08/2024 0924   LANDY: How many times in the past year have you...    Used an illegal drug or used a prescription medication for non-medical reasons? Never Filed at: 12/08/2024 0924                            History of Present Illness       Chief Complaint   Patient presents with    Abdominal Pain     Patient presents to the ED with complaints of diarrhea and \"insides that are on fire\" since 0300. Patient was recently evaluated for the same.        Past Medical History:   Diagnosis Date    COPD (chronic obstructive pulmonary disease) (HCC)     Hiatal hernia       Past Surgical History:   Procedure " Laterality Date    CHOLECYSTECTOMY        History reviewed. No pertinent family history.   Social History     Tobacco Use    Smoking status: Former     Current packs/day: 0.25     Types: Cigarettes    Smokeless tobacco: Never   Vaping Use    Vaping status: Never Used   Substance Use Topics    Alcohol use: Never    Drug use: Never      E-Cigarette/Vaping    E-Cigarette Use Never User       E-Cigarette/Vaping Substances      I have reviewed and agree with the history as documented.     Patient presents to the emergency department accompanied by her  for evaluation of recurrent upper abdominal pain and ongoing diarrhea.  Patient was admitted on Friday and stayed until yesterday for similar complaints.  Complained of persistent epigastric pain, nausea, decreased oral intake, ongoing diarrhea.  Has known gastritis.  Recently had Protonix increased from 40 mg daily to 40 mg twice daily.  Also added famotidine and sucralfate.  Patient states that she is taking these without significant relief.  Complains of severe upper abdominal discomfort described as a burning sensation.  States she had diarrhea at 3 and 4:00 this morning.  No diarrhea since.  Was unable to give stool sample during prior hospital admission.  No fevers.  No urinary symptoms.  No blood in the diarrhea.  No other complaints, modifying factors, or associated symptoms.        Review of Systems   All other systems reviewed and are negative.          Objective       ED Triage Vitals [12/08/24 0923]   Temperature Pulse Blood Pressure Respirations SpO2 Patient Position - Orthostatic VS   99 °F (37.2 °C) 105 134/63 18 96 % Sitting      Temp Source Heart Rate Source BP Location FiO2 (%) Pain Score    Temporal Monitor Right arm -- 10 - Worst Possible Pain      Vitals      Date and Time Temp Pulse SpO2 Resp BP Pain Score FACES Pain Rating User   12/08/24 1730 -- 76 98 % 18 127/61 -- -- MD   12/08/24 1700 -- 90 98 % 16 152/74 -- -- MD   12/08/24 1600 -- 67 100  % 15 116/58 -- -- MD   12/08/24 1451 -- -- -- -- -- 10 - Worst Possible Pain -- MD   12/08/24 1430 -- 94 96 % 16 137/72 -- -- MD   12/08/24 1330 -- 90 98 % 17 126/73 -- -- MD   12/08/24 1300 -- 89 98 % 16 133/62 -- -- MD   12/08/24 1200 -- 94 98 % 17 135/78 -- -- MD   12/08/24 1105 -- -- -- -- -- 8 -- MD   12/08/24 1100 -- 81 97 % 16 123/66 -- -- MD   12/08/24 1045 -- 96 98 % 18 157/96 -- -- MD   12/08/24 1041 -- -- -- -- -- 10 - Worst Possible Pain -- MD   12/08/24 0923 99 °F (37.2 °C) 105 96 % 18 134/63 10 - Worst Possible Pain -- RR            Physical Exam  Vitals and nursing note reviewed.   Constitutional:       General: She is not in acute distress.     Appearance: She is well-developed.      Comments: Seems uncomfortable.   HENT:      Head: Normocephalic and atraumatic.   Eyes:      Conjunctiva/sclera: Conjunctivae normal.   Cardiovascular:      Rate and Rhythm: Regular rhythm. Tachycardia present.      Heart sounds: Normal heart sounds. No murmur heard.     No friction rub. No gallop.   Pulmonary:      Effort: Pulmonary effort is normal. No respiratory distress.      Breath sounds: Normal breath sounds.   Abdominal:      General: Abdomen is flat.      Palpations: Abdomen is soft.      Tenderness: There is generalized abdominal tenderness and tenderness in the epigastric area.      Comments: Generalized abdominal tenderness, worse in the epigastrium.   Musculoskeletal:         General: No swelling.      Cervical back: Neck supple.   Skin:     General: Skin is warm and dry.      Capillary Refill: Capillary refill takes less than 2 seconds.   Neurological:      General: No focal deficit present.      Mental Status: She is alert and oriented to person, place, and time.   Psychiatric:         Mood and Affect: Mood normal.         Behavior: Behavior normal.         Results Reviewed       Procedure Component Value Units Date/Time    UA w Reflex to Microscopic w Reflex to Culture [[id number]] Collected: 12/08/24  1108    Lab Status: Final result Specimen: Urine, Clean Catch Updated: 12/08/24 1120     Color, UA Yellow     Clarity, UA Clear     Specific Gravity, UA 1.010     pH, UA 6.5     Leukocytes, UA Negative     Nitrite, UA Negative     Protein, UA Negative mg/dl      Glucose, UA Negative mg/dl      Ketones, UA Negative mg/dl      Urobilinogen, UA 0.2 E.U./dl      Bilirubin, UA Negative     Occult Blood, UA Negative    Comprehensive metabolic panel [[id number]]  (Abnormal) Collected: 12/08/24 1034    Lab Status: Final result Specimen: Blood from Arm, Right Updated: 12/08/24 1101     Sodium 136 mmol/L      Potassium 3.6 mmol/L      Chloride 100 mmol/L      CO2 30 mmol/L      ANION GAP 6 mmol/L      BUN 4 mg/dL      Creatinine 0.50 mg/dL      Glucose 140 mg/dL      Calcium 9.0 mg/dL       U/L       U/L      Alkaline Phosphatase 210 U/L      Total Protein 6.8 g/dL      Albumin 4.1 g/dL      Total Bilirubin 2.34 mg/dL      eGFR 101 ml/min/1.73sq m     Narrative:      [employer] guidelines for Chronic Kidney Disease (CKD):     Stage 1 with normal or high GFR (GFR > 90 mL/min/1.73 square meters)    Stage 2 Mild CKD (GFR = 60-89 mL/min/1.73 square meters)    Stage 3A Moderate CKD (GFR = 45-59 mL/min/1.73 square meters)    Stage 3B Moderate CKD (GFR = 30-44 mL/min/1.73 square meters)    Stage 4 Severe CKD (GFR = 15-29 mL/min/1.73 square meters)    Stage 5 End Stage CKD (GFR <15 mL/min/1.73 square meters)  Note: GFR calculation is accurate only with a steady state creatinine    Lipase [[id number]]  (Normal) Collected: 12/08/24 1034    Lab Status: Final result Specimen: Blood from Arm, Right Updated: 12/08/24 1101     Lipase 28 u/L     Magnesium [[id number]]  (Normal) Collected: 12/08/24 1034    Lab Status: Final result Specimen: Blood from Arm, Right Updated: 12/08/24 1101     Magnesium 1.9 mg/dL     Lactic acid, plasma (w/reflex if result > 2.0) [[id number]]  (Normal) Collected: 12/08/24 1035     Lab Status: Final result Specimen: Blood from Arm, Right Updated: 12/08/24 1057     LACTIC ACID 0.6 mmol/L     Narrative:      Result may be elevated if tourniquet was used during collection.    CBC and differential [[id number]]  (Abnormal) Collected: 12/08/24 1034    Lab Status: Final result Specimen: Blood from Arm, Right Updated: 12/08/24 1040     WBC 9.26 Thousand/uL      RBC 4.40 Million/uL      Hemoglobin 13.0 g/dL      Hematocrit 39.7 %      MCV 90 fL      MCH 29.5 pg      MCHC 32.7 g/dL      RDW 13.0 %      MPV 12.7 fL      Platelets 193 Thousands/uL      nRBC 0 /100 WBCs      Segmented % 77 %      Immature Grans % 0 %      Lymphocytes % 12 %      Monocytes % 10 %      Eosinophils Relative 1 %      Basophils Relative 0 %      Absolute Neutrophils 7.17 Thousands/µL      Absolute Immature Grans 0.02 Thousand/uL      Absolute Lymphocytes 1.08 Thousands/µL      Absolute Monocytes 0.90 Thousand/µL      Eosinophils Absolute 0.06 Thousand/µL      Basophils Absolute 0.03 Thousands/µL             US right upper quadrant   Final Interpretation by Teresa Gomez MD (12/08 1715)      Status post cholecystectomy. Otherwise, unremarkable right upper quadrant ultrasound.      Workstation performed: [id number]             Procedures    ED Medication and Procedure Management   Prior to Admission Medications   Prescriptions Last Dose Informant Patient Reported? Taking?   azithromycin (ZITHROMAX) 500 MG tablet   Yes No   Sig: Take 250 mg by mouth daily   docusate sodium (COLACE) 100 mg capsule   Yes No   Sig: Take 100 mg by mouth 2 (two) times a day   ethambutol (MYAMBUTOL) 400 mg tablet   Yes No   Sig: Take 800 mg by mouth daily   famotidine (PEPCID) 20 mg tablet   No No   Sig: Take 1 tablet (20 mg total) by mouth 2 (two) times a day   fluticasone-umeclidinium-vilanterol (Trelegy Ellipta) 100-62.5-25 mcg/actuation inhaler   Yes No   Sig: Inhale 1 puff daily Rinse mouth after use.   hydrOXYzine HCL (ATARAX) 25 mg tablet    No No   Sig: Take 1 tablet (25 mg total) by mouth if needed for itching for up to 5 days   ipratropium-albuterol (DUO-NEB) 0.5-2.5 mg/3 mL nebulizer solution   No No   Sig: Take 3 mL by nebulization 4 (four) times a day   lidocaine (LIDODERM) 5 %   No No   Sig: Apply 1 patch topically over 12 hours daily for 4 days Remove & Discard patch within 12 hours or as directed by MD   metoprolol tartrate (LOPRESSOR) 25 mg tablet   No No   Sig: Take 1 tablet (25 mg total) by mouth every 12 (twelve) hours   nicotine (NICODERM CQ) 14 mg/24hr TD 24 hr patch   No No   Sig: Place 1 patch on the skin over 24 hours daily   omeprazole (PriLOSEC) 40 MG capsule   No No   Sig: Take 1 capsule (40 mg total) by mouth 2 (two) times a day 30 minutes before meal   ondansetron (ZOFRAN) 4 mg tablet   No No   Sig: Take 1 tablet (4 mg total) by mouth every 6 (six) hours   rifabutin (MYCOBUTIN) 150 mg capsule   Yes No   Sig: Take 300 mg by mouth daily   sucralfate (CARAFATE) 1 g tablet   No No   Sig: Take 1 tablet (1 g total) by mouth 4 (four) times a day for 14 days      Facility-Administered Medications: None     Patient's Medications   Discharge Prescriptions    No medications on file     No discharge procedures on file.  ED SEPSIS DOCUMENTATION   Time reflects when diagnosis was documented in both MDM as applicable and the Disposition within this note       Time User Action Codes Description Comment    12/8/2024  5:57 PM Mik Lamb [R10.10] Pain of upper abdomen     12/8/2024  5:57 PM Mik Lamb [K29.70] Gastritis without bleeding, unspecified chronicity, unspecified gastritis type     12/8/2024  5:57 PM Mik Lamb [R79.89] Abnormal LFTs                  Mik Lamb MD  12/08/24 5289

## 2024-12-09 ENCOUNTER — APPOINTMENT (INPATIENT)
Dept: MRI IMAGING | Facility: HOSPITAL | Age: 64
DRG: 392 | End: 2024-12-09
Payer: MEDICARE

## 2024-12-09 LAB
ALBUMIN SERPL BCG-MCNC: 3.1 G/DL (ref 3.5–5)
ALP SERPL-CCNC: 161 U/L (ref 34–104)
ALT SERPL W P-5'-P-CCNC: 160 U/L (ref 7–52)
AMMONIA PLAS-SCNC: 37 UMOL/L (ref 18–72)
ANION GAP SERPL CALCULATED.3IONS-SCNC: 5 MMOL/L (ref 4–13)
AST SERPL W P-5'-P-CCNC: 107 U/L (ref 13–39)
BILIRUB SERPL-MCNC: 2.37 MG/DL (ref 0.2–1)
BUN SERPL-MCNC: 3 MG/DL (ref 5–25)
CALCIUM ALBUM COR SERPL-MCNC: 9 MG/DL (ref 8.3–10.1)
CALCIUM SERPL-MCNC: 8.3 MG/DL (ref 8.4–10.2)
CHLORIDE SERPL-SCNC: 108 MMOL/L (ref 96–108)
CO2 SERPL-SCNC: 26 MMOL/L (ref 21–32)
CREAT SERPL-MCNC: 0.44 MG/DL (ref 0.6–1.3)
ERYTHROCYTE [DISTWIDTH] IN BLOOD BY AUTOMATED COUNT: 13.7 % (ref 11.6–15.1)
GFR SERPL CREATININE-BSD FRML MDRD: 105 ML/MIN/1.73SQ M
GLUCOSE P FAST SERPL-MCNC: 84 MG/DL (ref 65–99)
GLUCOSE SERPL-MCNC: 84 MG/DL (ref 65–140)
HCT VFR BLD AUTO: 32 % (ref 34.8–46.1)
HGB BLD-MCNC: 10.1 G/DL (ref 11.5–15.4)
MCH RBC QN AUTO: 29 PG (ref 26.8–34.3)
MCHC RBC AUTO-ENTMCNC: 31.6 G/DL (ref 31.4–37.4)
MCV RBC AUTO: 92 FL (ref 82–98)
PLATELET # BLD AUTO: 156 THOUSANDS/UL (ref 149–390)
PMV BLD AUTO: 12.9 FL (ref 8.9–12.7)
POTASSIUM SERPL-SCNC: 3.7 MMOL/L (ref 3.5–5.3)
PROT SERPL-MCNC: 5 G/DL (ref 6.4–8.4)
RBC # BLD AUTO: 3.48 MILLION/UL (ref 3.81–5.12)
SODIUM SERPL-SCNC: 139 MMOL/L (ref 135–147)
WBC # BLD AUTO: 5.46 THOUSAND/UL (ref 4.31–10.16)

## 2024-12-09 PROCEDURE — 74181 MRI ABDOMEN W/O CONTRAST: CPT

## 2024-12-09 PROCEDURE — 80053 COMPREHEN METABOLIC PANEL: CPT | Performed by: NURSE PRACTITIONER

## 2024-12-09 PROCEDURE — 85027 COMPLETE CBC AUTOMATED: CPT | Performed by: NURSE PRACTITIONER

## 2024-12-09 PROCEDURE — 94760 N-INVAS EAR/PLS OXIMETRY 1: CPT

## 2024-12-09 PROCEDURE — 94640 AIRWAY INHALATION TREATMENT: CPT

## 2024-12-09 PROCEDURE — 82140 ASSAY OF AMMONIA: CPT | Performed by: NURSE PRACTITIONER

## 2024-12-09 PROCEDURE — 99232 SBSQ HOSP IP/OBS MODERATE 35: CPT | Performed by: FAMILY MEDICINE

## 2024-12-09 RX ORDER — IPRATROPIUM BROMIDE AND ALBUTEROL SULFATE 2.5; .5 MG/3ML; MG/3ML
3 SOLUTION RESPIRATORY (INHALATION) 4 TIMES DAILY
Status: DISCONTINUED | OUTPATIENT
Start: 2024-12-09 | End: 2024-12-11

## 2024-12-09 RX ORDER — ONDANSETRON 2 MG/ML
4 INJECTION INTRAMUSCULAR; INTRAVENOUS EVERY 8 HOURS PRN
Status: DISCONTINUED | OUTPATIENT
Start: 2024-12-09 | End: 2024-12-17 | Stop reason: HOSPADM

## 2024-12-09 RX ADMIN — PANTOPRAZOLE SODIUM 40 MG: 40 TABLET, DELAYED RELEASE ORAL at 06:02

## 2024-12-09 RX ADMIN — ENOXAPARIN SODIUM 40 MG: 40 INJECTION SUBCUTANEOUS at 08:27

## 2024-12-09 RX ADMIN — SUCRALFATE 1 G: 1 TABLET ORAL at 21:03

## 2024-12-09 RX ADMIN — METOPROLOL TARTRATE 25 MG: 25 TABLET, FILM COATED ORAL at 08:27

## 2024-12-09 RX ADMIN — IPRATROPIUM BROMIDE AND ALBUTEROL SULFATE 3 ML: .5; 3 SOLUTION RESPIRATORY (INHALATION) at 12:50

## 2024-12-09 RX ADMIN — ONDANSETRON 4 MG: 2 INJECTION INTRAMUSCULAR; INTRAVENOUS at 03:50

## 2024-12-09 RX ADMIN — UMECLIDINIUM 1 PUFF: 62.5 AEROSOL, POWDER ORAL at 08:27

## 2024-12-09 RX ADMIN — FAMOTIDINE 20 MG: 20 TABLET, FILM COATED ORAL at 08:27

## 2024-12-09 RX ADMIN — SUCRALFATE 1 G: 1 TABLET ORAL at 11:53

## 2024-12-09 RX ADMIN — FAMOTIDINE 20 MG: 20 TABLET, FILM COATED ORAL at 18:27

## 2024-12-09 RX ADMIN — PANTOPRAZOLE SODIUM 40 MG: 40 TABLET, DELAYED RELEASE ORAL at 15:48

## 2024-12-09 RX ADMIN — SUCRALFATE 1 G: 1 TABLET ORAL at 15:48

## 2024-12-09 RX ADMIN — IPRATROPIUM BROMIDE AND ALBUTEROL SULFATE 3 ML: .5; 3 SOLUTION RESPIRATORY (INHALATION) at 08:59

## 2024-12-09 RX ADMIN — FLUTICASONE FUROATE AND VILANTEROL TRIFENATATE 1 PUFF: 100; 25 POWDER RESPIRATORY (INHALATION) at 08:27

## 2024-12-09 RX ADMIN — IPRATROPIUM BROMIDE AND ALBUTEROL SULFATE 3 ML: .5; 3 SOLUTION RESPIRATORY (INHALATION) at 19:20

## 2024-12-09 RX ADMIN — SUCRALFATE 1 G: 1 TABLET ORAL at 06:02

## 2024-12-09 NOTE — PROGRESS NOTES
"Progress Note - Hospitalist   Name: Ileana Seaman 64 y.o. female I MRN: [id number]  Unit/Bed#: -01 I Date of Admission: 12/8/2024   Date of Service: 12/9/2024 I Hospital Day: 0    Assessment & Plan  Epigastric pain  Recurrent epigastric abdominal pain and diarrhea, admitted 12/6 and discharged 12/7 for same.  Returns to ED states pain returned at 0300 on 12/8.  Thoroughly evaluated by ED, found to have elevated liver enzymes and discussed with gastroenterology, abdominal ultrasound obtained with reassuring findings no liver abnormality  Normal lactic acid level  Patient refused to leave from the emergency department continuing to say she is having severe burning of her insides  ER physician obliged and requested observation admission   no further narcotic medications as this patient is suspected of drug-seeking behavior  Will continue PPI, Carafate, Pepcid outpatient regimen for abdominal pain  Avoid Tylenol due to elevated liver enzymes suspected secondary to MARIS treatment  Per ED note  \"patient with persistent recurrence of pain, requiring repeat medication dosing including IV narcotic medications. Recommendation made for attempt at oral pain medication. Patient expressed concerns about \"waking up in the middle of the night with my insides of burning, and if they admit me, they could figure out why that is happening.\"   Keep stool record at bedside, monitor episodes of diarrhea occurring in 24-hour  Patient has recent EGD done on November in this hospital, reviewed  Discussed the case with gastroenterology team, we will proceed with mesenteric arterial duplex-which will be done in the morning time  Gastroenterology also recommending MRCP to rule out any obstructions since patient is having abdominal pain recurrently -pending tests  Mycobacterial infection, non-TB  Hx MARIS infection and 3 drug regimen managed by ID (Dr. Landrum )  Azithromycin, rifabutin and ethambutol placed on hold due to elevated liver " enzymes  Will need outpatient follow-up for regimen adjustment  COPD (chronic obstructive pulmonary disease) (HCC)  No exacerbation  Continue PTA regimen  Atrial fibrillation (HCC)  Continue metoprolol  Not chronically anticoagulated  Chronic hypoxic respiratory failure (HCC)  Maintained on 2 L nasal cannula  Moderate protein-calorie malnutrition (HCC)  Malnutrition Findings: Loss of subcutaneous fat in orbital, triceps, ribcage areas.  Loss of muscle at temples, clavicles, scapula, extremities.  Consultation to dietitian       BMI Findings:     Body mass index is 18.71 kg/m².     Elevated liver enzymes  Suspect DILI  ALT/AST > 3 x nomal, T bili >2 x normal  Azithromycin, ethambutol and rifabutin on hold due to elevated liver enzymes   Will need to follow up with ID for treatment alteration - MARIS infection and 3 drug regimen managed by ID (Dr. Landrum)   Check Tylenol level and PT/INR for thoroughness  Repeat CMP in a.m.  Ultrasound RUQ - S/P cholecystectomy, liver imaging unrevealing of abnormality  Will need outpatient follow-up    VTE Pharmacologic Prophylaxis:    Anticoagulation remain on hold    Mobility:   Basic Mobility Inpatient Raw Score: 24  JH-HLM Goal: 8: Walk 250 feet or more  JH-HLM Achieved: 8: Walk 250 feet ot more  JH-HLM Goal achieved. Continue to encourage appropriate mobility.    Patient Centered Rounds: I performed bedside rounds with nursing staff today.   Discussions with Specialists or Other Care Team Provider: Gastroenterology    Education and Discussions with Family / Patient:  With patient.     Current Length of Stay: 0 day(s)  Current Patient Status: Observation   Certification Statement: The patient will continue to require additional inpatient hospital stay due to monitorable conditions  Discharge Plan: Anticipate discharge in 24-48 hrs to home.    Code Status: Level 1 - Full Code    Subjective   Seen and evaluated during the run.  Resting comfortably.  Denies any significant complaint.   Denies any nausea, vomiting.  Patient reports after getting discharged last time, went home, early morning she woke up with severe pain, and had 2 episodes of diarrhea nonbloody.    Objective :  Temp:  [97.1 °F (36.2 °C)-97.7 °F (36.5 °C)] 97.5 °F (36.4 °C)  HR:  [57-94] 62  BP: (104-152)/(58-78) 131/65  Resp:  [15-20] 20  SpO2:  [96 %-100 %] 98 %  O2 Device: Nasal cannula  Nasal Cannula O2 Flow Rate (L/min):  [2 L/min] 2 L/min    Body mass index is 18.71 kg/m².     Input and Output Summary (last 24 hours):     Intake/Output Summary (Last 24 hours) at 12/9/2024 1146  Last data filed at 12/9/2024 0801  Gross per 24 hour   Intake 240 ml   Output --   Net 240 ml       Physical Exam  Vitals and nursing note reviewed.   Constitutional:       Appearance: She is not ill-appearing or diaphoretic.   HENT:      Mouth/Throat:      Mouth: Mucous membranes are moist.      Pharynx: No oropharyngeal exudate or posterior oropharyngeal erythema.   Eyes:      General: No scleral icterus.        Left eye: No discharge.      Extraocular Movements: Extraocular movements intact.      Pupils: Pupils are equal, round, and reactive to light.   Cardiovascular:      Rate and Rhythm: Normal rate.      Heart sounds: No murmur heard.     No friction rub. No gallop.   Pulmonary:      Effort: Pulmonary effort is normal. No respiratory distress.      Breath sounds: No stridor. No wheezing or rhonchi.   Abdominal:      General: There is no distension.      Palpations: There is no mass.      Tenderness: There is no abdominal tenderness.      Hernia: No hernia is present.   Musculoskeletal:      Right lower leg: No edema.      Left lower leg: No edema.   Neurological:      General: No focal deficit present.      Mental Status: She is alert and oriented to person, place, and time.      Cranial Nerves: No cranial nerve deficit.      Sensory: No sensory deficit.      Motor: No weakness.      Coordination: Coordination normal.          Lines/Drains:              Lab Results: I have reviewed the following results:   Results from last 7 days   Lab Units 12/09/24  0440 12/08/24  1034   WBC Thousand/uL 5.46 9.26   HEMOGLOBIN g/dL 10.1* 13.0   HEMATOCRIT % 32.0* 39.7   PLATELETS Thousands/uL 156 193   SEGS PCT %  --  77*   LYMPHO PCT %  --  12*   MONO PCT %  --  10   EOS PCT %  --  1     Results from last 7 days   Lab Units 12/09/24  0440   SODIUM mmol/L 139   POTASSIUM mmol/L 3.7   CHLORIDE mmol/L 108   CO2 mmol/L 26   BUN mg/dL 3*   CREATININE mg/dL 0.44*   ANION GAP mmol/L 5   CALCIUM mg/dL 8.3*   ALBUMIN g/dL 3.1*   TOTAL BILIRUBIN mg/dL 2.37*   ALK PHOS U/L 161*   ALT U/L 160*   AST U/L 107*   GLUCOSE RANDOM mg/dL 84     Results from last 7 days   Lab Units 12/08/24  2319   INR  1.12             Results from last 7 days   Lab Units 12/08/24  1034 12/06/24  1310 12/06/24  1307   LACTIC ACID mmol/L 0.6  --  1.6   PROCALCITONIN ng/ml  --  <0.05  --        Recent Cultures (last 7 days):   Results from last 7 days   Lab Units 12/06/24  1310   BLOOD CULTURE  No Growth at 48 hrs.  No Growth at 48 hrs.       Imaging Results Review: No pertinent imaging studies reviewed.  Other Study Results Review: No additional pertinent studies reviewed.    Last 24 Hours Medication List:     Current Facility-Administered Medications:     albuterol inhalation solution 2.5 mg, Q4H PRN    famotidine (PEPCID) tablet 20 mg, BID    Fluticasone Furoate-Vilanterol 100-25 mcg/actuation 1 puff, Daily **AND** umeclidinium 62.5 mcg/actuation inhaler AEPB 1 puff, Daily    hydrOXYzine HCL (ATARAX) tablet 25 mg, Q6H PRN    ipratropium-albuterol (DUO-NEB) 0.5-2.5 mg/3 mL inhalation solution 3 mL, 4x Daily    metoprolol tartrate (LOPRESSOR) tablet 25 mg, Q12H MAYCO    ondansetron (ZOFRAN) injection 4 mg, Q8H PRN    pantoprazole (PROTONIX) EC tablet 40 mg, BID AC    sucralfate (CARAFATE) tablet 1 g, 4x Daily (AC & HS)    Administrative Statements   Today, Patient Was Seen By:  Rolando Parker MD      **Please Note: This note may have been constructed using a voice recognition system.**

## 2024-12-09 NOTE — ASSESSMENT & PLAN NOTE
Patient with chronic epigastric pain, worsened after eating, especially larger meals   EGD 11/8/24 unremarkable for source of pain   She was able to tolerate breakfast this AM with minimal pain    Unclear etiology at this time  Continue PPI, carafate and pepcid  Recommend mesenteric/celiac doppler US given hx of atherosclerotic disease  Consider outpatient GES and esophageal pH impedance testing   She is also due for a screening colonoscopy  Strongly encouraged smoking cessation   We will arrange outpatient GI follow up

## 2024-12-09 NOTE — H&P
"H&P - Hospitalist   Name: Ileana Seaman 64 y.o. female I MRN: [id number]  Unit/Bed#: [facility] I Date of Admission: 12/8/2024   Date of Service: 12/8/2024 I Hospital Day: 0     Assessment & Plan  Epigastric pain  Recurrent epigastric abdominal pain and diarrhea, admitted 12/6 and discharged 12/7 for same.  Returns to ED states pain returned at 0300 on 12/8.  Thoroughly evaluated by ED, found to have elevated liver enzymes and discussed with gastroenterology, abdominal ultrasound obtained with reassuring findings no liver abnormality  Normal lactic acid level  Patient refused to leave from the emergency department continuing to say she is having severe burning of her insides  ER physician obliged and requested observation admission  Will observe overnight, no further narcotic medications as this patient is suspected of drug-seeking behavior  Will continue PPI, Carafate, Pepcid outpatient regimen for abdominal pain  Avoid Tylenol due to elevated liver enzymes suspected secondary to MARIS treatment  Per ED note  \"patient with persistent recurrence of pain, requiring repeat medication dosing including IV narcotic medications. Recommendation made for attempt at oral pain medication. Patient expressed concerns about \"waking up in the middle of the night with my insides of burning, and if they admit me, they could figure out why that is happening.\"   Keep stool record at bedside, monitor episodes of diarrhea occurring in 24-hour  Protonix, Pepcid, Carafate for pain control  Avoid narcotics  Mycobacterial infection, non-TB  Hx MARIS infection and 3 drug regimen managed by ID (Dr. Landrum )  Azithromycin, rifabutin and ethambutol placed on hold due to elevated liver enzymes  Will need outpatient follow-up for regimen adjustment  COPD (chronic obstructive pulmonary disease) (HCC)  No exacerbation  Continue PTA regimen  Atrial fibrillation (HCC)  Continue metoprolol  Not chronically anticoagulated  Chronic hypoxic respiratory " failure (HCC)  Maintained on 2 L nasal cannula  Moderate protein-calorie malnutrition (HCC)  Malnutrition Findings: Loss of subcutaneous fat in orbital, triceps, ribcage areas.  Loss of muscle at temples, clavicles, scapula, extremities.  Consultation to dietitian       BMI Findings:     Body mass index is 18.33 kg/m².     Elevated liver enzymes  Suspect DILI  ALT/AST > 3 x nomal, T bili >2 x normal  Azithromycin, ethambutol and rifabutin on hold due to elevated liver enzymes   Will need to follow up with ID for treatment alteration - MARIS infection and 3 drug regimen managed by ID (Dr. Landrum)   Check Tylenol level and PT/INR for thoroughness  Repeat CMP in a.m.  Ultrasound RUQ - S/P cholecystectomy, liver imaging unrevealing of abnormality  Will need outpatient follow-up      VTE Pharmacologic Prophylaxis:   High Risk (Score >/= 5) - Pharmacological DVT Prophylaxis Ordered: enoxaparin (Lovenox). Sequential Compression Devices Ordered.  Code Status: Level 1 - Full Code   Discussion with family: Patient declined call to .     Anticipated Length of Stay: Patient will be admitted on an observation basis with an anticipated length of stay of less than 2 midnights secondary to intractable abdominal pain.    History of Present Illness   Chief Complaint: Abdominal pain    Ileana Seaman is a 64 y.o. female with a PMH of COPD, MARIS infection on triple antibiotic therapy followed by infectious disease, atrial fibrillation not maintained on anticoagulation who presents with intractable abdominal pain and diarrhea stools.  Patient originally presented to the ED on 12/6 and was admitted for intractable abdominal pain with diarrhea discharged home on 12/7 feeling improved.  Reports today again to the ED stating her pain returned at 3 AM and is intolerable.  Patient has had multiple doses of IV narcotics, thorough evaluation by the ED revealing elevated liver enzymes this was discussed with gastroenterology  on-call who recommended ultrasound of abdomen.  Ultrasound of abdomen obtained with reassuring findings of no liver abnormalities.  Patient refused to be discharged home from the hospital due to the burning on her insides and not wanting to wait until 12/18 to see GI in the outpatient setting.  Presented to the hospitalist service by the ED physicians for observation of intractable abdominal pain.      Review of Systems   Constitutional:  Negative for chills and fever.   HENT:  Negative for ear pain and sore throat.    Eyes:  Negative for pain and visual disturbance.   Respiratory:  Negative for cough and shortness of breath.    Cardiovascular:  Negative for chest pain and palpitations.   Gastrointestinal:  Positive for abdominal pain and diarrhea. Negative for vomiting.   Genitourinary:  Negative for dysuria and hematuria.   Musculoskeletal:  Negative for arthralgias and back pain.   Skin:  Negative for color change and rash.   Neurological:  Negative for seizures and syncope.   All other systems reviewed and are negative.      Historical Information   Past Medical History:   Diagnosis Date    COPD (chronic obstructive pulmonary disease) (HCC)     Hiatal hernia      Past Surgical History:   Procedure Laterality Date    CHOLECYSTECTOMY       Social History     Tobacco Use    Smoking status: Former     Current packs/day: 0.25     Types: Cigarettes    Smokeless tobacco: Never   Vaping Use    Vaping status: Never Used   Substance and Sexual Activity    Alcohol use: Never    Drug use: Never    Sexual activity: Not on file     E-Cigarette/Vaping    E-Cigarette Use Never User      E-Cigarette/Vaping Substances     History reviewed. No pertinent family history.  Social History:  Marital Status: Single   Patient Pre-hospital Living Situation: Home  Patient Pre-hospital Level of Mobility: walks  Patient Pre-hospital Diet Restrictions: None    Meds/Allergies   I have reviewed home medications with patient personally.  Prior  to Admission medications    Medication Sig Start Date End Date Taking? Authorizing Provider   azithromycin (ZITHROMAX) 500 MG tablet Take 250 mg by mouth daily    Historical Provider, MD   docusate sodium (COLACE) 100 mg capsule Take 100 mg by mouth 2 (two) times a day 11/13/24   Historical Provider, MD   ethambutol (MYAMBUTOL) 400 mg tablet Take 800 mg by mouth daily    Historical Provider, MD   famotidine (PEPCID) 20 mg tablet Take 1 tablet (20 mg total) by mouth 2 (two) times a day 12/6/24   Carlos Turcios PA-C   fluticasone-umeclidinium-vilanterol (Trelegy Ellipta) 100-62.5-25 mcg/actuation inhaler Inhale 1 puff daily Rinse mouth after use.    Historical Provider, MD   hydrOXYzine HCL (ATARAX) 25 mg tablet Take 1 tablet (25 mg total) by mouth if needed for itching for up to 5 days 11/23/24 11/28/24  Mary Altamirano MD   ipratropium-albuterol (DUO-NEB) 0.5-2.5 mg/3 mL nebulizer solution Take 3 mL by nebulization 4 (four) times a day 11/23/24   Mary Altamirano MD   lidocaine (LIDODERM) 5 % Apply 1 patch topically over 12 hours daily for 4 days Remove & Discard patch within 12 hours or as directed by MD 10/10/24 10/14/24  Juana Santana MD   metoprolol tartrate (LOPRESSOR) 25 mg tablet Take 1 tablet (25 mg total) by mouth every 12 (twelve) hours 11/23/24   Mary Altamirano MD   nicotine (NICODERM CQ) 14 mg/24hr TD 24 hr patch Place 1 patch on the skin over 24 hours daily 10/10/24   Juana Santana MD   omeprazole (PriLOSEC) 40 MG capsule Take 1 capsule (40 mg total) by mouth 2 (two) times a day 30 minutes before meal 11/8/24 12/8/24  Rolando Parker MD   ondansetron (ZOFRAN) 4 mg tablet Take 1 tablet (4 mg total) by mouth every 6 (six) hours 12/6/24   Carlos Turcios PA-C   rifabutin (MYCOBUTIN) 150 mg capsule Take 300 mg by mouth daily    Historical Provider, MD   sucralfate (CARAFATE) 1 g tablet Take 1 tablet (1 g total) by mouth 4 (four) times a day for 14 days 12/6/24 12/20/24  Carlos Bennett  SUE Turcios     No Known Allergies    Objective :  Temp:  [99 °F (37.2 °C)] 99 °F (37.2 °C)  HR:  [] 64  BP: (104-157)/(58-96) 104/74  Resp:  [15-18] 16  SpO2:  [96 %-100 %] 96 %  O2 Device: Nasal cannula  Nasal Cannula O2 Flow Rate (L/min):  [2 L/min] 2 L/min    Physical Exam  Vitals and nursing note reviewed.   Constitutional:       General: She is not in acute distress.     Appearance: She is underweight.   HENT:      Head: Normocephalic and atraumatic.   Eyes:      Conjunctiva/sclera: Conjunctivae normal.   Cardiovascular:      Rate and Rhythm: Normal rate and regular rhythm.      Heart sounds: No murmur heard.  Pulmonary:      Effort: Pulmonary effort is normal. No respiratory distress.      Breath sounds: Wheezing and rhonchi present.   Abdominal:      Palpations: Abdomen is soft.      Tenderness: There is no abdominal tenderness.   Musculoskeletal:         General: No swelling.      Cervical back: Neck supple.   Skin:     General: Skin is warm and dry.      Capillary Refill: Capillary refill takes less than 2 seconds.   Neurological:      General: No focal deficit present.      Mental Status: She is alert and oriented to person, place, and time.   Psychiatric:         Mood and Affect: Mood normal.         Behavior: Behavior normal.          Lab Results: I have reviewed the following results:  Results from last 7 days   Lab Units 12/08/24  1034   WBC Thousand/uL 9.26   HEMOGLOBIN g/dL 13.0   HEMATOCRIT % 39.7   PLATELETS Thousands/uL 193   SEGS PCT % 77*   LYMPHO PCT % 12*   MONO PCT % 10   EOS PCT % 1     Results from last 7 days   Lab Units 12/08/24  1034   SODIUM mmol/L 136   POTASSIUM mmol/L 3.6   CHLORIDE mmol/L 100   CO2 mmol/L 30   BUN mg/dL 4*   CREATININE mg/dL 0.50*   ANION GAP mmol/L 6   CALCIUM mg/dL 9.0   ALBUMIN g/dL 4.1   TOTAL BILIRUBIN mg/dL 2.34*   ALK PHOS U/L 210*   ALT U/L 234*   AST U/L 269*   GLUCOSE RANDOM mg/dL 140             Lab Results   Component Value Date    HGBA1C 6.5 (H)  11/13/2024    HGBA1C 6.4 (H) 10/04/2024    HGBA1C 5.9 (H) 05/22/2023     Results from last 7 days   Lab Units 12/08/24  1034 12/06/24  1310 12/06/24  1307   LACTIC ACID mmol/L 0.6  --  1.6   PROCALCITONIN ng/ml  --  <0.05  --        Imaging Results Review: I reviewed radiology reports from this admission including: Ultrasound(s).  Other Study Results Review: No additional pertinent studies reviewed.      ** Please Note: This note has been constructed using a voice recognition system. **

## 2024-12-09 NOTE — ASSESSMENT & PLAN NOTE
"Patient refused to leave from the emergency department continuing to say she is having severe burning of her insides  Admitted for same 12/6, DC'd 12/7.  Reports pain returned at 3 AM on 12/8 and is intolerable  ED physicians requesting observation for intractable abdominal pain despite multiple doses of IV fentanyl  Will observe overnight, no further narcotic medications as this patient is suspected of drug-seeking behavior  Will continue PPI, Carafate, Pepcid outpatient regimen for abdominal pain  Avoid Tylenol due to elevated liver enzymes suspected secondary to MARIS treatment  Per ED note  \"patient with persistent recurrence of pain, requiring repeat medication dosing including IV narcotic medications. Recommendation made for attempt at oral pain medication. Patient expressed concerns about \"waking up in the middle of the night with my insides of burning, and if they admit me, they could figure out why that is happening.\"   "

## 2024-12-09 NOTE — ASSESSMENT & PLAN NOTE
Hx MARIS infection and 3 drug regimen managed by ID (Dr. Ladnrum )  Azithromycin, rifabutin and ethambutol placed on hold due to elevated liver enzymes  Will need outpatient follow-up for regimen adjustment

## 2024-12-09 NOTE — ASSESSMENT & PLAN NOTE
Noted on admission to have mildly elevated AST/ALT, alk phos and t bili  RUQ US with evidence of CCY, otherwise unremarkable  Acetaminophen level <2  Labs improving this AM   Continue to monitor daily CMP

## 2024-12-09 NOTE — UTILIZATION REVIEW
"Initial Clinical Review    WAS OBSERVATION 12/8/24 @ 1930 CONVERTED TO INPATIENT ADMISSION 12/9/24 @ 1332 DUE TO CONTINUED STAY REQUIRED TO CARE FOR PATIENT WITH DX: EPIGASTRIC PAIN.    Admission: Date/Time/Statement:   Admission Orders (From admission, onward)       Ordered        12/09/24 1332  INPATIENT ADMISSION  Once            12/08/24 1930  Place in Observation  Once                          Orders Placed This Encounter   Procedures    INPATIENT ADMISSION     Standing Status:   Standing     Number of Occurrences:   1     Level of Care:   Med Surg [16]     Estimated length of stay:   More than 2 Midnights     Certification:   I certify that inpatient services are medically necessary for this patient for a duration of greater than two midnights. See H&P and MD Progress Notes for additional information about the patient's course of treatment.     ED Arrival Information       Expected   -    Arrival   12/8/2024 09:08    Acuity   Urgent              Means of arrival   Wheelchair    Escorted by   Family Member    Service   Hospitalist    Admission type   Emergency              Arrival complaint   Abdominal Pain / Diarrhea (released from GS yesterday)             Chief Complaint   Patient presents with    Abdominal Pain     Patient presents to the ED with complaints of diarrhea and \"insides that are on fire\" since 0300. Patient was recently evaluated for the same.        Initial Presentation: 64 y.o. female to EMS via WC from home  Present to ED with intractable abdominal pain and diarrhea stools.   PMHX COPD, MARIS infection on triple antibiotic therapy followed by infectious disease, atrial fibrillation not maintained on anticoagulation   Admitted to OBS with DX: Epigastric pain   on exam: T 99; tachy; hypertensive; lungs with wheezing and rhonchi; abdominal tenderness; pain 10/10; Cr 0.50; T bili 2.34; ;   PLAN: serial abdominal exams; pain / nausea control (see below); monitor labs; stool record at " bedside; Will continue PPI, Carafate, Pepcid       Anticipated Length of Stay/Certification Statement: Patient will be admitted on an observation basis with an anticipated length of stay of less than 2 midnights secondary to intractable abdominal pain.       Date: 12/9/24 - CHANGED TO INPATIENT   Pain 3-6/10; Cr 0.44; Albumin 31.; T bili 2.37; ;   Plan: serial abdominal exams; pain / nausea control (see below); start DuoNebs; monitor labs; stool record at bedside; Will continue PPI, Carafate, Pepcid; f/u mesenteric arterial duplex;  Gastroenterology also recommending MRCP to rule out any obstructions since patient is having abdominal pain recurrently       GI CONSULT   Epigastric pain: Patient with chronic epigastric pain, worsened after eating, especially larger meals.   EGD 11/8/24 unremarkable for source of pain. She was able to tolerate breakfast this AM with minimal pain.    Unclear etiology at this time  Elevated liver enzymes: Noted on admission to have mildly elevated AST/ALT, alk phos and t bili. RUQ US with evidence of CCY, otherwise unremarkable. Acetaminophen level <2. Labs improving this AM   Recommendation: Continue PPI, carafate and pepcid. Recommend mesenteric/celiac doppler US given hx of atherosclerotic disease      ED Treatment-Medication Administration from 12/08/2024 0907 to 12/08/2024 2024         Date/Time Order Dose Route Action     12/08/2024 1035 sodium chloride 0.9 % bolus 1,000 mL 1,000 mL Intravenous New Bag     12/08/2024 1041 pantoprazole (PROTONIX) injection 40 mg 40 mg Intravenous Given     12/08/2024 1040 Famotidine (PF) (PEPCID) injection 20 mg 20 mg Intravenous Given     12/08/2024 1040 ondansetron (ZOFRAN) injection 4 mg 4 mg Intravenous Given     12/08/2024 1040 sucralfate (CARAFATE) tablet 1 g 1 g Oral Given     12/08/2024 1041 fentaNYL injection 25 mcg 25 mcg Intravenous Given     12/08/2024 1452 sodium chloride 0.9 % bolus 1,000 mL 1,000 mL Intravenous New Bag      12/08/2024 1451 dicyclomine (BENTYL) tablet 20 mg 20 mg Oral Given     12/08/2024 1451 fentaNYL injection 25 mcg 25 mcg Intravenous Given     12/08/2024 1759 oxyCODONE (ROXICODONE) IR tablet 5 mg 5 mg Oral Given            Scheduled Medications:  famotidine, 20 mg, Oral, BID  Fluticasone Furoate-Vilanterol, 1 puff, Inhalation, Daily   And  umeclidinium, 1 puff, Inhalation, Daily  ipratropium-albuterol, 3 mL, Nebulization, 4x Daily  metoprolol tartrate, 25 mg, Oral, Q12H MAYCO  pantoprazole, 40 mg, Oral, BID AC  sucralfate, 1 g, Oral, 4x Daily (AC & HS)      Continuous IV Infusions: none       PRN Meds:  albuterol, 2.5 mg, Nebulization, Q4H PRN  hydrOXYzine HCL, 25 mg, Oral, Q6H PRN  ondansetron, 4 mg, Intravenous, Q8H PRN (12/9 recd x1 so far today)      ED Triage Vitals [12/08/24 0923]   Temperature Pulse Respirations Blood Pressure SpO2 Pain Score   99 °F (37.2 °C) 105 18 134/63 96 % 10 - Worst Possible Pain     Weight (last 2 days)       Date/Time Weight    12/08/24 2041 44.9 (99)    12/08/24 0923 44 (97)            Vital Signs (last 3 days)       Date/Time Temp Pulse Resp BP MAP (mmHg) SpO2 Calculated FIO2 (%) - Nasal Cannula Nasal Cannula O2 Flow Rate (L/min) O2 Device Patient Position - Orthostatic VS Pain    12/09/24 15:17:47 97.3 °F (36.3 °C) -- 18 112/57 75 -- -- -- -- -- --    12/09/24 1250 -- -- -- -- -- 96 % 28 2 L/min Nasal cannula -- --    12/09/24 0900 -- -- -- -- -- 98 % 28 2 L/min Nasal cannula -- --    12/09/24 07:49:37 97.5 °F (36.4 °C) 62 20 131/65 87 97 % 28 2 L/min Nasal cannula -- 3    12/09/24 0354 -- -- -- -- -- 97 % 28 2 L/min Nasal cannula -- 6    12/08/24 23:11:57 97.7 °F (36.5 °C) 57 20 112/58 76 98 % 28 2 L/min Nasal cannula Lying --    12/08/24 2100 97.1 °F (36.2 °C) 65 16 109/70 83 98 % 28 2 L/min Nasal cannula Lying --    12/08/24 2055 -- -- -- -- -- -- -- -- -- -- 5    12/08/24 20:46:12 -- -- 16 109/70 83 -- -- -- -- -- --    12/08/24 2000 -- 64 16 104/74 82 96 % 28 2 L/min Nasal  cannula Lying --    12/08/24 1925 -- -- -- -- -- -- -- -- Nasal cannula -- --    12/08/24 1900 -- 59 16 106/62 77 97 % 28 2 L/min Nasal cannula Lying --    12/08/24 1800 -- 73 18 126/62 86 98 % 28 2 L/min Nasal cannula Lying --    12/08/24 1759 -- -- -- -- -- -- -- -- -- -- 8    12/08/24 1730 -- 76 18 127/61 87 98 % -- -- -- -- --    12/08/24 1700 -- 90 16 152/74 106 98 % 28 2 L/min Nasal cannula Lying --    12/08/24 1600 -- 67 15 116/58 81 100 % 28 2 L/min Nasal cannula Lying --    12/08/24 1451 -- -- -- -- -- -- -- -- -- -- 10 - Worst Possible Pain    12/08/24 1430 -- 94 16 137/72 99 96 % 28 2 L/min Nasal cannula Lying --    12/08/24 1330 -- 90 17 126/73 93 98 % 28 2 L/min Nasal cannula Lying --    12/08/24 1300 -- 89 16 133/62 89 98 % 28 2 L/min Nasal cannula -- --    12/08/24 1200 -- 94 17 135/78 101 98 % 28 2 L/min Nasal cannula Sitting --    12/08/24 1105 -- -- -- -- -- -- -- -- -- -- 8    12/08/24 1100 -- 81 16 123/66 88 97 % 28 2 L/min Nasal cannula -- --    12/08/24 1045 -- 96 18 157/96 121 98 % -- -- None (Room air) Lying --    12/08/24 1041 -- -- -- -- -- -- -- -- -- -- 10 - Worst Possible Pain    12/08/24 0923 99 °F (37.2 °C) 105 18 134/63 -- 96 % -- -- None (Room air) Sitting 10 - Worst Possible Pain              Pertinent Labs/Diagnostic Test Results:   Radiology:  US right upper quadrant   Final Interpretation by Teresa Gomez MD (12/08 5095)      Status post cholecystectomy. Otherwise, unremarkable right upper quadrant ultrasound.      Workstation performed: [id number]         VAS CELIAC AND/OR MESENTERIC DUPLEX    (Results Pending)   MRI abdomen wo contrast and mrcp    (Results Pending)       Results from last 7 days   Lab Units 12/09/24  0440 12/08/24  1034 12/07/24  0607 12/06/24  1307   WBC Thousand/uL 5.46 9.26 9.06 11.77*   HEMOGLOBIN g/dL 10.1* 13.0 10.5* 13.1   HEMATOCRIT % 32.0* 39.7 34.2* 40.2   PLATELETS Thousands/uL 156 193 179 222   TOTAL NEUT ABS Thousands/µL  --  7.17  --  7.78*         Results from last 7 days   Lab Units 12/09/24  0440 12/08/24  1034 12/07/24  0607 12/06/24  1307   SODIUM mmol/L 139 136 139 135   POTASSIUM mmol/L 3.7 3.6 3.9 3.9   CHLORIDE mmol/L 108 100 109* 98   CO2 mmol/L 26 30 25 29   ANION GAP mmol/L 5 6 5 8   BUN mg/dL 3* 4* 2* 3*   CREATININE mg/dL 0.44* 0.50* 0.57* 0.56*   EGFR ml/min/1.73sq m 105 101 96 97   CALCIUM mg/dL 8.3* 9.0 8.0* 9.3   MAGNESIUM mg/dL  --  1.9  --  2.0     Results from last 7 days   Lab Units 12/09/24  0440 12/08/24  1034 12/06/24  1307   AST U/L 107* 269* 14   ALT U/L 160* 234* 17   ALK PHOS U/L 161* 210* 92   TOTAL PROTEIN g/dL 5.0* 6.8 6.7   ALBUMIN g/dL 3.1* 4.1 4.1   TOTAL BILIRUBIN mg/dL 2.37* 2.34* 0.45   AMMONIA umol/L 37  --   --         Results from last 7 days   Lab Units 12/09/24  0440 12/08/24  1034 12/07/24  0607 12/06/24  1307   GLUCOSE RANDOM mg/dL 84 140 92 117        Results from last 7 days   Lab Units 12/08/24  2319   PROTIME seconds 14.8   INR  1.12        Results from last 7 days   Lab Units 12/06/24  1310   PROCALCITONIN ng/ml <0.05     Results from last 7 days   Lab Units 12/08/24  1034 12/06/24  1307   LACTIC ACID mmol/L 0.6 1.6        Results from last 7 days   Lab Units 12/08/24  1034 12/06/24  1307   LIPASE u/L 28 16        Results from last 7 days   Lab Units 12/08/24  1108 12/06/24  1413   CLARITY UA  Clear Clear   COLOR UA  Yellow Yellow   SPEC GRAV UA  1.010 <=1.005   PH UA  6.5 7.0   GLUCOSE UA mg/dl Negative Negative   KETONES UA mg/dl Negative Negative   BLOOD UA  Negative Negative   PROTEIN UA mg/dl Negative Negative   NITRITE UA  Negative Negative   BILIRUBIN UA  Negative Negative   UROBILINOGEN UA E.U./dl 0.2 0.2   LEUKOCYTES UA  Negative Negative        Results from last 7 days   Lab Units 12/08/24  2319   ACETAMINOPHEN LVL ug/mL <2*        Results from last 7 days   Lab Units 12/06/24  1310   BLOOD CULTURE  No Growth at 48 hrs.  No Growth at 48 hrs.          Past Medical History:   Diagnosis Date     COPD (chronic obstructive pulmonary disease) (HCC)     Hiatal hernia      Present on Admission:   Atrial fibrillation (HCC)   Chronic hypoxic respiratory failure (HCC)   COPD (chronic obstructive pulmonary disease) (HCC)   Moderate protein-calorie malnutrition (HCC)   Epigastric pain   Mycobacterial infection, non-TB      Admitting Diagnosis: Abdominal pain [R10.9]  Abnormal LFTs [R79.89]  Pain of upper abdomen [R10.10]  Gastritis without bleeding, unspecified chronicity, unspecified gastritis type [K29.70]  Age/Sex: 64 y.o. female    Network Utilization Review Department  ATTENTION: Please call with any questions or concerns to [phone] and carefully listen to the prompts so that you are directed to the right person. All voicemails are confidential.   For Discharge needs, contact Care Management DC Support Team at [phone] opt. [phone]  Send all requests for admission clinical reviews, approved or denied determinations and any other requests to dedicated fax number below belonging to the campus where the patient is receiving treatment. List of dedicated fax numbers for the Facilities:  FACILITY NAME UR FAX NUMBER   ADMISSION DENIALS (Administrative/Medical Necessity) [phone]   DISCHARGE SUPPORT TEAM (NETWORK) [phone]   PARENT CHILD HEALTH (Maternity/NICU/Pediatrics) [phone]   [facility] [phone]   [facility] [phone]   [facility] [phone]   [facility] [phone]   [facility] [phone]   [facility] [phone]   [facility] [phone]   [facility] [phone]   [facility] [phone]   [facility] [phone]   [facility] [phone]   [facility]  [facility] [phone]

## 2024-12-09 NOTE — CONSULTS
Consultation - Gastroenterology   Name: Ileana Seaman 64 y.o. female I MRN: [id number]  Unit/Bed#: -01 I Date of Admission: 12/8/2024   Date of Service: 12/9/2024 I Hospital Day: 0   Inpatient consult to gastroenterology  Consult performed by: Hali Gayle PA-C  Consult ordered by: Rolando Parker MD      Physician Requesting Evaluation: Rolando Parker MD   Reason for Evaluation / Principal Problem:   Assessment & Plan  Epigastric pain  Patient with chronic epigastric pain, worsened after eating, especially larger meals   EGD 11/8/24 unremarkable for source of pain   She was able to tolerate breakfast this AM with minimal pain    Unclear etiology at this time  Continue PPI, carafate and pepcid  Recommend mesenteric/celiac doppler US given hx of atherosclerotic disease  Consider outpatient GES and esophageal pH impedance testing   She is also due for a screening colonoscopy  Strongly encouraged smoking cessation   We will arrange outpatient GI follow up  Elevated liver enzymes  Noted on admission to have mildly elevated AST/ALT, alk phos and t bili  RUQ US with evidence of CCY, otherwise unremarkable  Acetaminophen level <2  Labs improving this AM   Continue to monitor daily CMP    HPI: Ileana Seaman is a 64 y.o. year old female with a PMHx COPD on 2L O2 at baseline, MAC, Afib not on anticoagulation, HH, GERD, hx of CCY who presents with recurrent epigastric pain.      Patient admitted this past weekend at [facility] 12/6/24 - 12/7/24 with nausea, vomiting and diarrhea x 3 days. CT AP with fluid contents throughout entire colon, compatible with diarrheal state. She was given IVF and was able to tolerate a diet. Her diarrhea resolved and she was discharged home.     Once home, patient reports she ate a [place] cheeseburger and french fries for dinner and then awoke with severe epigastric at 3am, prompting her return to the ED yesterday.     In the ED, labs with elevated , , alk phos 210,  "t bili 2.34. RUQ US with evidence of prior CCY, otherwise normal. She was admitted with epigastric pain for pain management and GI has been consulted for further management. Per H&P, patient refusing to be discharged from the ED due to \"burning of her insides\" and being unable to wait until her outpatient GI appointment next week on 12/18.    Of note, patient admitted at [facility] 11/7/24 - 11/8/24 for epigastric pain. She underwent an EGD on 11/8/24 with small type 1 hiatal hernia and mild gastritis s/p bx (neg H pylori). It was recommended she continue PPI daily, avoid NSAID, smoking cessation. Consider outpatient esophageal pH impedance testing.     She reports the pain typically starts 30 min after eating and can last for an hour or so. Sometimes the pain improves with having a BM. Anti acid medications seem to help as well. She does note she chronically has some degree of epigastric pain at all times, but eating worsens it. This all started about 1 month ago, prior to that, no issues. She was able to tolerate eggs and toast this AM with minimal pain.     Active smoker, 1/2 PPD for 50+ years, chronically on 2L O2   No prior colonoscopy    Past Medical History:   Diagnosis Date    COPD (chronic obstructive pulmonary disease) (HCC)     Hiatal hernia      Past Surgical History:   Procedure Laterality Date    CHOLECYSTECTOMY       Social History   Social History     Substance and Sexual Activity   Alcohol Use Never     Social History     Substance and Sexual Activity   Drug Use Never     Social History     Tobacco Use   Smoking Status Former    Current packs/day: 0.25    Types: Cigarettes   Smokeless Tobacco Never       History reviewed. No pertinent family history.      Medications Prior to Admission:     azithromycin (ZITHROMAX) 500 MG tablet    docusate sodium (COLACE) 100 mg capsule    ethambutol (MYAMBUTOL) 400 mg tablet    famotidine (PEPCID) 20 mg tablet    fluticasone-umeclidinium-vilanterol (Trelegy Ellipta) " 100-62.5-25 mcg/actuation inhaler    metoprolol tartrate (LOPRESSOR) 25 mg tablet    omeprazole (PriLOSEC) 40 MG capsule    ondansetron (ZOFRAN) 4 mg tablet    rifabutin (MYCOBUTIN) 150 mg capsule    sucralfate (CARAFATE) 1 g tablet    hydrOXYzine HCL (ATARAX) 25 mg tablet    ipratropium-albuterol (DUO-NEB) 0.5-2.5 mg/3 mL nebulizer solution    lidocaine (LIDODERM) 5 %    nicotine (NICODERM CQ) 14 mg/24hr TD 24 hr patch    Current Facility-Administered Medications:     albuterol inhalation solution 2.5 mg, Q4H PRN    famotidine (PEPCID) tablet 20 mg, BID    Fluticasone Furoate-Vilanterol 100-25 mcg/actuation 1 puff, Daily **AND** umeclidinium 62.5 mcg/actuation inhaler AEPB 1 puff, Daily    hydrOXYzine HCL (ATARAX) tablet 25 mg, Q6H PRN    ipratropium-albuterol (DUO-NEB) 0.5-2.5 mg/3 mL inhalation solution 3 mL, 4x Daily    metoprolol tartrate (LOPRESSOR) tablet 25 mg, Q12H MAYCO    ondansetron (ZOFRAN) injection 4 mg, Q8H PRN    pantoprazole (PROTONIX) EC tablet 40 mg, BID AC    sucralfate (CARAFATE) tablet 1 g, 4x Daily (AC & HS)  No Known Allergies    Physical Exam  Constitutional:       General: She is not in acute distress.     Appearance: She is not ill-appearing.      Interventions: Nasal cannula in place.   HENT:      Head: Normocephalic and atraumatic.      Mouth/Throat:      Mouth: Mucous membranes are moist.   Eyes:      General: No scleral icterus.  Pulmonary:      Effort: Pulmonary effort is normal. No respiratory distress.   Abdominal:      General: Abdomen is flat. There is no distension.      Palpations: Abdomen is soft.      Tenderness: There is no abdominal tenderness. There is no guarding.   Skin:     General: Skin is warm and dry.      Coloration: Skin is not jaundiced.   Neurological:      Mental Status: She is alert.       Most Recent Vital Signs:  Vitals:    12/08/24 2311 12/09/24 0354 12/09/24 0749 12/09/24 0900   BP: 112/58  131/65    BP Location: Right arm      Pulse: 57  62    Resp: 20  20     Temp: 97.7 °F (36.5 °C)  97.5 °F (36.4 °C)    TempSrc: Temporal      SpO2: 98% 97% 97% 98%   Weight:       Height:           Intake/Output Summary (Last 24 hours) at 12/9/2024 0948  Last data filed at 12/9/2024 0801  Gross per 24 hour   Intake 240 ml   Output --   Net 240 ml       LABS/IMAGING  Lab Results: I have reviewed all relevant lab results during this hospitalization.    Imaging Studies:  I have reviewed all the relevant images during this hospitalizations    Counseling / Coordination of Care  Total time spent today 45 minutes. Greater than 50% of total time was spent with the patient and / or family counseling and / or coordination of care.    Hali Gayle PA-C

## 2024-12-09 NOTE — ASSESSMENT & PLAN NOTE
Patient was given this information prior to leaving the office / via phone conversation - voiced understanding  2. Spoke to Amando in [address]: 3/10/2023 @ 9:00AM  >  You will need to arrive at 8:30AM (from home or from nursing home) and check in at the [address] desk.       > Spoke to Patient yesterday around 3PM, patient is not sure if he can keep that appointment. He wanted to reschedule but changed his mind due a work meeting not being confirmed yet. He isn't sure if he wants to keep the appointment. Would like someone that schedules procedures to call him. Suspect DILI  ALT/AST > 3 x nomal, T bili >2 x normal  Azithromycin, ethambutol and rifabutin on hold due to elevated liver enzymes   Will need to follow up with ID for treatment alteration - MARIS infection and 3 drug regimen managed by ID (Dr. Landrum)   Check Tylenol level and PT/INR for thoroughness  Repeat CMP in a.m.  Ultrasound RUQ - S/P cholecystectomy, liver imaging unrevealing of abnormality  Will need outpatient follow-up

## 2024-12-09 NOTE — ASSESSMENT & PLAN NOTE
Malnutrition Findings: Loss of subcutaneous fat in orbital, triceps, ribcage areas.  Loss of muscle at temples, clavicles, scapula, extremities.  Consultation to dietitian       BMI Findings:     Body mass index is 18.71 kg/m².

## 2024-12-09 NOTE — ASSESSMENT & PLAN NOTE
Suspect DILI  ALT/AST > 3 x nomal, T bili >2 x normal  Azithromycin, ethambutol and rifabutin on hold due to elevated liver enzymes   Will need to follow up with ID for treatment alteration - MARIS infection and 3 drug regimen managed by ID (Dr. Landrum)   Check Tylenol level and PT/INR for thoroughness  Repeat CMP in a.m.  Ultrasound RUQ - S/P cholecystectomy, liver imaging unrevealing of abnormality  Will need outpatient follow-up

## 2024-12-09 NOTE — PLAN OF CARE

## 2024-12-09 NOTE — ASSESSMENT & PLAN NOTE
Malnutrition Findings: Loss of subcutaneous fat in orbital, triceps, ribcage areas.  Loss of muscle at temples, clavicles, scapula, extremities.  Consultation to dietitian       BMI Findings:     Body mass index is 18.33 kg/m².

## 2024-12-09 NOTE — CASE MANAGEMENT
Case Management Assessment & Discharge Planning Note    Patient name Ileana Seaman  Location /-01 MRN [id number]  : 1960 Date 2024       Current Admission Date: 2024  Current Admission Diagnosis:Epigastric pain   Patient Active Problem List    Diagnosis Date Noted Date Diagnosed    Elevated liver enzymes 2024     Pulmonary nodule 2024     Nausea vomiting and diarrhea 2024     Moderate protein-calorie malnutrition (HCC) 2024     COPD (chronic obstructive pulmonary disease) (HCC) 2024     Atrial fibrillation (HCC) 2024     Chronic hypoxic respiratory failure (HCC) 2024     Epigastric pain 2024     New onset atrial fibrillation (HCC) 10/06/2024     Type 2 diabetes mellitus without complication, without long-term current use of insulin (HCC) 10/05/2024     Dysphagia 10/05/2024     Chronic obstructive pulmonary disease with acute lower respiratory infection (HCC) 10/04/2024     Mycobacterial infection, 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 - [place], [place] - [address]  [address]  [place] [address]  Phone: [phone] Fax: [phone]    Primary Care Provider: Merline Dinero    Primary Insurance: MEDICARE  Secondary Insurance: [facility]    ASSESSMENT:  Active Health Care Proxies    There are no active Health Care Proxies on file.       Advance Directives  Does patient have a Health Care POA?: No  Was patient offered paperwork?: Yes  Does patient currently have a Health Care decision maker?: Yes, please see Health Care Proxy section  Does patient have Advance Directives?: No  Was  patient offered paperwork?: Yes  Primary Contact: Ayde (Daughter)         Readmission Root Cause  30 Day Readmission: Yes  During your hospital stay, did someone (provider, nurse, ) explain your care to you in a way you could understand?: Yes  Did you feel medically stable to leave the hospital?: Yes  Were you able to pay for your medication at the pharmacy?: Yes  Did you have reliable transportation to take you to your appointments?: Yes  During previous admission, was a post-acute recommendation made?: No  Patient was readmitted due to: stomach pains  Action Plan: O2, PPI, Carafate, Pepcid outpatient regimen, GI consult    Patient Information  Admitted from:: Home  Mental Status: Alert  During Assessment patient was accompanied by: Not accompanied during assessment  Assessment information provided by:: Patient  Primary Caregiver: Self  Support Systems: Children  [facility]: [facility]  What [facility] do you live in?: [place]  Home entry access options. Select all that apply.: Stairs  Number of steps to enter home.: 2  Do the steps have railings?: Yes  Type of Current Residence: [address] [place] [address]  Upon entering residence, is there a bedroom on the main floor (no further steps)?: No  A bedroom is located on the following floor levels of residence (select all that apply):: 2nd Floor  Upon entering residence, is there a bathroom on the main floor (no further steps)?: No  Indicate which floors of current residence have a bathroom (select all the apply):: 2nd Floor  Number of steps to 2nd floor from main floor: One Flight  Living Arrangements: Lives Alone  Is patient a ?: Yes ([employer])  Is patient active with [facility] [facility]: No (Patient missing form she needs () to get records to get support)    Activities of Daily Living Prior to Admission  Functional Status: Independent  Completes ADLs independently?: Yes  Ambulates independently?: Yes  Does patient use assisted devices?:  Yes  Assisted Devices (DME) used: Home Oxygen concentrator, Portable Oxygen tanks  DME Company Name (respiratory supplies): [employer] - looking to switch to [employer] in January when insurance changes to pursue POC.  O2 Rate(s): 2 L 24/7  Does patient currently own DME?: Yes  What DME does the patient currently own?: Home Oxygen concentrator, Portable Oxygen tanks  Does patient have a history of Outpatient Therapy (PT/OT)?: No  Does the patient have a history of Short-Term Rehab?: No  Does patient have a history of HHC?: No  Does patient currently have HHC?: No         Patient Information Continued  Income Source: SSI/SSD  Does patient have prescription coverage?: Yes  Does patient receive dialysis treatments?: No  Does patient have a history of Mental Health Diagnosis?: No         Means of Transportation  Means of Transport to Appts:: Family transport          DISCHARGE DETAILS:    Discharge planning discussed with:: patient  Freedom of Choice: Yes  Comments - Freedom of Choice: no therapy needs, per request provided information on transportation and POA/AD paperwork  CM contacted family/caregiver?: No- see comments (declined)  Were Treatment Team discharge recommendations reviewed with patient/caregiver?: Yes  Did patient/caregiver verbalize understanding of patient care needs?: Yes  Were patient/caregiver advised of the risks associated with not following Treatment Team discharge recommendations?: Yes         Requested Home Health Care         Is the patient interested in HHC at discharge?: No    DME Referral Provided  Referral made for DME?: No    Other Referral/Resources/Interventions Provided:  Interventions: Advanced Directives, Transportation, FindHelp    Would you like to participate in our [facility] service program?  : No - Declined    Treatment Team Recommendation: Home  Discharge Destination Plan:: Home  Transport at Discharge : Family          CM met with patient at the bedside,baseline information   was obtained. CM discussed the role of CM in helping the patient develop a discharge plan and assist the patient in carry out their plan.  Patient was IPTA, uses no DME at baseline for ambulation, and has no hx of rehab/therapy. Patient does have O2 through [employer] for 2L continuous O2 - patient looking to switch to [employer] early next year to pursue POC.   CM reviewed CM consult for OP resources - patient requested POA/AD paperwork and transportation resources. CM provided STS application, shared ride program information and transportation resources via  [employer] help. Patient was thankful for information.   CM discussed discharge planning - at this time patient has no anticipated CM needs.       CM to follow patient's care and discharge needs.

## 2024-12-09 NOTE — ASSESSMENT & PLAN NOTE
Hx MARIS infection and 3 drug regimen managed by ID (Dr. Landrum )  Azithromycin, rifabutin and ethambutol placed on hold due to elevated liver enzymes  Will need outpatient follow-up for regimen adjustment

## 2024-12-09 NOTE — ASSESSMENT & PLAN NOTE
"Recurrent epigastric abdominal pain and diarrhea, admitted 12/6 and discharged 12/7 for same.  Returns to ED states pain returned at 0300 on 12/8.  Thoroughly evaluated by ED, found to have elevated liver enzymes and discussed with gastroenterology, abdominal ultrasound obtained with reassuring findings no liver abnormality  Normal lactic acid level  Patient refused to leave from the emergency department continuing to say she is having severe burning of her insides  ER physician obliged and requested observation admission  Will observe overnight, no further narcotic medications as this patient is suspected of drug-seeking behavior  Will continue PPI, Carafate, Pepcid outpatient regimen for abdominal pain  Avoid Tylenol due to elevated liver enzymes suspected secondary to MARIS treatment  Per ED note  \"patient with persistent recurrence of pain, requiring repeat medication dosing including IV narcotic medications. Recommendation made for attempt at oral pain medication. Patient expressed concerns about \"waking up in the middle of the night with my insides of burning, and if they admit me, they could figure out why that is happening.\"   Keep stool record at bedside, monitor episodes of diarrhea occurring in 24-hour  Protonix, Pepcid, Carafate for pain control  Avoid narcotics  "

## 2024-12-09 NOTE — ASSESSMENT & PLAN NOTE
"Recurrent epigastric abdominal pain and diarrhea, admitted 12/6 and discharged 12/7 for same.  Returns to ED states pain returned at 0300 on 12/8.  Thoroughly evaluated by ED, found to have elevated liver enzymes and discussed with gastroenterology, abdominal ultrasound obtained with reassuring findings no liver abnormality  Normal lactic acid level  Patient refused to leave from the emergency department continuing to say she is having severe burning of her insides  ER physician obliged and requested observation admission   no further narcotic medications as this patient is suspected of drug-seeking behavior  Will continue PPI, Carafate, Pepcid outpatient regimen for abdominal pain  Avoid Tylenol due to elevated liver enzymes suspected secondary to MARIS treatment  Per ED note  \"patient with persistent recurrence of pain, requiring repeat medication dosing including IV narcotic medications. Recommendation made for attempt at oral pain medication. Patient expressed concerns about \"waking up in the middle of the night with my insides of burning, and if they admit me, they could figure out why that is happening.\"   Keep stool record at bedside, monitor episodes of diarrhea occurring in 24-hour  Patient has recent EGD done on November in this hospital, reviewed  Discussed the case with gastroenterology team, we will proceed with mesenteric arterial duplex-which will be done in the morning time  Gastroenterology also recommending MRCP to rule out any obstructions since patient is having abdominal pain recurrently -pending tests  "

## 2024-12-10 ENCOUNTER — APPOINTMENT (OUTPATIENT)
Dept: NON INVASIVE DIAGNOSTICS | Facility: HOSPITAL | Age: 64
DRG: 392 | End: 2024-12-10
Payer: MEDICARE

## 2024-12-10 PROCEDURE — 94640 AIRWAY INHALATION TREATMENT: CPT

## 2024-12-10 PROCEDURE — 93975 VASCULAR STUDY: CPT | Performed by: SURGERY

## 2024-12-10 PROCEDURE — 99232 SBSQ HOSP IP/OBS MODERATE 35: CPT | Performed by: FAMILY MEDICINE

## 2024-12-10 PROCEDURE — 94760 N-INVAS EAR/PLS OXIMETRY 1: CPT

## 2024-12-10 PROCEDURE — 93975 VASCULAR STUDY: CPT

## 2024-12-10 RX ORDER — DICYCLOMINE HYDROCHLORIDE 10 MG/1
10 CAPSULE ORAL
Status: DISCONTINUED | OUTPATIENT
Start: 2024-12-10 | End: 2024-12-16

## 2024-12-10 RX ORDER — DOCUSATE SODIUM 100 MG/1
100 CAPSULE, LIQUID FILLED ORAL 2 TIMES DAILY
Status: DISCONTINUED | OUTPATIENT
Start: 2024-12-10 | End: 2024-12-17 | Stop reason: HOSPADM

## 2024-12-10 RX ORDER — SIMETHICONE 80 MG
80 TABLET,CHEWABLE ORAL EVERY 6 HOURS PRN
Status: DISCONTINUED | OUTPATIENT
Start: 2024-12-10 | End: 2024-12-17 | Stop reason: HOSPADM

## 2024-12-10 RX ORDER — CALCIUM CARBONATE 500 MG/1
500 TABLET, CHEWABLE ORAL DAILY PRN
Status: DISCONTINUED | OUTPATIENT
Start: 2024-12-10 | End: 2024-12-17 | Stop reason: HOSPADM

## 2024-12-10 RX ADMIN — METOPROLOL TARTRATE 25 MG: 25 TABLET, FILM COATED ORAL at 09:11

## 2024-12-10 RX ADMIN — DICYCLOMINE HYDROCHLORIDE 10 MG: 10 CAPSULE ORAL at 17:28

## 2024-12-10 RX ADMIN — SUCRALFATE 1 G: 1 TABLET ORAL at 06:04

## 2024-12-10 RX ADMIN — CALCIUM CARBONATE (ANTACID) CHEW TAB 500 MG 500 MG: 500 CHEW TAB at 20:15

## 2024-12-10 RX ADMIN — METOPROLOL TARTRATE 25 MG: 25 TABLET, FILM COATED ORAL at 21:28

## 2024-12-10 RX ADMIN — FAMOTIDINE 20 MG: 20 TABLET, FILM COATED ORAL at 09:11

## 2024-12-10 RX ADMIN — DICYCLOMINE HYDROCHLORIDE 10 MG: 10 CAPSULE ORAL at 12:06

## 2024-12-10 RX ADMIN — IPRATROPIUM BROMIDE AND ALBUTEROL SULFATE 3 ML: .5; 3 SOLUTION RESPIRATORY (INHALATION) at 19:39

## 2024-12-10 RX ADMIN — FLUTICASONE FUROATE AND VILANTEROL TRIFENATATE 1 PUFF: 100; 25 POWDER RESPIRATORY (INHALATION) at 09:12

## 2024-12-10 RX ADMIN — SUCRALFATE 1 G: 1 TABLET ORAL at 12:06

## 2024-12-10 RX ADMIN — DICYCLOMINE HYDROCHLORIDE 10 MG: 10 CAPSULE ORAL at 21:28

## 2024-12-10 RX ADMIN — IPRATROPIUM BROMIDE AND ALBUTEROL SULFATE 3 ML: .5; 3 SOLUTION RESPIRATORY (INHALATION) at 13:00

## 2024-12-10 RX ADMIN — DOCUSATE SODIUM 100 MG: 100 CAPSULE, LIQUID FILLED ORAL at 09:11

## 2024-12-10 RX ADMIN — SUCRALFATE 1 G: 1 TABLET ORAL at 21:28

## 2024-12-10 RX ADMIN — IPRATROPIUM BROMIDE AND ALBUTEROL SULFATE 3 ML: .5; 3 SOLUTION RESPIRATORY (INHALATION) at 08:54

## 2024-12-10 RX ADMIN — PANTOPRAZOLE SODIUM 40 MG: 40 TABLET, DELAYED RELEASE ORAL at 17:27

## 2024-12-10 RX ADMIN — UMECLIDINIUM 1 PUFF: 62.5 AEROSOL, POWDER ORAL at 09:12

## 2024-12-10 RX ADMIN — DOCUSATE SODIUM 100 MG: 100 CAPSULE, LIQUID FILLED ORAL at 17:27

## 2024-12-10 RX ADMIN — FAMOTIDINE 20 MG: 20 TABLET, FILM COATED ORAL at 17:27

## 2024-12-10 RX ADMIN — SUCRALFATE 1 G: 1 TABLET ORAL at 17:27

## 2024-12-10 RX ADMIN — SIMETHICONE 80 MG: 80 TABLET, CHEWABLE ORAL at 09:11

## 2024-12-10 RX ADMIN — PANTOPRAZOLE SODIUM 40 MG: 40 TABLET, DELAYED RELEASE ORAL at 06:04

## 2024-12-10 NOTE — PLAN OF CARE
Problem: Potential for Falls  Goal: Patient will remain free of falls  Description: INTERVENTIONS:  - Educate patient/family on patient safety including physical limitations  - Instruct patient to call for assistance with activity   - Consult OT/PT to assist with strengthening/mobility   - Keep Call bell within reach  - Keep bed low and locked with side rails adjusted as appropriate  - Keep care items and personal belongings within reach  - Initiate and maintain comfort rounds  - Make Fall Risk Sign visible to staff  - Offer Toileting every 2 Hours, in advance of need  - Obtain necessary fall risk management equipment: non-slip sock  - Apply yellow socks and bracelet for high fall risk patients  - Consider moving patient to room near nurses station  Outcome: Progressing     Problem: PAIN - ADULT  Goal: Verbalizes/displays adequate comfort level or baseline comfort level  Description: Interventions:  - Encourage patient to monitor pain and request assistance  - Assess pain using appropriate pain scale  - Administer analgesics based on type and severity of pain and evaluate response  - Implement non-pharmacological measures as appropriate and evaluate response  - Consider cultural and social influences on pain and pain management  - Notify physician/advanced practitioner if interventions unsuccessful or patient reports new pain  Outcome: Progressing     Problem: INFECTION - ADULT  Goal: Absence or prevention of progression during hospitalization  Description: INTERVENTIONS:  - Assess and monitor for signs and symptoms of infection  - Monitor lab/diagnostic results  - Monitor all insertion sites, i.e. indwelling lines, tubes, and drains  - Monitor endotracheal if appropriate and nasal secretions for changes in amount and color  - [place] appropriate cooling/warming therapies per order  - Administer medications as ordered  - Instruct and encourage patient and family to use good hand hygiene technique  - Identify and  instruct in appropriate isolation precautions for identified infection/condition  Outcome: Progressing  Goal: Absence of fever/infection during neutropenic period  Description: INTERVENTIONS:  - Monitor WBC    Outcome: Progressing     Problem: SAFETY ADULT  Goal: Patient will remain free of falls  Description: INTERVENTIONS:  - Educate patient/family on patient safety including physical limitations  - Instruct patient to call for assistance with activity   - Consult OT/PT to assist with strengthening/mobility   - Keep Call bell within reach  - Keep bed low and locked with side rails adjusted as appropriate  - Keep care items and personal belongings within reach  - Initiate and maintain comfort rounds  - Make Fall Risk Sign visible to staff  - Offer Toileting every 2 Hours, in advance of need  - Obtain necessary fall risk management equipment: non-slip sock  - Apply yellow socks and bracelet for high fall risk patients  - Consider moving patient to room near nurses station  Outcome: Progressing  Goal: Maintain or return to baseline ADL function  Description: INTERVENTIONS:  -  Assess patient's ability to carry out ADLs; assess patient's baseline for ADL function and identify physical deficits which impact ability to perform ADLs (bathing, care of mouth/teeth, toileting, grooming, dressing, etc.)  - Assess/evaluate cause of self-care deficits   - Assess range of motion  - Assess patient's mobility; develop plan if impaired  - Assess patient's need for assistive devices and provide as appropriate  - Encourage maximum independence but intervene and supervise when necessary  - Involve family in performance of ADLs  - Assess for home care needs following discharge   - Consider OT consult to assist with ADL evaluation and planning for discharge  - Provide patient education as appropriate  Outcome: Progressing  Goal: Maintains/Returns to pre admission functional level  Description: INTERVENTIONS:  - Perform AM-PAC 6 Click  Basic Mobility/ Daily Activity assessment daily.  - Set and communicate daily mobility goal to care team and patient/family/caregiver.   - Collaborate with rehabilitation services on mobility goals if consulted  - Perform Range of Motion 6 times a day.  - Reposition patient every 2 hours.  - Dangle patient 3 times a day  - Stand patient 3 times a day  - Ambulate patient 3 times a day  - Out of bed to chair 3 times a day   - Out of bed for meals 3 times a day  - Out of bed for toileting  - Record patient progress and toleration of activity level   Outcome: Progressing     Problem: DISCHARGE PLANNING  Goal: Discharge to home or other facility with appropriate resources  Description: INTERVENTIONS:  - Identify barriers to discharge w/patient and caregiver  - Arrange for needed discharge resources and transportation as appropriate  - Identify discharge learning needs (meds, wound care, etc.)  - Arrange for interpretive services to assist at discharge as needed  - Refer to Case Management Department for coordinating discharge planning if the patient needs post-hospital services based on physician/advanced practitioner order or complex needs related to functional status, cognitive ability, or social support system  Outcome: Progressing     Problem: Knowledge Deficit  Goal: Patient/family/caregiver demonstrates understanding of disease process, treatment plan, medications, and discharge instructions  Description: Complete learning assessment and assess knowledge base.  Interventions:  - Provide teaching at level of understanding  - Provide teaching via preferred learning methods  Outcome: Progressing     Problem: Nutrition/Hydration-ADULT  Goal: Nutrient/Hydration intake appropriate for improving, restoring or maintaining nutritional needs  Description: Monitor and assess patient's nutrition/hydration status for malnutrition. Collaborate with interdisciplinary team and initiate plan and interventions as ordered.  Monitor  patient's weight and dietary intake as ordered or per policy. Utilize nutrition screening tool and intervene as necessary. Determine patient's food preferences and provide high-protein, high-caloric foods as appropriate.     INTERVENTIONS:  - Monitor oral intake, urinary output, labs, and treatment plans  - Assess nutrition and hydration status and recommend course of action  - Evaluate amount of meals eaten  - Assist patient with eating if necessary   - Allow adequate time for meals  - Recommend/ encourage appropriate diets, oral nutritional supplements, and vitamin/mineral supplements  - Order, calculate, and assess calorie counts as needed  - Recommend, monitor, and adjust tube feedings and TPN/PPN based on assessed needs  - Assess need for intravenous fluids  - Provide specific nutrition/hydration education as appropriate  - Include patient/family/caregiver in decisions related to nutrition  Outcome: Progressing

## 2024-12-10 NOTE — ASSESSMENT & PLAN NOTE
Suspect DILI  ALT/AST > 3 x nomal, T bili >2 x normal  Azithromycin, ethambutol and rifabutin on hold due to elevated liver enzymes   Will need to follow up with ID for treatment alteration - MARIS infection and 3 drug regimen managed by ID (Dr. Landrum)   neg Tylenol level and normal PT/INR   Repeat CMP in a.m.  Ultrasound RUQ - S/P cholecystectomy, liver imaging unrevealing of abnormality  Will need outpatient follow-up

## 2024-12-10 NOTE — ASSESSMENT & PLAN NOTE
Hx MARIS infection and 3 drug regimen managed by ID (Dr. Landrum )  Azithromycin, rifabutin and ethambutol placed on hold due to elevated liver enzymes.lfts now resolving  Will need outpatient follow-up for regimen adjustment

## 2024-12-10 NOTE — PROGRESS NOTES
"Progress Note - Hospitalist   Name: Ileana Seaman 64 y.o. female I MRN: [id number]  Unit/Bed#: -01 I Date of Admission: 12/8/2024   Date of Service: 12/10/2024 I Hospital Day: 1    Assessment & Plan  Epigastric pain  Recurrent epigastric abdominal pain and diarrhea, admitted 12/6 and discharged 12/7 for same.  Returns to ED states pain returned at 0300 on 12/8.  Thoroughly evaluated by ED, found to have elevated liver enzymes and discussed with gastroenterology, abdominal ultrasound obtained with reassuring findings no liver abnormality  Normal lactic acid level  Patient refused to leave from the emergency department continuing to say she is having severe burning of her insides  ER physician obliged and requested observation admission   no further narcotic medications as this patient is suspected of drug-seeking behavior  Will continue PPI, Carafate, Pepcid outpatient regimen for abdominal pain  Avoid Tylenol due to elevated liver enzymes suspected secondary to MARIS treatment  Per ED note  \"patient with persistent recurrence of pain, requiring repeat medication dosing including IV narcotic medications. Recommendation made for attempt at oral pain medication. Patient expressed concerns about \"waking up in the middle of the night with my insides of burning, and if they admit me, they could figure out why that is happening.\"   Keep stool record at bedside, monitor episodes of diarrhea occurring in 24-hour  Patient has recent EGD done on November in this hospital, reviewed  Discussed the case with gastroenterology team, we will proceed with mesenteric arterial duplex-spoke to vascular surgery and recommend outpt follow up  Will trial bentyl,simethicone for now  Mri abd reviewed. Cont trial of food and monitor pain symptoms . Also placed on stool softeners for constipation  Mycobacterial infection, non-TB  Hx MARIS infection and 3 drug regimen managed by ID (Dr. Landrum )  Azithromycin, rifabutin and ethambutol " placed on hold due to elevated liver enzymes.lfts now resolving  Will need outpatient follow-up for regimen adjustment  COPD (chronic obstructive pulmonary disease) (HCC)  No exacerbation  Continue PTA regimen  Atrial fibrillation (HCC)  Continue metoprolol  Not chronically anticoagulated  Chronic hypoxic respiratory failure (HCC)  Maintained on 2 L nasal cannula  Moderate protein-calorie malnutrition (HCC)  Malnutrition Findings: Loss of subcutaneous fat in orbital, triceps, ribcage areas.  Loss of muscle at temples, clavicles, scapula, extremities.  Consultation to dietitian       BMI Findings:     Body mass index is 18.71 kg/m².     Elevated liver enzymes  Suspect DILI  ALT/AST > 3 x nomal, T bili >2 x normal  Azithromycin, ethambutol and rifabutin on hold due to elevated liver enzymes   Will need to follow up with ID for treatment alteration - MARIS infection and 3 drug regimen managed by ID (Dr. Landrum)   neg Tylenol level and normal PT/INR   Repeat CMP in a.m.  Ultrasound RUQ - S/P cholecystectomy, liver imaging unrevealing of abnormality  Will need outpatient follow-up    VTE Pharmacologic Prophylaxis:   Moderate Risk (Score 3-4) - Pharmacological DVT Prophylaxis Contraindicated. Sequential Compression Devices Ordered.    Mobility:   Basic Mobility Inpatient Raw Score: 23  JH-HLM Goal: 7: Walk 25 feet or more  JH-HLM Achieved: 8: Walk 250 feet ot more  JH-HLM Goal achieved. Continue to encourage appropriate mobility.    Patient Centered Rounds: I performed bedside rounds with nursing staff today.   Discussions with Specialists or Other Care Team Provider: [facility]    Education and Discussions with Family / Patient: will update family    Current Length of Stay: 1 day(s)  Current Patient Status: Inpatient   Certification Statement: The patient will continue to require additional inpatient hospital stay due to abd pain  Discharge Plan: Anticipate discharge in 24-48 hrs to home with home services.    Code  Status: Level 1 - Full Code    Subjective   States that she feels bloated has not had a bowel movement last 3 days was having episodes of burning abdominal pain and hence she returned to the hospital which she feels is a little bit better but usually gets worse after eating something.    Objective :  Temp:  [97.3 °F (36.3 °C)-97.5 °F (36.4 °C)] 97.5 °F (36.4 °C)  HR:  [68] 68  BP: ()/(55-63) 119/63  Resp:  [18] 18  SpO2:  [95 %-99 %] 97 %  O2 Device: Nasal cannula  Nasal Cannula O2 Flow Rate (L/min):  [2 L/min] 2 L/min    Body mass index is 18.71 kg/m².     Input and Output Summary (last 24 hours):     Intake/Output Summary (Last 24 hours) at 12/10/2024 1336  Last data filed at 12/10/2024 1302  Gross per 24 hour   Intake 180 ml   Output --   Net 180 ml       Physical Exam  Vitals and nursing note reviewed.   Constitutional:       Appearance: She is ill-appearing.   HENT:      Head: Normocephalic and atraumatic.      Right Ear: External ear normal.      Left Ear: External ear normal.      Nose: Nose normal.      Mouth/Throat:      Pharynx: Oropharynx is clear.   Eyes:      Pupils: Pupils are equal, round, and reactive to light.   Cardiovascular:      Rate and Rhythm: Normal rate and regular rhythm.      Heart sounds: Normal heart sounds.   Pulmonary:      Effort: Pulmonary effort is normal.      Breath sounds: Normal breath sounds.   Abdominal:      General: Bowel sounds are normal.      Palpations: Abdomen is soft.      Tenderness: There is abdominal tenderness.   Musculoskeletal:         General: Normal range of motion.      Cervical back: Normal range of motion and neck supple.   Skin:     General: Skin is warm and dry.      Capillary Refill: Capillary refill takes less than 2 seconds.   Neurological:      General: No focal deficit present.      Mental Status: She is alert and oriented to person, place, and time.   Psychiatric:         Mood and Affect: Mood normal.           Lines/Drains:              Lab  Results: I have reviewed the following results:   Results from last 7 days   Lab Units 12/09/24  0440 12/08/24  1034   WBC Thousand/uL 5.46 9.26   HEMOGLOBIN g/dL 10.1* 13.0   HEMATOCRIT % 32.0* 39.7   PLATELETS Thousands/uL 156 193   SEGS PCT %  --  77*   LYMPHO PCT %  --  12*   MONO PCT %  --  10   EOS PCT %  --  1     Results from last 7 days   Lab Units 12/09/24  0440   SODIUM mmol/L 139   POTASSIUM mmol/L 3.7   CHLORIDE mmol/L 108   CO2 mmol/L 26   BUN mg/dL 3*   CREATININE mg/dL 0.44*   ANION GAP mmol/L 5   CALCIUM mg/dL 8.3*   ALBUMIN g/dL 3.1*   TOTAL BILIRUBIN mg/dL 2.37*   ALK PHOS U/L 161*   ALT U/L 160*   AST U/L 107*   GLUCOSE RANDOM mg/dL 84     Results from last 7 days   Lab Units 12/08/24  2319   INR  1.12             Results from last 7 days   Lab Units 12/08/24  1034 12/06/24  1310 12/06/24  1307   LACTIC ACID mmol/L 0.6  --  1.6   PROCALCITONIN ng/ml  --  <0.05  --        Recent Cultures (last 7 days):   Results from last 7 days   Lab Units 12/06/24  1310   BLOOD CULTURE  No Growth at 72 hrs.  No Growth at 72 hrs.       Imaging Results Review: I reviewed radiology reports from this admission including: MRI abdomen/MRCP and Ultrasound(s).  Other Study Results Review: EKG was reviewed.     Last 24 Hours Medication List:     Current Facility-Administered Medications:     albuterol inhalation solution 2.5 mg, Q4H PRN    dicyclomine (BENTYL) capsule 10 mg, 4x Daily (AC & HS)    docusate sodium (COLACE) capsule 100 mg, BID    famotidine (PEPCID) tablet 20 mg, BID    Fluticasone Furoate-Vilanterol 100-25 mcg/actuation 1 puff, Daily **AND** umeclidinium 62.5 mcg/actuation inhaler AEPB 1 puff, Daily    hydrOXYzine HCL (ATARAX) tablet 25 mg, Q6H PRN    ipratropium-albuterol (DUO-NEB) 0.5-2.5 mg/3 mL inhalation solution 3 mL, 4x Daily    metoprolol tartrate (LOPRESSOR) tablet 25 mg, Q12H MAYCO    ondansetron (ZOFRAN) injection 4 mg, Q8H PRN    pantoprazole (PROTONIX) EC tablet 40 mg, BID AC    simethicone  (MYLICON) chewable tablet 80 mg, Q6H PRN    sucralfate (CARAFATE) tablet 1 g, 4x Daily (AC & HS)    Administrative Statements   Today, Patient Was Seen By: Modesta Santizo MD      **Please Note: This note may have been constructed using a voice recognition system.**

## 2024-12-10 NOTE — ASSESSMENT & PLAN NOTE
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

## 2024-12-10 NOTE — ASSESSMENT & PLAN NOTE
"Recurrent epigastric abdominal pain and diarrhea, admitted 12/6 and discharged 12/7 for same.  Returns to ED states pain returned at 0300 on 12/8.  Thoroughly evaluated by ED, found to have elevated liver enzymes and discussed with gastroenterology, abdominal ultrasound obtained with reassuring findings no liver abnormality  Normal lactic acid level  Patient refused to leave from the emergency department continuing to say she is having severe burning of her insides  ER physician obliged and requested observation admission   no further narcotic medications as this patient is suspected of drug-seeking behavior  Will continue PPI, Carafate, Pepcid outpatient regimen for abdominal pain  Avoid Tylenol due to elevated liver enzymes suspected secondary to MARIS treatment  Per ED note  \"patient with persistent recurrence of pain, requiring repeat medication dosing including IV narcotic medications. Recommendation made for attempt at oral pain medication. Patient expressed concerns about \"waking up in the middle of the night with my insides of burning, and if they admit me, they could figure out why that is happening.\"   Keep stool record at bedside, monitor episodes of diarrhea occurring in 24-hour  Patient has recent EGD done on November in this hospital, reviewed  Discussed the case with gastroenterology team, we will proceed with mesenteric arterial duplex-spoke to vascular surgery and recommend outpt follow up  Will trial bentyl,simethicone for now  Mri abd reviewed. Cont trial of food and monitor pain symptoms . Also placed on stool softeners for constipation  "

## 2024-12-10 NOTE — ASSESSMENT & PLAN NOTE
Noted on admission to have mildly elevated AST/ALT, alk phos and t bili  RUQ US with evidence of CCY, otherwise unremarkable  Acetaminophen level <2  MRCP negative for choledocho    Labs continue to improve this AM   Continue to monitor daily CMP

## 2024-12-10 NOTE — PLAN OF CARE
Problem: Potential for Falls  Goal: Patient will remain free of falls  Description: INTERVENTIONS:  - Educate patient/family on patient safety including physical limitations  - Instruct patient to call for assistance with activity   - Consult OT/PT to assist with strengthening/mobility   - Keep Call bell within reach  - Keep bed low and locked with side rails adjusted as appropriate  - Keep care items and personal belongings within reach  - Initiate and maintain comfort rounds  - Make Fall Risk Sign visible to staff  - Offer Toileting every 2 Hours, in advance of need  - Obtain necessary fall risk management equipment: non-slip sock  - Apply yellow socks and bracelet for high fall risk patients  - Consider moving patient to room near nurses station  Outcome: Progressing     Problem: PAIN - ADULT  Goal: Verbalizes/displays adequate comfort level or baseline comfort level  Description: Interventions:  - Encourage patient to monitor pain and request assistance  - Assess pain using appropriate pain scale  - Administer analgesics based on type and severity of pain and evaluate response  - Implement non-pharmacological measures as appropriate and evaluate response  - Consider cultural and social influences on pain and pain management  - Notify physician/advanced practitioner if interventions unsuccessful or patient reports new pain  Outcome: Progressing     Problem: INFECTION - ADULT  Goal: Absence or prevention of progression during hospitalization  Description: INTERVENTIONS:  - Assess and monitor for signs and symptoms of infection  - Monitor lab/diagnostic results  - Monitor all insertion sites, i.e. indwelling lines, tubes, and drains  - Monitor endotracheal if appropriate and nasal secretions for changes in amount and color  - [place] appropriate cooling/warming therapies per order  - Administer medications as ordered  - Instruct and encourage patient and family to use good hand hygiene technique  - Identify and  instruct in appropriate isolation precautions for identified infection/condition  Outcome: Progressing  Goal: Absence of fever/infection during neutropenic period  Description: INTERVENTIONS:  - Monitor WBC    Outcome: Progressing     Problem: SAFETY ADULT  Goal: Patient will remain free of falls  Description: INTERVENTIONS:  - Educate patient/family on patient safety including physical limitations  - Instruct patient to call for assistance with activity   - Consult OT/PT to assist with strengthening/mobility   - Keep Call bell within reach  - Keep bed low and locked with side rails adjusted as appropriate  - Keep care items and personal belongings within reach  - Initiate and maintain comfort rounds  - Make Fall Risk Sign visible to staff  - Offer Toileting every 2 Hours, in advance of need  - Obtain necessary fall risk management equipment: non-slip sock  - Apply yellow socks and bracelet for high fall risk patients  - Consider moving patient to room near nurses station  Outcome: Progressing  Goal: Maintain or return to baseline ADL function  Description: INTERVENTIONS:  -  Assess patient's ability to carry out ADLs; assess patient's baseline for ADL function and identify physical deficits which impact ability to perform ADLs (bathing, care of mouth/teeth, toileting, grooming, dressing, etc.)  - Assess/evaluate cause of self-care deficits   - Assess range of motion  - Assess patient's mobility; develop plan if impaired  - Assess patient's need for assistive devices and provide as appropriate  - Encourage maximum independence but intervene and supervise when necessary  - Involve family in performance of ADLs  - Assess for home care needs following discharge   - Consider OT consult to assist with ADL evaluation and planning for discharge  - Provide patient education as appropriate  Outcome: Progressing  Goal: Maintains/Returns to pre admission functional level  Description: INTERVENTIONS:  - Perform AM-PAC 6 Click  Basic Mobility/ Daily Activity assessment daily.  - Set and communicate daily mobility goal to care team and patient/family/caregiver.   - Collaborate with rehabilitation services on mobility goals if consulted  - Perform Range of Motion 6 times a day.  - Reposition patient every 2 hours.  - Dangle patient 3 times a day  - Stand patient 3 times a day  - Ambulate patient 3 times a day  - Out of bed to chair 3 times a day   - Out of bed for meals 3 times a day  - Out of bed for toileting  - Record patient progress and toleration of activity level   Outcome: Progressing     Problem: DISCHARGE PLANNING  Goal: Discharge to home or other facility with appropriate resources  Description: INTERVENTIONS:  - Identify barriers to discharge w/patient and caregiver  - Arrange for needed discharge resources and transportation as appropriate  - Identify discharge learning needs (meds, wound care, etc.)  - Arrange for interpretive services to assist at discharge as needed  - Refer to Case Management Department for coordinating discharge planning if the patient needs post-hospital services based on physician/advanced practitioner order or complex needs related to functional status, cognitive ability, or social support system  Outcome: Progressing     Problem: Knowledge Deficit  Goal: Patient/family/caregiver demonstrates understanding of disease process, treatment plan, medications, and discharge instructions  Description: Complete learning assessment and assess knowledge base.  Interventions:  - Provide teaching at level of understanding  - Provide teaching via preferred learning methods  Outcome: Progressing     Problem: Nutrition/Hydration-ADULT  Goal: Nutrient/Hydration intake appropriate for improving, restoring or maintaining nutritional needs  Description: Monitor and assess patient's nutrition/hydration status for malnutrition. Collaborate with interdisciplinary team and initiate plan and interventions as ordered.  Monitor  patient's weight and dietary intake as ordered or per policy. Utilize nutrition screening tool and intervene as necessary. Determine patient's food preferences and provide high-protein, high-caloric foods as appropriate.     INTERVENTIONS:  - Monitor oral intake, urinary output, labs, and treatment plans  - Assess nutrition and hydration status and recommend course of action  - Evaluate amount of meals eaten  - Assist patient with eating if necessary   - Allow adequate time for meals  - Recommend/ encourage appropriate diets, oral nutritional supplements, and vitamin/mineral supplements  - Order, calculate, and assess calorie counts as needed  - Recommend, monitor, and adjust tube feedings and TPN/PPN based on assessed needs  - Assess need for intravenous fluids  - Provide specific nutrition/hydration education as appropriate  - Include patient/family/caregiver in decisions related to nutrition  Outcome: Progressing

## 2024-12-10 NOTE — PROGRESS NOTES
Progress Note - Gastroenterology   Name: Ileana Seaman 64 y.o. 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   Elevated liver enzymes  Noted on admission to have mildly elevated AST/ALT, alk phos and t bili  RUQ US with evidence of CCY, otherwise unremarkable  Acetaminophen level <2  MRCP negative for choledocho    Labs continue to improve this AM   Continue to monitor daily CMP    HPI: Ileana Seaman is a 64 y.o. year old female with a PMHx COPD on 2L O2 at baseline, MAC, Afib not on anticoagulation, HH, GERD, hx of CCY who presents with recurrent epigastric pain.     Today patient reports she is feeling improved. She does note some bloating. She has not had a BM since she had been here. She was recently given a stool softener. She reports ongoing mild epigastric discomfort at this time. She did tolerate breakfast without severe pain.       Medications Prior to Admission:     azithromycin (ZITHROMAX) 500 MG tablet    docusate sodium (COLACE) 100 mg capsule    ethambutol (MYAMBUTOL) 400 mg tablet    famotidine (PEPCID) 20 mg tablet    fluticasone-umeclidinium-vilanterol (Trelegy Ellipta) 100-62.5-25 mcg/actuation inhaler    metoprolol tartrate (LOPRESSOR) 25 mg tablet    omeprazole (PriLOSEC)  40 MG capsule    ondansetron (ZOFRAN) 4 mg tablet    rifabutin (MYCOBUTIN) 150 mg capsule    sucralfate (CARAFATE) 1 g tablet    hydrOXYzine HCL (ATARAX) 25 mg tablet    ipratropium-albuterol (DUO-NEB) 0.5-2.5 mg/3 mL nebulizer solution    lidocaine (LIDODERM) 5 %    nicotine (NICODERM CQ) 14 mg/24hr TD 24 hr patch    Current Facility-Administered Medications:     albuterol inhalation solution 2.5 mg, Q4H PRN    dicyclomine (BENTYL) capsule 10 mg, 4x Daily (AC & HS)    docusate sodium (COLACE) capsule 100 mg, BID    famotidine (PEPCID) tablet 20 mg, BID    Fluticasone Furoate-Vilanterol 100-25 mcg/actuation 1 puff, Daily **AND** umeclidinium 62.5 mcg/actuation inhaler AEPB 1 puff, Daily    hydrOXYzine HCL (ATARAX) tablet 25 mg, Q6H PRN    ipratropium-albuterol (DUO-NEB) 0.5-2.5 mg/3 mL inhalation solution 3 mL, 4x Daily    metoprolol tartrate (LOPRESSOR) tablet 25 mg, Q12H MAYCO    ondansetron (ZOFRAN) injection 4 mg, Q8H PRN    pantoprazole (PROTONIX) EC tablet 40 mg, BID AC    simethicone (MYLICON) chewable tablet 80 mg, Q6H PRN    sucralfate (CARAFATE) tablet 1 g, 4x Daily (AC & HS)  No Known Allergies    Physical Exam  Constitutional:       General: She is not in acute distress.     Appearance: She is not ill-appearing.   HENT:      Head: Normocephalic and atraumatic.      Mouth/Throat:      Mouth: Mucous membranes are moist.   Eyes:      General: No scleral icterus.  Pulmonary:      Effort: Pulmonary effort is normal. No respiratory distress.   Abdominal:      General: Abdomen is flat. There is no distension.      Palpations: Abdomen is soft.      Tenderness: There is no abdominal tenderness. There is no guarding.   Skin:     General: Skin is warm and dry.      Coloration: Skin is not jaundiced.   Neurological:      Mental Status: She is alert.       Most Recent Vital Signs:  Vitals:    12/09/24 2103 12/09/24 2107 12/09/24 2342 12/10/24 0756   BP: 98/55 98/55 119/58 119/63   Pulse: 68 68  68   Resp:    18   Temp:     97.5 °F (36.4 °C)   TempSrc:       SpO2:  96%  99%   Weight:       Height:           Intake/Output Summary (Last 24 hours) at 12/10/2024 0931  Last data filed at 12/9/2024 1300  Gross per 24 hour   Intake 420 ml   Output --   Net 420 ml       LABS/IMAGING  Lab Results: I have reviewed all relevant lab results during this hospitalization.    Imaging Studies:  I have reviewed all the relevant images during this hospitalizations    Counseling / Coordination of Care  Total time spent today 30 minutes. Greater than 50% of total time was spent with the patient and / or family counseling and / or coordination of care.    Hali Gayle PA-C

## 2024-12-10 NOTE — UTILIZATION REVIEW
Continued Stay Review    SEE INITIAL REVIEW AT BOTTOM    Date: 12/10  Day 2                          Current Patient Class: Inpatient  Current Level of Care: med surg    HPI:64 y.o. female initially admitted on 12/8 OBS, 12/9/ IP     Assessment/Plan: Ongoing epigastric discomfort and notes some bloating, no BM since admission. Started on stool softener. GI recommending vascular evaluation for celiac artery stenosis, likely the source patients pain, continue PPI, consider OP GES and esophageal testing. Continue to monitor daily CMP. GI signed off.     Medications:   Scheduled Medications:  dicyclomine, 10 mg, Oral, 4x Daily (AC & HS)  docusate sodium, 100 mg, Oral, BID  famotidine, 20 mg, Oral, BID  Fluticasone Furoate-Vilanterol, 1 puff, Inhalation, Daily   And  umeclidinium, 1 puff, Inhalation, Daily  ipratropium-albuterol, 3 mL, Nebulization, 4x Daily  metoprolol tartrate, 25 mg, Oral, Q12H MAYCO  pantoprazole, 40 mg, Oral, BID AC  sucralfate, 1 g, Oral, 4x Daily (AC & HS)      Continuous IV Infusions: none     PRN Meds:  albuterol, 2.5 mg, Nebulization, Q4H PRN  hydrOXYzine HCL, 25 mg, Oral, Q6H PRN  ondansetron, 4 mg, Intravenous, Q8H PRN x1  simethicone, 80 mg, Oral, Q6H PRN x1      Discharge Plan: tbd    Vital Signs (last 3 days)       Date/Time Temp Pulse Resp BP MAP (mmHg) SpO2 Calculated FIO2 (%) - Nasal Cannula Nasal Cannula O2 Flow Rate (L/min) O2 Device Patient Position - Orthostatic VS [place] Coma Scale Score Pain    12/10/24 0920 -- -- -- -- -- 96 % 28 2 L/min Nasal cannula -- -- --    12/10/24 0915 -- -- -- -- -- 95 % 28 2 L/min Nasal cannula -- 15 No Pain    12/10/24 07:56:49 97.5 °F (36.4 °C) 68 18 119/63 82 99 % 28 2 L/min Nasal cannula -- -- --    12/09/24 23:42:06 -- -- -- 119/58 78 -- -- -- -- -- -- --    12/09/24 21:07:28 -- 68 -- 98/55 69 96 % -- -- -- -- -- --    12/09/24 2103 -- 68 -- 98/55 -- -- -- -- -- -- -- --    12/09/24 1940 -- -- -- -- -- 97 % 28 2 L/min Nasal cannula -- -- No Pain     12/09/24 1921 -- -- -- -- -- 98 % -- -- -- -- -- --    12/09/24 15:17:47 97.3 °F (36.3 °C) -- 18 112/57 75 -- -- -- -- -- -- --    12/09/24 1250 -- -- -- -- -- 96 % 28 2 L/min Nasal cannula -- -- --    12/09/24 0900 -- -- -- -- -- 98 % 28 2 L/min Nasal cannula -- -- --    12/09/24 07:49:37 97.5 °F (36.4 °C) 62 20 131/65 87 97 % 28 2 L/min Nasal cannula -- -- 3    12/09/24 0354 -- -- -- -- -- 97 % 28 2 L/min Nasal cannula -- -- 6    12/08/24 23:11:57 97.7 °F (36.5 °C) 57 20 112/58 76 98 % 28 2 L/min Nasal cannula Lying -- --    12/08/24 2100 97.1 °F (36.2 °C) 65 16 109/70 83 98 % 28 2 L/min Nasal cannula Lying -- --    12/08/24 2055 -- -- -- -- -- -- -- -- -- -- -- 5 12/08/24 20:46:12 -- -- 16 109/70 83 -- -- -- -- -- -- --    12/08/24 2000 -- 64 16 104/74 82 96 % 28 2 L/min Nasal cannula Lying -- --    12/08/24 1925 -- -- -- -- -- -- -- -- Nasal cannula -- -- --    12/08/24 1900 -- 59 16 106/62 77 97 % 28 2 L/min Nasal cannula Lying -- --    12/08/24 1800 -- 73 18 126/62 86 98 % 28 2 L/min Nasal cannula Lying -- --    12/08/24 1759 -- -- -- -- -- -- -- -- -- -- -- 8 12/08/24 1730 -- 76 18 127/61 87 98 % -- -- -- -- -- --    12/08/24 1700 -- 90 16 152/74 106 98 % 28 2 L/min Nasal cannula Lying -- --    12/08/24 1600 -- 67 15 116/58 81 100 % 28 2 L/min Nasal cannula Lying -- --    12/08/24 1451 -- -- -- -- -- -- -- -- -- -- -- 10 - Worst Possible Pain    12/08/24 1430 -- 94 16 137/72 99 96 % 28 2 L/min Nasal cannula Lying -- --    12/08/24 1330 -- 90 17 126/73 93 98 % 28 2 L/min Nasal cannula Lying -- --    12/08/24 1300 -- 89 16 133/62 89 98 % 28 2 L/min Nasal cannula -- -- --    12/08/24 1200 -- 94 17 135/78 101 98 % 28 2 L/min Nasal cannula Sitting -- --    12/08/24 1105 -- -- -- -- -- -- -- -- -- -- -- 8    12/08/24 1100 -- 81 16 123/66 88 97 % 28 2 L/min Nasal cannula -- -- --    12/08/24 1045 -- 96 18 157/96 121 98 % -- -- None (Room air) Lying -- --    12/08/24 1041 -- -- -- -- -- -- -- -- -- -- -- 10 -  Worst Possible Pain    12/08/24 0923 99 °F (37.2 °C) 105 18 134/63 -- 96 % -- -- None (Room air) Sitting -- 10 - Worst Possible Pain          Weight (last 2 days)       Date/Time Weight    12/08/24 2041 44.9 (99)    12/08/24 0923 44 (97)            Pertinent Labs/Diagnostic Results:   Radiology:  MRI abdomen wo contrast and mrcp   Final Interpretation by Jaylen Fournier MD (12/10 0818)      1.  No acute abdominal pathology. No choledocholithiasis or biliary ductal dilation.   2.  8 mm solid right lower lobe pulmonary nodule is unchanged from October 8, 2024         Workstation performed: [id number]         US right upper quadrant   Final Interpretation by Teresa Gomez MD (12/08 1715)      Status post cholecystectomy. Otherwise, unremarkable right upper quadrant ultrasound.      Workstation performed: [id number]         VAS CELIAC AND/OR MESENTERIC DUPLEX    (Results Pending)     Cardiology:  No orders to display     GI:  No orders to display           Results from last 7 days   Lab Units 12/09/24  0440 12/08/24  1034 12/07/24  0607 12/06/24  1307   WBC Thousand/uL 5.46 9.26 9.06 11.77*   HEMOGLOBIN g/dL 10.1* 13.0 10.5* 13.1   HEMATOCRIT % 32.0* 39.7 34.2* 40.2   PLATELETS Thousands/uL 156 193 179 222   TOTAL NEUT ABS Thousands/µL  --  7.17  --  7.78*         Results from last 7 days   Lab Units 12/09/24  0440 12/08/24  1034 12/07/24  0607 12/06/24  1307   SODIUM mmol/L 139 136 139 135   POTASSIUM mmol/L 3.7 3.6 3.9 3.9   CHLORIDE mmol/L 108 100 109* 98   CO2 mmol/L 26 30 25 29   ANION GAP mmol/L 5 6 5 8   BUN mg/dL 3* 4* 2* 3*   CREATININE mg/dL 0.44* 0.50* 0.57* 0.56*   EGFR ml/min/1.73sq m 105 101 96 97   CALCIUM mg/dL 8.3* 9.0 8.0* 9.3   MAGNESIUM mg/dL  --  1.9  --  2.0     Results from last 7 days   Lab Units 12/09/24  0440 12/08/24  1034 12/06/24  1307   AST U/L 107* 269* 14   ALT U/L 160* 234* 17   ALK PHOS U/L 161* 210* 92   TOTAL PROTEIN g/dL 5.0* 6.8 6.7   ALBUMIN g/dL 3.1* 4.1 4.1   TOTAL  BILIRUBIN mg/dL 2.37* 2.34* 0.45   AMMONIA umol/L 37  --   --          Results from last 7 days   Lab Units 12/09/24  0440 12/08/24  1034 12/07/24  0607 12/06/24  1307   GLUCOSE RANDOM mg/dL 84 140 92 117     Results from last 7 days   Lab Units 12/08/24  2319   PROTIME seconds 14.8   INR  1.12         Results from last 7 days   Lab Units 12/06/24  1310   PROCALCITONIN ng/ml <0.05     Results from last 7 days   Lab Units 12/08/24  1034 12/06/24  1307   LACTIC ACID mmol/L 0.6 1.6     Results from last 7 days   Lab Units 12/08/24  1034 12/06/24  1307   LIPASE u/L 28 16     Results from last 7 days   Lab Units 12/08/24  1108 12/06/24  1413   CLARITY UA  Clear Clear   COLOR UA  Yellow Yellow   SPEC GRAV UA  1.010 <=1.005   PH UA  6.5 7.0   GLUCOSE UA mg/dl Negative Negative   KETONES UA mg/dl Negative Negative   BLOOD UA  Negative Negative   PROTEIN UA mg/dl Negative Negative   NITRITE UA  Negative Negative   BILIRUBIN UA  Negative Negative   UROBILINOGEN UA E.U./dl 0.2 0.2   LEUKOCYTES UA  Negative Negative     Results from last 7 days   Lab Units 12/08/24  2319   ACETAMINOPHEN LVL ug/mL <2*     Results from last 7 days   Lab Units 12/06/24  1310   BLOOD CULTURE  No Growth at 72 hrs.  No Growth at 72 hrs.     Network Utilization Review Department  ATTENTION: Please call with any questions or concerns to [phone] and carefully listen to the prompts so that you are directed to the right person. All voicemails are confidential.   For Discharge needs, contact Care Management DC Support Team at [phone] opt. 2  Send all requests for admission clinical reviews, approved or denied determinations and any other requests to dedicated fax number below belonging to the campus where the patient is receiving treatment. List of dedicated fax numbers for the Facilities:  FACILITY NAME UR FAX NUMBER   ADMISSION DENIALS (Administrative/Medical Necessity) [phone]   DISCHARGE SUPPORT TEAM (NETWORK) [phone]   PARENT  CHILD HEALTH (Maternity/NICU/Pediatrics) [phone]   [facility] [phone]   [facility] [phone]   [facility] [phone]   [facility] [phone]   formerly [facility] [phone]   [facility] [phone]   [facility] [phone]   [facility] [phone]   [facility] [phone]   formerly [facility] [phone]   [facility] [phone]   [facility] [phone]

## 2024-12-11 ENCOUNTER — APPOINTMENT (INPATIENT)
Dept: CT IMAGING | Facility: HOSPITAL | Age: 64
DRG: 392 | End: 2024-12-11
Payer: MEDICARE

## 2024-12-11 PROBLEM — I77.4 CELIAC ARTERY STENOSIS (HCC): Status: ACTIVE | Noted: 2024-12-11

## 2024-12-11 LAB
ALBUMIN SERPL BCG-MCNC: 3.3 G/DL (ref 3.5–5)
ALP SERPL-CCNC: 140 U/L (ref 34–104)
ALT SERPL W P-5'-P-CCNC: 89 U/L (ref 7–52)
ANION GAP SERPL CALCULATED.3IONS-SCNC: 5 MMOL/L (ref 4–13)
AST SERPL W P-5'-P-CCNC: 20 U/L (ref 13–39)
BACTERIA BLD CULT: NORMAL
BACTERIA BLD CULT: NORMAL
BILIRUB SERPL-MCNC: 0.55 MG/DL (ref 0.2–1)
BUN SERPL-MCNC: 3 MG/DL (ref 5–25)
CALCIUM ALBUM COR SERPL-MCNC: 9.4 MG/DL (ref 8.3–10.1)
CALCIUM SERPL-MCNC: 8.8 MG/DL (ref 8.4–10.2)
CHLORIDE SERPL-SCNC: 106 MMOL/L (ref 96–108)
CO2 SERPL-SCNC: 28 MMOL/L (ref 21–32)
CREAT SERPL-MCNC: 0.44 MG/DL (ref 0.6–1.3)
GFR SERPL CREATININE-BSD FRML MDRD: 105 ML/MIN/1.73SQ M
GLUCOSE SERPL-MCNC: 104 MG/DL (ref 65–140)
LIPASE SERPL-CCNC: 30 U/L (ref 11–82)
POTASSIUM SERPL-SCNC: 3.5 MMOL/L (ref 3.5–5.3)
PROT SERPL-MCNC: 5.4 G/DL (ref 6.4–8.4)
SODIUM SERPL-SCNC: 139 MMOL/L (ref 135–147)

## 2024-12-11 PROCEDURE — 83690 ASSAY OF LIPASE: CPT | Performed by: FAMILY MEDICINE

## 2024-12-11 PROCEDURE — 94640 AIRWAY INHALATION TREATMENT: CPT

## 2024-12-11 PROCEDURE — 99232 SBSQ HOSP IP/OBS MODERATE 35: CPT | Performed by: FAMILY MEDICINE

## 2024-12-11 PROCEDURE — 80053 COMPREHEN METABOLIC PANEL: CPT | Performed by: FAMILY MEDICINE

## 2024-12-11 PROCEDURE — 94760 N-INVAS EAR/PLS OXIMETRY 1: CPT

## 2024-12-11 PROCEDURE — 74174 CTA ABD&PLVS W/CONTRAST: CPT

## 2024-12-11 PROCEDURE — 94664 DEMO&/EVAL PT USE INHALER: CPT

## 2024-12-11 PROCEDURE — NC001 PR NO CHARGE: Performed by: PHYSICIAN ASSISTANT

## 2024-12-11 RX ORDER — POLYETHYLENE GLYCOL 3350 17 G/17G
17 POWDER, FOR SOLUTION ORAL ONCE
Status: COMPLETED | OUTPATIENT
Start: 2024-12-11 | End: 2024-12-11

## 2024-12-11 RX ORDER — IPRATROPIUM BROMIDE AND ALBUTEROL SULFATE 2.5; .5 MG/3ML; MG/3ML
3 SOLUTION RESPIRATORY (INHALATION)
Status: DISCONTINUED | OUTPATIENT
Start: 2024-12-11 | End: 2024-12-17 | Stop reason: HOSPADM

## 2024-12-11 RX ADMIN — DOCUSATE SODIUM 100 MG: 100 CAPSULE, LIQUID FILLED ORAL at 17:04

## 2024-12-11 RX ADMIN — SUCRALFATE 1 G: 1 TABLET ORAL at 17:04

## 2024-12-11 RX ADMIN — PANTOPRAZOLE SODIUM 40 MG: 40 TABLET, DELAYED RELEASE ORAL at 17:04

## 2024-12-11 RX ADMIN — SUCRALFATE 1 G: 1 TABLET ORAL at 08:13

## 2024-12-11 RX ADMIN — FAMOTIDINE 20 MG: 20 TABLET, FILM COATED ORAL at 17:04

## 2024-12-11 RX ADMIN — CALCIUM CARBONATE (ANTACID) CHEW TAB 500 MG 500 MG: 500 CHEW TAB at 12:03

## 2024-12-11 RX ADMIN — FAMOTIDINE 20 MG: 20 TABLET, FILM COATED ORAL at 08:13

## 2024-12-11 RX ADMIN — IPRATROPIUM BROMIDE AND ALBUTEROL SULFATE 3 ML: .5; 3 SOLUTION RESPIRATORY (INHALATION) at 07:50

## 2024-12-11 RX ADMIN — IPRATROPIUM BROMIDE AND ALBUTEROL SULFATE 3 ML: 2.5; .5 SOLUTION RESPIRATORY (INHALATION) at 19:50

## 2024-12-11 RX ADMIN — POLYETHYLENE GLYCOL 3350 17 G: 17 POWDER, FOR SOLUTION ORAL at 15:31

## 2024-12-11 RX ADMIN — DICYCLOMINE HYDROCHLORIDE 10 MG: 10 CAPSULE ORAL at 17:04

## 2024-12-11 RX ADMIN — IOHEXOL 75 ML: 350 INJECTION, SOLUTION INTRAVENOUS at 14:26

## 2024-12-11 RX ADMIN — DOCUSATE SODIUM 100 MG: 100 CAPSULE, LIQUID FILLED ORAL at 08:13

## 2024-12-11 RX ADMIN — UMECLIDINIUM 1 PUFF: 62.5 AEROSOL, POWDER ORAL at 08:18

## 2024-12-11 RX ADMIN — DICYCLOMINE HYDROCHLORIDE 10 MG: 10 CAPSULE ORAL at 21:27

## 2024-12-11 RX ADMIN — METOPROLOL TARTRATE 25 MG: 25 TABLET, FILM COATED ORAL at 08:13

## 2024-12-11 RX ADMIN — SUCRALFATE 1 G: 1 TABLET ORAL at 21:27

## 2024-12-11 RX ADMIN — PANTOPRAZOLE SODIUM 40 MG: 40 TABLET, DELAYED RELEASE ORAL at 08:13

## 2024-12-11 RX ADMIN — SUCRALFATE 1 G: 1 TABLET ORAL at 11:59

## 2024-12-11 RX ADMIN — SIMETHICONE 80 MG: 80 TABLET, CHEWABLE ORAL at 12:57

## 2024-12-11 RX ADMIN — POLYETHYLENE GLYCOL 3350 17 G: 17 POWDER, FOR SOLUTION ORAL at 10:04

## 2024-12-11 RX ADMIN — FLUTICASONE FUROATE AND VILANTEROL TRIFENATATE 1 PUFF: 100; 25 POWDER RESPIRATORY (INHALATION) at 08:18

## 2024-12-11 RX ADMIN — DICYCLOMINE HYDROCHLORIDE 10 MG: 10 CAPSULE ORAL at 08:13

## 2024-12-11 RX ADMIN — IPRATROPIUM BROMIDE AND ALBUTEROL SULFATE 3 ML: 2.5; .5 SOLUTION RESPIRATORY (INHALATION) at 13:30

## 2024-12-11 RX ADMIN — DICYCLOMINE HYDROCHLORIDE 10 MG: 10 CAPSULE ORAL at 11:59

## 2024-12-11 NOTE — ASSESSMENT & PLAN NOTE
"Recurrent epigastric abdominal pain and diarrhea, admitted 12/6 and discharged 12/7 for same.  Returns to ED states pain returned at 0300 on 12/8.  Thoroughly evaluated by ED, found to have elevated liver enzymes and discussed with gastroenterology, abdominal ultrasound obtained with reassuring findings no liver abnormality  Normal lactic acid level  Patient refused to leave from the emergency department continuing to say she is having severe burning of her insides  ER physician obliged and requested observation admission   no further narcotic medications as this patient is suspected of drug-seeking behavior  Will continue PPI, Carafate, Pepcid outpatient regimen for abdominal pain  Avoid Tylenol due to elevated liver enzymes suspected secondary to MARIS treatment  Per ED note  \"patient with persistent recurrence of pain, requiring repeat medication dosing including IV narcotic medications. Recommendation made for attempt at oral pain medication. Patient expressed concerns about \"waking up in the middle of the night with my insides of burning, and if they admit me, they could figure out why that is happening.\"   Keep stool record at bedside, monitor episodes of diarrhea occurring in 24-hour  Patient has recent EGD done on November in this hospital, reviewed  Discussed the case with gastroenterology team, we will proceed with mesenteric arterial duplex-spoke to vascular surgery and recommend outpt follow up  Will trial bentyl,simethicone for now  Mri abd reviewed. Cont trial of food and monitor pain symptoms . Also placed on stool softeners for constipation  Will do cta abd with celiac compression protocol as patient abd pain persists and r/o vascular compression  "

## 2024-12-11 NOTE — CONSULTS
Consultation - Vascular Surgery   Name: Ileana Seaman 64 y.o. female I MRN: [id number]  Unit/Bed#: -01 I Date of Admission: 12/8/2024   Date of Service: 12/11/2024 I Hospital Day: 2   Consults  Physician Requesting Evaluation: Modesta Santizo MD   Reason for Evaluation / Principal Problem: Celiac artery stenosis    Assessment & Plan  Celiac artery stenosis (HCC)  -Recurrent hospitalization for epigastric pain and diarrhea  -EGD significant for gastritis, but despite therapy, continues to have symptoms  -Epigastric pain apparently exacerbated by eating  -5 pound weight loss in the past month per primary team  -Celiac/mesenteric duplex 12/10/24 shows patent abdominal aorta 1.8 cm.  Celiac artery stenosis greater than 70% with  cm/s and EDV 62 cm/s.  The superior and inferior mesenteric arteries are noted to be patent.  No prior study for comparison.    Plan:  -Check CTA abdomen with celiac compression protocol for further evaluation of abdominal pain  -Further recommendations pending imaging  Epigastric pain  -Recurrent epigastric pain and diarrhea  -As discussed below  Atrial fibrillation (HCC)  -Newly dx parox AF  -Not on a/c  Chronic hypoxic respiratory failure (HCC)  -On supplemental O2  Moderate protein-calorie malnutrition (HCC)        Body mass index is 18.71 kg/m².     Caveats: Due to geographic constraints, patient was not seen or examined.  Mesenteric duplex and CAT scan with contrast images reviewed.  Case and imaging were reviewed and discussed with Dr. Foley.  I have discussed the above management plan in detail with the primary service.     History of Present Illness   Ileana Seaman is a 64 y.o. female 1/2 pack/day smoker, COPD on chronic supplemental O2, MARIS on triple antibiotic therapy, T2DM not on medications (A1c 6.5), AF not on anticoagulation who is currently admitted at [facility] for intractable, recurrent abdominal and diarrhea. EGD performed 11/8/24 showed gastritis. When she  returned, initially thought to be viral She has undergone significant workup with multiple imaging studies under [facility] and GI unrevealing of definite etiology for abdominal pain.  Celiac/ mesenteric duplex demonstrates greater than 70% celiac artery stenosis which vascular surgery is asked to evaluate this patient.    Case discussed with Dr. Santizo, SURY attending. Patient with continued epigastric pain and tenderness which appears to be associated with eating. She has worked with GI and performed multiple imaging studies and placed her on multiple medications which include: Carafate, H2 blocker, PPI, Bentyl, etc. Dr. Santizo tells me that GI believes that further work up of celiac stenosis should be considered.     Review of imaging:  Right upper quadrant ultrasound is unremarkable.     MRI abdomen wo contrast 12/9 shows no acute abdominal pathology.  No ductal stones.  Noted, 8 mm solid right lower lower lobe pulmonary nodule.    Celiac/mesenteric duplex 12/10/24 shows patent abdominal aorta 1.8 cm.  Celiac artery stenosis greater than 70% with  cm/s and EDV 62 cm/s.  The superior and inferior mesenteric arteries are noted to be patent.  No prior study for comparison.    Review of CT abdomen pelvis with contrast 11/7 does show atherosclerotic disease of mesenteric vessels, but inconclusive to adequately evaluate anatomy for mesenteric occlusive disease.    Review of labs:  Evaluation of the labs shows elevated transaminases, alk phos and total bilirubin on 12/8.  Liver functions improving and may have been abnormal due to medication. She is on therapy for mycobacterial - non-TB infection.    Lipase and ammonia levels are normal.    WBC 16.67 on 11/18 and 5.46 on 11/9    Blood cultures x 2 show no growth after 4 days.     Lactic acid negative.     Review of Systems  I have reviewed the patient's PMH, PSH, Social History, Family History, Meds, and Allergies    Objective :  Temp:  [97.2 °F (36.2 °C)-97.9 °F (36.6  °C)] 97.2 °F (36.2 °C)  HR:  [69-91] 84  BP: (107-158)/(62-87) 123/87  Resp:  [16-21] 21  SpO2:  [96 %-100 %] 96 %  O2 Device: Nasal cannula  Nasal Cannula O2 Flow Rate (L/min):  [2 L/min] 2 L/min    I/O         12/09 0701  12/10 0700 12/10 0701  12/11 0700 12/11 0701  12/12 0700    P.O. 660 420 240    Total Intake(mL/kg) 660 (14.7) 420 (9.4) 240 (5.3)    Urine (mL/kg/hr)  800 (0.7)     Total Output  800     Net +660 -380 +240           Unmeasured Urine Occurrence 2 x                Lab Results: I have reviewed the following results:  Recent Labs     12/08/24  2319 12/09/24 0440 12/09/24 0440 12/11/24  0453   WBC  --  5.46  --   --    HGB  --  10.1*  --   --    HCT  --  32.0*  --   --    PLT  --  156  --   --    SODIUM  --  139   < > 139   K  --  3.7   < > 3.5   CL  --  108   < > 106   CO2  --  26   < > 28   BUN  --  3*   < > 3*   CREATININE  --  0.44*   < > 0.44*   GLUC  --  84   < > 104   AST  --  107*   < > 20   ALT  --  160*   < > 89*   ALB  --  3.1*   < > 3.3*   TBILI  --  2.37*   < > 0.55   ALKPHOS  --  161*   < > 140*   INR 1.12  --   --   --     < > = values in this interval not displayed.             I have reviewed and made appropriate changes to the review of systems input by the medical assistant.    Vitals:    12/10/24 2253 12/11/24 0713 12/11/24 0816 12/11/24 0819   BP: 117/64 158/82 123/87    Pulse: 69 69 84    Resp: 16 21     Temp: 97.9 °F (36.6 °C) (!) 97.2 °F (36.2 °C)     TempSrc:       SpO2: 99% 100% 97% 96%   Weight:       Height:           Patient Active Problem List   Diagnosis    Chronic obstructive pulmonary disease with acute lower respiratory infection (HCC)    Mycobacterial infection, non-TB    Hypomagnesemia    Type 2 diabetes mellitus without complication, without long-term current use of insulin (HCC)    Dysphagia    New onset atrial fibrillation (HCC)    Epigastric pain    COPD (chronic obstructive pulmonary disease) (HCC)    Atrial fibrillation (HCC)    Chronic hypoxic respiratory  failure (HCC)    Moderate protein-calorie malnutrition (HCC)    Nausea vomiting and diarrhea    Pulmonary nodule    Elevated liver enzymes    Celiac artery stenosis (HCC)       Past Surgical History:   Procedure Laterality Date    CHOLECYSTECTOMY         History reviewed. No pertinent family history.    Social History     Socioeconomic History    Marital status: Single     Spouse name: Not on file    Number of children: Not on file    Years of education: Not on file    Highest education level: Not on file   Occupational History    Not on file   Tobacco Use    Smoking status: Former     Current packs/day: 0.25     Types: Cigarettes    Smokeless tobacco: Never   Vaping Use    Vaping status: Never Used   Substance and Sexual Activity    Alcohol use: Never    Drug use: Never    Sexual activity: Not on file   Other Topics Concern    Not on file   Social History Narrative    Not on file     Social Drivers of Health     Financial Resource Strain: Not on file   Food Insecurity: No Food Insecurity (12/8/2024)    Nursing - Inadequate Food Risk Classification     Worried About Running Out of Food in the Last Year: Never true     Ran Out of Food in the Last Year: Never true     Ran Out of Food in the Last Year: 1   Recent Concern: Food Insecurity - Food Insecurity Present (10/5/2024)    Nursing - Inadequate Food Risk Classification     Worried About Running Out of Food in the Last Year: Sometimes true     Ran Out of Food in the Last Year: Sometimes true     Ran Out of Food in the Last Year: Not on file   Transportation Needs: Unmet Transportation Needs (12/8/2024)    Nursing - Transportation Risk Classification     Lack of Transportation: Not on file     Lack of Transportation: 1   Physical Activity: Not on file   Stress: Not on file   Social Connections: Unknown (6/18/2024)    Received from [employer]    Social Connections     How often do you feel lonely or isolated from those around you? (Adult - for ages 18 years and over):  Not on file   Intimate Partner Violence: Unknown (2024)    Nursing IPS     Feels Physically and Emotionally Safe: Not on file     Physically Hurt by Someone: Not on file     Humiliated or Emotionally Abused by Someone: Not on file     Physically Hurt by Someone: 2     Hurt or Threatened by Someone: 2   Housing Stability: Unknown (2024)    Nursing: Inadequate Housing Risk Classification     Has Housing: Not on file     Worried About Losing Housing: Not on file     Unable to Get Utilities: Not on file     Unable to Pay for Housing in the Last Year: 2     Has Housin       No Known Allergies      Current Facility-Administered Medications:     albuterol inhalation solution 2.5 mg, 2.5 mg, Nebulization, Q4H PRN, Iesha Espinozax, CRNP    calcium carbonate (TUMS) chewable tablet 500 mg, 500 mg, Oral, Daily PRN, Chapis Ayala MD, 500 mg at 24 1203    dicyclomine (BENTYL) capsule 10 mg, 10 mg, Oral, 4x Daily (AC & HS), Modesta Santizo MD, 10 mg at 24 1159    docusate sodium (COLACE) capsule 100 mg, 100 mg, Oral, BID, Modesta Santizo MD, 100 mg at 24 0813    famotidine (PEPCID) tablet 20 mg, 20 mg, Oral, BID, Iesha S Brandon, CRNP, 20 mg at 24 0813    Fluticasone Furoate-Vilanterol 100-25 mcg/actuation 1 puff, 1 puff, Inhalation, Daily, 1 puff at 24 0818 **AND** umeclidinium 62.5 mcg/actuation inhaler AEPB 1 puff, 1 puff, Inhalation, Daily, Iesha Espinozax, CRNP, 1 puff at 24 0818    hydrOXYzine HCL (ATARAX) tablet 25 mg, 25 mg, Oral, Q6H PRN, Iesha S Brandon, CRNP    ipratropium-albuterol (DUO-NEB) 0.5-2.5 mg/3 mL inhalation solution 3 mL, 3 mL, Nebulization, TID, Modesta Santizo MD, 3 mL at 24 1330    metoprolol tartrate (LOPRESSOR) tablet 25 mg, 25 mg, Oral, Q12H MAYCO, Iesha S Brandon, CRNP, 25 mg at 24 0813    ondansetron (ZOFRAN) injection 4 mg, 4 mg, Intravenous, Q8H PRN, Iesha S Brandon, CRNP, 4 mg at 24 0350    pantoprazole (PROTONIX) EC tablet 40 mg, 40 mg, Oral, BID AC,  Iesha ESPINAL Brandon, CRNP, 40 mg at 12/11/24 0813    simethicone (MYLICON) chewable tablet 80 mg, 80 mg, Oral, Q6H PRN, Modesta Santizo MD, 80 mg at 12/11/24 1257    sucralfate (CARAFATE) tablet 1 g, 1 g, Oral, 4x Daily (AC & HS), Iesha ESPINAL Brandon, CRNP, 1 g at 12/11/24 1159     DVT prophylaxis: Defer to primary team.

## 2024-12-11 NOTE — RESPIRATORY THERAPY NOTE
RT Protocol Note  Ileana Seaman 64 y.o. female MRN: [id number]  Unit/Bed#: -01 Encounter: [id number]    Assessment    Principal Problem:    Epigastric pain  Active Problems:    Mycobacterial infection, non-TB    COPD (chronic obstructive pulmonary disease) (HCC)    Atrial fibrillation (HCC)    Chronic hypoxic respiratory failure (HCC)    Moderate protein-calorie malnutrition (HCC)    Elevated liver enzymes      Home Pulmonary Medications:         Past Medical History:   Diagnosis Date    COPD (chronic obstructive pulmonary disease) (HCC)     Hiatal hernia      Social History     Socioeconomic History    Marital status: Single     Spouse name: None    Number of children: None    Years of education: None    Highest education level: None   Occupational History    None   Tobacco Use    Smoking status: Former     Current packs/day: 0.25     Types: Cigarettes    Smokeless tobacco: Never   Vaping Use    Vaping status: Never Used   Substance and Sexual Activity    Alcohol use: Never    Drug use: Never    Sexual activity: None   Other Topics Concern    None   Social History Narrative    None     Social Drivers of Health     Financial Resource Strain: Not on file   Food Insecurity: No Food Insecurity (12/8/2024)    Nursing - Inadequate Food Risk Classification     Worried About Running Out of Food in the Last Year: Never true     Ran Out of Food in the Last Year: Never true     Ran Out of Food in the Last Year: 1   Recent Concern: Food Insecurity - Food Insecurity Present (10/5/2024)    Nursing - Inadequate Food Risk Classification     Worried About Running Out of Food in the Last Year: Sometimes true     Ran Out of Food in the Last Year: Sometimes true     Ran Out of Food in the Last Year: Not on file   Transportation Needs: Unmet Transportation Needs (12/8/2024)    Nursing - Transportation Risk Classification     Lack of Transportation: Not on file     Lack of Transportation: 1   Physical Activity: Not on file  "  Stress: Not on file   Social Connections: Unknown (2024)    Received from [employer]    Social [employer]     How often do you feel lonely or isolated from those around you? (Adult - for ages 18 years and over): Not on file   Intimate Partner Violence: Unknown (2024)    Nursing IPS     Feels Physically and Emotionally Safe: Not on file     Physically Hurt by Someone: Not on file     Humiliated or Emotionally Abused by Someone: Not on file     Physically Hurt by Someone: 2     Hurt or Threatened by Someone: 2   Housing Stability: Unknown (2024)    Nursing: Inadequate Housing Risk Classification     Has Housing: Not on file     Worried About Losing Housing: Not on file     Unable to Get Utilities: Not on file     Unable to Pay for Housing in the Last Year: 2     Has Housin       Subjective         Objective    Physical Exam:   Assessment Type: During-treatment  General Appearance: Alert, Awake  Respiratory Pattern: Dyspnea with exertion  Chest Assessment: Chest expansion symmetrical  Bilateral Breath Sounds: Clear, Diminished  Cough: None  O2 Device: 2LPM NC    Vitals:  Blood pressure 123/87, pulse 84, temperature (!) 97.2 °F (36.2 °C), resp. rate 21, height 5' 1\" (1.549 m), weight 44.9 kg (99 lb), SpO2 96%.          Imaging and other studies:     O2 Device: 2LPM NC     Plan             Resp Comments: (P) Pt only takes UDN txs BID at home. Changed QID UDN tx to TID.   "

## 2024-12-11 NOTE — PROGRESS NOTES
"Progress Note - Hospitalist   Name: Ileana Seaman 64 y.o. female I MRN: [id number]  Unit/Bed#: MS 228Chu I Date of Admission: 12/8/2024   Date of Service: 12/11/2024 I Hospital Day: 2    Assessment & Plan  Epigastric pain  Recurrent epigastric abdominal pain and diarrhea, admitted 12/6 and discharged 12/7 for same.  Returns to ED states pain returned at 0300 on 12/8.  Thoroughly evaluated by ED, found to have elevated liver enzymes and discussed with gastroenterology, abdominal ultrasound obtained with reassuring findings no liver abnormality  Normal lactic acid level  Patient refused to leave from the emergency department continuing to say she is having severe burning of her insides  ER physician obliged and requested observation admission   no further narcotic medications as this patient is suspected of drug-seeking behavior  Will continue PPI, Carafate, Pepcid outpatient regimen for abdominal pain  Avoid Tylenol due to elevated liver enzymes suspected secondary to MARIS treatment  Per ED note  \"patient with persistent recurrence of pain, requiring repeat medication dosing including IV narcotic medications. Recommendation made for attempt at oral pain medication. Patient expressed concerns about \"waking up in the middle of the night with my insides of burning, and if they admit me, they could figure out why that is happening.\"   Keep stool record at bedside, monitor episodes of diarrhea occurring in 24-hour  Patient has recent EGD done on November in this hospital, reviewed  Discussed the case with gastroenterology team, we will proceed with mesenteric arterial duplex-spoke to vascular surgery and recommend outpt follow up  Will trial bentyl,simethicone for now  Mri abd reviewed. Cont trial of food and monitor pain symptoms . Also placed on stool softeners for constipation  Will do cta abd with celiac compression protocol as patient abd pain persists and r/o vascular compression  Mycobacterial infection, " non-TB  Hx MARIS infection and 3 drug regimen managed by ID (Dr. Landrum )  Azithromycin, rifabutin and ethambutol placed on hold due to elevated liver enzymes.lfts now resolving  Will need outpatient follow-up for regimen adjustment  COPD (chronic obstructive pulmonary disease) (HCC)  No exacerbation  Continue PTA regimen  Atrial fibrillation (HCC)  Continue metoprolol  Not chronically anticoagulated  Chronic hypoxic respiratory failure (HCC)  Maintained on 2 L nasal cannula  Moderate protein-calorie malnutrition (HCC)  Malnutrition Findings: Loss of subcutaneous fat in orbital, triceps, ribcage areas.  Loss of muscle at temples, clavicles, scapula, extremities.  Consultation to dietitian       BMI Findings:     Body mass index is 18.71 kg/m².     Elevated liver enzymes  Suspect DILI  ALT/AST > 3 x nomal, T bili >2 x normal  Azithromycin, ethambutol and rifabutin on hold due to elevated liver enzymes   Will need to follow up with ID for treatment alteration - MARIS infection and 3 drug regimen managed by ID (Dr. Landrum)   neg Tylenol level and normal PT/INR   Repeat CMP in a.m.  Ultrasound RUQ - S/P cholecystectomy, liver imaging unrevealing of abnormality  Will need outpatient follow-up    VTE Pharmacologic Prophylaxis:   Moderate Risk (Score 3-4) - Pharmacological DVT Prophylaxis Contraindicated. Sequential Compression Devices Ordered.    Mobility:   Basic Mobility Inpatient Raw Score: 23  JH-HLM Goal: 7: Walk 25 feet or more  JH-HLM Achieved: 8: Walk 250 feet ot more  JH-HLM Goal achieved. Continue to encourage appropriate mobility.    Patient Centered Rounds: I performed bedside rounds with nursing staff today.   Discussions with Specialists or Other Care Team Provider: alison vascular and gi    Education and Discussions with Family / Patient: updated patient    Current Length of Stay: 2 day(s)  Current Patient Status: Inpatient   Certification Statement: The patient will continue to require additional inpatient  hospital stay due to abd pain  Discharge Plan: Anticipate discharge in 24-48 hrs to home.    Code Status: Level 1 - Full Code    Subjective   Still complaining of bloating and upper abdominal pain and having hard time tolerating oral diet consistently as this afternoon the pain got worse again     Objective :  Temp:  [97.2 °F (36.2 °C)-97.9 °F (36.6 °C)] 97.2 °F (36.2 °C)  HR:  [69-91] 84  BP: (107-158)/(62-87) 123/87  Resp:  [16-21] 21  SpO2:  [96 %-100 %] 96 %  O2 Device: Nasal cannula  Nasal Cannula O2 Flow Rate (L/min):  [2 L/min] 2 L/min    Body mass index is 18.71 kg/m².     Input and Output Summary (last 24 hours):     Intake/Output Summary (Last 24 hours) at 12/11/2024 1302  Last data filed at 12/11/2024 0800  Gross per 24 hour   Intake 480 ml   Output --   Net 480 ml       Physical Exam  Vitals and nursing note reviewed.   Constitutional:       Appearance: Normal appearance.   HENT:      Head: Normocephalic and atraumatic.      Right Ear: External ear normal.      Left Ear: External ear normal.      Nose: Nose normal.      Mouth/Throat:      Pharynx: Oropharynx is clear.   Cardiovascular:      Rate and Rhythm: Normal rate and regular rhythm.      Heart sounds: Normal heart sounds.   Pulmonary:      Effort: Pulmonary effort is normal.      Breath sounds: Normal breath sounds.   Abdominal:      General: Bowel sounds are normal. There is distension.      Palpations: Abdomen is soft.      Tenderness: There is abdominal tenderness.   Musculoskeletal:         General: Normal range of motion.      Cervical back: Normal range of motion and neck supple.   Skin:     General: Skin is warm and dry.      Capillary Refill: Capillary refill takes less than 2 seconds.   Neurological:      General: No focal deficit present.      Mental Status: She is alert and oriented to person, place, and time.   Psychiatric:         Mood and Affect: Mood normal.           Lines/Drains:              Lab Results: I have reviewed the  following results:   Results from last 7 days   Lab Units 12/09/24  0440 12/08/24  1034   WBC Thousand/uL 5.46 9.26   HEMOGLOBIN g/dL 10.1* 13.0   HEMATOCRIT % 32.0* 39.7   PLATELETS Thousands/uL 156 193   SEGS PCT %  --  77*   LYMPHO PCT %  --  12*   MONO PCT %  --  10   EOS PCT %  --  1     Results from last 7 days   Lab Units 12/11/24  0453   SODIUM mmol/L 139   POTASSIUM mmol/L 3.5   CHLORIDE mmol/L 106   CO2 mmol/L 28   BUN mg/dL 3*   CREATININE mg/dL 0.44*   ANION GAP mmol/L 5   CALCIUM mg/dL 8.8   ALBUMIN g/dL 3.3*   TOTAL BILIRUBIN mg/dL 0.55   ALK PHOS U/L 140*   ALT U/L 89*   AST U/L 20   GLUCOSE RANDOM mg/dL 104     Results from last 7 days   Lab Units 12/08/24  2319   INR  1.12             Results from last 7 days   Lab Units 12/08/24  1034 12/06/24  1310 12/06/24  1307   LACTIC ACID mmol/L 0.6  --  1.6   PROCALCITONIN ng/ml  --  <0.05  --        Recent Cultures (last 7 days):   Results from last 7 days   Lab Units 12/06/24  1310   BLOOD CULTURE  No Growth After 4 Days.  No Growth After 4 Days.       Imaging Results Review: I reviewed radiology reports from this admission including: MRI abdomen/MRCP and Ultrasound(s).  Other Study Results Review: EKG was reviewed.     Last 24 Hours Medication List:     Current Facility-Administered Medications:     albuterol inhalation solution 2.5 mg, Q4H PRN    calcium carbonate (TUMS) chewable tablet 500 mg, Daily PRN    dicyclomine (BENTYL) capsule 10 mg, 4x Daily (AC & HS)    docusate sodium (COLACE) capsule 100 mg, BID    famotidine (PEPCID) tablet 20 mg, BID    Fluticasone Furoate-Vilanterol 100-25 mcg/actuation 1 puff, Daily **AND** umeclidinium 62.5 mcg/actuation inhaler AEPB 1 puff, Daily    hydrOXYzine HCL (ATARAX) tablet 25 mg, Q6H PRN    ipratropium-albuterol (DUO-NEB) 0.5-2.5 mg/3 mL inhalation solution 3 mL, TID    metoprolol tartrate (LOPRESSOR) tablet 25 mg, Q12H MAYCO    ondansetron (ZOFRAN) injection 4 mg, Q8H PRN    pantoprazole (PROTONIX) EC tablet 40  mg, BID AC    simethicone (MYLICON) chewable tablet 80 mg, Q6H PRN    sucralfate (CARAFATE) tablet 1 g, 4x Daily (AC & HS)    Administrative Statements   Today, Patient Was Seen By: Modesta Santizo MD      **Please Note: This note may have been constructed using a voice recognition system.**

## 2024-12-11 NOTE — ASSESSMENT & PLAN NOTE
-Recurrent hospitalization for epigastric pain and diarrhea  -EGD significant for gastritis, but despite therapy, continues to have symptoms  -Epigastric pain apparently exacerbated by eating  -5 pound weight loss in the past month per primary team  -Celiac/mesenteric duplex 12/10/24 shows patent abdominal aorta 1.8 cm.  Celiac artery stenosis greater than 70% with  cm/s and EDV 62 cm/s.  The superior and inferior mesenteric arteries are noted to be patent.  No prior study for comparison.    Plan:  -Check CTA abdomen with celiac compression protocol for further evaluation of abdominal pain  -Further recommendations pending imaging

## 2024-12-11 NOTE — PLAN OF CARE
Problem: Potential for Falls  Goal: Patient will remain free of falls  Description: INTERVENTIONS:  - Educate patient/family on patient safety including physical limitations  - Instruct patient to call for assistance with activity   - Consult OT/PT to assist with strengthening/mobility   - Keep Call bell within reach  - Keep bed low and locked with side rails adjusted as appropriate  - Keep care items and personal belongings within reach  - Initiate and maintain comfort rounds  - Make Fall Risk Sign visible to staff  - Offer Toileting every 2 Hours, in advance of need  - Obtain necessary fall risk management equipment: non-slip sock  - Apply yellow socks and bracelet for high fall risk patients  - Consider moving patient to room near nurses station  Outcome: Progressing     Problem: PAIN - ADULT  Goal: Verbalizes/displays adequate comfort level or baseline comfort level  Description: Interventions:  - Encourage patient to monitor pain and request assistance  - Assess pain using appropriate pain scale  - Administer analgesics based on type and severity of pain and evaluate response  - Implement non-pharmacological measures as appropriate and evaluate response  - Consider cultural and social influences on pain and pain management  - Notify physician/advanced practitioner if interventions unsuccessful or patient reports new pain  Outcome: Progressing

## 2024-12-12 LAB
ALBUMIN SERPL BCG-MCNC: 3.2 G/DL (ref 3.5–5)
ALP SERPL-CCNC: 135 U/L (ref 34–104)
ALT SERPL W P-5'-P-CCNC: 82 U/L (ref 7–52)
ANION GAP SERPL CALCULATED.3IONS-SCNC: 8 MMOL/L (ref 4–13)
AST SERPL W P-5'-P-CCNC: 40 U/L (ref 13–39)
BILIRUB SERPL-MCNC: 0.49 MG/DL (ref 0.2–1)
BUN SERPL-MCNC: 4 MG/DL (ref 5–25)
CALCIUM ALBUM COR SERPL-MCNC: 9.3 MG/DL (ref 8.3–10.1)
CALCIUM SERPL-MCNC: 8.7 MG/DL (ref 8.4–10.2)
CHLORIDE SERPL-SCNC: 105 MMOL/L (ref 96–108)
CO2 SERPL-SCNC: 29 MMOL/L (ref 21–32)
CREAT SERPL-MCNC: 0.46 MG/DL (ref 0.6–1.3)
GFR SERPL CREATININE-BSD FRML MDRD: 103 ML/MIN/1.73SQ M
GLUCOSE SERPL-MCNC: 88 MG/DL (ref 65–140)
POTASSIUM SERPL-SCNC: 3.4 MMOL/L (ref 3.5–5.3)
PROT SERPL-MCNC: 5.3 G/DL (ref 6.4–8.4)
SODIUM SERPL-SCNC: 142 MMOL/L (ref 135–147)

## 2024-12-12 PROCEDURE — 94640 AIRWAY INHALATION TREATMENT: CPT

## 2024-12-12 PROCEDURE — 94664 DEMO&/EVAL PT USE INHALER: CPT

## 2024-12-12 PROCEDURE — 94760 N-INVAS EAR/PLS OXIMETRY 1: CPT

## 2024-12-12 PROCEDURE — 80053 COMPREHEN METABOLIC PANEL: CPT | Performed by: FAMILY MEDICINE

## 2024-12-12 PROCEDURE — 99232 SBSQ HOSP IP/OBS MODERATE 35: CPT | Performed by: FAMILY MEDICINE

## 2024-12-12 RX ORDER — POLYETHYLENE GLYCOL 3350 17 G/17G
17 POWDER, FOR SOLUTION ORAL 2 TIMES DAILY
Status: DISCONTINUED | OUTPATIENT
Start: 2024-12-12 | End: 2024-12-12

## 2024-12-12 RX ORDER — POTASSIUM CHLORIDE 1500 MG/1
20 TABLET, EXTENDED RELEASE ORAL ONCE
Status: COMPLETED | OUTPATIENT
Start: 2024-12-12 | End: 2024-12-12

## 2024-12-12 RX ORDER — POLYETHYLENE GLYCOL 3350 17 G/17G
238 POWDER, FOR SOLUTION ORAL ONCE
Status: COMPLETED | OUTPATIENT
Start: 2024-12-12 | End: 2024-12-12

## 2024-12-12 RX ADMIN — SUCRALFATE 1 G: 1 TABLET ORAL at 11:37

## 2024-12-12 RX ADMIN — DICYCLOMINE HYDROCHLORIDE 10 MG: 10 CAPSULE ORAL at 21:48

## 2024-12-12 RX ADMIN — PANTOPRAZOLE SODIUM 40 MG: 40 TABLET, DELAYED RELEASE ORAL at 06:46

## 2024-12-12 RX ADMIN — FAMOTIDINE 20 MG: 20 TABLET, FILM COATED ORAL at 16:49

## 2024-12-12 RX ADMIN — DICYCLOMINE HYDROCHLORIDE 10 MG: 10 CAPSULE ORAL at 16:49

## 2024-12-12 RX ADMIN — PANTOPRAZOLE SODIUM 40 MG: 40 TABLET, DELAYED RELEASE ORAL at 16:49

## 2024-12-12 RX ADMIN — DOCUSATE SODIUM 100 MG: 100 CAPSULE, LIQUID FILLED ORAL at 09:00

## 2024-12-12 RX ADMIN — SUCRALFATE 1 G: 1 TABLET ORAL at 06:46

## 2024-12-12 RX ADMIN — POLYETHYLENE GLYCOL 3350 238 G: 17 POWDER, FOR SOLUTION ORAL at 12:26

## 2024-12-12 RX ADMIN — IPRATROPIUM BROMIDE AND ALBUTEROL SULFATE 3 ML: 2.5; .5 SOLUTION RESPIRATORY (INHALATION) at 13:24

## 2024-12-12 RX ADMIN — FLUTICASONE FUROATE AND VILANTEROL TRIFENATATE 1 PUFF: 100; 25 POWDER RESPIRATORY (INHALATION) at 09:01

## 2024-12-12 RX ADMIN — CALCIUM CARBONATE (ANTACID) CHEW TAB 500 MG 500 MG: 500 CHEW TAB at 18:11

## 2024-12-12 RX ADMIN — SUCRALFATE 1 G: 1 TABLET ORAL at 21:48

## 2024-12-12 RX ADMIN — UMECLIDINIUM 1 PUFF: 62.5 AEROSOL, POWDER ORAL at 09:01

## 2024-12-12 RX ADMIN — DOCUSATE SODIUM 100 MG: 100 CAPSULE, LIQUID FILLED ORAL at 16:49

## 2024-12-12 RX ADMIN — IPRATROPIUM BROMIDE AND ALBUTEROL SULFATE 3 ML: 2.5; .5 SOLUTION RESPIRATORY (INHALATION) at 20:21

## 2024-12-12 RX ADMIN — FAMOTIDINE 20 MG: 20 TABLET, FILM COATED ORAL at 09:00

## 2024-12-12 RX ADMIN — METOPROLOL TARTRATE 25 MG: 25 TABLET, FILM COATED ORAL at 09:00

## 2024-12-12 RX ADMIN — POLYETHYLENE GLYCOL 3350 17 G: 17 POWDER, FOR SOLUTION ORAL at 09:00

## 2024-12-12 RX ADMIN — DICYCLOMINE HYDROCHLORIDE 10 MG: 10 CAPSULE ORAL at 06:46

## 2024-12-12 RX ADMIN — IPRATROPIUM BROMIDE AND ALBUTEROL SULFATE 3 ML: 2.5; .5 SOLUTION RESPIRATORY (INHALATION) at 08:09

## 2024-12-12 RX ADMIN — POTASSIUM CHLORIDE 20 MEQ: 1500 TABLET, EXTENDED RELEASE ORAL at 13:39

## 2024-12-12 RX ADMIN — DICYCLOMINE HYDROCHLORIDE 10 MG: 10 CAPSULE ORAL at 11:37

## 2024-12-12 RX ADMIN — SUCRALFATE 1 G: 1 TABLET ORAL at 16:49

## 2024-12-12 RX ADMIN — SIMETHICONE 80 MG: 80 TABLET, CHEWABLE ORAL at 16:51

## 2024-12-12 NOTE — ASSESSMENT & PLAN NOTE
Suspect DILI  ALT/AST > 3 x nomal, T bili >2 x normal  Azithromycin, ethambutol and rifabutin on hold due to elevated liver enzymes   Will need to follow up with ID for treatment alteration - MARIS infection and 3 drug regimen managed by ID (Dr. Landrum)   neg Tylenol level and normal PT/INR   Ultrasound RUQ - S/P cholecystectomy, liver imaging unrevealing of abnormality  Will need outpatient follow-up

## 2024-12-12 NOTE — ASSESSMENT & PLAN NOTE
Hx MARIS infection and 3 drug regimen managed by ID (Dr. Landrum )  Azithromycin, rifabutin and ethambutol placed on hold due to elevated liver enzymes.lfts now resolving  Consult inf disease for reviewing medications and recommendations

## 2024-12-12 NOTE — CONSULTS
Vascular Surgery    Please see Vascular Surgery Consult from earlier by Dior Palmer PA-C 12/11/24 @1:34pm

## 2024-12-12 NOTE — PROGRESS NOTES
"Progress Note - Hospitalist   Name: Ileana Seaman 64 y.o. female I MRN: [id number]  Unit/Bed#: MS 228Chu I Date of Admission: 12/8/2024   Date of Service: 12/12/2024 I Hospital Day: 3    Assessment & Plan  Epigastric pain  Recurrent epigastric abdominal pain and diarrhea, admitted 12/6 and discharged 12/7 for same.  Returns to ED states pain returned at 0300 on 12/8.  Thoroughly evaluated by ED, found to have elevated liver enzymes and discussed with gastroenterology, abdominal ultrasound obtained with reassuring findings no liver abnormality  Normal lactic acid level  Patient refused to leave from the emergency department continuing to say she is having severe burning of her insides  ER physician obliged and requested observation admission   no further narcotic medications as this patient is suspected of drug-seeking behavior  Will continue PPI, Carafate, Pepcid outpatient regimen for abdominal pain  Avoid Tylenol due to elevated liver enzymes suspected secondary to MARIS treatment  Per ED note  \"patient with persistent recurrence of pain, requiring repeat medication dosing including IV narcotic medications. Recommendation made for attempt at oral pain medication. Patient expressed concerns about \"waking up in the middle of the night with my insides of burning, and if they admit me, they could figure out why that is happening.\"   Keep stool record at bedside, monitor episodes of diarrhea occurring in 24-hour  Patient has recent EGD done on November in this hospital, reviewed  Discussed the case with gastroenterology team, we will proceed with mesenteric arterial duplex-spoke to vascular surgery and recommend outpt follow up  Will trial bentyl,simethicone for now  Mri abd reviewed. Cont trial of food and monitor pain symptoms . Also placed on stool softeners for constipation  cta abd Abdominal aorta and branches are patent with no aneurysm or dissection.Celiac artery with moderate origin stenosis with patent " branches. Replaced right hepatic artery from the superior mesenteric artery.Mild to moderate atherosclerotic changes in the pelvic vessels with tortuous left external iliac artery.Stool impaction in the ascending and transverse colon.  Placed on tap water enema and bowel prep.need to have 2-3 bowel movements  Mycobacterial infection, non-TB  Hx MARIS infection and 3 drug regimen managed by ID (Dr. Landrum )  Azithromycin, rifabutin and ethambutol placed on hold due to elevated liver enzymes.lfts now resolving  Consult inf disease for reviewing medications and recommendations   COPD (chronic obstructive pulmonary disease) (HCC)  No exacerbation  Continue PTA regimen  Atrial fibrillation (HCC)  Continue metoprolol  Not chronically anticoagulated  Chronic hypoxic respiratory failure (HCC)  Maintained on 2 L nasal cannula  Moderate protein-calorie malnutrition (HCC)  Malnutrition Findings: Loss of subcutaneous fat in orbital, triceps, ribcage areas.  Loss of muscle at temples, clavicles, scapula, extremities.  Consultation to dietitian       BMI Findings:     Body mass index is 18.71 kg/m².     Elevated liver enzymes  Suspect DILI  ALT/AST > 3 x nomal, T bili >2 x normal  Azithromycin, ethambutol and rifabutin on hold due to elevated liver enzymes   Will need to follow up with ID for treatment alteration - MARIS infection and 3 drug regimen managed by ID (Dr. Landrum)   neg Tylenol level and normal PT/INR   Ultrasound RUQ - S/P cholecystectomy, liver imaging unrevealing of abnormality  Will need outpatient follow-up  Celiac artery stenosis (HCC)  Outpt follow up with vascular    VTE Pharmacologic Prophylaxis:   Moderate Risk (Score 3-4) - Pharmacological DVT Prophylaxis Contraindicated. Sequential Compression Devices Ordered.    Mobility:   Basic Mobility Inpatient Raw Score: 24  JH-HLM Goal: 8: Walk 250 feet or more  JH-HLM Achieved: 8: Walk 250 feet ot more  JH-HLM Goal achieved. Continue to encourage appropriate  mobility.    Patient Centered Rounds: I performed bedside rounds with nursing staff today.   Discussions with Specialists or Other Care Team Provider: alison vascular and gi    Education and Discussions with Family / Patient: update daughter    Current Length of Stay: 3 day(s)  Current Patient Status: Inpatient   Certification Statement: The patient will continue to require additional inpatient hospital stay due to abd pain  Discharge Plan: Anticipate discharge in 24-48 hrs to home.    Code Status: Level 1 - Full Code    Subjective   No complaints of abdominal pain discomfort and has not had much of a bowel movement since admission.  Had tapwater enema with 2 very small pieces of brown stool.    Objective :  Temp:  [97.3 °F (36.3 °C)] 97.3 °F (36.3 °C)  HR:  [66-87] 70  BP: (108-125)/(54-90) 112/54  Resp:  [17-18] 18  SpO2:  [93 %-99 %] 98 %  O2 Device: Nasal cannula  Nasal Cannula O2 Flow Rate (L/min):  [2 L/min] 2 L/min    Body mass index is 18.71 kg/m².     Input and Output Summary (last 24 hours):     Intake/Output Summary (Last 24 hours) at 12/12/2024 1239  Last data filed at 12/12/2024 0647  Gross per 24 hour   Intake 840 ml   Output 1000 ml   Net -160 ml       Physical Exam  Vitals and nursing note reviewed.   Constitutional:       Appearance: Normal appearance.   HENT:      Head: Normocephalic and atraumatic.      Right Ear: External ear normal.      Left Ear: External ear normal.      Nose: Nose normal.      Mouth/Throat:      Pharynx: Oropharynx is clear.   Cardiovascular:      Rate and Rhythm: Normal rate and regular rhythm.      Heart sounds: Normal heart sounds.   Pulmonary:      Effort: Pulmonary effort is normal.      Breath sounds: Normal breath sounds.   Abdominal:      General: Bowel sounds are normal.      Palpations: Abdomen is soft.      Tenderness: There is abdominal tenderness.   Musculoskeletal:         General: Normal range of motion.      Cervical back: Normal range of motion and neck supple.    Skin:     General: Skin is warm and dry.      Capillary Refill: Capillary refill takes less than 2 seconds.   Neurological:      General: No focal deficit present.      Mental Status: She is alert and oriented to person, place, and time.   Psychiatric:         Mood and Affect: Mood normal.           Lines/Drains:              Lab Results: I have reviewed the following results:   Results from last 7 days   Lab Units 12/09/24  0440 12/08/24  1034   WBC Thousand/uL 5.46 9.26   HEMOGLOBIN g/dL 10.1* 13.0   HEMATOCRIT % 32.0* 39.7   PLATELETS Thousands/uL 156 193   SEGS PCT %  --  77*   LYMPHO PCT %  --  12*   MONO PCT %  --  10   EOS PCT %  --  1     Results from last 7 days   Lab Units 12/12/24  0529   SODIUM mmol/L 142   POTASSIUM mmol/L 3.4*   CHLORIDE mmol/L 105   CO2 mmol/L 29   BUN mg/dL 4*   CREATININE mg/dL 0.46*   ANION GAP mmol/L 8   CALCIUM mg/dL 8.7   ALBUMIN g/dL 3.2*   TOTAL BILIRUBIN mg/dL 0.49   ALK PHOS U/L 135*   ALT U/L 82*   AST U/L 40*   GLUCOSE RANDOM mg/dL 88     Results from last 7 days   Lab Units 12/08/24  2319   INR  1.12             Results from last 7 days   Lab Units 12/08/24  1034 12/06/24  1310 12/06/24  1307   LACTIC ACID mmol/L 0.6  --  1.6   PROCALCITONIN ng/ml  --  <0.05  --        Recent Cultures (last 7 days):   Results from last 7 days   Lab Units 12/06/24  1310   BLOOD CULTURE  No Growth After 5 Days.  No Growth After 5 Days.       Imaging Results Review: I reviewed radiology reports from this admission including: CT abdomen/pelvis.  Other Study Results Review: EKG was reviewed.     Last 24 Hours Medication List:     Current Facility-Administered Medications:     albuterol inhalation solution 2.5 mg, Q4H PRN    calcium carbonate (TUMS) chewable tablet 500 mg, Daily PRN    dicyclomine (BENTYL) capsule 10 mg, 4x Daily (AC & HS)    docusate sodium (COLACE) capsule 100 mg, BID    famotidine (PEPCID) tablet 20 mg, BID    Fluticasone Furoate-Vilanterol 100-25 mcg/actuation 1 puff,  Daily **AND** umeclidinium 62.5 mcg/actuation inhaler AEPB 1 puff, Daily    hydrOXYzine HCL (ATARAX) tablet 25 mg, Q6H PRN    ipratropium-albuterol (DUO-NEB) 0.5-2.5 mg/3 mL inhalation solution 3 mL, TID    metoprolol tartrate (LOPRESSOR) tablet 25 mg, Q12H MAYCO    ondansetron (ZOFRAN) injection 4 mg, Q8H PRN    pantoprazole (PROTONIX) EC tablet 40 mg, BID AC    simethicone (MYLICON) chewable tablet 80 mg, Q6H PRN    sucralfate (CARAFATE) tablet 1 g, 4x Daily (AC & HS)    Administrative Statements   Today, Patient Was Seen By: Modesta Santizo MD      **Please Note: This note may have been constructed using a voice recognition system.**

## 2024-12-12 NOTE — ASSESSMENT & PLAN NOTE
"Recurrent epigastric abdominal pain and diarrhea, admitted 12/6 and discharged 12/7 for same.  Returns to ED states pain returned at 0300 on 12/8.  Thoroughly evaluated by ED, found to have elevated liver enzymes and discussed with gastroenterology, abdominal ultrasound obtained with reassuring findings no liver abnormality  Normal lactic acid level  Patient refused to leave from the emergency department continuing to say she is having severe burning of her insides  ER physician obliged and requested observation admission   no further narcotic medications as this patient is suspected of drug-seeking behavior  Will continue PPI, Carafate, Pepcid outpatient regimen for abdominal pain  Avoid Tylenol due to elevated liver enzymes suspected secondary to MARIS treatment  Per ED note  \"patient with persistent recurrence of pain, requiring repeat medication dosing including IV narcotic medications. Recommendation made for attempt at oral pain medication. Patient expressed concerns about \"waking up in the middle of the night with my insides of burning, and if they admit me, they could figure out why that is happening.\"   Keep stool record at bedside, monitor episodes of diarrhea occurring in 24-hour  Patient has recent EGD done on November in this hospital, reviewed  Discussed the case with gastroenterology team, we will proceed with mesenteric arterial duplex-spoke to vascular surgery and recommend outpt follow up  Will trial bentyl,simethicone for now  Mri abd reviewed. Cont trial of food and monitor pain symptoms . Also placed on stool softeners for constipation  cta abd Abdominal aorta and branches are patent with no aneurysm or dissection.Celiac artery with moderate origin stenosis with patent branches. Replaced right hepatic artery from the superior mesenteric artery.Mild to moderate atherosclerotic changes in the pelvic vessels with tortuous left external iliac artery.Stool impaction in the ascending and transverse " colon.  Placed on tap water enema and bowel prep.need to have 2-3 bowel movements

## 2024-12-13 PROCEDURE — 94640 AIRWAY INHALATION TREATMENT: CPT

## 2024-12-13 PROCEDURE — 94760 N-INVAS EAR/PLS OXIMETRY 1: CPT

## 2024-12-13 PROCEDURE — 99255 IP/OBS CONSLTJ NEW/EST HI 80: CPT | Performed by: INTERNAL MEDICINE

## 2024-12-13 PROCEDURE — 99232 SBSQ HOSP IP/OBS MODERATE 35: CPT | Performed by: FAMILY MEDICINE

## 2024-12-13 RX ORDER — LACTULOSE 10 G/15ML
20 SOLUTION ORAL 2 TIMES DAILY
Status: DISCONTINUED | OUTPATIENT
Start: 2024-12-13 | End: 2024-12-16

## 2024-12-13 RX ADMIN — IPRATROPIUM BROMIDE AND ALBUTEROL SULFATE 3 ML: 2.5; .5 SOLUTION RESPIRATORY (INHALATION) at 19:51

## 2024-12-13 RX ADMIN — SUCRALFATE 1 G: 1 TABLET ORAL at 17:01

## 2024-12-13 RX ADMIN — DICYCLOMINE HYDROCHLORIDE 10 MG: 10 CAPSULE ORAL at 22:43

## 2024-12-13 RX ADMIN — DOCUSATE SODIUM 100 MG: 100 CAPSULE, LIQUID FILLED ORAL at 09:15

## 2024-12-13 RX ADMIN — DICYCLOMINE HYDROCHLORIDE 10 MG: 10 CAPSULE ORAL at 12:09

## 2024-12-13 RX ADMIN — DICYCLOMINE HYDROCHLORIDE 10 MG: 10 CAPSULE ORAL at 07:31

## 2024-12-13 RX ADMIN — FAMOTIDINE 20 MG: 20 TABLET, FILM COATED ORAL at 09:15

## 2024-12-13 RX ADMIN — IPRATROPIUM BROMIDE AND ALBUTEROL SULFATE 3 ML: 2.5; .5 SOLUTION RESPIRATORY (INHALATION) at 07:17

## 2024-12-13 RX ADMIN — SUCRALFATE 1 G: 1 TABLET ORAL at 22:43

## 2024-12-13 RX ADMIN — SUCRALFATE 1 G: 1 TABLET ORAL at 12:09

## 2024-12-13 RX ADMIN — FLUTICASONE FUROATE AND VILANTEROL TRIFENATATE 1 PUFF: 100; 25 POWDER RESPIRATORY (INHALATION) at 09:15

## 2024-12-13 RX ADMIN — SUCRALFATE 1 G: 1 TABLET ORAL at 07:31

## 2024-12-13 RX ADMIN — DOCUSATE SODIUM 100 MG: 100 CAPSULE, LIQUID FILLED ORAL at 17:01

## 2024-12-13 RX ADMIN — LACTULOSE 20 G: 20 SOLUTION ORAL at 17:02

## 2024-12-13 RX ADMIN — PANTOPRAZOLE SODIUM 40 MG: 40 TABLET, DELAYED RELEASE ORAL at 17:01

## 2024-12-13 RX ADMIN — DICYCLOMINE HYDROCHLORIDE 10 MG: 10 CAPSULE ORAL at 17:01

## 2024-12-13 RX ADMIN — METOPROLOL TARTRATE 25 MG: 25 TABLET, FILM COATED ORAL at 09:15

## 2024-12-13 RX ADMIN — METOPROLOL TARTRATE 25 MG: 25 TABLET, FILM COATED ORAL at 22:43

## 2024-12-13 RX ADMIN — UMECLIDINIUM 1 PUFF: 62.5 AEROSOL, POWDER ORAL at 09:15

## 2024-12-13 RX ADMIN — IPRATROPIUM BROMIDE AND ALBUTEROL SULFATE 3 ML: 2.5; .5 SOLUTION RESPIRATORY (INHALATION) at 13:07

## 2024-12-13 RX ADMIN — FAMOTIDINE 20 MG: 20 TABLET, FILM COATED ORAL at 17:01

## 2024-12-13 RX ADMIN — PANTOPRAZOLE SODIUM 40 MG: 40 TABLET, DELAYED RELEASE ORAL at 07:31

## 2024-12-13 RX ADMIN — LACTULOSE 20 G: 20 SOLUTION ORAL at 10:07

## 2024-12-13 NOTE — CASE MANAGEMENT
Case Management Discharge Planning Note    Patient name Ileana Seaman  Location /-01 MRN [id number]  : 1960 Date 2024       Current Admission Date: 2024  Current Admission Diagnosis:Celiac artery stenosis (HCC)   Patient Active Problem List    Diagnosis Date Noted Date Diagnosed    Celiac artery stenosis (HCC) 2024     Elevated liver enzymes 2024     Pulmonary nodule 2024     Nausea vomiting and diarrhea 2024     Moderate protein-calorie malnutrition (HCC) 2024     COPD (chronic obstructive pulmonary disease) (HCC) 2024     Atrial fibrillation (HCC) 2024     Chronic hypoxic respiratory failure (HCC) 2024     Epigastric pain 2024     New onset atrial fibrillation (HCC) 10/06/2024     Type 2 diabetes mellitus without complication, without long-term current use of insulin (HCC) 10/05/2024     Dysphagia 10/05/2024     Chronic obstructive pulmonary disease with acute lower respiratory infection (HCC) 10/04/2024     Mycobacterial infection, 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     Hypomagnesemia 10/04/2024       LOS (days): 4  Geometric Mean LOS (GMLOS) (days): 2.5  Days to GMLOS:-1.5     OBJECTIVE:  Risk of Unplanned Readmission Score: 18.62         Current admission status: Inpatient   Preferred Pharmacy:   [id number] - [place], [place] - [address]  [address]  [place] [address]  Phone: [phone] Fax: [phone]    Primary Care Provider: Merline Dinero    Primary Insurance: MEDICARE  Secondary Insurance: [facility]    DISCHARGE DETAILS:        Chart reviewed aware of medical management. Case was discussed in multidisciplinary discharge meeting.  Clinical information supporting hospitalization: pt not medically cleared for discharge, pt has had multiple GI complaints and elevated LFTs. Pt requiring aggressive bowel regime. Followed by gastroenterology, infectious disease and  vascular surgery. Pt has COPD on oxygen 3 LPM, pt wears chronic oxygen at home.      Barriers to discharge:  - medical management  Discharge plan: Home , family to provide transportation to home when medically cleared for discharge.     CM will continue to follow plan of care.

## 2024-12-13 NOTE — ASSESSMENT & PLAN NOTE
Malnutrition Findings:                                 BMI Findings:           Body mass index is 18.71 kg/m².

## 2024-12-13 NOTE — PROGRESS NOTES
"Progress Note - Hospitalist   Name: Ileana Seaman 64 y.o. female I MRN: [id number]  Unit/Bed#: -Kelin I Date of Admission: 12/8/2024   Date of Service: 12/13/2024 I Hospital Day: 4    Assessment & Plan  Epigastric pain  Recurrent epigastric abdominal pain and diarrhea, admitted 12/6 and discharged 12/7 for same.  Returns to ED states pain returned at 0300 on 12/8.  Thoroughly evaluated by ED, found to have elevated liver enzymes and discussed with gastroenterology, abdominal ultrasound obtained with reassuring findings no liver abnormality  Normal lactic acid level  Patient refused to leave from the emergency department continuing to say she is having severe burning of her insides  ER physician obliged and requested observation admission   no further narcotic medications as this patient is suspected of drug-seeking behavior  Will continue PPI, Carafate, Pepcid outpatient regimen for abdominal pain  Avoid Tylenol due to elevated liver enzymes suspected secondary to MARIS treatment  Per ED note  \"patient with persistent recurrence of pain, requiring repeat medication dosing including IV narcotic medications. Recommendation made for attempt at oral pain medication. Patient expressed concerns about \"waking up in the middle of the night with my insides of burning, and if they admit me, they could figure out why that is happening.\"   Keep stool record at bedside, monitor episodes of diarrhea occurring in 24-hour  Patient has recent EGD done on November in this hospital, reviewed  Discussed the case with gastroenterology team, we will proceed with mesenteric arterial duplex-spoke to vascular surgery and recommend outpt follow up  Will trial bentyl,simethicone for now  Mri abd reviewed. Cont trial of food and monitor pain symptoms . Also placed on stool softeners for constipation  cta abd Abdominal aorta and branches are patent with no aneurysm or dissection.Celiac artery with moderate origin stenosis with patent " branches. Replaced right hepatic artery from the superior mesenteric artery.Mild to moderate atherosclerotic changes in the pelvic vessels with tortuous left external iliac artery.Stool impaction in the ascending and transverse colon.  Placed on tap water enema and bowel prep.need to have 2-3 bowel movements  Mycobacterial infection, non-TB  Hx MARIS infection and 3 drug regimen managed by ID (Dr. Landrum )  Azithromycin, rifabutin and ethambutol placed on hold due to elevated liver enzymes.lfts now resolving  Consult inf disease for reviewing medications and recommendations   COPD (chronic obstructive pulmonary disease) (HCC)  No exacerbation  Continue PTA regimen  Atrial fibrillation (HCC)  Continue metoprolol  Not chronically anticoagulated  Chronic hypoxic respiratory failure (HCC)  Maintained on 2 L nasal cannula  Moderate protein-calorie malnutrition (HCC)  Malnutrition Findings: Loss of subcutaneous fat in orbital, triceps, ribcage areas.  Loss of muscle at temples, clavicles, scapula, extremities.  Consultation to dietitian       BMI Findings:     Body mass index is 18.71 kg/m².     Elevated liver enzymes  Suspect DILI  ALT/AST > 3 x nomal, T bili >2 x normal  Azithromycin, ethambutol and rifabutin on hold due to elevated liver enzymes   Will need to follow up with ID for treatment alteration - MARIS infection and 3 drug regimen managed by ID (Dr. Landrum)   neg Tylenol level and normal PT/INR   Ultrasound RUQ - S/P cholecystectomy, liver imaging unrevealing of abnormality  Will need outpatient follow-up  Celiac artery stenosis (HCC)  Outpt follow up with vascular    VTE Pharmacologic Prophylaxis:   Moderate Risk (Score 3-4) - Pharmacological DVT Prophylaxis Contraindicated. Sequential Compression Devices Ordered.    Mobility:   Basic Mobility Inpatient Raw Score: 24  JH-HLM Goal: 8: Walk 250 feet or more  JH-HLM Achieved: 8: Walk 250 feet ot more  JH-HLM Goal achieved. Continue to encourage appropriate  mobility.    Patient Centered Rounds: I performed bedside rounds with nursing staff today.   Discussions with Specialists or Other Care Team Provider: none    Education and Discussions with Family / Patient: updated patient    Current Length of Stay: 4 day(s)  Current Patient Status: Inpatient   Certification Statement: The patient will continue to require additional inpatient hospital stay due to fecal impaction and abd pain  Discharge Plan: Anticipate discharge in 24-48 hrs to home with home services.    Code Status: Level 1 - Full Code    Subjective   Patient states that she has been drinking the GoLytely prep since yesterday however has not had a bowel movement yet her abdominal pain is a little bit better but still feels bloated    Objective :  Temp:  [97.5 °F (36.4 °C)] 97.5 °F (36.4 °C)  HR:  [74-85] 74  BP: ()/(54-74) 141/69  Resp:  [18] 18  SpO2:  [97 %-99 %] 99 %  O2 Device: Nasal cannula  Nasal Cannula O2 Flow Rate (L/min):  [3 L/min] 3 L/min    Body mass index is 18.71 kg/m².     Input and Output Summary (last 24 hours):     Intake/Output Summary (Last 24 hours) at 12/13/2024 0922  Last data filed at 12/12/2024 1748  Gross per 24 hour   Intake 720 ml   Output --   Net 720 ml       Physical Exam  Vitals and nursing note reviewed.   Constitutional:       Appearance: Normal appearance.   HENT:      Head: Normocephalic and atraumatic.      Right Ear: External ear normal.      Left Ear: External ear normal.      Nose: Nose normal.      Mouth/Throat:      Pharynx: Oropharynx is clear.   Cardiovascular:      Rate and Rhythm: Normal rate and regular rhythm.      Heart sounds: Normal heart sounds.   Pulmonary:      Effort: Pulmonary effort is normal.      Breath sounds: Normal breath sounds.   Abdominal:      General: Bowel sounds are normal. There is distension.      Palpations: Abdomen is soft.      Tenderness: There is no abdominal tenderness.   Musculoskeletal:         General: Normal range of motion.       Cervical back: Normal range of motion and neck supple.   Skin:     General: Skin is warm and dry.      Capillary Refill: Capillary refill takes less than 2 seconds.   Neurological:      General: No focal deficit present.      Mental Status: She is alert and oriented to person, place, and time.   Psychiatric:         Mood and Affect: Mood normal.           Lines/Drains:              Lab Results: I have reviewed the following results:   Results from last 7 days   Lab Units 12/09/24  0440 12/08/24  1034   WBC Thousand/uL 5.46 9.26   HEMOGLOBIN g/dL 10.1* 13.0   HEMATOCRIT % 32.0* 39.7   PLATELETS Thousands/uL 156 193   SEGS PCT %  --  77*   LYMPHO PCT %  --  12*   MONO PCT %  --  10   EOS PCT %  --  1     Results from last 7 days   Lab Units 12/12/24  0529   SODIUM mmol/L 142   POTASSIUM mmol/L 3.4*   CHLORIDE mmol/L 105   CO2 mmol/L 29   BUN mg/dL 4*   CREATININE mg/dL 0.46*   ANION GAP mmol/L 8   CALCIUM mg/dL 8.7   ALBUMIN g/dL 3.2*   TOTAL BILIRUBIN mg/dL 0.49   ALK PHOS U/L 135*   ALT U/L 82*   AST U/L 40*   GLUCOSE RANDOM mg/dL 88     Results from last 7 days   Lab Units 12/08/24  2319   INR  1.12             Results from last 7 days   Lab Units 12/08/24  1034 12/06/24  1310 12/06/24  1307   LACTIC ACID mmol/L 0.6  --  1.6   PROCALCITONIN ng/ml  --  <0.05  --        Recent Cultures (last 7 days):   Results from last 7 days   Lab Units 12/06/24  1310   BLOOD CULTURE  No Growth After 5 Days.  No Growth After 5 Days.       Imaging Results Review: I reviewed radiology reports from this admission including: CT abdomen/pelvis.  Other Study Results Review: EKG was reviewed.     Last 24 Hours Medication List:     Current Facility-Administered Medications:     albuterol inhalation solution 2.5 mg, Q4H PRN    calcium carbonate (TUMS) chewable tablet 500 mg, Daily PRN    dicyclomine (BENTYL) capsule 10 mg, 4x Daily (AC & HS)    docusate sodium (COLACE) capsule 100 mg, BID    famotidine (PEPCID) tablet 20 mg, BID     Fluticasone Furoate-Vilanterol 100-25 mcg/actuation 1 puff, Daily **AND** umeclidinium 62.5 mcg/actuation inhaler AEPB 1 puff, Daily    hydrOXYzine HCL (ATARAX) tablet 25 mg, Q6H PRN    ipratropium-albuterol (DUO-NEB) 0.5-2.5 mg/3 mL inhalation solution 3 mL, TID    metoprolol tartrate (LOPRESSOR) tablet 25 mg, Q12H MAYCO    ondansetron (ZOFRAN) injection 4 mg, Q8H PRN    pantoprazole (PROTONIX) EC tablet 40 mg, BID AC    simethicone (MYLICON) chewable tablet 80 mg, Q6H PRN    sucralfate (CARAFATE) tablet 1 g, 4x Daily (AC & HS)    witch hazel-glycerin (TUCKS) topical pad 1 Pad, Q4H PRN    Administrative Statements   Today, Patient Was Seen By: Modesta Santizo MD      **Please Note: This note may have been constructed using a voice recognition system.**

## 2024-12-13 NOTE — ASSESSMENT & PLAN NOTE
In the setting of COPD and chronic tobacco use. Suspected clinically with symptoms of weight loss, progressive dyspnea and chronic cough as well radiologically with cavitating lesion in RUL and multiple pulmonary nodules  Confirmed on path and cultures following bronch on 5/22  Started on azithro (now on 250 mg every other day/ethambutol 400 mg OD and rifabutin 300 mg OD on 9/12. FU sputum cx neg for AFB and imaging appears improved/stable.  Patient has had multiple GI complaints since initiating the regime and multiple hospitalizations for abdominal pain . Now also with elevated LFTs which may be secondary to drug induced liver injury.  Improving but has not receiving her MAC therapy inpatient     Continue supportive care and aggressive bowel regimePatient has significant adverse side effects and disabling GI symptoms requiring 4 hospitalizations and now probable drug induced hepatotoxictiy.    Decisions on dose modifications or drug discontinuations are complex in this setting. Left a message to speak with patient's outpatient ID physician Dr Landrum ([phone]) for discussion of modification of MAC therapy.   Check daily CBC, CMP while inpatient to monitor for any evolving antibiotic toxicity or treatment failure   Continue supportive care, monitor clinical course

## 2024-12-13 NOTE — PLAN OF CARE

## 2024-12-13 NOTE — CONSULTS
Consultation - Infectious Disease   Name: Ileana Seaman 64 y.o. female I MRN: [id number]  Unit/Bed#: -01 I Date of Admission: 12/8/2024   Date of Service: 12/13/2024 I Hospital Day: 4   Inpatient consult to Infectious Diseases  Consult performed by: Janet Carter MD  Consult ordered by: Modesta Santizo MD            VIRTUAL CARE DOCUMENTATION:     1. This service was provided via Telemedicine using : [email or web address] kit    2. Parties in the room with patient during teleconsult : Patient only    3. Confidentiality : My office door was closed    4. Participants : No one else was in the room    5. Patient acknowledged consent and understanding of privacy and security of the  Telemedicine consult. I informed the patient that I have reviewed their record in Epic and presented the opportunity for them to ask any questions regarding the visit today.  The patient agreed to participate.    6. Time spent 13 minutes       Physician Requesting Evaluation: Modesta Santizo MD   Reason for Evaluation / Principal Problem: MAC, DILI    Assessment & Plan  Mycobacterial infection, non-TB  In the setting of COPD and chronic tobacco use. Suspected clinically with symptoms of weight loss, progressive dyspnea and chronic cough as well radiologically with cavitating lesion in RUL and multiple pulmonary nodules  Confirmed on path and cultures following bronch on 5/22  Started on azithro (now on 250 mg every other day/ethambutol 400 mg OD and rifabutin 300 mg OD on 9/12. FU sputum cx neg for AFB and imaging appears improved/stable.  Patient has had multiple GI complaints since initiating the regime and multiple hospitalizations for abdominal pain . Now also with elevated LFTs which may be secondary to drug induced liver injury.  Improving but has not receiving her MAC therapy inpatient     Continue supportive care and aggressive bowel regimePatient has significant adverse side effects and disabling GI symptoms requiring 4  hospitalizations and now probable drug induced hepatotoxictiy.    Decisions on dose modifications or drug discontinuations are complex in this setting. Left a message to speak with patient's outpatient ID physician Dr Landrum ([phone]) for discussion of modification of MAC therapy.   Check daily CBC, CMP while inpatient to monitor for any evolving antibiotic toxicity or treatment failure   Continue supportive care, monitor clinical course    Elevated liver enzymes  Abnormal LFTs noted on admission. 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  Consider possibility of DILI  although hepatotoxicity is less common with rifabutin. Consider also possibility of celiac artery stenosis as a cause for liver injury in the setting of po oral intake/relative hypoperfusion     Hold MAC therapy at present and await discussion with patient's outpatient ID and Pulm group  COPD (chronic obstructive pulmonary disease) (HCC)  FU CT notes stable pulmonary nodules and RUL lesion. Has had recent COPD exacerbations.     FU with pulm   Encourage tobacco cessation  Celiac artery stenosis (HCC)     Outpatient fu with vascular surgery and GI  Moderate protein-calorie malnutrition (HCC)  Malnutrition Findings:                                 BMI Findings:           Body mass index is 18.71 kg/m².         Reviewed recommendations to hold antibiotics  with primary team who is in agreement. Will follow.  Please call with concerns or any changes in clinical status or new test results.      HPI: Ileana Seaman is a 64 y.o. year old female with COPD, chronic tobacco use and chronic hypoxic and hypercarbic respiratory failure on 2 L oxygen.  She has had several weeks to months of progressive shortness of breath, cough productive of thick white sputum, weight loss, occasional chills and sweats.  CT  imaging noted a cavitary mass and she underwent bronchoscopy and biopsy on May 22.  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  Repeat sputum culture in September was negative for MAC.  She has been feeling unwell since starting treatment for MAC with abdominal discomfort, bloating, constipation and has had multiple hospitalizations for COPD exacerbation requiring prednisone taper as well as GI symptoms (EGD unremarkable).  At her last visit on 10/28 her medication dosing was decreased.  Despite this she was admitted on 12/6 with nausea vomiting and diarrhea and poor oral intake.  She was discharged on 12/7 with supportive care and did receive her MAC therapy before discharge. She returned the next day to the hospital on 12/8 with recurrent abdominal pain and elevated LFTs.  However right upper quadrant ultrasound showed no evidence of cholecystitis.  Doppler imaging notes celiac artery stenosis and outpatient follow-up with GI as recommended.  Infectious disease is being consulted for diagnostic work up and antibiotic management.    Review of Systems  Pertinent positives and negatives as noted in HPI. Rest complete 12 point system-based review of systems is otherwise negative.    PAST MEDICAL HISTORY:  Past Medical History:   Diagnosis Date    COPD (chronic obstructive pulmonary disease) (HCC)     Hiatal hernia      Past Surgical History:   Procedure Laterality Date    CHOLECYSTECTOMY         FAMILY HISTORY:  Non-contributory    SOCIAL HISTORY:  Social History   Single  Social History     Substance and Sexual Activity   Alcohol Use Never     Social History     Substance and Sexual Activity   Drug Use Never     Social History     Tobacco Use   Smoking Status Former    Current packs/day: 0.25    Types: Cigarettes   Smokeless  Tobacco Never       ALLERGIES:  No Known Allergies    MEDICATIONS:  All current active medications have been reviewed.    Physical Exam     Temp:  [97.5 °F (36.4 °C)] 97.5 °F (36.4 °C)  HR:  [74-85] 74  Resp:  [18] 18  BP: ()/(54-74) 141/69  SpO2:  [97 %-99 %] 99 %  Temp (24hrs), Av.5 °F (36.4 °C), Min:97.5 °F (36.4 °C), Max:97.5 °F (36.4 °C)  Current: Temperature: 97.5 °F (36.4 °C)    Intake/Output Summary (Last 24 hours) at 2024 1050  Last data filed at 2024 0917  Gross per 24 hour   Intake 960 ml   Output --   Net 960 ml         Physical exam findings reported by bedside and primary medical team staff    General Appearance:  Appearing frail but well, nontoxic, and in no distress, appears stated age   Lungs:   Clear but diminished to auscultation bilaterally, no audible wheezes, rhonchi and rales, respirations unlabored   Chest Wall:  No tenderness or deformity   Heart:  Regular rate and rhythm, S1, S2 normal, no murmur, rub or gallop   Abdomen:   Soft, non-tender, non-distended, positive bowel sounds, no masses, no organomegaly    No CVA tenderness   Extremities: Extremities normal, atraumatic, no cyanosis, clubbing or edema   Skin: Skin color, texture, turgor normal, no rashes or lesions. No draining wounds noted.   Neurologic: Alert and oriented times 3       Invasive Devices:   Peripheral IV 24 Right;Ventral (anterior) Forearm (Active)   Site Assessment WDL 24 1000   Dressing Type Transparent 24 1000   Line Status Flushed;Saline locked 24 1000   Dressing Status Clean;Dry;Intact 24 1000   Dressing Change Due 24 1000   Reason Not Rotated Not due 24 1000       Labs, Imaging, & Other Studies     Lab Results:    I have personally reviewed pertinent labs.    Results from last 7 days   Lab Units 24  0440 24  1034 24  0607   WBC Thousand/uL 5.46 9.26 9.06   HEMOGLOBIN g/dL 10.1* 13.0 10.5*   PLATELETS Thousands/uL 156 193 179      Results from last 7 days   Lab Units 12/12/24  0529 12/11/24  0453 12/09/24  0440   POTASSIUM mmol/L 3.4* 3.5 3.7   CHLORIDE mmol/L 105 106 108   CO2 mmol/L 29 28 26   BUN mg/dL 4* 3* 3*   CREATININE mg/dL 0.46* 0.44* 0.44*   EGFR ml/min/1.73sq m 103 105 105   CALCIUM mg/dL 8.7 8.8 8.3*   AST U/L 40* 20 107*   ALT U/L 82* 89* 160*   ALK PHOS U/L 135* 140* 161*     Results from last 7 days   Lab Units 12/06/24  1310   BLOOD CULTURE  No Growth After 5 Days.  No Growth After 5 Days.       Imaging Studies:   I have personally reviewed pertinent imaging study reports and images in PACS.      EKG, Pathology, and Other Studies:   I have personally reviewed pertinent reports and reviewed external records.    Counseling/Coordination of care:       Total 90 minutes in evaluation of the patient and communication with the patient via telehealth of which 50 minutes was in counseling/coordination of care.  Extensive review of the medical records in epic including review of the notes, radiographs, and laboratory results.  My recommendations were discussed with the patient in detail who verbalized understanding.

## 2024-12-13 NOTE — ASSESSMENT & PLAN NOTE
Abnormal LFTs noted on admission. 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  Consider possibility of DILI  although hepatotoxicity is less common with rifabutin. Consider also possibility of celiac artery stenosis as a cause for liver injury in the setting of po oral intake/relative hypoperfusion     Hold MAC therapy at present and await discussion with patient's outpatient ID and Pulm group

## 2024-12-13 NOTE — ASSESSMENT & PLAN NOTE
FU CT notes stable pulmonary nodules and RUL lesion. Has had recent COPD exacerbations.     FU with pulm   Encourage tobacco cessation

## 2024-12-13 NOTE — MALNUTRITION/BMI
This medical record reflects one or more clinical indicators suggestive of malnutrition.    Malnutrition Findings:   Adult Malnutrition type: Chronic illness  Adult Degree of Malnutrition: Malnutrition of mild degree  Malnutrition Characteristics: Muscle loss, Fat loss                360 Statement: malnutrition of mild degree in setting of chronic illness, evidenced by fat loss/hollowing/dark circles/orbitals, muscle loss/slight hollowing/temples, protruding clavicles, treated with diet and supplements    BMI Findings:           Body mass index is 18.71 kg/m².     See Nutrition note dated 12/13/24 for additional details.  Completed nutrition assessment is viewable in the nutrition documentation.

## 2024-12-14 ENCOUNTER — APPOINTMENT (INPATIENT)
Dept: RADIOLOGY | Facility: HOSPITAL | Age: 64
DRG: 392 | End: 2024-12-14
Payer: MEDICARE

## 2024-12-14 PROBLEM — E44.1 MILD PROTEIN-CALORIE MALNUTRITION (HCC): Status: ACTIVE | Noted: 2024-12-14

## 2024-12-14 PROCEDURE — 94760 N-INVAS EAR/PLS OXIMETRY 1: CPT

## 2024-12-14 PROCEDURE — 99232 SBSQ HOSP IP/OBS MODERATE 35: CPT | Performed by: FAMILY MEDICINE

## 2024-12-14 PROCEDURE — 94640 AIRWAY INHALATION TREATMENT: CPT

## 2024-12-14 PROCEDURE — 74022 RADEX COMPL AQT ABD SERIES: CPT

## 2024-12-14 RX ORDER — POLYETHYLENE GLYCOL 3350 17 G/17G
17 POWDER, FOR SOLUTION ORAL DAILY
Status: DISCONTINUED | OUTPATIENT
Start: 2024-12-14 | End: 2024-12-15

## 2024-12-14 RX ORDER — FLUCONAZOLE 100 MG/1
200 TABLET ORAL DAILY
Status: DISCONTINUED | OUTPATIENT
Start: 2024-12-14 | End: 2024-12-17 | Stop reason: HOSPADM

## 2024-12-14 RX ADMIN — SUCRALFATE 1 G: 1 TABLET ORAL at 21:01

## 2024-12-14 RX ADMIN — LACTULOSE 20 G: 20 SOLUTION ORAL at 17:48

## 2024-12-14 RX ADMIN — FAMOTIDINE 20 MG: 20 TABLET, FILM COATED ORAL at 17:48

## 2024-12-14 RX ADMIN — FAMOTIDINE 20 MG: 20 TABLET, FILM COATED ORAL at 08:33

## 2024-12-14 RX ADMIN — IPRATROPIUM BROMIDE AND ALBUTEROL SULFATE 3 ML: 2.5; .5 SOLUTION RESPIRATORY (INHALATION) at 13:55

## 2024-12-14 RX ADMIN — LACTULOSE 20 G: 20 SOLUTION ORAL at 08:33

## 2024-12-14 RX ADMIN — PANTOPRAZOLE SODIUM 40 MG: 40 TABLET, DELAYED RELEASE ORAL at 08:33

## 2024-12-14 RX ADMIN — METOPROLOL TARTRATE 25 MG: 25 TABLET, FILM COATED ORAL at 20:48

## 2024-12-14 RX ADMIN — SUCRALFATE 1 G: 1 TABLET ORAL at 08:33

## 2024-12-14 RX ADMIN — DOCUSATE SODIUM 100 MG: 100 CAPSULE, LIQUID FILLED ORAL at 08:33

## 2024-12-14 RX ADMIN — DOCUSATE SODIUM 100 MG: 100 CAPSULE, LIQUID FILLED ORAL at 17:48

## 2024-12-14 RX ADMIN — IPRATROPIUM BROMIDE AND ALBUTEROL SULFATE 3 ML: 2.5; .5 SOLUTION RESPIRATORY (INHALATION) at 19:11

## 2024-12-14 RX ADMIN — SUCRALFATE 1 G: 1 TABLET ORAL at 16:10

## 2024-12-14 RX ADMIN — DICYCLOMINE HYDROCHLORIDE 10 MG: 10 CAPSULE ORAL at 08:33

## 2024-12-14 RX ADMIN — SUCRALFATE 1 G: 1 TABLET ORAL at 12:28

## 2024-12-14 RX ADMIN — DICYCLOMINE HYDROCHLORIDE 10 MG: 10 CAPSULE ORAL at 12:28

## 2024-12-14 RX ADMIN — DICYCLOMINE HYDROCHLORIDE 10 MG: 10 CAPSULE ORAL at 21:01

## 2024-12-14 RX ADMIN — FLUTICASONE FUROATE AND VILANTEROL TRIFENATATE 1 PUFF: 100; 25 POWDER RESPIRATORY (INHALATION) at 08:36

## 2024-12-14 RX ADMIN — PANTOPRAZOLE SODIUM 40 MG: 40 TABLET, DELAYED RELEASE ORAL at 16:10

## 2024-12-14 RX ADMIN — POLYETHYLENE GLYCOL 3350 17 G: 17 POWDER, FOR SOLUTION ORAL at 12:28

## 2024-12-14 RX ADMIN — UMECLIDINIUM 1 PUFF: 62.5 AEROSOL, POWDER ORAL at 08:35

## 2024-12-14 RX ADMIN — DICYCLOMINE HYDROCHLORIDE 10 MG: 10 CAPSULE ORAL at 16:10

## 2024-12-14 RX ADMIN — METOPROLOL TARTRATE 25 MG: 25 TABLET, FILM COATED ORAL at 08:33

## 2024-12-14 RX ADMIN — IPRATROPIUM BROMIDE AND ALBUTEROL SULFATE 3 ML: 2.5; .5 SOLUTION RESPIRATORY (INHALATION) at 07:14

## 2024-12-14 RX ADMIN — SIMETHICONE 80 MG: 80 TABLET, CHEWABLE ORAL at 16:16

## 2024-12-14 RX ADMIN — FLUCONAZOLE 200 MG: 100 TABLET ORAL at 10:14

## 2024-12-14 NOTE — ASSESSMENT & PLAN NOTE
Malnutrition Findings: Loss of subcutaneous fat in orbital, triceps, ribcage areas.  Loss of muscle at temples, clavicles, scapula, extremities.  Consultation to dietitian      Adult Malnutrition type: Chronic illnessBMI Findings:     Body mass index is 18.71 kg/m².

## 2024-12-14 NOTE — PROGRESS NOTES
"Progress Note - Hospitalist   Name: Ileana Seaman 64 y.o. female I MRN: [id number]  Unit/Bed#: -[facility] I Date of Admission: 12/8/2024   Date of Service: 12/14/2024 I Hospital Day: 5    Assessment & Plan  Epigastric pain  Recurrent epigastric abdominal pain and diarrhea, admitted 12/6 and discharged 12/7 for same.  Returns to ED states pain returned at 0300 on 12/8.  Thoroughly evaluated by ED, found to have elevated liver enzymes and discussed with gastroenterology, abdominal ultrasound obtained with reassuring findings no liver abnormality  Normal lactic acid level  Patient refused to leave from the emergency department continuing to say she is having severe burning of her insides  ER physician obliged and requested observation admission   no further narcotic medications as this patient is suspected of drug-seeking behavior  Will continue PPI, Carafate, Pepcid outpatient regimen for abdominal pain  Avoid Tylenol due to elevated liver enzymes suspected secondary to MARIS treatment  Per ED note  \"patient with persistent recurrence of pain, requiring repeat medication dosing including IV narcotic medications. Recommendation made for attempt at oral pain medication. Patient expressed concerns about \"waking up in the middle of the night with my insides of burning, and if they admit me, they could figure out why that is happening.\"   Keep stool record at bedside, monitor episodes of diarrhea occurring in 24-hour  Patient has recent EGD done on November in this hospital, reviewed  Discussed the case with gastroenterology team, we will proceed with mesenteric arterial duplex-spoke to vascular surgery and recommend outpt follow up  Will trial bentyl,simethicone for now  Mri abd reviewed. Cont trial of food and monitor pain symptoms . Also placed on stool softeners for constipation  cta abd Abdominal aorta and branches are patent with no aneurysm or dissection.Celiac artery with moderate origin stenosis with patent " branches. Replaced right hepatic artery from the superior mesenteric artery.Mild to moderate atherosclerotic changes in the pelvic vessels with tortuous left external iliac artery.Stool impaction in the ascending and transverse colon.  Placed on tap water enema and bowel prep.so far has had a few bm. Cont miralax and lactulose. Abd pain Is resolving  Mycobacterial infection, non-TB  Hx MARIS infection and 3 drug regimen managed by ID (Dr. Landrum )  Azithromycin, rifabutin and ethambutol placed on hold due to elevated liver enzymes.lfts now resolving  Consult inf disease for reviewing medications and recommendations   COPD (chronic obstructive pulmonary disease) (HCC)  No exacerbation  Continue PTA regimen  Atrial fibrillation (HCC)  Continue metoprolol  Not chronically anticoagulated  Chronic hypoxic respiratory failure (HCC)  Maintained on 2 L nasal cannula  Moderate protein-calorie malnutrition (HCC)  Malnutrition Findings: Loss of subcutaneous fat in orbital, triceps, ribcage areas.  Loss of muscle at temples, clavicles, scapula, extremities.  Consultation to dietitian      Adult Malnutrition type: Chronic illnessBMI Findings:     Body mass index is 18.71 kg/m².     Elevated liver enzymes  Suspect DILI  ALT/AST > 3 x nomal, T bili >2 x normal  Azithromycin, ethambutol and rifabutin on hold due to elevated liver enzymes   Will need to follow up with ID for treatment alteration - MARIS infection and 3 drug regimen managed by ID (Dr. Landrum)   neg Tylenol level and normal PT/INR   Ultrasound RUQ - S/P cholecystectomy, liver imaging unrevealing of abnormality  Will need outpatient follow-up  Celiac artery stenosis (HCC)  Outpt follow up with vascular  Mild protein-calorie malnutrition (HCC)  Malnutrition Findings:   Adult Malnutrition type: Chronic illness  Adult Degree of Malnutrition: Malnutrition of mild degree  Malnutrition Characteristics: Muscle loss, Fat loss                  360 Statement: malnutrition of mild  degree in setting of chronic illness, evidenced by fat loss/hollowing/dark circles/orbitals, muscle loss/slight hollowing/temples, protruding clavicles, treated with diet and supplements    BMI Findings:           Body mass index is 18.71 kg/m².       VTE Pharmacologic Prophylaxis:   Moderate Risk (Score 3-4) - Pharmacological DVT Prophylaxis Contraindicated. Sequential Compression Devices Ordered.    Mobility:   Basic Mobility Inpatient Raw Score: 24  JH-HLM Goal: 8: Walk 250 feet or more  JH-HLM Achieved: 8: Walk 250 feet ot more  JH-HLM Goal achieved. Continue to encourage appropriate mobility.    Patient Centered Rounds: I performed bedside rounds with nursing staff today.   Discussions with Specialists or Other Care Team Provider: none    Education and Discussions with Family / Patient: will update    Current Length of Stay: 5 day(s)  Current Patient Status: Inpatient   Certification Statement: The patient will continue to require additional inpatient hospital stay due to abd pain  Discharge Plan: Anticipate discharge in 24-48 hrs to home.    Code Status: Level 1 - Full Code    Subjective   Patient states that she is having small amounts of bowel movements now they are loose and watery.  She finished the bowel prep yesterday.  Abdominal pain is decreasing but she still feels a little bloated    Objective :  Temp:  [96.8 °F (36 °C)-97.7 °F (36.5 °C)] 96.8 °F (36 °C)  HR:  [77-80] 77  BP: (112-124)/(63-75) 124/75  Resp:  [16-19] 16  SpO2:  [96 %-99 %] 99 %  O2 Device: Nasal cannula  Nasal Cannula O2 Flow Rate (L/min):  [2 L/min] 2 L/min    Body mass index is 18.71 kg/m².     Input and Output Summary (last 24 hours):     Intake/Output Summary (Last 24 hours) at 12/14/2024 1156  Last data filed at 12/13/2024 1801  Gross per 24 hour   Intake 540 ml   Output --   Net 540 ml       Physical Exam  Vitals and nursing note reviewed.   Constitutional:       Appearance: Normal appearance.   HENT:      Head: Normocephalic  and atraumatic.      Right Ear: External ear normal.      Left Ear: External ear normal.      Nose: Nose normal.      Mouth/Throat:      Pharynx: Oropharynx is clear.   Cardiovascular:      Rate and Rhythm: Normal rate and regular rhythm.      Heart sounds: Normal heart sounds.   Pulmonary:      Effort: Pulmonary effort is normal.      Breath sounds: Normal breath sounds.   Abdominal:      General: Bowel sounds are normal.      Palpations: Abdomen is soft.      Tenderness: There is no abdominal tenderness.   Musculoskeletal:         General: Normal range of motion.      Cervical back: Normal range of motion and neck supple.   Skin:     General: Skin is warm and dry.      Capillary Refill: Capillary refill takes less than 2 seconds.   Neurological:      General: No focal deficit present.      Mental Status: She is alert and oriented to person, place, and time.   Psychiatric:         Mood and Affect: Mood normal.           Lines/Drains:              Lab Results: I have reviewed the following results:   Results from last 7 days   Lab Units 12/09/24  0440 12/08/24  1034   WBC Thousand/uL 5.46 9.26   HEMOGLOBIN g/dL 10.1* 13.0   HEMATOCRIT % 32.0* 39.7   PLATELETS Thousands/uL 156 193   SEGS PCT %  --  77*   LYMPHO PCT %  --  12*   MONO PCT %  --  10   EOS PCT %  --  1     Results from last 7 days   Lab Units 12/12/24  0529   SODIUM mmol/L 142   POTASSIUM mmol/L 3.4*   CHLORIDE mmol/L 105   CO2 mmol/L 29   BUN mg/dL 4*   CREATININE mg/dL 0.46*   ANION GAP mmol/L 8   CALCIUM mg/dL 8.7   ALBUMIN g/dL 3.2*   TOTAL BILIRUBIN mg/dL 0.49   ALK PHOS U/L 135*   ALT U/L 82*   AST U/L 40*   GLUCOSE RANDOM mg/dL 88     Results from last 7 days   Lab Units 12/08/24  2319   INR  1.12             Results from last 7 days   Lab Units 12/08/24  1034   LACTIC ACID mmol/L 0.6       Recent Cultures (last 7 days):         Imaging Results Review: I reviewed radiology reports from this admission including: xray(s).  Other Study Results  Review: EKG was reviewed.     Last 24 Hours Medication List:     Current Facility-Administered Medications:     albuterol inhalation solution 2.5 mg, Q4H PRN    calcium carbonate (TUMS) chewable tablet 500 mg, Daily PRN    dicyclomine (BENTYL) capsule 10 mg, 4x Daily (AC & HS)    docusate sodium (COLACE) capsule 100 mg, BID    famotidine (PEPCID) tablet 20 mg, BID    fluconazole (DIFLUCAN) tablet 200 mg, Daily    Fluticasone Furoate-Vilanterol 100-25 mcg/actuation 1 puff, Daily **AND** umeclidinium 62.5 mcg/actuation inhaler AEPB 1 puff, Daily    hydrOXYzine HCL (ATARAX) tablet 25 mg, Q6H PRN    ipratropium-albuterol (DUO-NEB) 0.5-2.5 mg/3 mL inhalation solution 3 mL, TID    lactulose (CHRONULAC) oral solution 20 g, BID    metoprolol tartrate (LOPRESSOR) tablet 25 mg, Q12H MAYCO    ondansetron (ZOFRAN) injection 4 mg, Q8H PRN    pantoprazole (PROTONIX) EC tablet 40 mg, BID AC    polyethylene glycol (MIRALAX) packet 17 g, Daily    simethicone (MYLICON) chewable tablet 80 mg, Q6H PRN    sucralfate (CARAFATE) tablet 1 g, 4x Daily (AC & HS)    witch hazel-glycerin (TUCKS) topical pad 1 Pad, Q4H PRN    Administrative Statements   Today, Patient Was Seen By: Modesta Santizo MD      **Please Note: This note may have been constructed using a voice recognition system.**

## 2024-12-14 NOTE — PLAN OF CARE
Problem: Potential for Falls  Goal: Patient will remain free of falls  Description: INTERVENTIONS:  - Educate patient/family on patient safety including physical limitations  - Instruct patient to call for assistance with activity   - Consult OT/PT to assist with strengthening/mobility   - Keep Call bell within reach  - Keep bed low and locked with side rails adjusted as appropriate  - Keep care items and personal belongings within reach  - Initiate and maintain comfort rounds  - Make Fall Risk Sign visible to staff  - Offer Toileting every 2 Hours, in advance of need  - Obtain necessary fall risk management equipment: non-slip sock  - Apply yellow socks and bracelet for high fall risk patients  - Consider moving patient to room near nurses station  Outcome: Progressing     Problem: PAIN - ADULT  Goal: Verbalizes/displays adequate comfort level or baseline comfort level  Description: Interventions:  - Encourage patient to monitor pain and request assistance  - Assess pain using appropriate pain scale  - Administer analgesics based on type and severity of pain and evaluate response  - Implement non-pharmacological measures as appropriate and evaluate response  - Consider cultural and social influences on pain and pain management  - Notify physician/advanced practitioner if interventions unsuccessful or patient reports new pain  Outcome: Progressing     Problem: INFECTION - ADULT  Goal: Absence or prevention of progression during hospitalization  Description: INTERVENTIONS:  - Assess and monitor for signs and symptoms of infection  - Monitor lab/diagnostic results  - Monitor all insertion sites, i.e. indwelling lines, tubes, and drains  - Monitor endotracheal if appropriate and nasal secretions for changes in amount and color  - [place] appropriate cooling/warming therapies per order  - Administer medications as ordered  - Instruct and encourage patient and family to use good hand hygiene technique  - Identify and  instruct in appropriate isolation precautions for identified infection/condition  Outcome: Progressing     Problem: SAFETY ADULT  Goal: Patient will remain free of falls  Description: INTERVENTIONS:  - Educate patient/family on patient safety including physical limitations  - Instruct patient to call for assistance with activity   - Consult OT/PT to assist with strengthening/mobility   - Keep Call bell within reach  - Keep bed low and locked with side rails adjusted as appropriate  - Keep care items and personal belongings within reach  - Initiate and maintain comfort rounds  - Make Fall Risk Sign visible to staff  - Offer Toileting every 2 Hours, in advance of need  - Obtain necessary fall risk management equipment: non-slip sock  - Apply yellow socks and bracelet for high fall risk patients  - Consider moving patient to room near nurses station  Outcome: Progressing     Problem: Nutrition/Hydration-ADULT  Goal: Nutrient/Hydration intake appropriate for improving, restoring or maintaining nutritional needs  Description: Monitor and assess patient's nutrition/hydration status for malnutrition. Collaborate with interdisciplinary team and initiate plan and interventions as ordered.  Monitor patient's weight and dietary intake as ordered or per policy. Utilize nutrition screening tool and intervene as necessary. Determine patient's food preferences and provide high-protein, high-caloric foods as appropriate.     INTERVENTIONS:  - Monitor oral intake, urinary output, labs, and treatment plans  - Assess nutrition and hydration status and recommend course of action  - Evaluate amount of meals eaten  - Assist patient with eating if necessary   - Allow adequate time for meals  - Recommend/ encourage appropriate diets, oral nutritional supplements, and vitamin/mineral supplements  - Order, calculate, and assess calorie counts as needed  - Recommend, monitor, and adjust tube feedings and TPN/PPN based on assessed needs  -  Assess need for intravenous fluids  - Provide specific nutrition/hydration education as appropriate  - Include patient/family/caregiver in decisions related to nutrition  Outcome: Progressing

## 2024-12-14 NOTE — ASSESSMENT & PLAN NOTE
"Recurrent epigastric abdominal pain and diarrhea, admitted 12/6 and discharged 12/7 for same.  Returns to ED states pain returned at 0300 on 12/8.  Thoroughly evaluated by ED, found to have elevated liver enzymes and discussed with gastroenterology, abdominal ultrasound obtained with reassuring findings no liver abnormality  Normal lactic acid level  Patient refused to leave from the emergency department continuing to say she is having severe burning of her insides  ER physician obliged and requested observation admission   no further narcotic medications as this patient is suspected of drug-seeking behavior  Will continue PPI, Carafate, Pepcid outpatient regimen for abdominal pain  Avoid Tylenol due to elevated liver enzymes suspected secondary to MARIS treatment  Per ED note  \"patient with persistent recurrence of pain, requiring repeat medication dosing including IV narcotic medications. Recommendation made for attempt at oral pain medication. Patient expressed concerns about \"waking up in the middle of the night with my insides of burning, and if they admit me, they could figure out why that is happening.\"   Keep stool record at bedside, monitor episodes of diarrhea occurring in 24-hour  Patient has recent EGD done on November in this hospital, reviewed  Discussed the case with gastroenterology team, we will proceed with mesenteric arterial duplex-spoke to vascular surgery and recommend outpt follow up  Will trial bentyl,simethicone for now  Mri abd reviewed. Cont trial of food and monitor pain symptoms . Also placed on stool softeners for constipation  cta abd Abdominal aorta and branches are patent with no aneurysm or dissection.Celiac artery with moderate origin stenosis with patent branches. Replaced right hepatic artery from the superior mesenteric artery.Mild to moderate atherosclerotic changes in the pelvic vessels with tortuous left external iliac artery.Stool impaction in the ascending and transverse " colon.  Placed on tap water enema and bowel prep.so far has had a few bm. Cont miralax and lactulose. Abd pain Is resolving

## 2024-12-14 NOTE — PLAN OF CARE
Problem: Potential for Falls  Goal: Patient will remain free of falls  Description: INTERVENTIONS:  - Educate patient/family on patient safety including physical limitations  - Instruct patient to call for assistance with activity   - Consult OT/PT to assist with strengthening/mobility   - Keep Call bell within reach  - Keep bed low and locked with side rails adjusted as appropriate  - Keep care items and personal belongings within reach  - Initiate and maintain comfort rounds  - Make Fall Risk Sign visible to staff  - Offer Toileting every 2 Hours, in advance of need  - Obtain necessary fall risk management equipment: non-slip sock  - Apply yellow socks and bracelet for high fall risk patients  - Consider moving patient to room near nurses station  Outcome: Progressing     Problem: PAIN - ADULT  Goal: Verbalizes/displays adequate comfort level or baseline comfort level  Description: Interventions:  - Encourage patient to monitor pain and request assistance  - Assess pain using appropriate pain scale  - Administer analgesics based on type and severity of pain and evaluate response  - Implement non-pharmacological measures as appropriate and evaluate response  - Consider cultural and social influences on pain and pain management  - Notify physician/advanced practitioner if interventions unsuccessful or patient reports new pain  Outcome: Progressing     Problem: INFECTION - ADULT  Goal: Absence or prevention of progression during hospitalization  Description: INTERVENTIONS:  - Assess and monitor for signs and symptoms of infection  - Monitor lab/diagnostic results  - Monitor all insertion sites, i.e. indwelling lines, tubes, and drains  - Monitor endotracheal if appropriate and nasal secretions for changes in amount and color  - [place] appropriate cooling/warming therapies per order  - Administer medications as ordered  - Instruct and encourage patient and family to use good hand hygiene technique  - Identify and  instruct in appropriate isolation precautions for identified infection/condition  Outcome: Progressing  Goal: Absence of fever/infection during neutropenic period  Description: INTERVENTIONS:  - Monitor WBC    Outcome: Progressing     Problem: SAFETY ADULT  Goal: Patient will remain free of falls  Description: INTERVENTIONS:  - Educate patient/family on patient safety including physical limitations  - Instruct patient to call for assistance with activity   - Consult OT/PT to assist with strengthening/mobility   - Keep Call bell within reach  - Keep bed low and locked with side rails adjusted as appropriate  - Keep care items and personal belongings within reach  - Initiate and maintain comfort rounds  - Make Fall Risk Sign visible to staff  - Offer Toileting every 2 Hours, in advance of need  - Obtain necessary fall risk management equipment: non-slip sock  - Apply yellow socks and bracelet for high fall risk patients  - Consider moving patient to room near nurses station  Outcome: Progressing  Goal: Maintain or return to baseline ADL function  Description: INTERVENTIONS:  -  Assess patient's ability to carry out ADLs; assess patient's baseline for ADL function and identify physical deficits which impact ability to perform ADLs (bathing, care of mouth/teeth, toileting, grooming, dressing, etc.)  - Assess/evaluate cause of self-care deficits   - Assess range of motion  - Assess patient's mobility; develop plan if impaired  - Assess patient's need for assistive devices and provide as appropriate  - Encourage maximum independence but intervene and supervise when necessary  - Involve family in performance of ADLs  - Assess for home care needs following discharge   - Consider OT consult to assist with ADL evaluation and planning for discharge  - Provide patient education as appropriate  Outcome: Progressing  Goal: Maintains/Returns to pre admission functional level  Description: INTERVENTIONS:  - Perform AM-PAC 6 Click  Basic Mobility/ Daily Activity assessment daily.  - Set and communicate daily mobility goal to care team and patient/family/caregiver.   - Collaborate with rehabilitation services on mobility goals if consulted  - Perform Range of Motion 6 times a day.  - Reposition patient every 2 hours.  - Dangle patient 3 times a day  - Stand patient 3 times a day  - Ambulate patient 3 times a day  - Out of bed to chair 3 times a day   - Out of bed for meals 3 times a day  - Out of bed for toileting  - Record patient progress and toleration of activity level   Outcome: Progressing     Problem: DISCHARGE PLANNING  Goal: Discharge to home or other facility with appropriate resources  Description: INTERVENTIONS:  - Identify barriers to discharge w/patient and caregiver  - Arrange for needed discharge resources and transportation as appropriate  - Identify discharge learning needs (meds, wound care, etc.)  - Arrange for interpretive services to assist at discharge as needed  - Refer to Case Management Department for coordinating discharge planning if the patient needs post-hospital services based on physician/advanced practitioner order or complex needs related to functional status, cognitive ability, or social support system  Outcome: Progressing     Problem: Knowledge Deficit  Goal: Patient/family/caregiver demonstrates understanding of disease process, treatment plan, medications, and discharge instructions  Description: Complete learning assessment and assess knowledge base.  Interventions:  - Provide teaching at level of understanding  - Provide teaching via preferred learning methods  Outcome: Progressing     Problem: Nutrition/Hydration-ADULT  Goal: Nutrient/Hydration intake appropriate for improving, restoring or maintaining nutritional needs  Description: Monitor and assess patient's nutrition/hydration status for malnutrition. Collaborate with interdisciplinary team and initiate plan and interventions as ordered.  Monitor  patient's weight and dietary intake as ordered or per policy. Utilize nutrition screening tool and intervene as necessary. Determine patient's food preferences and provide high-protein, high-caloric foods as appropriate.     INTERVENTIONS:  - Monitor oral intake, urinary output, labs, and treatment plans  - Assess nutrition and hydration status and recommend course of action  - Evaluate amount of meals eaten  - Assist patient with eating if necessary   - Allow adequate time for meals  - Recommend/ encourage appropriate diets, oral nutritional supplements, and vitamin/mineral supplements  - Order, calculate, and assess calorie counts as needed  - Recommend, monitor, and adjust tube feedings and TPN/PPN based on assessed needs  - Assess need for intravenous fluids  - Provide specific nutrition/hydration education as appropriate  - Include patient/family/caregiver in decisions related to nutrition  Outcome: Progressing

## 2024-12-14 NOTE — ASSESSMENT & PLAN NOTE
Malnutrition Findings:   Adult Malnutrition type: Chronic illness  Adult Degree of Malnutrition: Malnutrition of mild degree  Malnutrition Characteristics: Muscle loss, Fat loss                  360 Statement: malnutrition of mild degree in setting of chronic illness, evidenced by fat loss/hollowing/dark circles/orbitals, muscle loss/slight hollowing/temples, protruding clavicles, treated with diet and supplements    BMI Findings:           Body mass index is 18.71 kg/m².

## 2024-12-15 PROCEDURE — 99232 SBSQ HOSP IP/OBS MODERATE 35: CPT | Performed by: FAMILY MEDICINE

## 2024-12-15 PROCEDURE — 94760 N-INVAS EAR/PLS OXIMETRY 1: CPT

## 2024-12-15 PROCEDURE — 94640 AIRWAY INHALATION TREATMENT: CPT

## 2024-12-15 RX ORDER — POLYETHYLENE GLYCOL 3350 17 G/17G
17 POWDER, FOR SOLUTION ORAL 2 TIMES DAILY
Status: DISCONTINUED | OUTPATIENT
Start: 2024-12-15 | End: 2024-12-17 | Stop reason: HOSPADM

## 2024-12-15 RX ADMIN — LACTULOSE 20 G: 20 SOLUTION ORAL at 16:55

## 2024-12-15 RX ADMIN — POLYETHYLENE GLYCOL 3350 17 G: 17 POWDER, FOR SOLUTION ORAL at 10:24

## 2024-12-15 RX ADMIN — FLUTICASONE FUROATE AND VILANTEROL TRIFENATATE 1 PUFF: 100; 25 POWDER RESPIRATORY (INHALATION) at 10:24

## 2024-12-15 RX ADMIN — IPRATROPIUM BROMIDE AND ALBUTEROL SULFATE 3 ML: 2.5; .5 SOLUTION RESPIRATORY (INHALATION) at 19:29

## 2024-12-15 RX ADMIN — FLUCONAZOLE 200 MG: 100 TABLET ORAL at 10:24

## 2024-12-15 RX ADMIN — DOCUSATE SODIUM 100 MG: 100 CAPSULE, LIQUID FILLED ORAL at 16:56

## 2024-12-15 RX ADMIN — DICYCLOMINE HYDROCHLORIDE 10 MG: 10 CAPSULE ORAL at 21:39

## 2024-12-15 RX ADMIN — DICYCLOMINE HYDROCHLORIDE 10 MG: 10 CAPSULE ORAL at 12:31

## 2024-12-15 RX ADMIN — DOCUSATE SODIUM 100 MG: 100 CAPSULE, LIQUID FILLED ORAL at 10:24

## 2024-12-15 RX ADMIN — UMECLIDINIUM 1 PUFF: 62.5 AEROSOL, POWDER ORAL at 10:24

## 2024-12-15 RX ADMIN — METOPROLOL TARTRATE 25 MG: 25 TABLET, FILM COATED ORAL at 10:24

## 2024-12-15 RX ADMIN — SUCRALFATE 1 G: 1 TABLET ORAL at 06:33

## 2024-12-15 RX ADMIN — IPRATROPIUM BROMIDE AND ALBUTEROL SULFATE 3 ML: 2.5; .5 SOLUTION RESPIRATORY (INHALATION) at 13:17

## 2024-12-15 RX ADMIN — CALCIUM CARBONATE (ANTACID) CHEW TAB 500 MG 500 MG: 500 CHEW TAB at 18:51

## 2024-12-15 RX ADMIN — DICYCLOMINE HYDROCHLORIDE 10 MG: 10 CAPSULE ORAL at 06:33

## 2024-12-15 RX ADMIN — LACTULOSE 20 G: 20 SOLUTION ORAL at 10:24

## 2024-12-15 RX ADMIN — SUCRALFATE 1 G: 1 TABLET ORAL at 12:31

## 2024-12-15 RX ADMIN — FAMOTIDINE 20 MG: 20 TABLET, FILM COATED ORAL at 16:55

## 2024-12-15 RX ADMIN — POLYETHYLENE GLYCOL 3350 17 G: 17 POWDER, FOR SOLUTION ORAL at 21:39

## 2024-12-15 RX ADMIN — PANTOPRAZOLE SODIUM 40 MG: 40 TABLET, DELAYED RELEASE ORAL at 16:55

## 2024-12-15 RX ADMIN — IPRATROPIUM BROMIDE AND ALBUTEROL SULFATE 3 ML: 2.5; .5 SOLUTION RESPIRATORY (INHALATION) at 07:28

## 2024-12-15 RX ADMIN — FAMOTIDINE 20 MG: 20 TABLET, FILM COATED ORAL at 10:24

## 2024-12-15 RX ADMIN — PANTOPRAZOLE SODIUM 40 MG: 40 TABLET, DELAYED RELEASE ORAL at 06:33

## 2024-12-15 RX ADMIN — DICYCLOMINE HYDROCHLORIDE 10 MG: 10 CAPSULE ORAL at 16:55

## 2024-12-15 NOTE — PROGRESS NOTES
"Progress Note - Hospitalist   Name: Ileana Seaman 64 y.o. female I MRN: [id number]  Unit/Bed#: -[facility] Date of Admission: 12/8/2024   Date of Service: 12/15/2024 I Hospital Day: 6    Assessment & Plan  Epigastric pain  Recurrent epigastric abdominal pain and diarrhea, admitted 12/6 and discharged 12/7 for same.  Returns to ED states pain returned at 0300 on 12/8.  Thoroughly evaluated by ED, found to have elevated liver enzymes and discussed with gastroenterology, abdominal ultrasound obtained with reassuring findings no liver abnormality  Normal lactic acid level  Patient refused to leave from the emergency department continuing to say she is having severe burning of her insides  ER physician obliged and requested observation admission   no further narcotic medications as this patient is suspected of drug-seeking behavior  Will continue PPI, Carafate, Pepcid outpatient regimen for abdominal pain  Avoid Tylenol due to elevated liver enzymes suspected secondary to MARIS treatment  Per ED note  \"patient with persistent recurrence of pain, requiring repeat medication dosing including IV narcotic medications. Recommendation made for attempt at oral pain medication. Patient expressed concerns about \"waking up in the middle of the night with my insides of burning, and if they admit me, they could figure out why that is happening.\"   Keep stool record at bedside, monitor episodes of diarrhea occurring in 24-hour  Patient has recent EGD done on November in this hospital, reviewed  Discussed the case with gastroenterology team, we will proceed with mesenteric arterial duplex-spoke to vascular surgery and recommend outpt follow up  Will trial bentyl,simethicone for now  Mri abd reviewed. Cont trial of food and monitor pain symptoms . Also placed on stool softeners for constipation  cta abd Abdominal aorta and branches are patent with no aneurysm or dissection.Celiac artery with moderate origin stenosis with patent " branches. Replaced right hepatic artery from the superior mesenteric artery.Mild to moderate atherosclerotic changes in the pelvic vessels with tortuous left external iliac artery.Stool impaction in the ascending and transverse colon.  Placed on tap water enema and bowel prep.so far has had a few bm. Cont miralax and lactulose. Abd pain Is resolving  Mycobacterial infection, non-TB  Hx MARIS infection and 3 drug regimen managed by ID (Dr. Landrum )  Azithromycin, rifabutin and ethambutol placed on hold due to elevated liver enzymes.lfts now resolving  Consult inf disease for reviewing medications and recommendations . Will adjust regimen further tomorrow  COPD (chronic obstructive pulmonary disease) (HCC)  No exacerbation  Continue PTA regimen  Atrial fibrillation (HCC)  Continue metoprolol  Not chronically anticoagulated  Chronic hypoxic respiratory failure (HCC)  Maintained on 2 L nasal cannula  Moderate protein-calorie malnutrition (HCC)  Malnutrition Findings: Loss of subcutaneous fat in orbital, triceps, ribcage areas.  Loss of muscle at temples, clavicles, scapula, extremities.  Consultation to dietitian      Adult Malnutrition type: Chronic illnessBMI Findings:     Body mass index is 18.71 kg/m².     Elevated liver enzymes  Suspect DILI  ALT/AST > 3 x nomal, T bili >2 x normal  Azithromycin, ethambutol and rifabutin on hold due to elevated liver enzymes   Will need to follow up with ID for treatment alteration - MARIS infection and 3 drug regimen managed by ID (Dr. Landrum)   neg Tylenol level and normal PT/INR   Ultrasound RUQ - S/P cholecystectomy, liver imaging unrevealing of abnormality  Will need outpatient follow-up  Celiac artery stenosis (HCC)  Outpt follow up with vascular  Mild protein-calorie malnutrition (HCC)  Malnutrition Findings:   Adult Malnutrition type: Chronic illness  Adult Degree of Malnutrition: Malnutrition of mild degree  Malnutrition Characteristics: Muscle loss, Fat loss                   360 Statement: malnutrition of mild degree in setting of chronic illness, evidenced by fat loss/hollowing/dark circles/orbitals, muscle loss/slight hollowing/temples, protruding clavicles, treated with diet and supplements    BMI Findings:           Body mass index is 18.71 kg/m².       VTE Pharmacologic Prophylaxis:   Moderate Risk (Score 3-4) - Pharmacological DVT Prophylaxis Contraindicated. Sequential Compression Devices Ordered.    Mobility:   Basic Mobility Inpatient Raw Score: 24  JH-HLM Goal: 8: Walk 250 feet or more  JH-HLM Achieved: 8: Walk 250 feet ot more  JH-HLM Goal achieved. Continue to encourage appropriate mobility.    Patient Centered Rounds: I performed bedside rounds with nursing staff today.   Discussions with Specialists or Other Care Team Provider: none    Education and Discussions with Family / Patient: updated patient     Current Length of Stay: 6 day(s)  Current Patient Status: Inpatient   Certification Statement: The patient will continue to require additional inpatient hospital stay due to abd pain  Discharge Plan: Anticipate discharge tomorrow to home.    Code Status: Level 1 - Full Code    Subjective   Patient denies any abdominal pain today.  Definitely appears to be anxious.  Had 2-3 small bowel movements overnight    Objective :  Temp:  [96.4 °F (35.8 °C)-97.3 °F (36.3 °C)] 96.4 °F (35.8 °C)  HR:  [70-91] 70  BP: (118-127)/(72-88) 120/76  Resp:  [18-20] 20  SpO2:  [95 %-99 %] 99 %  O2 Device: Nasal cannula  Nasal Cannula O2 Flow Rate (L/min):  [2 L/min] 2 L/min    Body mass index is 18.71 kg/m².     Input and Output Summary (last 24 hours):     Intake/Output Summary (Last 24 hours) at 12/15/2024 1100  Last data filed at 12/15/2024 0518  Gross per 24 hour   Intake 780 ml   Output --   Net 780 ml       Physical Exam  Vitals and nursing note reviewed.   Constitutional:       Appearance: Normal appearance.   HENT:      Head: Normocephalic and atraumatic.      Right Ear: External  ear normal.      Left Ear: External ear normal.      Nose: Nose normal.      Mouth/Throat:      Pharynx: Oropharynx is clear.   Eyes:      Pupils: Pupils are equal, round, and reactive to light.   Cardiovascular:      Rate and Rhythm: Normal rate and regular rhythm.      Heart sounds: Normal heart sounds.   Pulmonary:      Effort: Pulmonary effort is normal.      Breath sounds: Normal breath sounds.   Abdominal:      General: Bowel sounds are normal.      Palpations: Abdomen is soft.      Tenderness: There is abdominal tenderness.   Musculoskeletal:         General: Normal range of motion.      Cervical back: Normal range of motion and neck supple.   Skin:     General: Skin is warm and dry.      Capillary Refill: Capillary refill takes less than 2 seconds.   Neurological:      General: No focal deficit present.      Mental Status: She is alert and oriented to person, place, and time.   Psychiatric:      Comments: anxious           Lines/Drains:              Lab Results: I have reviewed the following results:   Results from last 7 days   Lab Units 12/09/24  0440   WBC Thousand/uL 5.46   HEMOGLOBIN g/dL 10.1*   HEMATOCRIT % 32.0*   PLATELETS Thousands/uL 156     Results from last 7 days   Lab Units 12/12/24  0529   SODIUM mmol/L 142   POTASSIUM mmol/L 3.4*   CHLORIDE mmol/L 105   CO2 mmol/L 29   BUN mg/dL 4*   CREATININE mg/dL 0.46*   ANION GAP mmol/L 8   CALCIUM mg/dL 8.7   ALBUMIN g/dL 3.2*   TOTAL BILIRUBIN mg/dL 0.49   ALK PHOS U/L 135*   ALT U/L 82*   AST U/L 40*   GLUCOSE RANDOM mg/dL 88     Results from last 7 days   Lab Units 12/08/24  2319   INR  1.12                   Recent Cultures (last 7 days):         Imaging Results Review: I reviewed radiology reports from this admission including: xray(s).  Other Study Results Review: EKG was reviewed.     Last 24 Hours Medication List:     Current Facility-Administered Medications:     albuterol inhalation solution 2.5 mg, Q4H PRN    calcium carbonate (TUMS)  chewable tablet 500 mg, Daily PRN    dicyclomine (BENTYL) capsule 10 mg, 4x Daily (AC & HS)    docusate sodium (COLACE) capsule 100 mg, BID    famotidine (PEPCID) tablet 20 mg, BID    fluconazole (DIFLUCAN) tablet 200 mg, Daily    Fluticasone Furoate-Vilanterol 100-25 mcg/actuation 1 puff, Daily **AND** umeclidinium 62.5 mcg/actuation inhaler AEPB 1 puff, Daily    hydrOXYzine HCL (ATARAX) tablet 25 mg, Q6H PRN    ipratropium-albuterol (DUO-NEB) 0.5-2.5 mg/3 mL inhalation solution 3 mL, TID    lactulose (CHRONULAC) oral solution 20 g, BID    metoprolol tartrate (LOPRESSOR) tablet 25 mg, Q12H MAYCO    ondansetron (ZOFRAN) injection 4 mg, Q8H PRN    pantoprazole (PROTONIX) EC tablet 40 mg, BID AC    polyethylene glycol (MIRALAX) packet 17 g, Daily    simethicone (MYLICON) chewable tablet 80 mg, Q6H PRN    sucralfate (CARAFATE) tablet 1 g, 4x Daily (AC & HS)    witch hazel-glycerin (TUCKS) topical pad 1 Pad, Q4H PRN    Administrative Statements   Today, Patient Was Seen By: Modesta Santizo MD      **Please Note: This note may have been constructed using a voice recognition system.**

## 2024-12-15 NOTE — PLAN OF CARE
Problem: Potential for Falls  Goal: Patient will remain free of falls  Description: INTERVENTIONS:  - Educate patient/family on patient safety including physical limitations  - Instruct patient to call for assistance with activity   - Consult OT/PT to assist with strengthening/mobility   - Keep Call bell within reach  - Keep bed low and locked with side rails adjusted as appropriate  - Keep care items and personal belongings within reach  - Initiate and maintain comfort rounds  - Make Fall Risk Sign visible to staff  - Offer Toileting every 2 Hours, in advance of need  - Obtain necessary fall risk management equipment: non-slip sock  - Apply yellow socks and bracelet for high fall risk patients  - Consider moving patient to room near nurses station  Outcome: Progressing     Problem: PAIN - ADULT  Goal: Verbalizes/displays adequate comfort level or baseline comfort level  Description: Interventions:  - Encourage patient to monitor pain and request assistance  - Assess pain using appropriate pain scale  - Administer analgesics based on type and severity of pain and evaluate response  - Implement non-pharmacological measures as appropriate and evaluate response  - Consider cultural and social influences on pain and pain management  - Notify physician/advanced practitioner if interventions unsuccessful or patient reports new pain  Outcome: Progressing     Problem: INFECTION - ADULT  Goal: Absence or prevention of progression during hospitalization  Description: INTERVENTIONS:  - Assess and monitor for signs and symptoms of infection  - Monitor lab/diagnostic results  - Monitor all insertion sites, i.e. indwelling lines, tubes, and drains  - Monitor endotracheal if appropriate and nasal secretions for changes in amount and color  - [place] appropriate cooling/warming therapies per order  - Administer medications as ordered  - Instruct and encourage patient and family to use good hand hygiene technique  - Identify and  instruct in appropriate isolation precautions for identified infection/condition  Outcome: Progressing  Goal: Absence of fever/infection during neutropenic period  Description: INTERVENTIONS:  - Monitor WBC    Outcome: Progressing     Problem: SAFETY ADULT  Goal: Patient will remain free of falls  Description: INTERVENTIONS:  - Educate patient/family on patient safety including physical limitations  - Instruct patient to call for assistance with activity   - Consult OT/PT to assist with strengthening/mobility   - Keep Call bell within reach  - Keep bed low and locked with side rails adjusted as appropriate  - Keep care items and personal belongings within reach  - Initiate and maintain comfort rounds  - Make Fall Risk Sign visible to staff  - Offer Toileting every 2 Hours, in advance of need  - Obtain necessary fall risk management equipment: non-slip sock  - Apply yellow socks and bracelet for high fall risk patients  - Consider moving patient to room near nurses station  Outcome: Progressing  Goal: Maintain or return to baseline ADL function  Description: INTERVENTIONS:  -  Assess patient's ability to carry out ADLs; assess patient's baseline for ADL function and identify physical deficits which impact ability to perform ADLs (bathing, care of mouth/teeth, toileting, grooming, dressing, etc.)  - Assess/evaluate cause of self-care deficits   - Assess range of motion  - Assess patient's mobility; develop plan if impaired  - Assess patient's need for assistive devices and provide as appropriate  - Encourage maximum independence but intervene and supervise when necessary  - Involve family in performance of ADLs  - Assess for home care needs following discharge   - Consider OT consult to assist with ADL evaluation and planning for discharge  - Provide patient education as appropriate  Outcome: Progressing  Goal: Maintains/Returns to pre admission functional level  Description: INTERVENTIONS:  - Perform AM-PAC 6 Click  Basic Mobility/ Daily Activity assessment daily.  - Set and communicate daily mobility goal to care team and patient/family/caregiver.   - Collaborate with rehabilitation services on mobility goals if consulted  - Perform Range of Motion 6 times a day.  - Reposition patient every 2 hours.  - Dangle patient 3 times a day  - Stand patient 3 times a day  - Ambulate patient 3 times a day  - Out of bed to chair 3 times a day   - Out of bed for meals 3 times a day  - Out of bed for toileting  - Record patient progress and toleration of activity level   Outcome: Progressing     Problem: DISCHARGE PLANNING  Goal: Discharge to home or other facility with appropriate resources  Description: INTERVENTIONS:  - Identify barriers to discharge w/patient and caregiver  - Arrange for needed discharge resources and transportation as appropriate  - Identify discharge learning needs (meds, wound care, etc.)  - Arrange for interpretive services to assist at discharge as needed  - Refer to Case Management Department for coordinating discharge planning if the patient needs post-hospital services based on physician/advanced practitioner order or complex needs related to functional status, cognitive ability, or social support system  Outcome: Progressing     Problem: Knowledge Deficit  Goal: Patient/family/caregiver demonstrates understanding of disease process, treatment plan, medications, and discharge instructions  Description: Complete learning assessment and assess knowledge base.  Interventions:  - Provide teaching at level of understanding  - Provide teaching via preferred learning methods  Outcome: Progressing     Problem: Nutrition/Hydration-ADULT  Goal: Nutrient/Hydration intake appropriate for improving, restoring or maintaining nutritional needs  Description: Monitor and assess patient's nutrition/hydration status for malnutrition. Collaborate with interdisciplinary team and initiate plan and interventions as ordered.  Monitor  patient's weight and dietary intake as ordered or per policy. Utilize nutrition screening tool and intervene as necessary. Determine patient's food preferences and provide high-protein, high-caloric foods as appropriate.     INTERVENTIONS:  - Monitor oral intake, urinary output, labs, and treatment plans  - Assess nutrition and hydration status and recommend course of action  - Evaluate amount of meals eaten  - Assist patient with eating if necessary   - Allow adequate time for meals  - Recommend/ encourage appropriate diets, oral nutritional supplements, and vitamin/mineral supplements  - Order, calculate, and assess calorie counts as needed  - Recommend, monitor, and adjust tube feedings and TPN/PPN based on assessed needs  - Assess need for intravenous fluids  - Provide specific nutrition/hydration education as appropriate  - Include patient/family/caregiver in decisions related to nutrition  Outcome: Progressing

## 2024-12-15 NOTE — ASSESSMENT & PLAN NOTE
Hx MARIS infection and 3 drug regimen managed by ID (Dr. Landrum )  Azithromycin, rifabutin and ethambutol placed on hold due to elevated liver enzymes.lfts now resolving  Consult inf disease for reviewing medications and recommendations . Will adjust regimen further tomorrow

## 2024-12-16 PROBLEM — F41.9 ANXIETY: Status: ACTIVE | Noted: 2024-12-16

## 2024-12-16 LAB
ALBUMIN SERPL BCG-MCNC: 3.5 G/DL (ref 3.5–5)
ALP SERPL-CCNC: 125 U/L (ref 34–104)
ALT SERPL W P-5'-P-CCNC: 40 U/L (ref 7–52)
ANION GAP SERPL CALCULATED.3IONS-SCNC: 6 MMOL/L (ref 4–13)
AST SERPL W P-5'-P-CCNC: 12 U/L (ref 13–39)
BILIRUB SERPL-MCNC: 0.43 MG/DL (ref 0.2–1)
BUN SERPL-MCNC: 7 MG/DL (ref 5–25)
CALCIUM SERPL-MCNC: 9.1 MG/DL (ref 8.4–10.2)
CHLORIDE SERPL-SCNC: 102 MMOL/L (ref 96–108)
CO2 SERPL-SCNC: 31 MMOL/L (ref 21–32)
CREAT SERPL-MCNC: 0.56 MG/DL (ref 0.6–1.3)
GFR SERPL CREATININE-BSD FRML MDRD: 97 ML/MIN/1.73SQ M
GLUCOSE SERPL-MCNC: 92 MG/DL (ref 65–140)
POTASSIUM SERPL-SCNC: 3.8 MMOL/L (ref 3.5–5.3)
PROT SERPL-MCNC: 5.7 G/DL (ref 6.4–8.4)
SODIUM SERPL-SCNC: 139 MMOL/L (ref 135–147)

## 2024-12-16 PROCEDURE — 99232 SBSQ HOSP IP/OBS MODERATE 35: CPT | Performed by: FAMILY MEDICINE

## 2024-12-16 PROCEDURE — 80053 COMPREHEN METABOLIC PANEL: CPT | Performed by: FAMILY MEDICINE

## 2024-12-16 PROCEDURE — 94640 AIRWAY INHALATION TREATMENT: CPT

## 2024-12-16 PROCEDURE — 94760 N-INVAS EAR/PLS OXIMETRY 1: CPT

## 2024-12-16 PROCEDURE — 99233 SBSQ HOSP IP/OBS HIGH 50: CPT | Performed by: INTERNAL MEDICINE

## 2024-12-16 RX ORDER — DICYCLOMINE HYDROCHLORIDE 10 MG/1
10 CAPSULE ORAL
Status: DISCONTINUED | OUTPATIENT
Start: 2024-12-16 | End: 2024-12-17 | Stop reason: HOSPADM

## 2024-12-16 RX ORDER — LORAZEPAM 0.5 MG/1
0.25 TABLET ORAL EVERY 8 HOURS PRN
Status: DISCONTINUED | OUTPATIENT
Start: 2024-12-16 | End: 2024-12-17 | Stop reason: HOSPADM

## 2024-12-16 RX ORDER — AZITHROMYCIN 250 MG/1
250 TABLET, FILM COATED ORAL EVERY OTHER DAY
Status: DISCONTINUED | OUTPATIENT
Start: 2024-12-16 | End: 2024-12-17 | Stop reason: HOSPADM

## 2024-12-16 RX ADMIN — DOCUSATE SODIUM 100 MG: 100 CAPSULE, LIQUID FILLED ORAL at 17:36

## 2024-12-16 RX ADMIN — DICYCLOMINE HYDROCHLORIDE 10 MG: 10 CAPSULE ORAL at 06:09

## 2024-12-16 RX ADMIN — UMECLIDINIUM 1 PUFF: 62.5 AEROSOL, POWDER ORAL at 09:37

## 2024-12-16 RX ADMIN — PANTOPRAZOLE SODIUM 40 MG: 40 TABLET, DELAYED RELEASE ORAL at 06:09

## 2024-12-16 RX ADMIN — METOPROLOL TARTRATE 25 MG: 25 TABLET, FILM COATED ORAL at 08:40

## 2024-12-16 RX ADMIN — FLUCONAZOLE 200 MG: 100 TABLET ORAL at 08:40

## 2024-12-16 RX ADMIN — IPRATROPIUM BROMIDE AND ALBUTEROL SULFATE 3 ML: 2.5; .5 SOLUTION RESPIRATORY (INHALATION) at 19:48

## 2024-12-16 RX ADMIN — IPRATROPIUM BROMIDE AND ALBUTEROL SULFATE 3 ML: 2.5; .5 SOLUTION RESPIRATORY (INHALATION) at 07:46

## 2024-12-16 RX ADMIN — POLYETHYLENE GLYCOL 3350 17 G: 17 POWDER, FOR SOLUTION ORAL at 09:36

## 2024-12-16 RX ADMIN — HYDROXYZINE HYDROCHLORIDE 25 MG: 25 TABLET ORAL at 15:38

## 2024-12-16 RX ADMIN — AZITHROMYCIN 250 MG: 250 TABLET, FILM COATED ORAL at 12:11

## 2024-12-16 RX ADMIN — IPRATROPIUM BROMIDE AND ALBUTEROL SULFATE 3 ML: 2.5; .5 SOLUTION RESPIRATORY (INHALATION) at 14:33

## 2024-12-16 RX ADMIN — FLUTICASONE FUROATE AND VILANTEROL TRIFENATATE 1 PUFF: 100; 25 POWDER RESPIRATORY (INHALATION) at 09:37

## 2024-12-16 RX ADMIN — PANTOPRAZOLE SODIUM 40 MG: 40 TABLET, DELAYED RELEASE ORAL at 15:38

## 2024-12-16 RX ADMIN — FAMOTIDINE 20 MG: 20 TABLET, FILM COATED ORAL at 17:36

## 2024-12-16 RX ADMIN — DICYCLOMINE HYDROCHLORIDE 10 MG: 10 CAPSULE ORAL at 15:38

## 2024-12-16 RX ADMIN — POLYETHYLENE GLYCOL 3350 17 G: 17 POWDER, FOR SOLUTION ORAL at 22:22

## 2024-12-16 RX ADMIN — DOCUSATE SODIUM 100 MG: 100 CAPSULE, LIQUID FILLED ORAL at 08:39

## 2024-12-16 RX ADMIN — FAMOTIDINE 20 MG: 20 TABLET, FILM COATED ORAL at 08:40

## 2024-12-16 NOTE — PLAN OF CARE
Problem: Potential for Falls  Goal: Patient will remain free of falls  Description: INTERVENTIONS:  - Educate patient/family on patient safety including physical limitations  - Instruct patient to call for assistance with activity   - Consult OT/PT to assist with strengthening/mobility   - Keep Call bell within reach  - Keep bed low and locked with side rails adjusted as appropriate  - Keep care items and personal belongings within reach  - Initiate and maintain comfort rounds  - Make Fall Risk Sign visible to staff  - Offer Toileting every 2 Hours, in advance of need  - Obtain necessary fall risk management equipment: non-slip sock  - Apply yellow socks and bracelet for high fall risk patients  - Consider moving patient to room near nurses station  Outcome: Progressing     Problem: PAIN - ADULT  Goal: Verbalizes/displays adequate comfort level or baseline comfort level  Description: Interventions:  - Encourage patient to monitor pain and request assistance  - Assess pain using appropriate pain scale  - Administer analgesics based on type and severity of pain and evaluate response  - Implement non-pharmacological measures as appropriate and evaluate response  - Consider cultural and social influences on pain and pain management  - Notify physician/advanced practitioner if interventions unsuccessful or patient reports new pain  Outcome: Progressing     Problem: INFECTION - ADULT  Goal: Absence or prevention of progression during hospitalization  Description: INTERVENTIONS:  - Assess and monitor for signs and symptoms of infection  - Monitor lab/diagnostic results  - Monitor all insertion sites, i.e. indwelling lines, tubes, and drains  - Monitor endotracheal if appropriate and nasal secretions for changes in amount and color  - [place] appropriate cooling/warming therapies per order  - Administer medications as ordered  - Instruct and encourage patient and family to use good hand hygiene technique  - Identify and  instruct in appropriate isolation precautions for identified infection/condition  Outcome: Progressing  Goal: Absence of fever/infection during neutropenic period  Description: INTERVENTIONS:  - Monitor WBC    Outcome: Progressing     Problem: SAFETY ADULT  Goal: Patient will remain free of falls  Description: INTERVENTIONS:  - Educate patient/family on patient safety including physical limitations  - Instruct patient to call for assistance with activity   - Consult OT/PT to assist with strengthening/mobility   - Keep Call bell within reach  - Keep bed low and locked with side rails adjusted as appropriate  - Keep care items and personal belongings within reach  - Initiate and maintain comfort rounds  - Make Fall Risk Sign visible to staff  - Offer Toileting every 2 Hours, in advance of need  - Obtain necessary fall risk management equipment: non-slip sock  - Apply yellow socks and bracelet for high fall risk patients  - Consider moving patient to room near nurses station  Outcome: Progressing  Goal: Maintain or return to baseline ADL function  Description: INTERVENTIONS:  -  Assess patient's ability to carry out ADLs; assess patient's baseline for ADL function and identify physical deficits which impact ability to perform ADLs (bathing, care of mouth/teeth, toileting, grooming, dressing, etc.)  - Assess/evaluate cause of self-care deficits   - Assess range of motion  - Assess patient's mobility; develop plan if impaired  - Assess patient's need for assistive devices and provide as appropriate  - Encourage maximum independence but intervene and supervise when necessary  - Involve family in performance of ADLs  - Assess for home care needs following discharge   - Consider OT consult to assist with ADL evaluation and planning for discharge  - Provide patient education as appropriate  Outcome: Progressing  Goal: Maintains/Returns to pre admission functional level  Description: INTERVENTIONS:  - Perform AM-PAC 6 Click  Basic Mobility/ Daily Activity assessment daily.  - Set and communicate daily mobility goal to care team and patient/family/caregiver.   - Collaborate with rehabilitation services on mobility goals if consulted  - Perform Range of Motion 6 times a day.  - Reposition patient every 2 hours.  - Dangle patient 3 times a day  - Stand patient 3 times a day  - Ambulate patient 3 times a day  - Out of bed to chair 3 times a day   - Out of bed for meals 3 times a day  - Out of bed for toileting  - Record patient progress and toleration of activity level   Outcome: Progressing     Problem: DISCHARGE PLANNING  Goal: Discharge to home or other facility with appropriate resources  Description: INTERVENTIONS:  - Identify barriers to discharge w/patient and caregiver  - Arrange for needed discharge resources and transportation as appropriate  - Identify discharge learning needs (meds, wound care, etc.)  - Arrange for interpretive services to assist at discharge as needed  - Refer to Case Management Department for coordinating discharge planning if the patient needs post-hospital services based on physician/advanced practitioner order or complex needs related to functional status, cognitive ability, or social support system  Outcome: Progressing     Problem: Knowledge Deficit  Goal: Patient/family/caregiver demonstrates understanding of disease process, treatment plan, medications, and discharge instructions  Description: Complete learning assessment and assess knowledge base.  Interventions:  - Provide teaching at level of understanding  - Provide teaching via preferred learning methods  Outcome: Progressing     Problem: Nutrition/Hydration-ADULT  Goal: Nutrient/Hydration intake appropriate for improving, restoring or maintaining nutritional needs  Description: Monitor and assess patient's nutrition/hydration status for malnutrition. Collaborate with interdisciplinary team and initiate plan and interventions as ordered.  Monitor  patient's weight and dietary intake as ordered or per policy. Utilize nutrition screening tool and intervene as necessary. Determine patient's food preferences and provide high-protein, high-caloric foods as appropriate.     INTERVENTIONS:  - Monitor oral intake, urinary output, labs, and treatment plans  - Assess nutrition and hydration status and recommend course of action  - Evaluate amount of meals eaten  - Assist patient with eating if necessary   - Allow adequate time for meals  - Recommend/ encourage appropriate diets, oral nutritional supplements, and vitamin/mineral supplements  - Order, calculate, and assess calorie counts as needed  - Recommend, monitor, and adjust tube feedings and TPN/PPN based on assessed needs  - Assess need for intravenous fluids  - Provide specific nutrition/hydration education as appropriate  - Include patient/family/caregiver in decisions related to nutrition  Outcome: Progressing

## 2024-12-16 NOTE — ASSESSMENT & PLAN NOTE
"64-year-old woman with history of situational anxiety presents to the hospital for epigastric abdominal pain and diarrhea.  Psychiatry consulted for evaluation of anxiety.  Patient reports ongoing anxiety regarding her medical problems and occasionally having \"panic attacks\".  Patient described her anxiety attacks and although she describes them as panic attacks, they do not appear to meet criteria for panic attacks as it appears she experiences heightened anxiety which she is able to calm herself down from without medications.  She does not describe feelings of doom or fear of dying.  It is possible that these are panic attacks although I was not able to elicit something meeting criteria for panic attack.  She does have anxiety that seems to be impacting her functioning and she is preoccupied with somatic symptoms as it is common in patients with anxiety.  The patient would benefit from treatment of anxiety and should follow-up with an outpatient behavioral health specialist such as a psychiatrist or psychotherapist.  Given that the patient is not at baseline, it is difficult to diagnose somatic symptom disorder or other somatizations disorder as more longitudinal information and observation is required.  The treatment for somatic symptom disorders is outpatient cognitive behavioral therapy which she is also the treatment for anxiety, regardless the patient would benefit from outpatient behavioral treatment.  Patient would benefit from as needed medications for anxiety and recommend hydroxyzine 25 mg every 4 hours as needed for anxiety.  Typically I would start an antidepressant and anxiolytic in the form of an SSRI although given her GI symptoms, recommend avoiding these medications at this time as they are likely to exacerbate her GI symptoms as the most common side effect of SSRIs is diarrhea and nausea.  Patient denies SI/HI/AVH and does not meet criteria for inpatient psychiatric hospitalization.  Please refer " for outpatient behavioral treatment on discharge if patient is amenable.

## 2024-12-16 NOTE — ASSESSMENT & PLAN NOTE
Malnutrition Findings:   Adult Malnutrition type: Chronic illness  Adult Degree of Malnutrition: Malnutrition of mild degree  Malnutrition Characteristics: Muscle loss, Fat loss                  360 Statement: malnutrition of mild degree in setting of chronic illness, evidenced by fat loss/hollowing/dark circles/orbitals, muscle loss/slight hollowing/temples, protruding clavicles, treated with diet and supplements    BMI Findings:           Body mass index is 18.71 kg/m².      normal

## 2024-12-16 NOTE — ASSESSMENT & PLAN NOTE
In the setting of COPD and chronic tobacco use. Suspected clinically with symptoms of weight loss, progressive dyspnea and chronic cough as well radiologically with cavitating lesion in RUL and multiple pulmonary nodules  Confirmed on path and cultures following bronch on 5/22  Started on azithro (now on 250 mg every other day/ethambutol 400 mg OD and rifabutin 300 mg OD on 9/12. FU sputum cx neg for AFB and imaging appears improved/stable.  Patient has had multiple GI complaints since initiating the regime and multiple hospitalizations for abdominal pain . Now also with elevated LFTs which may be secondary to drug induced liver injury.  Symptoms are slowly improving     Continue supportive care and bowel regime, additional management per GI   Decisions on dose modifications or drug discontinuations are complex in this setting. Left a message to speak with patient's outpatient ID physician Dr Landrum ([phone]) for discussion of modification of MAC therapy.   At this time would favor graded challenge with MAC therapy and start with po azithro 250 mg every other day.   Check daily CBC, CMP while inpatient to monitor for any evolving antibiotic toxicity or treatment failure   Continue supportive care, monitor clinical course

## 2024-12-16 NOTE — ASSESSMENT & PLAN NOTE
Abnormal LFTs noted on admission. 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  Consider possibility of DILI  although hepatotoxicity is less common with rifabutin. Consider also possibility of celiac artery stenosis as a cause for liver injury in the setting of po oral intake/relative hypoperfusion  LFTs improved     Proceed with graded challenge of MAC therapy

## 2024-12-16 NOTE — ASSESSMENT & PLAN NOTE
Malnutrition Findings:   Adult Malnutrition type: Chronic illness  Adult Degree of Malnutrition: Malnutrition of mild degree  Malnutrition Characteristics: Muscle loss, Fat loss                  360 Statement: malnutrition of mild degree in setting of chronic illness, evidenced by fat loss/hollowing/dark circles/orbitals, muscle loss/slight hollowing/temples, protruding clavicles, treated with diet and supplements    BMI Findings:           Body mass index is 18.71 kg/m².   Per medicine

## 2024-12-16 NOTE — ASSESSMENT & PLAN NOTE
Hx MARIS infection and 3 drug regimen managed by ID (Dr. Landrum )  Azithromycin, rifabutin and ethambutol placed on hold due to elevated liver enzymes.lfts now resolving  Consult inf disease for reviewing medications and recommendations . Will adjust regimen further tomorrow  Start graded medication challenge with zithromax 250 mg q48 hrs.  Has a televisit with her regular infectious disease doctor tomorrow which she wants to do.  discussed with Dr. Carter patient has had 3 hospitalizations since starting the 3 drug regimen and starting a graded approach might be better for long-term compliance.monitor cbc and cmp

## 2024-12-16 NOTE — ASSESSMENT & PLAN NOTE
"Recurrent epigastric abdominal pain and diarrhea, admitted 12/6 and discharged 12/7 for same.  Returns to ED states pain returned at 0300 on 12/8.  Thoroughly evaluated by ED, found to have elevated liver enzymes and discussed with gastroenterology, abdominal ultrasound obtained with reassuring findings no liver abnormality  Normal lactic acid level  Patient refused to leave from the emergency department continuing to say she is having severe burning of her insides   no further narcotic medications as this patient is suspected of drug-seeking behavior  Will continue PPI, Carafate, Pepcid outpatient regimen for abdominal pain  Avoid Tylenol due to elevated liver enzymes suspected secondary to MARIS treatment  Per ED note  \"patient with persistent recurrence of pain, requiring repeat medication dosing including IV narcotic medications. Recommendation made for attempt at oral pain medication. Patient expressed concerns about \"waking up in the middle of the night with my insides of burning, and if they admit me, they could figure out why that is happening.\"   Keep stool record at bedside, monitor episodes of diarrhea occurring in 24-hour  Patient has recent EGD done on November in this hospital, reviewed  Discussed the case with gastroenterology team, we will proceed with mesenteric arterial duplex-spoke to vascular surgery and recommend outpt follow up  Will trial bentyl,simethicone for now  Mri abd reviewed. Cont trial of food and monitor pain symptoms . Also placed on stool softeners for constipation  cta abd Abdominal aorta and branches are patent with no aneurysm or dissection.Celiac artery with moderate origin stenosis with patent branches. Replaced right hepatic artery from the superior mesenteric artery.Mild to moderate atherosclerotic changes in the pelvic vessels with tortuous left external iliac artery.Stool impaction in the ascending and transverse colon.  Placed on tap water enema and bowel prep.so far has " had multiple small loose bm. Cont miralax and colace.  Patient is quite anxious and complaining of abdominal pain again today and burning.  Will ask psychiatry to evaluate her and placed on low-dose Ativan to help her relax.

## 2024-12-16 NOTE — CONSULTS
TELEConsultation - Behavioral Health   Ileana Seaman 64 y.o. female MRN: [id number]  Unit/Bed#: -01 Encounter: [id number]    VIRTUAL CARE DOCUMENTATION:     1. This service was provided via Telemedicine using: [employer]    2. Parties in the room with patient during teleconsult: Patient only    3. Confidentiality: My office door was closed     4. Participants: No one else was in the room    5. Patient acknowledged consent and understanding of privacy and security of the  Telemedicine consult. I informed the patient that I have reviewed their record in Epic and presented the opportunity for them to ask any questions regarding the visit today.  The patient agreed to participate.    6. I have spent a total time of 60 minutes in caring for this patient on the day of the visit/encounter, NOT including the time spent for establishing the audio/video connection, but including Diagnostic results, Prognosis, Risks and benefits of tx options, Instructions for management, Patient and family education including obtaining collateral information, Importance of tx compliance, Risk factor reductions, Assessment & Impressions, Counseling / Coordination of care, Documenting in the medical record, Reviewing / ordering tests, medicine, and procedures  , Obtaining or reviewing history  , and Communicating with other healthcare professionals.          Assessment & Plan     Assessment & Plan  Celiac artery stenosis (HCC)  Per medicine per medicine  Mycobacterial infection, non-TB  Per medicine  Epigastric pain  Per medicine  COPD (chronic obstructive pulmonary disease) (HCC)  Per medicine  Atrial fibrillation (HCC)  Per medicine  Chronic hypoxic respiratory failure (HCC)  Per medicine  Moderate protein-calorie malnutrition (HCC)  Malnutrition Findings:   Adult Malnutrition type: Chronic illness  Adult Degree of Malnutrition: Malnutrition of mild degree  Malnutrition Characteristics: Muscle loss, Fat loss                  360  "Statement: malnutrition of mild degree in setting of chronic illness, evidenced by fat loss/hollowing/dark circles/orbitals, muscle loss/slight hollowing/temples, protruding clavicles, treated with diet and supplements    BMI Findings:           Body mass index is 18.71 kg/m².   Per medicine  Elevated liver enzymes  Per medicine  Anxiety  64-year-old woman with history of situational anxiety presents to the hospital for epigastric abdominal pain and diarrhea.  Psychiatry consulted for evaluation of anxiety.  Patient reports ongoing anxiety regarding her medical problems and occasionally having \"panic attacks\".  Patient described her anxiety attacks and although she describes them as panic attacks, they do not appear to meet criteria for panic attacks as it appears she experiences heightened anxiety which she is able to calm herself down from without medications.  She does not describe feelings of doom or fear of dying.  It is possible that these are panic attacks although I was not able to elicit something meeting criteria for panic attack.  She does have anxiety that seems to be impacting her functioning and she is preoccupied with somatic symptoms as it is common in patients with anxiety.  The patient would benefit from treatment of anxiety and should follow-up with an outpatient behavioral health specialist such as a psychiatrist or psychotherapist.  Given that the patient is not at baseline, it is difficult to diagnose somatic symptom disorder or other somatizations disorder as more longitudinal information and observation is required.  The treatment for somatic symptom disorders is outpatient cognitive behavioral therapy which she is also the treatment for anxiety, regardless the patient would benefit from outpatient behavioral treatment.  Patient would benefit from as needed medications for anxiety and recommend hydroxyzine 25 mg every 4 hours as needed for anxiety.  Typically I would start an antidepressant " "and anxiolytic in the form of an SSRI although given her GI symptoms, recommend avoiding these medications at this time as they are likely to exacerbate her GI symptoms as the most common side effect of SSRIs is diarrhea and nausea.  Patient denies SI/HI/AVH and does not meet criteria for inpatient psychiatric hospitalization.  Please refer for outpatient behavioral treatment on discharge if patient is amenable.      Recommendations:   --Please TigerText with any questions or concerns.    Diagnoses, available treatment options, alternatives to treatment, and risks vs. benefits of current psychiatric treatment plan were discussed with the patient.  Prior records were reviewed in [facility].  The case was discussed with the primary team.    Chief Complaint: \"I feel anxious about my medical problems\"    History of Present Illness   Physician Requesting Consult: Modesta Santizo MD    Reason for Consult / Principal Problem: Anxiety    Per H and P: Ileana Seaman is a 64 y.o. female with a PMH of COPD, MARIS infection on triple antibiotic therapy followed by infectious disease, atrial fibrillation not maintained on anticoagulation who presents with intractable abdominal pain and diarrhea stools.  Patient originally presented to the ED on 12/6 and was admitted for intractable abdominal pain with diarrhea discharged home on 12/7 feeling improved.  Reports today again to the ED stating her pain returned at 3 AM and is intolerable.  Patient has had multiple doses of IV narcotics, thorough evaluation by the ED revealing elevated liver enzymes this was discussed with gastroenterology on-call who recommended ultrasound of abdomen.  Ultrasound of abdomen obtained with reassuring findings of no liver abnormalities.  Patient refused to be discharged home from the hospital due to the burning on her insides and not wanting to wait until 12/18 to see GI in the outpatient setting.  Presented to the hospitalist service by the ED physicians for " "observation of intractable abdominal pain.      Psychiatry consulted for evaluation of anxiety and somatic symptoms.    On evaluation,    Patient was calm and cooperative with interview.  She reports that she has been feeling anxious for many years due to her ongoing medical problems.  She reported that initially she developed anxiety in the setting of her COPD, but she has learned to \"cope with that.\"  She has recently been struggling with constipation and change in bowel habits which she finds very distressing and has been causing renewed and increased anxiety with \"panic attacks\".  She describes panic attacks as feeling increased anxiety that is difficult to calm down from.  She reports that with deep breathing exercises she often is able to overcome the anxiety and in the instances where she is not, she relies on her daughter to talk her down from these episodes but is able to calm down without medications.  She reports that at this time she feels anxious but it is manageable.  Reports that in the past she has never seen a psychiatrist or behavioral health specialist including psychotherapist for any mental health problems.  Reports that she took anxiety medications in the form of hydroxyzine while in the hospital in the past.  She reports that this was effective for as needed anxiety.  Amenable to utilizing this medication now.  We discussed long-term medications for anxiety and to assist with her mood as she reports feeling situationally depressed regarding her medical problems.  We discussed the risk versus benefits and decided to hold off on initiation of antidepressants as most of them can exacerbate GI symptoms and predispose 1 to nausea, diarrhea, constipation.  Encouraged her to follow-up with her PCP for management of her anxiety or, if willing, follow-up with outpatient psychiatrist and psychotherapist.  She denies significant depressed mood other than feeling depressed about her medical problems.  " Reports poor sleep and appetite along with low energy.  Denies trouble concentrating or suicidal ideation, intention, or plan.  Denies any history of self-harm or self-injurious behaviors.  Denies HI or AVH past or present.    Psychiatric Review Of Systems:  Medication side effects: none  Sleep: Poor, multiple awakenings  Appetite: Low  Hygiene: able to tend to instrumental and basic ADLs  Anxiety Symptoms: Endorses  Psychotic Symptoms: denies  Depression Symptoms: Endorses situational depression  Manic Symptoms: denies  PTSD Symptoms: denies  Suicidal Thoughts: denies  Homicidal Thoughts: denies    Historical Information   Psychiatric History:   Diagnoses: Anxiety  Inpatient Hx: None  Outpatient Hx: None  Medications/Trials: Hydroxyzine    Substance Abuse History:    Social History     Substance and Sexual Activity   Alcohol Use Never     Social History     Substance and Sexual Activity   Drug Use Never       I discussed substance abuse with the patient and, if pertinent, discussed risks vs benefits of decreasing frequency of use.    Family History:   History reviewed. No pertinent family history.    Social History    Rest of social history as per below:  Social History     Socioeconomic History    Marital status: Single     Spouse name: Not on file    Number of children: Not on file    Years of education: Not on file    Highest education level: Not on file   Occupational History    Not on file   Tobacco Use    Smoking status: Former     Current packs/day: 0.25     Types: Cigarettes    Smokeless tobacco: Never   Vaping Use    Vaping status: Never Used   Substance and Sexual Activity    Alcohol use: Never    Drug use: Never    Sexual activity: Not on file   Other Topics Concern    Not on file   Social History Narrative    Not on file     Social Drivers of Health     Financial Resource Strain: Not on file   Food Insecurity: No Food Insecurity (12/8/2024)    Nursing - Inadequate Food Risk Classification     Worried  About Running Out of Food in the Last Year: Never true     Ran Out of Food in the Last Year: Never true     Ran Out of Food in the Last Year: 1   Recent Concern: Food Insecurity - Food Insecurity Present (10/5/2024)    Nursing - Inadequate Food Risk Classification     Worried About Running Out of Food in the Last Year: Sometimes true     Ran Out of Food in the Last Year: Sometimes true     Ran Out of Food in the Last Year: Not on file   Transportation Needs: Unmet Transportation Needs (2024)    Nursing - Transportation Risk Classification     Lack of Transportation: Not on file     Lack of Transportation: 1   Physical Activity: Not on file   Stress: Not on file   Social Connections: Unknown (2024)    Received from [facility]     How often do you feel lonely or isolated from those around you? (Adult - for ages 18 years and over): Not on file   Intimate Partner Violence: Unknown (2024)    Nursing IPS     Feels Physically and Emotionally Safe: Not on file     Physically Hurt by Someone: Not on file     Humiliated or Emotionally Abused by Someone: Not on file     Physically Hurt by Someone: 2     Hurt or Threatened by Someone: 2   Housing Stability: Unknown (2024)    Nursing: Inadequate Housing Risk Classification     Has Housing: Not on file     Worried About Losing Housing: Not on file     Unable to Get Utilities: Not on file     Unable to Pay for Housing in the Last Year: 2     Has Housin       Past Medical History:   Diagnosis Date    COPD (chronic obstructive pulmonary disease) (HCC)     Hiatal hernia            Medical Review Of Systems:  Patient denies headache or dizziness.   Patient denies chest pain or palpitations.  Patient denies difficulty breathing or wheezing.  Patient Endorses constipation  Patient denies polyuria or polydipsia.  Patient denies weakness or numbness.  Pertinent positives as per HPI.    Meds/Allergies   No Known Allergies    Current  "Facility-Administered Medications:     albuterol inhalation solution 2.5 mg, Q4H PRN    azithromycin (ZITHROMAX) tablet 250 mg, Every Other Day    calcium carbonate (TUMS) chewable tablet 500 mg, Daily PRN    dicyclomine (BENTYL) capsule 10 mg, TID AC    docusate sodium (COLACE) capsule 100 mg, BID    famotidine (PEPCID) tablet 20 mg, BID    fluconazole (DIFLUCAN) tablet 200 mg, Daily    Fluticasone Furoate-Vilanterol 100-25 mcg/actuation 1 puff, Daily **AND** umeclidinium 62.5 mcg/actuation inhaler AEPB 1 puff, Daily    hydrOXYzine HCL (ATARAX) tablet 25 mg, Q6H PRN    ipratropium-albuterol (DUO-NEB) 0.5-2.5 mg/3 mL inhalation solution 3 mL, TID    LORazepam (ATIVAN) tablet 0.25 mg, Q8H PRN    metoprolol tartrate (LOPRESSOR) tablet 25 mg, Q12H MAYCO    ondansetron (ZOFRAN) injection 4 mg, Q8H PRN    pantoprazole (PROTONIX) EC tablet 40 mg, BID AC    polyethylene glycol (MIRALAX) packet 17 g, BID    simethicone (MYLICON) chewable tablet 80 mg, Q6H PRN    witch hazel-glycerin (TUCKS) topical pad 1 Pad, Q4H PRN    Current Medications:  Current medications as per above. All medications have been reviewed.   Risks, benefits, alternatives, and possible side effects of patient's psychiatric medications were discussed with patient.     Objective   Vital signs in last 24 hours:  Temp:  [97.2 °F (36.2 °C)-97.7 °F (36.5 °C)] 97.2 °F (36.2 °C)  HR:  [72-86] 86  BP: (109-119)/(63-72) 119/63  Resp:  [16-19] 16  SpO2:  [95 %-99 %] 98 %  O2 Device: Nasal cannula  Nasal Cannula O2 Flow Rate (L/min):  [2 L/min] 2 L/min    Mental Status Exam:  Appearance: moderately kempt  Motor: no psychomotor agitation  Behavior: cooperative, pleasant  Speech: normal rate, rhythm, and volume  Mood: \"anxious\"  Affect: mood-congruent, anxious appearing  Thought Process: organized and linear  Thought Content: denies delusions  Risk Potential: denies suicidal ideation, plan, or intent. Denies homicidal ideation  Perceptions: denies auditory " hallucinations, denies visual hallucinations, no IP/RIS  Sensorium: Oriented to person, place, time, and situation  Cognition: cognitive ability appears intact but was not quantitatively tested  Consciousness: alert and awake  Attention: currently intact  Insight: fair  Judgement: fair     Laboratory results:  I have personally reviewed all pertinent laboratory/tests results.  Recent Results (from the past 48 hours)   Comprehensive metabolic panel    Collection Time: 12/16/24  6:11 AM   Result Value Ref Range    Sodium 139 135 - 147 mmol/L    Potassium 3.8 3.5 - 5.3 mmol/L    Chloride 102 96 - 108 mmol/L    CO2 31 21 - 32 mmol/L    ANION GAP 6 4 - 13 mmol/L    BUN 7 5 - 25 mg/dL    Creatinine 0.56 (L) 0.60 - 1.30 mg/dL    Glucose 92 65 - 140 mg/dL    Calcium 9.1 8.4 - 10.2 mg/dL    AST 12 (L) 13 - 39 U/L    ALT 40 7 - 52 U/L    Alkaline Phosphatase 125 (H) 34 - 104 U/L    Total Protein 5.7 (L) 6.4 - 8.4 g/dL    Albumin 3.5 3.5 - 5.0 g/dL    Total Bilirubin 0.43 0.20 - 1.00 mg/dL    eGFR 97 ml/min/1.73sq elenita Goodman MD    This note has been constructed using a voice recognition system. There may be translation, syntax, or grammatical errors. If you have any questions, please contact the dictating provider.

## 2024-12-17 ENCOUNTER — TELEPHONE (OUTPATIENT)
Age: 64
End: 2024-12-17

## 2024-12-17 ENCOUNTER — PATIENT MESSAGE (OUTPATIENT)
Age: 64
End: 2024-12-17

## 2024-12-17 VITALS
HEART RATE: 90 BPM | RESPIRATION RATE: 17 BRPM | OXYGEN SATURATION: 94 % | DIASTOLIC BLOOD PRESSURE: 63 MMHG | SYSTOLIC BLOOD PRESSURE: 108 MMHG | TEMPERATURE: 97.9 F | BODY MASS INDEX: 18.31 KG/M2 | WEIGHT: 97 LBS | HEIGHT: 61 IN

## 2024-12-17 LAB
ALBUMIN SERPL BCG-MCNC: 3.6 G/DL (ref 3.5–5)
ALP SERPL-CCNC: 110 U/L (ref 34–104)
ALT SERPL W P-5'-P-CCNC: 32 U/L (ref 7–52)
ANION GAP SERPL CALCULATED.3IONS-SCNC: 7 MMOL/L (ref 4–13)
AST SERPL W P-5'-P-CCNC: 11 U/L (ref 13–39)
BILIRUB SERPL-MCNC: 0.42 MG/DL (ref 0.2–1)
BUN SERPL-MCNC: 7 MG/DL (ref 5–25)
CALCIUM SERPL-MCNC: 8.9 MG/DL (ref 8.4–10.2)
CHLORIDE SERPL-SCNC: 105 MMOL/L (ref 96–108)
CO2 SERPL-SCNC: 29 MMOL/L (ref 21–32)
CREAT SERPL-MCNC: 0.56 MG/DL (ref 0.6–1.3)
ERYTHROCYTE [DISTWIDTH] IN BLOOD BY AUTOMATED COUNT: 13.8 % (ref 11.6–15.1)
GFR SERPL CREATININE-BSD FRML MDRD: 97 ML/MIN/1.73SQ M
GLUCOSE SERPL-MCNC: 86 MG/DL (ref 65–140)
HCT VFR BLD AUTO: 35 % (ref 34.8–46.1)
HGB BLD-MCNC: 10.9 G/DL (ref 11.5–15.4)
MCH RBC QN AUTO: 29.2 PG (ref 26.8–34.3)
MCHC RBC AUTO-ENTMCNC: 31.1 G/DL (ref 31.4–37.4)
MCV RBC AUTO: 94 FL (ref 82–98)
PLATELET # BLD AUTO: 254 THOUSANDS/UL (ref 149–390)
PMV BLD AUTO: 11.7 FL (ref 8.9–12.7)
POTASSIUM SERPL-SCNC: 4.1 MMOL/L (ref 3.5–5.3)
PROT SERPL-MCNC: 5.9 G/DL (ref 6.4–8.4)
RBC # BLD AUTO: 3.73 MILLION/UL (ref 3.81–5.12)
SODIUM SERPL-SCNC: 141 MMOL/L (ref 135–147)
WBC # BLD AUTO: 6.69 THOUSAND/UL (ref 4.31–10.16)

## 2024-12-17 PROCEDURE — 94640 AIRWAY INHALATION TREATMENT: CPT

## 2024-12-17 PROCEDURE — 85027 COMPLETE CBC AUTOMATED: CPT | Performed by: FAMILY MEDICINE

## 2024-12-17 PROCEDURE — 80053 COMPREHEN METABOLIC PANEL: CPT | Performed by: FAMILY MEDICINE

## 2024-12-17 PROCEDURE — 94760 N-INVAS EAR/PLS OXIMETRY 1: CPT

## 2024-12-17 PROCEDURE — 99239 HOSP IP/OBS DSCHRG MGMT >30: CPT | Performed by: FAMILY MEDICINE

## 2024-12-17 RX ORDER — DICYCLOMINE HYDROCHLORIDE 10 MG/1
10 CAPSULE ORAL
Qty: 90 CAPSULE | Refills: 0 | Status: SHIPPED | OUTPATIENT
Start: 2024-12-17

## 2024-12-17 RX ORDER — SIMETHICONE 80 MG
80 TABLET,CHEWABLE ORAL EVERY 6 HOURS PRN
Qty: 30 TABLET | Refills: 0 | Status: SHIPPED | OUTPATIENT
Start: 2024-12-17

## 2024-12-17 RX ORDER — ASPIRIN 81 MG/1
81 TABLET ORAL DAILY
Qty: 30 TABLET | Refills: 0 | Status: SHIPPED | OUTPATIENT
Start: 2024-12-17

## 2024-12-17 RX ORDER — HYDROXYZINE HYDROCHLORIDE 25 MG/1
25 TABLET, FILM COATED ORAL AS NEEDED
Qty: 30 TABLET | Refills: 0 | Status: SHIPPED | OUTPATIENT
Start: 2024-12-17

## 2024-12-17 RX ORDER — AZITHROMYCIN 500 MG/1
250 TABLET, FILM COATED ORAL
Qty: 15 TABLET | Refills: 0 | Status: SHIPPED | OUTPATIENT
Start: 2024-12-17 | End: 2025-02-15

## 2024-12-17 RX ORDER — ATORVASTATIN CALCIUM 40 MG/1
40 TABLET, FILM COATED ORAL DAILY
Qty: 30 TABLET | Refills: 0 | Status: SHIPPED | OUTPATIENT
Start: 2024-12-17

## 2024-12-17 RX ADMIN — FLUTICASONE FUROATE AND VILANTEROL TRIFENATATE 1 PUFF: 100; 25 POWDER RESPIRATORY (INHALATION) at 08:21

## 2024-12-17 RX ADMIN — PANTOPRAZOLE SODIUM 40 MG: 40 TABLET, DELAYED RELEASE ORAL at 06:08

## 2024-12-17 RX ADMIN — DICYCLOMINE HYDROCHLORIDE 10 MG: 10 CAPSULE ORAL at 06:08

## 2024-12-17 RX ADMIN — SIMETHICONE 80 MG: 80 TABLET, CHEWABLE ORAL at 08:15

## 2024-12-17 RX ADMIN — POLYETHYLENE GLYCOL 3350 17 G: 17 POWDER, FOR SOLUTION ORAL at 08:15

## 2024-12-17 RX ADMIN — UMECLIDINIUM 1 PUFF: 62.5 AEROSOL, POWDER ORAL at 08:21

## 2024-12-17 RX ADMIN — DICYCLOMINE HYDROCHLORIDE 10 MG: 10 CAPSULE ORAL at 11:28

## 2024-12-17 RX ADMIN — FAMOTIDINE 20 MG: 20 TABLET, FILM COATED ORAL at 08:20

## 2024-12-17 RX ADMIN — IPRATROPIUM BROMIDE AND ALBUTEROL SULFATE 3 ML: 2.5; .5 SOLUTION RESPIRATORY (INHALATION) at 07:11

## 2024-12-17 RX ADMIN — IPRATROPIUM BROMIDE AND ALBUTEROL SULFATE 3 ML: 2.5; .5 SOLUTION RESPIRATORY (INHALATION) at 14:43

## 2024-12-17 RX ADMIN — DOCUSATE SODIUM 100 MG: 100 CAPSULE, LIQUID FILLED ORAL at 08:17

## 2024-12-17 RX ADMIN — FLUCONAZOLE 200 MG: 100 TABLET ORAL at 08:17

## 2024-12-17 NOTE — DISCHARGE SUMMARY
Discharge Summary - Hospitalist   Name: Ileana Seaman 64 y.o. female I MRN: [id number]  Unit/Bed#: -01 I Date of Admission: 12/8/2024   Date of Service: 12/17/2024 I Hospital Day: 8     Assessment & Plan  Epigastric pain  Recurrent epigastric abdominal pain and diarrhea, admitted 12/6 and discharged 12/7 for same.  Returns to ED states pain returned at 0300 on 12/8.  Thoroughly evaluated by ED, found to have elevated liver enzymes and discussed with gastroenterology, abdominal ultrasound obtained with reassuring findings no liver abnormality  Normal lactic acid level  Patient refused to leave from the emergency department continuing to say she is having severe burning of her insides   no further narcotic medications as this patient is suspected of drug-seeking behavior  Will continue PPI, Carafate, Pepcid outpatient regimen for abdominal pain  Patient has recent EGD done on November in this hospital, reviewed  Discussed the case with gastroenterology team, we will proceed with mesenteric arterial duplex-spoke to vascular surgery and recommend outpt follow up  Continue bentyl,simethicone for now  Mri abd reviewed. Cont trial of food and monitor pain symptoms . Also placed on stool softeners for constipation  cta abd Abdominal aorta and branches are patent with no aneurysm or dissection.Celiac artery with moderate origin stenosis with patent branches. Replaced right hepatic artery from the superior mesenteric artery.Mild to moderate atherosclerotic changes in the pelvic vessels with tortuous left external iliac artery.Stool impaction in the ascending and transverse colon.  Placed on tap water enema and bowel prep.so far has had multiple small loose bm. Cont miralax and colace.  Patient also evaluated by behavioral therapist for anxiety, added hydroxyzine as needed  Mycobacterial infection, non-TB  Hx MARIS infection and 3 drug regimen managed by ID (Dr. Landrum )  Azithromycin, rifabutin and ethambutol placed  on hold due to elevated liver enzymes.lfts now resolving  Evaluated by infectious disease, at this time, recommending to continue azithromycin 250 mg every other day as well as continue ethambutol 400 mg daily.  For rifabutin, recommendation is to discuss patient own's infectious disease.  COPD (chronic obstructive pulmonary disease) (HCC)  No exacerbation  Continue PTA regimen  Atrial fibrillation (HCC)  Continue metoprolol  Not chronically anticoagulated  Chronic hypoxic respiratory failure (HCC)  Maintained on 2 L nasal cannula  Moderate protein-calorie malnutrition (HCC)  Malnutrition Findings: Loss of subcutaneous fat in orbital, triceps, ribcage areas.  Loss of muscle at temples, clavicles, scapula, extremities.  Consultation to dietitian      Adult Malnutrition type: Chronic illnessBMI Findings:     Body mass index is 18.71 kg/m².     Elevated liver enzymes  Suspect DILI  ALT/AST > 3 x nomal, T bili >2 x normal  Azithromycin, ethambutol and rifabutin on hold due to elevated liver enzymes   Will need to follow up with ID for treatment alteration - MARIS infection and 3 drug regimen managed by ID (Dr. Landrum)   neg Tylenol level and normal PT/INR   Ultrasound RUQ - S/P cholecystectomy, liver imaging unrevealing of abnormality  Liver enzyme is improving.  Celiac artery stenosis (HCC)  Outpt follow up with vascular  Discussion has done regarding smoking cessation.  Can continue statin and aspirin  Anxiety  Appreciate behavioral therapy recommendation, can use hydroxyzine as needed basis.  At this moment, try to avoid any SSRIs since patient might get a side effect.  Will benefit from outpatient follow-up.   Discharging Physician / Practitioner: Rolando Parker MD  PCP: Merline Dinero  Admission Date:   Admission Orders (From admission, onward)       Ordered        12/09/24 1332  INPATIENT ADMISSION  Once            12/08/24 1930  Place in Observation  Once                          Discharge Date:  12/17/24    Medical Problems       Resolved Problems  Date Reviewed: 12/12/2024   None         Consultations During Hospital Stay:  Gastroenterology, infectious disease, vascular surgery, behavioral therapy    Procedures Performed:   XR abdomen obstruction series   Final Result by Sonny Adame MD (12/14 1753)      Nonobstructive bowel gas pattern.      Moderate volume colonic stool noting history of constipation.      Redemonstrated right upper lung irregular opacity and right midlung nodule, continued CT follow-up.      Workstation performed: [id number]         CTA abdomen pelvis w wo contrast   Final Result by Blue Guerra MD (12/11 1519)      1.Abdominal aorta and branches are patent with no aneurysm or dissection.      2.Celiac artery with moderate origin stenosis with patent branches. Replaced right hepatic artery from the superior mesenteric artery.      3.Mild to moderate atherosclerotic changes in the pelvic vessels with tortuous left external iliac artery.      4.Stool impaction in the ascending and transverse colon.         Workstation performed: [id number]         VAS CELIAC AND/OR MESENTERIC DUPLEX   Final Result by Khadra Mora MD (12/10 0668)      MRI abdomen wo contrast and mrcp   Final Result by Jaylen Fournier MD (12/10 0818)      1.  No acute abdominal pathology. No choledocholithiasis or biliary ductal dilation.   2.  8 mm solid right lower lobe pulmonary nodule is unchanged from October 8, 2024         Workstation performed: [id number]         US right upper quadrant   Final Result by Teresa Gomez MD (12/08 5725)      Status post cholecystectomy. Otherwise, unremarkable right upper quadrant ultrasound.      Workstation performed: [id number]               Significant Findings / Test Results:   Lab Results   Component Value Date    WBC 6.69 12/17/2024    HGB 10.9 (L) 12/17/2024    HCT 35.0 12/17/2024    MCV 94 12/17/2024     12/17/2024     Lab Results   Component Value  Date    SODIUM 141 12/17/2024    K 4.1 12/17/2024     12/17/2024    CO2 29 12/17/2024    AGAP 7 12/17/2024    BUN 7 12/17/2024    CREATININE 0.56 (L) 12/17/2024    GLUC 86 12/17/2024    GLUF 84 12/09/2024    CALCIUM 8.9 12/17/2024    AST 11 (L) 12/17/2024    ALT 32 12/17/2024    ALKPHOS 110 (H) 12/17/2024    TP 5.9 (L) 12/17/2024    TBILI 0.42 12/17/2024    EGFR 97 12/17/2024     ollected Updated Procedure Result Status Patient Facility Result Comment    12/06/2024 1310 12/11/2024 2001 Blood culture #1 [[id number]]   Blood from Arm, Left    Final result [facility]  Component Value   Blood Culture No Growth After 5 Days.             12/06/2024 1310 12/11/2024 2001 Blood culture #2 [[id number]]   Blood from Hand, Right    Final result [facility]  Component Value   Blood Culture No Growth After 5 Days.             12/06/2024 1310 12/06/2024 1338 FLU/COVID Rapid Antigen (30 min. TAT) - Preferred screening test in ED [[id number]]    Nares from Nose    Final result [facility] This test has been performed using the Quidel Rhea 2 FLU+SARS Antigen test under the Emergency Use Authorization (EUA). This test has been validated by the  and verified by the performing laboratory. The Rhea uses lateral flow immunofluorescent sandwich assay to detect SARS-COV, Influenza A and Influenza B Antigen.       The Quidel Rhea 2 SARS Antigen test does not differentiate between SARS-CoV and SARS-CoV-2.       Negative results are presumptive and may be confirmed with a molecular assay, if necessary, for patient management. Negative results do not rule out SARS-CoV-2 or influenza infection and should not be used as the sole basis for treatment or patient management decisions. A negative test result may occur if the level of antigen in a sample is below the limit of detection of this test.       Positive results are indicative of the presence  of viral antigens, but do not rule out bacterial infection or co-infection with other viruses.       All test results should be used as an adjunct to clinical observations and other information available to the provider.   FOR PEDIATRIC PATIENTS - copy/paste COVID Guidelines URL to browser: [email or web address]    Component Value   SARS COV Rapid Antigen Negative   Influenza A Rapid Antigen Negative   Influenza B Rapid Antigen Negative                 Incidental Findings:   As mentioned above    Test Results Pending at Discharge (will require follow up):   none     Outpatient Tests Requested:  none    Complications:  none    Reason for Admission: Recurrent abdominal pain    Hospital Course:     Ileana Seaman is a 64 y.o. female patient who originally presented to the hospital on 12/8/2024 due to recurrent abdominal pain and elevated liver enzyme.  Patient has extensive workup done during the hospitalization including MRI of abdomen which shows no obstruction per GI recommendation.  Also has CTA to rule out any stenosis or occlusion of artery.  Which shows patient does have celiac artery stenosis, personal vascular surgery recommendation outpatient follow-up.  Per GI recommendation, continue PPI, Carafate, Pepcid outpatient regimen for abdominal pain.  On top of that, added Bentyl and simethicone.  This patient has severe anxiety, also evaluated by behavioral therapist, recommending hydroxyzine as needed.    Patient is getting treatment for mycobacterial infection on TV, infectious disease evaluate the patient, recommending to continue azithromycin 200 mg every other day.  Continue with albuterol.  For rifabutin, recommendation that patient contact with her own infectious disease.  With the treatment, patient condition remained stable.  All lab results commending findings, treatment plan option discussed in details with patient.  Verbalized understanding  "agrees.  Patient is getting discharge home with follow-up with PCP, gastroenterology, infectious disease, vascular surgery.      Please see above list of diagnoses and related plan for additional information.     Condition at Discharge: stable     Discharge Day Visit / Exam:     Subjective: Seen and evaluated during the run.  Resting comfortably.  Denies any significant complaint.  Vitals: Blood Pressure: 108/63 (12/17/24 0819)  Pulse: 95 (12/17/24 0819)  Temperature: 97.9 °F (36.6 °C) (12/17/24 0807)  Temp Source: Temporal (12/14/24 0719)  Respirations: 17 (12/17/24 0807)  Height: 5' 1\" (154.9 cm) (12/10/24 0944)  Weight - Scale: 44.9 kg (99 lb) (12/08/24 2041)  SpO2: 94 % (12/17/24 0819)  Exam:   Physical Exam  Vitals and nursing note reviewed.   Constitutional:       Appearance: Normal appearance. She is not ill-appearing or diaphoretic.   HENT:      Mouth/Throat:      Mouth: Mucous membranes are moist.      Pharynx: No oropharyngeal exudate or posterior oropharyngeal erythema.   Eyes:      General: No scleral icterus.        Left eye: No discharge.      Extraocular Movements: Extraocular movements intact.      Conjunctiva/sclera: Conjunctivae normal.      Pupils: Pupils are equal, round, and reactive to light.   Cardiovascular:      Rate and Rhythm: Normal rate.      Heart sounds: No murmur heard.     No friction rub. No gallop.   Pulmonary:      Effort: No respiratory distress.      Breath sounds: No stridor. No wheezing or rhonchi.   Abdominal:      General: There is no distension.      Palpations: There is no mass.      Tenderness: There is no abdominal tenderness.      Hernia: No hernia is present.   Musculoskeletal:      Right lower leg: No edema.      Left lower leg: No edema.   Neurological:      General: No focal deficit present.      Mental Status: She is alert.      Cranial Nerves: No cranial nerve deficit.      Sensory: No sensory deficit.      Motor: No weakness.      Coordination: Coordination " normal.       Discussion with Family: With patient    Discharge instructions/Information to patient and family:   See after visit summary for information provided to patient and family.      Provisions for Follow-Up Care:  See after visit summary for information related to follow-up care and any pertinent home health orders.      Disposition:     Home with VNA Services (Reminder: Complete face to face encounter)    For Discharges to [facility] SN[facility]:   Not Applicable to this Patient - Not Applicable to this Patient    Planned Readmission: If condition get worse     Discharge Statement:  Greater than 50% of the total time was spent examining patient, answering all patient questions, arranging and discussing plan of care with patient as well as directly providing post-discharge instructions.  Additional time then spent on discharge activities.    Discharge Medications:  See after visit summary for reconciled discharge medications provided to patient and family.      ** Please Note: This note has been constructed using a voice recognition system **

## 2024-12-17 NOTE — ASSESSMENT & PLAN NOTE
Recurrent epigastric abdominal pain and diarrhea, admitted 12/6 and discharged 12/7 for same.  Returns to ED states pain returned at 0300 on 12/8.  Thoroughly evaluated by ED, found to have elevated liver enzymes and discussed with gastroenterology, abdominal ultrasound obtained with reassuring findings no liver abnormality  Normal lactic acid level  Patient refused to leave from the emergency department continuing to say she is having severe burning of her insides   no further narcotic medications as this patient is suspected of drug-seeking behavior  Will continue PPI, Carafate, Pepcid outpatient regimen for abdominal pain  Patient has recent EGD done on November in this hospital, reviewed  Discussed the case with gastroenterology team, we will proceed with mesenteric arterial duplex-spoke to vascular surgery and recommend outpt follow up  Continue bentmarksimethicone for now  Mri abd reviewed. Cont trial of food and monitor pain symptoms . Also placed on stool softeners for constipation  cta abd Abdominal aorta and branches are patent with no aneurysm or dissection.Celiac artery with moderate origin stenosis with patent branches. Replaced right hepatic artery from the superior mesenteric artery.Mild to moderate atherosclerotic changes in the pelvic vessels with tortuous left external iliac artery.Stool impaction in the ascending and transverse colon.  Placed on tap water enema and bowel prep.so far has had multiple small loose bm. Cont miralax and colace.  Patient also evaluated by behavioral therapist for anxiety, added hydroxyzine as needed

## 2024-12-17 NOTE — ASSESSMENT & PLAN NOTE
Appreciate behavioral therapy recommendation, can use hydroxyzine as needed basis.  At this moment, try to avoid any SSRIs since patient might get a side effect.  Will benefit from outpatient follow-up.

## 2024-12-17 NOTE — ASSESSMENT & PLAN NOTE
Hx MARIS infection and 3 drug regimen managed by ID (Dr. Landrum )  Azithromycin, rifabutin and ethambutol placed on hold due to elevated liver enzymes.lfts now resolving  Evaluated by infectious disease, at this time, recommending to continue azithromycin 250 mg every other day as well as continue ethambutol 400 mg daily.  For rifabutin, recommendation is to discuss patient own's infectious disease.

## 2024-12-17 NOTE — ASSESSMENT & PLAN NOTE
Outpt follow up with vascular  Discussion has done regarding smoking cessation.  Can continue statin and aspirin

## 2024-12-17 NOTE — QUICK NOTE
INF DIS PLAN OF CARE NOTE    Patient is afebrile without leukocytosis. No acute events.  Tolerated po azithromycin.    A/P Pulmonary MARIS infection, chronic epigastric pain, constipation and acute transaminitis ?adverse effects of MARIS regime    - Recommend graded challenge of resuming MARIS therapy  - Tolerated po azithromycin, continue 250 mg every other day  - Recommend resuming ethambutol 400 mg daily  - Has fu with ID at [facility] today to discuss any additional changes in regime including whether or not to resume rifabutin (message left with ID office)  - Will need outpt fu with GI, vascular surgery, consider psych fu     Please call with concerns or any changes in clinical status or new test results.

## 2024-12-17 NOTE — NURSING NOTE
AVS reviewed virtually with patient. Patient verbalized understanding of same, including new prescribed medications and side effects, recommended follow-up appointments, and reasons to seek medical assistance. All questions answered.

## 2024-12-17 NOTE — CASE MANAGEMENT
Case Management Discharge Planning Note    Patient name Ileana Seaman  Location /-01 MRN [id number]  : 1960 Date 2024       Current Admission Date: 2024  Current Admission Diagnosis:Celiac artery stenosis (HCC)   Patient Active Problem List    Diagnosis Date Noted Date Diagnosed    Anxiety 2024     Mild protein-calorie malnutrition (HCC) 2024     Celiac artery stenosis (HCC) 2024     Elevated liver enzymes 2024     Pulmonary nodule 2024     Nausea vomiting and diarrhea 2024     Moderate protein-calorie malnutrition (HCC) 2024     COPD (chronic obstructive pulmonary disease) (HCC) 2024     Atrial fibrillation (HCC) 2024     Chronic hypoxic respiratory failure (HCC) 2024     Epigastric pain 2024     New onset atrial fibrillation (HCC) 10/06/2024     Type 2 diabetes mellitus without complication, without long-term current use of insulin (HCC) 10/05/2024     Dysphagia 10/05/2024     Chronic obstructive pulmonary disease with acute lower respiratory infection (HCC) 10/04/2024     Mycobacterial infection, 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     Hypomagnesemia 10/04/2024       LOS (days): 8  Geometric Mean LOS (GMLOS) (days): 2.5  Days to GMLOS:-5.3     OBJECTIVE:  Risk of Unplanned Readmission Score: 21.44         Current admission status: Inpatient   Preferred Pharmacy:   [id number] - [place], [place] - [address]  [address]  [place] [address]  Phone: [phone] Fax: [phone]    Primary Care Provider: Merline Dinero    Primary Insurance: MEDICARE  Secondary Insurance: [facility]    DISCHARGE DETAILS:     CM discussed with pt, behavioral health recommendations for OP behavioral treatment. Pt is agreeable to virtual visits for OP behavioral treatment through [facility]. Call placed to [facility]. CM spoke with wilman, they  are currently operating off a wait list and they will offer virtual visits, pt will have to be seen in the office for initial appointment. Pt aware and agreeable.             Cm placed call [facility] mental health clinic in [place] to see if they offer virtual visits, [facility] offer all virtual visits, no in person visits. Discussed with pt, pt choice is [facility], pt prefers virtual visits. 1st virtual visit scheduled for 1/02/25 at 1000 . Pt is aware and agreeable.      CM cancelled appointment with [facility].

## 2024-12-17 NOTE — CASE MANAGEMENT
Case Management Discharge Planning Note    Patient name Ileana Seaman  Location /-01 MRN [id number]  : 1960 Date 2024       Current Admission Date: 2024  Current Admission Diagnosis:Celiac artery stenosis (HCC)   Patient Active Problem List    Diagnosis Date Noted Date Diagnosed    Anxiety 2024     Mild protein-calorie malnutrition (HCC) 2024     Celiac artery stenosis (HCC) 2024     Elevated liver enzymes 2024     Pulmonary nodule 2024     Nausea vomiting and diarrhea 2024     Moderate protein-calorie malnutrition (HCC) 2024     COPD (chronic obstructive pulmonary disease) (HCC) 2024     Atrial fibrillation (HCC) 2024     Chronic hypoxic respiratory failure (HCC) 2024     Epigastric pain 2024     New onset atrial fibrillation (HCC) 10/06/2024     Type 2 diabetes mellitus without complication, without long-term current use of insulin (HCC) 10/05/2024     Dysphagia 10/05/2024     Chronic obstructive pulmonary disease with acute lower respiratory infection (HCC) 10/04/2024     Mycobacterial infection, 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     Hypomagnesemia 10/04/2024       LOS (days): 8  Geometric Mean LOS (GMLOS) (days): 2.5  Days to GMLOS:-5.4     OBJECTIVE:  Risk of Unplanned Readmission Score: 21.44         Current admission status: Inpatient   Preferred Pharmacy:   [facility] - [place] - [address] [place] [address]  [address]  [place] [address]  Phone: [phone] Fax: [phone]    Primary Care Provider: Merline Dinero    Primary Insurance: MEDICARE  Secondary Insurance: [facility]    DISCHARGE DETAILS:    Discharge planning discussed with:: patient  Freedom of Choice: Yes  Comments - Freedom of Choice: CM offered HHC services for COPD/oxygen management at home- pt declined HHC services at this time.  CM contacted family/caregiver?: Yes (Hilda Seaman- daughter)    Patient has portable oxygen tank at hospital for transport home. Pt indicated her daughter will provide transportation to home.           Contacts  Patient Contacts: Ayde Seaman- daughter  Relationship to Patient:: Family  Contact Method: Phone  Phone Number: [phone]- message left  Reason/Outcome: Discharge Planning    Requested Home Health Care         Is the patient interested in HHC at discharge?: No (patient declined HHC services)                   Treatment Team Recommendation: Home  Discharge Destination Plan:: Home  Transport at Discharge : Family                             IMM Given (Date):: 12/17/24  IMM Given to:: Patient   CM spoke to patient at the bedside, reviewed DC IMM with patient and informed that patient can stay an additional 4 hours for reconsidering appealing the discharge as the medicare rights were review on the day of discharge. Pt verbalized understanding and feels ready to go home and does not intend to stay 4 hours to reconsider.

## 2024-12-17 NOTE — TELEPHONE ENCOUNTER
Patient has been added to the [facility] wait list without a referral.    Insurance: Medicare  Insurance Type:    Commercial []   Medicaid []   [facility] (if applicable)   Medicare [x]  Location Preference: any  Provider Preference: any  Virtual: Yes [x] No []  Were outside resources sent: Yes [] No [x]      Patient would prefer virtual. She uses Oxygen and would prefer not to have to bring it with her

## 2024-12-17 NOTE — TELEPHONE ENCOUNTER
Hospital called office again stating they found someone and would like to take patient off wait list. Writer has deleted patient from wait list at this time.

## 2024-12-17 NOTE — MALNUTRITION/BMI
This medical record reflects one or more clinical indicators suggestive of malnutrition.    Malnutrition Findings:   Adult Malnutrition type: Chronic illness  Adult Degree of Malnutrition: Malnutrition of moderate degree  Malnutrition Characteristics: Muscle loss, Fat loss                360 Statement: Chronic moderate malnutrition related to COPD, abdominal pain, inadequate intake as evidenced by moderate fat loss to buccal pads, orbitals, triceps; moderate muscle loss to interroseous, temporalis muscles. Treated with: D/c beef/lactose/nut restrictions, MD agreeable. Liberalize to regular diet as medically appropriate. Adjust to ensure plus high PRO vanilla and strawberry. Recommend daily weights for nutrition monitoring.    BMI Findings:           Body mass index is 18.71 kg/m².     See Nutrition note dated 12/17/24 for additional details.  Completed nutrition assessment is viewable in the nutrition documentation.

## 2024-12-17 NOTE — PLAN OF CARE
Problem: Potential for Falls  Goal: Patient will remain free of falls  Description: INTERVENTIONS:  - Educate patient/family on patient safety including physical limitations  - Instruct patient to call for assistance with activity   - Consult OT/PT to assist with strengthening/mobility   - Keep Call bell within reach  - Keep bed low and locked with side rails adjusted as appropriate  - Keep care items and personal belongings within reach  - Initiate and maintain comfort rounds  - Make Fall Risk Sign visible to staff  - Offer Toileting every 2 Hours, in advance of need  - Obtain necessary fall risk management equipment: non-slip sock  - Apply yellow socks and bracelet for high fall risk patients  - Consider moving patient to room near nurses station  Outcome: Progressing     Problem: PAIN - ADULT  Goal: Verbalizes/displays adequate comfort level or baseline comfort level  Description: Interventions:  - Encourage patient to monitor pain and request assistance  - Assess pain using appropriate pain scale  - Administer analgesics based on type and severity of pain and evaluate response  - Implement non-pharmacological measures as appropriate and evaluate response  - Consider cultural and social influences on pain and pain management  - Notify physician/advanced practitioner if interventions unsuccessful or patient reports new pain  Outcome: Progressing     Problem: INFECTION - ADULT  Goal: Absence or prevention of progression during hospitalization  Description: INTERVENTIONS:  - Assess and monitor for signs and symptoms of infection  - Monitor lab/diagnostic results  - Monitor all insertion sites, i.e. indwelling lines, tubes, and drains  - Monitor endotracheal if appropriate and nasal secretions for changes in amount and color  - [place] appropriate cooling/warming therapies per order  - Administer medications as ordered  - Instruct and encourage patient and family to use good hand hygiene technique  - Identify and  instruct in appropriate isolation precautions for identified infection/condition  Outcome: Progressing  Goal: Absence of fever/infection during neutropenic period  Description: INTERVENTIONS:  - Monitor WBC    Outcome: Progressing     Problem: SAFETY ADULT  Goal: Patient will remain free of falls  Description: INTERVENTIONS:  - Educate patient/family on patient safety including physical limitations  - Instruct patient to call for assistance with activity   - Consult OT/PT to assist with strengthening/mobility   - Keep Call bell within reach  - Keep bed low and locked with side rails adjusted as appropriate  - Keep care items and personal belongings within reach  - Initiate and maintain comfort rounds  - Make Fall Risk Sign visible to staff  - Offer Toileting every 2 Hours, in advance of need  - Obtain necessary fall risk management equipment: non-slip sock  - Apply yellow socks and bracelet for high fall risk patients  - Consider moving patient to room near nurses station  Outcome: Progressing  Goal: Maintain or return to baseline ADL function  Description: INTERVENTIONS:  -  Assess patient's ability to carry out ADLs; assess patient's baseline for ADL function and identify physical deficits which impact ability to perform ADLs (bathing, care of mouth/teeth, toileting, grooming, dressing, etc.)  - Assess/evaluate cause of self-care deficits   - Assess range of motion  - Assess patient's mobility; develop plan if impaired  - Assess patient's need for assistive devices and provide as appropriate  - Encourage maximum independence but intervene and supervise when necessary  - Involve family in performance of ADLs  - Assess for home care needs following discharge   - Consider OT consult to assist with ADL evaluation and planning for discharge  - Provide patient education as appropriate  Outcome: Progressing  Goal: Maintains/Returns to pre admission functional level  Description: INTERVENTIONS:  - Perform AM-PAC 6 Click  Basic Mobility/ Daily Activity assessment daily.  - Set and communicate daily mobility goal to care team and patient/family/caregiver.   - Collaborate with rehabilitation services on mobility goals if consulted  - Perform Range of Motion 6 times a day.  - Reposition patient every 2 hours.  - Dangle patient 3 times a day  - Stand patient 3 times a day  - Ambulate patient 3 times a day  - Out of bed to chair 3 times a day   - Out of bed for meals 3 times a day  - Out of bed for toileting  - Record patient progress and toleration of activity level   Outcome: Progressing     Problem: DISCHARGE PLANNING  Goal: Discharge to home or other facility with appropriate resources  Description: INTERVENTIONS:  - Identify barriers to discharge w/patient and caregiver  - Arrange for needed discharge resources and transportation as appropriate  - Identify discharge learning needs (meds, wound care, etc.)  - Arrange for interpretive services to assist at discharge as needed  - Refer to Case Management Department for coordinating discharge planning if the patient needs post-hospital services based on physician/advanced practitioner order or complex needs related to functional status, cognitive ability, or social support system  Outcome: Progressing     Problem: Knowledge Deficit  Goal: Patient/family/caregiver demonstrates understanding of disease process, treatment plan, medications, and discharge instructions  Description: Complete learning assessment and assess knowledge base.  Interventions:  - Provide teaching at level of understanding  - Provide teaching via preferred learning methods  Outcome: Progressing     Problem: Nutrition/Hydration-ADULT  Goal: Nutrient/Hydration intake appropriate for improving, restoring or maintaining nutritional needs  Description: Monitor and assess patient's nutrition/hydration status for malnutrition. Collaborate with interdisciplinary team and initiate plan and interventions as ordered.  Monitor  patient's weight and dietary intake as ordered or per policy. Utilize nutrition screening tool and intervene as necessary. Determine patient's food preferences and provide high-protein, high-caloric foods as appropriate.     INTERVENTIONS:  - Monitor oral intake, urinary output, labs, and treatment plans  - Assess nutrition and hydration status and recommend course of action  - Evaluate amount of meals eaten  - Assist patient with eating if necessary   - Allow adequate time for meals  - Recommend/ encourage appropriate diets, oral nutritional supplements, and vitamin/mineral supplements  - Order, calculate, and assess calorie counts as needed  - Recommend, monitor, and adjust tube feedings and TPN/PPN based on assessed needs  - Assess need for intravenous fluids  - Provide specific nutrition/hydration education as appropriate  - Include patient/family/caregiver in decisions related to nutrition  Outcome: Progressing

## 2024-12-17 NOTE — ASSESSMENT & PLAN NOTE
Suspect DILI  ALT/AST > 3 x nomal, T bili >2 x normal  Azithromycin, ethambutol and rifabutin on hold due to elevated liver enzymes   Will need to follow up with ID for treatment alteration - MARIS infection and 3 drug regimen managed by ID (Dr. Landrum)   neg Tylenol level and normal PT/INR   Ultrasound RUQ - S/P cholecystectomy, liver imaging unrevealing of abnormality  Liver enzyme is improving.

## 2025-01-05 PROBLEM — R19.7 NAUSEA VOMITING AND DIARRHEA: Status: RESOLVED | Noted: 2024-12-06 | Resolved: 2025-01-05

## 2025-01-05 PROBLEM — R11.2 NAUSEA VOMITING AND DIARRHEA: Status: RESOLVED | Noted: 2024-12-06 | Resolved: 2025-01-05

## 2025-01-22 ENCOUNTER — HOSPITAL ENCOUNTER (INPATIENT)
Facility: HOSPITAL | Age: 65
LOS: 2 days | Discharge: HOME | DRG: 191 | End: 2025-01-26
Attending: EMERGENCY MEDICINE | Admitting: FAMILY MEDICINE
Payer: MEDICARE

## 2025-01-22 DIAGNOSIS — R60.9 EDEMA, UNSPECIFIED TYPE: ICD-10-CM

## 2025-01-22 DIAGNOSIS — Z13.9 ENCOUNTER FOR SCREENING INVOLVING SOCIAL DETERMINANTS OF HEALTH (SDOH): ICD-10-CM

## 2025-01-22 DIAGNOSIS — J44.1 COPD WITH ACUTE EXACERBATION (HCC): ICD-10-CM

## 2025-01-22 DIAGNOSIS — J44.1 COPD EXACERBATION (HCC): ICD-10-CM

## 2025-01-22 DIAGNOSIS — D72.829 LEUKOCYTOSIS: ICD-10-CM

## 2025-01-22 DIAGNOSIS — E83.42 HYPOMAGNESEMIA: ICD-10-CM

## 2025-01-22 DIAGNOSIS — J18.9 PNEUMONIA: Primary | ICD-10-CM

## 2025-01-22 DIAGNOSIS — E87.6 HYPOKALEMIA: ICD-10-CM

## 2025-01-22 PROCEDURE — 99285 EMERGENCY DEPT VISIT HI MDM: CPT

## 2025-01-22 NOTE — Clinical Note
Case was discussed with Iesha and the patient's admission status was agreed to be Admission Status: inpatient status to the service of Dr. Barahona.

## 2025-01-23 ENCOUNTER — APPOINTMENT (EMERGENCY)
Dept: CT IMAGING | Facility: HOSPITAL | Age: 65
DRG: 191 | End: 2025-01-23
Payer: MEDICARE

## 2025-01-23 ENCOUNTER — APPOINTMENT (EMERGENCY)
Dept: RADIOLOGY | Facility: HOSPITAL | Age: 65
DRG: 191 | End: 2025-01-23
Payer: MEDICARE

## 2025-01-23 PROBLEM — E87.6 HYPOKALEMIA: Status: ACTIVE | Noted: 2025-01-23

## 2025-01-23 LAB
2HR DELTA HS TROPONIN: 0 NG/L
ALBUMIN SERPL BCG-MCNC: 4.2 G/DL (ref 3.5–5)
ALP SERPL-CCNC: 89 U/L (ref 34–104)
ALT SERPL W P-5'-P-CCNC: 8 U/L (ref 7–52)
ANION GAP SERPL CALCULATED.3IONS-SCNC: 8 MMOL/L (ref 4–13)
ANION GAP SERPL CALCULATED.3IONS-SCNC: 8 MMOL/L (ref 4–13)
APTT PPP: 29 SECONDS (ref 23–34)
AST SERPL W P-5'-P-CCNC: 9 U/L (ref 13–39)
ATRIAL RATE: 107 BPM
BASE EX.OXY STD BLDV CALC-SCNC: 81.7 % (ref 60–80)
BASE EXCESS BLDV CALC-SCNC: -1.1 MMOL/L
BASOPHILS # BLD AUTO: 0.05 THOUSANDS/ΜL (ref 0–0.1)
BASOPHILS NFR BLD AUTO: 0 % (ref 0–1)
BILIRUB SERPL-MCNC: 0.64 MG/DL (ref 0.2–1)
BNP SERPL-MCNC: 114 PG/ML (ref 0–100)
BUN SERPL-MCNC: 7 MG/DL (ref 5–25)
BUN SERPL-MCNC: 7 MG/DL (ref 5–25)
CALCIUM SERPL-MCNC: 8.9 MG/DL (ref 8.4–10.2)
CALCIUM SERPL-MCNC: 9.2 MG/DL (ref 8.4–10.2)
CARDIAC TROPONIN I PNL SERPL HS: 7 NG/L (ref ?–50)
CARDIAC TROPONIN I PNL SERPL HS: 7 NG/L (ref ?–50)
CHLORIDE SERPL-SCNC: 101 MMOL/L (ref 96–108)
CHLORIDE SERPL-SCNC: 105 MMOL/L (ref 96–108)
CO2 SERPL-SCNC: 24 MMOL/L (ref 21–32)
CO2 SERPL-SCNC: 26 MMOL/L (ref 21–32)
CORTIS SERPL-MCNC: 8.1 UG/DL
CREAT SERPL-MCNC: 0.49 MG/DL (ref 0.6–1.3)
CREAT SERPL-MCNC: 0.51 MG/DL (ref 0.6–1.3)
CRP SERPL QL: 163.4 MG/L
EOSINOPHIL # BLD AUTO: 0 THOUSAND/ΜL (ref 0–0.61)
EOSINOPHIL NFR BLD AUTO: 0 % (ref 0–6)
ERYTHROCYTE [DISTWIDTH] IN BLOOD BY AUTOMATED COUNT: 13.4 % (ref 11.6–15.1)
FLUAV AG UPPER RESP QL IA.RAPID: NEGATIVE
FLUBV AG UPPER RESP QL IA.RAPID: NEGATIVE
GFR SERPL CREATININE-BSD FRML MDRD: 100 ML/MIN/1.73SQ M
GFR SERPL CREATININE-BSD FRML MDRD: 101 ML/MIN/1.73SQ M
GLUCOSE SERPL-MCNC: 158 MG/DL (ref 65–140)
GLUCOSE SERPL-MCNC: 227 MG/DL (ref 65–140)
HCO3 BLDV-SCNC: 23.1 MMOL/L (ref 24–30)
HCT VFR BLD AUTO: 36.6 % (ref 34.8–46.1)
HGB BLD-MCNC: 12.2 G/DL (ref 11.5–15.4)
IMM GRANULOCYTES # BLD AUTO: 0.13 THOUSAND/UL (ref 0–0.2)
IMM GRANULOCYTES NFR BLD AUTO: 1 % (ref 0–2)
INR PPP: 1.21 (ref 0.85–1.19)
LYMPHOCYTES # BLD AUTO: 1.07 THOUSANDS/ΜL (ref 0.6–4.47)
LYMPHOCYTES NFR BLD AUTO: 5 % (ref 14–44)
MAGNESIUM SERPL-MCNC: 1.4 MG/DL (ref 1.9–2.7)
MAGNESIUM SERPL-MCNC: 2.5 MG/DL (ref 1.9–2.7)
MCH RBC QN AUTO: 29.8 PG (ref 26.8–34.3)
MCHC RBC AUTO-ENTMCNC: 33.3 G/DL (ref 31.4–37.4)
MCV RBC AUTO: 90 FL (ref 82–98)
MONOCYTES # BLD AUTO: 1.05 THOUSAND/ΜL (ref 0.17–1.22)
MONOCYTES NFR BLD AUTO: 5 % (ref 4–12)
NEUTROPHILS # BLD AUTO: 17.97 THOUSANDS/ΜL (ref 1.85–7.62)
NEUTS SEG NFR BLD AUTO: 89 % (ref 43–75)
NRBC BLD AUTO-RTO: 0 /100 WBCS
O2 CT BLDV-SCNC: 15.4 ML/DL
P AXIS: 90 DEGREES
PCO2 BLDV: 37 MM HG (ref 42–50)
PH BLDV: 7.41 [PH] (ref 7.3–7.4)
PLATELET # BLD AUTO: 204 THOUSANDS/UL (ref 149–390)
PMV BLD AUTO: 12.2 FL (ref 8.9–12.7)
PO2 BLDV: 47.1 MM HG (ref 35–45)
POTASSIUM SERPL-SCNC: 3.3 MMOL/L (ref 3.5–5.3)
POTASSIUM SERPL-SCNC: 3.9 MMOL/L (ref 3.5–5.3)
PR INTERVAL: 138 MS
PROCALCITONIN SERPL-MCNC: 0.08 NG/ML
PROT SERPL-MCNC: 6.8 G/DL (ref 6.4–8.4)
PROTHROMBIN TIME: 15.7 SECONDS (ref 12.3–15)
QRS AXIS: 75 DEGREES
QRSD INTERVAL: 90 MS
QT INTERVAL: 326 MS
QTC INTERVAL: 435 MS
RBC # BLD AUTO: 4.09 MILLION/UL (ref 3.81–5.12)
SARS-COV+SARS-COV-2 AG RESP QL IA.RAPID: NEGATIVE
SODIUM SERPL-SCNC: 135 MMOL/L (ref 135–147)
SODIUM SERPL-SCNC: 137 MMOL/L (ref 135–147)
T WAVE AXIS: 70 DEGREES
VENTRICULAR RATE: 107 BPM
WBC # BLD AUTO: 20.27 THOUSAND/UL (ref 4.31–10.16)

## 2025-01-23 PROCEDURE — 99285 EMERGENCY DEPT VISIT HI MDM: CPT | Performed by: EMERGENCY MEDICINE

## 2025-01-23 PROCEDURE — 94640 AIRWAY INHALATION TREATMENT: CPT

## 2025-01-23 PROCEDURE — 94760 N-INVAS EAR/PLS OXIMETRY 1: CPT

## 2025-01-23 PROCEDURE — 84145 PROCALCITONIN (PCT): CPT | Performed by: EMERGENCY MEDICINE

## 2025-01-23 PROCEDURE — 80048 BASIC METABOLIC PNL TOTAL CA: CPT | Performed by: NURSE PRACTITIONER

## 2025-01-23 PROCEDURE — 94664 DEMO&/EVAL PT USE INHALER: CPT

## 2025-01-23 PROCEDURE — 86140 C-REACTIVE PROTEIN: CPT | Performed by: EMERGENCY MEDICINE

## 2025-01-23 PROCEDURE — 83880 ASSAY OF NATRIURETIC PEPTIDE: CPT | Performed by: EMERGENCY MEDICINE

## 2025-01-23 PROCEDURE — 99223 1ST HOSP IP/OBS HIGH 75: CPT | Performed by: FAMILY MEDICINE

## 2025-01-23 PROCEDURE — 82805 BLOOD GASES W/O2 SATURATION: CPT | Performed by: EMERGENCY MEDICINE

## 2025-01-23 PROCEDURE — 96365 THER/PROPH/DIAG IV INF INIT: CPT

## 2025-01-23 PROCEDURE — 84484 ASSAY OF TROPONIN QUANT: CPT | Performed by: EMERGENCY MEDICINE

## 2025-01-23 PROCEDURE — 80053 COMPREHEN METABOLIC PANEL: CPT | Performed by: EMERGENCY MEDICINE

## 2025-01-23 PROCEDURE — 85025 COMPLETE CBC W/AUTO DIFF WBC: CPT | Performed by: EMERGENCY MEDICINE

## 2025-01-23 PROCEDURE — 71045 X-RAY EXAM CHEST 1 VIEW: CPT

## 2025-01-23 PROCEDURE — 87804 INFLUENZA ASSAY W/OPTIC: CPT | Performed by: EMERGENCY MEDICINE

## 2025-01-23 PROCEDURE — 71250 CT THORAX DX C-: CPT

## 2025-01-23 PROCEDURE — 85730 THROMBOPLASTIN TIME PARTIAL: CPT | Performed by: EMERGENCY MEDICINE

## 2025-01-23 PROCEDURE — 83735 ASSAY OF MAGNESIUM: CPT | Performed by: NURSE PRACTITIONER

## 2025-01-23 PROCEDURE — 83735 ASSAY OF MAGNESIUM: CPT | Performed by: EMERGENCY MEDICINE

## 2025-01-23 PROCEDURE — 36415 COLL VENOUS BLD VENIPUNCTURE: CPT | Performed by: EMERGENCY MEDICINE

## 2025-01-23 PROCEDURE — 96375 TX/PRO/DX INJ NEW DRUG ADDON: CPT

## 2025-01-23 PROCEDURE — 82533 TOTAL CORTISOL: CPT | Performed by: FAMILY MEDICINE

## 2025-01-23 PROCEDURE — 87811 SARS-COV-2 COVID19 W/OPTIC: CPT | Performed by: EMERGENCY MEDICINE

## 2025-01-23 PROCEDURE — 85610 PROTHROMBIN TIME: CPT | Performed by: EMERGENCY MEDICINE

## 2025-01-23 PROCEDURE — 87040 BLOOD CULTURE FOR BACTERIA: CPT | Performed by: EMERGENCY MEDICINE

## 2025-01-23 PROCEDURE — 93005 ELECTROCARDIOGRAM TRACING: CPT

## 2025-01-23 RX ORDER — SODIUM CHLORIDE FOR INHALATION 0.9 %
3 VIAL, NEBULIZER (ML) INHALATION EVERY 6 HOURS PRN
Status: DISCONTINUED | OUTPATIENT
Start: 2025-01-23 | End: 2025-01-24

## 2025-01-23 RX ORDER — METOPROLOL TARTRATE 25 MG/1
25 TABLET, FILM COATED ORAL EVERY 12 HOURS SCHEDULED
Status: DISCONTINUED | OUTPATIENT
Start: 2025-01-23 | End: 2025-01-26 | Stop reason: HOSPADM

## 2025-01-23 RX ORDER — CEFTRIAXONE 1 G/50ML
1000 INJECTION, SOLUTION INTRAVENOUS DAILY
Status: DISCONTINUED | OUTPATIENT
Start: 2025-01-23 | End: 2025-01-26 | Stop reason: HOSPADM

## 2025-01-23 RX ORDER — LEVALBUTEROL INHALATION SOLUTION 1.25 MG/3ML
1.25 SOLUTION RESPIRATORY (INHALATION) EVERY 6 HOURS PRN
Status: DISCONTINUED | OUTPATIENT
Start: 2025-01-23 | End: 2025-01-24

## 2025-01-23 RX ORDER — METHYLPREDNISOLONE SODIUM SUCCINATE 40 MG/ML
40 INJECTION, POWDER, LYOPHILIZED, FOR SOLUTION INTRAMUSCULAR; INTRAVENOUS EVERY 8 HOURS
Status: DISCONTINUED | OUTPATIENT
Start: 2025-01-23 | End: 2025-01-24

## 2025-01-23 RX ORDER — METOPROLOL TARTRATE 25 MG/1
25 TABLET, FILM COATED ORAL EVERY 12 HOURS SCHEDULED
Status: DISCONTINUED | OUTPATIENT
Start: 2025-01-23 | End: 2025-01-23

## 2025-01-23 RX ORDER — ASPIRIN 81 MG/1
81 TABLET ORAL DAILY
Status: DISCONTINUED | OUTPATIENT
Start: 2025-01-23 | End: 2025-01-26 | Stop reason: HOSPADM

## 2025-01-23 RX ORDER — DEXAMETHASONE SODIUM PHOSPHATE 10 MG/ML
10 INJECTION, SOLUTION INTRAMUSCULAR; INTRAVENOUS ONCE
Status: COMPLETED | OUTPATIENT
Start: 2025-01-23 | End: 2025-01-23

## 2025-01-23 RX ORDER — SUCRALFATE 1 G/1
1 TABLET ORAL
Status: DISCONTINUED | OUTPATIENT
Start: 2025-01-23 | End: 2025-01-26 | Stop reason: HOSPADM

## 2025-01-23 RX ORDER — FLUTICASONE FUROATE AND VILANTEROL 100; 25 UG/1; UG/1
1 POWDER RESPIRATORY (INHALATION) DAILY
Status: DISCONTINUED | OUTPATIENT
Start: 2025-01-23 | End: 2025-01-25

## 2025-01-23 RX ORDER — IPRATROPIUM BROMIDE AND ALBUTEROL SULFATE 2.5; .5 MG/3ML; MG/3ML
3 SOLUTION RESPIRATORY (INHALATION) ONCE
Status: COMPLETED | OUTPATIENT
Start: 2025-01-23 | End: 2025-01-23

## 2025-01-23 RX ORDER — SODIUM CHLORIDE FOR INHALATION 0.9 %
3 VIAL, NEBULIZER (ML) INHALATION
Status: DISCONTINUED | OUTPATIENT
Start: 2025-01-23 | End: 2025-01-23

## 2025-01-23 RX ORDER — MIDODRINE HYDROCHLORIDE 5 MG/1
5 TABLET ORAL ONCE
Status: COMPLETED | OUTPATIENT
Start: 2025-01-23 | End: 2025-01-23

## 2025-01-23 RX ORDER — ATORVASTATIN CALCIUM 40 MG/1
40 TABLET, FILM COATED ORAL
Status: DISCONTINUED | OUTPATIENT
Start: 2025-01-23 | End: 2025-01-26 | Stop reason: HOSPADM

## 2025-01-23 RX ORDER — ENOXAPARIN SODIUM 100 MG/ML
30 INJECTION SUBCUTANEOUS DAILY
Status: DISCONTINUED | OUTPATIENT
Start: 2025-01-23 | End: 2025-01-26 | Stop reason: HOSPADM

## 2025-01-23 RX ORDER — LEVALBUTEROL INHALATION SOLUTION 1.25 MG/3ML
1.25 SOLUTION RESPIRATORY (INHALATION)
Status: DISCONTINUED | OUTPATIENT
Start: 2025-01-23 | End: 2025-01-26 | Stop reason: HOSPADM

## 2025-01-23 RX ORDER — DOCUSATE SODIUM 100 MG/1
100 CAPSULE, LIQUID FILLED ORAL 2 TIMES DAILY
Status: DISCONTINUED | OUTPATIENT
Start: 2025-01-23 | End: 2025-01-26 | Stop reason: HOSPADM

## 2025-01-23 RX ORDER — PANTOPRAZOLE SODIUM 40 MG/1
40 TABLET, DELAYED RELEASE ORAL
Status: DISCONTINUED | OUTPATIENT
Start: 2025-01-23 | End: 2025-01-26 | Stop reason: HOSPADM

## 2025-01-23 RX ORDER — POTASSIUM CHLORIDE 1500 MG/1
40 TABLET, EXTENDED RELEASE ORAL ONCE
Status: COMPLETED | OUTPATIENT
Start: 2025-01-23 | End: 2025-01-23

## 2025-01-23 RX ORDER — POLYETHYLENE GLYCOL 3350 17 G/17G
17 POWDER, FOR SOLUTION ORAL DAILY
Status: DISCONTINUED | OUTPATIENT
Start: 2025-01-23 | End: 2025-01-26 | Stop reason: HOSPADM

## 2025-01-23 RX ORDER — FAMOTIDINE 20 MG/1
20 TABLET, FILM COATED ORAL 2 TIMES DAILY
Status: DISCONTINUED | OUTPATIENT
Start: 2025-01-23 | End: 2025-01-26 | Stop reason: HOSPADM

## 2025-01-23 RX ORDER — MAGNESIUM SULFATE HEPTAHYDRATE 40 MG/ML
2 INJECTION, SOLUTION INTRAVENOUS ONCE
Status: COMPLETED | OUTPATIENT
Start: 2025-01-23 | End: 2025-01-23

## 2025-01-23 RX ORDER — DICYCLOMINE HYDROCHLORIDE 10 MG/1
10 CAPSULE ORAL
Status: DISCONTINUED | OUTPATIENT
Start: 2025-01-23 | End: 2025-01-26 | Stop reason: HOSPADM

## 2025-01-23 RX ADMIN — MIDODRINE HYDROCHLORIDE 5 MG: 5 TABLET ORAL at 08:50

## 2025-01-23 RX ADMIN — FAMOTIDINE 20 MG: 20 TABLET, FILM COATED ORAL at 08:50

## 2025-01-23 RX ADMIN — IPRATROPIUM BROMIDE 0.5 MG: 0.5 SOLUTION RESPIRATORY (INHALATION) at 20:10

## 2025-01-23 RX ADMIN — PANTOPRAZOLE SODIUM 40 MG: 40 TABLET, DELAYED RELEASE ORAL at 06:19

## 2025-01-23 RX ADMIN — SUCRALFATE 1 G: 1 TABLET ORAL at 12:09

## 2025-01-23 RX ADMIN — METHYLPREDNISOLONE SODIUM SUCCINATE 40 MG: 40 INJECTION, POWDER, FOR SOLUTION INTRAMUSCULAR; INTRAVENOUS at 17:57

## 2025-01-23 RX ADMIN — METHYLPREDNISOLONE SODIUM SUCCINATE 40 MG: 40 INJECTION, POWDER, FOR SOLUTION INTRAMUSCULAR; INTRAVENOUS at 08:50

## 2025-01-23 RX ADMIN — DOXYCYCLINE 100 MG: 100 INJECTION, POWDER, LYOPHILIZED, FOR SOLUTION INTRAVENOUS at 02:41

## 2025-01-23 RX ADMIN — DICYCLOMINE HYDROCHLORIDE 10 MG: 10 CAPSULE ORAL at 12:09

## 2025-01-23 RX ADMIN — UMECLIDINIUM 1 PUFF: 62.5 AEROSOL, POWDER ORAL at 08:51

## 2025-01-23 RX ADMIN — DICYCLOMINE HYDROCHLORIDE 10 MG: 10 CAPSULE ORAL at 06:19

## 2025-01-23 RX ADMIN — FLUTICASONE FUROATE AND VILANTEROL TRIFENATATE 1 PUFF: 100; 25 POWDER RESPIRATORY (INHALATION) at 08:51

## 2025-01-23 RX ADMIN — IPRATROPIUM BROMIDE AND ALBUTEROL SULFATE 3 ML: 2.5; .5 SOLUTION RESPIRATORY (INHALATION) at 00:15

## 2025-01-23 RX ADMIN — ENOXAPARIN SODIUM 30 MG: 30 INJECTION SUBCUTANEOUS at 08:50

## 2025-01-23 RX ADMIN — LEVALBUTEROL HYDROCHLORIDE 1.25 MG: 1.25 SOLUTION RESPIRATORY (INHALATION) at 20:10

## 2025-01-23 RX ADMIN — ASPIRIN 81 MG: 81 TABLET, COATED ORAL at 08:50

## 2025-01-23 RX ADMIN — POLYETHYLENE GLYCOL 3350 17 G: 17 POWDER, FOR SOLUTION ORAL at 18:04

## 2025-01-23 RX ADMIN — DICYCLOMINE HYDROCHLORIDE 10 MG: 10 CAPSULE ORAL at 17:57

## 2025-01-23 RX ADMIN — DEXAMETHASONE SODIUM PHOSPHATE 10 MG: 10 INJECTION, SOLUTION INTRAMUSCULAR; INTRAVENOUS at 00:15

## 2025-01-23 RX ADMIN — LEVALBUTEROL HYDROCHLORIDE 1.25 MG: 1.25 SOLUTION RESPIRATORY (INHALATION) at 07:50

## 2025-01-23 RX ADMIN — SUCRALFATE 1 G: 1 TABLET ORAL at 17:57

## 2025-01-23 RX ADMIN — SUCRALFATE 1 G: 1 TABLET ORAL at 06:19

## 2025-01-23 RX ADMIN — METHYLPREDNISOLONE SODIUM SUCCINATE 40 MG: 40 INJECTION, POWDER, FOR SOLUTION INTRAMUSCULAR; INTRAVENOUS at 23:44

## 2025-01-23 RX ADMIN — SODIUM CHLORIDE 1000 ML: 0.9 INJECTION, SOLUTION INTRAVENOUS at 10:19

## 2025-01-23 RX ADMIN — IPRATROPIUM BROMIDE 0.5 MG: 0.5 SOLUTION RESPIRATORY (INHALATION) at 07:50

## 2025-01-23 RX ADMIN — CEFTRIAXONE 1000 MG: 1 INJECTION, SOLUTION INTRAVENOUS at 03:51

## 2025-01-23 RX ADMIN — POTASSIUM CHLORIDE 40 MEQ: 1500 TABLET, EXTENDED RELEASE ORAL at 01:14

## 2025-01-23 RX ADMIN — ATORVASTATIN CALCIUM 40 MG: 40 TABLET, FILM COATED ORAL at 17:57

## 2025-01-23 RX ADMIN — FAMOTIDINE 20 MG: 20 TABLET, FILM COATED ORAL at 21:49

## 2025-01-23 RX ADMIN — DOCUSATE SODIUM 100 MG: 100 CAPSULE, LIQUID FILLED ORAL at 18:04

## 2025-01-23 RX ADMIN — MAGNESIUM SULFATE HEPTAHYDRATE 2 G: 40 INJECTION, SOLUTION INTRAVENOUS at 01:14

## 2025-01-23 RX ADMIN — SODIUM CHLORIDE 500 ML: 0.9 INJECTION, SOLUTION INTRAVENOUS at 12:06

## 2025-01-23 RX ADMIN — IPRATROPIUM BROMIDE 0.5 MG: 0.5 SOLUTION RESPIRATORY (INHALATION) at 13:16

## 2025-01-23 RX ADMIN — LEVALBUTEROL HYDROCHLORIDE 1.25 MG: 1.25 SOLUTION RESPIRATORY (INHALATION) at 13:16

## 2025-01-23 RX ADMIN — SUCRALFATE 1 G: 1 TABLET ORAL at 21:49

## 2025-01-23 NOTE — ASSESSMENT & PLAN NOTE
Patient has known respiratory infection being managed by ID.  CT shows opacities in left lower lobe, right upper lobe, and right lower lobe.   Normal procalcitonin, no fever, no tachycardia, no tachypnea, and non-dyspneic on conversation.   No concerns for SIRS or sepsis. Blood cultures ordered to confirm.   CBC shows elevated white count of 20.27.  CRP is 163.4 which correlates with inflammation from lung infiltrates.   Doxycycline and rocephin given for pneumonia seen on CT.

## 2025-01-23 NOTE — SEPSIS NOTE
Sepsis Note   Ileana Seaman 64 y.o. female MRN: [id number]  Unit/Bed#: -01 Encounter: [id number]       Initial Sepsis Screening       Row Name 01/23/25 0758                Is the patient's history suggestive of a new or worsening infection? No  -                  User Key  (r) = Recorded By, (t) = Taken By, (c) = Cosigned By      Initials Name Provider Type     Barbra Dunn PA-C Physician Assistant                        Body mass index is 18.06 kg/m².  Wt Readings from Last 1 Encounters:   01/23/25 43.4 kg (95 lb 9.6 oz)     IBW (Ideal Body Weight): 47.8 kg    Ideal body weight: 52.1 kg (114 lb 15 oz)  Leukocytosis likely secondary to steroids. No worsening infection.  Continue current treatment

## 2025-01-23 NOTE — NURSING NOTE
Patient feeling SOB, shaky, and anxious after nebulizer treatment. No other signs of hypoxia.Patient 02 saturation is 97% on 2L. With history of COPD, patient taken off of 2L at this time. Patient now 92% at room air at rest. Patient educated on COPD, perfusion, and common signs after nebulizer treatments.

## 2025-01-23 NOTE — RESPIRATORY THERAPY NOTE
RT Protocol Note  Ileana Seaman 64 y.o. female MRN: [id number]  Unit/Bed#: -01 Encounter: [id number]    Assessment    Principal Problem:    COPD (chronic obstructive pulmonary disease) (HCC)  Active Problems:    Chronic obstructive pulmonary disease with acute lower respiratory infection (HCC)    Mycobacterial infection, non-TB    Hypomagnesemia    Chronic hypoxic respiratory failure (HCC)    Hypokalemia      Home Pulmonary Medications:         Past Medical History:   Diagnosis Date    COPD (chronic obstructive pulmonary disease) (HCC)     Hiatal hernia      Social History     Socioeconomic History    Marital status: Single     Spouse name: None    Number of children: None    Years of education: None    Highest education level: None   Occupational History    None   Tobacco Use    Smoking status: Former     Current packs/day: 0.25     Types: Cigarettes    Smokeless tobacco: Never   Vaping Use    Vaping status: Never Used   Substance and Sexual Activity    Alcohol use: Never    Drug use: Never    Sexual activity: None   Other Topics Concern    None   Social History Narrative    None     Social Drivers of Health     Financial Resource Strain: Not on file   Food Insecurity: No Food Insecurity (1/23/2025)    Nursing - Inadequate Food Risk Classification     Worried About Running Out of Food in the Last Year: Never true     Ran Out of Food in the Last Year: Never true     Ran Out of Food in the Last Year: Never true   Transportation Needs: Unmet Transportation Needs (1/23/2025)    Nursing - Transportation Risk Classification     Lack of Transportation: Not on file     Lack of Transportation: Yes   Physical Activity: Not on file   Stress: Not on file   Social Connections: Unknown (6/18/2024)    Received from [facility]    Social Interact Public Safety     How often do you feel lonely or isolated from those around you? (Adult - for ages 18 years and over): Not on file   Intimate Partner Violence: Unknown (1/23/2025)     "Nursing IPS     Feels Physically and Emotionally Safe: Not on file     Physically Hurt by Someone: Not on file     Humiliated or Emotionally Abused by Someone: Not on file     Physically Hurt by Someone: No     Hurt or Threatened by Someone: No   Housing Stability: Unknown (2025)    Nursing: Inadequate Housing Risk Classification     Has Housing: Not on file     Worried About Losing Housing: Not on file     Unable to Get Utilities: Not on file     Unable to Pay for Housing in the Last Year: No     Has Housin       Subjective         Objective    Physical Exam:   Assessment Type: During-treatment  General Appearance: Drowsy  Respiratory Pattern: Normal  Chest Assessment: Chest expansion symmetrical  Bilateral Breath Sounds: Clear, Diminished  O2 Device: 2:L    Vitals:  Blood pressure 109/62, pulse 95, temperature (!) 96.8 °F (36 °C), temperature source Temporal, resp. rate 18, height 5' 1\" (1.549 m), weight 43.4 kg (95 lb 9.6 oz), SpO2 97%.          Imaging and other studies:     O2 Device: 2:L     Plan    Respiratory Plan: No distress/Pulmonary history        Resp Comments: Pt w/ HO COPD admitted w/ a lung infection similar to TB. PT has HO COPD denies any SOB . States she actually feels good . 2L tommy O2 atc . Nebs TX 2X daily  BC are clear and dim. bilaterally   "

## 2025-01-23 NOTE — ASSESSMENT & PLAN NOTE
Magnesium level of 1.4 at 00:14 on 1/23/25.   Given magnesium sulfate via IV.  History of protein-calorie malnutrition.

## 2025-01-23 NOTE — CASE MANAGEMENT
Case Management Assessment & Discharge Planning Note    Patient name Ileana Seaman  Location /-01 MRN [id number]  : 1960 Date 2025       Current Admission Date: 2025  Current Admission Diagnosis:COPD (chronic obstructive pulmonary disease) (HCC)   Patient Active Problem List    Diagnosis Date Noted Date Diagnosed    Hypokalemia 2025     Anxiety 2024     Mild protein-calorie malnutrition (HCC) 2024     Celiac artery stenosis (HCC) 2024     Elevated liver enzymes 2024     Pulmonary nodule 2024     Moderate protein-calorie malnutrition (HCC) 2024     COPD (chronic obstructive pulmonary disease) (HCC) 2024     Atrial fibrillation (HCC) 2024     Chronic hypoxic respiratory failure (HCC) 2024     Epigastric pain 2024     New onset atrial fibrillation (HCC) 10/06/2024     Type 2 diabetes mellitus without complication, without long-term current use of insulin (HCC) 10/05/2024     Dysphagia 10/05/2024     Chronic obstructive pulmonary disease with acute lower respiratory infection (HCC) 10/04/2024     Mycobacterial infection, 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     Hypomagnesemia 10/04/2024       LOS (days): 0  Geometric Mean LOS (GMLOS) (days):   Days to GMLOS:     OBJECTIVE:              Current admission status: Observation  Referral Reason: Other (SDOH- transportation)    Preferred Pharmacy:   [id number] - [place], [place] - [address] [place] [address]  [address] [place] [address]  Phone: [phone] Fax: [phone]    Primary Care Provider: Merline Dinero    Primary Insurance: MEDICARE  [facility]    ASSESSMENT:  Active Health Care Proxies    There are no active Health Care Proxies on file.       Advance Directives  Does patient have a Health Care POA?: No  Was patient offered paperwork?: Yes  Does patient have Advance Directives?: No  Was patient offered  paperwork?: Yes  Primary Contact: Ayde Seaman (Daughter)  [phone] (Mobile)    Readmission Root Cause  30 Day Readmission: No    Patient Information  Admitted from:: Home  Mental Status: Alert  During Assessment patient was accompanied by: Not accompanied during assessment  Assessment information provided by:: Patient  Primary Caregiver: Self  Support Systems: Self, Children, Friends/neighbors  County of Residence: [facility]  What [facility] do you live in?: [place]  Home entry access options. Select all that apply.: Stairs  Number of steps to enter home.: 2  Type of Current Residence: 2 story home  Upon entering residence, is there a bedroom on the main floor (no further steps)?: No  A bedroom is located on the following floor levels of residence (select all that apply):: 2nd Floor  Upon entering residence, is there a bathroom on the main floor (no further steps)?: No  Indicate which floors of current residence have a bathroom (select all the apply):: 2nd Floor  Number of steps to 2nd floor from main floor: One Flight  Living Arrangements: Lives Alone  Is patient a ?: Yes ([employer] years in [employer])  Is patient active with [facility] ( [facility])?: No    Activities of Daily Living Prior to Admission  Functional Status: Independent  Completes ADLs independently?: Yes  Ambulates independently?: Yes  Does patient use assisted devices?: Yes  Assisted Devices (DME) used: Portable Oxygen tanks, Home Oxygen concentrator  DME Company Name (respiratory supplies): [employer] but still planning to try and switch to [employer]  O2 Rate(s): 2L at all times  Does patient currently own DME?: Yes  What DME does the patient currently own?: Portable Oxygen tanks, Home Oxygen concentrator  Does patient have a history of Outpatient Therapy (PT/OT)?: No  Does the patient have a history of Short-Term Rehab?: No  Does patient have a history of HHC?: No  Does patient currently have HHC?: No    Patient Information Continued  Income  Source: SSI/SSD  Does patient have prescription coverage?: Yes  Does patient receive dialysis treatments?: No  Does patient have a history of substance abuse?: No  Does patient have a history of Mental Health Diagnosis?: Yes (anxiety)  Is patient receiving treatment for mental health?: No. Treatment options were provided. (Pt plans to call [facility] to get [facility] started)  Has patient received inpatient treatment related to mental health in the last 2 years?: No    Means of Transportation  Means of Transport to Appts:: Family transport    DISCHARGE DETAILS:    Discharge planning discussed with:: Patient  Freedom of Choice: Yes  Comments - Freedom of Choice: Pt looking for resources for transportation to appts, Pt states she plans to call [facility] for [facility] follow up     Contacts  Patient Contacts: Ayde Seaman (Daughter)  [phone] (Mobile)    Other Referral/Resources/Interventions Provided:  Interventions: Transportation  Referral Comments: STS application and transportation resources via [employer] Help provided to Pt       CM met with Pt to introduce self, explain role, complete CM assessment, and discuss DC planning.     Pt lives alone in a 2 story home. Pt reports she was independent PTA. Does not use any assortive devices for ambulation but is on O2 at home (2L at all times). Pt O2 through [employer] but she is looking to switch still to [employer] so she can get a POC which she can't through [employer]. Pt reports she missed her [facility] appt with [facility] but plans to call them to reschedule.     Pt requesting resources for transportation to medical appts. Pt states she has two appts on 2/18 that she may need a ride to. CM provided Pt with STS application and transportation resources from [employer].     CM will continue to follow for DC planning needs.

## 2025-01-23 NOTE — H&P
H&P - Hospitalist   Name: Ileana Seaman 64 y.o. female I MRN: [id number]  Unit/Bed#: ED 01 I Date of Admission: 1/22/2025   Date of Service: 1/23/2025 I Hospital Day: 0     Assessment & Plan  COPD (chronic obstructive pulmonary disease) (HCC)  Acute exacerbation of COPD.  SOB started today.  Patient uses 2L of O2 at home chronically and is stable on 2L of O2 as inpatient.  Uses nebulizer and Trelegy inhaler at home to manage COPD.   VBG shows no acidosis.   BNP is 114 today, down from 139 on 11/24/24.  Flu/COVID rapid is negative.   Dexamethasone 10mg IV, duo-neb, Atrovent, and Xopenex given for acute exacerbation.   Plan for rapid prednisone taper  Chronic obstructive pulmonary disease with acute lower respiratory infection (HCC)  Patient has known respiratory infection being managed by ID.  CT shows opacities in left lower lobe, right upper lobe, and right lower lobe.   Normal procalcitonin, no fever, no tachycardia, no tachypnea, and non-dyspneic on conversation.   No concerns for SIRS or sepsis. Blood cultures ordered to confirm.   CBC shows elevated white count of 20.27.  CRP is 163.4 which correlates with inflammation from lung infiltrates.   Doxycycline and rocephin given for pneumonia seen on CT.   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   Antibiotic regiment from ID includes ethambutol 400mg qd, azithromycin 250mg qod, and rifabutin 300mg qd.   Regiment discontinued by ID temporarily due to elevated liver enzymes. Patient states next ID visit is on February 4th, 2025 where they will discuss lower doses of antibiotic or a two medication regiment to treat mycobacterium without liver damage.  Patient states she was doing well with her breathing on triple antibiotic therapy.   Hypomagnesemia  Magnesium level of 1.4 at 00:14 on 1/23/25.   Given magnesium sulfate via IV.  History of protein-calorie malnutrition.  Hypokalemia  Potassium  level of 3.3 at 00:14 on 1/23/25.  Given potassium chloride 40mg tablet PO.  Recheck blood draw scheduled for this morning.  History of protein-calorie malnutrition.       VTE Pharmacologic Prophylaxis:   High Risk (Score >/= 5) - Pharmacological DVT Prophylaxis Ordered: enoxaparin (Lovenox). Sequential Compression Devices Ordered.  Code Status: Prior Level 1 Full Code  Discussion with family:  Family not contacted due to early morning time and patient A&Ox3.     Anticipated Length of Stay: Patient will be admitted on an observation basis with an anticipated length of stay of less than 2 midnights secondary to COPD exacerbation and pneumonia.    History of Present Illness   Chief Complaint: shortness of breath and increased work of breathing x1 day    Ileana Seaman is a 64 y.o. female with a PMH of COPD, non-TB mycobacterium lung infection, epigastric pain, atrial fibrillation, and elevated liver enzymes who presents with shortness of breath and increased work of breathing x1day. Patient states she used her nebulizer and inhalers at home and they did not alleviate her symptoms. She is aware of her chronic mycobacterium lung infection and states she is seeing ID on the 4th of February to discuss continuing her antibiotics after a holiday due to elevated liver enzymes. Patient states she coughs up clear phlegm. She also states her legs are mildly swollen around the lower calf and ankle. She is wearing compression socks for it. She has been eating and drinking regularly. Patient had chicken soup for dinner last night. She denies chest pain, abdominal pain, dizziness, HA, congestion, and sore throat. Patient showed written medication list to confirm medications here.     Review of Systems   Constitutional:  Negative for appetite change, chills and fever.   HENT:  Negative for congestion, rhinorrhea and sore throat.    Respiratory:  Positive for cough (productive with clear phlegm), chest tightness, shortness of  breath and wheezing.    Gastrointestinal:  Negative for abdominal pain, constipation, diarrhea, nausea and vomiting.   Genitourinary:  Negative for dysuria, frequency and urgency.   Neurological:  Negative for dizziness, light-headedness and headaches.       Historical Information   Past Medical History:   Diagnosis Date    COPD (chronic obstructive pulmonary disease) (HCC)     Hiatal hernia      Past Surgical History:   Procedure Laterality Date    CHOLECYSTECTOMY       Social History     Tobacco Use    Smoking status: Former     Current packs/day: 0.25     Types: Cigarettes    Smokeless tobacco: Never   Vaping Use    Vaping status: Never Used   Substance and Sexual Activity    Alcohol use: Never    Drug use: Never    Sexual activity: Not on file     E-Cigarette/Vaping    E-Cigarette Use Never User      E-Cigarette/Vaping Substances     History reviewed. No pertinent family history.  Social History:  Marital Status: Single   Occupation: Retired  Patient Pre-hospital Living Situation: Home  Patient Pre-hospital Level of Mobility: unable to be assessed at time of evaluation  Patient Pre-hospital Diet Restrictions: none    Meds/Allergies   I have reviewed home medications with patient personally.  Prior to Admission medications    Medication Sig Start Date End Date Taking? Authorizing Provider   aspirin (Aspirin 81) 81 mg EC tablet Take 1 tablet (81 mg total) by mouth daily 12/17/24   Rolando Parker MD   atorvastatin (LIPITOR) 40 mg tablet Take 1 tablet (40 mg total) by mouth daily 12/17/24   Rolando Parker MD   azithromycin (ZITHROMAX) 500 MG tablet Take 0.5 tablets (250 mg total) by mouth every other day 12/17/24 2/15/25  Rolando Parker MD   dicyclomine (BENTYL) 10 mg capsule Take 1 capsule (10 mg total) by mouth 3 (three) times a day before meals 12/17/24   Rolando Parker MD   docusate sodium (COLACE) 100 mg capsule Take 100 mg by mouth 2 (two) times a day 11/13/24   Historical Provider, MD   ethambutol  (MYAMBUTOL) 400 mg tablet Take 800 mg by mouth daily    Historical Provider, MD   famotidine (PEPCID) 20 mg tablet Take 1 tablet (20 mg total) by mouth 2 (two) times a day 12/6/24   Carlos Turcios PA-C   fluticasone-umeclidinium-vilanterol (Trelegy Ellipta) 100-62.5-25 mcg/actuation inhaler Inhale 1 puff daily Rinse mouth after use.    Historical Provider, MD   hydrOXYzine HCL (ATARAX) 25 mg tablet Take 1 tablet (25 mg total) by mouth if needed for itching 12/17/24   Rolando Parker MD   ipratropium-albuterol (DUO-NEB) 0.5-2.5 mg/3 mL nebulizer solution Take 3 mL by nebulization 4 (four) times a day 11/23/24   Mary Altamirano MD   lidocaine (LIDODERM) 5 % Apply 1 patch topically over 12 hours daily for 4 days Remove & Discard patch within 12 hours or as directed by MD 10/10/24 10/14/24  Juana Santana MD   metoprolol tartrate (LOPRESSOR) 25 mg tablet Take 1 tablet (25 mg total) by mouth every 12 (twelve) hours 11/23/24   Mary Altamirano MD   nicotine (NICODERM CQ) 14 mg/24hr TD 24 hr patch Place 1 patch on the skin over 24 hours daily 10/10/24   Juana Santana MD   omeprazole (PriLOSEC) 40 MG capsule Take 1 capsule (40 mg total) by mouth 2 (two) times a day 30 minutes before meal 11/8/24 12/8/24  Rolando Parker MD   ondansetron (ZOFRAN) 4 mg tablet Take 1 tablet (4 mg total) by mouth every 6 (six) hours 12/6/24   Carlos Turcios PA-C   rifabutin (MYCOBUTIN) 150 mg capsule Take 300 mg by mouth daily    Historical Provider, MD   simethicone (MYLICON) 80 mg chewable tablet Chew 1 tablet (80 mg total) every 6 (six) hours as needed for flatulence 12/17/24   Rolando Parker MD   sucralfate (CARAFATE) 1 g tablet Take 1 tablet (1 g total) by mouth 4 (four) times a day for 14 days 12/6/24 12/20/24  Carlos Turcios PA-C     No Known Allergies    Objective :  Temp:  [98.5 °F (36.9 °C)] 98.5 °F (36.9 °C)  HR:  [] 86  BP: ()/(55-76) 103/55  Resp:  [22-24] 23  SpO2:  [94 %-98 %] 97 %  O2  15-Mar-2019 21:01 Device: Nasal cannula  Nasal Cannula O2 Flow Rate (L/min):  [2 L/min-4 L/min] 4 L/min    Physical Exam  Vitals and nursing note reviewed.   Constitutional:       General: She is not in acute distress.     Appearance: She is ill-appearing.   HENT:      Head: Normocephalic and atraumatic.      Nose: Nose normal.      Mouth/Throat:      Mouth: Mucous membranes are moist.      Pharynx: Oropharynx is clear. No posterior oropharyngeal erythema.   Eyes:      Extraocular Movements: Extraocular movements intact.   Cardiovascular:      Rate and Rhythm: Normal rate and regular rhythm.      Pulses: Normal pulses.      Heart sounds: Normal heart sounds. No murmur heard.     No friction rub.   Pulmonary:      Effort: Pulmonary effort is normal. No tachypnea, bradypnea, accessory muscle usage or respiratory distress.      Breath sounds: No stridor or transmitted upper airway sounds. Examination of the right-upper field reveals decreased breath sounds. Examination of the left-upper field reveals decreased breath sounds. Examination of the right-middle field reveals wheezing and rhonchi. Examination of the left-middle field reveals decreased breath sounds. Examination of the right-lower field reveals wheezing and rhonchi. Examination of the left-lower field reveals wheezing and rhonchi. Decreased breath sounds, wheezing and rhonchi present. No rales.      Comments: AP lateral ratio 1:1. Patient on 2L of O2 through nasal cannula.   Chest:      Chest wall: No tenderness.   Breasts:     Breasts are symmetrical.   Abdominal:      General: Abdomen is flat. Bowel sounds are normal. There is no distension.      Palpations: Abdomen is soft.      Tenderness: There is no abdominal tenderness. There is no guarding or rebound.   Musculoskeletal:      Right lower le+ Edema present.      Left lower le+ Edema present.      Comments: Patient wearing compression socks. Sock marks left in legs.    Skin:     General: Skin is warm and dry.       Capillary Refill: Capillary refill takes less than 2 seconds.      Coloration: Skin is pale. Skin is not jaundiced.      Findings: No bruising or erythema.   Neurological:      General: No focal deficit present.      Mental Status: She is alert and oriented to person, place, and time.   Psychiatric:         Mood and Affect: Mood normal.         Behavior: Behavior normal.           Lab Results: I have reviewed the following results:  Results from last 7 days   Lab Units 01/23/25  0014   WBC Thousand/uL 20.27*   HEMOGLOBIN g/dL 12.2   HEMATOCRIT % 36.6   PLATELETS Thousands/uL 204   SEGS PCT % 89*   LYMPHO PCT % 5*   MONO PCT % 5   EOS PCT % 0     Results from last 7 days   Lab Units 01/23/25  0014   SODIUM mmol/L 135   POTASSIUM mmol/L 3.3*   CHLORIDE mmol/L 101   CO2 mmol/L 26   BUN mg/dL 7   CREATININE mg/dL 0.51*   ANION GAP mmol/L 8   CALCIUM mg/dL 9.2   ALBUMIN g/dL 4.2   TOTAL BILIRUBIN mg/dL 0.64   ALK PHOS U/L 89   ALT U/L 8   AST U/L 9*   GLUCOSE RANDOM mg/dL 158*     Results from last 7 days   Lab Units 01/23/25  0014   INR  1.21*         Lab Results   Component Value Date    HGBA1C 6.5 (H) 11/13/2024    HGBA1C 6.4 (H) 10/04/2024    HGBA1C 5.9 (H) 05/22/2023     Results from last 7 days   Lab Units 01/23/25  0014   PROCALCITONIN ng/ml 0.08       Imaging Results Review: I reviewed radiology reports from this admission including: chest xray and CT chest.  Other Study Results Review: No additional pertinent studies reviewed.    ** Please Note: This note has been constructed using a voice recognition system. **

## 2025-01-23 NOTE — ASSESSMENT & PLAN NOTE
Potassium level of 3.3 at 00:14 on 1/23/25.  Given potassium chloride 40mg tablet PO.  Recheck blood draw scheduled for this morning.  History of protein-calorie malnutrition.

## 2025-01-23 NOTE — ASSESSMENT & PLAN NOTE
Acute exacerbation of COPD.  SOB started today.  Patient uses 2L of O2 at home chronically and is stable on 2L of O2 as inpatient.  Uses nebulizer and Trelegy inhaler at home to manage COPD.   VBG shows no acidosis.   BNP is 114 today, down from 139 on 11/24/24.  Flu/COVID rapid is negative.   Dexamethasone 10mg IV, duo-neb, Atrovent, and Xopenex given for acute exacerbation.   Plan for rapid prednisone taper

## 2025-01-23 NOTE — ASSESSMENT & PLAN NOTE
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   Antibiotic regiment from ID includes ethambutol 400mg qd, azithromycin 250mg qod, and rifabutin 300mg qd.   Regiment discontinued by ID temporarily due to elevated liver enzymes. Patient states next ID visit is on February 4th, 2025 where they will discuss lower doses of antibiotic or a two medication regiment to treat mycobacterium without liver damage.  Patient states she was doing well with her breathing on triple antibiotic therapy.

## 2025-01-23 NOTE — ED PROVIDER NOTES
Time reflects when diagnosis was documented in both MDM as applicable and the Disposition within this note       Time User Action Codes Description Comment    1/23/2025  2:06 AM Rey Jenkins Add [J18.9] Pneumonia     1/23/2025  2:07 AM Rey Jenkins Add [J44.1] COPD exacerbation (HCC)     1/23/2025  2:07 AM Rey Jenkins Add [E87.6] Hypokalemia     1/23/2025  2:07 AM Rey Jenkins Add [E83.42] Hypomagnesemia     1/23/2025  2:07 AM Rey Jenkins Add [D72.829] Leukocytosis           ED Disposition       ED Disposition   Admit    Condition   Stable    Date/Time   Thu Jan 23, 2025  2:16 AM    Comment   Case was discussed with Iesha and the patient's admission status was agreed to be Admission Status: observation status to the service of Dr. Barahona.               Assessment & Plan       Medical Decision Making  Amount and/or Complexity of Data Reviewed  Labs: ordered.  Radiology: ordered.    Risk  Prescription drug management.  Decision regarding hospitalization.        ED Course as of 01/23/25 0218   Thu Jan 23, 2025 0217 Spoke with Iesha rockwellist advanced practitioner on-call reviewed case and findings in the emergency department management thus far accepts for observation on behalf of Dr. Barahona.       Medications   magnesium sulfate 2 g/50 mL IVPB (premix) 2 g (2 g Intravenous New Bag 1/23/25 0114)   doxycycline (VIBRAMYCIN) 100 mg in sodium chloride 0.9 % 100 mL IVPB (has no administration in time range)   dexamethasone (PF) (DECADRON) injection 10 mg (10 mg Intravenous Given 1/23/25 0015)   ipratropium-albuterol (DUO-NEB) 0.5-2.5 mg/3 mL inhalation solution 3 mL (3 mL Nebulization Given 1/23/25 0015)   potassium chloride (Klor-Con M20) CR tablet 40 mEq (40 mEq Oral Given 1/23/25 0114)       ED Risk Strat Scores                          SBIRT 20yo+      Flowsheet Row Most Recent Value   Initial Alcohol Screen: US AUDIT-C     1. How often do you have a drink containing alcohol? 0 Filed at: 01/23/2025 0005    2. How many drinks containing alcohol do you have on a typical day you are drinking?  0 Filed at: 01/23/2025 0005   3b. FEMALE Any Age, or MALE 65+: How often do you have 4 or more drinks on one occassion? 0 Filed at: 01/23/2025 0005   Audit-C Score 0 Filed at: 01/23/2025 0005   LANDY: How many times in the past year have you...    Used an illegal drug or used a prescription medication for non-medical reasons? Never Filed at: 01/23/2025 0005                            History of Present Illness       Chief Complaint   Patient presents with    Shortness of Breath     BIBA from home, reports she was admitted for a bacterial lung infection that is similar to TB. States they had to stop the antibiotics d/t it harming her kidneys. Started with inc difficulty breathing today. Wears 2L/NC chronically.        Past Medical History:   Diagnosis Date    COPD (chronic obstructive pulmonary disease) (HCC)     Hiatal hernia       Past Surgical History:   Procedure Laterality Date    CHOLECYSTECTOMY        History reviewed. No pertinent family history.   Social History     Tobacco Use    Smoking status: Former     Current packs/day: 0.25     Types: Cigarettes    Smokeless tobacco: Never   Vaping Use    Vaping status: Never Used   Substance Use Topics    Alcohol use: Never    Drug use: Never      E-Cigarette/Vaping    E-Cigarette Use Never User       E-Cigarette/Vaping Substances      I have reviewed and agree with the history as documented.     Patient is a 64-year-old female history of COPD hiatal hernia diabetes TB infection presents the emergency department due to cough and shortness of breath started about 24 hours ago still present worsening denies chest pain no fevers patient reports productive cough thick mucus.  Patient has been using home nebulizer treatments without relief of symptoms.        History provided by:  Patient and EMS personnel      Review of Systems   Constitutional:  Negative for fever.   HENT:  Positive  for congestion.    Respiratory:  Positive for cough, shortness of breath and wheezing.    Cardiovascular:  Negative for chest pain.   Gastrointestinal:  Negative for abdominal pain, nausea and vomiting.   All other systems reviewed and are negative.          Objective       ED Triage Vitals [01/23/25 0002]   Temperature Pulse Blood Pressure Respirations SpO2 Patient Position - Orthostatic VS   98.5 °F (36.9 °C) 104 137/76 22 98 % Sitting      Temp Source Heart Rate Source BP Location FiO2 (%) Pain Score    Oral Monitor Left arm -- 6      Vitals      Date and Time Temp Pulse SpO2 Resp BP Pain Score FACES Pain Rating User   01/23/25 0215 -- 86 97 % -- 103/55 -- -- MD   01/23/25 0200 -- 85 98 % -- 110/57 -- -- MD   01/23/25 0145 -- 91 97 % 23 98/57 -- -- MD   01/23/25 0130 -- 95 98 % -- 109/58 -- -- MD   01/23/25 0115 -- 103 97 % -- 107/55 -- -- MD   01/23/25 0045 -- 96 98 % -- 107/61 -- -- MD   01/23/25 0030 -- 100 98 % 24 112/66 -- -- MD   01/23/25 0028 -- -- 94 % 86% on 2L/NC -- -- -- -- AR   01/23/25 0002 98.5 °F (36.9 °C) 104 98 % 22 137/76 6 -- AR            Physical Exam  Vitals and nursing note reviewed.   Constitutional:       General: She is not in acute distress.     Appearance: Normal appearance.   HENT:      Head: Normocephalic and atraumatic.   Eyes:      Conjunctiva/sclera: Conjunctivae normal.   Cardiovascular:      Rate and Rhythm: Normal rate and regular rhythm.   Pulmonary:      Effort: Pulmonary effort is normal. No respiratory distress.      Breath sounds: Wheezing and rhonchi present.   Skin:     General: Skin is dry.   Neurological:      General: No focal deficit present.      Mental Status: She is alert and oriented to person, place, and time.         Results Reviewed       Procedure Component Value Units Date/Time    HS Troponin I 4hr [[id number]]     Lab Status: No result Specimen: Blood     Blood culture #1 [[id number]]     Lab Status: No result Specimen: Blood     Blood culture #2 [[id number]]      Lab Status: No result Specimen: Blood     B-Type Natriuretic Peptide(BNP) [[id number]]  (Abnormal) Collected: 01/23/25 0014    Lab Status: Final result Specimen: Blood from Arm, Left Updated: 01/23/25 0117      pg/mL     HS Troponin I 2hr [[id number]]     Lab Status: No result Specimen: Blood     HS Troponin 0hr (reflex protocol) [[id number]]  (Normal) Collected: 01/23/25 0014    Lab Status: Final result Specimen: Blood from Arm, Left Updated: 01/23/25 0049     hs TnI 0hr 7 ng/L     Procalcitonin [[id number]]  (Normal) Collected: 01/23/25 0014    Lab Status: Final result Specimen: Blood from Arm, Left Updated: 01/23/25 0049     Procalcitonin 0.08 ng/ml     C-reactive protein [[id number]]  (Abnormal) Collected: 01/23/25 0014    Lab Status: Final result Specimen: Blood from Arm, Left Updated: 01/23/25 0042     .4 mg/L     Comprehensive metabolic panel [[id number]]  (Abnormal) Collected: 01/23/25 0014    Lab Status: Final result Specimen: Blood from Arm, Left Updated: 01/23/25 0042     Sodium 135 mmol/L      Potassium 3.3 mmol/L      Chloride 101 mmol/L      CO2 26 mmol/L      ANION GAP 8 mmol/L      BUN 7 mg/dL      Creatinine 0.51 mg/dL      Glucose 158 mg/dL      Calcium 9.2 mg/dL      AST 9 U/L      ALT 8 U/L      Alkaline Phosphatase 89 U/L      Total Protein 6.8 g/dL      Albumin 4.2 g/dL      Total Bilirubin 0.64 mg/dL      eGFR 100 ml/min/1.73sq m     Narrative:      [employer] guidelines for Chronic Kidney Disease (CKD):     Stage 1 with normal or high GFR (GFR > 90 mL/min/1.73 square meters)    Stage 2 Mild CKD (GFR = 60-89 mL/min/1.73 square meters)    Stage 3A Moderate CKD (GFR = 45-59 mL/min/1.73 square meters)    Stage 3B Moderate CKD (GFR = 30-44 mL/min/1.73 square meters)    Stage 4 Severe CKD (GFR = 15-29 mL/min/1.73 square meters)    Stage 5 End Stage CKD (GFR <15 mL/min/1.73 square meters)  Note: GFR calculation is accurate only with a steady state creatinine     Magnesium [[id number]]  (Abnormal) Collected: 01/23/25 0014    Lab Status: Final result Specimen: Blood from Arm, Left Updated: 01/23/25 0042     Magnesium 1.4 mg/dL     FLU/COVID Rapid Antigen (30 min. TAT) - Preferred screening test in ED [[id number]]  (Normal) Collected: 01/23/25 0014    Lab Status: Final result Specimen: Nares from Nose Updated: 01/23/25 0040     SARS COV Rapid Antigen Negative     Influenza A Rapid Antigen Negative     Influenza B Rapid Antigen Negative    Narrative:      This test has been performed using the [employer] Rhea 2 FLU+SARS Antigen test under the Emergency Use Authorization (EUA). This test has been validated by the  and verified by the performing laboratory. The Rhea uses lateral flow immunofluorescent sandwich assay to detect SARS-COV, Influenza A and Influenza B Antigen.     The [employer] Rhea 2 SARS Antigen test does not differentiate between SARS-CoV and SARS-CoV-2.     Negative results are presumptive and may be confirmed with a molecular assay, if necessary, for patient management. Negative results do not rule out SARS-CoV-2 or influenza infection and should not be used as the sole basis for treatment or patient management decisions. A negative test result may occur if the level of antigen in a sample is below the limit of detection of this test.     Positive results are indicative of the presence of viral antigens, but do not rule out bacterial infection or co-infection with other viruses.     All test results should be used as an adjunct to clinical observations and other information available to the provider.    FOR PEDIATRIC PATIENTS - copy/paste COVID Guidelines URL to browser: [email or web address]    Protime-INR [[id number]]  (Abnormal) Collected: 01/23/25 0014    Lab Status: Final result Specimen: Blood from Arm, Left Updated: 01/23/25 0036     Protime 15.7 seconds      INR 1.21    Narrative:      INR  Therapeutic Range    Indication                                             INR Range      Atrial Fibrillation                                               2.0-3.0  Hypercoagulable State                                    2.0.2.3  Left Ventricular Asist Device                            2.0-3.0  Mechanical Heart Valve                                  -    Aortic(with afib, MI, embolism, HF, LA enlargement,    and/or coagulopathy)                                     2.0-3.0 (2.5-3.5)     Mitral                                                             2.5-3.5  Prosthetic/Bioprosthetic Heart Valve               2.0-3.0  Venous thromboembolism (VTE: VT, PE        2.0-3.0    APTT [[id number]]  (Normal) Collected: 01/23/25 0014    Lab Status: Final result Specimen: Blood from Arm, Left Updated: 01/23/25 0036     PTT 29 seconds     Blood gas, venous [[id number]]  (Abnormal) Collected: 01/23/25 0014    Lab Status: Final result Specimen: Blood from Arm, Left Updated: 01/23/25 0024     pH, Terence 7.413     pCO2, Terence 37.0 mm Hg      pO2, Terence 47.1 mm Hg      HCO3, Terence 23.1 mmol/L      Base Excess, Terence -1.1 mmol/L      O2 Content, Terence 15.4 ml/dL      O2 HGB, VENOUS 81.7 %     CBC and differential [[id number]]  (Abnormal) Collected: 01/23/25 0014    Lab Status: Final result Specimen: Blood from Arm, Left Updated: 01/23/25 0023     WBC 20.27 Thousand/uL      RBC 4.09 Million/uL      Hemoglobin 12.2 g/dL      Hematocrit 36.6 %      MCV 90 fL      MCH 29.8 pg      MCHC 33.3 g/dL      RDW 13.4 %      MPV 12.2 fL      Platelets 204 Thousands/uL      nRBC 0 /100 WBCs      Segmented % 89 %      Immature Grans % 1 %      Lymphocytes % 5 %      Monocytes % 5 %      Eosinophils Relative 0 %      Basophils Relative 0 %      Absolute Neutrophils 17.97 Thousands/µL      Absolute Immature Grans 0.13 Thousand/uL      Absolute Lymphocytes 1.07 Thousands/µL      Absolute Monocytes 1.05 Thousand/µL      Eosinophils Absolute 0.00 Thousand/µL       Basophils Absolute 0.05 Thousands/µL             CT chest without contrast   Final Interpretation by Cain Ansari MD (01/23 0152)      1.  Left lower lobe opacities consistent with pneumonia   2.  Persistent right upper lobe elongated opacity, with cavitation. Differential includes postinfectious scarring, as well as malignancy. Recommend follow-up PET/CT.   3.  New right lower lobe superior segment nodular opacity, also likely represents pneumonic infiltrate, attention on follow-up   4.  Multiple pulmonary nodules, attention on follow-up   5.  Emphysema      The study was marked in EPIC for significant notification.         Workstation performed: [id number]         XR chest 1 view portable    (Results Pending)       ECG 12 Lead Documentation Only    Date/Time: 1/23/2025 12:09 AM    Performed by: Rey Jenkins DO  Authorized by: Rey Jenkins DO    ECG reviewed by me, the ED Provider: yes    Patient location:  ED  Previous ECG:     Comparison to cardiac monitor: Yes    Quality:     Tracing quality:  Limited by artifact  Rate:     ECG rate:  107    ECG rate assessment: tachycardic    Rhythm:     Rhythm: sinus tachycardia    QRS:     QRS axis:  Normal    QRS intervals:  Normal  Conduction:     Conduction: normal    ST segments:     ST segments:  Non-specific  T waves:     T waves: non-specific        ED Medication and Procedure Management   Prior to Admission Medications   Prescriptions Last Dose Informant Patient Reported? Taking?   aspirin (Aspirin 81) 81 mg EC tablet   No No   Sig: Take 1 tablet (81 mg total) by mouth daily   atorvastatin (LIPITOR) 40 mg tablet   No No   Sig: Take 1 tablet (40 mg total) by mouth daily   azithromycin (ZITHROMAX) 500 MG tablet   No No   Sig: Take 0.5 tablets (250 mg total) by mouth every other day   dicyclomine (BENTYL) 10 mg capsule   No No   Sig: Take 1 capsule (10 mg total) by mouth 3 (three) times a day before meals   docusate sodium (COLACE) 100 mg capsule   Yes No   Sig:  Take 100 mg by mouth 2 (two) times a day   ethambutol (MYAMBUTOL) 400 mg tablet   Yes No   Sig: Take 800 mg by mouth daily   famotidine (PEPCID) 20 mg tablet   No No   Sig: Take 1 tablet (20 mg total) by mouth 2 (two) times a day   fluticasone-umeclidinium-vilanterol (Trelegy Ellipta) 100-62.5-25 mcg/actuation inhaler   Yes No   Sig: Inhale 1 puff daily Rinse mouth after use.   hydrOXYzine HCL (ATARAX) 25 mg tablet   No No   Sig: Take 1 tablet (25 mg total) by mouth if needed for itching   ipratropium-albuterol (DUO-NEB) 0.5-2.5 mg/3 mL nebulizer solution   No No   Sig: Take 3 mL by nebulization 4 (four) times a day   lidocaine (LIDODERM) 5 %   No No   Sig: Apply 1 patch topically over 12 hours daily for 4 days Remove & Discard patch within 12 hours or as directed by MD   metoprolol tartrate (LOPRESSOR) 25 mg tablet   No No   Sig: Take 1 tablet (25 mg total) by mouth every 12 (twelve) hours   nicotine (NICODERM CQ) 14 mg/24hr TD 24 hr patch   No No   Sig: Place 1 patch on the skin over 24 hours daily   omeprazole (PriLOSEC) 40 MG capsule   No No   Sig: Take 1 capsule (40 mg total) by mouth 2 (two) times a day 30 minutes before meal   ondansetron (ZOFRAN) 4 mg tablet   No No   Sig: Take 1 tablet (4 mg total) by mouth every 6 (six) hours   rifabutin (MYCOBUTIN) 150 mg capsule   Yes No   Sig: Take 300 mg by mouth daily   simethicone (MYLICON) 80 mg chewable tablet   No No   Sig: Chew 1 tablet (80 mg total) every 6 (six) hours as needed for flatulence   sucralfate (CARAFATE) 1 g tablet   No No   Sig: Take 1 tablet (1 g total) by mouth 4 (four) times a day for 14 days      Facility-Administered Medications: None     Patient's Medications   Discharge Prescriptions    No medications on file     No discharge procedures on file.  ED SEPSIS DOCUMENTATION   Time reflects when diagnosis was documented in both MDM as applicable and the Disposition within this note       Time User Action Codes Description Comment    1/23/2025   2:06 AM Rey Jenkins [J18.9] Pneumonia     1/23/2025  2:07 AM Rey Jenkins [J44.1] COPD exacerbation (HCC)     1/23/2025  2:07 AM Rey Jenkins [E87.6] Hypokalemia     1/23/2025  2:07 AM Rey Jenkins [E83.42] Hypomagnesemia     1/23/2025  2:07 AM Rey Jenkins [D72.829] Leukocytosis                  Rey Jenkins DO  01/23/25 0218

## 2025-01-24 PROBLEM — J44.1 COPD WITH ACUTE EXACERBATION (HCC): Status: ACTIVE | Noted: 2024-10-04

## 2025-01-24 LAB
ALBUMIN SERPL BCG-MCNC: 3.6 G/DL (ref 3.5–5)
ALP SERPL-CCNC: 80 U/L (ref 34–104)
ALT SERPL W P-5'-P-CCNC: 7 U/L (ref 7–52)
ANION GAP SERPL CALCULATED.3IONS-SCNC: 1 MMOL/L (ref 4–13)
AST SERPL W P-5'-P-CCNC: 7 U/L (ref 13–39)
BASOPHILS # BLD MANUAL: 0 THOUSAND/UL (ref 0–0.1)
BASOPHILS NFR MAR MANUAL: 0 % (ref 0–1)
BILIRUB SERPL-MCNC: 0.28 MG/DL (ref 0.2–1)
BUN SERPL-MCNC: 9 MG/DL (ref 5–25)
CALCIUM SERPL-MCNC: 9 MG/DL (ref 8.4–10.2)
CHLORIDE SERPL-SCNC: 111 MMOL/L (ref 96–108)
CO2 SERPL-SCNC: 27 MMOL/L (ref 21–32)
CREAT SERPL-MCNC: 0.52 MG/DL (ref 0.6–1.3)
EOSINOPHIL # BLD MANUAL: 0 THOUSAND/UL (ref 0–0.4)
EOSINOPHIL NFR BLD MANUAL: 0 % (ref 0–6)
ERYTHROCYTE [DISTWIDTH] IN BLOOD BY AUTOMATED COUNT: 13.9 % (ref 11.6–15.1)
GFR SERPL CREATININE-BSD FRML MDRD: 99 ML/MIN/1.73SQ M
GLUCOSE P FAST SERPL-MCNC: 171 MG/DL (ref 65–99)
GLUCOSE SERPL-MCNC: 171 MG/DL (ref 65–140)
HCT VFR BLD AUTO: 34.9 % (ref 34.8–46.1)
HGB BLD-MCNC: 11 G/DL (ref 11.5–15.4)
LYMPHOCYTES # BLD AUTO: 0.8 THOUSAND/UL (ref 0.6–4.47)
LYMPHOCYTES # BLD AUTO: 4 % (ref 14–44)
MCH RBC QN AUTO: 29.7 PG (ref 26.8–34.3)
MCHC RBC AUTO-ENTMCNC: 31.5 G/DL (ref 31.4–37.4)
MCV RBC AUTO: 94 FL (ref 82–98)
MONOCYTES # BLD AUTO: 1.4 THOUSAND/UL (ref 0–1.22)
MONOCYTES NFR BLD: 7 % (ref 4–12)
NEUTROPHILS # BLD MANUAL: 17.79 THOUSAND/UL (ref 1.85–7.62)
NEUTS SEG NFR BLD AUTO: 89 % (ref 43–75)
NRBC BLD AUTO-RTO: 1 /100 WBC (ref 0–2)
PLATELET # BLD AUTO: 189 THOUSANDS/UL (ref 149–390)
PLATELET BLD QL SMEAR: ADEQUATE
PMV BLD AUTO: 12.6 FL (ref 8.9–12.7)
POTASSIUM SERPL-SCNC: 4.6 MMOL/L (ref 3.5–5.3)
PROCALCITONIN SERPL-MCNC: 0.11 NG/ML
PROT SERPL-MCNC: 6.1 G/DL (ref 6.4–8.4)
RBC # BLD AUTO: 3.7 MILLION/UL (ref 3.81–5.12)
RBC MORPH BLD: NORMAL
SODIUM SERPL-SCNC: 139 MMOL/L (ref 135–147)
WBC # BLD AUTO: 19.99 THOUSAND/UL (ref 4.31–10.16)

## 2025-01-24 PROCEDURE — 94640 AIRWAY INHALATION TREATMENT: CPT

## 2025-01-24 PROCEDURE — 85027 COMPLETE CBC AUTOMATED: CPT | Performed by: FAMILY MEDICINE

## 2025-01-24 PROCEDURE — 99223 1ST HOSP IP/OBS HIGH 75: CPT | Performed by: INTERNAL MEDICINE

## 2025-01-24 PROCEDURE — 80053 COMPREHEN METABOLIC PANEL: CPT | Performed by: FAMILY MEDICINE

## 2025-01-24 PROCEDURE — 94760 N-INVAS EAR/PLS OXIMETRY 1: CPT

## 2025-01-24 PROCEDURE — 84145 PROCALCITONIN (PCT): CPT | Performed by: FAMILY MEDICINE

## 2025-01-24 PROCEDURE — 94668 MNPJ CHEST WALL SBSQ: CPT

## 2025-01-24 PROCEDURE — 85007 BL SMEAR W/DIFF WBC COUNT: CPT | Performed by: FAMILY MEDICINE

## 2025-01-24 PROCEDURE — 99232 SBSQ HOSP IP/OBS MODERATE 35: CPT | Performed by: FAMILY MEDICINE

## 2025-01-24 RX ORDER — AZITHROMYCIN 250 MG/1
500 TABLET, FILM COATED ORAL EVERY 24 HOURS
Status: DISCONTINUED | OUTPATIENT
Start: 2025-01-24 | End: 2025-01-26 | Stop reason: HOSPADM

## 2025-01-24 RX ORDER — METHYLPREDNISOLONE SODIUM SUCCINATE 40 MG/ML
40 INJECTION, POWDER, LYOPHILIZED, FOR SOLUTION INTRAMUSCULAR; INTRAVENOUS EVERY 12 HOURS SCHEDULED
Status: DISCONTINUED | OUTPATIENT
Start: 2025-01-24 | End: 2025-01-26 | Stop reason: HOSPADM

## 2025-01-24 RX ORDER — GUAIFENESIN 600 MG/1
600 TABLET, EXTENDED RELEASE ORAL EVERY 12 HOURS SCHEDULED
Status: DISCONTINUED | OUTPATIENT
Start: 2025-01-24 | End: 2025-01-26 | Stop reason: HOSPADM

## 2025-01-24 RX ORDER — ALBUTEROL SULFATE 90 UG/1
2 INHALANT RESPIRATORY (INHALATION) EVERY 6 HOURS PRN
Status: DISCONTINUED | OUTPATIENT
Start: 2025-01-24 | End: 2025-01-26 | Stop reason: HOSPADM

## 2025-01-24 RX ADMIN — METHYLPREDNISOLONE SODIUM SUCCINATE 40 MG: 40 INJECTION, POWDER, FOR SOLUTION INTRAMUSCULAR; INTRAVENOUS at 09:00

## 2025-01-24 RX ADMIN — PANTOPRAZOLE SODIUM 40 MG: 40 TABLET, DELAYED RELEASE ORAL at 05:15

## 2025-01-24 RX ADMIN — ENOXAPARIN SODIUM 30 MG: 30 INJECTION SUBCUTANEOUS at 09:09

## 2025-01-24 RX ADMIN — IPRATROPIUM BROMIDE 0.5 MG: 0.5 SOLUTION RESPIRATORY (INHALATION) at 20:29

## 2025-01-24 RX ADMIN — ATORVASTATIN CALCIUM 40 MG: 40 TABLET, FILM COATED ORAL at 17:10

## 2025-01-24 RX ADMIN — DICYCLOMINE HYDROCHLORIDE 10 MG: 10 CAPSULE ORAL at 05:15

## 2025-01-24 RX ADMIN — LEVALBUTEROL HYDROCHLORIDE 1.25 MG: 1.25 SOLUTION RESPIRATORY (INHALATION) at 20:29

## 2025-01-24 RX ADMIN — IPRATROPIUM BROMIDE 0.5 MG: 0.5 SOLUTION RESPIRATORY (INHALATION) at 07:39

## 2025-01-24 RX ADMIN — AZITHROMYCIN 500 MG: 250 TABLET, FILM COATED ORAL at 17:10

## 2025-01-24 RX ADMIN — SUCRALFATE 1 G: 1 TABLET ORAL at 11:48

## 2025-01-24 RX ADMIN — IPRATROPIUM BROMIDE 0.5 MG: 0.5 SOLUTION RESPIRATORY (INHALATION) at 13:46

## 2025-01-24 RX ADMIN — GUAIFENESIN 600 MG: 600 TABLET ORAL at 13:39

## 2025-01-24 RX ADMIN — UMECLIDINIUM 1 PUFF: 62.5 AEROSOL, POWDER ORAL at 09:09

## 2025-01-24 RX ADMIN — METHYLPREDNISOLONE SODIUM SUCCINATE 40 MG: 40 INJECTION, POWDER, FOR SOLUTION INTRAMUSCULAR; INTRAVENOUS at 21:05

## 2025-01-24 RX ADMIN — LEVALBUTEROL HYDROCHLORIDE 1.25 MG: 1.25 SOLUTION RESPIRATORY (INHALATION) at 07:39

## 2025-01-24 RX ADMIN — FAMOTIDINE 20 MG: 20 TABLET, FILM COATED ORAL at 09:06

## 2025-01-24 RX ADMIN — FLUTICASONE FUROATE AND VILANTEROL TRIFENATATE 1 PUFF: 100; 25 POWDER RESPIRATORY (INHALATION) at 09:09

## 2025-01-24 RX ADMIN — SUCRALFATE 1 G: 1 TABLET ORAL at 05:15

## 2025-01-24 RX ADMIN — ASPIRIN 81 MG: 81 TABLET, COATED ORAL at 09:05

## 2025-01-24 RX ADMIN — GUAIFENESIN 600 MG: 600 TABLET ORAL at 21:05

## 2025-01-24 RX ADMIN — SUCRALFATE 1 G: 1 TABLET ORAL at 17:10

## 2025-01-24 RX ADMIN — DICYCLOMINE HYDROCHLORIDE 10 MG: 10 CAPSULE ORAL at 11:48

## 2025-01-24 RX ADMIN — DICYCLOMINE HYDROCHLORIDE 10 MG: 10 CAPSULE ORAL at 17:10

## 2025-01-24 RX ADMIN — FAMOTIDINE 20 MG: 20 TABLET, FILM COATED ORAL at 21:05

## 2025-01-24 RX ADMIN — POLYETHYLENE GLYCOL 3350 17 G: 17 POWDER, FOR SOLUTION ORAL at 09:09

## 2025-01-24 RX ADMIN — CEFTRIAXONE 1000 MG: 1 INJECTION, SOLUTION INTRAVENOUS at 05:15

## 2025-01-24 RX ADMIN — LEVALBUTEROL HYDROCHLORIDE 1.25 MG: 1.25 SOLUTION RESPIRATORY (INHALATION) at 13:46

## 2025-01-24 RX ADMIN — DOCUSATE SODIUM 100 MG: 100 CAPSULE, LIQUID FILLED ORAL at 09:05

## 2025-01-24 RX ADMIN — DOCUSATE SODIUM 100 MG: 100 CAPSULE, LIQUID FILLED ORAL at 17:10

## 2025-01-24 RX ADMIN — SUCRALFATE 1 G: 1 TABLET ORAL at 21:05

## 2025-01-24 NOTE — ASSESSMENT & PLAN NOTE
Decrease to Solumedrol 40 mg IV every 12 hours today.  Continue Breo/Incruse in place of home Trelegy  Continue Xopenex 3 times daily.  Discontinue Atrovent.  Add albuterol HFA 2 puffs every 6 hours as needed at her request.  Supportive care for cough-add Mucinex 600 mg p.o. twice daily

## 2025-01-24 NOTE — ASSESSMENT & PLAN NOTE
Inpatient regimen as above  At discharge, resume Trelegy 100/62.5/25 mcg 1 puff daily, DuoNeb 4 times daily  Follow up with outpatient pulmonary [facility].

## 2025-01-24 NOTE — ASSESSMENT & PLAN NOTE
Patient has known respiratory infection being managed by ID.  CT shows opacities in left lower lobe, right upper lobe, and right lower lobe.   Normal procalcitonin, no fever, no tachycardia, no tachypnea, and non-dyspneic on conversation.   SIRS. Blood cultures ordered to confirm.   CBC shows elevated white count of 20.27.  CRP is 163.4 which correlates with inflammation from lung infiltrates.   Doxycycline and rocephin given for pneumonia seen on CT.-discussed with infectious disease, since procalcitonin is normal, does not recommend any antibiotic and less suspicious for pneumonia.  More prone to COPD exacerbation  Follow pulmonary consult

## 2025-01-24 NOTE — ASSESSMENT & PLAN NOTE
Acute exacerbation of COPD.  SOB started today.  Patient uses 2L of O2 at home chronically and is stable on 2L of O2 as inpatient.  Uses nebulizer and Trelegy inhaler at home to manage COPD.   VBG shows no acidosis.   BNP is 114 today, down from 139 on 11/24/24.  Flu/COVID rapid is negative.   Dexamethasone 10mg IV, duo-neb, Atrovent, and Xopenex given for acute exacerbation.   Plan for rapid prednisone taper   Cheek-To-Nose Interpolation Flap Text: A decision was made to reconstruct the defect utilizing an interpolation axial flap and a staged reconstruction.  A telfa template was made of the defect.  This telfa template was then used to outline the Cheek-To-Nose Interpolation flap.  The donor area for the pedicle flap was then injected with anesthesia.  The flap was excised through the skin and subcutaneous tissue down to the layer of the underlying musculature.  The interpolation flap was carefully excised within this deep plane to maintain its blood supply.  The edges of the donor site were undermined.   The donor site was closed in a primary fashion.  The pedicle was then rotated into position and sutured.  Once the tube was sutured into place, adequate blood supply was confirmed with blanching and refill.  The pedicle was then wrapped with xeroform gauze and dressed appropriately with a telfa and gauze bandage to ensure continued blood supply and protect the attached pedicle.

## 2025-01-24 NOTE — CONSULTS
Consultation - Pulmonology   Name: Ileana Seaman 64 y.o. female I MRN: [id number]  Unit/Bed#: -01 I Date of Admission: 1/22/2025   Date of Service: 1/24/2025 I Hospital Day: 0   Inpatient consult to Pulmonology  Consult performed by: Alvaro Liang PA-C  Consult ordered by: Rolando Parker MD        Physician Requesting Evaluation: Rolando Parker MD   Reason for Evaluation / Principal Problem: COPD exacerbation     Assessment & Plan  COPD with acute exacerbation (HCC)  Decrease to Solumedrol 40 mg IV every 12 hours today.  Continue Breo/Incruse in place of home Trelegy  Continue Xopenex 3 times daily.  Discontinue Atrovent.  Add albuterol HFA 2 puffs every 6 hours as needed at her request.  Supportive care for cough-add Mucinex 600 mg p.o. twice daily  COPD, severe (HCC)  Inpatient regimen as above  At discharge, resume Trelegy 100/62.5/25 mcg 1 puff daily, DuoNeb 4 times daily  Follow up with outpatient pulmonary [facility].  Pneumonia  New left lower lobe infiltrates could represent progression of her known MARIS versus community-acquired pneumonia.  Will cover for both with ceftriaxone and also add 7 days of azithromycin.  Mycobacterial infection, non-TB  Recommend she follow-up with outpatient ID 2/4.  Suspect new/worsening findings on CT Chest this admission represent progression of MARIS and therefore warrants restarting triple therapy.   Chronic hypoxic respiratory failure (HCC)  Seen on 2L NC which is her baseline oxygen requirement.   Maintain SpO2 greater than or equal to 88%.   Pulmonary toilet:  Increase activity as tolerated, out of bed as tolerated, cough and deep breathing exercises encourage, incentive spirometry q.1 hour    I have discussed the above management plan in detail with the primary service.   Pulmonology service will follow.    History of Present Illness   Ileana Seaman is a 64 y.o. female who presents with shortness of breath and increased cough.  Patient has a past medical  asbestos for severe COPD, chronic hypoxic respiratory failure on 2 L, non-tPA mycobacterial lung infection, atrial fibrillation.  Patient endorses the above-mentioned symptoms and could not get any relief from her home inhalers and nebulizer prompting ED visit tonight of 1/22.  Noted to be tachypneic with increased work of breathing requiring 4 L nasal cannula initially and quickly titrated back to her baseline 2 L nasal cannula.  Laboratory workup revealed leukocytosis, elevated CRP, hypomagnesemia, hypokalemia, elevated BNP, normal troponin and flu/COVID PCR.  CT chest without contrast done showed [place]'s new left lower lobe infiltrates, multiple stable pulmonary nodules with the exception of new/worsening right lower lobe superior segment nodular opacity compared to November, slight progression of cavitation right upper lobe nodular opacity, and emphysema.  Patient was admitted for COPD exacerbation and pulmonary was consulted to help manage this.    Patient follows with [facility] pulmonary for COPD and pulmonary nodules.  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  Follows with infectious disease previously on rifampin, ethambutol, azithromycin but these were stopped mid December due to LFT elevation.      Review of Systems   All other systems reviewed and are negative.      Historical Information   I have reviewed the patient's PMH, PSH, Social History, Family History, Meds, and Allergies  Tobacco History: quit 1 month ago. Reports a 45 pack year smoking history.  Occupational History: retired from factory jobs.  Family History:History reviewed. No pertinent family history.    Objective :  Temp:  [97.2 °F (36.2 °C)-98 °F (36.7 °C)] 97.2 °F (36.2 °C)  HR:  [] 110  BP: ()/(45-76) 110/64  Resp:  [18-20] 18  SpO2:  [89 %-98 %] 89 %  O2 Device: Nasal  cannula  Nasal Cannula O2 Flow Rate (L/min):  [2 L/min] 2 L/min    Physical Exam  Vitals reviewed.   Constitutional:       Appearance: Normal appearance. She is well-developed. She is cachectic.   HENT:      Head: Normocephalic and atraumatic.      Nose: Nose normal.      Mouth/Throat:      Mouth: Mucous membranes are moist.   Eyes:      Extraocular Movements: Extraocular movements intact.   Cardiovascular:      Rate and Rhythm: Normal rate and regular rhythm.      Heart sounds: Normal heart sounds.   Pulmonary:      Effort: Pulmonary effort is normal. No respiratory distress.      Breath sounds: Wheezing present. No rhonchi or rales.   Musculoskeletal:         General: No swelling.   Skin:     General: Skin is warm and dry.   Neurological:      Mental Status: She is alert. Mental status is at baseline.   Psychiatric:         Mood and Affect: Mood normal.         Behavior: Behavior normal.         Lab Results: I have reviewed the following results:  .     01/24/25  0511   WBC 19.99*   HGB 11.0*   HCT 34.9      SODIUM 139   K 4.6   *   CO2 27   BUN 9   CREATININE 0.52*   GLUC 171*   AST 7*   ALT 7   ALB 3.6   TBILI 0.28   ALKPHOS 80     ABG: No new results in last 24 hours.    Imaging Results Review: I personally reviewed the following image studies in PACS and associated radiology reports: CT chest. My interpretation of the radiology images/reports is: as below.    Chest without contrast 1/23/2025  Severe emphysema.  Multiple stable pulmonary nodules with the exception of worsening nodular opacity in the right lower lobe and area and right upper lobe compatible with known cavitary lesion suggest cavitation may have progressed as well.  Left lower lobe infiltrates not seen on CT chest from November 2024.    PFT Results Reviewed: reviewed  Pulmonary function test 1/31/2024  FEV1/FVC ratio reduced  FEV1 0.7 L, 33% predicted  FVC 2.19 L, 81% predicted  Bronchodilator not administered  TLC 1.76 L, 168%  predicted  RV 5.68 L, 292% predicted  DLCO corrected for patient's hemoglobin 22% predicted  Interpretation: Severe obstructive airflow defect on spirometry.  Lung volumes indicate hyperinflation and air trapping.  Severely reduced diffusion capacity.

## 2025-01-24 NOTE — ASSESSMENT & PLAN NOTE
Seen on 2L NC which is her baseline oxygen requirement.   Maintain SpO2 greater than or equal to 88%.   Pulmonary toilet:  Increase activity as tolerated, out of bed as tolerated, cough and deep breathing exercises encourage, incentive spirometry q.1 hour

## 2025-01-24 NOTE — PLAN OF CARE
Problem: PAIN - ADULT  Goal: Verbalizes/displays adequate comfort level or baseline comfort level  Description: Interventions:  - Encourage patient to monitor pain and request assistance  - Assess pain using appropriate pain scale  - Administer analgesics based on type and severity of pain and evaluate response  - Implement non-pharmacological measures as appropriate and evaluate response  - Consider cultural and social influences on pain and pain management  - Notify physician/advanced practitioner if interventions unsuccessful or patient reports new pain  Outcome: Progressing     Problem: INFECTION - ADULT  Goal: Absence or prevention of progression during hospitalization  Description: INTERVENTIONS:  - Assess and monitor for signs and symptoms of infection  - Monitor lab/diagnostic results  - Monitor all insertion sites, i.e. indwelling lines, tubes, and drains  - Monitor endotracheal if appropriate and nasal secretions for changes in amount and color  - [place] appropriate cooling/warming therapies per order  - Administer medications as ordered  - Instruct and encourage patient and family to use good hand hygiene technique  - Identify and instruct in appropriate isolation precautions for identified infection/condition  Outcome: Progressing  Goal: Absence of fever/infection during neutropenic period  Description: INTERVENTIONS:  - Monitor WBC    Outcome: Progressing     Problem: SAFETY ADULT  Goal: Patient will remain free of falls  Description: INTERVENTIONS:  - Educate patient/family on patient safety including physical limitations  - Instruct patient to call for assistance with activity   - Consult OT/PT to assist with strengthening/mobility   - Keep Call bell within reach  - Keep bed low and locked with side rails adjusted as appropriate  - Keep care items and personal belongings within reach  - Initiate and maintain comfort rounds  - Make Fall Risk Sign visible to staff  - Offer Toileting every 1 Hours,  in advance of need  - Initiate/Maintain bed/chair alarm  - Obtain necessary fall risk management equipment  - Apply yellow socks and bracelet for high fall risk patients  - Consider moving patient to room near nurses station  Outcome: Progressing  Goal: Maintain or return to baseline ADL function  Description: INTERVENTIONS:  -  Assess patient's ability to carry out ADLs; assess patient's baseline for ADL function and identify physical deficits which impact ability to perform ADLs (bathing, care of mouth/teeth, toileting, grooming, dressing, etc.)  - Assess/evaluate cause of self-care deficits   - Assess range of motion  - Assess patient's mobility; develop plan if impaired  - Assess patient's need for assistive devices and provide as appropriate  - Encourage maximum independence but intervene and supervise when necessary  - Involve family in performance of ADLs  - Assess for home care needs following discharge   - Consider OT consult to assist with ADL evaluation and planning for discharge  - Provide patient education as appropriate  Outcome: Progressing  Goal: Maintains/Returns to pre admission functional level  Description: INTERVENTIONS:  - Perform AM-PAC 6 Click Basic Mobility/ Daily Activity assessment daily.  - Set and communicate daily mobility goal 3 to care team and patient/family/caregiver.   - Collaborate with rehabilitation services on mobility goals if consulted  - Perform Range of Motion 2 times a day.  - Reposition patient every 3 hours.  - Dangle patient 3 times a day  - Stand patient 3 times a day  - Ambulate patient 3 times a day  - Out of bed to chair 3 times a day   - Out of bed for meals 3 times a day  - Out of bed for toileting  - Record patient progress and toleration of activity level   Outcome: Progressing     Problem: DISCHARGE PLANNING  Goal: Discharge to home or other facility with appropriate resources  Description: INTERVENTIONS:  - Identify barriers to discharge w/patient and  caregiver  - Arrange for needed discharge resources and transportation as appropriate  - Identify discharge learning needs (meds, wound care, etc.)  - Arrange for interpretive services to assist at discharge as needed  - Refer to Case Management Department for coordinating discharge planning if the patient needs post-hospital services based on physician/advanced practitioner order or complex needs related to functional status, cognitive ability, or social support system  Outcome: Progressing     Problem: Knowledge Deficit  Goal: Patient/family/caregiver demonstrates understanding of disease process, treatment plan, medications, and discharge instructions  Description: Complete learning assessment and assess knowledge base.  Interventions:  - Provide teaching at level of understanding  - Provide teaching via preferred learning methods  Outcome: Progressing     Problem: RESPIRATORY - ADULT  Goal: Achieves optimal ventilation and oxygenation  Description: INTERVENTIONS:  - Assess for changes in respiratory status  - Assess for changes in mentation and behavior  - Position to facilitate oxygenation and minimize respiratory effort  - Oxygen administered by appropriate delivery if ordered  - Initiate smoking cessation education as indicated  - Encourage broncho-pulmonary hygiene including cough, deep breathe, Incentive Spirometry  - Assess the need for suctioning and aspirate as needed  - Assess and instruct to report SOB or any respiratory difficulty  - Respiratory Therapy support as indicated  Outcome: Progressing     Problem: Nutrition/Hydration-ADULT  Goal: Nutrient/Hydration intake appropriate for improving, restoring or maintaining nutritional needs  Description: Monitor and assess patient's nutrition/hydration status for malnutrition. Collaborate with interdisciplinary team and initiate plan and interventions as ordered.  Monitor patient's weight and dietary intake as ordered or per policy. Utilize nutrition  screening tool and intervene as necessary. Determine patient's food preferences and provide high-protein, high-caloric foods as appropriate.     INTERVENTIONS:  - Monitor oral intake, urinary output, labs, and treatment plans  - Assess nutrition and hydration status and recommend course of action  - Evaluate amount of meals eaten  - Assist patient with eating if necessary   - Allow adequate time for meals  - Recommend/ encourage appropriate diets, oral nutritional supplements, and vitamin/mineral supplements  - Order, calculate, and assess calorie counts as needed  - Recommend, monitor, and adjust tube feedings and TPN/PPN based on assessed needs  - Assess need for intravenous fluids  - Provide specific nutrition/hydration education as appropriate  - Include patient/family/caregiver in decisions related to nutrition  Outcome: Progressing

## 2025-01-24 NOTE — PROGRESS NOTES
Progress Note - Hospitalist   Name: Ileana Seaman 64 y.o. female I MRN: [id number]  Unit/Bed#: -01 I Date of Admission: 1/22/2025   Date of Service: 1/24/2025 I Hospital Day: 0    Assessment & Plan  COPD (chronic obstructive pulmonary disease) (HCC)  Acute exacerbation of COPD.  SOB started today.  Patient uses 2L of O2 at home chronically and is stable on 2L of O2 as inpatient.  Uses nebulizer and Trelegy inhaler at home to manage COPD.   VBG shows no acidosis.   BNP is 114 today, down from 139 on 11/24/24.  Flu/COVID rapid is negative.   Dexamethasone 10mg IV, duo-neb, Atrovent, and Xopenex given for acute exacerbation.   Plan for rapid prednisone taper  Patient still has significant cough with wheezing.  Chronic obstructive pulmonary disease with acute lower respiratory infection (HCC)  Patient has known respiratory infection being managed by ID.  CT shows opacities in left lower lobe, right upper lobe, and right lower lobe.   Normal procalcitonin, no fever, no tachycardia, no tachypnea, and non-dyspneic on conversation.   SIRS. Blood cultures ordered to confirm.   CBC shows elevated white count of 20.27.  CRP is 163.4 which correlates with inflammation from lung infiltrates.   Doxycycline and rocephin given for pneumonia seen on CT.-discussed with infectious disease, since procalcitonin is normal, does not recommend any antibiotic and less suspicious for pneumonia.  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   Antibiotic regiment from ID includes ethambutol 400mg qd, azithromycin 250mg qod, and rifabutin 300mg qd.   Regiment discontinued by ID temporarily due to elevated liver enzymes. Patient states next ID visit is on February 4th, 2025 where they will discuss lower doses of antibiotic or a two medication regiment to treat mycobacterium without liver damage.  Patient  states she was doing well with her breathing on triple antibiotic therapy.   Hypomagnesemia  Magnesium level of 1.4 at 00:14 on 1/23/25.   Given magnesium sulfate via IV.  History of protein-calorie malnutrition.  Hypokalemia  Potassium level of 3.3 at 00:14 on 1/23/25.  Given potassium chloride 40mg tablet PO.  Recheck blood draw scheduled for this morning.  History of protein-calorie malnutrition.   Chronic hypoxic respiratory failure (HCC)  Chronically uses 2 L of oxygen    VTE Pharmacologic Prophylaxis: VTE Score: 6 High Risk (Score >/= 5) - Pharmacological DVT Prophylaxis Ordered: enoxaparin (Lovenox). Sequential Compression Devices Ordered.    Mobility:   Basic Mobility Inpatient Raw Score: 24  JH-HLM Goal: 8: Walk 250 feet or more  JH-HLM Achieved: 8: Walk 250 feet ot more  JH-HLM Goal achieved. Continue to encourage appropriate mobility.    Patient Centered Rounds: I performed bedside rounds with nursing staff today.   Discussions with Specialists or Other Care Team Provider: Pulmonary.  Unofficially discussed with infectious disease    Education and Discussions with Family / Patient: Updated  (daughter) via phone.    Current Length of Stay: 0 day(s)  Current Patient Status: Inpatient   Certification Statement: The patient will continue to require additional inpatient hospital stay due to monitorable conditions  Discharge Plan: Anticipate discharge in >72 hrs to to be determined    Code Status: Level 1 - Full Code    Subjective   Seen and evaluated during the rounding.  Patient is coughing vigorously, with wheezing.  Wet cough.    Objective :  Temp:  [97.2 °F (36.2 °C)-98 °F (36.7 °C)] 97.2 °F (36.2 °C)  HR:  [] 110  BP: ()/(50-76) 110/64  Resp:  [18-20] 18  SpO2:  [89 %-98 %] 89 %  O2 Device: Nasal cannula  Nasal Cannula O2 Flow Rate (L/min):  [2 L/min] 2 L/min    Body mass index is 18.06 kg/m².     Input and Output Summary (last 24 hours):     Intake/Output Summary (Last 24 hours)  at 1/24/2025 1018  Last data filed at 1/24/2025 0901  Gross per 24 hour   Intake 180 ml   Output 1450 ml   Net -1270 ml       Physical Exam  Vitals and nursing note reviewed.   Constitutional:       Appearance: She is ill-appearing. She is not toxic-appearing or diaphoretic.   HENT:      Mouth/Throat:      Mouth: Mucous membranes are moist.   Eyes:      General: No scleral icterus.        Left eye: No discharge.      Extraocular Movements: Extraocular movements intact.      Conjunctiva/sclera: Conjunctivae normal.      Pupils: Pupils are equal, round, and reactive to light.   Cardiovascular:      Rate and Rhythm: Tachycardia present.      Heart sounds: No murmur heard.     No friction rub. No gallop.   Pulmonary:      Effort: Pulmonary effort is normal. No respiratory distress.      Breath sounds: Wheezing and rhonchi present.   Abdominal:      General: There is no distension.      Palpations: There is no mass.      Tenderness: There is no abdominal tenderness.   Musculoskeletal:      Right lower leg: No edema.      Left lower leg: No edema.   Neurological:      Mental Status: She is oriented to person, place, and time.      Cranial Nerves: No cranial nerve deficit.      Sensory: No sensory deficit.      Motor: No weakness.      Coordination: Coordination normal.         Lines/Drains:              Lab Results: I have reviewed the following results:   Results from last 7 days   Lab Units 01/24/25  0511 01/23/25  0014   WBC Thousand/uL 19.99* 20.27*   HEMOGLOBIN g/dL 11.0* 12.2   HEMATOCRIT % 34.9 36.6   PLATELETS Thousands/uL 189 204   SEGS PCT %  --  89*   LYMPHO PCT % 4* 5*   MONO PCT % 7 5   EOS PCT % 0 0     Results from last 7 days   Lab Units 01/24/25  0511   SODIUM mmol/L 139   POTASSIUM mmol/L 4.6   CHLORIDE mmol/L 111*   CO2 mmol/L 27   BUN mg/dL 9   CREATININE mg/dL 0.52*   ANION GAP mmol/L 1*   CALCIUM mg/dL 9.0   ALBUMIN g/dL 3.6   TOTAL BILIRUBIN mg/dL 0.28   ALK PHOS U/L 80   ALT U/L 7   AST U/L 7*    GLUCOSE RANDOM mg/dL 171*     Results from last 7 days   Lab Units 01/23/25  0014   INR  1.21*             Results from last 7 days   Lab Units 01/23/25  0014   PROCALCITONIN ng/ml 0.08       Recent Cultures (last 7 days):   Results from last 7 days   Lab Units 01/23/25  0230   BLOOD CULTURE  Received in Microbiology Lab. Culture in Progress.  Received in Microbiology Lab. Culture in Progress.       Imaging Results Review: No pertinent imaging studies reviewed.  Other Study Results Review: No additional pertinent studies reviewed.    Last 24 Hours Medication List:     Current Facility-Administered Medications:     aspirin (ECOTRIN LOW STRENGTH) EC tablet 81 mg, Daily    atorvastatin (LIPITOR) tablet 40 mg, After Dinner    cefTRIAXone (ROCEPHIN) IVPB (premix in dextrose) 1,000 mg 50 mL, Daily, Last Rate: 1,000 mg (01/24/25 0515)    dicyclomine (BENTYL) capsule 10 mg, TID AC    docusate sodium (COLACE) capsule 100 mg, BID    enoxaparin (LOVENOX) subcutaneous injection 30 mg, Daily    famotidine (PEPCID) tablet 20 mg, BID    Fluticasone Furoate-Vilanterol 100-25 mcg/actuation 1 puff, Daily **AND** umeclidinium 62.5 mcg/actuation inhaler AEPB 1 puff, Daily    ipratropium (ATROVENT) 0.02 % inhalation solution 0.5 mg, TID    levalbuterol (XOPENEX) inhalation solution 1.25 mg, TID **AND** [DISCONTINUED] sodium chloride 0.9 % inhalation solution 3 mL, TID    levalbuterol (XOPENEX) inhalation solution 1.25 mg, Q6H PRN **AND** sodium chloride 0.9 % inhalation solution 3 mL, Q6H PRN    methylPREDNISolone sodium succinate (Solu-MEDROL) injection 40 mg, Q8H    metoprolol tartrate (LOPRESSOR) tablet 25 mg, Q12H MAYCO    pantoprazole (PROTONIX) EC tablet 40 mg, Early Morning    polyethylene glycol (MIRALAX) packet 17 g, Daily    sucralfate (CARAFATE) tablet 1 g, 4x Daily (AC & HS)    Administrative Statements   Today, Patient Was Seen By: Rolando Parker MD      **Please Note: This note may have been constructed using a voice  recognition system.**   dietitian/nutrition services

## 2025-01-24 NOTE — ASSESSMENT & PLAN NOTE
Acute exacerbation of COPD.  SOB started today.  Patient uses 2L of O2 at home chronically and is stable on 2L of O2 as inpatient.  Uses nebulizer and Trelegy inhaler at home to manage COPD.   VBG shows no acidosis.   BNP is 114 today, down from 139 on 11/24/24.  Flu/COVID rapid is negative.   Dexamethasone 10mg IV, duo-neb, Atrovent, and Xopenex given for acute exacerbation.   Plan for rapid prednisone taper  Patient still has significant cough with wheezing.

## 2025-01-24 NOTE — ASSESSMENT & PLAN NOTE
New left lower lobe infiltrates could represent progression of her known MARIS versus community-acquired pneumonia.  Will cover for both with ceftriaxone and also add 7 days of azithromycin.

## 2025-01-24 NOTE — ASSESSMENT & PLAN NOTE
Recommend she follow-up with outpatient ID 2/4.  Suspect new/worsening findings on CT Chest this admission represent progression of MARIS and therefore warrants restarting triple therapy.

## 2025-01-24 NOTE — PLAN OF CARE
Problem: PAIN - ADULT  Goal: Verbalizes/displays adequate comfort level or baseline comfort level  Description: Interventions:  - Encourage patient to monitor pain and request assistance  - Assess pain using appropriate pain scale  - Administer analgesics based on type and severity of pain and evaluate response  - Implement non-pharmacological measures as appropriate and evaluate response  - Consider cultural and social influences on pain and pain management  - Notify physician/advanced practitioner if interventions unsuccessful or patient reports new pain  Outcome: Progressing     Problem: INFECTION - ADULT  Goal: Absence or prevention of progression during hospitalization  Description: INTERVENTIONS:  - Assess and monitor for signs and symptoms of infection  - Monitor lab/diagnostic results  - Monitor all insertion sites, i.e. indwelling lines, tubes, and drains  - Monitor endotracheal if appropriate and nasal secretions for changes in amount and color  - [place] appropriate cooling/warming therapies per order  - Administer medications as ordered  - Instruct and encourage patient and family to use good hand hygiene technique  - Identify and instruct in appropriate isolation precautions for identified infection/condition  Outcome: Progressing  Goal: Absence of fever/infection during neutropenic period  Description: INTERVENTIONS:  - Monitor WBC    Outcome: Progressing     Problem: SAFETY ADULT  Goal: Patient will remain free of falls  Description: INTERVENTIONS:  - Educate patient/family on patient safety including physical limitations  - Instruct patient to call for assistance with activity   - Consult OT/PT to assist with strengthening/mobility   - Keep Call bell within reach  - Keep bed low and locked with side rails adjusted as appropriate  - Keep care items and personal belongings within reach  - Initiate and maintain comfort rounds  - Make Fall Risk Sign visible to staff  - Offer Toileting every 1 Hours,  in advance of need  - Initiate/Maintain bed/chair alarm  - Obtain necessary fall risk management equipment  - Apply yellow socks and bracelet for high fall risk patients  - Consider moving patient to room near nurses station  Outcome: Progressing  Goal: Maintain or return to baseline ADL function  Description: INTERVENTIONS:  -  Assess patient's ability to carry out ADLs; assess patient's baseline for ADL function and identify physical deficits which impact ability to perform ADLs (bathing, care of mouth/teeth, toileting, grooming, dressing, etc.)  - Assess/evaluate cause of self-care deficits   - Assess range of motion  - Assess patient's mobility; develop plan if impaired  - Assess patient's need for assistive devices and provide as appropriate  - Encourage maximum independence but intervene and supervise when necessary  - Involve family in performance of ADLs  - Assess for home care needs following discharge   - Consider OT consult to assist with ADL evaluation and planning for discharge  - Provide patient education as appropriate  Outcome: Progressing  Goal: Maintains/Returns to pre admission functional level  Description: INTERVENTIONS:  - Perform AM-PAC 6 Click Basic Mobility/ Daily Activity assessment daily.  - Set and communicate daily mobility goal 3 to care team and patient/family/caregiver.   - Collaborate with rehabilitation services on mobility goals if consulted  - Perform Range of Motion 2 times a day.  - Reposition patient every 3 hours.  - Dangle patient 3 times a day  - Stand patient 3 times a day  - Ambulate patient 3 times a day  - Out of bed to chair 3 times a day   - Out of bed for meals 3 times a day  - Out of bed for toileting  - Record patient progress and toleration of activity level   Outcome: Progressing     Problem: DISCHARGE PLANNING  Goal: Discharge to home or other facility with appropriate resources  Description: INTERVENTIONS:  - Identify barriers to discharge w/patient and  caregiver  - Arrange for needed discharge resources and transportation as appropriate  - Identify discharge learning needs (meds, wound care, etc.)  - Arrange for interpretive services to assist at discharge as needed  - Refer to Case Management Department for coordinating discharge planning if the patient needs post-hospital services based on physician/advanced practitioner order or complex needs related to functional status, cognitive ability, or social support system  Outcome: Progressing     Problem: Knowledge Deficit  Goal: Patient/family/caregiver demonstrates understanding of disease process, treatment plan, medications, and discharge instructions  Description: Complete learning assessment and assess knowledge base.  Interventions:  - Provide teaching at level of understanding  - Provide teaching via preferred learning methods  Outcome: Progressing     Problem: RESPIRATORY - ADULT  Goal: Achieves optimal ventilation and oxygenation  Description: INTERVENTIONS:  - Assess for changes in respiratory status  - Assess for changes in mentation and behavior  - Position to facilitate oxygenation and minimize respiratory effort  - Oxygen administered by appropriate delivery if ordered  - Initiate smoking cessation education as indicated  - Encourage broncho-pulmonary hygiene including cough, deep breathe, Incentive Spirometry  - Assess the need for suctioning and aspirate as needed  - Assess and instruct to report SOB or any respiratory difficulty  - Respiratory Therapy support as indicated  Outcome: Progressing     Problem: Nutrition/Hydration-ADULT  Goal: Nutrient/Hydration intake appropriate for improving, restoring or maintaining nutritional needs  Description: Monitor and assess patient's nutrition/hydration status for malnutrition. Collaborate with interdisciplinary team and initiate plan and interventions as ordered.  Monitor patient's weight and dietary intake as ordered or per policy. Utilize nutrition  screening tool and intervene as necessary. Determine patient's food preferences and provide high-protein, high-caloric foods as appropriate.     INTERVENTIONS:  - Monitor oral intake, urinary output, labs, and treatment plans  - Assess nutrition and hydration status and recommend course of action  - Evaluate amount of meals eaten  - Assist patient with eating if necessary   - Allow adequate time for meals  - Recommend/ encourage appropriate diets, oral nutritional supplements, and vitamin/mineral supplements  - Order, calculate, and assess calorie counts as needed  - Recommend, monitor, and adjust tube feedings and TPN/PPN based on assessed needs  - Assess need for intravenous fluids  - Provide specific nutrition/hydration education as appropriate  - Include patient/family/caregiver in decisions related to nutrition  Outcome: Progressing

## 2025-01-25 LAB
ALBUMIN SERPL BCG-MCNC: 3.5 G/DL (ref 3.5–5)
ALP SERPL-CCNC: 76 U/L (ref 34–104)
ALT SERPL W P-5'-P-CCNC: 10 U/L (ref 7–52)
ANION GAP SERPL CALCULATED.3IONS-SCNC: 2 MMOL/L (ref 4–13)
AST SERPL W P-5'-P-CCNC: 8 U/L (ref 13–39)
BASOPHILS # BLD AUTO: 0.02 THOUSANDS/ΜL (ref 0–0.1)
BASOPHILS NFR BLD AUTO: 0 % (ref 0–1)
BILIRUB SERPL-MCNC: 0.23 MG/DL (ref 0.2–1)
BUN SERPL-MCNC: 12 MG/DL (ref 5–25)
CALCIUM SERPL-MCNC: 9.2 MG/DL (ref 8.4–10.2)
CHLORIDE SERPL-SCNC: 107 MMOL/L (ref 96–108)
CO2 SERPL-SCNC: 29 MMOL/L (ref 21–32)
CREAT SERPL-MCNC: 0.47 MG/DL (ref 0.6–1.3)
EOSINOPHIL # BLD AUTO: 0 THOUSAND/ΜL (ref 0–0.61)
EOSINOPHIL NFR BLD AUTO: 0 % (ref 0–6)
ERYTHROCYTE [DISTWIDTH] IN BLOOD BY AUTOMATED COUNT: 14 % (ref 11.6–15.1)
GFR SERPL CREATININE-BSD FRML MDRD: 103 ML/MIN/1.73SQ M
GLUCOSE SERPL-MCNC: 157 MG/DL (ref 65–140)
HCT VFR BLD AUTO: 33.7 % (ref 34.8–46.1)
HGB BLD-MCNC: 10.9 G/DL (ref 11.5–15.4)
IMM GRANULOCYTES # BLD AUTO: 0.15 THOUSAND/UL (ref 0–0.2)
IMM GRANULOCYTES NFR BLD AUTO: 1 % (ref 0–2)
LYMPHOCYTES # BLD AUTO: 1.08 THOUSANDS/ΜL (ref 0.6–4.47)
LYMPHOCYTES NFR BLD AUTO: 6 % (ref 14–44)
MCH RBC QN AUTO: 30.1 PG (ref 26.8–34.3)
MCHC RBC AUTO-ENTMCNC: 32.3 G/DL (ref 31.4–37.4)
MCV RBC AUTO: 93 FL (ref 82–98)
MONOCYTES # BLD AUTO: 0.68 THOUSAND/ΜL (ref 0.17–1.22)
MONOCYTES NFR BLD AUTO: 4 % (ref 4–12)
NEUTROPHILS # BLD AUTO: 16.14 THOUSANDS/ΜL (ref 1.85–7.62)
NEUTS SEG NFR BLD AUTO: 89 % (ref 43–75)
NRBC BLD AUTO-RTO: 0 /100 WBCS
PLATELET # BLD AUTO: 211 THOUSANDS/UL (ref 149–390)
PMV BLD AUTO: 12.5 FL (ref 8.9–12.7)
POTASSIUM SERPL-SCNC: 4.6 MMOL/L (ref 3.5–5.3)
PROT SERPL-MCNC: 5.8 G/DL (ref 6.4–8.4)
RBC # BLD AUTO: 3.62 MILLION/UL (ref 3.81–5.12)
SODIUM SERPL-SCNC: 138 MMOL/L (ref 135–147)
WBC # BLD AUTO: 18.07 THOUSAND/UL (ref 4.31–10.16)

## 2025-01-25 PROCEDURE — 94664 DEMO&/EVAL PT USE INHALER: CPT

## 2025-01-25 PROCEDURE — 94760 N-INVAS EAR/PLS OXIMETRY 1: CPT

## 2025-01-25 PROCEDURE — 94668 MNPJ CHEST WALL SBSQ: CPT

## 2025-01-25 PROCEDURE — 94640 AIRWAY INHALATION TREATMENT: CPT

## 2025-01-25 PROCEDURE — 85025 COMPLETE CBC W/AUTO DIFF WBC: CPT | Performed by: FAMILY MEDICINE

## 2025-01-25 PROCEDURE — 99232 SBSQ HOSP IP/OBS MODERATE 35: CPT | Performed by: FAMILY MEDICINE

## 2025-01-25 PROCEDURE — 80053 COMPREHEN METABOLIC PANEL: CPT | Performed by: FAMILY MEDICINE

## 2025-01-25 RX ADMIN — METOPROLOL TARTRATE 25 MG: 25 TABLET, FILM COATED ORAL at 09:19

## 2025-01-25 RX ADMIN — IPRATROPIUM BROMIDE 0.5 MG: 0.5 SOLUTION RESPIRATORY (INHALATION) at 19:17

## 2025-01-25 RX ADMIN — SUCRALFATE 1 G: 1 TABLET ORAL at 17:20

## 2025-01-25 RX ADMIN — DICYCLOMINE HYDROCHLORIDE 10 MG: 10 CAPSULE ORAL at 11:47

## 2025-01-25 RX ADMIN — ATORVASTATIN CALCIUM 40 MG: 40 TABLET, FILM COATED ORAL at 17:20

## 2025-01-25 RX ADMIN — AZITHROMYCIN 500 MG: 250 TABLET, FILM COATED ORAL at 17:20

## 2025-01-25 RX ADMIN — METHYLPREDNISOLONE SODIUM SUCCINATE 40 MG: 40 INJECTION, POWDER, FOR SOLUTION INTRAMUSCULAR; INTRAVENOUS at 09:20

## 2025-01-25 RX ADMIN — LEVALBUTEROL HYDROCHLORIDE 1.25 MG: 1.25 SOLUTION RESPIRATORY (INHALATION) at 19:17

## 2025-01-25 RX ADMIN — FAMOTIDINE 20 MG: 20 TABLET, FILM COATED ORAL at 09:20

## 2025-01-25 RX ADMIN — SUCRALFATE 1 G: 1 TABLET ORAL at 06:52

## 2025-01-25 RX ADMIN — DICYCLOMINE HYDROCHLORIDE 10 MG: 10 CAPSULE ORAL at 06:52

## 2025-01-25 RX ADMIN — IPRATROPIUM BROMIDE 0.5 MG: 0.5 SOLUTION RESPIRATORY (INHALATION) at 14:45

## 2025-01-25 RX ADMIN — IPRATROPIUM BROMIDE 0.5 MG: 0.5 SOLUTION RESPIRATORY (INHALATION) at 08:58

## 2025-01-25 RX ADMIN — DICYCLOMINE HYDROCHLORIDE 10 MG: 10 CAPSULE ORAL at 17:20

## 2025-01-25 RX ADMIN — FAMOTIDINE 20 MG: 20 TABLET, FILM COATED ORAL at 20:41

## 2025-01-25 RX ADMIN — CEFTRIAXONE 1000 MG: 1 INJECTION, SOLUTION INTRAVENOUS at 05:09

## 2025-01-25 RX ADMIN — DOCUSATE SODIUM 100 MG: 100 CAPSULE, LIQUID FILLED ORAL at 09:19

## 2025-01-25 RX ADMIN — ASPIRIN 81 MG: 81 TABLET, COATED ORAL at 09:20

## 2025-01-25 RX ADMIN — PANTOPRAZOLE SODIUM 40 MG: 40 TABLET, DELAYED RELEASE ORAL at 05:09

## 2025-01-25 RX ADMIN — GUAIFENESIN 600 MG: 600 TABLET ORAL at 09:19

## 2025-01-25 RX ADMIN — METHYLPREDNISOLONE SODIUM SUCCINATE 40 MG: 40 INJECTION, POWDER, FOR SOLUTION INTRAMUSCULAR; INTRAVENOUS at 20:41

## 2025-01-25 RX ADMIN — ENOXAPARIN SODIUM 30 MG: 30 INJECTION SUBCUTANEOUS at 09:19

## 2025-01-25 RX ADMIN — LEVALBUTEROL HYDROCHLORIDE 1.25 MG: 1.25 SOLUTION RESPIRATORY (INHALATION) at 08:58

## 2025-01-25 RX ADMIN — SUCRALFATE 1 G: 1 TABLET ORAL at 20:41

## 2025-01-25 RX ADMIN — GUAIFENESIN 600 MG: 600 TABLET ORAL at 20:41

## 2025-01-25 RX ADMIN — LEVALBUTEROL HYDROCHLORIDE 1.25 MG: 1.25 SOLUTION RESPIRATORY (INHALATION) at 14:45

## 2025-01-25 RX ADMIN — FLUTICASONE FUROATE, UMECLIDINIUM BROMIDE AND VILANTEROL TRIFENATATE 1 PUFF: 100; 62.5; 25 POWDER RESPIRATORY (INHALATION) at 11:47

## 2025-01-25 RX ADMIN — DOCUSATE SODIUM 100 MG: 100 CAPSULE, LIQUID FILLED ORAL at 17:20

## 2025-01-25 RX ADMIN — SUCRALFATE 1 G: 1 TABLET ORAL at 11:47

## 2025-01-25 NOTE — PROGRESS NOTES
Progress Note - Hospitalist   Name: Ileana Seaman 64 y.o. female I MRN: [id number]  Unit/Bed#: -01 I Date of Admission: 1/22/2025   Date of Service: 1/25/2025 I Hospital Day: 1    Assessment & Plan  COPD, severe (HCC)  Acute exacerbation of COPD.  SOB started today.  Patient uses 2L of O2 at home chronically and is stable on 2L of O2 as inpatient.  Uses nebulizer and Trelegy inhaler at home to manage COPD.   VBG shows no acidosis.   BNP is 114 today, down from 139 on 11/24/24.  Flu/COVID rapid is negative.   Dexamethasone 10mg IV, duo-neb, Atrovent, and Xopenex given for acute exacerbation.   Plan for rapid prednisone taper  Patient still has significant cough with wheezing.  COPD with acute exacerbation (HCC)  Patient has known respiratory infection being managed by ID.  CT shows opacities in left lower lobe, right upper lobe, and right lower lobe.   Normal procalcitonin, no fever, no tachycardia, no tachypnea, and non-dyspneic on conversation.   SIRS. Blood cultures ordered to confirm.   CBC shows elevated white count of 20.27.  CRP is 163.4 which correlates with inflammation from lung infiltrates.   Doxycycline and rocephin given for pneumonia seen on CT.-discussed with infectious disease, since procalcitonin is normal, does not recommend any antibiotic and less suspicious for pneumonia.  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   Antibiotic regiment from ID includes ethambutol 400mg qd, azithromycin 250mg qod, and rifabutin 300mg qd.   Regiment discontinued by ID temporarily due to elevated liver enzymes. Patient states next ID visit is on February 4th, 2025 where they will discuss lower doses of antibiotic or a two medication regiment to treat mycobacterium without liver damage.  Patient states she was doing well with her breathing on triple antibiotic therapy.    Hypomagnesemia  Magnesium level of 1.4 at 00:14 on 1/23/25.   Given magnesium sulfate via IV.  History of protein-calorie malnutrition.  Hypokalemia  Potassium level of 3.3 at 00:14 on 1/23/25.  Given potassium chloride 40mg tablet PO.  Recheck blood draw scheduled for this morning.  History of protein-calorie malnutrition.   Chronic hypoxic respiratory failure (HCC)  Chronically uses 2 L of oxygen    VTE Pharmacologic Prophylaxis: VTE Score: 6 High Risk (Score >/= 5) - Pharmacological DVT Prophylaxis Ordered: enoxaparin (Lovenox). Sequential Compression Devices Ordered.    Mobility:   Basic Mobility Inpatient Raw Score: 20  JH-HLM Goal: 6: Walk 10 steps or more  JH-HLM Achieved: 7: Walk 25 feet or more  JH-HLM Goal NOT achieved. Continue with multidisciplinary rounding and encourage appropriate mobility to improve upon JH-HLM goals.    Patient Centered Rounds: I performed bedside rounds with nursing staff today.   Discussions with Specialists or Other Care Team Provider: None    Education and Discussions with Family / Patient: Updated  (daughter) via phone.    Current Length of Stay: 1 day(s)  Current Patient Status: Inpatient   Certification Statement: The patient will continue to require additional inpatient hospital stay due to monitor  Discharge Plan: Anticipate discharge in 24-48 hrs to home.    Code Status: Level 1 - Full Code    Subjective   Seen and evaluated during the rounds resting comfortably.    Objective :  Temp:  [97 °F (36.1 °C)-97.2 °F (36.2 °C)] 97 °F (36.1 °C)  HR:  [] 77  BP: (124-132)/(68-85) 126/68  Resp:  [18-20] 18  SpO2:  [96 %-98 %] 98 %  O2 Device: Nasal cannula  Nasal Cannula O2 Flow Rate (L/min):  [2 L/min] 2 L/min    Body mass index is 18.06 kg/m².     Input and Output Summary (last 24 hours):     Intake/Output Summary (Last 24 hours) at 1/25/2025 0823  Last data filed at 1/24/2025 0901  Gross per 24 hour   Intake --   Output 550 ml   Net -550 ml       Physical  Exam  Vitals and nursing note reviewed.   Constitutional:       Appearance: Normal appearance. She is not ill-appearing or diaphoretic.   Eyes:      General: No scleral icterus.        Left eye: No discharge.      Extraocular Movements: Extraocular movements intact.      Conjunctiva/sclera: Conjunctivae normal.      Pupils: Pupils are equal, round, and reactive to light.   Cardiovascular:      Rate and Rhythm: Normal rate.      Heart sounds: No murmur heard.     No friction rub. No gallop.   Pulmonary:      Breath sounds: No stridor. Wheezing present. No rhonchi or rales.   Abdominal:      General: There is no distension.      Palpations: There is no mass.      Tenderness: There is no abdominal tenderness.      Hernia: No hernia is present.   Musculoskeletal:      Right lower leg: No edema.      Left lower leg: No edema.   Neurological:      Mental Status: She is alert and oriented to person, place, and time.      Cranial Nerves: No cranial nerve deficit.      Sensory: No sensory deficit.      Motor: No weakness.      Coordination: Coordination normal.           Lines/Drains:              Lab Results: I have reviewed the following results:   Results from last 7 days   Lab Units 01/25/25  0516   WBC Thousand/uL 18.07*   HEMOGLOBIN g/dL 10.9*   HEMATOCRIT % 33.7*   PLATELETS Thousands/uL 211   SEGS PCT % 89*   LYMPHO PCT % 6*   MONO PCT % 4   EOS PCT % 0     Results from last 7 days   Lab Units 01/25/25  0516   SODIUM mmol/L 138   POTASSIUM mmol/L 4.6   CHLORIDE mmol/L 107   CO2 mmol/L 29   BUN mg/dL 12   CREATININE mg/dL 0.47*   ANION GAP mmol/L 2*   CALCIUM mg/dL 9.2   ALBUMIN g/dL 3.5   TOTAL BILIRUBIN mg/dL 0.23   ALK PHOS U/L 76   ALT U/L 10   AST U/L 8*   GLUCOSE RANDOM mg/dL 157*     Results from last 7 days   Lab Units 01/23/25  0014   INR  1.21*             Results from last 7 days   Lab Units 01/24/25  0511 01/23/25  0014   PROCALCITONIN ng/ml 0.11 0.08       Recent Cultures (last 7 days):   Results from  last 7 days   Lab Units 01/23/25  0230   BLOOD CULTURE  No Growth at 24 hrs.  No Growth at 24 hrs.       Imaging Results Review: No pertinent imaging studies reviewed.  Other Study Results Review: No additional pertinent studies reviewed.    Last 24 Hours Medication List:     Current Facility-Administered Medications:     albuterol (PROVENTIL HFA,VENTOLIN HFA) inhaler 2 puff, Q6H PRN    aspirin (ECOTRIN LOW STRENGTH) EC tablet 81 mg, Daily    atorvastatin (LIPITOR) tablet 40 mg, After Dinner    azithromycin (ZITHROMAX) tablet 500 mg, Q24H    cefTRIAXone (ROCEPHIN) IVPB (premix in dextrose) 1,000 mg 50 mL, Daily, Last Rate: 1,000 mg (01/25/25 0509)    dicyclomine (BENTYL) capsule 10 mg, TID AC    docusate sodium (COLACE) capsule 100 mg, BID    enoxaparin (LOVENOX) subcutaneous injection 30 mg, Daily    famotidine (PEPCID) tablet 20 mg, BID    Fluticasone Furoate-Vilanterol 100-25 mcg/actuation 1 puff, Daily **AND** umeclidinium 62.5 mcg/actuation inhaler AEPB 1 puff, Daily    guaiFENesin (MUCINEX) 12 hr tablet 600 mg, Q12H MAYCO    ipratropium (ATROVENT) 0.02 % inhalation solution 0.5 mg, TID    levalbuterol (XOPENEX) inhalation solution 1.25 mg, TID **AND** [DISCONTINUED] sodium chloride 0.9 % inhalation solution 3 mL, TID    methylPREDNISolone sodium succinate (Solu-MEDROL) injection 40 mg, Q12H MAYCO    metoprolol tartrate (LOPRESSOR) tablet 25 mg, Q12H MAYCO    pantoprazole (PROTONIX) EC tablet 40 mg, Early Morning    polyethylene glycol (MIRALAX) packet 17 g, Daily    sucralfate (CARAFATE) tablet 1 g, 4x Daily (AC & HS)    Administrative Statements   Today, Patient Was Seen By: Rolando Parker MD      **Please Note: This note may have been constructed using a voice recognition system.**

## 2025-01-25 NOTE — PLAN OF CARE
Problem: PAIN - ADULT  Goal: Verbalizes/displays adequate comfort level or baseline comfort level  Description: Interventions:  - Encourage patient to monitor pain and request assistance  - Assess pain using appropriate pain scale  - Administer analgesics based on type and severity of pain and evaluate response  - Implement non-pharmacological measures as appropriate and evaluate response  - Consider cultural and social influences on pain and pain management  - Notify physician/advanced practitioner if interventions unsuccessful or patient reports new pain  Outcome: Progressing     Problem: INFECTION - ADULT  Goal: Absence or prevention of progression during hospitalization  Description: INTERVENTIONS:  - Assess and monitor for signs and symptoms of infection  - Monitor lab/diagnostic results  - Monitor all insertion sites, i.e. indwelling lines, tubes, and drains  - Monitor endotracheal if appropriate and nasal secretions for changes in amount and color  - [place] appropriate cooling/warming therapies per order  - Administer medications as ordered  - Instruct and encourage patient and family to use good hand hygiene technique  - Identify and instruct in appropriate isolation precautions for identified infection/condition  Outcome: Progressing  Goal: Absence of fever/infection during neutropenic period  Description: INTERVENTIONS:  - Monitor WBC    Outcome: Progressing     Problem: SAFETY ADULT  Goal: Patient will remain free of falls  Description: INTERVENTIONS:  - Educate patient/family on patient safety including physical limitations  - Instruct patient to call for assistance with activity   - Consult OT/PT to assist with strengthening/mobility   - Keep Call bell within reach  - Keep bed low and locked with side rails adjusted as appropriate  - Keep care items and personal belongings within reach  - Initiate and maintain comfort rounds  - Make Fall Risk Sign visible to staff  - Offer Toileting every 1 Hours,  in advance of need  - Initiate/Maintain bed/chair alarm  - Obtain necessary fall risk management equipment  - Apply yellow socks and bracelet for high fall risk patients  - Consider moving patient to room near nurses station  Outcome: Progressing  Goal: Maintain or return to baseline ADL function  Description: INTERVENTIONS:  -  Assess patient's ability to carry out ADLs; assess patient's baseline for ADL function and identify physical deficits which impact ability to perform ADLs (bathing, care of mouth/teeth, toileting, grooming, dressing, etc.)  - Assess/evaluate cause of self-care deficits   - Assess range of motion  - Assess patient's mobility; develop plan if impaired  - Assess patient's need for assistive devices and provide as appropriate  - Encourage maximum independence but intervene and supervise when necessary  - Involve family in performance of ADLs  - Assess for home care needs following discharge   - Consider OT consult to assist with ADL evaluation and planning for discharge  - Provide patient education as appropriate  Outcome: Progressing  Goal: Maintains/Returns to pre admission functional level  Description: INTERVENTIONS:  - Perform AM-PAC 6 Click Basic Mobility/ Daily Activity assessment daily.  - Set and communicate daily mobility goal 3 to care team and patient/family/caregiver.   - Collaborate with rehabilitation services on mobility goals if consulted  - Perform Range of Motion 2 times a day.  - Reposition patient every 3 hours.  - Dangle patient 3 times a day  - Stand patient 3 times a day  - Ambulate patient 3 times a day  - Out of bed to chair 3 times a day   - Out of bed for meals 3 times a day  - Out of bed for toileting  - Record patient progress and toleration of activity level   Outcome: Progressing     Problem: DISCHARGE PLANNING  Goal: Discharge to home or other facility with appropriate resources  Description: INTERVENTIONS:  - Identify barriers to discharge w/patient and  caregiver  - Arrange for needed discharge resources and transportation as appropriate  - Identify discharge learning needs (meds, wound care, etc.)  - Arrange for interpretive services to assist at discharge as needed  - Refer to Case Management Department for coordinating discharge planning if the patient needs post-hospital services based on physician/advanced practitioner order or complex needs related to functional status, cognitive ability, or social support system  Outcome: Progressing     Problem: Knowledge Deficit  Goal: Patient/family/caregiver demonstrates understanding of disease process, treatment plan, medications, and discharge instructions  Description: Complete learning assessment and assess knowledge base.  Interventions:  - Provide teaching at level of understanding  - Provide teaching via preferred learning methods  Outcome: Progressing     Problem: RESPIRATORY - ADULT  Goal: Achieves optimal ventilation and oxygenation  Description: INTERVENTIONS:  - Assess for changes in respiratory status  - Assess for changes in mentation and behavior  - Position to facilitate oxygenation and minimize respiratory effort  - Oxygen administered by appropriate delivery if ordered  - Initiate smoking cessation education as indicated  - Encourage broncho-pulmonary hygiene including cough, deep breathe, Incentive Spirometry  - Assess the need for suctioning and aspirate as needed  - Assess and instruct to report SOB or any respiratory difficulty  - Respiratory Therapy support as indicated  Outcome: Progressing     Problem: Nutrition/Hydration-ADULT  Goal: Nutrient/Hydration intake appropriate for improving, restoring or maintaining nutritional needs  Description: Monitor and assess patient's nutrition/hydration status for malnutrition. Collaborate with interdisciplinary team and initiate plan and interventions as ordered.  Monitor patient's weight and dietary intake as ordered or per policy. Utilize nutrition  screening tool and intervene as necessary. Determine patient's food preferences and provide high-protein, high-caloric foods as appropriate.     INTERVENTIONS:  - Monitor oral intake, urinary output, labs, and treatment plans  - Assess nutrition and hydration status and recommend course of action  - Evaluate amount of meals eaten  - Assist patient with eating if necessary   - Allow adequate time for meals  - Recommend/ encourage appropriate diets, oral nutritional supplements, and vitamin/mineral supplements  - Order, calculate, and assess calorie counts as needed  - Recommend, monitor, and adjust tube feedings and TPN/PPN based on assessed needs  - Assess need for intravenous fluids  - Provide specific nutrition/hydration education as appropriate  - Include patient/family/caregiver in decisions related to nutrition  Outcome: Progressing

## 2025-01-25 NOTE — PLAN OF CARE
Problem: Knowledge Deficit  Goal: Patient/family/caregiver demonstrates understanding of disease process, treatment plan, medications, and discharge instructions  Description: Complete learning assessment and assess knowledge base.  Interventions:  - Provide teaching at level of understanding  - Provide teaching via preferred learning methods  Outcome: Progressing        Problem: RESPIRATORY - ADULT  Goal: Achieves optimal ventilation and oxygenation  Description: INTERVENTIONS:  - Assess for changes in respiratory status  - Assess for changes in mentation and behavior  - Position to facilitate oxygenation and minimize respiratory effort  - Oxygen administered by appropriate delivery if ordered  - Initiate smoking cessation education as indicated  - Encourage broncho-pulmonary hygiene including cough, deep breathe, Incentive Spirometry  - Assess the need for suctioning and aspirate as needed  - Assess and instruct to report SOB or any respiratory difficulty  - Respiratory Therapy support as indicated  Outcome: Progressing

## 2025-01-26 VITALS
RESPIRATION RATE: 18 BRPM | WEIGHT: 95.6 LBS | HEIGHT: 61 IN | OXYGEN SATURATION: 94 % | HEART RATE: 63 BPM | SYSTOLIC BLOOD PRESSURE: 135 MMHG | BODY MASS INDEX: 18.05 KG/M2 | DIASTOLIC BLOOD PRESSURE: 86 MMHG | TEMPERATURE: 97.2 F

## 2025-01-26 LAB
ALBUMIN SERPL BCG-MCNC: 3.6 G/DL (ref 3.5–5)
ALP SERPL-CCNC: 77 U/L (ref 34–104)
ALT SERPL W P-5'-P-CCNC: 11 U/L (ref 7–52)
ANION GAP SERPL CALCULATED.3IONS-SCNC: 8 MMOL/L (ref 4–13)
AST SERPL W P-5'-P-CCNC: 8 U/L (ref 13–39)
BASOPHILS # BLD AUTO: 0.01 THOUSANDS/ΜL (ref 0–0.1)
BASOPHILS NFR BLD AUTO: 0 % (ref 0–1)
BILIRUB SERPL-MCNC: 0.22 MG/DL (ref 0.2–1)
BUN SERPL-MCNC: 16 MG/DL (ref 5–25)
CALCIUM SERPL-MCNC: 9.1 MG/DL (ref 8.4–10.2)
CHLORIDE SERPL-SCNC: 104 MMOL/L (ref 96–108)
CO2 SERPL-SCNC: 30 MMOL/L (ref 21–32)
CREAT SERPL-MCNC: 0.49 MG/DL (ref 0.6–1.3)
EOSINOPHIL # BLD AUTO: 0 THOUSAND/ΜL (ref 0–0.61)
EOSINOPHIL NFR BLD AUTO: 0 % (ref 0–6)
ERYTHROCYTE [DISTWIDTH] IN BLOOD BY AUTOMATED COUNT: 13.9 % (ref 11.6–15.1)
GFR SERPL CREATININE-BSD FRML MDRD: 101 ML/MIN/1.73SQ M
GLUCOSE SERPL-MCNC: 153 MG/DL (ref 65–140)
HCT VFR BLD AUTO: 36.5 % (ref 34.8–46.1)
HGB BLD-MCNC: 11.6 G/DL (ref 11.5–15.4)
IMM GRANULOCYTES # BLD AUTO: 0.09 THOUSAND/UL (ref 0–0.2)
IMM GRANULOCYTES NFR BLD AUTO: 1 % (ref 0–2)
LYMPHOCYTES # BLD AUTO: 1.45 THOUSANDS/ΜL (ref 0.6–4.47)
LYMPHOCYTES NFR BLD AUTO: 13 % (ref 14–44)
MCH RBC QN AUTO: 29.7 PG (ref 26.8–34.3)
MCHC RBC AUTO-ENTMCNC: 31.8 G/DL (ref 31.4–37.4)
MCV RBC AUTO: 93 FL (ref 82–98)
MONOCYTES # BLD AUTO: 0.63 THOUSAND/ΜL (ref 0.17–1.22)
MONOCYTES NFR BLD AUTO: 6 % (ref 4–12)
NEUTROPHILS # BLD AUTO: 9.14 THOUSANDS/ΜL (ref 1.85–7.62)
NEUTS SEG NFR BLD AUTO: 80 % (ref 43–75)
NRBC BLD AUTO-RTO: 0 /100 WBCS
PLATELET # BLD AUTO: 231 THOUSANDS/UL (ref 149–390)
PMV BLD AUTO: 12.5 FL (ref 8.9–12.7)
POTASSIUM SERPL-SCNC: 4.5 MMOL/L (ref 3.5–5.3)
PROT SERPL-MCNC: 6 G/DL (ref 6.4–8.4)
RBC # BLD AUTO: 3.91 MILLION/UL (ref 3.81–5.12)
SODIUM SERPL-SCNC: 142 MMOL/L (ref 135–147)
WBC # BLD AUTO: 11.32 THOUSAND/UL (ref 4.31–10.16)

## 2025-01-26 PROCEDURE — 80053 COMPREHEN METABOLIC PANEL: CPT | Performed by: FAMILY MEDICINE

## 2025-01-26 PROCEDURE — 94640 AIRWAY INHALATION TREATMENT: CPT

## 2025-01-26 PROCEDURE — 85025 COMPLETE CBC W/AUTO DIFF WBC: CPT | Performed by: FAMILY MEDICINE

## 2025-01-26 PROCEDURE — 94760 N-INVAS EAR/PLS OXIMETRY 1: CPT

## 2025-01-26 PROCEDURE — 99239 HOSP IP/OBS DSCHRG MGMT >30: CPT | Performed by: FAMILY MEDICINE

## 2025-01-26 RX ORDER — CEPHALEXIN 500 MG/1
500 CAPSULE ORAL EVERY 6 HOURS SCHEDULED
Qty: 16 CAPSULE | Refills: 0 | Status: SHIPPED | OUTPATIENT
Start: 2025-01-26 | End: 2025-01-30

## 2025-01-26 RX ORDER — ALBUTEROL SULFATE 90 UG/1
2 INHALANT RESPIRATORY (INHALATION) EVERY 6 HOURS PRN
Qty: 18 G | Refills: 0 | Status: SHIPPED | OUTPATIENT
Start: 2025-01-26

## 2025-01-26 RX ORDER — PREDNISONE 20 MG/1
TABLET ORAL
Qty: 15 TABLET | Refills: 0 | Status: SHIPPED | OUTPATIENT
Start: 2025-01-26 | End: 2025-02-07

## 2025-01-26 RX ORDER — FUROSEMIDE 20 MG/1
20 TABLET ORAL DAILY PRN
Qty: 30 TABLET | Refills: 0 | Status: SHIPPED | OUTPATIENT
Start: 2025-01-26

## 2025-01-26 RX ADMIN — IPRATROPIUM BROMIDE 0.5 MG: 0.5 SOLUTION RESPIRATORY (INHALATION) at 13:38

## 2025-01-26 RX ADMIN — IPRATROPIUM BROMIDE 0.5 MG: 0.5 SOLUTION RESPIRATORY (INHALATION) at 07:45

## 2025-01-26 RX ADMIN — SUCRALFATE 1 G: 1 TABLET ORAL at 05:17

## 2025-01-26 RX ADMIN — FLUTICASONE FUROATE, UMECLIDINIUM BROMIDE AND VILANTEROL TRIFENATATE 1 PUFF: 100; 62.5; 25 POWDER RESPIRATORY (INHALATION) at 09:18

## 2025-01-26 RX ADMIN — DICYCLOMINE HYDROCHLORIDE 10 MG: 10 CAPSULE ORAL at 11:45

## 2025-01-26 RX ADMIN — LEVALBUTEROL HYDROCHLORIDE 1.25 MG: 1.25 SOLUTION RESPIRATORY (INHALATION) at 07:44

## 2025-01-26 RX ADMIN — GUAIFENESIN 600 MG: 600 TABLET ORAL at 09:14

## 2025-01-26 RX ADMIN — DICYCLOMINE HYDROCHLORIDE 10 MG: 10 CAPSULE ORAL at 05:17

## 2025-01-26 RX ADMIN — FAMOTIDINE 20 MG: 20 TABLET, FILM COATED ORAL at 09:14

## 2025-01-26 RX ADMIN — ENOXAPARIN SODIUM 30 MG: 30 INJECTION SUBCUTANEOUS at 09:14

## 2025-01-26 RX ADMIN — PANTOPRAZOLE SODIUM 40 MG: 40 TABLET, DELAYED RELEASE ORAL at 05:17

## 2025-01-26 RX ADMIN — METHYLPREDNISOLONE SODIUM SUCCINATE 40 MG: 40 INJECTION, POWDER, FOR SOLUTION INTRAMUSCULAR; INTRAVENOUS at 09:14

## 2025-01-26 RX ADMIN — DOCUSATE SODIUM 100 MG: 100 CAPSULE, LIQUID FILLED ORAL at 09:14

## 2025-01-26 RX ADMIN — LEVALBUTEROL HYDROCHLORIDE 1.25 MG: 1.25 SOLUTION RESPIRATORY (INHALATION) at 13:38

## 2025-01-26 RX ADMIN — METOPROLOL TARTRATE 25 MG: 25 TABLET, FILM COATED ORAL at 09:14

## 2025-01-26 RX ADMIN — CEFTRIAXONE 1000 MG: 1 INJECTION, SOLUTION INTRAVENOUS at 05:17

## 2025-01-26 RX ADMIN — SUCRALFATE 1 G: 1 TABLET ORAL at 11:45

## 2025-01-26 RX ADMIN — ASPIRIN 81 MG: 81 TABLET, COATED ORAL at 09:14

## 2025-01-26 NOTE — DISCHARGE SUMMARY
Discharge Summary - Hospitalist   Name: Ileana Seaman 64 y.o. female I MRN: [id number]  Unit/Bed#: -01 I Date of Admission: 1/22/2025   Date of Service: 1/26/2025 I Hospital Day: 2     Assessment & Plan  COPD, severe (HCC)  Acute exacerbation of COPD.  SOB started today.  Patient uses 2L of O2 at home chronically and is stable on 2L of O2 as inpatient.  Uses nebulizer and Trelegy inhaler at home to manage COPD.   VBG shows no acidosis.   Back to baseline, patient will be discharged with resuming home medication we added tapering dose of steroid and antibiotic.  COPD with acute exacerbation (HCC)  Patient has known respiratory infection being managed by ID.  CT shows opacities in left lower lobe, right upper lobe, and right lower lobe.   Normal procalcitonin, no fever, no tachycardia, no tachypnea, and non-dyspneic on conversation.   SIRS. Blood cultures ordered to confirm.   CBC shows elevated white count of 20.27.  CRP is 163.4 which correlates with inflammation from lung infiltrates.   Doxycycline and rocephin given for pneumonia seen on CT.-discussed with infectious disease, since procalcitonin is normal, does not recommend any antibiotic and less suspicious for pneumonia.  More prone to COPD exacerbation  To baseline, patient will be discharged back to home with follow-up with PCP.    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   Antibiotic regiment from ID includes ethambutol 400mg qd, azithromycin 250mg qod, and rifabutin 300mg qd.   Regiment discontinued by ID temporarily due to elevated liver enzymes. Patient states next ID visit is on February 4th, 2025 where they will discuss lower doses of antibiotic or a two medication regiment to treat mycobacterium without liver damage.  Patient states she was doing well with her breathing on triple antibiotic therapy.   Hypomagnesemia  Magnesium level of 1.4 at 00:14 on 1/23/25.   Given  magnesium sulfate via IV.  History of protein-calorie malnutrition.  Hypokalemia  Potassium level of 3.3 at 00:14 on 1/23/25.  Given potassium chloride 40mg tablet PO.  Recheck blood draw scheduled for this morning.  History of protein-calorie malnutrition.   Chronic hypoxic respiratory failure (HCC)  Chronically uses 2 L of oxygen  Pneumonia  Ruled out.  Blood culture negative.  Procalcitonin x 2 negative  Patient already diagnosed with MAC and being followed up with infectious disease at [facility].     Discharging Physician / Practitioner: Rolando Parker MD  PCP: Merline Dinero  Admission Date:   Admission Orders (From admission, onward)       Ordered        01/24/25 1016  INPATIENT ADMISSION  Once            01/23/25 0217  Place in Observation  Once                          Discharge Date: 01/26/25    Medical Problems       Resolved Problems  Date Reviewed: 1/23/2025   None         Consultations During Hospital Stay:  Pulmonologist    Procedures Performed:   CT chest without contrast   Final Result by Cain Ansari MD (01/23 0152)      1.  Left lower lobe opacities consistent with pneumonia   2.  Persistent right upper lobe elongated opacity, with cavitation. Differential includes postinfectious scarring, as well as malignancy. Recommend follow-up PET/CT.   3.  New right lower lobe superior segment nodular opacity, also likely represents pneumonic infiltrate, attention on follow-up   4.  Multiple pulmonary nodules, attention on follow-up   5.  Emphysema      The study was marked in EPIC for significant notification.         Workstation performed: [id number]         XR chest 1 view portable   Final Result by Aguila Aponte MD (01/23 0817)      1. Left lower lobe suspected pulmonary infiltrate with trace effusion.   2. Severe emphysema with right apex scarring and bronchiectasis. Also refer to follow-up CT chest.            Workstation performed: [id number]               Significant Findings / Test Results:    Lab Results   Component Value Date    WBC 11.32 (H) 01/26/2025    HGB 11.6 01/26/2025    HCT 36.5 01/26/2025    MCV 93 01/26/2025     01/26/2025     Lab Results   Component Value Date    SODIUM 142 01/26/2025    K 4.5 01/26/2025     01/26/2025    CO2 30 01/26/2025    AGAP 8 01/26/2025    BUN 16 01/26/2025    CREATININE 0.49 (L) 01/26/2025    GLUC 153 (H) 01/26/2025    GLUF 171 (H) 01/24/2025    CALCIUM 9.1 01/26/2025    AST 8 (L) 01/26/2025    ALT 11 01/26/2025    ALKPHOS 77 01/26/2025    TP 6.0 (L) 01/26/2025    TBILI 0.22 01/26/2025    EGFR 101 01/26/2025     Collected Updated Procedure Result Status Patient Facility Result Comment    01/23/2025 0230 01/25/2025 1201 Blood culture #1 [[id number]]   Blood from Arm, Right    Preliminary result [facility]  Component Value   Blood Culture No Growth at 48 hrs. P             01/23/2025 0230 01/25/2025 1201 Blood culture #2 [[id number]]   Blood from Arm, Left    Preliminary result [facility]  Component Value   Blood Culture No Growth at 48 hrs. P             01/23/2025 0014 01/23/2025 0040 FLU/COVID Rapid Antigen (30 min. TAT) - Preferred screening test in ED [[id number]]    Nares from Nose    Final result [facility] This test has been performed using the Quidel Rhea 2 FLU+SARS Antigen test under the Emergency Use Authorization (EUA). This test has been validated by the  and verified by the performing laboratory. The Rhea uses lateral flow immunofluorescent sandwich assay to detect SARS-COV, Influenza A and Influenza B Antigen.       The Quidel Rhea 2 SARS Antigen test does not differentiate between SARS-CoV and SARS-CoV-2.       Negative results are presumptive and may be confirmed with a molecular assay, if necessary, for patient management. Negative results do not rule out SARS-CoV-2 or influenza infection and should not be used as the sole basis for treatment  or patient management decisions. A negative test result may occur if the level of antigen in a sample is below the limit of detection of this test.       Positive results are indicative of the presence of viral antigens, but do not rule out bacterial infection or co-infection with other viruses.       All test results should be used as an adjunct to clinical observations and other information available to the provider.   FOR PEDIATRIC PATIENTS - copy/paste COVID Guidelines URL to browser: [email or web address]    Component Value   SARS COV Rapid Antigen Negative   Influenza A Rapid Antigen Negative   Influenza B Rapid Antigen Negative          Incidental Findings:   As mentioned above    Test Results Pending at Discharge (will require follow up):   none     Outpatient Tests Requested:  Repeat chest x-ray in 4 weeks with PCP/infectious disease at [facility]    Complications: None    Reason for Admission: Shortness of breath    Hospital Course:     Ileana Seaman is a 64 y.o. female patient who originally presented to the hospital on 1/22/2025 due to shortness of breath secondary to COPD exacerbation.  Patient already diagnosed with MAC and being followed up with infectious disease at [facility].  Over here, procalcitonin x 2 negative, blood culture remain negative.  Patient received treatment per protocol for COPD exacerbation.  Evaluated by pulmonologist.  Back to baseline.  Patient be discharged back home with follow-up with PCP and pulmonology as outpatient basis.  Discharged with tapering dose of steroid as well as antibiotic with resuming all her home medication.    All lab results commending findings, treatment plan option discussed in details with patient and family member.  Verbalized understanding agrees.    Please see above list of diagnoses and related plan for additional information.     Condition at Discharge: stable     Discharge Day Visit /  "Exam:     Subjective: Seen and evaluated during the run.  Resting comfortably.  Still coughing but feels much better.  Vitals: Blood Pressure: 135/86 (01/26/25 0731)  Pulse: 63 (01/26/25 0731)  Temperature: (!) 97.2 °F (36.2 °C) (01/26/25 0731)  Temp Source: Temporal (01/26/25 0731)  Respirations: 18 (01/26/25 0731)  Height: 5' 1\" (154.9 cm) (01/23/25 1448)  Weight - Scale: 43.4 kg (95 lb 9.6 oz) (01/23/25 0314)  SpO2: 96 % (01/26/25 0745)  Exam:   Physical Exam  Vitals and nursing note reviewed.   Constitutional:       Appearance: Normal appearance. She is not ill-appearing or diaphoretic.   Eyes:      General: No scleral icterus.        Left eye: No discharge.      Extraocular Movements: Extraocular movements intact.      Conjunctiva/sclera: Conjunctivae normal.      Pupils: Pupils are equal, round, and reactive to light.   Cardiovascular:      Rate and Rhythm: Normal rate.      Heart sounds: No murmur heard.     No friction rub. No gallop.   Pulmonary:      Effort: Pulmonary effort is normal. No respiratory distress.      Breath sounds: Wheezing (minimal) present. No rhonchi.   Chest:      Chest wall: No tenderness.   Abdominal:      General: There is no distension.      Palpations: There is no mass.      Tenderness: There is no abdominal tenderness.      Hernia: No hernia is present.   Musculoskeletal:      Right lower leg: No edema.      Left lower leg: No edema.   Neurological:      Mental Status: She is alert and oriented to person, place, and time.      Cranial Nerves: No cranial nerve deficit.      Sensory: No sensory deficit.      Motor: No weakness.      Coordination: Coordination normal.         Discussion with Family: Left voice message for daughter    Discharge instructions/Information to patient and family:   See after visit summary for information provided to patient and family.      Provisions for Follow-Up Care:  See after visit summary for information related to follow-up care and any pertinent " home health orders.      Disposition:     Home with [facility]NA Services (Reminder: Complete face to face encounter)    For Discharges to [facility]:   Not Applicable to this Patient - Not Applicable to this Patient    Planned Readmission: If condition get worse     Discharge Statement:  Greater than 50% of the total time was spent examining patient, answering all patient questions, arranging and discussing plan of care with patient as well as directly providing post-discharge instructions.  Additional time then spent on discharge activities.    Discharge Medications:  See after visit summary for reconciled discharge medications provided to patient and family.      ** Please Note: This note has been constructed using a voice recognition system **

## 2025-01-26 NOTE — PLAN OF CARE
Problem: PAIN - ADULT  Goal: Verbalizes/displays adequate comfort level or baseline comfort level  Description: Interventions:  - Encourage patient to monitor pain and request assistance  - Assess pain using appropriate pain scale  - Administer analgesics based on type and severity of pain and evaluate response  - Implement non-pharmacological measures as appropriate and evaluate response  - Consider cultural and social influences on pain and pain management  - Notify physician/advanced practitioner if interventions unsuccessful or patient reports new pain  1/26/2025 1101 by Maria Ines Orourke RN  Outcome: Adequate for Discharge  1/26/2025 1058 by Maria Ines Orourke RN  Outcome: Progressing     Problem: INFECTION - ADULT  Goal: Absence or prevention of progression during hospitalization  Description: INTERVENTIONS:  - Assess and monitor for signs and symptoms of infection  - Monitor lab/diagnostic results  - Monitor all insertion sites, i.e. indwelling lines, tubes, and drains  - Monitor endotracheal if appropriate and nasal secretions for changes in amount and color  - Ethel appropriate cooling/warming therapies per order  - Administer medications as ordered  - Instruct and encourage patient and family to use good hand hygiene technique  - Identify and instruct in appropriate isolation precautions for identified infection/condition  1/26/2025 1101 by Maria Ines Orourke RN  Outcome: Adequate for Discharge  1/26/2025 1058 by Maria Ines Orourke RN  Outcome: Progressing  Goal: Absence of fever/infection during neutropenic period  Description: INTERVENTIONS:  - Monitor WBC    1/26/2025 1101 by Maria Ines Orourke RN  Outcome: Adequate for Discharge  1/26/2025 1058 by Maria Ines Orourke RN  Outcome: Progressing     Problem: SAFETY ADULT  Goal: Patient will remain free of falls  Description: INTERVENTIONS:  - Educate patient/family on patient safety including physical limitations  - Instruct patient to call for assistance with  activity   - Consult OT/PT to assist with strengthening/mobility   - Keep Call bell within reach  - Keep bed low and locked with side rails adjusted as appropriate  - Keep care items and personal belongings within reach  - Initiate and maintain comfort rounds  - Make Fall Risk Sign visible to staff  - Offer Toileting every 1 Hours, in advance of need  - Initiate/Maintain bed/chair alarm  - Obtain necessary fall risk management equipment  - Apply yellow socks and bracelet for high fall risk patients  - Consider moving patient to room near nurses station  1/26/2025 1101 by Maria Ines Orourke RN  Outcome: Adequate for Discharge  1/26/2025 1058 by Maria Ines Orourke RN  Outcome: Progressing  Goal: Maintain or return to baseline ADL function  Description: INTERVENTIONS:  -  Assess patient's ability to carry out ADLs; assess patient's baseline for ADL function and identify physical deficits which impact ability to perform ADLs (bathing, care of mouth/teeth, toileting, grooming, dressing, etc.)  - Assess/evaluate cause of self-care deficits   - Assess range of motion  - Assess patient's mobility; develop plan if impaired  - Assess patient's need for assistive devices and provide as appropriate  - Encourage maximum independence but intervene and supervise when necessary  - Involve family in performance of ADLs  - Assess for home care needs following discharge   - Consider OT consult to assist with ADL evaluation and planning for discharge  - Provide patient education as appropriate  1/26/2025 1101 by Maria Ines Orourke RN  Outcome: Adequate for Discharge  1/26/2025 1058 by Maria Ines Orourke RN  Outcome: Progressing  Goal: Maintains/Returns to pre admission functional level  Description: INTERVENTIONS:  - Perform AM-PAC 6 Click Basic Mobility/ Daily Activity assessment daily.  - Set and communicate daily mobility goal 3 to care team and patient/family/caregiver.   - Collaborate with rehabilitation services on mobility goals if  consulted  - Perform Range of Motion 2 times a day.  - Reposition patient every 3 hours.  - Dangle patient 3 times a day  - Stand patient 3 times a day  - Ambulate patient 3 times a day  - Out of bed to chair 3 times a day   - Out of bed for meals 3 times a day  - Out of bed for toileting  - Record patient progress and toleration of activity level   1/26/2025 1101 by Maria Ines Orourke RN  Outcome: Adequate for Discharge  1/26/2025 1058 by Maria Ines Orourke RN  Outcome: Progressing     Problem: DISCHARGE PLANNING  Goal: Discharge to home or other facility with appropriate resources  Description: INTERVENTIONS:  - Identify barriers to discharge w/patient and caregiver  - Arrange for needed discharge resources and transportation as appropriate  - Identify discharge learning needs (meds, wound care, etc.)  - Arrange for interpretive services to assist at discharge as needed  - Refer to Case Management Department for coordinating discharge planning if the patient needs post-hospital services based on physician/advanced practitioner order or complex needs related to functional status, cognitive ability, or social support system  1/26/2025 1101 by Maria Ines Orourke RN  Outcome: Adequate for Discharge  1/26/2025 1058 by Maria Ines Orourke RN  Outcome: Progressing     Problem: Knowledge Deficit  Goal: Patient/family/caregiver demonstrates understanding of disease process, treatment plan, medications, and discharge instructions  Description: Complete learning assessment and assess knowledge base.  Interventions:  - Provide teaching at level of understanding  - Provide teaching via preferred learning methods  1/26/2025 1101 by Maria Ines Orourke RN  Outcome: Adequate for Discharge  1/26/2025 1058 by Maria Ines Orourke RN  Outcome: Progressing     Problem: RESPIRATORY - ADULT  Goal: Achieves optimal ventilation and oxygenation  Description: INTERVENTIONS:  - Assess for changes in respiratory status  - Assess for changes in mentation and  behavior  - Position to facilitate oxygenation and minimize respiratory effort  - Oxygen administered by appropriate delivery if ordered  - Initiate smoking cessation education as indicated  - Encourage broncho-pulmonary hygiene including cough, deep breathe, Incentive Spirometry  - Assess the need for suctioning and aspirate as needed  - Assess and instruct to report SOB or any respiratory difficulty  - Respiratory Therapy support as indicated  1/26/2025 1101 by Maria Ines Orourke RN  Outcome: Adequate for Discharge  1/26/2025 1058 by Maria Ines Orourke RN  Outcome: Progressing     Problem: Nutrition/Hydration-ADULT  Goal: Nutrient/Hydration intake appropriate for improving, restoring or maintaining nutritional needs  Description: Monitor and assess patient's nutrition/hydration status for malnutrition. Collaborate with interdisciplinary team and initiate plan and interventions as ordered.  Monitor patient's weight and dietary intake as ordered or per policy. Utilize nutrition screening tool and intervene as necessary. Determine patient's food preferences and provide high-protein, high-caloric foods as appropriate.     INTERVENTIONS:  - Monitor oral intake, urinary output, labs, and treatment plans  - Assess nutrition and hydration status and recommend course of action  - Evaluate amount of meals eaten  - Assist patient with eating if necessary   - Allow adequate time for meals  - Recommend/ encourage appropriate diets, oral nutritional supplements, and vitamin/mineral supplements  - Order, calculate, and assess calorie counts as needed  - Recommend, monitor, and adjust tube feedings and TPN/PPN based on assessed needs  - Assess need for intravenous fluids  - Provide specific nutrition/hydration education as appropriate  - Include patient/family/caregiver in decisions related to nutrition  1/26/2025 1101 by Maria Ines Orourke RN  Outcome: Adequate for Discharge  1/26/2025 1058 by Maria Ines Orourke RN  Outcome:  Progressing

## 2025-01-26 NOTE — PLAN OF CARE
Problem: SAFETY ADULT  Goal: Patient will remain free of falls  Description: INTERVENTIONS:  - Educate patient/family on patient safety including physical limitations  - Instruct patient to call for assistance with activity   - Consult OT/PT to assist with strengthening/mobility   - Keep Call bell within reach  - Keep bed low and locked with side rails adjusted as appropriate  - Keep care items and personal belongings within reach  - Initiate and maintain comfort rounds  - Make Fall Risk Sign visible to staff  - Offer Toileting every 1 Hours, in advance of need  - Initiate/Maintain bed/chair alarm  - Obtain necessary fall risk management equipment  - Apply yellow socks and bracelet for high fall risk patients  - Consider moving patient to room near nurses station  Outcome: Progressing     Problem: RESPIRATORY - ADULT  Goal: Achieves optimal ventilation and oxygenation  Description: INTERVENTIONS:  - Assess for changes in respiratory status  - Assess for changes in mentation and behavior  - Position to facilitate oxygenation and minimize respiratory effort  - Oxygen administered by appropriate delivery if ordered  - Initiate smoking cessation education as indicated  - Encourage broncho-pulmonary hygiene including cough, deep breathe, Incentive Spirometry  - Assess the need for suctioning and aspirate as needed  - Assess and instruct to report SOB or any respiratory difficulty  - Respiratory Therapy support as indicated  Outcome: Progressing      Patient/Parent

## 2025-01-26 NOTE — ASSESSMENT & PLAN NOTE
Ruled out.  Blood culture negative.  Procalcitonin x 2 negative  Patient already diagnosed with MAC and being followed up with infectious disease at [facility].

## 2025-01-26 NOTE — ASSESSMENT & PLAN NOTE
Patient has known respiratory infection being managed by ID.  CT shows opacities in left lower lobe, right upper lobe, and right lower lobe.   Normal procalcitonin, no fever, no tachycardia, no tachypnea, and non-dyspneic on conversation.   SIRS. Blood cultures ordered to confirm.   CBC shows elevated white count of 20.27.  CRP is 163.4 which correlates with inflammation from lung infiltrates.   Doxycycline and rocephin given for pneumonia seen on CT.-discussed with infectious disease, since procalcitonin is normal, does not recommend any antibiotic and less suspicious for pneumonia.  More prone to COPD exacerbation  To baseline, patient will be discharged back to home with follow-up with PCP.

## 2025-01-26 NOTE — INCIDENTAL FINDINGS
The following findings require follow up:  Radiographic finding   Finding: CT chest without contrast: 1. Left lower lobe opacities consistent with pneumonia, 2. Persistent right upper lobe elongated opacity, with cavitation. Differential includes postinfectious scarring, as well as malignancy. Recommend follow-up PET/CT., 3. New right lower lobe superior segment nodular opacity, also likely represents pneumonic infiltrate, attention on follow-up, 4. Multiple pulmonary nodules, attention on follow-up, 5. Emphysema    Follow up required: yes   Follow up should be done within 1 week(s)    Please notify the following clinician to assist with the follow up:   Dr. Merline Dinero      [facility]     [phone]       Incidental finding results were discussed with the Patient by Rolando Parker MD on 01/26/25.   They expressed understanding and all questions answered.

## 2025-01-26 NOTE — ASSESSMENT & PLAN NOTE
Acute exacerbation of COPD.  SOB started today.  Patient uses 2L of O2 at home chronically and is stable on 2L of O2 as inpatient.  Uses nebulizer and Trelegy inhaler at home to manage COPD.   VBG shows no acidosis.   Back to baseline, patient will be discharged with resuming home medication we added tapering dose of steroid and antibiotic.

## 2025-01-28 LAB
BACTERIA BLD CULT: NORMAL
BACTERIA BLD CULT: NORMAL

## 2025-01-31 ENCOUNTER — TELEPHONE (OUTPATIENT)
Dept: CARDIAC REHAB | Facility: HOSPITAL | Age: 65
End: 2025-01-31

## 2025-02-04 DIAGNOSIS — R91.8 OTHER NONSPECIFIC ABNORMAL FINDING OF LUNG FIELD: ICD-10-CM

## 2025-02-11 ENCOUNTER — HOSPITAL ENCOUNTER (OUTPATIENT)
Dept: CT IMAGING | Facility: HOSPITAL | Age: 65
Discharge: HOME | End: 2025-02-11
Payer: MEDICARE

## 2025-02-11 DIAGNOSIS — R91.8 OTHER NONSPECIFIC ABNORMAL FINDING OF LUNG FIELD: ICD-10-CM

## 2025-02-11 PROCEDURE — 71250 CT THORAX DX C-: CPT

## 2025-02-23 PROBLEM — J18.9 PNEUMONIA: Status: RESOLVED | Noted: 2024-10-04 | Resolved: 2025-02-23

## 2025-03-16 ENCOUNTER — APPOINTMENT (EMERGENCY)
Dept: RADIOLOGY | Facility: HOSPITAL | Age: 65
End: 2025-03-16
Payer: MEDICARE

## 2025-03-16 ENCOUNTER — HOSPITAL ENCOUNTER (EMERGENCY)
Facility: HOSPITAL | Age: 65
Discharge: HOME | End: 2025-03-16
Attending: EMERGENCY MEDICINE
Payer: MEDICARE

## 2025-03-16 VITALS
HEART RATE: 104 BPM | RESPIRATION RATE: 26 BRPM | BODY MASS INDEX: 18.31 KG/M2 | HEIGHT: 61 IN | WEIGHT: 97 LBS | TEMPERATURE: 97.6 F | SYSTOLIC BLOOD PRESSURE: 104 MMHG | OXYGEN SATURATION: 95 % | DIASTOLIC BLOOD PRESSURE: 59 MMHG

## 2025-03-16 DIAGNOSIS — J44.1 COPD WITH ACUTE EXACERBATION (HCC): Primary | ICD-10-CM

## 2025-03-16 LAB
ALBUMIN SERPL BCG-MCNC: 4.2 G/DL (ref 3.5–5)
ALP SERPL-CCNC: 136 U/L (ref 34–104)
ALT SERPL W P-5'-P-CCNC: 13 U/L (ref 7–52)
ANION GAP SERPL CALCULATED.3IONS-SCNC: 10 MMOL/L (ref 4–13)
AST SERPL W P-5'-P-CCNC: 14 U/L (ref 13–39)
BASE EX.OXY STD BLDV CALC-SCNC: 87.9 % (ref 60–80)
BASE EXCESS BLDV CALC-SCNC: 0.4 MMOL/L
BASOPHILS # BLD AUTO: 0.05 THOUSANDS/ÂΜL (ref 0–0.1)
BASOPHILS NFR BLD AUTO: 0 % (ref 0–1)
BILIRUB SERPL-MCNC: 0.4 MG/DL (ref 0.2–1)
BNP SERPL-MCNC: 68 PG/ML (ref 0–100)
BUN SERPL-MCNC: 5 MG/DL (ref 5–25)
CALCIUM SERPL-MCNC: 9.5 MG/DL (ref 8.4–10.2)
CARDIAC TROPONIN I PNL SERPL HS: 7 NG/L (ref ?–50)
CHLORIDE SERPL-SCNC: 103 MMOL/L (ref 96–108)
CO2 SERPL-SCNC: 28 MMOL/L (ref 21–32)
CREAT SERPL-MCNC: 0.47 MG/DL (ref 0.6–1.3)
EOSINOPHIL # BLD AUTO: 0.01 THOUSAND/ÂΜL (ref 0–0.61)
EOSINOPHIL NFR BLD AUTO: 0 % (ref 0–6)
ERYTHROCYTE [DISTWIDTH] IN BLOOD BY AUTOMATED COUNT: 12.9 % (ref 11.6–15.1)
FLUAV RNA RESP QL NAA+PROBE: NEGATIVE
FLUBV RNA RESP QL NAA+PROBE: NEGATIVE
GFR SERPL CREATININE-BSD FRML MDRD: 103 ML/MIN/1.73SQ M
GLUCOSE SERPL-MCNC: 145 MG/DL (ref 65–140)
HCO3 BLDV-SCNC: 25.2 MMOL/L (ref 24–30)
HCT VFR BLD AUTO: 39.7 % (ref 34.8–46.1)
HGB BLD-MCNC: 12.7 G/DL (ref 11.5–15.4)
IMM GRANULOCYTES # BLD AUTO: 0.05 THOUSAND/UL (ref 0–0.2)
IMM GRANULOCYTES NFR BLD AUTO: 0 % (ref 0–2)
LACTATE SERPL-SCNC: 2 MMOL/L (ref 0.5–2)
LYMPHOCYTES # BLD AUTO: 1.51 THOUSANDS/ÂΜL (ref 0.6–4.47)
LYMPHOCYTES NFR BLD AUTO: 13 % (ref 14–44)
MCH RBC QN AUTO: 28.9 PG (ref 26.8–34.3)
MCHC RBC AUTO-ENTMCNC: 32 G/DL (ref 31.4–37.4)
MCV RBC AUTO: 90 FL (ref 82–98)
MONOCYTES # BLD AUTO: 0.83 THOUSAND/ÂΜL (ref 0.17–1.22)
MONOCYTES NFR BLD AUTO: 7 % (ref 4–12)
NEUTROPHILS # BLD AUTO: 9.48 THOUSANDS/ÂΜL (ref 1.85–7.62)
NEUTS SEG NFR BLD AUTO: 80 % (ref 43–75)
NRBC BLD AUTO-RTO: 0 /100 WBCS
O2 CT BLDV-SCNC: 17.5 ML/DL
PCO2 BLDV: 41 MM HG (ref 42–50)
PH BLDV: 7.41 [PH] (ref 7.3–7.4)
PLATELET # BLD AUTO: 192 THOUSANDS/UL (ref 149–390)
PMV BLD AUTO: 12.2 FL (ref 8.9–12.7)
PO2 BLDV: 76.7 MM HG (ref 35–45)
POTASSIUM SERPL-SCNC: 3.5 MMOL/L (ref 3.5–5.3)
PROCALCITONIN SERPL-MCNC: <0.05 NG/ML
PROT SERPL-MCNC: 7.1 G/DL (ref 6.4–8.4)
RBC # BLD AUTO: 4.4 MILLION/UL (ref 3.81–5.12)
RSV RNA RESP QL NAA+PROBE: NEGATIVE
SARS-COV-2 RNA RESP QL NAA+PROBE: NEGATIVE
SODIUM SERPL-SCNC: 141 MMOL/L (ref 135–147)
WBC # BLD AUTO: 11.93 THOUSAND/UL (ref 4.31–10.16)

## 2025-03-16 PROCEDURE — 94640 AIRWAY INHALATION TREATMENT: CPT

## 2025-03-16 PROCEDURE — 96374 THER/PROPH/DIAG INJ IV PUSH: CPT

## 2025-03-16 PROCEDURE — 0241U HB NFCT DS VIR RESP RNA 4 TRGT: CPT | Performed by: EMERGENCY MEDICINE

## 2025-03-16 PROCEDURE — 71045 X-RAY EXAM CHEST 1 VIEW: CPT

## 2025-03-16 PROCEDURE — 85025 COMPLETE CBC W/AUTO DIFF WBC: CPT | Performed by: EMERGENCY MEDICINE

## 2025-03-16 PROCEDURE — 83880 ASSAY OF NATRIURETIC PEPTIDE: CPT | Performed by: EMERGENCY MEDICINE

## 2025-03-16 PROCEDURE — 93005 ELECTROCARDIOGRAM TRACING: CPT

## 2025-03-16 PROCEDURE — 84484 ASSAY OF TROPONIN QUANT: CPT | Performed by: EMERGENCY MEDICINE

## 2025-03-16 PROCEDURE — 94760 N-INVAS EAR/PLS OXIMETRY 1: CPT

## 2025-03-16 PROCEDURE — 94664 DEMO&/EVAL PT USE INHALER: CPT

## 2025-03-16 PROCEDURE — 83605 ASSAY OF LACTIC ACID: CPT | Performed by: EMERGENCY MEDICINE

## 2025-03-16 PROCEDURE — 99285 EMERGENCY DEPT VISIT HI MDM: CPT | Performed by: EMERGENCY MEDICINE

## 2025-03-16 PROCEDURE — 36415 COLL VENOUS BLD VENIPUNCTURE: CPT | Performed by: EMERGENCY MEDICINE

## 2025-03-16 PROCEDURE — 96361 HYDRATE IV INFUSION ADD-ON: CPT

## 2025-03-16 PROCEDURE — 96375 TX/PRO/DX INJ NEW DRUG ADDON: CPT

## 2025-03-16 PROCEDURE — 80053 COMPREHEN METABOLIC PANEL: CPT | Performed by: EMERGENCY MEDICINE

## 2025-03-16 PROCEDURE — 82805 BLOOD GASES W/O2 SATURATION: CPT | Performed by: EMERGENCY MEDICINE

## 2025-03-16 PROCEDURE — 99285 EMERGENCY DEPT VISIT HI MDM: CPT

## 2025-03-16 PROCEDURE — 94644 CONT INHLJ TX 1ST HOUR: CPT

## 2025-03-16 PROCEDURE — 84145 PROCALCITONIN (PCT): CPT | Performed by: EMERGENCY MEDICINE

## 2025-03-16 RX ORDER — METHYLPREDNISOLONE SODIUM SUCCINATE 125 MG/2ML
100 INJECTION, POWDER, LYOPHILIZED, FOR SOLUTION INTRAMUSCULAR; INTRAVENOUS ONCE
Status: COMPLETED | OUTPATIENT
Start: 2025-03-16 | End: 2025-03-16

## 2025-03-16 RX ORDER — ONDANSETRON 2 MG/ML
4 INJECTION INTRAMUSCULAR; INTRAVENOUS ONCE
Status: COMPLETED | OUTPATIENT
Start: 2025-03-16 | End: 2025-03-16

## 2025-03-16 RX ORDER — ALBUTEROL SULFATE 5 MG/ML
10 SOLUTION RESPIRATORY (INHALATION) ONCE
Status: COMPLETED | OUTPATIENT
Start: 2025-03-16 | End: 2025-03-16

## 2025-03-16 RX ORDER — PREDNISONE 50 MG/1
50 TABLET ORAL DAILY
Qty: 5 TABLET | Refills: 0 | Status: SHIPPED | OUTPATIENT
Start: 2025-03-17 | End: 2025-03-22

## 2025-03-16 RX ORDER — SODIUM CHLORIDE FOR INHALATION 0.9 %
12 VIAL, NEBULIZER (ML) INHALATION ONCE
Status: COMPLETED | OUTPATIENT
Start: 2025-03-16 | End: 2025-03-16

## 2025-03-16 RX ADMIN — ISODIUM CHLORIDE 12 ML: 0.03 SOLUTION RESPIRATORY (INHALATION) at 11:14

## 2025-03-16 RX ADMIN — IPRATROPIUM BROMIDE 1 MG: 0.5 SOLUTION RESPIRATORY (INHALATION) at 11:14

## 2025-03-16 RX ADMIN — ALBUTEROL SULFATE 10 MG: 2.5 SOLUTION RESPIRATORY (INHALATION) at 11:14

## 2025-03-16 RX ADMIN — METHYLPREDNISOLONE SODIUM SUCCINATE 100 MG: 125 INJECTION, POWDER, FOR SOLUTION INTRAMUSCULAR; INTRAVENOUS at 11:09

## 2025-03-16 RX ADMIN — SODIUM CHLORIDE 1000 ML: 0.9 INJECTION, SOLUTION INTRAVENOUS at 11:08

## 2025-03-16 RX ADMIN — ONDANSETRON 4 MG: 2 INJECTION, SOLUTION INTRAMUSCULAR; INTRAVENOUS at 11:09

## 2025-03-16 NOTE — ED PROVIDER NOTES
Time reflects when diagnosis was documented in both MDM as applicable and the Disposition within this note       Time User Action Codes Description Comment    3/16/2025  3:26 PM Mik Lamb Add [J44.1] COPD with acute exacerbation (HCC)           ED Disposition       ED Disposition   Discharge    Condition   Stable    Date/Time   Sun Mar 16, 2025  3:26 PM    Comment   Ileana Seaman discharge to home/self care.                   Assessment & Plan       Medical Decision Making  Patient was seen and evaluated for her presentation as outlined.  Differential diagnosis includes COVID, flu, RSV, pneumonia, acute on chronic respiratory failure, COPD exacerbation, other.  Testing as shown.  COVID, flu, RSV negative.  Chest x-ray clear.  No concerning blood work abnormalities.  VBG shows likely hyperventilatory pattern, no significant CO2 retention or hypoxia.  Oxygen requirement at baseline.  Given DuoNeb for 1 hour, Solu-Medrol, IV fluids.  Feels much better upon reassessment.  No clear indication for antibiotics.  Patient feels comfortable going home.  Stable for discharge from the emergency department.  Advise close primary care and pulmonology follow-up.  Will prescribe 5-day course of prednisone.  Strict return precautions reviewed.  All questions answered.  Has DuoNebs at home, advised to continue.    Amount and/or Complexity of Data Reviewed  Labs: ordered.  Radiology: ordered and independent interpretation performed.  ECG/medicine tests: ordered and independent interpretation performed.     Details: EKG by my interpretation demonstrates sinus tachycardia at a ventricular rate of 113 bpm.  Left axis deviation, normal intervals.  No acute ST elevations or T wave inversions.  Compared with prior EKG from January 23, 2025, left axis deviation now present.    Risk  Prescription drug management.             Medications   sodium chloride 0.9 % bolus 1,000 mL (0 mL Intravenous Stopped 3/16/25 1240)   albuterol  inhalation solution 10 mg (10 mg Nebulization Given 3/16/25 1114)   ipratropium (ATROVENT) 0.02 % inhalation solution 1 mg (1 mg Nebulization Given 3/16/25 1114)   sodium chloride 0.9 % inhalation solution 12 mL (12 mL Nebulization Given 3/16/25 1114)   methylPREDNISolone sodium succinate (Solu-MEDROL) injection 100 mg (100 mg Intravenous Given 3/16/25 1109)   ondansetron (ZOFRAN) injection 4 mg (4 mg Intravenous Given 3/16/25 1109)       ED Risk Strat Scores                            SBIRT 20yo+      Flowsheet Row Most Recent Value   Initial Alcohol Screen: US AUDIT-C     1. How often do you have a drink containing alcohol? 0 Filed at: 03/16/2025 1117   2. How many drinks containing alcohol do you have on a typical day you are drinking?  0 Filed at: 03/16/2025 1117   3b. FEMALE Any Age, or MALE 65+: How often do you have 4 or more drinks on one occassion? 0 Filed at: 03/16/2025 1117   Audit-C Score 0 Filed at: 03/16/2025 1117   LANDY: How many times in the past year have you...    Used an illegal drug or used a prescription medication for non-medical reasons? Never Filed at: 03/16/2025 1117                            History of Present Illness       Chief Complaint   Patient presents with    Shortness of Breath     Pt presented to this ED from home c/o increased sob w/cough starting last night. Breathing tx done w/o relief. Hx copd-uses 2L O2NC daily. Denies travel/fevers/n/v/d       Past Medical History:   Diagnosis Date    COPD (chronic obstructive pulmonary disease) (HCC)     Hiatal hernia       Past Surgical History:   Procedure Laterality Date    CHOLECYSTECTOMY        History reviewed. No pertinent family history.   Social History     Tobacco Use    Smoking status: Former     Current packs/day: 0.25     Types: Cigarettes    Smokeless tobacco: Never   Vaping Use    Vaping status: Never Used   Substance Use Topics    Alcohol use: Never    Drug use: Never      E-Cigarette/Vaping    E-Cigarette Use Never User        E-Cigarette/Vaping Substances      I have reviewed and agree with the history as documented.     Patient presents to the emergency department accompanied by family for evaluation of shortness of breath that started last night and became worse this morning.  Associated with nonproductive cough.  Denies any chest pain, does have some pain in her back.  Denies any fevers.  Some nausea related to the severity of the coughing.  No vomiting.  Used a DuoNeb this morning at 7:00 without significant improvement.  States that she feels that she can get air in, but cannot get it out.  Does wear 2 L of oxygen at all times, has not required higher flow of oxygen.  No other complaints, modifying factors, or associated symptoms.        Review of Systems   All other systems reviewed and are negative.          Objective       ED Triage Vitals [03/16/25 1046]   Temperature Pulse Blood Pressure Respirations SpO2 Patient Position - Orthostatic VS   97.6 °F (36.4 °C) (!) 109 147/76 (!) 24 97 % Lying      Temp src Heart Rate Source BP Location FiO2 (%) Pain Score    -- Monitor Left arm -- --      Vitals      Date and Time Temp Pulse SpO2 Resp BP Pain Score FACES Pain Rating User   03/16/25 1400 -- 104 94 % 28 100/53 -- -- MD   03/16/25 1300 -- 112 96 % 26 94/51 -- -- MD   03/16/25 1200 -- 113 98 % 31 120/64 -- -- MD   03/16/25 1114 -- -- 97 % -- -- -- -- TLS   03/16/25 1046 97.6 °F (36.4 °C) 109 97 % 24 147/76 -- -- AC            Physical Exam  Vitals and nursing note reviewed.   Constitutional:       General: She is not in acute distress.     Appearance: She is well-developed. She is not ill-appearing or toxic-appearing.      Comments: Frequent dry coughing during exam.   HENT:      Head: Normocephalic and atraumatic.   Eyes:      Conjunctiva/sclera: Conjunctivae normal.   Cardiovascular:      Rate and Rhythm: Regular rhythm. Tachycardia present.      Pulses: Normal pulses.      Heart sounds: Normal heart sounds. No murmur heard.      No friction rub.   Pulmonary:      Effort: Accessory muscle usage present. No respiratory distress.      Breath sounds: Decreased breath sounds and wheezing present. No rhonchi or rales.   Abdominal:      Palpations: Abdomen is soft.      Tenderness: There is no abdominal tenderness.   Musculoskeletal:         General: No swelling. Normal range of motion.      Cervical back: Neck supple.   Skin:     General: Skin is warm and dry.      Capillary Refill: Capillary refill takes less than 2 seconds.   Neurological:      General: No focal deficit present.      Mental Status: She is alert and oriented to person, place, and time.   Psychiatric:         Mood and Affect: Mood normal.         Behavior: Behavior normal.         Results Reviewed       Procedure Component Value Units Date/Time    Procalcitonin [[id number]]  (Normal) Collected: 03/16/25 1103    Lab Status: Final result Specimen: Blood from Arm, Right Updated: 03/16/25 1210     Procalcitonin <0.05 ng/ml     FLU/RSV/COVID - if FLU/RSV clinically relevant (2hr TAT) [[id number]]  (Normal) Collected: 03/16/25 1103    Lab Status: Final result Specimen: Nares from Nasopharyngeal Swab Updated: 03/16/25 1149     SARS-CoV-2 Negative     INFLUENZA A PCR Negative     INFLUENZA B PCR Negative     RSV PCR Negative    Narrative:      This test has been performed using the CoV-2/Flu/RSV plus assay on the "[employer] GeneXpert platform. This test has been validated by the  and verified by the performing laboratory.     This test is designed to amplify and detect the following: nucleocapsid (N), envelope (E), and RNA-dependent RNA polymerase (RdRP) genes of the SARS-CoV-2 genome; matrix (M), basic polymerase (PB2), and acidic protein (PA) segments of the influenza A genome; matrix (M) and non-structural protein (NS) segments of the influenza B genome, and the nucleocapsid genes of RSV A and RSV B.     Positive results are indicative of the presence of Flu A, Flu B, RSV,  and/or SARS-CoV-2 RNA. Positive results for SARS-CoV-2 or suspected novel influenza should be reported to state, local, or federal health departments according to local reporting requirements.      All results should be assessed in conjunction with clinical presentation and other laboratory markers for clinical management.     FOR PEDIATRIC PATIENTS - copy/paste COVID Guidelines URL to browser: [email or web address]       HS Troponin 0hr (reflex protocol) [[id number]]  (Normal) Collected: 03/16/25 1103    Lab Status: Final result Specimen: Blood from Arm, Right Updated: 03/16/25 1140     hs TnI 0hr 7 ng/L     B-Type Natriuretic Peptide(BNP) [[id number]]  (Normal) Collected: 03/16/25 1103    Lab Status: Final result Specimen: Blood from Arm, Right Updated: 03/16/25 1135     BNP 68 pg/mL     Lactic acid, plasma (w/reflex if result > 2.0) [[id number]]  (Normal) Collected: 03/16/25 1103    Lab Status: Final result Specimen: Blood from Arm, Right Updated: 03/16/25 1134     LACTIC ACID 2.0 mmol/L     Narrative:      Result may be elevated if tourniquet was used during collection.    Comprehensive metabolic panel [[id number]]  (Abnormal) Collected: 03/16/25 1103    Lab Status: Final result Specimen: Blood from Arm, Right Updated: 03/16/25 1133     Sodium 141 mmol/L      Potassium 3.5 mmol/L      Chloride 103 mmol/L      CO2 28 mmol/L      ANION GAP 10 mmol/L      BUN 5 mg/dL      Creatinine 0.47 mg/dL      Glucose 145 mg/dL      Calcium 9.5 mg/dL      AST 14 U/L      ALT 13 U/L      Alkaline Phosphatase 136 U/L      Total Protein 7.1 g/dL      Albumin 4.2 g/dL      Total Bilirubin 0.40 mg/dL      eGFR 103 ml/min/1.73sq m     Narrative:      National Kid[employer] Disease Foundation guidelines for Chronic Kidney Disease (CKD):     Stage 1 with normal or high GFR (GFR > 90 mL/min/1.73 square meters)    Stage 2 Mild CKD (GFR = 60-89 mL/min/1.73 square meters)    Stage 3A Moderate CKD  (GFR = 45-59 mL/min/1.73 square meters)    Stage 3B Moderate CKD (GFR = 30-44 mL/min/1.73 square meters)    Stage 4 Severe CKD (GFR = 15-29 mL/min/1.73 square meters)    Stage 5 End Stage CKD (GFR <15 mL/min/1.73 square meters)  Note: GFR calculation is accurate only with a steady state creatinine    Blood gas, venous [[id number]]  (Abnormal) Collected: 03/16/25 1103    Lab Status: Final result Specimen: Blood from Arm, Right Updated: 03/16/25 1117     pH, Terence 7.406     pCO2, Terence 41.0 mm Hg      pO2, Terence 76.7 mm Hg      HCO3, Terence 25.2 mmol/L      Base Excess, Terence 0.4 mmol/L      O2 Content, Terence 17.5 ml/dL      O2 HGB, VENOUS 87.9 %     CBC and differential [[id number]]  (Abnormal) Collected: 03/16/25 1103    Lab Status: Final result Specimen: Blood from Arm, Right Updated: 03/16/25 1110     WBC 11.93 Thousand/uL      RBC 4.40 Million/uL      Hemoglobin 12.7 g/dL      Hematocrit 39.7 %      MCV 90 fL      MCH 28.9 pg      MCHC 32.0 g/dL      RDW 12.9 %      MPV 12.2 fL      Platelets 192 Thousands/uL      nRBC 0 /100 WBCs      Segmented % 80 %      Immature Grans % 0 %      Lymphocytes % 13 %      Monocytes % 7 %      Eosinophils Relative 0 %      Basophils Relative 0 %      Absolute Neutrophils 9.48 Thousands/µL      Absolute Immature Grans 0.05 Thousand/uL      Absolute Lymphocytes 1.51 Thousands/µL      Absolute Monocytes 0.83 Thousand/µL      Eosinophils Absolute 0.01 Thousand/µL      Basophils Absolute 0.05 Thousands/µL             XR chest 1 view portable   ED Interpretation by Mik Lamb MD (03/16 1322)   No acute cardiopulmonary process appreciated.      Final Interpretation by Aguila Aponte MD (03/16 1515)      No acute cardiopulmonary disease.            Workstation performed: [id number]             Procedures    ED Medication and Procedure Management   Prior to Admission Medications   Prescriptions Last Dose Informant Patient Reported? Taking?   albuterol (PROVENTIL HFA,VENTOLIN HFA) 90 mcg/act  inhaler   No No   Sig: Inhale 2 puffs every 6 (six) hours as needed for wheezing   aspirin (Aspirin 81) 81 mg EC tablet   No No   Sig: Take 1 tablet (81 mg total) by mouth daily   atorvastatin (LIPITOR) 40 mg tablet   No No   Sig: Take 1 tablet (40 mg total) by mouth daily   dicyclomine (BENTYL) 10 mg capsule   No No   Sig: Take 1 capsule (10 mg total) by mouth 3 (three) times a day before meals   docusate sodium (COLACE) 100 mg capsule   Yes No   Sig: Take 100 mg by mouth 2 (two) times a day   ethambutol (MYAMBUTOL) 400 mg tablet   Yes No   Sig: Take 800 mg by mouth daily   famotidine (PEPCID) 20 mg tablet   No No   Sig: Take 1 tablet (20 mg total) by mouth 2 (two) times a day   fluticasone-umeclidinium-vilanterol (Trelegy Ellipta) 100-62.5-25 mcg/actuation inhaler   Yes No   Sig: Inhale 1 puff daily Rinse mouth after use.   furosemide (LASIX) 20 mg tablet   No No   Sig: Take 1 tablet (20 mg total) by mouth daily as needed (If you gain 3 pounds in 24 hours or 5 pounds in 7 days, take 1 pill.)   hydrOXYzine HCL (ATARAX) 25 mg tablet   No No   Sig: Take 1 tablet (25 mg total) by mouth if needed for itching   ipratropium-albuterol (DUO-NEB) 0.5-2.5 mg/3 mL nebulizer solution   No No   Sig: Take 3 mL by nebulization 4 (four) times a day   lidocaine (LIDODERM) 5 %   No No   Sig: Apply 1 patch topically over 12 hours daily for 4 days Remove & Discard patch within 12 hours or as directed by MD   metoprolol tartrate (LOPRESSOR) 25 mg tablet   No No   Sig: Take 1 tablet (25 mg total) by mouth every 12 (twelve) hours   nicotine (NICODERM CQ) 14 mg/24hr TD 24 hr patch   No No   Sig: Place 1 patch on the skin over 24 hours daily   omeprazole (PriLOSEC) 40 MG capsule   No No   Sig: Take 1 capsule (40 mg total) by mouth 2 (two) times a day 30 minutes before meal   ondansetron (ZOFRAN) 4 mg tablet   No No   Sig: Take 1 tablet (4 mg total) by mouth every 6 (six) hours   rifabutin (MYCOBUTIN) 150 mg capsule   Yes No   Sig: Take 300  mg by mouth daily   simethicone (MYLICON) 80 mg chewable tablet   No No   Sig: Chew 1 tablet (80 mg total) every 6 (six) hours as needed for flatulence   sucralfate (CARAFATE) 1 g tablet   No No   Sig: Take 1 tablet (1 g total) by mouth 4 (four) times a day for 14 days      Facility-Administered Medications: None     Patient's Medications   Discharge Prescriptions    PREDNISONE 50 MG TABLET    Take 1 tablet (50 mg total) by mouth daily for 5 days Do not start before March 17, 2025.       Start Date: 3/17/2025 End Date: 3/22/2025       Order Dose: 50 mg       Quantity: 5 tablet    Refills: 0     No discharge procedures on file.  ED SEPSIS DOCUMENTATION   Time reflects when diagnosis was documented in both MDM as applicable and the Disposition within this note       Time User Action Codes Description Comment    3/16/2025  3:26 PM Mik Lamb Add [J44.1] COPD with acute exacerbation (HCC)                  Mik Lamb MD  03/16/25 1528       Mik Lamb MD  03/16/25 174

## 2025-03-16 NOTE — RESPIRATORY THERAPY NOTE
Respiratory Note     03/16/25 1114   Respiratory Assessment   Resp Comments Pt quit smoking 2mo ago, continues to have family smoke in house. Pt states pt had walls painted in room this week and now SOB. Pt here with BS diminished and expiratory wheezes. Pt wears 2LPM NC ATC at baseline, pt stable on baseline 2LPM NC. 1hr UDN tx given as ordred.   Additional Assessments   SpO2 97 %

## 2025-03-16 NOTE — ED NOTES
Pt ambulated with a steady gait to the bathroom and back     Lilly Bullard, MARIAELENA  03/16/25 8350

## 2025-03-17 LAB
ATRIAL RATE: 113 BPM
P AXIS: 81 DEGREES
PR INTERVAL: 126 MS
QRS AXIS: -64 DEGREES
QRSD INTERVAL: 78 MS
QT INTERVAL: 328 MS
QTC INTERVAL: 449 MS
T WAVE AXIS: 69 DEGREES
VENTRICULAR RATE: 113 BPM

## 2025-03-21 ENCOUNTER — TELEPHONE (OUTPATIENT)
Dept: VASCULAR SURGERY | Facility: CLINIC | Age: 65
End: 2025-03-21

## 2025-03-21 NOTE — TELEPHONE ENCOUNTER
Spoke with pt and informed them that [facility] will no longer be in the office for their appt and we will give them a call to reschedule.

## 2025-03-25 NOTE — TELEPHONE ENCOUNTER
No available appts to schedule into. Added pt to wait list. Please call when an appt becomes available.

## 2025-03-29 ENCOUNTER — HOSPITAL ENCOUNTER (EMERGENCY)
Facility: HOSPITAL | Age: 65
Discharge: HOME | End: 2025-03-29
Attending: EMERGENCY MEDICINE
Payer: MEDICARE

## 2025-03-29 ENCOUNTER — APPOINTMENT (EMERGENCY)
Dept: RADIOLOGY | Facility: HOSPITAL | Age: 65
End: 2025-03-29
Payer: MEDICARE

## 2025-03-29 VITALS
SYSTOLIC BLOOD PRESSURE: 93 MMHG | RESPIRATION RATE: 21 BRPM | HEIGHT: 61 IN | TEMPERATURE: 97.7 F | WEIGHT: 115 LBS | HEART RATE: 97 BPM | DIASTOLIC BLOOD PRESSURE: 50 MMHG | BODY MASS INDEX: 21.71 KG/M2 | OXYGEN SATURATION: 99 %

## 2025-03-29 DIAGNOSIS — J44.1 COPD EXACERBATION (HCC): Primary | ICD-10-CM

## 2025-03-29 LAB
ALBUMIN SERPL BCG-MCNC: 3.9 G/DL (ref 3.5–5)
ALP SERPL-CCNC: 103 U/L (ref 34–104)
ALT SERPL W P-5'-P-CCNC: 10 U/L (ref 7–52)
ANION GAP SERPL CALCULATED.3IONS-SCNC: 7 MMOL/L (ref 4–13)
APTT PPP: 28 SECONDS (ref 23–34)
AST SERPL W P-5'-P-CCNC: 9 U/L (ref 13–39)
BASOPHILS # BLD AUTO: 0.06 THOUSANDS/ÂΜL (ref 0–0.1)
BASOPHILS NFR BLD AUTO: 0 % (ref 0–1)
BILIRUB SERPL-MCNC: 0.35 MG/DL (ref 0.2–1)
BNP SERPL-MCNC: 37 PG/ML (ref 0–100)
BUN SERPL-MCNC: 6 MG/DL (ref 5–25)
CALCIUM SERPL-MCNC: 9.3 MG/DL (ref 8.4–10.2)
CARDIAC TROPONIN I PNL SERPL HS: 6 NG/L (ref ?–50)
CHLORIDE SERPL-SCNC: 101 MMOL/L (ref 96–108)
CO2 SERPL-SCNC: 31 MMOL/L (ref 21–32)
CREAT SERPL-MCNC: 0.5 MG/DL (ref 0.6–1.3)
D DIMER PPP FEU-MCNC: 0.28 UG/ML FEU
EOSINOPHIL # BLD AUTO: 0.05 THOUSAND/ÂΜL (ref 0–0.61)
EOSINOPHIL NFR BLD AUTO: 0 % (ref 0–6)
ERYTHROCYTE [DISTWIDTH] IN BLOOD BY AUTOMATED COUNT: 13.2 % (ref 11.6–15.1)
FLUAV AG UPPER RESP QL IA.RAPID: NEGATIVE
FLUBV AG UPPER RESP QL IA.RAPID: NEGATIVE
GFR SERPL CREATININE-BSD FRML MDRD: 101 ML/MIN/1.73SQ M
GLUCOSE SERPL-MCNC: 129 MG/DL (ref 65–140)
HCT VFR BLD AUTO: 41.1 % (ref 34.8–46.1)
HGB BLD-MCNC: 12.8 G/DL (ref 11.5–15.4)
IMM GRANULOCYTES # BLD AUTO: 0.06 THOUSAND/UL (ref 0–0.2)
IMM GRANULOCYTES NFR BLD AUTO: 0 % (ref 0–2)
INR PPP: 1.06 (ref 0.85–1.19)
LACTATE SERPL-SCNC: 2.3 MMOL/L (ref 0.5–2)
LIPASE SERPL-CCNC: 14 U/L (ref 11–82)
LYMPHOCYTES # BLD AUTO: 1.74 THOUSANDS/ÂΜL (ref 0.6–4.47)
LYMPHOCYTES NFR BLD AUTO: 13 % (ref 14–44)
MAGNESIUM SERPL-MCNC: 1.7 MG/DL (ref 1.9–2.7)
MCH RBC QN AUTO: 28.4 PG (ref 26.8–34.3)
MCHC RBC AUTO-ENTMCNC: 31.1 G/DL (ref 31.4–37.4)
MCV RBC AUTO: 91 FL (ref 82–98)
MONOCYTES # BLD AUTO: 0.94 THOUSAND/ÂΜL (ref 0.17–1.22)
MONOCYTES NFR BLD AUTO: 7 % (ref 4–12)
NEUTROPHILS # BLD AUTO: 11.08 THOUSANDS/ÂΜL (ref 1.85–7.62)
NEUTS SEG NFR BLD AUTO: 80 % (ref 43–75)
NRBC BLD AUTO-RTO: 0 /100 WBCS
PLATELET # BLD AUTO: 308 THOUSANDS/UL (ref 149–390)
PMV BLD AUTO: 11.3 FL (ref 8.9–12.7)
POTASSIUM SERPL-SCNC: 3.2 MMOL/L (ref 3.5–5.3)
PROT SERPL-MCNC: 7 G/DL (ref 6.4–8.4)
PROTHROMBIN TIME: 14.2 SECONDS (ref 12.3–15)
RBC # BLD AUTO: 4.51 MILLION/UL (ref 3.81–5.12)
SARS-COV+SARS-COV-2 AG RESP QL IA.RAPID: NEGATIVE
SODIUM SERPL-SCNC: 139 MMOL/L (ref 135–147)
WBC # BLD AUTO: 13.93 THOUSAND/UL (ref 4.31–10.16)

## 2025-03-29 PROCEDURE — 85379 FIBRIN DEGRADATION QUANT: CPT | Performed by: EMERGENCY MEDICINE

## 2025-03-29 PROCEDURE — 87804 INFLUENZA ASSAY W/OPTIC: CPT | Performed by: EMERGENCY MEDICINE

## 2025-03-29 PROCEDURE — 83690 ASSAY OF LIPASE: CPT | Performed by: EMERGENCY MEDICINE

## 2025-03-29 PROCEDURE — 85730 THROMBOPLASTIN TIME PARTIAL: CPT | Performed by: EMERGENCY MEDICINE

## 2025-03-29 PROCEDURE — 84484 ASSAY OF TROPONIN QUANT: CPT | Performed by: EMERGENCY MEDICINE

## 2025-03-29 PROCEDURE — 83735 ASSAY OF MAGNESIUM: CPT | Performed by: EMERGENCY MEDICINE

## 2025-03-29 PROCEDURE — 83880 ASSAY OF NATRIURETIC PEPTIDE: CPT | Performed by: EMERGENCY MEDICINE

## 2025-03-29 PROCEDURE — 99285 EMERGENCY DEPT VISIT HI MDM: CPT

## 2025-03-29 PROCEDURE — 87811 SARS-COV-2 COVID19 W/OPTIC: CPT | Performed by: EMERGENCY MEDICINE

## 2025-03-29 PROCEDURE — 96374 THER/PROPH/DIAG INJ IV PUSH: CPT

## 2025-03-29 PROCEDURE — 71045 X-RAY EXAM CHEST 1 VIEW: CPT

## 2025-03-29 PROCEDURE — 85025 COMPLETE CBC W/AUTO DIFF WBC: CPT | Performed by: EMERGENCY MEDICINE

## 2025-03-29 PROCEDURE — 93005 ELECTROCARDIOGRAM TRACING: CPT

## 2025-03-29 PROCEDURE — 36415 COLL VENOUS BLD VENIPUNCTURE: CPT | Performed by: EMERGENCY MEDICINE

## 2025-03-29 PROCEDURE — 94640 AIRWAY INHALATION TREATMENT: CPT

## 2025-03-29 PROCEDURE — 85610 PROTHROMBIN TIME: CPT | Performed by: EMERGENCY MEDICINE

## 2025-03-29 PROCEDURE — 83605 ASSAY OF LACTIC ACID: CPT | Performed by: EMERGENCY MEDICINE

## 2025-03-29 PROCEDURE — 99285 EMERGENCY DEPT VISIT HI MDM: CPT | Performed by: EMERGENCY MEDICINE

## 2025-03-29 PROCEDURE — 80053 COMPREHEN METABOLIC PANEL: CPT | Performed by: EMERGENCY MEDICINE

## 2025-03-29 RX ORDER — CEFUROXIME AXETIL 250 MG/1
500 TABLET ORAL ONCE
Status: COMPLETED | OUTPATIENT
Start: 2025-03-29 | End: 2025-03-29

## 2025-03-29 RX ORDER — ALBUTEROL SULFATE 0.83 MG/ML
5 SOLUTION RESPIRATORY (INHALATION) ONCE
Status: COMPLETED | OUTPATIENT
Start: 2025-03-29 | End: 2025-03-29

## 2025-03-29 RX ORDER — IPRATROPIUM BROMIDE AND ALBUTEROL SULFATE 2.5; .5 MG/3ML; MG/3ML
3 SOLUTION RESPIRATORY (INHALATION) ONCE
Status: COMPLETED | OUTPATIENT
Start: 2025-03-29 | End: 2025-03-29

## 2025-03-29 RX ORDER — METHYLPREDNISOLONE SODIUM SUCCINATE 125 MG/2ML
125 INJECTION, POWDER, LYOPHILIZED, FOR SOLUTION INTRAMUSCULAR; INTRAVENOUS ONCE
Status: COMPLETED | OUTPATIENT
Start: 2025-03-29 | End: 2025-03-29

## 2025-03-29 RX ORDER — CEFUROXIME AXETIL 500 MG/1
500 TABLET ORAL EVERY 12 HOURS SCHEDULED
Qty: 20 TABLET | Refills: 0 | Status: SHIPPED | OUTPATIENT
Start: 2025-03-29 | End: 2025-04-05 | Stop reason: ALTCHOICE

## 2025-03-29 RX ORDER — PREDNISONE 10 MG/1
TABLET ORAL
Qty: 30 TABLET | Refills: 0 | Status: SHIPPED | OUTPATIENT
Start: 2025-03-29 | End: 2025-04-05 | Stop reason: ALTCHOICE

## 2025-03-29 RX ADMIN — IPRATROPIUM BROMIDE AND ALBUTEROL SULFATE 3 ML: .5; 3 SOLUTION RESPIRATORY (INHALATION) at 11:17

## 2025-03-29 RX ADMIN — METHYLPREDNISOLONE SODIUM SUCCINATE 125 MG: 125 INJECTION, POWDER, FOR SOLUTION INTRAMUSCULAR; INTRAVENOUS at 11:19

## 2025-03-29 RX ADMIN — ALBUTEROL SULFATE 5 MG: 2.5 SOLUTION RESPIRATORY (INHALATION) at 12:30

## 2025-03-29 RX ADMIN — CEFUROXIME AXETIL 500 MG: 250 TABLET ORAL at 13:26

## 2025-03-29 RX ADMIN — IPRATROPIUM BROMIDE AND ALBUTEROL SULFATE 3 ML: 2.5; .5 SOLUTION RESPIRATORY (INHALATION) at 12:30

## 2025-03-29 NOTE — ED PROVIDER NOTES
Time reflects when diagnosis was documented in both MDM as applicable and the Disposition within this note       Time User Action Codes Description Comment    3/29/2025 12:23 PM Darin Ag Add [J44.1] COPD exacerbation (HCC)           ED Disposition       ED Disposition   Discharge    Condition   Stable    Date/Time   Sat Mar 29, 2025 12:23 PM    Comment   Ileana Seaman discharge to home/self care.                   Assessment & Plan       Medical Decision Making  Amount and/or Complexity of Data Reviewed  Labs: ordered. Decision-making details documented in ED Course.  Radiology: ordered and independent interpretation performed. Decision-making details documented in ED Course.  ECG/medicine tests: ordered and independent interpretation performed. Decision-making details documented in ED Course.  Discussion of management or test interpretation with external provider(s): Differential diagnosis includes but not limited to STEMI, NSTEMI, PE, pneumonia, pneumothorax, musculoskeletal chest pain, costochondritis, gastritis, cholelithiasis, contusion, strain    Risk  Prescription drug management.        ED Course as of 03/29/25 1300   Sat Mar 29, 2025   1151 D-Dimer, Quant: 0.28   1151 hs TnI 0hr: 6   1223 Ambulatory pulse ox on her 2 L only dips down to 91% at the lowest.       Medications   cefuroxime (CEFTIN) tablet 500 mg (has no administration in time range)   methylPREDNISolone sodium succinate (Solu-MEDROL) injection 125 mg (125 mg Intravenous Given 3/29/25 1119)   ipratropium-albuterol (DUO-NEB) 0.5-2.5 mg/3 mL inhalation solution 3 mL (3 mL Nebulization Given 3/29/25 1117)   ipratropium-albuterol (DUO-NEB) 0.5-2.5 mg/3 mL inhalation solution 3 mL (3 mL Nebulization Given 3/29/25 1230)   albuterol inhalation solution 5 mg (5 mg Nebulization Given 3/29/25 1230)       ED Risk Strat Scores                                                History of Present Illness       Chief Complaint   Patient presents with     Shortness of Breath     Patient reports SOB beginning yesterday, no relief with breathing treatments. Hx of COPD, wears 2LNC at all times.        Past Medical History:   Diagnosis Date    COPD (chronic obstructive pulmonary disease) (HCC)     Hiatal hernia       Past Surgical History:   Procedure Laterality Date    CHOLECYSTECTOMY        History reviewed. No pertinent family history.   Social History     Tobacco Use    Smoking status: Some Days     Current packs/day: 0.25     Types: Cigarettes    Smokeless tobacco: Never   Vaping Use    Vaping status: Never Used   Substance Use Topics    Alcohol use: Never    Drug use: Never      E-Cigarette/Vaping    E-Cigarette Use Never User       E-Cigarette/Vaping Substances      I have reviewed and agree with the history as documented.     Patient with a history of COPD.  Normally on 2 L nasal cannula at home.  Still smoking.  Coughing but no phlegm production.  No relief with nebulizer treatments.  No recent steroids.  No fevers or chills.  No vomiting or diarrhea.      History provided by:  Patient   used: No    Shortness of Breath  Severity:  Mild  Onset quality:  Gradual  Duration:  1 day  Timing:  Constant  Progression:  Worsening  Chronicity:  New  Context: smoke exposure and URI    Relieved by:  Nothing  Worsened by:  Exertion  Ineffective treatments:  Inhaler  Associated symptoms: cough and wheezing    Associated symptoms: no abdominal pain, no chest pain, no ear pain, no fever, no headaches, no neck pain, no rash, no sore throat, no sputum production and no vomiting        Review of Systems   Constitutional:  Negative for chills and fever.   HENT:  Negative for ear pain, hearing loss, sore throat, trouble swallowing and voice change.    Eyes:  Negative for pain and discharge.   Respiratory:  Positive for cough, shortness of breath and wheezing. Negative for sputum production.    Cardiovascular:  Negative for chest pain and palpitations.    Gastrointestinal:  Negative for abdominal pain, blood in stool, constipation, diarrhea, nausea and vomiting.   Genitourinary:  Negative for dysuria, flank pain, frequency and hematuria.   Musculoskeletal:  Negative for joint swelling, neck pain and neck stiffness.   Skin:  Negative for rash and wound.   Neurological:  Negative for dizziness, seizures, syncope, facial asymmetry and headaches.   Psychiatric/Behavioral:  Negative for hallucinations, self-injury and suicidal ideas.    All other systems reviewed and are negative.          Objective       ED Triage Vitals [03/29/25 1104]   Temperature Pulse Blood Pressure Respirations SpO2 Patient Position - Orthostatic VS   97.7 °F (36.5 °C) (!) 108 (!) 129/104 22 93 % Lying      Temp Source Heart Rate Source BP Location FiO2 (%) Pain Score    Temporal Monitor Right arm -- 8      Vitals      Date and Time Temp Pulse SpO2 Resp BP Pain Score FACES Pain Rating User   03/29/25 1230 -- 97 96 % 24 122/65 -- -- AC   03/29/25 1200 -- 97 97 % 25 128/62 -- -- AC   03/29/25 1115 -- 103 97 % 26 129/104 -- -- [facility]   03/29/25 1104 97.7 °F (36.5 °C) 108 93 % 22 129/104 8 -- [facility]            Physical Exam  Vitals and nursing note reviewed.   Constitutional:       General: She is not in acute distress.     Appearance: She is well-developed.   HENT:      Head: Normocephalic and atraumatic.      Right Ear: External ear normal.      Left Ear: External ear normal.   Eyes:      General: No scleral icterus.        Right eye: No discharge.         Left eye: No discharge.      Extraocular Movements: Extraocular movements intact.      Conjunctiva/sclera: Conjunctivae normal.   Cardiovascular:      Rate and Rhythm: Normal rate and regular rhythm.      Heart sounds: Normal heart sounds. No murmur heard.  Pulmonary:      Effort: Pulmonary effort is normal. Tachypnea present.      Breath sounds: Decreased breath sounds and wheezing present. No rales.   Abdominal:      General: Bowel sounds are normal.  There is no distension.      Palpations: Abdomen is soft.      Tenderness: There is no abdominal tenderness. There is no guarding or rebound.   Musculoskeletal:         General: No deformity. Normal range of motion.      Cervical back: Normal range of motion and neck supple.      Comments: Mild swelling to the right lower extremity.  Not unusual for patient.   Skin:     General: Skin is warm and dry.      Findings: No rash.   Neurological:      General: No focal deficit present.      Mental Status: She is alert and oriented to person, place, and time.      Cranial Nerves: No cranial nerve deficit.   Psychiatric:         Mood and Affect: Mood normal.         Behavior: Behavior normal.         Thought Content: Thought content normal.         Judgment: Judgment normal.         Results Reviewed       Procedure Component Value Units Date/Time    B-Type Natriuretic Peptide(BNP) [[id number]]  (Normal) Collected: 03/29/25 1114    Lab Status: Final result Specimen: Blood from Arm, Left Updated: 03/29/25 1206     BNP 37 pg/mL     FLU/COVID Rapid Antigen (30 min. TAT) - Preferred screening test in ED [[id number]]  (Normal) Collected: 03/29/25 1114    Lab Status: Final result Specimen: Nares from Nose Updated: 03/29/25 1145     SARS COV Rapid Antigen Negative     Influenza A Rapid Antigen Negative     Influenza B Rapid Antigen Negative    Narrative:      This test has been performed using the Quidel Rhea 2 FLU+SARS Antigen test under the Emergency Use Authorization (EUA). This test has been validated by the  and verified by the performing laboratory. The Rhea uses lateral flow immunofluorescent sandwich assay to detect SARS-COV, Influenza A and Influenza B Antigen.     The Quidel Rhea 2 SARS Antigen test does not differentiate between SARS-CoV and SARS-CoV-2.     Negative results are presumptive and may be confirmed with a molecular assay, if necessary, for patient management. Negative results do not rule out  SARS-CoV-2 or influenza infection and should not be used as the sole basis for treatment or patient management decisions. A negative test result may occur if the level of antigen in a sample is below the limit of detection of this test.     Positive results are indicative of the presence of viral antigens, but do not rule out bacterial infection or co-infection with other viruses.     All test results should be used as an adjunct to clinical observations and other information available to the provider.    FOR PEDIATRIC PATIENTS - copy/paste COVID Guidelines URL to browser: [email or web address]    HS Troponin 0hr (reflex protocol) [[id number]]  (Normal) Collected: 03/29/25 1114    Lab Status: Final result Specimen: Blood from Arm, Left Updated: 03/29/25 1144     hs TnI 0hr 6 ng/L     D-Dimer [[id number]]  (Normal) Collected: 03/29/25 1114    Lab Status: Final result Specimen: Blood from Arm, Left Updated: 03/29/25 1141     D-Dimer, Quant 0.28 ug/ml FEU     Narrative:      In the evaluation for possible pulmonary embolism, in the appropriate (Well's Score of 4 or less) patient, the age adjusted d-dimer cutoff for this patient can be calculated as:    Age x 0.01 (in ug/mL) for Age-adjusted D-dimer exclusion threshold for a patient over 50 years.    Protime-INR [[id number]]  (Normal) Collected: 03/29/25 1114    Lab Status: Final result Specimen: Blood from Arm, Left Updated: 03/29/25 1138     Protime 14.2 seconds      INR 1.06    Narrative:      INR Therapeutic Range    Indication                                             INR Range      Atrial Fibrillation                                               2.0-3.0  Hypercoagulable State                                    2.0.2.3  Left Ventricular Asist Device                            2.0-3.0  Mechanical Heart Valve                                  -    Aortic(with afib, MI, embolism, HF, LA enlargement,    and/or  coagulopathy)                                     2.0-3.0 (2.5-3.5)     Mitral                                                             2.5-3.5  Prosthetic/Bioprosthetic Heart Valve               2.0-3.0  Venous thromboembolism (VTE: VT, PE        2.0-3.0    APTT [[id number]]  (Normal) Collected: 03/29/25 1114    Lab Status: Final result Specimen: Blood from Arm, Left Updated: 03/29/25 1138     PTT 28 seconds     Lactic acid, plasma (w/reflex if result > 2.0) [[id number]]  (Abnormal) Collected: 03/29/25 1114    Lab Status: Final result Specimen: Blood from Arm, Left Updated: 03/29/25 1137     LACTIC ACID 2.3 mmol/L     Narrative:      Result may be elevated if tourniquet was used during collection.    Comprehensive metabolic panel [[id number]]  (Abnormal) Collected: 03/29/25 1114    Lab Status: Final result Specimen: Blood from Arm, Left Updated: 03/29/25 1137     Sodium 139 mmol/L      Potassium 3.2 mmol/L      Chloride 101 mmol/L      CO2 31 mmol/L      ANION GAP 7 mmol/L      BUN 6 mg/dL      Creatinine 0.50 mg/dL      Glucose 129 mg/dL      Calcium 9.3 mg/dL      AST 9 U/L      ALT 10 U/L      Alkaline Phosphatase 103 U/L      Total Protein 7.0 g/dL      Albumin 3.9 g/dL      Total Bilirubin 0.35 mg/dL      eGFR 101 ml/min/1.73sq m     Narrative:      National Kidney Disease Foundation guidelines for Chronic Kidney Disease (CKD):     Stage 1 with normal or high GFR (GFR > 90 mL/min/1.73 square meters)    Stage 2 Mild CKD (GFR = 60-89 mL/min/1.73 square meters)    Stage 3A Moderate CKD (GFR = 45-59 mL/min/1.73 square meters)    Stage 3B Moderate CKD (GFR = 30-44 mL/min/1.73 square meters)    Stage 4 Severe CKD (GFR = 15-29 mL/min/1.73 square meters)    Stage 5 End Stage CKD (GFR <15 mL/min/1.73 square meters)  Note: GFR calculation is accurate only with a steady state creatinine    Magnesium [[id number]]  (Abnormal) Collected: 03/29/25 1114    Lab Status: Final result Specimen: Blood from Arm, Left Updated:  03/29/25 1137     Magnesium 1.7 mg/dL     Lipase [[id number]]  (Normal) Collected: 03/29/25 1114    Lab Status: Final result Specimen: Blood from Arm, Left Updated: 03/29/25 1137     Lipase 14 u/L     CBC and differential [[id number]]  (Abnormal) Collected: 03/29/25 1114    Lab Status: Final result Specimen: Blood from Arm, Left Updated: 03/29/25 1120     WBC 13.93 Thousand/uL      RBC 4.51 Million/uL      Hemoglobin 12.8 g/dL      Hematocrit 41.1 %      MCV 91 fL      MCH 28.4 pg      MCHC 31.1 g/dL      RDW 13.2 %      MPV 11.3 fL      Platelets 308 Thousands/uL      nRBC 0 /100 WBCs      Segmented % 80 %      Immature Grans % 0 %      Lymphocytes % 13 %      Monocytes % 7 %      Eosinophils Relative 0 %      Basophils Relative 0 %      Absolute Neutrophils 11.08 Thousands/µL      Absolute Immature Grans 0.06 Thousand/uL      Absolute Lymphocytes 1.74 Thousands/µL      Absolute Monocytes 0.94 Thousand/µL      Eosinophils Absolute 0.05 Thousand/µL      Basophils Absolute 0.06 Thousands/µL             XR chest 1 view portable   ED Interpretation by Darin Ag MD (03/29 1120)   No significant change from previous chest x-ray.          ECG 12 Lead Documentation Only    Date/Time: 3/29/2025 11:13 AM    Performed by: Darin Ag MD  Authorized by: Darin Ag MD    ECG reviewed by me, the ED Provider: yes    Patient location:  ED  Rate:     ECG rate:  110  Rhythm:     Rhythm: sinus rhythm    Ectopy:     Ectopy: none    QRS:     QRS axis:  Normal      ED Medication and Procedure Management   Prior to Admission Medications   Prescriptions Last Dose Informant Patient Reported? Taking?   albuterol (PROVENTIL HFA,VENTOLIN HFA) 90 mcg/act inhaler   No No   Sig: Inhale 2 puffs every 6 (six) hours as needed for wheezing   aspirin (Aspirin 81) 81 mg EC tablet   No No   Sig: Take 1 tablet (81 mg total) by mouth daily   atorvastatin (LIPITOR) 40 mg tablet   No No   Sig: Take 1 tablet (40 mg total) by mouth daily    dicyclomine (BENTYL) 10 mg capsule   No No   Sig: Take 1 capsule (10 mg total) by mouth 3 (three) times a day before meals   docusate sodium (COLACE) 100 mg capsule   Yes No   Sig: Take 100 mg by mouth 2 (two) times a day   ethambutol (MYAMBUTOL) 400 mg tablet   Yes No   Sig: Take 800 mg by mouth daily   famotidine (PEPCID) 20 mg tablet   No No   Sig: Take 1 tablet (20 mg total) by mouth 2 (two) times a day   fluticasone-umeclidinium-vilanterol (Trelegy Ellipta) 100-62.5-25 mcg/actuation inhaler   Yes No   Sig: Inhale 1 puff daily Rinse mouth after use.   furosemide (LASIX) 20 mg tablet   No No   Sig: Take 1 tablet (20 mg total) by mouth daily as needed (If you gain 3 pounds in 24 hours or 5 pounds in 7 days, take 1 pill.)   hydrOXYzine HCL (ATARAX) 25 mg tablet   No No   Sig: Take 1 tablet (25 mg total) by mouth if needed for itching   ipratropium-albuterol (DUO-NEB) 0.5-2.5 mg/3 mL nebulizer solution   No No   Sig: Take 3 mL by nebulization 4 (four) times a day   lidocaine (LIDODERM) 5 %   No No   Sig: Apply 1 patch topically over 12 hours daily for 4 days Remove & Discard patch within 12 hours or as directed by MD   metoprolol tartrate (LOPRESSOR) 25 mg tablet   No No   Sig: Take 1 tablet (25 mg total) by mouth every 12 (twelve) hours   nicotine (NICODERM CQ) 14 mg/24hr TD 24 hr patch   No No   Sig: Place 1 patch on the skin over 24 hours daily   omeprazole (PriLOSEC) 40 MG capsule   No No   Sig: Take 1 capsule (40 mg total) by mouth 2 (two) times a day 30 minutes before meal   ondansetron (ZOFRAN) 4 mg tablet   No No   Sig: Take 1 tablet (4 mg total) by mouth every 6 (six) hours   rifabutin (MYCOBUTIN) 150 mg capsule   Yes No   Sig: Take 300 mg by mouth daily   simethicone (MYLICON) 80 mg chewable tablet   No No   Sig: Chew 1 tablet (80 mg total) every 6 (six) hours as needed for flatulence   sucralfate (CARAFATE) 1 g tablet   No No   Sig: Take 1 tablet (1 g total) by mouth 4 (four) times a day for 14 days       Facility-Administered Medications: None     Patient's Medications   Discharge Prescriptions    CEFUROXIME (CEFTIN) 500 MG TABLET    Take 1 tablet (500 mg total) by mouth every 12 (twelve) hours for 10 days       Start Date: 3/29/2025 End Date: 4/8/2025       Order Dose: 500 mg       Quantity: 20 tablet    Refills: 0    PREDNISONE 10 MG TABLET    Take 4 tabs daily for thee days then take three tabs daily for three days then take two tablets daily for three days then take one tab daily for three days.       Start Date: 3/29/2025 End Date: --       Order Dose: --       Quantity: 30 tablet    Refills: 0     No discharge procedures on file.  ED SEPSIS DOCUMENTATION   Time reflects when diagnosis was documented in both MDM as applicable and the Disposition within this note       Time User Action Codes Description Comment    3/29/2025 12:23 PM Darin Ag Add [J44.1] COPD exacerbation (HCC)                  Darin Ag MD  03/29/25 1300

## 2025-03-29 NOTE — DISCHARGE INSTRUCTIONS
Patient Education     Chronic Obstructive Pulmonary Disease (COPD) Discharge Instructions   About this topic   Chronic obstructive pulmonary disease is also called COPD. It is a lung illness. COPD is often caused by inflammation of the bronchial tubes that carry air into your lungs or by infection. Chronic bronchitis and emphysema are mostly caused by smoking. It can also be caused by breathing in toxic fumes or gases. With chronic bronchitis, your cough will bring up mucus and can last for months.     What care is needed at home?   Ask your doctor what you need to do when you go home. Make sure you ask questions if you do not understand what the doctor says.  If you smoke, try to quit. Your doctor or nurse can help you with this.  Stay away from smoke-filled places. Avoid other things that may cause breathing problems like fumes, pollution, dust, and other common allergens.  If you have an inhaler to take when you are feeling short of breath, be sure to carry it with you all the time. Then, you can take it when needed. Take all your other medicines as directed.  The doctor may have ordered antibiotics to treat an infection. It is important to take all of your antibiotics even if you start to feel better.  Work to get as healthy as possible.  Be active at home by:  Walking. Ask your doctor how far you can walk and how often you should walk.  Exercising your arms, shoulders, and legs. Ask your doctor or therapist about what exercises are best for you.  Do breathing exercises 2 to 3 times each day.  Wear a mask when you are around dust or pollution.  Protect yourself from getting sick.  Wash your hands often.  Ask visitors with a cold to wear a mask or reschedule their visit.  Save your energy.  Place the things you use within easy reach where you do not have to bend over or stretch to get them.  Use a cart with wheels to move things around your house.  Avoid heavy activities unless your doctor tells you it is  OK.  Use oxygen if you need it. Your doctor may give you oxygen to use at home. You will be taught how to use the oxygen at home by the staff that brings it to your home. Follow your doctor's advice on using it.  Never change the amount of oxygen flowing without talking to your doctor.  Always have a back-up oxygen supply at home or when you go out.  No candles, matches, cigarettes, or open flame should ever come near your oxygen.  Never smoke when using oxygen. This can cause severe burns to your face, mouth, throat, and lungs.       What follow-up care is needed?   Your doctor may ask you to make visits to the office to check on your progress. Be sure to keep these visits. You may be sent to a doctor who is specializes in lung illnesses. Your doctor may suggest you go to a lung rehab center to help improve your health.  Your doctor may order:  Breathing tests that check the amount and force of the air you breathe in and out. These are called pulmonary function tests or PFTs.  Chest x-rays  CT scan of your chest  Arterial blood gas (ABG) to measure the amount of oxygen in your blood that is taken directly from your artery  A breathing therapist to help you with your breathing exercises  Someone who can help you stop smoking, if you smoke  What drugs may be needed?   Take your drugs as ordered by your doctor. The doctor may order drugs to:  Make breathing easier  Help decrease coughing  Lower swelling in your airways  Prevent infection  Help you stop smoking  Will physical activity be limited?   Your physical activities may be limited as long as you have the signs of this health problem. Avoid heavy and tiring activities. Talk to your doctor about the right amount of activity for you.  What problems could happen?   Lung infections  High blood pressure  Heart problems  Anxiety and low mood  Lung collapse  Fluid in the lungs  What can be done to prevent this health problem?   If you have COPD you can take some steps  to keep it from getting worse.  Try to quit smoking.  If you have COPD already, continuing to smoke will make it worse more quickly.  Wear protective gear, like a mask and goggles, if exposed to irritants or toxins at work.  Stay away from crowded places.  Get a flu shot each year. Ask your doctor if you need a pneumonia shot.     When do I need to call the doctor?   Activate the emergency medical system right away if you have signs of a heart attack. Call 911 in the [place] or [place]. The sooner treatment begins, the better your chances for recovery. Call for emergency help right away if you have:  Signs of heart attack which may include:  Severe chest pain, pressure, or discomfort with:  Breathing trouble; sweating; upset stomach; or cold, clammy skin.  Pain in your arms, back, or jaw.  Worse pain with activity like walking up stairs.  Fast or irregular heartbeat.  Feeling dizzy, faint, or weak.  Call your regular doctor if:  You are having so much trouble breathing that you can only say one or two words at a time.  You need to sit upright at all times to be able to breathe and or cannot lie down.  You are very tired from working to catch your breath or you are sweating from trying to breathe.  You have trouble breathing when talking or sitting still.  You cough up blood.  You have a fever of 100.4°F (38°C) or higher or chills.  You are feeling weak when doing activities.  You have new or worsening cough.  You cough up more mucus.  You feel more short of breath.  Teach Back: Helping You Understand   The Teach Back Method helps you understand the information we are giving you. After you talk with the staff, tell them in your own words what you learned. This helps to make sure the staff has described each thing clearly. It also helps to explain things that may have been confusing. Before going home, make sure you can do these:  I can tell you about my condition.  I can tell you what I can do to help protect my  lungs and save my strength.  I can tell you what I will do if I have chest tightness, wheezing, a cough that does not go away, or trouble breathing.  Last Reviewed Date   2022-02-11  Consumer Information Use and Disclaimer   This generalized information is a limited summary of diagnosis, treatment, and/or medication information. It is not meant to be comprehensive and should be used as a tool to help the user understand and/or assess potential diagnostic and treatment options. It does NOT include all information about conditions, treatments, medications, side effects, or risks that may apply to a specific patient. It is not intended to be medical advice or a substitute for the medical advice, diagnosis, or treatment of a health care provider based on the health care provider's examination and assessment of a patient’s specific and unique circumstances. Patients must speak with a health care provider for complete information about their health, medical questions, and treatment options, including any risks or benefits regarding use of medications. This information does not endorse any treatments or medications as safe, effective, or approved for treating a specific patient. [employer] and its affiliates disclaim any warranty or liability relating to this information or the use thereof. The use of this information is governed by the Terms of Use, available at https://www.woltersStoroneuwer.com/en/know/clinical-effectiveness-terms   Copyright   Copyright © 2024 [employer] and its affiliates and/or licensors. All rights reserved.

## 2025-03-30 LAB
ATRIAL RATE: 109 BPM
P AXIS: 75 DEGREES
PR INTERVAL: 126 MS
QRS AXIS: 74 DEGREES
QRSD INTERVAL: 84 MS
QT INTERVAL: 334 MS
QTC INTERVAL: 449 MS
T WAVE AXIS: 67 DEGREES
VENTRICULAR RATE: 109 BPM

## 2025-03-30 PROCEDURE — PBNCHG PB NO CHARGE PLACEHOLDER: Performed by: INTERNAL MEDICINE

## 2025-04-05 ENCOUNTER — APPOINTMENT (EMERGENCY)
Dept: CT IMAGING | Facility: HOSPITAL | Age: 65
DRG: 871 | End: 2025-04-05
Payer: COMMERCIAL

## 2025-04-05 ENCOUNTER — APPOINTMENT (EMERGENCY)
Dept: RADIOLOGY | Facility: HOSPITAL | Age: 65
DRG: 871 | End: 2025-04-05
Payer: COMMERCIAL

## 2025-04-05 ENCOUNTER — HOSPITAL ENCOUNTER (INPATIENT)
Facility: HOSPITAL | Age: 65
LOS: 3 days | Discharge: HOME | DRG: 871 | End: 2025-04-09
Attending: STUDENT IN AN ORGANIZED HEALTH CARE EDUCATION/TRAINING PROGRAM | Admitting: FAMILY MEDICINE
Payer: COMMERCIAL

## 2025-04-05 DIAGNOSIS — R60.0 BILATERAL LOWER EXTREMITY EDEMA: ICD-10-CM

## 2025-04-05 DIAGNOSIS — J18.9 BILATERAL PNEUMONIA: ICD-10-CM

## 2025-04-05 DIAGNOSIS — J96.11 CHRONIC RESPIRATORY FAILURE WITH HYPOXIA (HCC): ICD-10-CM

## 2025-04-05 DIAGNOSIS — J44.1 COPD EXACERBATION (HCC): Primary | ICD-10-CM

## 2025-04-05 PROBLEM — I50.33 ACUTE ON CHRONIC DIASTOLIC (CONGESTIVE) HEART FAILURE (HCC): Status: ACTIVE | Noted: 2025-04-05

## 2025-04-05 PROBLEM — F17.200 SMOKER: Status: ACTIVE | Noted: 2025-04-05

## 2025-04-05 PROBLEM — J18.0 BRONCHOPNEUMONIA: Status: ACTIVE | Noted: 2024-10-04

## 2025-04-05 PROBLEM — A41.9 SEPSIS (HCC): Status: ACTIVE | Noted: 2025-04-05

## 2025-04-05 LAB
ALBUMIN SERPL BCG-MCNC: 3.9 G/DL (ref 3.5–5)
ALP SERPL-CCNC: 112 U/L (ref 34–104)
ALT SERPL W P-5'-P-CCNC: 13 U/L (ref 7–52)
ANION GAP SERPL CALCULATED.3IONS-SCNC: 8 MMOL/L (ref 4–13)
APTT PPP: 26 SECONDS (ref 23–34)
AST SERPL W P-5'-P-CCNC: 11 U/L (ref 13–39)
BASE EX.OXY STD BLDV CALC-SCNC: 64.1 % (ref 60–80)
BASE EXCESS BLDV CALC-SCNC: 3.7 MMOL/L
BASOPHILS # BLD AUTO: 0.04 THOUSANDS/ÂΜL (ref 0–0.1)
BASOPHILS NFR BLD AUTO: 0 % (ref 0–1)
BILIRUB SERPL-MCNC: 0.46 MG/DL (ref 0.2–1)
BILIRUB UR QL STRIP: NEGATIVE
BNP SERPL-MCNC: 35 PG/ML (ref 0–100)
BUN SERPL-MCNC: 7 MG/DL (ref 5–25)
CALCIUM SERPL-MCNC: 9 MG/DL (ref 8.4–10.2)
CARDIAC TROPONIN I PNL SERPL HS: 6 NG/L (ref ?–50)
CHLORIDE SERPL-SCNC: 99 MMOL/L (ref 96–108)
CLARITY UR: CLEAR
CO2 SERPL-SCNC: 29 MMOL/L (ref 21–32)
COLOR UR: YELLOW
CREAT SERPL-MCNC: 0.43 MG/DL (ref 0.6–1.3)
D DIMER PPP FEU-MCNC: <0.27 UG/ML FEU
EOSINOPHIL # BLD AUTO: 0.04 THOUSAND/ÂΜL (ref 0–0.61)
EOSINOPHIL NFR BLD AUTO: 0 % (ref 0–6)
ERYTHROCYTE [DISTWIDTH] IN BLOOD BY AUTOMATED COUNT: 13.3 % (ref 11.6–15.1)
FLUAV AG UPPER RESP QL IA.RAPID: NEGATIVE
FLUBV AG UPPER RESP QL IA.RAPID: NEGATIVE
GFR SERPL CREATININE-BSD FRML MDRD: 106 ML/MIN/1.73SQ M
GLUCOSE SERPL-MCNC: 130 MG/DL (ref 65–140)
GLUCOSE UR STRIP-MCNC: ABNORMAL MG/DL
HCO3 BLDV-SCNC: 29.5 MMOL/L (ref 24–30)
HCT VFR BLD AUTO: 42.3 % (ref 34.8–46.1)
HGB BLD-MCNC: 13.6 G/DL (ref 11.5–15.4)
HGB UR QL STRIP.AUTO: NEGATIVE
IMM GRANULOCYTES # BLD AUTO: 0.09 THOUSAND/UL (ref 0–0.2)
IMM GRANULOCYTES NFR BLD AUTO: 1 % (ref 0–2)
INR PPP: 1.04 (ref 0.85–1.19)
KETONES UR STRIP-MCNC: NEGATIVE MG/DL
LEUKOCYTE ESTERASE UR QL STRIP: NEGATIVE
LYMPHOCYTES # BLD AUTO: 1.95 THOUSANDS/ÂΜL (ref 0.6–4.47)
LYMPHOCYTES NFR BLD AUTO: 10 % (ref 14–44)
MCH RBC QN AUTO: 28.7 PG (ref 26.8–34.3)
MCHC RBC AUTO-ENTMCNC: 32.2 G/DL (ref 31.4–37.4)
MCV RBC AUTO: 89 FL (ref 82–98)
MONOCYTES # BLD AUTO: 1.07 THOUSAND/ÂΜL (ref 0.17–1.22)
MONOCYTES NFR BLD AUTO: 6 % (ref 4–12)
NEUTROPHILS # BLD AUTO: 15.91 THOUSANDS/ÂΜL (ref 1.85–7.62)
NEUTS SEG NFR BLD AUTO: 83 % (ref 43–75)
NITRITE UR QL STRIP: NEGATIVE
NRBC BLD AUTO-RTO: 0 /100 WBCS
O2 CT BLDV-SCNC: 13.6 ML/DL
PCO2 BLDV: 48.7 MM HG (ref 42–50)
PH BLDV: 7.4 [PH] (ref 7.3–7.4)
PH UR STRIP.AUTO: 7 [PH]
PLATELET # BLD AUTO: 294 THOUSANDS/UL (ref 149–390)
PMV BLD AUTO: 11.7 FL (ref 8.9–12.7)
PO2 BLDV: 34 MM HG (ref 35–45)
POTASSIUM SERPL-SCNC: 3.3 MMOL/L (ref 3.5–5.3)
PROCALCITONIN SERPL-MCNC: <0.05 NG/ML
PROT SERPL-MCNC: 6.9 G/DL (ref 6.4–8.4)
PROT UR STRIP-MCNC: NEGATIVE MG/DL
PROTHROMBIN TIME: 14 SECONDS (ref 12.3–15)
RBC # BLD AUTO: 4.74 MILLION/UL (ref 3.81–5.12)
SARS-COV+SARS-COV-2 AG RESP QL IA.RAPID: NEGATIVE
SODIUM SERPL-SCNC: 136 MMOL/L (ref 135–147)
SP GR UR STRIP.AUTO: <=1.005 (ref 1–1.03)
UROBILINOGEN UR QL STRIP.AUTO: 0.2 E.U./DL
WBC # BLD AUTO: 19.1 THOUSAND/UL (ref 4.31–10.16)

## 2025-04-05 PROCEDURE — 81003 URINALYSIS AUTO W/O SCOPE: CPT | Performed by: STUDENT IN AN ORGANIZED HEALTH CARE EDUCATION/TRAINING PROGRAM

## 2025-04-05 PROCEDURE — 71046 X-RAY EXAM CHEST 2 VIEWS: CPT

## 2025-04-05 PROCEDURE — 96374 THER/PROPH/DIAG INJ IV PUSH: CPT

## 2025-04-05 PROCEDURE — 94664 DEMO&/EVAL PT USE INHALER: CPT

## 2025-04-05 PROCEDURE — 84484 ASSAY OF TROPONIN QUANT: CPT

## 2025-04-05 PROCEDURE — 93005 ELECTROCARDIOGRAM TRACING: CPT

## 2025-04-05 PROCEDURE — 94644 CONT INHLJ TX 1ST HOUR: CPT

## 2025-04-05 PROCEDURE — 94760 N-INVAS EAR/PLS OXIMETRY 1: CPT

## 2025-04-05 PROCEDURE — 87449 NOS EACH ORGANISM AG IA: CPT

## 2025-04-05 PROCEDURE — 36415 COLL VENOUS BLD VENIPUNCTURE: CPT

## 2025-04-05 PROCEDURE — 83880 ASSAY OF NATRIURETIC PEPTIDE: CPT

## 2025-04-05 PROCEDURE — 84145 PROCALCITONIN (PCT): CPT

## 2025-04-05 PROCEDURE — 94640 AIRWAY INHALATION TREATMENT: CPT

## 2025-04-05 PROCEDURE — 82805 BLOOD GASES W/O2 SATURATION: CPT

## 2025-04-05 PROCEDURE — 71250 CT THORAX DX C-: CPT

## 2025-04-05 PROCEDURE — 85025 COMPLETE CBC W/AUTO DIFF WBC: CPT

## 2025-04-05 PROCEDURE — 85730 THROMBOPLASTIN TIME PARTIAL: CPT

## 2025-04-05 PROCEDURE — 87804 INFLUENZA ASSAY W/OPTIC: CPT | Performed by: STUDENT IN AN ORGANIZED HEALTH CARE EDUCATION/TRAINING PROGRAM

## 2025-04-05 PROCEDURE — 85610 PROTHROMBIN TIME: CPT

## 2025-04-05 PROCEDURE — 80053 COMPREHEN METABOLIC PANEL: CPT

## 2025-04-05 PROCEDURE — 99285 EMERGENCY DEPT VISIT HI MDM: CPT

## 2025-04-05 PROCEDURE — 85379 FIBRIN DEGRADATION QUANT: CPT | Performed by: STUDENT IN AN ORGANIZED HEALTH CARE EDUCATION/TRAINING PROGRAM

## 2025-04-05 PROCEDURE — 87811 SARS-COV-2 COVID19 W/OPTIC: CPT | Performed by: STUDENT IN AN ORGANIZED HEALTH CARE EDUCATION/TRAINING PROGRAM

## 2025-04-05 RX ORDER — CEFTRIAXONE 1 G/50ML
1000 INJECTION, SOLUTION INTRAVENOUS EVERY 24 HOURS
Status: DISCONTINUED | OUTPATIENT
Start: 2025-04-05 | End: 2025-04-09

## 2025-04-05 RX ORDER — POTASSIUM CHLORIDE 1500 MG/1
40 TABLET, EXTENDED RELEASE ORAL ONCE
Status: COMPLETED | OUTPATIENT
Start: 2025-04-05 | End: 2025-04-05

## 2025-04-05 RX ORDER — ONDANSETRON 2 MG/ML
4 INJECTION INTRAMUSCULAR; INTRAVENOUS EVERY 6 HOURS PRN
Status: DISCONTINUED | OUTPATIENT
Start: 2025-04-05 | End: 2025-04-09 | Stop reason: HOSPADM

## 2025-04-05 RX ORDER — IPRATROPIUM BROMIDE AND ALBUTEROL SULFATE 2.5; .5 MG/3ML; MG/3ML
3 SOLUTION RESPIRATORY (INHALATION) ONCE
Status: COMPLETED | OUTPATIENT
Start: 2025-04-05 | End: 2025-04-05

## 2025-04-05 RX ORDER — SIMETHICONE 80 MG
80 TABLET,CHEWABLE ORAL EVERY 6 HOURS PRN
Status: DISCONTINUED | OUTPATIENT
Start: 2025-04-05 | End: 2025-04-09 | Stop reason: HOSPADM

## 2025-04-05 RX ORDER — FLUTICASONE FUROATE AND VILANTEROL 100; 25 UG/1; UG/1
1 POWDER RESPIRATORY (INHALATION) DAILY
Status: DISCONTINUED | OUTPATIENT
Start: 2025-04-06 | End: 2025-04-06

## 2025-04-05 RX ORDER — DICYCLOMINE HYDROCHLORIDE 10 MG/1
10 CAPSULE ORAL
Status: DISCONTINUED | OUTPATIENT
Start: 2025-04-05 | End: 2025-04-09 | Stop reason: HOSPADM

## 2025-04-05 RX ORDER — IPRATROPIUM BROMIDE AND ALBUTEROL SULFATE 2.5; .5 MG/3ML; MG/3ML
3 SOLUTION RESPIRATORY (INHALATION)
Status: DISCONTINUED | OUTPATIENT
Start: 2025-04-06 | End: 2025-04-09 | Stop reason: HOSPADM

## 2025-04-05 RX ORDER — DOCUSATE SODIUM 100 MG/1
100 CAPSULE, LIQUID FILLED ORAL 2 TIMES DAILY
Status: DISCONTINUED | OUTPATIENT
Start: 2025-04-05 | End: 2025-04-09 | Stop reason: HOSPADM

## 2025-04-05 RX ORDER — PANTOPRAZOLE SODIUM 40 MG/1
40 TABLET, DELAYED RELEASE ORAL
Status: DISCONTINUED | OUTPATIENT
Start: 2025-04-05 | End: 2025-04-09 | Stop reason: HOSPADM

## 2025-04-05 RX ORDER — ATORVASTATIN CALCIUM 40 MG/1
40 TABLET, FILM COATED ORAL DAILY
Status: DISCONTINUED | OUTPATIENT
Start: 2025-04-05 | End: 2025-04-09 | Stop reason: HOSPADM

## 2025-04-05 RX ORDER — IPRATROPIUM BROMIDE AND ALBUTEROL SULFATE 2.5; .5 MG/3ML; MG/3ML
3 SOLUTION RESPIRATORY (INHALATION)
Status: DISCONTINUED | OUTPATIENT
Start: 2025-04-05 | End: 2025-04-05

## 2025-04-05 RX ORDER — ALBUTEROL SULFATE 5 MG/ML
10 SOLUTION RESPIRATORY (INHALATION) ONCE
Status: COMPLETED | OUTPATIENT
Start: 2025-04-05 | End: 2025-04-05

## 2025-04-05 RX ORDER — ASPIRIN 81 MG/1
81 TABLET ORAL DAILY
Status: DISCONTINUED | OUTPATIENT
Start: 2025-04-06 | End: 2025-04-09 | Stop reason: HOSPADM

## 2025-04-05 RX ORDER — NICOTINE 21 MG/24HR
1 PATCH, TRANSDERMAL 24 HOURS TRANSDERMAL DAILY
Status: DISCONTINUED | OUTPATIENT
Start: 2025-04-05 | End: 2025-04-09 | Stop reason: HOSPADM

## 2025-04-05 RX ORDER — SODIUM CHLORIDE FOR INHALATION 0.9 %
12 VIAL, NEBULIZER (ML) INHALATION ONCE
Status: COMPLETED | OUTPATIENT
Start: 2025-04-05 | End: 2025-04-05

## 2025-04-05 RX ORDER — IPRATROPIUM BROMIDE AND ALBUTEROL SULFATE 2.5; .5 MG/3ML; MG/3ML
3 SOLUTION RESPIRATORY (INHALATION) EVERY 6 HOURS PRN
Qty: 180 ML | Refills: 0 | Status: SHIPPED | OUTPATIENT
Start: 2025-04-05

## 2025-04-05 RX ORDER — PREDNISONE 20 MG/1
40 TABLET ORAL DAILY
Qty: 8 TABLET | Refills: 0 | Status: SHIPPED | OUTPATIENT
Start: 2025-04-05 | End: 2025-04-09

## 2025-04-05 RX ORDER — SUCRALFATE 1 G/1
1 TABLET ORAL 4 TIMES DAILY
Status: DISCONTINUED | OUTPATIENT
Start: 2025-04-05 | End: 2025-04-09 | Stop reason: HOSPADM

## 2025-04-05 RX ORDER — AZITHROMYCIN 250 MG/1
500 TABLET, FILM COATED ORAL ONCE
Status: COMPLETED | OUTPATIENT
Start: 2025-04-05 | End: 2025-04-05

## 2025-04-05 RX ORDER — ENOXAPARIN SODIUM 100 MG/ML
40 INJECTION SUBCUTANEOUS DAILY
Status: DISCONTINUED | OUTPATIENT
Start: 2025-04-05 | End: 2025-04-09 | Stop reason: HOSPADM

## 2025-04-05 RX ORDER — FUROSEMIDE 10 MG/ML
40 INJECTION INTRAMUSCULAR; INTRAVENOUS 2 TIMES DAILY
Status: DISCONTINUED | OUTPATIENT
Start: 2025-04-05 | End: 2025-04-07

## 2025-04-05 RX ORDER — AZITHROMYCIN 250 MG/1
500 TABLET, FILM COATED ORAL EVERY 24 HOURS
Status: COMPLETED | OUTPATIENT
Start: 2025-04-06 | End: 2025-04-07

## 2025-04-05 RX ORDER — METHYLPREDNISOLONE SODIUM SUCCINATE 40 MG/ML
40 INJECTION, POWDER, LYOPHILIZED, FOR SOLUTION INTRAMUSCULAR; INTRAVENOUS ONCE
Status: COMPLETED | OUTPATIENT
Start: 2025-04-05 | End: 2025-04-05

## 2025-04-05 RX ORDER — FAMOTIDINE 20 MG/1
20 TABLET, FILM COATED ORAL 2 TIMES DAILY
Status: DISCONTINUED | OUTPATIENT
Start: 2025-04-05 | End: 2025-04-09 | Stop reason: HOSPADM

## 2025-04-05 RX ORDER — ACETAMINOPHEN 325 MG/1
650 TABLET ORAL EVERY 6 HOURS PRN
Status: DISCONTINUED | OUTPATIENT
Start: 2025-04-05 | End: 2025-04-09 | Stop reason: HOSPADM

## 2025-04-05 RX ORDER — AZITHROMYCIN 250 MG/1
250 TABLET, FILM COATED ORAL DAILY
Qty: 4 TABLET | Refills: 0 | Status: SHIPPED | OUTPATIENT
Start: 2025-04-05 | End: 2025-04-09

## 2025-04-05 RX ORDER — GUAIFENESIN 600 MG/1
600 TABLET, EXTENDED RELEASE ORAL 2 TIMES DAILY
Status: DISCONTINUED | OUTPATIENT
Start: 2025-04-05 | End: 2025-04-09 | Stop reason: HOSPADM

## 2025-04-05 RX ADMIN — NICOTINE 1 PATCH: 21 PATCH, EXTENDED RELEASE TRANSDERMAL at 15:59

## 2025-04-05 RX ADMIN — DOCUSATE SODIUM 100 MG: 100 CAPSULE, LIQUID FILLED ORAL at 17:07

## 2025-04-05 RX ADMIN — ENOXAPARIN SODIUM 40 MG: 40 INJECTION SUBCUTANEOUS at 15:57

## 2025-04-05 RX ADMIN — PANTOPRAZOLE SODIUM 40 MG: 40 TABLET, DELAYED RELEASE ORAL at 15:57

## 2025-04-05 RX ADMIN — IPRATROPIUM BROMIDE 1 MG: 0.5 SOLUTION RESPIRATORY (INHALATION) at 12:37

## 2025-04-05 RX ADMIN — ALBUTEROL SULFATE 10 MG: 2.5 SOLUTION RESPIRATORY (INHALATION) at 12:37

## 2025-04-05 RX ADMIN — IPRATROPIUM BROMIDE AND ALBUTEROL SULFATE 3 ML: .5; 3 SOLUTION RESPIRATORY (INHALATION) at 09:32

## 2025-04-05 RX ADMIN — SUCRALFATE 1 G: 1 TABLET ORAL at 22:16

## 2025-04-05 RX ADMIN — FUROSEMIDE 40 MG: 10 INJECTION, SOLUTION INTRAMUSCULAR; INTRAVENOUS at 17:07

## 2025-04-05 RX ADMIN — GUAIFENESIN 600 MG: 600 TABLET, EXTENDED RELEASE ORAL at 17:07

## 2025-04-05 RX ADMIN — ISODIUM CHLORIDE 12 ML: 0.03 SOLUTION RESPIRATORY (INHALATION) at 12:37

## 2025-04-05 RX ADMIN — POTASSIUM CHLORIDE 40 MEQ: 1500 TABLET, EXTENDED RELEASE ORAL at 12:35

## 2025-04-05 RX ADMIN — IPRATROPIUM BROMIDE AND ALBUTEROL SULFATE 3 ML: .5; 3 SOLUTION RESPIRATORY (INHALATION) at 11:50

## 2025-04-05 RX ADMIN — FAMOTIDINE 20 MG: 20 TABLET, FILM COATED ORAL at 17:07

## 2025-04-05 RX ADMIN — SUCRALFATE 1 G: 1 TABLET ORAL at 17:07

## 2025-04-05 RX ADMIN — DICYCLOMINE HYDROCHLORIDE 10 MG: 10 CAPSULE ORAL at 15:57

## 2025-04-05 RX ADMIN — CEFTRIAXONE 1000 MG: 1 INJECTION, SOLUTION INTRAVENOUS at 15:58

## 2025-04-05 RX ADMIN — ATORVASTATIN CALCIUM 40 MG: 40 TABLET, FILM COATED ORAL at 15:57

## 2025-04-05 RX ADMIN — IPRATROPIUM BROMIDE AND ALBUTEROL SULFATE 3 ML: .5; 3 SOLUTION RESPIRATORY (INHALATION) at 19:34

## 2025-04-05 RX ADMIN — AZITHROMYCIN DIHYDRATE 500 MG: 250 TABLET ORAL at 09:59

## 2025-04-05 RX ADMIN — UMECLIDINIUM 1 PUFF: 62.5 AEROSOL, POWDER ORAL at 16:36

## 2025-04-05 RX ADMIN — METHYLPREDNISOLONE SODIUM SUCCINATE 40 MG: 40 INJECTION, POWDER, FOR SOLUTION INTRAMUSCULAR; INTRAVENOUS at 09:58

## 2025-04-05 NOTE — ED NOTES
Patient verbalized being upset with discharge, stated they are still SOB and concerned for lower leg swelling. Stated provider did not come back in to review results with them. Provider made aware.      Megan Wilson RN  04/05/25 1029

## 2025-04-05 NOTE — RESPIRATORY THERAPY NOTE
Respiratory Note     04/05/25 1235   Inhalation Therapy Tx   $ Inhalation Therapy Performed Yes   $ CBT - First Hour and  Each Additional Hour First hour   $ Pulse Oximetry Spot Check Charge Completed   Pre-Treatment Pulse 98   Pre-Treatment Respirations 18   Duration 60   Breath Sounds Pre-Treatment Bilateral Diminished   Delivery Source UDN;Air   Position High Galindo's   Treatment Tolerance Tolerated well   Resp Comments Pt with COPD hx, current smoker, wears 2LPM NC at baseline. Given 1hr UDN tx as ordered. Pt stable on baseline 2LPM NC.

## 2025-04-05 NOTE — ED NOTES
Entered room to administer medication, patient stated that when receiving nebulized treatments previously they have been provided a face mask and not a t-piece. Advised that one could be provided and patient refused.      Megan Wilson RN  04/05/25 5933

## 2025-04-05 NOTE — H&P
"H&P - Hospitalist   Name: Ileana Seaman 64 y.o. female I MRN: [id number]  Unit/Bed#: ED 02 I Date of Admission: 4/5/2025   Date of Service: 4/5/2025 I Hospital Day: 0     Assessment & Plan  Bronchopneumonia  Presented to ED with SOB, cough, leg swelling.  CT mild new patchy opacity RLL and mild new bilateral LL greater on left, compatible with bronchopneumonia/bronchitis  COVID/Flu/RSV negative   Urine strep and legionella ordered  Continue rocephin and azithro  Currently at baseline 2L  Aspiration Precautions, Respiratory Protocol, Incentive Spirometry    Acute on chronic diastolic (congestive) heart failure (HCC)  Wt Readings from Last 3 Encounters:   04/05/25 44 kg (97 lb)   03/29/25 52.2 kg (115 lb)   03/16/25 44 kg (97 lb)   Echo in 10/2024 with EF 65% diastolic function mildly abnormal  3+ pitting edema at time of admission, reports worsened than normal despite compression socks. No longer taking lasix   BNP 35  Will start lasix 40mg iv bid and monitor response   Monitor daily weights, intake and output   COPD, severe (HCC)  Not in acute exacerbation, no wheezing on exam   At baseline O2  Continue home nebs and inhalers  Chronic hypoxic respiratory failure (HCC)  Chronic baseline 2L - currently at baseline   Smoker  NRT ordered  Sepsis (HCC)  AEB tachycardia and leukocytosis  Source: pneumonia  Continue rocephin and azithro  Holding off on fluids with volume overload   Monitor daily cbc      VTE Pharmacologic Prophylaxis:   Moderate Risk (Score 3-4) - Pharmacological DVT Prophylaxis Ordered: enoxaparin (Lovenox).  Code Status: Prior   Discussion with family: Patient declined call to .     Anticipated Length of Stay: Patient will be admitted on an observation basis with an anticipated length of stay of less than 2 midnights secondary to secondary to volume overload requiring lasix and pneumonia requiring iv antibiotics .    History of Present Illness   Chief Complaint: \"I have had leg " "swelling and worsening shortness of breath for the last few days\"    Ileana Seaman is a 64 y.o. female with a PMH of COPD, heart failure, smoking, a fib who presents with shortness of breath.  Patient states for last 2 days, she has been having worsening shortness of breath and cough.  States that she was in the ED and was sent home with antibiotics and steroids, but was unable to continue taking them due to upset stomach.  Has been using inhalers and nebulizers at home without relief of shortness of breath.  Endorses that she still smokes because she ran out of nicotine patches.  Has remained at her 2 L chronically.  States that her legs have been getting progressively more swollen despite using compression socks at home.  She was taken off of Lasix outpatient.    Review of Systems   Constitutional:  Positive for chills. Negative for fatigue and fever.   HENT:  Positive for congestion. Negative for rhinorrhea.    Respiratory:  Positive for cough and shortness of breath. Negative for chest tightness and wheezing.    Cardiovascular:  Positive for leg swelling. Negative for chest pain and palpitations.   Gastrointestinal:  Negative for abdominal distention, abdominal pain, constipation, diarrhea, nausea and vomiting.   Genitourinary:  Negative for difficulty urinating and dysuria.   Musculoskeletal:  Negative for arthralgias and back pain.   Skin:  Negative for wound.   Neurological:  Negative for dizziness, syncope, weakness, numbness and headaches.   Psychiatric/Behavioral:  Negative for agitation, behavioral problems and confusion.        Historical Information   Past Medical History:   Diagnosis Date    COPD (chronic obstructive pulmonary disease) (HCC)     Hiatal hernia      Past Surgical History:   Procedure Laterality Date    CHOLECYSTECTOMY       Social History     Tobacco Use    Smoking status: Some Days     Current packs/day: 0.25     Types: Cigarettes    Smokeless tobacco: Never   Vaping Use    Vaping " status: Never Used   Substance and Sexual Activity    Alcohol use: Never    Drug use: Never    Sexual activity: Not on file     E-Cigarette/Vaping    E-Cigarette Use Never User      E-Cigarette/Vaping Substances     History reviewed. No pertinent family history.  Social History:  Marital Status: Single   Patient Pre-hospital Living Situation: Home  Patient Pre-hospital Level of Mobility: walks  Patient Pre-hospital Diet Restrictions: none    Meds/Allergies   I have reviewed home medications with patient personally.  Prior to Admission medications    Medication Sig Start Date End Date Taking? Authorizing Provider   albuterol (PROVENTIL HFA,VENTOLIN HFA) 90 mcg/act inhaler Inhale 2 puffs every 6 (six) hours as needed for wheezing 1/26/25  Yes Rolando Parker MD   aspirin (Aspirin 81) 81 mg EC tablet Take 1 tablet (81 mg total) by mouth daily 12/17/24  Yes Rolando Parker MD   atorvastatin (LIPITOR) 40 mg tablet Take 1 tablet (40 mg total) by mouth daily 12/17/24  Yes Rolando Parker MD   azithromycin (ZITHROMAX) 250 mg tablet Take 1 tablet (250 mg total) by mouth daily for 4 days Take 2 tablets today then 1 tablet daily x 4 days 4/5/25 4/9/25 Yes Ponce Osorio DO   dicyclomine (BENTYL) 10 mg capsule Take 1 capsule (10 mg total) by mouth 3 (three) times a day before meals 12/17/24  Yes Rolando Parker MD   docusate sodium (COLACE) 100 mg capsule Take 100 mg by mouth 2 (two) times a day 11/13/24  Yes Historical Provider, MD   famotidine (PEPCID) 20 mg tablet Take 1 tablet (20 mg total) by mouth 2 (two) times a day 12/6/24  Yes Carlos Turcios PA-C   fluticasone-umeclidinium-vilanterol (Trelegy Ellipta) 100-62.5-25 mcg/actuation inhaler Inhale 1 puff daily Rinse mouth after use.   Yes Historical Provider, MD   ipratropium-albuterol (DUO-NEB) 0.5-2.5 mg/3 mL nebulizer solution Take 3 mL by nebulization 4 (four) times a day 11/23/24  Yes Mary Altamirano MD   ipratropium-albuterol (DUO-NEB) 0.5-2.5 mg/3 mL  nebulizer solution Take 3 mL by nebulization every 6 (six) hours as needed for wheezing or shortness of breath 4/5/25  Yes Ponce Osorio DO   omeprazole (PriLOSEC) 40 MG capsule Take 1 capsule (40 mg total) by mouth 2 (two) times a day 30 minutes before meal 11/8/24 4/5/25 Yes Rolando Parker MD   predniSONE 20 mg tablet Take 2 tablets (40 mg total) by mouth daily for 4 days 4/5/25 4/9/25 Yes Ponce Osorio DO   simethicone (MYLICON) 80 mg chewable tablet Chew 1 tablet (80 mg total) every 6 (six) hours as needed for flatulence 12/17/24  Yes Rolando Parker MD   sucralfate (CARAFATE) 1 g tablet Take 1 tablet (1 g total) by mouth 4 (four) times a day for 14 days 12/6/24 4/5/25 Yes Carlos Turcios PA-C   ethambutol (MYAMBUTOL) 400 mg tablet Take 800 mg by mouth daily  Patient not taking: Reported on 4/5/2025    Historical Provider, MD   lidocaine (LIDODERM) 5 % Apply 1 patch topically over 12 hours daily for 4 days Remove & Discard patch within 12 hours or as directed by MD 10/10/24 10/14/24  Juana Santana MD   ondansetron (ZOFRAN) 4 mg tablet Take 1 tablet (4 mg total) by mouth every 6 (six) hours 12/6/24   Carlos Turcios PA-C   rifabutin (MYCOBUTIN) 150 mg capsule Take 300 mg by mouth daily  Patient not taking: Reported on 4/5/2025    Historical Provider, MD   cefuroxime (CEFTIN) 500 mg tablet Take 1 tablet (500 mg total) by mouth every 12 (twelve) hours for 10 days  Patient not taking: Reported on 4/5/2025 3/29/25 4/5/25  Darin Ag MD   furosemide (LASIX) 20 mg tablet Take 1 tablet (20 mg total) by mouth daily as needed (If you gain 3 pounds in 24 hours or 5 pounds in 7 days, take 1 pill.) 1/26/25 4/5/25  Rolando Parker MD   hydrOXYzine HCL (ATARAX) 25 mg tablet Take 1 tablet (25 mg total) by mouth if needed for itching  Patient not taking: Reported on 4/5/2025 12/17/24 4/5/25  Rolando Parker MD   metoprolol tartrate (LOPRESSOR) 25 mg tablet Take 1 tablet (25 mg total) by mouth  every 12 (twelve) hours  Patient not taking: Reported on 4/5/2025 11/23/24 4/5/25  Mary Altamirano MD   nicotine (NICODERM CQ) 14 mg/24hr TD 24 hr patch Place 1 patch on the skin over 24 hours daily  Patient not taking: Reported on 4/5/2025 10/10/24 4/5/25  Juana Santana MD   predniSONE 10 mg tablet Take 4 tabs daily for thee days then take three tabs daily for three days then take two tablets daily for three days then take one tab daily for three days.  Patient not taking: Reported on 4/5/2025 3/29/25 4/5/25  Darin Ag MD     No Known Allergies    Objective :  Temp:  [98 °F (36.7 °C)] 98 °F (36.7 °C)  HR:  [84-97] 89  BP: (102-129)/(61-70) 102/65  Resp:  [18-30] 18  SpO2:  [91 %-98 %] 98 %  O2 Device: Nasal cannula  Nasal Cannula O2 Flow Rate (L/min):  [2 L/min] 2 L/min    Physical Exam  Vitals reviewed.   Constitutional:       General: She is not in acute distress.     Appearance: She is ill-appearing (chronic).   HENT:      Head: Normocephalic and atraumatic.      Nose: Nose normal.      Mouth/Throat:      Mouth: Mucous membranes are moist.      Pharynx: Oropharynx is clear.   Eyes:      Extraocular Movements: Extraocular movements intact.      Conjunctiva/sclera: Conjunctivae normal.   Cardiovascular:      Rate and Rhythm: Regular rhythm. Tachycardia present.      Pulses: Normal pulses.      Heart sounds: Normal heart sounds. No murmur heard.  Pulmonary:      Effort: Pulmonary effort is normal. No respiratory distress.      Breath sounds: No wheezing or rhonchi.      Comments: Diminished breath sounds, no wheezing  Wet cough  On 2L  Abdominal:      General: Abdomen is flat. Bowel sounds are normal. There is no distension.      Palpations: Abdomen is soft.      Tenderness: There is no abdominal tenderness. There is no guarding.   Musculoskeletal:         General: Normal range of motion.      Cervical back: Normal range of motion.      Right lower leg: Edema present.      Left lower leg: Edema present.       Comments: 3+ pitting edema bilateral lower extremities   Skin:     General: Skin is warm.   Neurological:      General: No focal deficit present.      Mental Status: She is alert and oriented to person, place, and time. Mental status is at baseline.      Motor: No weakness.   Psychiatric:         Mood and Affect: Mood normal.         Behavior: Behavior normal.         Thought Content: Thought content normal.         Judgment: Judgment normal.          Lines/Drains:        Lab Results: I have reviewed the following results:  Results from last 7 days   Lab Units 04/05/25  0844   WBC Thousand/uL 19.10*   HEMOGLOBIN g/dL 13.6   HEMATOCRIT % 42.3   PLATELETS Thousands/uL 294   SEGS PCT % 83*   LYMPHO PCT % 10*   MONO PCT % 6   EOS PCT % 0     Results from last 7 days   Lab Units 04/05/25  0844   SODIUM mmol/L 136   POTASSIUM mmol/L 3.3*   CHLORIDE mmol/L 99   CO2 mmol/L 29   BUN mg/dL 7   CREATININE mg/dL 0.43*   ANION GAP mmol/L 8   CALCIUM mg/dL 9.0   ALBUMIN g/dL 3.9   TOTAL BILIRUBIN mg/dL 0.46   ALK PHOS U/L 112*   ALT U/L 13   AST U/L 11*   GLUCOSE RANDOM mg/dL 130     Results from last 7 days   Lab Units 04/05/25  0844   INR  1.04         Lab Results   Component Value Date    HGBA1C 6.5 (H) 11/13/2024    HGBA1C 6.4 (H) 10/04/2024    HGBA1C 5.9 (H) 05/22/2023           Imaging Results Review: I reviewed radiology reports from this admission including: CT chest.    Administrative Statements     ** Please Note: This note has been constructed using a voice recognition system. **

## 2025-04-05 NOTE — ASSESSMENT & PLAN NOTE
AEB tachycardia and leukocytosis  Source: pneumonia  Continue rocephin and azithro  Holding off on fluids with volume overload   Monitor daily cbc

## 2025-04-05 NOTE — PLAN OF CARE
Problem: PAIN - ADULT  Goal: Verbalizes/displays adequate comfort level or baseline comfort level  Description: Interventions:- Encourage patient to monitor pain and request assistance- Assess pain using appropriate pain scale- Administer analgesics based on type and severity of pain and evaluate response- Implement non-pharmacological measures as appropriate and evaluate response- Consider cultural and social influences on pain and pain management- Notify physician/advanced practitioner if interventions unsuccessful or patient reports new pain  Outcome: Progressing     Problem: INFECTION - ADULT  Goal: Absence or prevention of progression during hospitalization  Description: INTERVENTIONS:- Assess and monitor for signs and symptoms of infection- Monitor lab/diagnostic results- Monitor all insertion sites, i.e. indwelling lines, tubes, and drains- Monitor endotracheal if appropriate and nasal secretions for changes in amount and color- [place] appropriate cooling/warming therapies per order- Administer medications as ordered- Instruct and encourage patient and family to use good hand hygiene technique- Identify and instruct in appropriate isolation precautions for identified infection/condition  Outcome: Progressing     Problem: DISCHARGE PLANNING  Goal: Discharge to home or other facility with appropriate resources  Description: INTERVENTIONS:- Identify barriers to discharge w/patient and caregiver- Arrange for needed discharge resources and transportation as appropriate- Identify discharge learning needs (meds, wound care, etc.)- Arrange for interpretive services to assist at discharge as needed- Refer to Case Management Department for coordinating discharge planning if the patient needs post-hospital services based on physician/advanced practitioner order or complex needs related to functional status, cognitive ability, or social support system  Outcome: Progressing     Problem: Knowledge Deficit  Goal:  Patient/family/caregiver demonstrates understanding of disease process, treatment plan, medications, and discharge instructions  Description: Complete learning assessment and assess knowledge base.Interventions:- Provide teaching at level of understanding- Provide teaching via preferred learning methods  Outcome: Progressing     Problem: RESPIRATORY - ADULT  Goal: Achieves optimal ventilation and oxygenation  Description: INTERVENTIONS:- Assess for changes in respiratory status- Assess for changes in mentation and behavior- Position to facilitate oxygenation and minimize respiratory effort- Oxygen administered by appropriate delivery if ordered- Initiate smoking cessation education as indicated- Encourage broncho-pulmonary hygiene including cough, deep breathe, Incentive Spirometry- Assess the need for suctioning and aspirate as needed- Assess and instruct to report SOB or any respiratory difficulty- Respiratory Therapy support as indicated  Outcome: Progressing

## 2025-04-05 NOTE — ED NOTES
Patient had ambulation trial with pulse oximetry per providers orders. Patient made it out of room, roughly 20 feet, and stated they were very SOB and needed to return to room. Patient assisted back in to room and placed in visitors chair at bedside. Patient tearful throughout walk, stating that staff does not understand how they are feeling. Patient was able to maintain oxygen saturations of 92% on 2L with heart rate of 108-111 throughout the walk. Provider made aware of the same and to speak with patient further at bedside.        Megan Wilson RN  04/05/25 3082

## 2025-04-05 NOTE — ASSESSMENT & PLAN NOTE
Wt Readings from Last 3 Encounters:   04/05/25 44 kg (97 lb)   03/29/25 52.2 kg (115 lb)   03/16/25 44 kg (97 lb)   Echo in 10/2024 with EF 65% diastolic function mildly abnormal  3+ pitting edema at time of admission, reports worsened than normal despite compression socks. No longer taking lasix   BNP 35  Will start lasix 40mg iv bid and monitor response   Monitor daily weights, intake and output

## 2025-04-05 NOTE — PLAN OF CARE
Problem: PAIN - ADULT  Goal: Verbalizes/displays adequate comfort level or baseline comfort level  Description: Interventions:- Encourage patient to monitor pain and request assistance- Assess pain using appropriate pain scale- Administer analgesics based on type and severity of pain and evaluate response- Implement non-pharmacological measures as appropriate and evaluate response- Consider cultural and social influences on pain and pain management- Notify physician/advanced practitioner if interventions unsuccessful or patient reports new pain  Outcome: Progressing     Problem: INFECTION - ADULT  Goal: Absence or prevention of progression during hospitalization  Description: INTERVENTIONS:- Assess and monitor for signs and symptoms of infection- Monitor lab/diagnostic results- Monitor all insertion sites, i.e. indwelling lines, tubes, and drains- Monitor endotracheal if appropriate and nasal secretions for changes in amount and color- [place] appropriate cooling/warming therapies per order- Administer medications as ordered- Instruct and encourage patient and family to use good hand hygiene technique- Identify and instruct in appropriate isolation precautions for identified infection/condition  Outcome: Progressing     Problem: SAFETY ADULT  Goal: Patient will remain free of falls  Description: INTERVENTIONS:- Educate patient/family on patient safety including physical limitations- Instruct patient to call for assistance with activity - Consult OT/PT to assist with strengthening/mobility - Keep Call bell within reach- Keep bed low and locked with side rails adjusted as appropriate- Keep care items and personal belongings within reach- Initiate and maintain comfort rounds- Make Fall Risk Sign visible to staff- Offer Toileting every    Hours, in advance of need- Initiate/Maintain   alarm- Obtain necessary fall risk management equipment:   - Apply yellow socks and bracelet for high fall risk patients- Consider  moving patient to room near nurses station  Outcome: Progressing  Goal: Maintain or return to baseline ADL function  Description: INTERVENTIONS:-  Assess patient's ability to carry out ADLs; assess patient's baseline for ADL function and identify physical deficits which impact ability to perform ADLs (bathing, care of mouth/teeth, toileting, grooming, dressing, etc.)- Assess/evaluate cause of self-care deficits - Assess range of motion- Assess patient's mobility; develop plan if impaired- Assess patient's need for assistive devices and provide as appropriate- Encourage maximum independence but intervene and supervise when necessary- Involve family in performance of ADLs- Assess for home care needs following discharge - Consider OT consult to assist with ADL evaluation and planning for discharge- Provide patient education as appropriate  Outcome: Progressing  Goal: Maintains/Returns to pre admission functional level  Description: INTERVENTIONS:- Perform AM-PAC 6 Click Basic Mobility/ Daily Activity assessment daily.- Set and communicate daily mobility goal to care team and patient/family/caregiver. - Collaborate with rehabilitation services on mobility goals if consulted- Perform Range of Motion    times a day.- Reposition patient every      hours.- Dangle patient    times a day- Stand patient    times a day- Ambulate patient    times a day- Out of bed to chair    times a day - Out of bed for meals        times a day- Out of bed for toileting- Record patient progress and toleration of activity level   Outcome: Progressing     Problem: DISCHARGE PLANNING  Goal: Discharge to home or other facility with appropriate resources  Description: INTERVENTIONS:- Identify barriers to discharge w/patient and caregiver- Arrange for needed discharge resources and transportation as appropriate- Identify discharge learning needs (meds, wound care, etc.)- Arrange for interpretive services to assist at discharge as needed- Refer to  Case Management Department for coordinating discharge planning if the patient needs post-hospital services based on physician/advanced practitioner order or complex needs related to functional status, cognitive ability, or social support system  Outcome: Progressing     Problem: Knowledge Deficit  Goal: Patient/family/caregiver demonstrates understanding of disease process, treatment plan, medications, and discharge instructions  Description: Complete learning assessment and assess knowledge base.Interventions:- Provide teaching at level of understanding- Provide teaching via preferred learning methods  Outcome: Progressing     Problem: RESPIRATORY - ADULT  Goal: Achieves optimal ventilation and oxygenation  Description: INTERVENTIONS:- Assess for changes in respiratory status- Assess for changes in mentation and behavior- Position to facilitate oxygenation and minimize respiratory effort- Oxygen administered by appropriate delivery if ordered- Initiate smoking cessation education as indicated- Encourage broncho-pulmonary hygiene including cough, deep breathe, Incentive Spirometry- Assess the need for suctioning and aspirate as needed- Assess and instruct to report SOB or any respiratory difficulty- Respiratory Therapy support as indicated  Outcome: Progressing

## 2025-04-05 NOTE — ASSESSMENT & PLAN NOTE
Presented to ED with SOB, cough, leg swelling.  CT mild new patchy opacity RLL and mild new bilateral LL greater on left, compatible with bronchopneumonia/bronchitis  COVID/Flu/RSV negative   Urine strep and legionella ordered  Continue rocephin and azithro  Currently at baseline 2L  Aspiration Precautions, Respiratory Protocol, Incentive Spirometry

## 2025-04-05 NOTE — ED NOTES
CHIEF COMPLAINT:  Chief Complaint   Patient presents with   • Ear Pain     started this week, no drainage just pain that radiates down neck     HPI:  Cheri Palacio is a 75 year old female presenting today with a 1 week history of right ear pain. Patient states that the pain is constantly dull and achy. No drainage from the ear, swelling of the canal. Has been using desonide external cream 0.05% and Polysporin drops. Dr. Larkin told her it was eczema of her ear in 2018. Denies fevers, chills, tinnitus, worsening hearing loss, URI symptoms, recent swimming/ dunking head under water.     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, severe stage   • Age-related nuclear cataract of both eyes   • Early dry stage nonexudative age-related macular degeneration of both eyes   • PVD (posterior vitreous detachment), left eye   • History of CVA (cerebrovascular accident) - 4/17 with only vision persist change   • Atrial fibrillation (CMS/HCC) - 4/5/17   • Chronic renal impairment, stage 2 (mild)   • Condyloma acuminata - perineum   • Age-related osteoporosis without current pathological fracture - 4/19 T= -3   • Vitamin D deficiency     ALLERGIES:   Allergen Reactions   • Sotalol WEAKNESS     Outpatient Medications Marked as Taking for the 11/21/19 encounter (Office Visit) with Rayna Calloway PA-C   Medication Sig Dispense Refill   • cholecalciferol (CHOLECALCIFEROL) 25 mcg (1,000 units) tablet Take by mouth daily.     • travoprost, benzalkonium free, (TRAVATAN Z) 0.004 % ophthalmic solution Place 1 drop into both eyes nightly. 5 mL 2   • brinzolamide-brimonidine (SIMBRINZA) 1-0.2 % ophthalmic suspension Place 1 drop into both eyes 2 times daily. 10 mL 2   • timolol (TIMOPTIC) 0.5 % ophthalmic solution Place 1 drop into both eyes every morning. 10 mL 2   • rivaroxaban (XARELTO) 20 MG Tab Take 1 tablet by mouth daily (with breakfast). 90 tablet 3   •  Patient's family member requesting food for patient, advised patient will need to be NPO until CT is ordered and completed.      Megan Wilson RN  04/05/25 2841     atorvastatin (LIPITOR) 10 MG tablet Take 1 tablet by mouth at bedtime. 90 tablet 0   • betamethasone valerate (VALISONE) 0.1 % cream Apply topically 2 times daily. Apply thin film to affected area twice daily 30 g 3   • dofetilide (TIKOSYN) 250 MCG capsule Take 1 capsule by mouth every 12 hours. 180 capsule 1   • alendronate (FOSAMAX) 70 MG tablet Take 1 tablet by mouth every 7 days. 12 tablet 1   • NON FORMULARY Calcium Carbonate 1200 mg plus Vitamin D3 1000 IU - Take one softgel daily     • B Complex-C-Folic Acid Tab Take 1 tablet by mouth daily.     • acetaminophen (TYLENOL) 500 MG tablet Take 500 mg by mouth every 6 hours as needed for Pain.     • Multiple Vitamins-Minerals (PRESERVISION AREDS 2) Cap Take 1 capsule by mouth 2 times daily.      • Flaxseed, Linseed, (FLAX SEEDS) Powder Add 2 tablespoonful to oatmeal every morning       Past Medical History:   Diagnosis Date   • A-fib (CMS/[facility]) 06/25/2018   • Cataract    • Glaucoma (increased eye pressure)     glaucoma suspect   • Posterior vitreous degeneration    • Stroke (CMS/[facility]) 04/05/2017     Past Surgical History:   Procedure Laterality Date   • Tonsillectomy       Family History   Problem Relation Age of Onset   • Macular degeneration Father    • Glaucoma Sister    • Hypertension Sister    • Cataracts Brother    • Cancer Brother         Prostate   • Heart disease Brother    • Stroke Brother    • Thyroid Daughter    • Cancer Brother         Skin   • Heart disease Brother    • Hypertension Brother    • Heart disease Sister         Irregular heart beat   • Glaucoma Sister    • Glaucoma Maternal Aunt      Social History     Socioeconomic History   • Marital status: /Civil Union     Spouse name: Not on file   • Number of children: Not on file   • Years of education: Not on file   • Highest education level: Not on file   Occupational History   • Not on file   Social Needs   • Financial resource strain: Not on file   • Food insecurity:     Worry: Not on  file     Inability: Not on file   • Transportation needs:     Medical: Not on file     Non-medical: Not on file   Tobacco Use   • Smoking status: Never Smoker   • Smokeless tobacco: Never Used   Substance and Sexual Activity   • Alcohol use: No     Frequency: Never     Drinks per session: 1 or 2     Binge frequency: Never   • Drug use: No   • Sexual activity: Not on file   Lifestyle   • Physical activity:     Days per week: Not on file     Minutes per session: Not on file   • Stress: Not on file   Relationships   • Social connections:     Talks on phone: Not on file     Gets together: Not on file     Attends Baptism service: Not on file     Active member of club or organization: Not on file     Attends meetings of clubs or organizations: Not on file     Relationship status: Not on file   • Intimate partner violence:     Fear of current or ex partner: Not on file     Emotionally abused: Not on file     Physically abused: Not on file     Forced sexual activity: Not on file   Other Topics Concern   • Not on file   Social History Narrative   • Not on file       OBJECTIVE:   Visit Vitals  BP (!) 177/100   Pulse 80   Temp 98.5 °F (36.9 °C) (Oral)   Resp 14   Ht 5' 4\" (1.626 m)   Wt 58.1 kg   BMI 21.97 kg/m²     General: A&O x 3 in NAD, Well groomed and Well nourished  Eye: PERRL, no conjunctival injection, no scleral icterus, normal fundoscopic exam.  ENT: Left TM pearly gray with normal landmarks, external auditory canal normal in appearance. Right TM cloudy with effusion present. Small patch of eczema posterior aspect of tragus. Nares patent without discharge. Oropharynx normal in appearance.   Neck: No thyromegaly, cervical LAD, trachea midline  Cardiac: RRR without murmurs/ rubs/ gallops, normal S1/ S2  Pulmonary: CTAB without wheezes/ rales/ rhonchi, no increased work of breathing    Recent PHQ 2/9 Score    PHQ 2:  Date Adult PHQ 2 Score   11/21/2019 0       PHQ 9:  Date Adult PHQ 9 Score   11/21/2019 0      DEPRESSION ASSESSMENT/PLAN:  Depression screening is negative no further plan needed.         ASSESSMENT:   1. Acute effusion of right ear   - Flonase 1 spray each nostril twice daily and OTC anti-histamine use until resolved.      2. Eczema of right external ear   - Desonide 0.05% twice daily for up to 14 consecutive days.      3. Elevated blood pressure reading   - Patient has done home monitoring for her cardiologist and has been at goal. Notes that her blood pressure increases during office visits. Advised to update provider in 2 weeks. With home readings. If >140/90, recommend starting anti-hypertensive.        PLAN:   Plan     Medications:  Flonase over the counter 1 spray (up and away from center of nose) into each nostril twice a day.   Take an over the counter anti-histamine once daily such as: Claritin, Zyrtec, or Allegra.   Apply Desonide cream to eczema of ear twice daily for up to 2 weeks.     Health Maintenance Due   Topic Date Due   • DM/CKD Microalbumin  08/18/1960   • Shingles Vaccine (1 of 2) 08/18/1992   • Medicare Wellness 65+  03/08/2020   • Depression Screening  03/08/2020     Patient is due for topics as listed above is on waiting list for shingles, had flu shot already, pt filled out depression screen    Health Maintenance reviewed and discussed with patient as appropriate by Provider: Yes    Additional Actions Completed at this Visit:  None     Today's Plan of Care:  Middle ear effusions can take weeks to months to fully resolve and can recur. Ear is not infected at this time.     Follow up as needed if symptoms worsen or persist.        Rayna Calloway PA-C  Supervising physician Dr. Logan Cunningham

## 2025-04-05 NOTE — ED NOTES
Provider again at bedside with patient. Patient verbalizes no relief. New orders for labs, IV placement, and heart neb.        Megan Wilson RN  04/05/25 1316

## 2025-04-05 NOTE — ED PROVIDER NOTES
Time reflects when diagnosis was documented in both MDM as applicable and the Disposition within this note       Time User Action Codes Description Comment    4/5/2025 10:20 AM Ponce Osorio [J44.1] COPD exacerbation (HCC)     4/5/2025 10:20 AM Ponce Osorio [J96.11] Chronic respiratory failure with hypoxia (HCC)     4/5/2025  2:59 PM Ponce Osorio [J18.9] Bilateral pneumonia     4/5/2025  2:59 PM Ponce Osorio [R60.0] Bilateral lower extremity edema           ED Disposition       ED Disposition   Admit    Condition   Stable    Date/Time   Sat Apr 5, 2025  2:59 PM    Comment   --             Assessment & Plan       Medical Decision Making  This patient presents with shortness of breath.   Diagnostic considerations include pulmonary embolism, ACS, COPD exacerbation, pneumonia, pneumothorax, bronchitis.    Vital signs reviewed.  On baseline oxygen requirement.  History of COPD.  Patient had decreased breath sounds. Duonebs, IV solumedrol, abx administered. CXR +/- RLL infiltrate vs atelectasis. D dimer negative. PE unlikely. Ambulatory pulse ox 91 % on baseline O2 but the patient remained tachypneic. MOHAMUD nebulizer administered, CT imaging obtained--+ bronchopneumonia/bronchiolitis.  In addition to the dose of azithromycin administered, IV Rocephin given. Admitted to [facility].         Problems Addressed:  Bilateral lower extremity edema: chronic illness or injury  Bilateral pneumonia: acute illness or injury  Chronic respiratory failure with hypoxia (HCC): chronic illness or injury with exacerbation, progression, or side effects of treatment  COPD exacerbation (HCC): acute illness or injury    Amount and/or Complexity of Data Reviewed  Labs: ordered.  Radiology: ordered and independent interpretation performed.     Details: Chest x-ray interpreted by me.  Emphysematous lungs.  No focal infiltrates.  Discussion of management or test interpretation with external provider(s): The case and treatment  plan were discussed with hospital medicine.     Risk  Prescription drug management.  Decision regarding hospitalization.      Medications   ipratropium-albuterol (DUO-NEB) 0.5-2.5 mg/3 mL inhalation solution 3 mL (3 mL Nebulization Given 4/5/25 0932)   methylPREDNISolone sodium succinate (Solu-MEDROL) injection 40 mg (40 mg Intravenous Given 4/5/25 0958)   azithromycin (ZITHROMAX) tablet 500 mg (500 mg Oral Given 4/5/25 0959)   ipratropium-albuterol (DUO-NEB) 0.5-2.5 mg/3 mL inhalation solution 3 mL (3 mL Nebulization Given 4/5/25 1150)   albuterol inhalation solution 10 mg (10 mg Nebulization Given 4/5/25 1237)   ipratropium (ATROVENT) 0.02 % inhalation solution 1 mg (1 mg Nebulization Given 4/5/25 1237)   sodium chloride 0.9 % inhalation solution 12 mL (12 mL Nebulization Given 4/5/25 1237)   potassium chloride (Klor-Con M20) CR tablet 40 mEq (40 mEq Oral Given 4/5/25 1235)     ED Risk Strat Scores        SBIRT 22yo+      Flowsheet Row Most Recent Value   Initial Alcohol Screen: US AUDIT-C     1. How often do you have a drink containing alcohol? 0 Filed at: 04/05/2025 0833   2. How many drinks containing alcohol do you have on a typical day you are drinking?  0 Filed at: 04/05/2025 0833   3b. FEMALE Any Age, or MALE 65+: How often do you have 4 or more drinks on one occassion? 0 Filed at: 04/05/2025 0833   Audit-C Score 0 Filed at: 04/05/2025 0833   LANDY: How many times in the past year have you...    Used an illegal drug or used a prescription medication for non-medical reasons? Never Filed at: 04/05/2025 0833          History of Present Illness       Chief Complaint   Patient presents with    Shortness of Breath     Pt reports last evening had sudden onset of worsening SOB- around grandchildren who all have cold symptoms- pt taking nebulized treatment without relief, on 2L NC chronically         Past Medical History:   Diagnosis Date    COPD (chronic obstructive pulmonary disease) (HCC)     Hiatal hernia        Past Surgical History:   Procedure Laterality Date    CHOLECYSTECTOMY        History reviewed. No pertinent family history.   Social History     Tobacco Use    Smoking status: Some Days     Current packs/day: 0.25     Types: Cigarettes    Smokeless tobacco: Never   Vaping Use    Vaping status: Never Used   Substance Use Topics    Alcohol use: Never    Drug use: Never      E-Cigarette/Vaping    E-Cigarette Use Never User       E-Cigarette/Vaping Substances      I have reviewed and agree with the history as documented.       History provided by:  Patient and medical records  Shortness of Breath  Severity:  Moderate  Onset quality:  Gradual  Duration:  7 days  Timing:  Constant  Progression:  Worsening  Chronicity:  Recurrent  Context comment:  Hx of COPD, chronic O2 @ 2lpm. Smokes approx 1/2 ppd. Worsening shortness of breath x 7 days; acutely worsening over the last 24 hrs. Was evaluated in the ED last week--prescribed prednisone/abx but not taking due to GI upset.  Associated symptoms: cough and fever    Associated symptoms: no abdominal pain, no chest pain, no claudication, no headaches, no neck pain, no sputum production, no vomiting and no wheezing      Review of Systems   Constitutional:  Positive for activity change, appetite change, fatigue and fever.   Respiratory:  Positive for cough, chest tightness and shortness of breath. Negative for sputum production and wheezing.    Cardiovascular:  Positive for leg swelling. Negative for chest pain, palpitations and claudication.   Gastrointestinal:  Negative for abdominal pain, diarrhea, nausea and vomiting.   Musculoskeletal:  Negative for arthralgias, back pain, neck pain and neck stiffness.   Neurological:  Negative for dizziness, syncope, light-headedness and headaches.   All other systems reviewed and are negative.    Objective       ED Triage Vitals   Temperature Pulse Blood Pressure Respirations SpO2 Patient Position - Orthostatic VS   04/05/25 0931 04/05/25  0834 04/05/25 0834 04/05/25 0834 04/05/25 0834 --   98 °F (36.7 °C) 84 129/61 18 97 %       Temp Source Heart Rate Source BP Location FiO2 (%) Pain Score    04/05/25 0931 04/05/25 0834 -- -- --    Temporal Monitor         Vitals      Date and Time Temp Pulse SpO2 Resp BP Pain Score FACES Pain Rating User   04/05/25 1245 -- 89 98 % 18 102/65 -- --    04/05/25 1030 -- 96 91 % 18 125/70 -- --    04/05/25 1015 -- 91 96 % 24 114/68 -- --    04/05/25 0931 98 °F (36.7 °C) -- -- -- -- -- --    04/05/25 0930 -- 95 95 % 30 126/66 -- --    04/05/25 0915 -- 97 97 % 26 113/67 -- --    04/05/25 0834 -- 84 97 % 18 129/61 -- --           Physical Exam  Vitals and nursing note reviewed.   Constitutional:       General: She is not in acute distress.     Appearance: She is ill-appearing. She is not toxic-appearing.   HENT:      Head: Normocephalic and atraumatic.   Eyes:      Extraocular Movements: Extraocular movements intact.   Cardiovascular:      Rate and Rhythm: Normal rate and regular rhythm.   Pulmonary:      Effort: Tachypnea, accessory muscle usage and respiratory distress present.      Breath sounds: Decreased breath sounds present. No wheezing, rhonchi or rales.   Chest:      Chest wall: No tenderness.   Abdominal:      General: Bowel sounds are normal.      Palpations: Abdomen is soft.      Tenderness: There is no abdominal tenderness. There is no guarding or rebound.   Musculoskeletal:      Cervical back: Neck supple.      Right lower leg: No tenderness. Edema present.      Left lower leg: No tenderness. Edema present.   Skin:     General: Skin is warm and dry.      Coloration: Skin is pale. Skin is not cyanotic.      Findings: No ecchymosis.   Neurological:      General: No focal deficit present.      Mental Status: She is alert and oriented to person, place, and time.   Psychiatric:         Mood and Affect: Mood normal.         Behavior: Behavior normal.       Results Reviewed       Procedure Component  Value Units Date/Time    Procalcitonin [[id number]] Collected: 04/05/25 0844    Lab Status: In process Specimen: Blood from Arm, Left Updated: 04/05/25 1524    Strep Pneumoniae, Urine [[id number]]     Lab Status: No result Specimen: Urine     Legionella antigen, Urine [[id number]]     Lab Status: No result Specimen: Urine     D-dimer, quantitative [[id number]]  (Normal) Collected: 04/05/25 0844    Lab Status: Final result Specimen: Blood from Arm, Left Updated: 04/05/25 1234     D-Dimer, Quant <0.27 ug/ml FEU     Narrative:      In the evaluation for possible pulmonary embolism, in the appropriate (Well's Score of 4 or less) patient, the age adjusted d-dimer cutoff for this patient can be calculated as:    Age x 0.01 (in ug/mL) for Age-adjusted D-dimer exclusion threshold for a patient over 50 years.    UA w Reflex to Microscopic w Reflex to Culture [[id number]]     Lab Status: No result Specimen: Urine     FLU/COVID Rapid Antigen (30 min. TAT) - Preferred screening test in ED [[id number]]  (Normal) Collected: 04/05/25 0957    Lab Status: Final result Specimen: Nares from Nose Updated: 04/05/25 1028     SARS COV Rapid Antigen Negative     Influenza A Rapid Antigen Negative     Influenza B Rapid Antigen Negative    Narrative:      This test has been performed using the Quidel Rhea 2 FLU+SARS Antigen test under the Emergency Use Authorization (EUA). This test has been validated by the  and verified by the performing laboratory. The Rhea uses lateral flow immunofluorescent sandwich assay to detect SARS-COV, Influenza A and Influenza B Antigen.     The Quidel Rhea 2 SARS Antigen test does not differentiate between SARS-CoV and SARS-CoV-2.     Negative results are presumptive and may be confirmed with a molecular assay, if necessary, for patient management. Negative results do not rule out SARS-CoV-2 or influenza infection and should not be used as the sole basis for treatment or patient management decisions. A  negative test result may occur if the level of antigen in a sample is below the limit of detection of this test.     Positive results are indicative of the presence of viral antigens, but do not rule out bacterial infection or co-infection with other viruses.     All test results should be used as an adjunct to clinical observations and other information available to the provider.    FOR PEDIATRIC PATIENTS - copy/paste COVID Guidelines URL to browser: [email or web address]    HS Troponin 0hr (reflex protocol) [[id number]]  (Normal) Collected: 04/05/25 0844    Lab Status: Final result Specimen: Blood from Arm, Left Updated: 04/05/25 0914     hs TnI 0hr 6 ng/L     B-Type Natriuretic Peptide(BNP) [[id number]]  (Normal) Collected: 04/05/25 0844    Lab Status: Final result Specimen: Blood from Arm, Left Updated: 04/05/25 0913     BNP 35 pg/mL     Protime-INR [[id number]]  (Normal) Collected: 04/05/25 0844    Lab Status: Final result Specimen: Blood from Arm, Left Updated: 04/05/25 0911     Protime 14.0 seconds      INR 1.04    Narrative:      INR Therapeutic Range    Indication                                             INR Range      Atrial Fibrillation                                               2.0-3.0  Hypercoagulable State                                    2.0.2.3  Left Ventricular Asist Device                            2.0-3.0  Mechanical Heart Valve                                  -    Aortic(with afib, MI, embolism, HF, LA enlargement,    and/or coagulopathy)                                     2.0-3.0 (2.5-3.5)     Mitral                                                             2.5-3.5  Prosthetic/Bioprosthetic Heart Valve               2.0-3.0  Venous thromboembolism (VTE: VT, PE        2.0-3.0    APTT [[id number]]  (Normal) Collected: 04/05/25 0844    Lab Status: Final result Specimen: Blood from Arm, Left Updated: 04/05/25 0911     PTT 26 seconds      Comprehensive metabolic panel [[id number]]  (Abnormal) Collected: 04/05/25 0844    Lab Status: Final result Specimen: Blood from Arm, Left Updated: 04/05/25 0907     Sodium 136 mmol/L      Potassium 3.3 mmol/L      Chloride 99 mmol/L      CO2 29 mmol/L      ANION GAP 8 mmol/L      BUN 7 mg/dL      Creatinine 0.43 mg/dL      Glucose 130 mg/dL      Calcium 9.0 mg/dL      AST 11 U/L      ALT 13 U/L      Alkaline Phosphatase 112 U/L      Total Protein 6.9 g/dL      Albumin 3.9 g/dL      Total Bilirubin 0.46 mg/dL      eGFR 106 ml/min/1.73sq m     Narrative:      National Kidney Disease Foundation guidelines for Chronic Kidney Disease (CKD):     Stage 1 with normal or high GFR (GFR > 90 mL/min/1.73 square meters)    Stage 2 Mild CKD (GFR = 60-89 mL/min/1.73 square meters)    Stage 3A Moderate CKD (GFR = 45-59 mL/min/1.73 square meters)    Stage 3B Moderate CKD (GFR = 30-44 mL/min/1.73 square meters)    Stage 4 Severe CKD (GFR = 15-29 mL/min/1.73 square meters)    Stage 5 End Stage CKD (GFR <15 mL/min/1.73 square meters)  Note: GFR calculation is accurate only with a steady state creatinine    Blood gas, venous [[id number]]  (Abnormal) Collected: 04/05/25 0844    Lab Status: Final result Specimen: Blood from Arm, Left Updated: 04/05/25 0852     pH, Terence 7.400     pCO2, Terence 48.7 mm Hg      pO2, Terence 34.0 mm Hg      HCO3, Terence 29.5 mmol/L      Base Excess, Terence 3.7 mmol/L      O2 Content, Terence 13.6 ml/dL      O2 HGB, VENOUS 64.1 %     CBC and differential [[id number]]  (Abnormal) Collected: 04/05/25 0844    Lab Status: Final result Specimen: Blood from Arm, Left Updated: 04/05/25 0852     WBC 19.10 Thousand/uL      RBC 4.74 Million/uL      Hemoglobin 13.6 g/dL      Hematocrit 42.3 %      MCV 89 fL      MCH 28.7 pg      MCHC 32.2 g/dL      RDW 13.3 %      MPV 11.7 fL      Platelets 294 Thousands/uL      nRBC 0 /100 WBCs      Segmented % 83 %      Immature Grans % 1 %      Lymphocytes % 10 %      Monocytes % 6 %      Eosinophils  Relative 0 %      Basophils Relative 0 %      Absolute Neutrophils 15.91 Thousands/µL      Absolute Immature Grans 0.09 Thousand/uL      Absolute Lymphocytes 1.95 Thousands/µL      Absolute Monocytes 1.07 Thousand/µL      Eosinophils Absolute 0.04 Thousand/µL      Basophils Absolute 0.04 Thousands/µL             CT chest without contrast   Final Interpretation by Inés Gutierrez MD (04/05 2139)      Mild new patchy opacity in the right lower lobe and mild new bilateral lower lobe tree-in-bud nodularity, greater on the left, compatible with bronchopneumonia/bronchiolitis.      Evolving discoid opacity in the right upper lobe with cavitation and evolving nodularity in the superior segment right lower lobe since October 2024.      Stable 7 mm low-attenuation right lower lobe nodule since October 2024.      Recommend follow-up with a chest CT with no contrast in 6 months.         Workstation performed: [id number]         XR chest 2 views   ED Interpretation by Ponce Osorio DO (04/05 0951)   Emphysematous lungs.  No focal infiltrates.          Procedures    ED Medication and Procedure Management   Prior to Admission Medications   Prescriptions Last Dose Informant Patient Reported? Taking?   albuterol (PROVENTIL HFA,VENTOLIN HFA) 90 mcg/act inhaler 4/5/2025  No Yes   Sig: Inhale 2 puffs every 6 (six) hours as needed for wheezing   aspirin (Aspirin 81) 81 mg EC tablet 4/5/2025  No Yes   Sig: Take 1 tablet (81 mg total) by mouth daily   atorvastatin (LIPITOR) 40 mg tablet 4/4/2025  No Yes   Sig: Take 1 tablet (40 mg total) by mouth daily   dicyclomine (BENTYL) 10 mg capsule 4/5/2025  No Yes   Sig: Take 1 capsule (10 mg total) by mouth 3 (three) times a day before meals   docusate sodium (COLACE) 100 mg capsule 4/5/2025  Yes Yes   Sig: Take 100 mg by mouth 2 (two) times a day   ethambutol (MYAMBUTOL) 400 mg tablet Not Taking  Yes No   Sig: Take 800 mg by mouth daily   Patient not taking: Reported on 4/5/2025    famotidine (PEPCID) 20 mg tablet 4/5/2025  No Yes   Sig: Take 1 tablet (20 mg total) by mouth 2 (two) times a day   fluticasone-umeclidinium-vilanterol (Trelegy Ellipta) 100-62.5-25 mcg/actuation inhaler 4/5/2025  Yes Yes   Sig: Inhale 1 puff daily Rinse mouth after use.   ipratropium-albuterol (DUO-NEB) 0.5-2.5 mg/3 mL nebulizer solution 4/5/2025  No Yes   Sig: Take 3 mL by nebulization 4 (four) times a day   lidocaine (LIDODERM) 5 %   No No   Sig: Apply 1 patch topically over 12 hours daily for 4 days Remove & Discard patch within 12 hours or as directed by MD   omeprazole (PriLOSEC) 40 MG capsule 4/5/2025  No Yes   Sig: Take 1 capsule (40 mg total) by mouth 2 (two) times a day 30 minutes before meal   ondansetron (ZOFRAN) 4 mg tablet Unknown  No No   Sig: Take 1 tablet (4 mg total) by mouth every 6 (six) hours   rifabutin (MYCOBUTIN) 150 mg capsule Not Taking  Yes No   Sig: Take 300 mg by mouth daily   Patient not taking: Reported on 4/5/2025   simethicone (MYLICON) 80 mg chewable tablet 4/5/2025  No Yes   Sig: Chew 1 tablet (80 mg total) every 6 (six) hours as needed for flatulence   sucralfate (CARAFATE) 1 g tablet 4/5/2025  No Yes   Sig: Take 1 tablet (1 g total) by mouth 4 (four) times a day for 14 days      Facility-Administered Medications: None     Current Discharge Medication List        START taking these medications    Details   azithromycin (ZITHROMAX) 250 mg tablet Take 1 tablet (250 mg total) by mouth daily for 4 days Take 2 tablets today then 1 tablet daily x 4 days  Qty: 4 tablet, Refills: 0    Associated Diagnoses: COPD exacerbation (HCC)      !! ipratropium-albuterol (DUO-NEB) 0.5-2.5 mg/3 mL nebulizer solution Take 3 mL by nebulization every 6 (six) hours as needed for wheezing or shortness of breath  Qty: 180 mL, Refills: 0    Associated Diagnoses: COPD exacerbation (HCC)      predniSONE 20 mg tablet Take 2 tablets (40 mg total) by mouth daily for 4 days  Qty: 8 tablet, Refills: 0     Associated Diagnoses: COPD exacerbation (HCC)       !! - Potential duplicate medications found. Please discuss with provider.        CONTINUE these medications which have NOT CHANGED    Details   albuterol (PROVENTIL HFA,VENTOLIN HFA) 90 mcg/act inhaler Inhale 2 puffs every 6 (six) hours as needed for wheezing  Qty: 18 g, Refills: 0    Comments: Substitution to a formulary equivalent within the same pharmaceutical class is authorized.  Associated Diagnoses: COPD with acute exacerbation (HCC)      aspirin (Aspirin 81) 81 mg EC tablet Take 1 tablet (81 mg total) by mouth daily  Qty: 30 tablet, Refills: 0    Associated Diagnoses: Celiac artery stenosis (HCC)      atorvastatin (LIPITOR) 40 mg tablet Take 1 tablet (40 mg total) by mouth daily  Qty: 30 tablet, Refills: 0    Associated Diagnoses: Celiac artery stenosis (HCC)      dicyclomine (BENTYL) 10 mg capsule Take 1 capsule (10 mg total) by mouth 3 (three) times a day before meals  Qty: 90 capsule, Refills: 0    Associated Diagnoses: Epigastric pain      docusate sodium (COLACE) 100 mg capsule Take 100 mg by mouth 2 (two) times a day      famotidine (PEPCID) 20 mg tablet Take 1 tablet (20 mg total) by mouth 2 (two) times a day  Qty: 30 tablet, Refills: 0    Associated Diagnoses: Gastroenteritis      fluticasone-umeclidinium-vilanterol (Trelegy Ellipta) 100-62.5-25 mcg/actuation inhaler Inhale 1 puff daily Rinse mouth after use.      !! ipratropium-albuterol (DUO-NEB) 0.5-2.5 mg/3 mL nebulizer solution Take 3 mL by nebulization 4 (four) times a day  Qty: 90 mL, Refills: 0    Associated Diagnoses: COPD exacerbation (HCC)      omeprazole (PriLOSEC) 40 MG capsule Take 1 capsule (40 mg total) by mouth 2 (two) times a day 30 minutes before meal  Qty: 60 capsule, Refills: 0    Associated Diagnoses: Epigastric pain      simethicone (MYLICON) 80 mg chewable tablet Chew 1 tablet (80 mg total) every 6 (six) hours as needed for flatulence  Qty: 30 tablet, Refills: 0    Associated  Diagnoses: Epigastric pain      sucralfate (CARAFATE) 1 g tablet Take 1 tablet (1 g total) by mouth 4 (four) times a day for 14 days  Qty: 56 tablet, Refills: 0    Associated Diagnoses: Gastroenteritis      ethambutol (MYAMBUTOL) 400 mg tablet Take 800 mg by mouth daily      lidocaine (LIDODERM) 5 % Apply 1 patch topically over 12 hours daily for 4 days Remove & Discard patch within 12 hours or as directed by MD  Qty: 7 patch, Refills: 0    Associated Diagnoses: COPD exacerbation (HCC)      ondansetron (ZOFRAN) 4 mg tablet Take 1 tablet (4 mg total) by mouth every 6 (six) hours  Qty: 12 tablet, Refills: 0    Associated Diagnoses: Gastroenteritis      rifabutin (MYCOBUTIN) 150 mg capsule Take 300 mg by mouth daily       !! - Potential duplicate medications found. Please discuss with provider.        No discharge procedures on file.  ED SEPSIS DOCUMENTATION   Time reflects when diagnosis was documented in both MDM as applicable and the Disposition within this note       Time User Action Codes Description Comment    4/5/2025 10:20 AM Ponce Osorio [J44.1] COPD exacerbation ([facility])     4/5/2025 10:20 AM Ponce Osorio [J96.11] Chronic respiratory failure with hypoxia ([facility])     4/5/2025  2:59 PM Ponce Osorio [J18.9] Bilateral pneumonia     4/5/2025  2:59 PM Ponce Osorio [R60.0] Bilateral lower extremity edema                  Ponce Osorio DO  04/05/25 154

## 2025-04-05 NOTE — DISCHARGE INSTRUCTIONS
You are being prescribed a course of prednisone and an antibiotic.  Take as directed.  You can start taking both medications tomorrow.    In addition, you are being provided with supplemental DuoNeb treatments.

## 2025-04-05 NOTE — RESPIRATORY THERAPY NOTE
RT Protocol Note  Ileana Seaman 64 y.o. female MRN: [id number]  Unit/Bed#: -01 Encounter: [id number]    Assessment    Principal Problem:    Bronchopneumonia  Active Problems:    COPD, severe (HCC)    Chronic hypoxic respiratory failure (HCC)    Acute on chronic diastolic (congestive) heart failure (HCC)    Smoker    Sepsis (HCC)      Home Pulmonary Medications:    Home Devices/Therapy: Home O2 (2LPM NC)    Past Medical History:   Diagnosis Date    COPD (chronic obstructive pulmonary disease) (HCC)     Hiatal hernia      Social History     Socioeconomic History    Marital status: Single     Spouse name: None    Number of children: None    Years of education: None    Highest education level: None   Occupational History    None   Tobacco Use    Smoking status: Some Days     Current packs/day: 0.25     Types: Cigarettes    Smokeless tobacco: Never   Vaping Use    Vaping status: Never Used   Substance and Sexual Activity    Alcohol use: Never    Drug use: Never    Sexual activity: None   Other Topics Concern    None   Social History Narrative    None     Social Drivers of Health     Financial Resource Strain: Low Risk  (2/17/2025)    Received from [employer]    Financial Resource Strain     Do you have any trouble paying for your medications, or do you think you might in the future?: No     Does your family have trouble paying for medicine? (Household - for ages 0-17 years): Not on file   Food Insecurity: No Food Insecurity (4/5/2025)    Nursing - Inadequate Food Risk Classification     Worried About Running Out of Food in the Last Year: Never true     Ran Out of Food in the Last Year: Never true     Ran Out of Food in the Last Year: Never true   Transportation Needs: No Transportation Needs (4/5/2025)    Nursing - Transportation Risk Classification     Lack of Transportation: Not on file     Lack of Transportation: No   Recent Concern: Transportation Needs - Unmet Transportation Needs (1/23/2025)    Nursing -  "Transportation Risk Classification     Lack of Transportation: Not on file     Lack of Transportation: Yes   Physical Activity: Not on file   Stress: Not on file   Social Connections: Socially Integrated (2025)    Received from [facility] [facility]     How often do you feel lonely or isolated from those around you?: Never   Intimate Partner Violence: Unknown (2025)    Nursing IPS     Feels Physically and Emotionally Safe: Not on file     Physically Hurt by Someone: Not on file     Humiliated or Emotionally Abused by Someone: Not on file     Physically Hurt by Someone: No     Hurt or Threatened by Someone: No   Housing Stability: Unknown (2025)    Nursing: Inadequate Housing Risk Classification     Has Housing: Not on file     Worried About Losing Housing: Not on file     Unable to Get Utilities: Not on file     Unable to Pay for Housing in the Last Year: No     Has Housin       Subjective         Objective    Physical Exam:   Assessment Type: Assess only  General Appearance: Alert, Awake  Respiratory Pattern: Dyspnea with exertion  Chest Assessment: Chest expansion symmetrical  Bilateral Breath Sounds: Diminished  O2 Device: 2LPM NC    Vitals:  Blood pressure 95/69, pulse 105, temperature (!) 97.3 °F (36.3 °C), resp. rate 18, height 5' 1\" (1.549 m), weight 41.8 kg (92 lb 2.4 oz), SpO2 94%.          Imaging and other studies:     O2 Device: 2LPM NC     Plan    Respiratory Plan: Home Bronchodilator Patient pathway, Mild Distress pathway        Resp Comments: Pt here with COPD, current smoker, wears 2LPM NC at baseline. Will continue UDN txs at . Pt had 1hr UDN @ 1235. Pt denies SOB at this time, no respiratory distress and on baseline 2LPM NC.   "

## 2025-04-06 LAB
ANION GAP SERPL CALCULATED.3IONS-SCNC: 2 MMOL/L (ref 4–13)
BUN SERPL-MCNC: 12 MG/DL (ref 5–25)
CALCIUM SERPL-MCNC: 8.5 MG/DL (ref 8.4–10.2)
CHLORIDE SERPL-SCNC: 105 MMOL/L (ref 96–108)
CO2 SERPL-SCNC: 33 MMOL/L (ref 21–32)
CREAT SERPL-MCNC: 0.56 MG/DL (ref 0.6–1.3)
ERYTHROCYTE [DISTWIDTH] IN BLOOD BY AUTOMATED COUNT: 13.5 % (ref 11.6–15.1)
GFR SERPL CREATININE-BSD FRML MDRD: 97 ML/MIN/1.73SQ M
GLUCOSE SERPL-MCNC: 113 MG/DL (ref 65–140)
HCT VFR BLD AUTO: 36.6 % (ref 34.8–46.1)
HGB BLD-MCNC: 11.7 G/DL (ref 11.5–15.4)
L PNEUMO1 AG UR QL IA.RAPID: NEGATIVE
MAGNESIUM SERPL-MCNC: 1.8 MG/DL (ref 1.9–2.7)
MCH RBC QN AUTO: 29 PG (ref 26.8–34.3)
MCHC RBC AUTO-ENTMCNC: 32 G/DL (ref 31.4–37.4)
MCV RBC AUTO: 91 FL (ref 82–98)
PLATELET # BLD AUTO: 250 THOUSANDS/UL (ref 149–390)
PMV BLD AUTO: 11.9 FL (ref 8.9–12.7)
POTASSIUM SERPL-SCNC: 4.1 MMOL/L (ref 3.5–5.3)
PROCALCITONIN SERPL-MCNC: <0.05 NG/ML
RBC # BLD AUTO: 4.03 MILLION/UL (ref 3.81–5.12)
S PNEUM AG UR QL: NEGATIVE
SODIUM SERPL-SCNC: 140 MMOL/L (ref 135–147)
WBC # BLD AUTO: 15.68 THOUSAND/UL (ref 4.31–10.16)

## 2025-04-06 PROCEDURE — 94640 AIRWAY INHALATION TREATMENT: CPT

## 2025-04-06 PROCEDURE — 83735 ASSAY OF MAGNESIUM: CPT

## 2025-04-06 PROCEDURE — 85027 COMPLETE CBC AUTOMATED: CPT

## 2025-04-06 PROCEDURE — 80048 BASIC METABOLIC PNL TOTAL CA: CPT

## 2025-04-06 PROCEDURE — 94760 N-INVAS EAR/PLS OXIMETRY 1: CPT

## 2025-04-06 PROCEDURE — 84145 PROCALCITONIN (PCT): CPT

## 2025-04-06 RX ORDER — MAGNESIUM SULFATE HEPTAHYDRATE 40 MG/ML
2 INJECTION, SOLUTION INTRAVENOUS ONCE
Status: COMPLETED | OUTPATIENT
Start: 2025-04-06 | End: 2025-04-06

## 2025-04-06 RX ORDER — POLYETHYLENE GLYCOL 3350 17 G/17G
17 POWDER, FOR SOLUTION ORAL DAILY PRN
Status: DISCONTINUED | OUTPATIENT
Start: 2025-04-06 | End: 2025-04-08

## 2025-04-06 RX ORDER — BUDESONIDE 0.5 MG/2ML
0.5 INHALANT ORAL
Status: DISCONTINUED | OUTPATIENT
Start: 2025-04-06 | End: 2025-04-09 | Stop reason: HOSPADM

## 2025-04-06 RX ORDER — FORMOTEROL FUMARATE 20 UG/2ML
20 SOLUTION RESPIRATORY (INHALATION)
Status: DISCONTINUED | OUTPATIENT
Start: 2025-04-06 | End: 2025-04-09 | Stop reason: HOSPADM

## 2025-04-06 RX ORDER — METHYLPREDNISOLONE SODIUM SUCCINATE 40 MG/ML
40 INJECTION, POWDER, LYOPHILIZED, FOR SOLUTION INTRAMUSCULAR; INTRAVENOUS EVERY 12 HOURS SCHEDULED
Status: DISCONTINUED | OUTPATIENT
Start: 2025-04-06 | End: 2025-04-09 | Stop reason: HOSPADM

## 2025-04-06 RX ADMIN — IPRATROPIUM BROMIDE AND ALBUTEROL SULFATE 3 ML: 2.5; .5 SOLUTION RESPIRATORY (INHALATION) at 19:30

## 2025-04-06 RX ADMIN — CEFTRIAXONE 1000 MG: 1 INJECTION, SOLUTION INTRAVENOUS at 15:08

## 2025-04-06 RX ADMIN — ATORVASTATIN CALCIUM 40 MG: 40 TABLET, FILM COATED ORAL at 08:25

## 2025-04-06 RX ADMIN — AZITHROMYCIN DIHYDRATE 500 MG: 250 TABLET ORAL at 09:52

## 2025-04-06 RX ADMIN — ASPIRIN 81 MG: 81 TABLET, COATED ORAL at 08:25

## 2025-04-06 RX ADMIN — POLYETHYLENE GLYCOL 3350 17 G: 17 POWDER, FOR SOLUTION ORAL at 13:47

## 2025-04-06 RX ADMIN — DOCUSATE SODIUM 100 MG: 100 CAPSULE, LIQUID FILLED ORAL at 17:09

## 2025-04-06 RX ADMIN — GUAIFENESIN 600 MG: 600 TABLET, EXTENDED RELEASE ORAL at 08:25

## 2025-04-06 RX ADMIN — NICOTINE 1 PATCH: 21 PATCH, EXTENDED RELEASE TRANSDERMAL at 08:29

## 2025-04-06 RX ADMIN — MAGNESIUM SULFATE HEPTAHYDRATE 2 G: 40 INJECTION, SOLUTION INTRAVENOUS at 08:31

## 2025-04-06 RX ADMIN — FAMOTIDINE 20 MG: 20 TABLET, FILM COATED ORAL at 08:25

## 2025-04-06 RX ADMIN — IPRATROPIUM BROMIDE AND ALBUTEROL SULFATE 3 ML: 2.5; .5 SOLUTION RESPIRATORY (INHALATION) at 07:21

## 2025-04-06 RX ADMIN — DICYCLOMINE HYDROCHLORIDE 10 MG: 10 CAPSULE ORAL at 05:18

## 2025-04-06 RX ADMIN — FAMOTIDINE 20 MG: 20 TABLET, FILM COATED ORAL at 17:09

## 2025-04-06 RX ADMIN — METHYLPREDNISOLONE SODIUM SUCCINATE 40 MG: 40 INJECTION, POWDER, FOR SOLUTION INTRAMUSCULAR; INTRAVENOUS at 20:34

## 2025-04-06 RX ADMIN — FUROSEMIDE 40 MG: 10 INJECTION, SOLUTION INTRAMUSCULAR; INTRAVENOUS at 17:08

## 2025-04-06 RX ADMIN — BUDESONIDE 0.5 MG: 0.5 INHALANT RESPIRATORY (INHALATION) at 19:30

## 2025-04-06 RX ADMIN — IPRATROPIUM BROMIDE AND ALBUTEROL SULFATE 3 ML: 2.5; .5 SOLUTION RESPIRATORY (INHALATION) at 13:19

## 2025-04-06 RX ADMIN — SUCRALFATE 1 G: 1 TABLET ORAL at 11:41

## 2025-04-06 RX ADMIN — GUAIFENESIN 600 MG: 600 TABLET, EXTENDED RELEASE ORAL at 17:09

## 2025-04-06 RX ADMIN — PANTOPRAZOLE SODIUM 40 MG: 40 TABLET, DELAYED RELEASE ORAL at 15:08

## 2025-04-06 RX ADMIN — SUCRALFATE 1 G: 1 TABLET ORAL at 08:25

## 2025-04-06 RX ADMIN — DICYCLOMINE HYDROCHLORIDE 10 MG: 10 CAPSULE ORAL at 15:08

## 2025-04-06 RX ADMIN — ENOXAPARIN SODIUM 40 MG: 40 INJECTION SUBCUTANEOUS at 08:26

## 2025-04-06 RX ADMIN — SUCRALFATE 1 G: 1 TABLET ORAL at 17:09

## 2025-04-06 RX ADMIN — SUCRALFATE 1 G: 1 TABLET ORAL at 20:34

## 2025-04-06 RX ADMIN — FORMOTEROL FUMARATE DIHYDRATE 20 MCG: 20 SOLUTION RESPIRATORY (INHALATION) at 19:30

## 2025-04-06 RX ADMIN — DOCUSATE SODIUM 100 MG: 100 CAPSULE, LIQUID FILLED ORAL at 08:25

## 2025-04-06 RX ADMIN — METHYLPREDNISOLONE SODIUM SUCCINATE 40 MG: 40 INJECTION, POWDER, FOR SOLUTION INTRAMUSCULAR; INTRAVENOUS at 09:52

## 2025-04-06 RX ADMIN — DICYCLOMINE HYDROCHLORIDE 10 MG: 10 CAPSULE ORAL at 11:41

## 2025-04-06 RX ADMIN — PANTOPRAZOLE SODIUM 40 MG: 40 TABLET, DELAYED RELEASE ORAL at 05:18

## 2025-04-06 NOTE — PROGRESS NOTES
Progress Note - Hospitalist   Name: Ileana Seaman 64 y.o. female I MRN: [id number]  Unit/Bed#: -[facility] I Date of Admission: 4/5/2025   Date of Service: 4/6/2025 I Hospital Day: 0    Assessment & Plan  Bronchopneumonia  Presented to ED with SOB, cough, leg swelling.  CT mild new patchy opacity RLL and mild new bilateral LL greater on left, compatible with bronchopneumonia/bronchitis  COVID/Flu/RSV negative   Urine strep and legionella ordered  Continue rocephin and azithro  Currently at baseline 2L  Aspiration Precautions, Respiratory Protocol, Incentive Spirometry    Acute on chronic diastolic (congestive) heart failure (HCC)  Wt Readings from Last 3 Encounters:   04/06/25 42.4 kg (93 lb 7.6 oz)   03/29/25 52.2 kg (115 lb)   03/16/25 44 kg (97 lb)   Echo in 10/2024 with EF 65% diastolic function mildly abnormal  3+ pitting edema at time of admission, reports worsened than normal despite compression socks. No longer taking lasix   BNP 35  Will start lasix 40mg iv bid and monitor response   Venous duplex pending with R>L swelling  Monitor daily weights, intake and output   Chronic hypoxic respiratory failure (HCC)  Chronic baseline 2L - currently at baseline   Smoker  NRT ordered  Sepsis (HCC)  AEB tachycardia and leukocytosis  Source: pneumonia  Continue rocephin and azithro  Holding off on fluids with volume overload   Monitor daily cbc  COPD with acute exacerbation (HCC)  At baseline O2 2L  Start solumedrol 40mg iv bid, wean as able   Azithro x3 doses  Continue nebs and inhalers    VTE Pharmacologic Prophylaxis:   Moderate Risk (Score 3-4) - Pharmacological DVT Prophylaxis Ordered: enoxaparin (Lovenox).    Mobility:   Basic Mobility Inpatient Raw Score: 24  JH-HLM Goal: 8: Walk 250 feet or more  JH-HLM Achieved: 7: Walk 25 feet or more  JH-HLM Goal NOT achieved. Continue with multidisciplinary rounding and encourage appropriate mobility to improve upon JH-HLM goals.    Patient Centered Rounds: I performed  bedside rounds with nursing staff today.   Discussions with Specialists or Other Care Team Provider: nursing, cm    Education and Discussions with Family / Patient: Patient declined call to .     Current Length of Stay: 0 day(s)  Current Patient Status: Inpatient   Certification Statement: The patient will continue to require additional inpatient hospital stay due to requiring iv antibiotics, steroids and venous duplex   Discharge Plan: Anticipate discharge in 24-48 hrs to home.    Code Status: Level 1 - Full Code    Subjective   Patient states that her breathing is feeling a bit better today. Denies chest pain, shortness of breath or abdominal pain. Endorses pain in right calf as well as still having wet cough. Agreeable to current plan.     Objective :  Temp:  [97.2 °F (36.2 °C)-97.7 °F (36.5 °C)] 97.2 °F (36.2 °C)  HR:  [] 95  BP: ()/(49-70) 101/65  Resp:  [18-24] 18  SpO2:  [91 %-98 %] 95 %  O2 Device: Nasal cannula  Nasal Cannula O2 Flow Rate (L/min):  [2 L/min] 2 L/min    Body mass index is 17.66 kg/m².     Input and Output Summary (last 24 hours):     Intake/Output Summary (Last 24 hours) at 4/6/2025 0933  Last data filed at 4/6/2025 0904  Gross per 24 hour   Intake 360 ml   Output 700 ml   Net -340 ml       Physical Exam  Vitals reviewed.   Constitutional:       General: She is not in acute distress.     Appearance: She is ill-appearing (chronic).   HENT:      Head: Normocephalic and atraumatic.      Nose: Nose normal.      Mouth/Throat:      Mouth: Mucous membranes are moist.      Pharynx: Oropharynx is clear.   Eyes:      Extraocular Movements: Extraocular movements intact.      Conjunctiva/sclera: Conjunctivae normal.   Cardiovascular:      Rate and Rhythm: Regular rhythm. Tachycardia present.      Pulses: Normal pulses.      Heart sounds: Normal heart sounds. No murmur heard.  Pulmonary:      Effort: Pulmonary effort is normal. No respiratory distress.      Breath sounds:  Wheezing present. No rhonchi.      Comments: Expiratory wheeze  Wet cough  On 2L  Abdominal:      General: Abdomen is flat. Bowel sounds are normal. There is no distension.      Palpations: Abdomen is soft.      Tenderness: There is no abdominal tenderness. There is no guarding.   Musculoskeletal:         General: Normal range of motion.      Cervical back: Normal range of motion.      Right lower leg: Edema present.      Left lower leg: Edema present.      Comments: Trace edema R>L   Skin:     General: Skin is warm.   Neurological:      General: No focal deficit present.      Mental Status: She is alert and oriented to person, place, and time. Mental status is at baseline.      Motor: No weakness.   Psychiatric:         Mood and Affect: Mood normal.         Behavior: Behavior normal.         Thought Content: Thought content normal.         Judgment: Judgment normal.         Lines/Drains:        Lab Results: I have reviewed the following results:   Results from last 7 days   Lab Units 04/06/25  0514 04/05/25  0844   WBC Thousand/uL 15.68* 19.10*   HEMOGLOBIN g/dL 11.7 13.6   HEMATOCRIT % 36.6 42.3   PLATELETS Thousands/uL 250 294   SEGS PCT %  --  83*   LYMPHO PCT %  --  10*   MONO PCT %  --  6   EOS PCT %  --  0     Results from last 7 days   Lab Units 04/06/25  0514 04/05/25  0844   SODIUM mmol/L 140 136   POTASSIUM mmol/L 4.1 3.3*   CHLORIDE mmol/L 105 99   CO2 mmol/L 33* 29   BUN mg/dL 12 7   CREATININE mg/dL 0.56* 0.43*   ANION GAP mmol/L 2* 8   CALCIUM mg/dL 8.5 9.0   ALBUMIN g/dL  --  3.9   TOTAL BILIRUBIN mg/dL  --  0.46   ALK PHOS U/L  --  112*   ALT U/L  --  13   AST U/L  --  11*   GLUCOSE RANDOM mg/dL 113 130     Results from last 7 days   Lab Units 04/05/25  0844   INR  1.04             Results from last 7 days   Lab Units 04/06/25  0514 04/05/25  0844   PROCALCITONIN ng/ml <0.05 <0.05       Recent Cultures (last 7 days):   Results from last 7 days   Lab Units 04/05/25  1609   LEGIONELLA URINARY ANTIGEN   Negative       Last 24 Hours Medication List:     Current Facility-Administered Medications:     acetaminophen (TYLENOL) tablet 650 mg, Q6H PRN    aspirin (ECOTRIN LOW STRENGTH) EC tablet 81 mg, Daily    atorvastatin (LIPITOR) tablet 40 mg, Daily    azithromycin (ZITHROMAX) tablet 500 mg, Q24H    budesonide (PULMICORT) inhalation solution 0.5 mg, Q12H    cefTRIAXone (ROCEPHIN) IVPB (premix in dextrose) 1,000 mg 50 mL, Q24H, Last Rate: 1,000 mg (04/05/25 8348)    dicyclomine (BENTYL) capsule 10 mg, TID AC    docusate sodium (COLACE) capsule 100 mg, BID    enoxaparin (LOVENOX) subcutaneous injection 40 mg, Daily    famotidine (PEPCID) tablet 20 mg, BID    formoterol (PERFOROMIST) nebulizer solution 20 mcg, Q12H    furosemide (LASIX) injection 40 mg, BID    guaiFENesin (MUCINEX) 12 hr tablet 600 mg, BID    ipratropium-albuterol (DUO-NEB) 0.5-2.5 mg/3 mL inhalation solution 3 mL, TID    magnesium sulfate 2 g/50 mL IVPB (premix) 2 g, Once, Last Rate: 2 g (04/06/25 0831)    methylPREDNISolone sodium succinate (Solu-MEDROL) injection 40 mg, Q12H MAYCO    nicotine (NICODERM CQ) 21 mg/24 hr TD 24 hr patch 1 patch, Daily    ondansetron (ZOFRAN) injection 4 mg, Q6H PRN    pantoprazole (PROTONIX) EC tablet 40 mg, BID AC    simethicone (MYLICON) chewable tablet 80 mg, Q6H PRN    sucralfate (CARAFATE) tablet 1 g, 4x Daily    Administrative Statements   Today, Patient Was Seen By: Barbra Dunn PA-C    **Please Note: This note may have been constructed using a voice recognition system.**

## 2025-04-06 NOTE — ASSESSMENT & PLAN NOTE
Wt Readings from Last 3 Encounters:   04/06/25 42.4 kg (93 lb 7.6 oz)   03/29/25 52.2 kg (115 lb)   03/16/25 44 kg (97 lb)   Echo in 10/2024 with EF 65% diastolic function mildly abnormal  3+ pitting edema at time of admission, reports worsened than normal despite compression socks. No longer taking lasix   BNP 35  Will start lasix 40mg iv bid and monitor response   Venous duplex pending with R>L swelling  Monitor daily weights, intake and output

## 2025-04-06 NOTE — PLAN OF CARE
Problem: PAIN - ADULT  Goal: Verbalizes/displays adequate comfort level or baseline comfort level  Description: Interventions:- Encourage patient to monitor pain and request assistance- Assess pain using appropriate pain scale- Administer analgesics based on type and severity of pain and evaluate response- Implement non-pharmacological measures as appropriate and evaluate response- Consider cultural and social influences on pain and pain management- Notify physician/advanced practitioner if interventions unsuccessful or patient reports new pain  Outcome: Progressing     Problem: INFECTION - ADULT  Goal: Absence or prevention of progression during hospitalization  Description: INTERVENTIONS:- Assess and monitor for signs and symptoms of infection- Monitor lab/diagnostic results- Monitor all insertion sites, i.e. indwelling lines, tubes, and drains- Monitor endotracheal if appropriate and nasal secretions for changes in amount and color- [place] appropriate cooling/warming therapies per order- Administer medications as ordered- Instruct and encourage patient and family to use good hand hygiene technique- Identify and instruct in appropriate isolation precautions for identified infection/condition  Outcome: Progressing     Problem: SAFETY ADULT  Goal: Patient will remain free of falls  Description: INTERVENTIONS:- Educate patient/family on patient safety including physical limitations- Instruct patient to call for assistance with activity - Consult OT/PT to assist with strengthening/mobility - Keep Call bell within reach- Keep bed low and locked with side rails adjusted as appropriate- Keep care items and personal belongings within reach- Initiate and maintain comfort rounds- Make Fall Risk Sign visible to staff- Offer Toileting every 2 Hours, in advance of need- Initiate/Maintain alarm- Obtain necessary fall risk management equipment- Apply yellow socks and bracelet for high fall risk patients- Consider moving  patient to room near nurses station  Outcome: Progressing  Goal: Maintain or return to baseline ADL function  Description: INTERVENTIONS:-  Assess patient's ability to carry out ADLs; assess patient's baseline for ADL function and identify physical deficits which impact ability to perform ADLs (bathing, care of mouth/teeth, toileting, grooming, dressing, etc.)- Assess/evaluate cause of self-care deficits - Assess range of motion- Assess patient's mobility; develop plan if impaired- Assess patient's need for assistive devices and provide as appropriate- Encourage maximum independence but intervene and supervise when necessary- Involve family in performance of ADLs- Assess for home care needs following discharge - Consider OT consult to assist with ADL evaluation and planning for discharge- Provide patient education as appropriate  Outcome: Progressing  Goal: Maintains/Returns to pre admission functional level  Description: INTERVENTIONS:- Perform AM-PAC 6 Click Basic Mobility/ Daily Activity assessment daily.- Set and communicate daily mobility goal to care team and patient/family/caregiver. - Collaborate with rehabilitation services on mobility goals if consulted- Perform Range of Motion 3 times a day.- Reposition patient every 2 hours.- Dangle patient 3 times a day- Stand patient 3 times a day- Ambulate patient 3 times a day- Out of bed to chair 3 times a day - Out of bed for meals 3 times a day- Out of bed for toileting- Record patient progress and toleration of activity level   Outcome: Progressing     Problem: DISCHARGE PLANNING  Goal: Discharge to home or other facility with appropriate resources  Description: INTERVENTIONS:- Identify barriers to discharge w/patient and caregiver- Arrange for needed discharge resources and transportation as appropriate- Identify discharge learning needs (meds, wound care, etc.)- Arrange for interpretive services to assist at discharge as needed- Refer to Case Management  Department for coordinating discharge planning if the patient needs post-hospital services based on physician/advanced practitioner order or complex needs related to functional status, cognitive ability, or social support system  Outcome: Progressing     Problem: Knowledge Deficit  Goal: Patient/family/caregiver demonstrates understanding of disease process, treatment plan, medications, and discharge instructions  Description: Complete learning assessment and assess knowledge base.Interventions:- Provide teaching at level of understanding- Provide teaching via preferred learning methods  Outcome: Progressing     Problem: RESPIRATORY - ADULT  Goal: Achieves optimal ventilation and oxygenation  Description: INTERVENTIONS:- Assess for changes in respiratory status- Assess for changes in mentation and behavior- Position to facilitate oxygenation and minimize respiratory effort- Oxygen administered by appropriate delivery if ordered- Initiate smoking cessation education as indicated- Encourage broncho-pulmonary hygiene including cough, deep breathe, Incentive Spirometry- Assess the need for suctioning and aspirate as needed- Assess and instruct to report SOB or any respiratory difficulty- Respiratory Therapy support as indicated  Outcome: Progressing

## 2025-04-06 NOTE — SEPSIS NOTE
Sepsis Note   Ileana Seaman 64 y.o. female MRN: [id number]  Unit/Bed#: -01 Encounter: [id number]       Initial Sepsis Screening       Row Name 04/06/25 0754                Is the patient's history suggestive of a new or worsening infection? No  -                  User Key  (r) = Recorded By, (t) = Taken By, (c) = Cosigned By      Initials Name Provider Type     Barbra Dunn PA-C Physician Assistant                        Body mass index is 17.66 kg/m².  Wt Readings from Last 1 Encounters:   04/06/25 42.4 kg (93 lb 7.6 oz)     IBW (Ideal Body Weight): 47.8 kg    Ideal body weight: 52.1 kg (114 lb 15 oz)

## 2025-04-06 NOTE — PLAN OF CARE
Problem: PAIN - ADULT  Goal: Verbalizes/displays adequate comfort level or baseline comfort level  Description: Interventions:- Encourage patient to monitor pain and request assistance- Assess pain using appropriate pain scale- Administer analgesics based on type and severity of pain and evaluate response- Implement non-pharmacological measures as appropriate and evaluate response- Consider cultural and social influences on pain and pain management- Notify physician/advanced practitioner if interventions unsuccessful or patient reports new pain  Outcome: Progressing     Problem: INFECTION - ADULT  Goal: Absence or prevention of progression during hospitalization  Description: INTERVENTIONS:- Assess and monitor for signs and symptoms of infection- Monitor lab/diagnostic results- Monitor all insertion sites, i.e. indwelling lines, tubes, and drains- Monitor endotracheal if appropriate and nasal secretions for changes in amount and color- [place] appropriate cooling/warming therapies per order- Administer medications as ordered- Instruct and encourage patient and family to use good hand hygiene technique- Identify and instruct in appropriate isolation precautions for identified infection/condition  Outcome: Progressing     Problem: SAFETY ADULT  Goal: Patient will remain free of falls  Description: INTERVENTIONS:- Educate patient/family on patient safety including physical limitations- Instruct patient to call for assistance with activity - Consult OT/PT to assist with strengthening/mobility - Keep Call bell within reach- Keep bed low and locked with side rails adjusted as appropriate- Keep care items and personal belongings within reach- Initiate and maintain comfort rounds- Make Fall Risk Sign visible to staff- Offer Toileting every 2 Hours, in advance of need- Initiate/Maintain bed alarm- Obtain necessary fall risk management equipment: walker - Apply yellow socks and bracelet for high fall risk patients-  Consider moving patient to room near nurses station  Outcome: Progressing  Goal: Maintain or return to baseline ADL function  Description: INTERVENTIONS:-  Assess patient's ability to carry out ADLs; assess patient's baseline for ADL function and identify physical deficits which impact ability to perform ADLs (bathing, care of mouth/teeth, toileting, grooming, dressing, etc.)- Assess/evaluate cause of self-care deficits - Assess range of motion- Assess patient's mobility; develop plan if impaired- Assess patient's need for assistive devices and provide as appropriate- Encourage maximum independence but intervene and supervise when necessary- Involve family in performance of ADLs- Assess for home care needs following discharge - Consider OT consult to assist with ADL evaluation and planning for discharge- Provide patient education as appropriate  Outcome: Progressing  Goal: Maintains/Returns to pre admission functional level  Description: INTERVENTIONS:- Perform AM-PAC 6 Click Basic Mobility/ Daily Activity assessment daily.- Set and communicate daily mobility goal to care team and patient/family/caregiver. - Collaborate with rehabilitation services on mobility goals if consulted- Perform Range of Motion 3 times a day.- Reposition patient every 2 hours.- Dangle patient 3 times a day- Stand patient 3 times a day- Ambulate patient 3 times a day- Out of bed to chair 3 times a day - Out of bed for meals 3 times a day- Out of bed for toileting- Record patient progress and toleration of activity level   Outcome: Progressing     Problem: DISCHARGE PLANNING  Goal: Discharge to home or other facility with appropriate resources  Description: INTERVENTIONS:- Identify barriers to discharge w/patient and caregiver- Arrange for needed discharge resources and transportation as appropriate- Identify discharge learning needs (meds, wound care, etc.)- Arrange for interpretive services to assist at discharge as needed- Refer to Case  Management Department for coordinating discharge planning if the patient needs post-hospital services based on physician/advanced practitioner order or complex needs related to functional status, cognitive ability, or social support system  Outcome: Progressing     Problem: Knowledge Deficit  Goal: Patient/family/caregiver demonstrates understanding of disease process, treatment plan, medications, and discharge instructions  Description: Complete learning assessment and assess knowledge base.Interventions:- Provide teaching at level of understanding- Provide teaching via preferred learning methods  Outcome: Progressing     Problem: RESPIRATORY - ADULT  Goal: Achieves optimal ventilation and oxygenation  Description: INTERVENTIONS:- Assess for changes in respiratory status- Assess for changes in mentation and behavior- Position to facilitate oxygenation and minimize respiratory effort- Oxygen administered by appropriate delivery if ordered- Initiate smoking cessation education as indicated- Encourage broncho-pulmonary hygiene including cough, deep breathe, Incentive Spirometry- Assess the need for suctioning and aspirate as needed- Assess and instruct to report SOB or any respiratory difficulty- Respiratory Therapy support as indicated  Outcome: Progressing

## 2025-04-06 NOTE — ASSESSMENT & PLAN NOTE
At baseline O2 2L  Start solumedrol 40mg iv bid, wean as able   Azithro x3 doses  Continue nebs and inhalers

## 2025-04-07 ENCOUNTER — APPOINTMENT (INPATIENT)
Dept: NON INVASIVE DIAGNOSTICS | Facility: HOSPITAL | Age: 65
DRG: 871 | End: 2025-04-07
Payer: COMMERCIAL

## 2025-04-07 LAB
ANION GAP SERPL CALCULATED.3IONS-SCNC: 4 MMOL/L (ref 4–13)
ATRIAL RATE: 97 BPM
ATRIAL RATE: 98 BPM
BUN SERPL-MCNC: 13 MG/DL (ref 5–25)
CALCIUM SERPL-MCNC: 8.9 MG/DL (ref 8.4–10.2)
CHLORIDE SERPL-SCNC: 101 MMOL/L (ref 96–108)
CO2 SERPL-SCNC: 34 MMOL/L (ref 21–32)
CREAT SERPL-MCNC: 0.53 MG/DL (ref 0.6–1.3)
ERYTHROCYTE [DISTWIDTH] IN BLOOD BY AUTOMATED COUNT: 13.5 % (ref 11.6–15.1)
GFR SERPL CREATININE-BSD FRML MDRD: 99 ML/MIN/1.73SQ M
GLUCOSE SERPL-MCNC: 134 MG/DL (ref 65–140)
HCT VFR BLD AUTO: 39.6 % (ref 34.8–46.1)
HGB BLD-MCNC: 12.3 G/DL (ref 11.5–15.4)
MAGNESIUM SERPL-MCNC: 2.2 MG/DL (ref 1.9–2.7)
MCH RBC QN AUTO: 28.5 PG (ref 26.8–34.3)
MCHC RBC AUTO-ENTMCNC: 31.1 G/DL (ref 31.4–37.4)
MCV RBC AUTO: 92 FL (ref 82–98)
P AXIS: 70 DEGREES
P AXIS: 73 DEGREES
PLATELET # BLD AUTO: 261 THOUSANDS/UL (ref 149–390)
PMV BLD AUTO: 12.4 FL (ref 8.9–12.7)
POTASSIUM SERPL-SCNC: 4.7 MMOL/L (ref 3.5–5.3)
PR INTERVAL: 128 MS
PR INTERVAL: 138 MS
QRS AXIS: 68 DEGREES
QRS AXIS: 68 DEGREES
QRSD INTERVAL: 80 MS
QRSD INTERVAL: 80 MS
QT INTERVAL: 344 MS
QT INTERVAL: 354 MS
QTC INTERVAL: 439 MS
QTC INTERVAL: 449 MS
RBC # BLD AUTO: 4.32 MILLION/UL (ref 3.81–5.12)
SODIUM SERPL-SCNC: 139 MMOL/L (ref 135–147)
T WAVE AXIS: 49 DEGREES
T WAVE AXIS: 64 DEGREES
VENTRICULAR RATE: 97 BPM
VENTRICULAR RATE: 98 BPM
WBC # BLD AUTO: 13.83 THOUSAND/UL (ref 4.31–10.16)

## 2025-04-07 PROCEDURE — 83735 ASSAY OF MAGNESIUM: CPT

## 2025-04-07 PROCEDURE — 94760 N-INVAS EAR/PLS OXIMETRY 1: CPT

## 2025-04-07 PROCEDURE — 85027 COMPLETE CBC AUTOMATED: CPT

## 2025-04-07 PROCEDURE — 99232 SBSQ HOSP IP/OBS MODERATE 35: CPT

## 2025-04-07 PROCEDURE — 93970 EXTREMITY STUDY: CPT

## 2025-04-07 PROCEDURE — 93970 EXTREMITY STUDY: CPT | Performed by: SURGERY

## 2025-04-07 PROCEDURE — 80048 BASIC METABOLIC PNL TOTAL CA: CPT

## 2025-04-07 PROCEDURE — 94640 AIRWAY INHALATION TREATMENT: CPT

## 2025-04-07 PROCEDURE — 93010 ELECTROCARDIOGRAM REPORT: CPT | Performed by: INTERNAL MEDICINE

## 2025-04-07 RX ORDER — NYSTATIN 100000 U/G
CREAM TOPICAL 2 TIMES DAILY
Status: DISCONTINUED | OUTPATIENT
Start: 2025-04-07 | End: 2025-04-09 | Stop reason: HOSPADM

## 2025-04-07 RX ORDER — FUROSEMIDE 10 MG/ML
40 INJECTION INTRAMUSCULAR; INTRAVENOUS 2 TIMES DAILY
Status: DISCONTINUED | OUTPATIENT
Start: 2025-04-07 | End: 2025-04-08

## 2025-04-07 RX ADMIN — SUCRALFATE 1 G: 1 TABLET ORAL at 08:29

## 2025-04-07 RX ADMIN — FUROSEMIDE 40 MG: 10 INJECTION, SOLUTION INTRAMUSCULAR; INTRAVENOUS at 17:09

## 2025-04-07 RX ADMIN — SUCRALFATE 1 G: 1 TABLET ORAL at 17:08

## 2025-04-07 RX ADMIN — DOCUSATE SODIUM 100 MG: 100 CAPSULE, LIQUID FILLED ORAL at 08:29

## 2025-04-07 RX ADMIN — FAMOTIDINE 20 MG: 20 TABLET, FILM COATED ORAL at 08:29

## 2025-04-07 RX ADMIN — FUROSEMIDE 40 MG: 10 INJECTION, SOLUTION INTRAMUSCULAR; INTRAVENOUS at 08:56

## 2025-04-07 RX ADMIN — SUCRALFATE 1 G: 1 TABLET ORAL at 11:02

## 2025-04-07 RX ADMIN — AZITHROMYCIN DIHYDRATE 500 MG: 250 TABLET ORAL at 08:29

## 2025-04-07 RX ADMIN — ATORVASTATIN CALCIUM 40 MG: 40 TABLET, FILM COATED ORAL at 08:29

## 2025-04-07 RX ADMIN — BUDESONIDE 0.5 MG: 0.5 INHALANT RESPIRATORY (INHALATION) at 07:11

## 2025-04-07 RX ADMIN — METHYLPREDNISOLONE SODIUM SUCCINATE 40 MG: 40 INJECTION, POWDER, FOR SOLUTION INTRAMUSCULAR; INTRAVENOUS at 08:29

## 2025-04-07 RX ADMIN — DICYCLOMINE HYDROCHLORIDE 10 MG: 10 CAPSULE ORAL at 11:02

## 2025-04-07 RX ADMIN — PANTOPRAZOLE SODIUM 40 MG: 40 TABLET, DELAYED RELEASE ORAL at 15:01

## 2025-04-07 RX ADMIN — PANTOPRAZOLE SODIUM 40 MG: 40 TABLET, DELAYED RELEASE ORAL at 05:12

## 2025-04-07 RX ADMIN — IPRATROPIUM BROMIDE AND ALBUTEROL SULFATE 3 ML: 2.5; .5 SOLUTION RESPIRATORY (INHALATION) at 13:32

## 2025-04-07 RX ADMIN — FAMOTIDINE 20 MG: 20 TABLET, FILM COATED ORAL at 17:08

## 2025-04-07 RX ADMIN — GUAIFENESIN 600 MG: 600 TABLET, EXTENDED RELEASE ORAL at 17:08

## 2025-04-07 RX ADMIN — NICOTINE 1 PATCH: 21 PATCH, EXTENDED RELEASE TRANSDERMAL at 08:29

## 2025-04-07 RX ADMIN — NYSTATIN 1 APPLICATION: 100000 CREAM TOPICAL at 18:17

## 2025-04-07 RX ADMIN — METHYLPREDNISOLONE SODIUM SUCCINATE 40 MG: 40 INJECTION, POWDER, FOR SOLUTION INTRAMUSCULAR; INTRAVENOUS at 21:21

## 2025-04-07 RX ADMIN — DOCUSATE SODIUM 100 MG: 100 CAPSULE, LIQUID FILLED ORAL at 17:08

## 2025-04-07 RX ADMIN — FORMOTEROL FUMARATE DIHYDRATE 20 MCG: 20 SOLUTION RESPIRATORY (INHALATION) at 07:11

## 2025-04-07 RX ADMIN — ASPIRIN 81 MG: 81 TABLET, COATED ORAL at 08:29

## 2025-04-07 RX ADMIN — IPRATROPIUM BROMIDE AND ALBUTEROL SULFATE 3 ML: 2.5; .5 SOLUTION RESPIRATORY (INHALATION) at 20:45

## 2025-04-07 RX ADMIN — POLYETHYLENE GLYCOL 3350 17 G: 17 POWDER, FOR SOLUTION ORAL at 17:13

## 2025-04-07 RX ADMIN — IPRATROPIUM BROMIDE AND ALBUTEROL SULFATE 3 ML: 2.5; .5 SOLUTION RESPIRATORY (INHALATION) at 07:11

## 2025-04-07 RX ADMIN — DICYCLOMINE HYDROCHLORIDE 10 MG: 10 CAPSULE ORAL at 15:02

## 2025-04-07 RX ADMIN — CEFTRIAXONE 1000 MG: 1 INJECTION, SOLUTION INTRAVENOUS at 15:02

## 2025-04-07 RX ADMIN — GUAIFENESIN 600 MG: 600 TABLET, EXTENDED RELEASE ORAL at 08:29

## 2025-04-07 RX ADMIN — DICYCLOMINE HYDROCHLORIDE 10 MG: 10 CAPSULE ORAL at 05:12

## 2025-04-07 RX ADMIN — FORMOTEROL FUMARATE DIHYDRATE 20 MCG: 20 SOLUTION RESPIRATORY (INHALATION) at 20:45

## 2025-04-07 RX ADMIN — ENOXAPARIN SODIUM 40 MG: 40 INJECTION SUBCUTANEOUS at 08:28

## 2025-04-07 RX ADMIN — BUDESONIDE 0.5 MG: 0.5 INHALANT RESPIRATORY (INHALATION) at 20:45

## 2025-04-07 RX ADMIN — SUCRALFATE 1 G: 1 TABLET ORAL at 21:21

## 2025-04-07 NOTE — MALNUTRITION/BMI
This medical record reflects one or more clinical indicators suggestive of malnutrition and underweight.    Malnutrition Findings:   Adult Malnutrition type: Chronic illness  Adult Degree of Malnutrition: Malnutrition of moderate degree  Malnutrition Characteristics: Fat loss, Muscle loss, Weight loss                360 Statement: Chronic moderate malnutrition related to possible previous inadequate, COPD intake as evidenced by moderate muscle loss to interroseous and temporalis muscles, moderate fat loss to orbitals and buccal pads. Treated with: Continue regular diet. +Ensure max PRO BID which pt plans to sip on in between meals. Recommend daily weights for nutrition monitoring. Pt declined need for diet ed.    BMI Findings:  Adult BMI Classifications: Underweight < 18.5        Body mass index is 17.66 kg/m².     See Nutrition note dated 4/7/25 for additional details.  Completed nutrition assessment is viewable in the nutrition documentation.

## 2025-04-07 NOTE — PLAN OF CARE
Problem: PAIN - ADULT  Goal: Verbalizes/displays adequate comfort level or baseline comfort level  Description: Interventions:- Encourage patient to monitor pain and request assistance- Assess pain using appropriate pain scale- Administer analgesics based on type and severity of pain and evaluate response- Implement non-pharmacological measures as appropriate and evaluate response- Consider cultural and social influences on pain and pain management- Notify physician/advanced practitioner if interventions unsuccessful or patient reports new pain  Outcome: Progressing     Problem: INFECTION - ADULT  Goal: Absence or prevention of progression during hospitalization  Description: INTERVENTIONS:- Assess and monitor for signs and symptoms of infection- Monitor lab/diagnostic results- Monitor all insertion sites, i.e. indwelling lines, tubes, and drains- Monitor endotracheal if appropriate and nasal secretions for changes in amount and color- [place] appropriate cooling/warming therapies per order- Administer medications as ordered- Instruct and encourage patient and family to use good hand hygiene technique- Identify and instruct in appropriate isolation precautions for identified infection/condition  Outcome: Progressing     Problem: SAFETY ADULT  Goal: Patient will remain free of falls  Description: INTERVENTIONS:- Educate patient/family on patient safety including physical limitations- Instruct patient to call for assistance with activity - Consult OT/PT to assist with strengthening/mobility - Keep Call bell within reach- Keep bed low and locked with side rails adjusted as appropriate- Keep care items and personal belongings within reach- Initiate and maintain comfort rounds- Make Fall Risk Sign visible to staff- Offer Toileting every - Hours, in advance of need- Initiate/Maintain -alarm- Obtain necessary fall risk management equipment: -- Apply yellow socks and bracelet for high fall risk patients- Consider moving  patient to room near nurses station  Outcome: Progressing  Goal: Maintain or return to baseline ADL function  Description: INTERVENTIONS:-  Assess patient's ability to carry out ADLs; assess patient's baseline for ADL function and identify physical deficits which impact ability to perform ADLs (bathing, care of mouth/teeth, toileting, grooming, dressing, etc.)- Assess/evaluate cause of self-care deficits - Assess range of motion- Assess patient's mobility; develop plan if impaired- Assess patient's need for assistive devices and provide as appropriate- Encourage maximum independence but intervene and supervise when necessary- Involve family in performance of ADLs- Assess for home care needs following discharge - Consider OT consult to assist with ADL evaluation and planning for discharge- Provide patient education as appropriate  Outcome: Progressing  Goal: Maintains/Returns to pre admission functional level  Description: INTERVENTIONS:- Perform AM-PAC 6 Click Basic Mobility/ Daily Activity assessment daily.- Set and communicate daily mobility goal to care team and patient/family/caregiver. - Collaborate with rehabilitation services on mobility goals if consulted- Perform Range of Motion - times a day.- Reposition patient every - hours.- Dangle patient - times a day- Stand patient - times a day- Ambulate patient - times a day- Out of bed to chair - times a day - Out of bed for meals - times a day- Out of bed for toileting- Record patient progress and toleration of activity level   Outcome: Progressing     Problem: DISCHARGE PLANNING  Goal: Discharge to home or other facility with appropriate resources  Description: INTERVENTIONS:- Identify barriers to discharge w/patient and caregiver- Arrange for needed discharge resources and transportation as appropriate- Identify discharge learning needs (meds, wound care, etc.)- Arrange for interpretive services to assist at discharge as needed- Refer to Case Management  Department for coordinating discharge planning if the patient needs post-hospital services based on physician/advanced practitioner order or complex needs related to functional status, cognitive ability, or social support system  Outcome: Progressing     Problem: Knowledge Deficit  Goal: Patient/family/caregiver demonstrates understanding of disease process, treatment plan, medications, and discharge instructions  Description: Complete learning assessment and assess knowledge base.Interventions:- Provide teaching at level of understanding- Provide teaching via preferred learning methods  Outcome: Progressing     Problem: RESPIRATORY - ADULT  Goal: Achieves optimal ventilation and oxygenation  Description: INTERVENTIONS:- Assess for changes in respiratory status- Assess for changes in mentation and behavior- Position to facilitate oxygenation and minimize respiratory effort- Oxygen administered by appropriate delivery if ordered- Initiate smoking cessation education as indicated- Encourage broncho-pulmonary hygiene including cough, deep breathe, Incentive Spirometry- Assess the need for suctioning and aspirate as needed- Assess and instruct to report SOB or any respiratory difficulty- Respiratory Therapy support as indicated  Outcome: Progressing

## 2025-04-07 NOTE — ASSESSMENT & PLAN NOTE
Presented to ED with SOB, cough, leg swelling.  CT mild new patchy opacity RLL and mild new bilateral LL greater on left, compatible with bronchopneumonia/bronchitis  COVID/Flu/RSV negative   Urine strep and legionella negative  Continue rocephin and azithro  Currently at baseline 2L  Aspiration Precautions, Respiratory Protocol, Incentive Spirometry

## 2025-04-07 NOTE — ASSESSMENT & PLAN NOTE
At baseline O2 2L  Start solumedrol 40mg iv bid, wean as able   Anticipate transition to oral prednisone taper on 4/8  Azithro x3 doses  Continue nebs and inhalers

## 2025-04-07 NOTE — ASSESSMENT & PLAN NOTE
Wt Readings from Last 3 Encounters:   04/06/25 42.4 kg (93 lb 7.6 oz)   03/29/25 52.2 kg (115 lb)   03/16/25 44 kg (97 lb)     Echo in 10/2024 with EF 65% diastolic function mildly abnormal  3+ pitting edema at time of admission, reports worsened than normal despite compression socks. No longer taking lasix   BNP 35  Will start lasix 40mg iv bid and monitor response   With good response, will continue with lasix 40 mg IV bid x 1 more day, then anticipate transition to oral lasix tomorrow.   Venous duplex pending with R>L swelling  Monitor daily weights, intake and output

## 2025-04-07 NOTE — PROGRESS NOTES
Progress Note - Hospitalist   Name: Ileana Seaman 64 y.o. female I MRN: [id number]  Unit/Bed#: -[facility] I Date of Admission: 4/5/2025   Date of Service: 4/7/2025 I Hospital Day: 1    Assessment & Plan  Bronchopneumonia  Presented to ED with SOB, cough, leg swelling.  CT mild new patchy opacity RLL and mild new bilateral LL greater on left, compatible with bronchopneumonia/bronchitis  COVID/Flu/RSV negative   Urine strep and legionella negative  Continue rocephin and azithro  Currently at baseline 2L  Aspiration Precautions, Respiratory Protocol, Incentive Spirometry    Acute on chronic diastolic (congestive) heart failure (HCC)  Wt Readings from Last 3 Encounters:   04/06/25 42.4 kg (93 lb 7.6 oz)   03/29/25 52.2 kg (115 lb)   03/16/25 44 kg (97 lb)     Echo in 10/2024 with EF 65% diastolic function mildly abnormal  3+ pitting edema at time of admission, reports worsened than normal despite compression socks. No longer taking lasix   BNP 35  Will start lasix 40mg iv bid and monitor response   With good response, will continue with lasix 40 mg IV bid x 1 more day, then anticipate transition to oral lasix tomorrow.   Venous duplex pending with R>L swelling  Monitor daily weights, intake and output   Sepsis (HCC)  AEB tachycardia and leukocytosis  Source: pneumonia  Continue rocephin and azithro  Holding off on fluids with volume overload  Monitor daily cbc  COPD with acute exacerbation (HCC)  At baseline O2 2L  Start solumedrol 40mg iv bid, wean as able   Anticipate transition to oral prednisone taper on 4/8  Azithro x3 doses  Continue nebs and inhalers  Chronic hypoxic respiratory failure (HCC)  Chronic baseline 2L - currently at baseline   Smoker  NRT ordered    VTE Pharmacologic Prophylaxis:   Moderate Risk (Score 3-4) - Pharmacological DVT Prophylaxis Ordered: enoxaparin (Lovenox).    Mobility:   Basic Mobility Inpatient Raw Score: 24  JH-HLM Goal: 8: Walk 250 feet or more  JH-HLM Achieved: 6: Walk 10 steps  or more  JH-HLM Goal NOT achieved. Continue with multidisciplinary rounding and encourage appropriate mobility to improve upon JH-HLM goals.    Patient Centered Rounds: I performed bedside rounds with nursing staff today.   Discussions with Specialists or Other Care Team Provider: nursing, case management    Education and Discussions with Family / Patient: Patient declined call to .     Current Length of Stay: 1 day(s)  Current Patient Status: Inpatient   Certification Statement: The patient will continue to require additional inpatient hospital stay due to chf exacerbation, COPD exacerbation, sepsis, bronchopneumonia   Discharge Plan: Anticipate discharge in 24-48 hrs to home.    Code Status: Level 1 - Full Code    Subjective   Seen and examined today. Said that she is feeling better with breathing today and that her leg swelling has improved since she was admitted, but not back to baseline yet. Said still with productive cough. No chest pain. Still SOB. No fevers or chills.     Objective :  Temp:  [97.5 °F (36.4 °C)-97.9 °F (36.6 °C)] 97.7 °F (36.5 °C)  HR:  [] 92  BP: (105-115)/(61-78) 105/61  Resp:  [18-20] 18  SpO2:  [90 %-97 %] 97 %  O2 Device: Nasal cannula  Nasal Cannula O2 Flow Rate (L/min):  [2 L/min] 2 L/min    Body mass index is 17.66 kg/m².     Input and Output Summary (last 24 hours):     Intake/Output Summary (Last 24 hours) at 4/7/2025 0826  Last data filed at 4/7/2025 0513  Gross per 24 hour   Intake 1440 ml   Output 4700 ml   Net -3260 ml       Physical Exam  Vitals reviewed.   Constitutional:       General: She is not in acute distress.     Appearance: She is ill-appearing. She is not toxic-appearing.   HENT:      Head: Normocephalic and atraumatic.      Mouth/Throat:      Mouth: Mucous membranes are moist.   Cardiovascular:      Rate and Rhythm: Normal rate and regular rhythm.      Heart sounds: No murmur heard.  Pulmonary:      Effort: No respiratory distress.      Breath  sounds: No stridor. Wheezing and rhonchi present. No rales.   Abdominal:      General: Bowel sounds are normal. There is no distension.      Palpations: Abdomen is soft. There is no mass.      Tenderness: There is no abdominal tenderness.   Musculoskeletal:      Right lower leg: Edema present.      Left lower leg: No edema.   Skin:     General: Skin is warm and dry.   Neurological:      Mental Status: She is alert and oriented to person, place, and time.   Psychiatric:         Mood and Affect: Mood normal.         Behavior: Behavior normal.           Lines/Drains:              Lab Results: I have reviewed the following results:   Results from last 7 days   Lab Units 04/07/25  0508 04/06/25  0514 04/05/25  0844   WBC Thousand/uL 13.83*   < > 19.10*   HEMOGLOBIN g/dL 12.3   < > 13.6   HEMATOCRIT % 39.6   < > 42.3   PLATELETS Thousands/uL 261   < > 294   SEGS PCT %  --   --  83*   LYMPHO PCT %  --   --  10*   MONO PCT %  --   --  6   EOS PCT %  --   --  0    < > = values in this interval not displayed.     Results from last 7 days   Lab Units 04/07/25  0508 04/06/25  0514 04/05/25  0844   SODIUM mmol/L 139   < > 136   POTASSIUM mmol/L 4.7   < > 3.3*   CHLORIDE mmol/L 101   < > 99   CO2 mmol/L 34*   < > 29   BUN mg/dL 13   < > 7   CREATININE mg/dL 0.53*   < > 0.43*   ANION GAP mmol/L 4   < > 8   CALCIUM mg/dL 8.9   < > 9.0   ALBUMIN g/dL  --   --  3.9   TOTAL BILIRUBIN mg/dL  --   --  0.46   ALK PHOS U/L  --   --  112*   ALT U/L  --   --  13   AST U/L  --   --  11*   GLUCOSE RANDOM mg/dL 134   < > 130    < > = values in this interval not displayed.     Results from last 7 days   Lab Units 04/05/25  0844   INR  1.04             Results from last 7 days   Lab Units 04/06/25  0514 04/05/25  0844   PROCALCITONIN ng/ml <0.05 <0.05       Recent Cultures (last 7 days):   Results from last 7 days   Lab Units 04/05/25  1609   LEGIONELLA URINARY ANTIGEN  Negative       Imaging Results Review: I reviewed radiology reports from  this admission including: CT chest.  Other Study Results Review: No additional pertinent studies reviewed.    Last 24 Hours Medication List:     Current Facility-Administered Medications:     acetaminophen (TYLENOL) tablet 650 mg, Q6H PRN    aspirin (ECOTRIN LOW STRENGTH) EC tablet 81 mg, Daily    atorvastatin (LIPITOR) tablet 40 mg, Daily    budesonide (PULMICORT) inhalation solution 0.5 mg, Q12H    cefTRIAXone (ROCEPHIN) IVPB (premix in dextrose) 1,000 mg 50 mL, Q24H, Last Rate: 1,000 mg (04/06/25 1508)    dicyclomine (BENTYL) capsule 10 mg, TID AC    docusate sodium (COLACE) capsule 100 mg, BID    enoxaparin (LOVENOX) subcutaneous injection 40 mg, Daily    famotidine (PEPCID) tablet 20 mg, BID    formoterol (PERFOROMIST) nebulizer solution 20 mcg, Q12H    furosemide (LASIX) injection 40 mg, BID    guaiFENesin (MUCINEX) 12 hr tablet 600 mg, BID    ipratropium-albuterol (DUO-NEB) 0.5-2.5 mg/3 mL inhalation solution 3 mL, TID    methylPREDNISolone sodium succinate (Solu-MEDROL) injection 40 mg, Q12H MAYCO    nicotine (NICODERM CQ) 21 mg/24 hr TD 24 hr patch 1 patch, Daily    ondansetron (ZOFRAN) injection 4 mg, Q6H PRN    pantoprazole (PROTONIX) EC tablet 40 mg, BID AC    polyethylene glycol (MIRALAX) packet 17 g, Daily PRN    simethicone (MYLICON) chewable tablet 80 mg, Q6H PRN    sucralfate (CARAFATE) tablet 1 g, 4x Daily    Administrative Statements   Today, Patient Was Seen By: Kadie Davila PA-C    **Please Note: This note may have been constructed using a voice recognition system.**

## 2025-04-07 NOTE — PLAN OF CARE
Problem: PAIN - ADULT  Goal: Verbalizes/displays adequate comfort level or baseline comfort level  Description: Interventions:- Encourage patient to monitor pain and request assistance- Assess pain using appropriate pain scale- Administer analgesics based on type and severity of pain and evaluate response- Implement non-pharmacological measures as appropriate and evaluate response- Consider cultural and social influences on pain and pain management- Notify physician/advanced practitioner if interventions unsuccessful or patient reports new pain  Outcome: Progressing     Problem: INFECTION - ADULT  Goal: Absence or prevention of progression during hospitalization  Description: INTERVENTIONS:- Assess and monitor for signs and symptoms of infection- Monitor lab/diagnostic results- Monitor all insertion sites, i.e. indwelling lines, tubes, and drains- Monitor endotracheal if appropriate and nasal secretions for changes in amount and color- [place] appropriate cooling/warming therapies per order- Administer medications as ordered- Instruct and encourage patient and family to use good hand hygiene technique- Identify and instruct in appropriate isolation precautions for identified infection/condition  Outcome: Progressing     Problem: SAFETY ADULT  Goal: Patient will remain free of falls  Description: INTERVENTIONS:- Educate patient/family on patient safety including physical limitations- Instruct patient to call for assistance with activity - Consult OT/PT to assist with strengthening/mobility - Keep Call bell within reach- Keep bed low and locked with side rails adjusted as appropriate- Keep care items and personal belongings within reach- Initiate and maintain comfort rounds- Make Fall Risk Sign visible to staff- Offer Toileting every 2 Hours, in advance of need- Initiate/Maintain bed alarm- Obtain necessary fall risk management equipment: walker - Apply yellow socks and bracelet for high fall risk patients-  Consider moving patient to room near nurses station  Outcome: Progressing  Goal: Maintain or return to baseline ADL function  Description: INTERVENTIONS:-  Assess patient's ability to carry out ADLs; assess patient's baseline for ADL function and identify physical deficits which impact ability to perform ADLs (bathing, care of mouth/teeth, toileting, grooming, dressing, etc.)- Assess/evaluate cause of self-care deficits - Assess range of motion- Assess patient's mobility; develop plan if impaired- Assess patient's need for assistive devices and provide as appropriate- Encourage maximum independence but intervene and supervise when necessary- Involve family in performance of ADLs- Assess for home care needs following discharge - Consider OT consult to assist with ADL evaluation and planning for discharge- Provide patient education as appropriate  Outcome: Progressing  Goal: Maintains/Returns to pre admission functional level  Description: INTERVENTIONS:- Perform AM-PAC 6 Click Basic Mobility/ Daily Activity assessment daily.- Set and communicate daily mobility goal to care team and patient/family/caregiver. - Collaborate with rehabilitation services on mobility goals if consulted- Perform Range of Motion 3 times a day.- Reposition patient every 2 hours.- Dangle patient 3 times a day- Stand patient 3 times a day- Ambulate patient 3 times a day- Out of bed to chair 3 times a day - Out of bed for meals 3 times a day- Out of bed for toileting- Record patient progress and toleration of activity level   Outcome: Progressing     Problem: DISCHARGE PLANNING  Goal: Discharge to home or other facility with appropriate resources  Description: INTERVENTIONS:- Identify barriers to discharge w/patient and caregiver- Arrange for needed discharge resources and transportation as appropriate- Identify discharge learning needs (meds, wound care, etc.)- Arrange for interpretive services to assist at discharge as needed- Refer to Case  Management Department for coordinating discharge planning if the patient needs post-hospital services based on physician/advanced practitioner order or complex needs related to functional status, cognitive ability, or social support system  Outcome: Progressing     Problem: Knowledge Deficit  Goal: Patient/family/caregiver demonstrates understanding of disease process, treatment plan, medications, and discharge instructions  Description: Complete learning assessment and assess knowledge base.Interventions:- Provide teaching at level of understanding- Provide teaching via preferred learning methods  Outcome: Progressing     Problem: RESPIRATORY - ADULT  Goal: Achieves optimal ventilation and oxygenation  Description: INTERVENTIONS:- Assess for changes in respiratory status- Assess for changes in mentation and behavior- Position to facilitate oxygenation and minimize respiratory effort- Oxygen administered by appropriate delivery if ordered- Initiate smoking cessation education as indicated- Encourage broncho-pulmonary hygiene including cough, deep breathe, Incentive Spirometry- Assess the need for suctioning and aspirate as needed- Assess and instruct to report SOB or any respiratory difficulty- Respiratory Therapy support as indicated  Outcome: Progressing

## 2025-04-08 LAB
ANION GAP SERPL CALCULATED.3IONS-SCNC: 6 MMOL/L (ref 4–13)
BUN SERPL-MCNC: 22 MG/DL (ref 5–25)
CALCIUM SERPL-MCNC: 9.7 MG/DL (ref 8.4–10.2)
CHLORIDE SERPL-SCNC: 97 MMOL/L (ref 96–108)
CO2 SERPL-SCNC: 35 MMOL/L (ref 21–32)
CREAT SERPL-MCNC: 0.62 MG/DL (ref 0.6–1.3)
ERYTHROCYTE [DISTWIDTH] IN BLOOD BY AUTOMATED COUNT: 13.3 % (ref 11.6–15.1)
GFR SERPL CREATININE-BSD FRML MDRD: 94 ML/MIN/1.73SQ M
GLUCOSE SERPL-MCNC: 148 MG/DL (ref 65–140)
HCT VFR BLD AUTO: 44.1 % (ref 34.8–46.1)
HGB BLD-MCNC: 13.6 G/DL (ref 11.5–15.4)
MAGNESIUM SERPL-MCNC: 2.1 MG/DL (ref 1.9–2.7)
MCH RBC QN AUTO: 28.4 PG (ref 26.8–34.3)
MCHC RBC AUTO-ENTMCNC: 30.8 G/DL (ref 31.4–37.4)
MCV RBC AUTO: 92 FL (ref 82–98)
PLATELET # BLD AUTO: 289 THOUSANDS/UL (ref 149–390)
PMV BLD AUTO: 11.8 FL (ref 8.9–12.7)
POTASSIUM SERPL-SCNC: 5 MMOL/L (ref 3.5–5.3)
RBC # BLD AUTO: 4.79 MILLION/UL (ref 3.81–5.12)
SODIUM SERPL-SCNC: 138 MMOL/L (ref 135–147)
WBC # BLD AUTO: 15.02 THOUSAND/UL (ref 4.31–10.16)

## 2025-04-08 PROCEDURE — 94640 AIRWAY INHALATION TREATMENT: CPT

## 2025-04-08 PROCEDURE — 83735 ASSAY OF MAGNESIUM: CPT

## 2025-04-08 PROCEDURE — 85027 COMPLETE CBC AUTOMATED: CPT

## 2025-04-08 PROCEDURE — 94760 N-INVAS EAR/PLS OXIMETRY 1: CPT

## 2025-04-08 PROCEDURE — 99232 SBSQ HOSP IP/OBS MODERATE 35: CPT

## 2025-04-08 PROCEDURE — 80048 BASIC METABOLIC PNL TOTAL CA: CPT

## 2025-04-08 RX ORDER — SENNOSIDES 8.6 MG
1 TABLET ORAL
Status: DISCONTINUED | OUTPATIENT
Start: 2025-04-08 | End: 2025-04-09 | Stop reason: HOSPADM

## 2025-04-08 RX ORDER — BISACODYL 10 MG
10 SUPPOSITORY, RECTAL RECTAL ONCE
Status: COMPLETED | OUTPATIENT
Start: 2025-04-08 | End: 2025-04-08

## 2025-04-08 RX ORDER — FUROSEMIDE 20 MG/1
20 TABLET ORAL ONCE
Status: COMPLETED | OUTPATIENT
Start: 2025-04-08 | End: 2025-04-08

## 2025-04-08 RX ORDER — POLYETHYLENE GLYCOL 3350 17 G/17G
17 POWDER, FOR SOLUTION ORAL DAILY
Status: DISCONTINUED | OUTPATIENT
Start: 2025-04-08 | End: 2025-04-09 | Stop reason: HOSPADM

## 2025-04-08 RX ADMIN — DICYCLOMINE HYDROCHLORIDE 10 MG: 10 CAPSULE ORAL at 15:58

## 2025-04-08 RX ADMIN — NYSTATIN: 100000 CREAM TOPICAL at 17:53

## 2025-04-08 RX ADMIN — IPRATROPIUM BROMIDE AND ALBUTEROL SULFATE 3 ML: 2.5; .5 SOLUTION RESPIRATORY (INHALATION) at 07:33

## 2025-04-08 RX ADMIN — GUAIFENESIN 600 MG: 600 TABLET, EXTENDED RELEASE ORAL at 08:08

## 2025-04-08 RX ADMIN — CEFTRIAXONE 1000 MG: 1 INJECTION, SOLUTION INTRAVENOUS at 15:11

## 2025-04-08 RX ADMIN — BUDESONIDE 0.5 MG: 0.5 INHALANT RESPIRATORY (INHALATION) at 07:33

## 2025-04-08 RX ADMIN — FORMOTEROL FUMARATE DIHYDRATE 20 MCG: 20 SOLUTION RESPIRATORY (INHALATION) at 07:33

## 2025-04-08 RX ADMIN — METHYLPREDNISOLONE SODIUM SUCCINATE 40 MG: 40 INJECTION, POWDER, FOR SOLUTION INTRAMUSCULAR; INTRAVENOUS at 08:09

## 2025-04-08 RX ADMIN — DICYCLOMINE HYDROCHLORIDE 10 MG: 10 CAPSULE ORAL at 06:17

## 2025-04-08 RX ADMIN — BISACODYL 10 MG: 10 SUPPOSITORY RECTAL at 12:03

## 2025-04-08 RX ADMIN — FUROSEMIDE 20 MG: 20 TABLET ORAL at 09:17

## 2025-04-08 RX ADMIN — GUAIFENESIN 600 MG: 600 TABLET, EXTENDED RELEASE ORAL at 17:56

## 2025-04-08 RX ADMIN — ASPIRIN 81 MG: 81 TABLET, COATED ORAL at 08:08

## 2025-04-08 RX ADMIN — POLYETHYLENE GLYCOL 3350 17 G: 17 POWDER, FOR SOLUTION ORAL at 09:17

## 2025-04-08 RX ADMIN — ATORVASTATIN CALCIUM 40 MG: 40 TABLET, FILM COATED ORAL at 08:09

## 2025-04-08 RX ADMIN — SUCRALFATE 1 G: 1 TABLET ORAL at 08:08

## 2025-04-08 RX ADMIN — SUCRALFATE 1 G: 1 TABLET ORAL at 20:58

## 2025-04-08 RX ADMIN — DOCUSATE SODIUM 100 MG: 100 CAPSULE, LIQUID FILLED ORAL at 17:56

## 2025-04-08 RX ADMIN — IPRATROPIUM BROMIDE AND ALBUTEROL SULFATE 3 ML: 2.5; .5 SOLUTION RESPIRATORY (INHALATION) at 13:24

## 2025-04-08 RX ADMIN — PANTOPRAZOLE SODIUM 40 MG: 40 TABLET, DELAYED RELEASE ORAL at 06:17

## 2025-04-08 RX ADMIN — METHYLPREDNISOLONE SODIUM SUCCINATE 40 MG: 40 INJECTION, POWDER, FOR SOLUTION INTRAMUSCULAR; INTRAVENOUS at 20:58

## 2025-04-08 RX ADMIN — SUCRALFATE 1 G: 1 TABLET ORAL at 12:03

## 2025-04-08 RX ADMIN — FORMOTEROL FUMARATE DIHYDRATE 20 MCG: 20 SOLUTION RESPIRATORY (INHALATION) at 20:04

## 2025-04-08 RX ADMIN — SUCRALFATE 1 G: 1 TABLET ORAL at 17:56

## 2025-04-08 RX ADMIN — BUDESONIDE 0.5 MG: 0.5 INHALANT RESPIRATORY (INHALATION) at 20:04

## 2025-04-08 RX ADMIN — SENNOSIDES 8.6 MG: 8.6 TABLET ORAL at 20:58

## 2025-04-08 RX ADMIN — FAMOTIDINE 20 MG: 20 TABLET, FILM COATED ORAL at 08:08

## 2025-04-08 RX ADMIN — PANTOPRAZOLE SODIUM 40 MG: 40 TABLET, DELAYED RELEASE ORAL at 15:58

## 2025-04-08 RX ADMIN — IPRATROPIUM BROMIDE AND ALBUTEROL SULFATE 3 ML: 2.5; .5 SOLUTION RESPIRATORY (INHALATION) at 20:04

## 2025-04-08 RX ADMIN — DOCUSATE SODIUM 100 MG: 100 CAPSULE, LIQUID FILLED ORAL at 08:09

## 2025-04-08 RX ADMIN — FAMOTIDINE 20 MG: 20 TABLET, FILM COATED ORAL at 17:56

## 2025-04-08 RX ADMIN — NICOTINE 1 PATCH: 21 PATCH, EXTENDED RELEASE TRANSDERMAL at 08:15

## 2025-04-08 RX ADMIN — DICYCLOMINE HYDROCHLORIDE 10 MG: 10 CAPSULE ORAL at 12:03

## 2025-04-08 RX ADMIN — ENOXAPARIN SODIUM 40 MG: 40 INJECTION SUBCUTANEOUS at 08:09

## 2025-04-08 NOTE — ASSESSMENT & PLAN NOTE
At baseline O2 2L  Start solumedrol 40mg iv bid, wean as able   Anticipate transition to oral prednisone taper on 4/9 as patient still with persistent wheezing on 4/8  Azithro x3 doses  Continue nebs and inhalers

## 2025-04-08 NOTE — CASE MANAGEMENT
Case Management Discharge Planning Note    Patient name Ileana Seaman  Location /-01 MRN [id number]  : 1960 Date 2025       Current Admission Date: 2025  Current Admission Diagnosis:Bronchopneumonia   Patient Active Problem List    Diagnosis Date Noted Date Diagnosed    Acute on chronic diastolic (congestive) heart failure (HCC) 2025     Smoker 2025     Sepsis (HCC) 2025     Hypokalemia 2025     Anxiety 2024     Mild protein-calorie malnutrition (HCC) 2024     Celiac artery stenosis (HCC) 2024     Elevated liver enzymes 2024     Pulmonary nodule 2024     Moderate protein-calorie malnutrition (HCC) 2024     COPD, severe (HCC) 2024     Atrial fibrillation (HCC) 2024     Chronic hypoxic respiratory failure (HCC) 2024     Epigastric pain 2024     New onset atrial fibrillation (HCC) 10/06/2024     Type 2 diabetes mellitus without complication, without long-term current use of insulin (HCC) 10/05/2024     Dysphagia 10/05/2024     Bronchopneumonia 10/04/2024     COPD with acute exacerbation (HCC) 10/04/2024     Mycobacterial infection, 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     Hypomagnesemia 10/04/2024       LOS (days): 2  Geometric Mean LOS (GMLOS) (days): 4.9  Days to GMLOS:2.9     OBJECTIVE:  Risk of Unplanned Readmission Score: 36.81         Current admission status: Inpatient   Preferred Pharmacy:   [facility] - [place], [place] - [address]  [address]  [place] [address]  Phone: [phone] Fax: [phone]    Primary Care Provider: Merline Dinero    Primary Insurance: [facility] [place] [employer] [place]  Secondary Insurance: [facility]    DISCHARGE DETAILS:    Discharge planning discussed with:: Patient  Freedom of Choice: Yes  Comments - Freedom of Choice: Declined any C needs - patient requested additional O2 information to get POC  CM contacted  family/caregiver?: No- see comments (declined)  Were Treatment Team discharge recommendations reviewed with patient/caregiver?: Yes  Did patient/caregiver verbalize understanding of patient care needs?: Yes  Were patient/caregiver advised of the risks associated with not following Treatment Team discharge recommendations?: Yes         Requested Home Health Care         Is the patient interested in HHC at discharge?: No    DME Referral Provided  Referral made for DME?: No    Other Referral/Resources/Interventions Provided:  Interventions: None Indicated    Would you like to participate in our [facility] service program?  : No - Declined    Treatment Team Recommendation: Home  Discharge Destination Plan:: Home  Transport at Discharge : Family         IMM Given (Date):: 04/08/25  IMM Given to:: Patient          CM reviewed IMM with pt; No questions or concerns. Pt in agreement with rights, and is aware of the right to appeal d/c once medically stable if desired. IMM signed.  CM reviewed discharge needs - patient declined any HHC or therapy needs. Patient did request O2 company information for [employer] to inquire about POC. Cm provided [employer], [employer] and [employer] information.   Patient's family friend Adriane is available to transport home when cleared.     CM to follow patient's care and discharge needs.

## 2025-04-08 NOTE — PLAN OF CARE
Problem: PAIN - ADULT  Goal: Verbalizes/displays adequate comfort level or baseline comfort level  Description: Interventions:- Encourage patient to monitor pain and request assistance- Assess pain using appropriate pain scale- Administer analgesics based on type and severity of pain and evaluate response- Implement non-pharmacological measures as appropriate and evaluate response- Consider cultural and social influences on pain and pain management- Notify physician/advanced practitioner if interventions unsuccessful or patient reports new pain  Outcome: Progressing     Problem: INFECTION - ADULT  Goal: Absence or prevention of progression during hospitalization  Description: INTERVENTIONS:- Assess and monitor for signs and symptoms of infection- Monitor lab/diagnostic results- Monitor all insertion sites, i.e. indwelling lines, tubes, and drains- Monitor endotracheal if appropriate and nasal secretions for changes in amount and color- [place] appropriate cooling/warming therapies per order- Administer medications as ordered- Instruct and encourage patient and family to use good hand hygiene technique- Identify and instruct in appropriate isolation precautions for identified infection/condition  Outcome: Progressing     Problem: SAFETY ADULT  Goal: Patient will remain free of falls  Description: INTERVENTIONS:- Educate patient/family on patient safety including physical limitations- Instruct patient to call for assistance with activity - Consult OT/PT to assist with strengthening/mobility - Keep Call bell within reach- Keep bed low and locked with side rails adjusted as appropriate- Keep care items and personal belongings within reach- Initiate and maintain comfort rounds- Make Fall Risk Sign visible to staff- Offer Toileting every 2 Hours, in advance of need- Initiate/Maintain bed alarm- Apply yellow socks and bracelet for high fall risk patients- Consider moving patient to room near nurses station  Outcome:  Progressing     Problem: RESPIRATORY - ADULT  Goal: Achieves optimal ventilation and oxygenation  Description: INTERVENTIONS:- Assess for changes in respiratory status- Assess for changes in mentation and behavior- Position to facilitate oxygenation and minimize respiratory effort- Oxygen administered by appropriate delivery if ordered- Initiate smoking cessation education as indicated- Encourage broncho-pulmonary hygiene including cough, deep breathe, Incentive Spirometry- Assess the need for suctioning and aspirate as needed- Assess and instruct to report SOB or any respiratory difficulty- Respiratory Therapy support as indicated  Outcome: Progressing     Problem: Nutrition/Hydration-ADULT  Goal: Nutrient/Hydration intake appropriate for improving, restoring or maintaining nutritional needs  Description: Monitor and assess patient's nutrition/hydration status for malnutrition. Collaborate with interdisciplinary team and initiate plan and interventions as ordered.  Monitor patient's weight and dietary intake as ordered or per policy. Utilize nutrition screening tool and intervene as necessary. Determine patient's food preferences and provide high-protein, high-caloric foods as appropriate. INTERVENTIONS:- Monitor oral intake, urinary output, labs, and treatment plans- Assess nutrition and hydration status and recommend course of action- Evaluate amount of meals eaten- Assist patient with eating if necessary - Allow adequate time for meals- Recommend/ encourage appropriate diets, oral nutritional supplements, and vitamin/mineral supplements- Order, calculate, and assess calorie counts as needed- Recommend, monitor, and adjust tube feedings and TPN/PPN based on assessed needs- Assess need for intravenous fluids- Provide specific nutrition/hydration education as appropriate- Include patient/family/caregiver in decisions related to nutrition  Outcome: Progressing

## 2025-04-08 NOTE — ASSESSMENT & PLAN NOTE
Malnutrition Findings:   Adult Malnutrition type: Chronic illness  Adult Degree of Malnutrition: Malnutrition of moderate degree  Malnutrition Characteristics: Fat loss, Muscle loss, Weight loss                  360 Statement: Chronic moderate malnutrition related to possible previous inadequate, COPD intake as evidenced by moderate muscle loss to interroseous and temporalis muscles, moderate fat loss to orbitals and buccal pads. Treated with: Continue regular diet. +Ensure max PRO BID which pt plans to sip on in between meals. Recommend daily weights for nutrition monitoring. Pt declined need for diet ed.    BMI Findings:  Adult BMI Classifications: Underweight < 18.5        Body mass index is 17.41 kg/m².

## 2025-04-08 NOTE — ASSESSMENT & PLAN NOTE
Wt Readings from Last 3 Encounters:   04/08/25 41.8 kg (92 lb 2.4 oz)   03/29/25 52.2 kg (115 lb)   03/16/25 44 kg (97 lb)     Echo in 10/2024 with EF 65% diastolic function mildly abnormal  3+ pitting edema at time of admission, reports worsened than normal despite compression socks. No longer taking lasix   BNP 35  Will start lasix 40mg iv bid and monitor response   With good response, will continue with lasix 40 mg IV bid x 1 more day  Will do dose of lasix 20 mg po x 1, for discharge, anticipate patient can do lasix prn for leg swelling or weight gain.   Venous duplex pending with R>L swelling - negative for dvt.   Monitor daily weights, intake and output

## 2025-04-08 NOTE — PLAN OF CARE
Problem: PAIN - ADULT  Goal: Verbalizes/displays adequate comfort level or baseline comfort level  Description: Interventions:- Encourage patient to monitor pain and request assistance- Assess pain using appropriate pain scale- Administer analgesics based on type and severity of pain and evaluate response- Implement non-pharmacological measures as appropriate and evaluate response- Consider cultural and social influences on pain and pain management- Notify physician/advanced practitioner if interventions unsuccessful or patient reports new pain  Outcome: Progressing     Problem: INFECTION - ADULT  Goal: Absence or prevention of progression during hospitalization  Description: INTERVENTIONS:- Assess and monitor for signs and symptoms of infection- Monitor lab/diagnostic results- Monitor all insertion sites, i.e. indwelling lines, tubes, and drains- Monitor endotracheal if appropriate and nasal secretions for changes in amount and color- [place] appropriate cooling/warming therapies per order- Administer medications as ordered- Instruct and encourage patient and family to use good hand hygiene technique- Identify and instruct in appropriate isolation precautions for identified infection/condition  Outcome: Progressing     Problem: SAFETY ADULT  Goal: Patient will remain free of falls  Description: INTERVENTIONS:- Educate patient/family on patient safety including physical limitations- Instruct patient to call for assistance with activity - Consult OT/PT to assist with strengthening/mobility - Keep Call bell within reach- Keep bed low and locked with side rails adjusted as appropriate- Keep care items and personal belongings within reach- Initiate and maintain comfort rounds- Make Fall Risk Sign visible to staff- Offer Toileting every 2 Hours, in advance of need- Initiate/Maintain bed alarm- Obtain necessary fall risk management equipment: walker - Apply yellow socks and bracelet for high fall risk patients-  Consider moving patient to room near nurses station  Outcome: Progressing  Goal: Maintain or return to baseline ADL function  Description: INTERVENTIONS:-  Assess patient's ability to carry out ADLs; assess patient's baseline for ADL function and identify physical deficits which impact ability to perform ADLs (bathing, care of mouth/teeth, toileting, grooming, dressing, etc.)- Assess/evaluate cause of self-care deficits - Assess range of motion- Assess patient's mobility; develop plan if impaired- Assess patient's need for assistive devices and provide as appropriate- Encourage maximum independence but intervene and supervise when necessary- Involve family in performance of ADLs- Assess for home care needs following discharge - Consider OT consult to assist with ADL evaluation and planning for discharge- Provide patient education as appropriate  Outcome: Progressing  Goal: Maintains/Returns to pre admission functional level  Description: INTERVENTIONS:- Perform AM-PAC 6 Click Basic Mobility/ Daily Activity assessment daily.- Set and communicate daily mobility goal to care team and patient/family/caregiver. - Collaborate with rehabilitation services on mobility goals if consulted- Perform Range of Motion 3 times a day.- Reposition patient every 2 hours.- Dangle patient 3 times a day- Stand patient 3 times a day- Ambulate patient 3 times a day- Out of bed to chair 3 times a day - Out of bed for meals 3 times a day- Out of bed for toileting- Record patient progress and toleration of activity level   Outcome: Progressing     Problem: DISCHARGE PLANNING  Goal: Discharge to home or other facility with appropriate resources  Description: INTERVENTIONS:- Identify barriers to discharge w/patient and caregiver- Arrange for needed discharge resources and transportation as appropriate- Identify discharge learning needs (meds, wound care, etc.)- Arrange for interpretive services to assist at discharge as needed- Refer to Case  Management Department for coordinating discharge planning if the patient needs post-hospital services based on physician/advanced practitioner order or complex needs related to functional status, cognitive ability, or social support system  Outcome: Progressing     Problem: Knowledge Deficit  Goal: Patient/family/caregiver demonstrates understanding of disease process, treatment plan, medications, and discharge instructions  Description: Complete learning assessment and assess knowledge base.Interventions:- Provide teaching at level of understanding- Provide teaching via preferred learning methods  Outcome: Progressing     Problem: RESPIRATORY - ADULT  Goal: Achieves optimal ventilation and oxygenation  Description: INTERVENTIONS:- Assess for changes in respiratory status- Assess for changes in mentation and behavior- Position to facilitate oxygenation and minimize respiratory effort- Oxygen administered by appropriate delivery if ordered- Initiate smoking cessation education as indicated- Encourage broncho-pulmonary hygiene including cough, deep breathe, Incentive Spirometry- Assess the need for suctioning and aspirate as needed- Assess and instruct to report SOB or any respiratory difficulty- Respiratory Therapy support as indicated  Outcome: Progressing     Problem: Nutrition/Hydration-ADULT  Goal: Nutrient/Hydration intake appropriate for improving, restoring or maintaining nutritional needs  Description: Monitor and assess patient's nutrition/hydration status for malnutrition. Collaborate with interdisciplinary team and initiate plan and interventions as ordered.  Monitor patient's weight and dietary intake as ordered or per policy. Utilize nutrition screening tool and intervene as necessary. Determine patient's food preferences and provide high-protein, high-caloric foods as appropriate. INTERVENTIONS:- Monitor oral intake, urinary output, labs, and treatment plans- Assess nutrition and hydration status and  recommend course of action- Evaluate amount of meals eaten- Assist patient with eating if necessary - Allow adequate time for meals- Recommend/ encourage appropriate diets, oral nutritional supplements, and vitamin/mineral supplements- Order, calculate, and assess calorie counts as needed- Recommend, monitor, and adjust tube feedings and TPN/PPN based on assessed needs- Assess need for intravenous fluids- Provide specific nutrition/hydration education as appropriate- Include patient/family/caregiver in decisions related to nutrition  Outcome: Progressing

## 2025-04-08 NOTE — PROGRESS NOTES
Progress Note - Hospitalist   Name: Ileana Seaman 64 y.o. female I MRN: [id number]  Unit/Bed#: -[facility] Date of Admission: 4/5/2025   Date of Service: 4/8/2025 I Hospital Day: 2    Assessment & Plan  Bronchopneumonia  Presented to ED with SOB, cough, leg swelling.  CT mild new patchy opacity RLL and mild new bilateral LL greater on left, compatible with bronchopneumonia/bronchitis  COVID/Flu/RSV negative   Urine strep and legionella negative  Continue rocephin and azithro  Currently at baseline 2L  Aspiration Precautions, Respiratory Protocol, Incentive Spirometry    Acute on chronic diastolic (congestive) heart failure (HCC)  Wt Readings from Last 3 Encounters:   04/08/25 41.8 kg (92 lb 2.4 oz)   03/29/25 52.2 kg (115 lb)   03/16/25 44 kg (97 lb)     Echo in 10/2024 with EF 65% diastolic function mildly abnormal  3+ pitting edema at time of admission, reports worsened than normal despite compression socks. No longer taking lasix   BNP 35  Will start lasix 40mg iv bid and monitor response   With good response, will continue with lasix 40 mg IV bid x 1 more day  Will do dose of lasix 20 mg po x 1, for discharge, anticipate patient can do lasix prn for leg swelling or weight gain.   Venous duplex pending with R>L swelling - negative for dvt.   Monitor daily weights, intake and output   Sepsis (HCC)  AEB tachycardia and leukocytosis  Source: pneumonia  Continue rocephin and azithro  Holding off on fluids with volume overload  Monitor daily cbc  COPD with acute exacerbation (HCC)  At baseline O2 2L  Start solumedrol 40mg iv bid, wean as able   Anticipate transition to oral prednisone taper on 4/9 as patient still with persistent wheezing on 4/8  Azithro x3 doses  Continue nebs and inhalers  Chronic hypoxic respiratory failure (HCC)  Chronic baseline 2L - currently at baseline   Smoker  NRT ordered  Moderate protein-calorie malnutrition (HCC)  Malnutrition Findings:   Adult Malnutrition type: Chronic  illness  Adult Degree of Malnutrition: Malnutrition of moderate degree  Malnutrition Characteristics: Fat loss, Muscle loss, Weight loss                  360 Statement: Chronic moderate malnutrition related to possible previous inadequate, COPD intake as evidenced by moderate muscle loss to interroseous and temporalis muscles, moderate fat loss to orbitals and buccal pads. Treated with: Continue regular diet. +Ensure max PRO BID which pt plans to sip on in between meals. Recommend daily weights for nutrition monitoring. Pt declined need for diet ed.    BMI Findings:  Adult BMI Classifications: Underweight < 18.5        Body mass index is 17.41 kg/m².       VTE Pharmacologic Prophylaxis: VTE Score: 5 High Risk (Score >/= 5) - Pharmacological DVT Prophylaxis Ordered: enoxaparin (Lovenox). Sequential Compression Devices Ordered.    Mobility:   Basic Mobility Inpatient Raw Score: 23  JH-HLM Goal: 7: Walk 25 feet or more  JH-HLM Achieved: 7: Walk 25 feet or more  JH-HLM Goal achieved. Continue to encourage appropriate mobility.    Patient Centered Rounds: I performed bedside rounds with nursing staff today.   Discussions with Specialists or Other Care Team Provider: nursing, case management    Education and Discussions with Family / Patient: Patient declined call to .     Current Length of Stay: 2 day(s)  Current Patient Status: Inpatient   Certification Statement: The patient will continue to require additional inpatient hospital stay due to copd exacerbation  Discharge Plan: Anticipate discharge tomorrow to home.    Code Status: Level 1 - Full Code    Subjective   Examined today.  Patient states that she is feeling a little better however still wheezy.  Send her biggest complaint right now is that she has not had any bowel movements.  Feeling constipated.    Objective :  Temp:  [97.2 °F (36.2 °C)-97.3 °F (36.3 °C)] 97.2 °F (36.2 °C)  HR:  [] 80  BP: (100-126)/(64-83) 106/82  Resp:  [16-18] 18  SpO2:   [94 %-97 %] 95 %  O2 Device: Nasal cannula  Nasal Cannula O2 Flow Rate (L/min):  [2 L/min] 2 L/min    Body mass index is 17.41 kg/m².     Input and Output Summary (last 24 hours):     Intake/Output Summary (Last 24 hours) at 4/8/2025 1021  Last data filed at 4/8/2025 0859  Gross per 24 hour   Intake 170 ml   Output 1100 ml   Net -930 ml       Physical Exam  Vitals reviewed.   Constitutional:       General: She is not in acute distress.     Appearance: She is ill-appearing (chronic). She is not toxic-appearing.      Comments: frail   HENT:      Head: Normocephalic and atraumatic.      Mouth/Throat:      Mouth: Mucous membranes are moist.   Cardiovascular:      Rate and Rhythm: Normal rate and regular rhythm.      Heart sounds: No murmur heard.  Pulmonary:      Effort: No respiratory distress.      Breath sounds: No stridor. Wheezing present. No rhonchi or rales.   Abdominal:      General: Bowel sounds are normal. There is no distension.      Palpations: Abdomen is soft. There is no mass.      Tenderness: There is no abdominal tenderness.   Musculoskeletal:      Right lower leg: Edema (trace) present.      Left lower leg: No edema.   Skin:     General: Skin is warm and dry.   Neurological:      Mental Status: She is alert and oriented to person, place, and time.   Psychiatric:         Mood and Affect: Mood normal.         Behavior: Behavior normal.           Lines/Drains:              Lab Results: I have reviewed the following results:   Results from last 7 days   Lab Units 04/08/25  0616 04/06/25 0514 04/05/25  0844   WBC Thousand/uL 15.02*   < > 19.10*   HEMOGLOBIN g/dL 13.6   < > 13.6   HEMATOCRIT % 44.1   < > 42.3   PLATELETS Thousands/uL 289   < > 294   SEGS PCT %  --   --  83*   LYMPHO PCT %  --   --  10*   MONO PCT %  --   --  6   EOS PCT %  --   --  0    < > = values in this interval not displayed.     Results from last 7 days   Lab Units 04/08/25  0616 04/06/25 0514 04/05/25  0844   SODIUM mmol/L 138   < >  136   POTASSIUM mmol/L 5.0   < > 3.3*   CHLORIDE mmol/L 97   < > 99   CO2 mmol/L 35*   < > 29   BUN mg/dL 22   < > 7   CREATININE mg/dL 0.62   < > 0.43*   ANION GAP mmol/L 6   < > 8   CALCIUM mg/dL 9.7   < > 9.0   ALBUMIN g/dL  --   --  3.9   TOTAL BILIRUBIN mg/dL  --   --  0.46   ALK PHOS U/L  --   --  112*   ALT U/L  --   --  13   AST U/L  --   --  11*   GLUCOSE RANDOM mg/dL 148*   < > 130    < > = values in this interval not displayed.     Results from last 7 days   Lab Units 04/05/25  0844   INR  1.04             Results from last 7 days   Lab Units 04/06/25  0514 04/05/25  0844   PROCALCITONIN ng/ml <0.05 <0.05       Recent Cultures (last 7 days):   Results from last 7 days   Lab Units 04/05/25  1609   LEGIONELLA URINARY ANTIGEN  Negative       Imaging Results Review: I reviewed radiology reports from this admission including: Ultrasound(s).  Other Study Results Review: No additional pertinent studies reviewed.    Last 24 Hours Medication List:     Current Facility-Administered Medications:     acetaminophen (TYLENOL) tablet 650 mg, Q6H PRN    aspirin (ECOTRIN LOW STRENGTH) EC tablet 81 mg, Daily    atorvastatin (LIPITOR) tablet 40 mg, Daily    bisacodyl (DULCOLAX) rectal suppository 10 mg, Once    budesonide (PULMICORT) inhalation solution 0.5 mg, Q12H    cefTRIAXone (ROCEPHIN) IVPB (premix in dextrose) 1,000 mg 50 mL, Q24H, Last Rate: 1,000 mg (04/07/25 1502)    dicyclomine (BENTYL) capsule 10 mg, TID AC    docusate sodium (COLACE) capsule 100 mg, BID    enoxaparin (LOVENOX) subcutaneous injection 40 mg, Daily    famotidine (PEPCID) tablet 20 mg, BID    formoterol (PERFOROMIST) nebulizer solution 20 mcg, Q12H    guaiFENesin (MUCINEX) 12 hr tablet 600 mg, BID    ipratropium-albuterol (DUO-NEB) 0.5-2.5 mg/3 mL inhalation solution 3 mL, TID    methylPREDNISolone sodium succinate (Solu-MEDROL) injection 40 mg, Q12H MAYCO    nicotine (NICODERM CQ) 21 mg/24 hr TD 24 hr patch 1 patch, Daily    nystatin (MYCOSTATIN)  cream, BID    ondansetron (ZOFRAN) injection 4 mg, Q6H PRN    pantoprazole (PROTONIX) EC tablet 40 mg, BID AC    polyethylene glycol (MIRALAX) packet 17 g, Daily    senna (SENOKOT) tablet 8.6 mg, HS    simethicone (MYLICON) chewable tablet 80 mg, Q6H PRN    sucralfate (CARAFATE) tablet 1 g, 4x Daily    Administrative Statements   Today, Patient Was Seen By: Kadie Davila PA-C    **Please Note: This note may have been constructed using a voice recognition system.**

## 2025-04-09 VITALS
SYSTOLIC BLOOD PRESSURE: 115 MMHG | TEMPERATURE: 97.3 F | DIASTOLIC BLOOD PRESSURE: 75 MMHG | WEIGHT: 92.59 LBS | OXYGEN SATURATION: 97 % | HEIGHT: 61 IN | HEART RATE: 83 BPM | BODY MASS INDEX: 17.48 KG/M2 | RESPIRATION RATE: 18 BRPM

## 2025-04-09 LAB
ANION GAP SERPL CALCULATED.3IONS-SCNC: 6 MMOL/L (ref 4–13)
BUN SERPL-MCNC: 17 MG/DL (ref 5–25)
CALCIUM SERPL-MCNC: 9 MG/DL (ref 8.4–10.2)
CHLORIDE SERPL-SCNC: 98 MMOL/L (ref 96–108)
CO2 SERPL-SCNC: 33 MMOL/L (ref 21–32)
CREAT SERPL-MCNC: 0.62 MG/DL (ref 0.6–1.3)
ERYTHROCYTE [DISTWIDTH] IN BLOOD BY AUTOMATED COUNT: 13.4 % (ref 11.6–15.1)
GFR SERPL CREATININE-BSD FRML MDRD: 94 ML/MIN/1.73SQ M
GLUCOSE SERPL-MCNC: 142 MG/DL (ref 65–140)
HCT VFR BLD AUTO: 39.1 % (ref 34.8–46.1)
HGB BLD-MCNC: 12.2 G/DL (ref 11.5–15.4)
MCH RBC QN AUTO: 28.3 PG (ref 26.8–34.3)
MCHC RBC AUTO-ENTMCNC: 31.2 G/DL (ref 31.4–37.4)
MCV RBC AUTO: 91 FL (ref 82–98)
PLATELET # BLD AUTO: 265 THOUSANDS/UL (ref 149–390)
PMV BLD AUTO: 12 FL (ref 8.9–12.7)
POTASSIUM SERPL-SCNC: 4.6 MMOL/L (ref 3.5–5.3)
RBC # BLD AUTO: 4.31 MILLION/UL (ref 3.81–5.12)
SODIUM SERPL-SCNC: 137 MMOL/L (ref 135–147)
WBC # BLD AUTO: 12.39 THOUSAND/UL (ref 4.31–10.16)

## 2025-04-09 PROCEDURE — 94640 AIRWAY INHALATION TREATMENT: CPT

## 2025-04-09 PROCEDURE — 85027 COMPLETE CBC AUTOMATED: CPT

## 2025-04-09 PROCEDURE — 94760 N-INVAS EAR/PLS OXIMETRY 1: CPT

## 2025-04-09 PROCEDURE — 80048 BASIC METABOLIC PNL TOTAL CA: CPT

## 2025-04-09 PROCEDURE — 94664 DEMO&/EVAL PT USE INHALER: CPT

## 2025-04-09 RX ORDER — CEFTRIAXONE 1 G/50ML
1000 INJECTION, SOLUTION INTRAVENOUS EVERY 24 HOURS
Status: DISCONTINUED | OUTPATIENT
Start: 2025-04-09 | End: 2025-04-09 | Stop reason: HOSPADM

## 2025-04-09 RX ORDER — CEFDINIR 300 MG/1
300 CAPSULE ORAL EVERY 12 HOURS SCHEDULED
Qty: 4 CAPSULE | Refills: 0 | Status: SHIPPED | OUTPATIENT
Start: 2025-04-10 | End: 2025-04-12

## 2025-04-09 RX ORDER — FUROSEMIDE 20 MG/1
20 TABLET ORAL DAILY PRN
Qty: 30 TABLET | Refills: 0 | Status: SHIPPED | OUTPATIENT
Start: 2025-04-09

## 2025-04-09 RX ORDER — PREDNISONE 10 MG/1
TABLET ORAL
Qty: 30 TABLET | Refills: 0 | Status: SHIPPED | OUTPATIENT
Start: 2025-04-10 | End: 2025-04-22

## 2025-04-09 RX ADMIN — POLYETHYLENE GLYCOL 3350 17 G: 17 POWDER, FOR SOLUTION ORAL at 09:40

## 2025-04-09 RX ADMIN — DICYCLOMINE HYDROCHLORIDE 10 MG: 10 CAPSULE ORAL at 06:33

## 2025-04-09 RX ADMIN — DICYCLOMINE HYDROCHLORIDE 10 MG: 10 CAPSULE ORAL at 11:36

## 2025-04-09 RX ADMIN — SUCRALFATE 1 G: 1 TABLET ORAL at 09:40

## 2025-04-09 RX ADMIN — ASPIRIN 81 MG: 81 TABLET, COATED ORAL at 09:40

## 2025-04-09 RX ADMIN — GUAIFENESIN 600 MG: 600 TABLET, EXTENDED RELEASE ORAL at 09:40

## 2025-04-09 RX ADMIN — FORMOTEROL FUMARATE DIHYDRATE 20 MCG: 20 SOLUTION RESPIRATORY (INHALATION) at 07:35

## 2025-04-09 RX ADMIN — DOCUSATE SODIUM 100 MG: 100 CAPSULE, LIQUID FILLED ORAL at 09:40

## 2025-04-09 RX ADMIN — CEFTRIAXONE 1000 MG: 1 INJECTION, SOLUTION INTRAVENOUS at 11:36

## 2025-04-09 RX ADMIN — FAMOTIDINE 20 MG: 20 TABLET, FILM COATED ORAL at 09:40

## 2025-04-09 RX ADMIN — ENOXAPARIN SODIUM 40 MG: 40 INJECTION SUBCUTANEOUS at 09:40

## 2025-04-09 RX ADMIN — BUDESONIDE 0.5 MG: 0.5 INHALANT RESPIRATORY (INHALATION) at 07:35

## 2025-04-09 RX ADMIN — NICOTINE 1 PATCH: 21 PATCH, EXTENDED RELEASE TRANSDERMAL at 09:41

## 2025-04-09 RX ADMIN — NYSTATIN: 100000 CREAM TOPICAL at 09:47

## 2025-04-09 RX ADMIN — METHYLPREDNISOLONE SODIUM SUCCINATE 40 MG: 40 INJECTION, POWDER, FOR SOLUTION INTRAMUSCULAR; INTRAVENOUS at 09:40

## 2025-04-09 RX ADMIN — PANTOPRAZOLE SODIUM 40 MG: 40 TABLET, DELAYED RELEASE ORAL at 06:33

## 2025-04-09 RX ADMIN — IPRATROPIUM BROMIDE AND ALBUTEROL SULFATE 3 ML: 2.5; .5 SOLUTION RESPIRATORY (INHALATION) at 07:35

## 2025-04-09 RX ADMIN — IPRATROPIUM BROMIDE AND ALBUTEROL SULFATE 3 ML: 2.5; .5 SOLUTION RESPIRATORY (INHALATION) at 13:20

## 2025-04-09 RX ADMIN — ATORVASTATIN CALCIUM 40 MG: 40 TABLET, FILM COATED ORAL at 09:40

## 2025-04-09 RX ADMIN — SUCRALFATE 1 G: 1 TABLET ORAL at 11:36

## 2025-04-09 NOTE — UTILIZATION REVIEW
Initial Clinical Review    WAS OBSERVATION 4/5/25 @ 1500 CONVERTED TO INPATIENT ADMISSION 4/6/25 @ 0931 DUE TO CONTINUED STAY REQUIRED TO CARE FOR PATIENT WITH DX: BRONCHOPNEUMONIA.    Admission: Date/Time/Statement:   Admission Orders (From admission, onward)       Ordered        04/06/25 0931  INPATIENT ADMISSION  Once            04/05/25 1500  Place in Observation  Once                          Orders Placed This Encounter   Procedures    INPATIENT ADMISSION     Standing Status:   Standing     Number of Occurrences:   1     Level of Care:   Med Surg [16]     Estimated length of stay:   More than 2 Midnights     Certification:   I certify that inpatient services are medically necessary for this patient for a duration of greater than two midnights. See H&P and MD Progress Notes for additional information about the patient's course of treatment.     ED Arrival Information       Expected   -    Arrival   4/5/2025 08:23    Acuity   Emergent              Means of arrival   Walk-In    Escorted by   [facility]    Service   Hospitalist    Admission type   Emergency              Arrival complaint   sob - copd             Chief Complaint   Patient presents with    Shortness of Breath     Pt reports last evening had sudden onset of worsening SOB- around grandchildren who all have cold symptoms- pt taking nebulized treatment without relief, on 2L NC chronically         Initial Presentation: 64 y.o. female to ED via walk-in from home  Present to ED with worsening shortness of breath and cough. was in the ED and was sent home with antibiotics and steroids, but was unable to continue taking them due to upset stomach. States that her legs have been getting progressively more swollen despite using compression socks at home. She was taken off of Lasix outpatient.   PMHX COPD (O2 @ 2L), heart failure (Echo in 10/2024 with EF 65%), smoking, a fib   Admitted to OBS with DX: Bronchopneumonia   on exam: tachy; tachypnea; wet cough; B/L LE  edema +3 pitting; WBC 19.10; K 3.3  CT mild new patchy opacity RLL and mild new bilateral LL greater on left, compatible with bronchopneumonia/bronchitis   PLAN: cont iv abx; cont solumedrol iv; cont DuoNebs; start Lasix iv; monitor labs; Cardiopulmonary monitoring; f/u blood cx; f/u Urine strep and legionella       Anticipated Length of Stay/Certification Statement: Patient will be admitted on an observation basis with an anticipated length of stay of less than 2 midnights secondary to secondary to volume overload requiring lasix and pneumonia requiring iv antibiotics .         Date: 4/6/25 - CHANGED TO INPATIENT    Patient states that her breathing is feeling a bit better today. Endorses pain in right calf as well as still having wet cough. I/O net -340; lungs with wheezing; WBC 15.68; Mg 1.8; tachy; hypotensive  Plan: cont iv abx; cont solumedrol iv; cont DuoNebs; cont Lasix iv; recd Mg iv x1; monitor labs; Cardiopulmonary monitoring; f/u blood cx; f/u Urine strep and legionella; f/u venous duplex      Date: 4/7/25     Day 3: Has surpassed a 2nd midnight with active treatments and services. Require additional inpatient hospital stay due to chf exacerbation, COPD exacerbation, sepsis, bronchopneumonia   Said that she is feeling better with breathing today and that her leg swelling has improved since she was admitted, but not back to baseline yet. Said still with productive cough. I/O net -3260; lungs with wheezing and rhonchi; tachy; WBC 13.83  Plan: cont iv abx; cont solumedrol iv; cont DuoNebs; cont Lasix iv; recd Mg iv x1; monitor labs; Cardiopulmonary monitoring; f/u blood cx; f/u Urine strep and legionella; f/u venous duplex        ED Treatment-Medication Administration from 04/05/2025 0819 to 04/05/2025 1534         Date/Time Order Dose Route Action     04/05/2025 0932 ipratropium-albuterol (DUO-NEB) 0.5-2.5 mg/3 mL inhalation solution 3 mL 3 mL Nebulization Given     04/05/2025 0958 methylPREDNISolone  sodium succinate (Solu-MEDROL) injection 40 mg 40 mg Intravenous Given     04/05/2025 0959 azithromycin (ZITHROMAX) tablet 500 mg 500 mg Oral Given     04/05/2025 1150 ipratropium-albuterol (DUO-NEB) 0.5-2.5 mg/3 mL inhalation solution 3 mL 3 mL Nebulization Given     04/05/2025 1237 albuterol inhalation solution 10 mg 10 mg Nebulization Given     04/05/2025 1237 ipratropium (ATROVENT) 0.02 % inhalation solution 1 mg 1 mg Nebulization Given     04/05/2025 1237 sodium chloride 0.9 % inhalation solution 12 mL 12 mL Nebulization Given     04/05/2025 1235 potassium chloride (Klor-Con M20) CR tablet 40 mEq 40 mEq Oral Given            Scheduled Medications:  aspirin, 81 mg, Oral, Daily  atorvastatin, 40 mg, Oral, Daily  budesonide, 0.5 mg, Nebulization, Q12H  cefTRIAXone, 1,000 mg, Intravenous, Q24H  dicyclomine, 10 mg, Oral, TID AC  docusate sodium, 100 mg, Oral, BID  enoxaparin, 40 mg, Subcutaneous, Daily  famotidine, 20 mg, Oral, BID  formoterol, 20 mcg, Nebulization, Q12H  guaiFENesin, 600 mg, Oral, BID  ipratropium-albuterol, 3 mL, Nebulization, TID  methylPREDNISolone sodium succinate, 40 mg, Intravenous, Q12H MAYCO  nicotine, 1 patch, Transdermal, Daily  nystatin, , Topical, BID  pantoprazole, 40 mg, Oral, BID AC  polyethylene glycol, 17 g, Oral, Daily  senna, 1 tablet, Oral, HS  sucralfate, 1 g, Oral, 4x Daily  furosemide (LASIX) injection 40 mg, Intravenous, BID    magnesium sulfate 2 g/50 mL IVPB (premix) 2 g  Dose: 2 g  Freq: Once Route: IV  Last Dose: 2 g (04/06/25 0831)  Start: 04/06/25 0800 End: 04/06/25 1031      Continuous IV Infusions: None       PRN Meds:  acetaminophen, 650 mg, Oral, Q6H PRN  ondansetron, 4 mg, Intravenous, Q6H PRN  simethicone, 80 mg, Oral, Q6H PRN      ED Triage Vitals   Temperature Pulse Respirations Blood Pressure SpO2 Pain Score   04/05/25 0931 04/05/25 0834 04/05/25 0834 04/05/25 0834 04/05/25 0834 04/05/25 1612   98 °F (36.7 °C) 84 18 129/61 97 % No Pain     Weight (last 2 days)  before discharge       Date/Time Weight    04/09/25 0559 42 (92.59)    04/08/25 0600 41.8 (92.15)            Vital Signs (last 3 days) before discharge       Date/Time Temp Pulse Resp BP MAP (mmHg) SpO2 Calculated FIO2 (%) - Nasal Cannula Nasal Cannula O2 Flow Rate (L/min) O2 Device Patient Position - Orthostatic VS Pain    04/07/25 2126 -- 94 -- 126/83 97 -- -- -- -- -- --    04/07/25 2120 -- -- -- -- -- 95 % 28 2 L/min Nasal cannula -- No Pain    04/07/25 21:17:42 97.3 °F (36.3 °C) 103 18 126/83 97 94 % -- -- -- -- --    04/07/25 2045 -- -- -- -- -- 96 % -- -- -- -- --    04/07/25 17:12:55 -- 106 -- 113/69 84 94 % -- -- -- -- --    04/07/25 14:50:13 -- 113 16 100/64 76 94 % -- -- -- -- --    04/07/25 1332 -- -- -- -- -- 95 % 28 2 L/min -- -- --    04/07/25 0900 -- -- -- -- -- -- -- -- -- -- No Pain    04/07/25 07:39:19 97.7 °F (36.5 °C) 92 18 105/61 76 97 % -- -- -- -- --    04/07/25 0715 -- -- -- -- -- 96 % 28 2 L/min Nasal cannula -- --    04/06/25 21:23:45 97.7 °F (36.5 °C) 100 18 106/67 80 94 % -- -- -- -- --    04/06/25 2045 -- -- -- -- -- 90 % 28 2 L/min Nasal cannula -- No Pain    04/06/25 20:41:32 97.7 °F (36.5 °C) 106 -- 109/67 81 91 % -- -- -- -- --    04/06/25 1954 -- -- -- -- -- 97 % -- -- -- -- --    04/06/25 1932 -- -- -- -- -- 94 % 28 2 L/min Nasal cannula -- --    04/06/25 1931 -- -- -- -- -- 94 % -- -- -- -- --    04/06/25 17:10:34 97.9 °F (36.6 °C) 103 20 111/71 84 91 % -- -- -- -- --    04/06/25 14:32:55 97.5 °F (36.4 °C) 103 18 108/73 85 92 % 28 2 L/min Nasal cannula Sitting --    04/06/25 1320 -- -- -- -- -- -- 28 2 L/min -- -- --    04/06/25 1145 -- 99 -- 115/78 90 95 % 28 2 L/min Nasal cannula -- --    04/06/25 0825 -- -- -- -- -- -- -- -- -- -- No Pain    04/06/25 07:45:29 97.2 °F (36.2 °C) 95 18 101/65 77 95 % 28 2 L/min Nasal cannula -- --    04/06/25 0722 -- -- -- -- -- -- 28 2 L/min Nasal cannula -- --    04/06/25 06:40:28 97.7 °F (36.5 °C) 89 -- 86/49 61 94 % -- -- -- -- --             Date/Time Temp Pulse Resp BP SpO2 Weight   04/06/25 0519 -- -- -- -- -- 42.4 kg (93 lb 7.6 oz)   04/05/25 2219 97.5 °F (36.4 °C) 106 (Abnormal)   -- 94/50 95 % --   04/05/25 2000 -- -- -- -- 96 % --   04/05/25 1935 -- -- -- -- 95 % --   04/05/25 1700 -- -- -- 118/67 -- --   04/05/25 1550 -- -- -- -- -- 41.8 kg (92 lb 2.4 oz)   04/05/25 1539 97.3 °F (36.3 °C) (Abnormal)   105 18 95/69 94 % --   04/05/25 1245 -- 89 18 102/65 98 % --   04/05/25 1030 -- 96 18 125/70 91 % --   04/05/25 1015 -- 91 24 (Abnormal)   114/68 96 % --   04/05/25 0931 98 °F (36.7 °C) -- -- -- -- --   04/05/25 0930 -- 95 30 (Abnormal)   126/66 95 % --   04/05/25 0915 -- 97 26 (Abnormal)   113/67 97 % --   04/05/25 0834 -- 84 18 129/61 97 % 44 kg (97 lb)         Pertinent Labs/Diagnostic Test Results:   Radiology:  CT chest without contrast   Final Interpretation by Inés Gutierrez MD (04/05 1409)      Mild new patchy opacity in the right lower lobe and mild new bilateral lower lobe tree-in-bud nodularity, greater on the left, compatible with bronchopneumonia/bronchiolitis.      Evolving discoid opacity in the right upper lobe with cavitation and evolving nodularity in the superior segment right lower lobe since October 2024.      Stable 7 mm low-attenuation right lower lobe nodule since October 2024.      Recommend follow-up with a chest CT with no contrast in 6 months.         Workstation performed: [id number]         XR chest 2 views   ED Interpretation by Ponce Osorio DO (04/05 1538)   RLL pneumonia vs atelectasis.       Final Interpretation by Inés Gutierrez MD (04/05 6184)      Mild patchy right base pneumonia better seen on CT.      Redemonstration of cavitary discoid opacity in the right upper lung. See subsequent chest CT.            Workstation performed: [id number]             Results from last 7 days   Lab Units 04/07/25  0508 04/06/25  0514 04/05/25  0844   WBC Thousand/uL 13.83* 15.68* 19.10*   HEMOGLOBIN g/dL 12.3  11.7 13.6   HEMATOCRIT % 39.6 36.6 42.3   PLATELETS Thousands/uL 261 250 294   TOTAL NEUT ABS Thousands/µL  --   --  15.91*       Results from last 7 days   Lab Units 04/07/25  0508 04/06/25  0514 04/05/25  0844   SODIUM mmol/L 139 140 136   POTASSIUM mmol/L 4.7 4.1 3.3*   CHLORIDE mmol/L 101 105 99   CO2 mmol/L 34* 33* 29   ANION GAP mmol/L 4 2* 8   BUN mg/dL 13 12 7   CREATININE mg/dL 0.53* 0.56* 0.43*   EGFR ml/min/1.73sq m 99 97 106   CALCIUM mg/dL 8.9 8.5 9.0   MAGNESIUM mg/dL 2.2 1.8*  --      Results from last 7 days   Lab Units 04/05/25  0844   AST U/L 11*   ALT U/L 13   ALK PHOS U/L 112*   TOTAL PROTEIN g/dL 6.9   ALBUMIN g/dL 3.9   TOTAL BILIRUBIN mg/dL 0.46       Results from last 7 days   Lab Units 04/07/25  0508 04/06/25  0514 04/05/25  0844   GLUCOSE RANDOM mg/dL 134 113 130       Results from last 7 days   Lab Units 04/05/25  0844   PH KRISTI  7.400   PCO2 KRISTI mm Hg 48.7   PO2 KRISTI mm Hg 34.0*   HCO3 KRISTI mmol/L 29.5   BASE EXC KRISTI mmol/L 3.7   O2 CONTENT KRISTI ml/dL 13.6   O2 HGB, VENOUS % 64.1       Results from last 7 days   Lab Units 04/05/25  0844   HS TNI 0HR ng/L 6     Results from last 7 days   Lab Units 04/05/25  0844   D-DIMER QUANTITATIVE ug/ml FEU <0.27     Results from last 7 days   Lab Units 04/05/25  0844   PROTIME seconds 14.0   INR  1.04   PTT seconds 26       Results from last 7 days   Lab Units 04/06/25  0514 04/05/25  0844   PROCALCITONIN ng/ml <0.05 <0.05       Results from last 7 days   Lab Units 04/05/25  0844   BNP pg/mL 35       Results from last 7 days   Lab Units 04/05/25  1609   CLARITY UA  Clear   COLOR UA  Yellow   SPEC GRAV UA  <=1.005   PH UA  7.0   GLUCOSE UA mg/dl 100 (1/10%)*   KETONES UA mg/dl Negative   BLOOD UA  Negative   PROTEIN UA mg/dl Negative   NITRITE UA  Negative   BILIRUBIN UA  Negative   UROBILINOGEN UA E.U./dl 0.2   LEUKOCYTES UA  Negative     Results from last 7 days   Lab Units 04/05/25  1609   STREP PNEUMONIAE ANTIGEN, URINE  Negative   LEGIONELLA URINARY  ANTIGEN  Negative       Past Medical History:   Diagnosis Date    COPD (chronic obstructive pulmonary disease) (HCC)     Hiatal hernia      Present on Admission:   Chronic hypoxic respiratory failure (HCC)   Bronchopneumonia   COPD with acute exacerbation (HCC)   Moderate protein-calorie malnutrition (HCC)      Admitting Diagnosis: SOB (shortness of breath) [R06.02]  COPD exacerbation (HCC) [J44.1]  Bilateral pneumonia [J18.9]  Chronic respiratory failure with hypoxia (HCC) [J96.11]  Bilateral lower extremity edema [R60.0]  Age/Sex: 64 y.o. female    Network Utilization Review Department  ATTENTION: Please call with any questions or concerns to [phone] and carefully listen to the prompts so that you are directed to the right person. All voicemails are confidential.   For Discharge needs, contact Care Management DC Support Team at [phone] opt. [phone]  Send all requests for admission clinical reviews, approved or denied determinations and any other requests to dedicated fax number below belonging to the campus where the patient is receiving treatment. List of dedicated fax numbers for the Facilities:  FACILITY NAME UR FAX NUMBER   ADMISSION DENIALS (Administrative/Medical Necessity) [phone]   DISCHARGE SUPPORT TEAM (NETWORK) [phone]   PARENT CHILD HEALTH (Maternity/NICU/Pediatrics) [phone]   [facility] [phone]   [facility] [phone]   [facility] [phone]   [facility] [phone]   [facility] [phone]   [facility] [phone]   [facility] [phone]   [facility] [phone]   [facility] [phone]   [facility] [phone]   [facility] [phone]   [facility]  [facility] [phone]

## 2025-04-09 NOTE — ASSESSMENT & PLAN NOTE
AEB tachycardia and leukocytosis  Source: pneumonia  Continue rocephin and azithro  Transition to cefdinir on discharge  Holding off on fluids with volume overload

## 2025-04-09 NOTE — ASSESSMENT & PLAN NOTE
Wt Readings from Last 3 Encounters:   04/09/25 42 kg (92 lb 9.5 oz)   03/29/25 52.2 kg (115 lb)   03/16/25 44 kg (97 lb)     Echo in 10/2024 with EF 65% diastolic function mildly abnormal  3+ pitting edema at time of admission, reports worsened than normal despite compression socks. No longer taking lasix   BNP 35  Will start lasix 40mg iv bid and monitor response   patient can do lasix prn for leg swelling or weight gain.   Venous duplex pending with R>L swelling - negative for dvt.

## 2025-04-09 NOTE — ASSESSMENT & PLAN NOTE
Presented to ED with SOB, cough, leg swelling.  CT mild new patchy opacity RLL and mild new bilateral LL greater on left, compatible with bronchopneumonia/bronchitis  COVID/Flu/RSV negative   Urine strep and legionella negative  Continue rocephin and azithro  Transition to cefdinir on discharge for 2 more days   Currently at baseline 2L

## 2025-04-09 NOTE — ASSESSMENT & PLAN NOTE
Malnutrition Findings:   Adult Malnutrition type: Chronic illness  Adult Degree of Malnutrition: Malnutrition of moderate degree  Malnutrition Characteristics: Fat loss, Muscle loss, Weight loss                  360 Statement: Chronic moderate malnutrition related to possible previous inadequate, COPD intake as evidenced by moderate muscle loss to interroseous and temporalis muscles, moderate fat loss to orbitals and buccal pads. Treated with: Continue regular diet. +Ensure max PRO BID which pt plans to sip on in between meals. Recommend daily weights for nutrition monitoring. Pt declined need for diet ed.    BMI Findings:  Adult BMI Classifications: Underweight < 18.5        Body mass index is 17.5 kg/m².

## 2025-04-09 NOTE — PLAN OF CARE
Problem: PAIN - ADULT  Goal: Verbalizes/displays adequate comfort level or baseline comfort level  Description: Interventions:- Encourage patient to monitor pain and request assistance- Assess pain using appropriate pain scale- Administer analgesics based on type and severity of pain and evaluate response- Implement non-pharmacological measures as appropriate and evaluate response- Consider cultural and social influences on pain and pain management- Notify physician/advanced practitioner if interventions unsuccessful or patient reports new pain  Outcome: Progressing     Problem: INFECTION - ADULT  Goal: Absence or prevention of progression during hospitalization  Description: INTERVENTIONS:- Assess and monitor for signs and symptoms of infection- Monitor lab/diagnostic results- Monitor all insertion sites, i.e. indwelling lines, tubes, and drains- Monitor endotracheal if appropriate and nasal secretions for changes in amount and color- [place] appropriate cooling/warming therapies per order- Administer medications as ordered- Instruct and encourage patient and family to use good hand hygiene technique- Identify and instruct in appropriate isolation precautions for identified infection/condition  Outcome: Progressing     Problem: SAFETY ADULT  Goal: Patient will remain free of falls  Description: INTERVENTIONS:- Educate patient/family on patient safety including physical limitations- Instruct patient to call for assistance with activity - Consult OT/PT to assist with strengthening/mobility - Keep Call bell within reach- Keep bed low and locked with side rails adjusted as appropriate- Keep care items and personal belongings within reach- Initiate and maintain comfort rounds- Make Fall Risk Sign visible to staff-  Outcome: Progressing  Goal: Maintain or return to baseline ADL function  Description: INTERVENTIONS:-  Assess patient's ability to carry out ADLs; assess patient's baseline for ADL function and identify  physical deficits which impact ability to perform ADLs (bathing, care of mouth/teeth, toileting, grooming, dressing, etc.)- Assess/evaluate cause of self-care deficits - Assess range of motion- Assess patient's mobility; develop plan if impaired- Assess patient's need for assistive devices and provide as appropriate- Encourage maximum independence but intervene and supervise when necessary- Involve family in performance of ADLs- Assess for home care needs following discharge - Consider OT consult to assist with ADL evaluation and planning for discharge- Provide patient education as appropriate  Outcome: Progressing  Goal: Maintains/Returns to pre admission functional level  Description: INTERVENTIONS:- Perform AM-PAC 6 Click Basic Mobility/ Daily Activity assessment daily.- Set and communicate daily mobility goal to care team and patient/family/caregiver. - Collaborate with rehabilitation services on mobility goals if consulted-  Out of bed for toileting- Record patient progress and toleration of activity level   Outcome: Progressing     Problem: DISCHARGE PLANNING  Goal: Discharge to home or other facility with appropriate resources  Description: INTERVENTIONS:- Identify barriers to discharge w/patient and caregiver- Arrange for needed discharge resources and transportation as appropriate- Identify discharge learning needs (meds, wound care, etc.)- Arrange for interpretive services to assist at discharge as needed- Refer to Case Management Department for coordinating discharge planning if the patient needs post-hospital services based on physician/advanced practitioner order or complex needs related to functional status, cognitive ability, or social support system  Outcome: Progressing     Problem: Knowledge Deficit  Goal: Patient/family/caregiver demonstrates understanding of disease process, treatment plan, medications, and discharge instructions  Description: Complete learning assessment and assess knowledge  base.Interventions:- Provide teaching at level of understanding- Provide teaching via preferred learning methods  Outcome: Progressing     Problem: RESPIRATORY - ADULT  Goal: Achieves optimal ventilation and oxygenation  Description: INTERVENTIONS:- Assess for changes in respiratory status- Assess for changes in mentation and behavior- Position to facilitate oxygenation and minimize respiratory effort- Oxygen administered by appropriate delivery if ordered- Initiate smoking cessation education as indicated- Encourage broncho-pulmonary hygiene including cough, deep breathe, Incentive Spirometry- Assess the need for suctioning and aspirate as needed- Assess and instruct to report SOB or any respiratory difficulty- Respiratory Therapy support as indicated  Outcome: Progressing     Problem: Nutrition/Hydration-ADULT  Goal: Nutrient/Hydration intake appropriate for improving, restoring or maintaining nutritional needs  Description: Monitor and assess patient's nutrition/hydration status for malnutrition. Collaborate with interdisciplinary team and initiate plan and interventions as ordered.  Monitor patient's weight and dietary intake as ordered or per policy. Utilize nutrition screening tool and intervene as necessary. Determine patient's food preferences and provide high-protein, high-caloric foods as appropriate. INTERVENTIONS:- Monitor oral intake, urinary output, labs, and treatment plans- Assess nutrition and hydration status and recommend course of action- Evaluate amount of meals eaten- Assist patient with eating if necessary - Allow adequate time for meals- Recommend/ encourage appropriate diets, oral nutritional supplements, and vitamin/mineral supplements- Order, calculate, and assess calorie counts as needed- Recommend, monitor, and adjust tube feedings and TPN/PPN based on assessed needs- Assess need for intravenous fluids- Provide specific nutrition/hydration education as appropriate- Include  patient/family/caregiver in decisions related to nutrition  Outcome: Progressing

## 2025-04-09 NOTE — DISCHARGE SUMMARY
Discharge Summary - Hospitalist   Name: Ileana Seaman 64 y.o. female I MRN: [id number]  Unit/Bed#: MS 329Chu I Date of Admission: 4/5/2025   Date of Service: 4/9/2025 I Hospital Day: 3     Assessment & Plan  Bronchopneumonia  Presented to ED with SOB, cough, leg swelling.  CT mild new patchy opacity RLL and mild new bilateral LL greater on left, compatible with bronchopneumonia/bronchitis  COVID/Flu/RSV negative   Urine strep and legionella negative  Continue rocephin and azithro  Transition to cefdinir on discharge for 2 more days   Currently at baseline 2L  Acute on chronic diastolic (congestive) heart failure (HCC)  Wt Readings from Last 3 Encounters:   04/09/25 42 kg (92 lb 9.5 oz)   03/29/25 52.2 kg (115 lb)   03/16/25 44 kg (97 lb)     Echo in 10/2024 with EF 65% diastolic function mildly abnormal  3+ pitting edema at time of admission, reports worsened than normal despite compression socks. No longer taking lasix   BNP 35  Will start lasix 40mg iv bid and monitor response   patient can do lasix prn for leg swelling or weight gain.   Venous duplex pending with R>L swelling - negative for dvt.   Sepsis (HCC)  AEB tachycardia and leukocytosis  Source: pneumonia  Continue rocephin and azithro  Transition to cefdinir on discharge  Holding off on fluids with volume overload  COPD with acute exacerbation (HCC)  At baseline O2 2L  Start solumedrol 40mg iv bid, wean as able   Prednisone taper on discharge  Azithro x3 doses  Continue nebs and inhalers  Chronic hypoxic respiratory failure (HCC)  Chronic baseline 2L - currently at baseline   Smoker  NRT ordered  Moderate protein-calorie malnutrition (HCC)  Malnutrition Findings:   Adult Malnutrition type: Chronic illness  Adult Degree of Malnutrition: Malnutrition of moderate degree  Malnutrition Characteristics: Fat loss, Muscle loss, Weight loss                  360 Statement: Chronic moderate malnutrition related to possible previous inadequate, COPD intake as  evidenced by moderate muscle loss to interroseous and temporalis muscles, moderate fat loss to orbitals and buccal pads. Treated with: Continue regular diet. +Ensure max PRO BID which pt plans to sip on in between meals. Recommend daily weights for nutrition monitoring. Pt declined need for diet ed.    BMI Findings:  Adult BMI Classifications: Underweight < 18.5        Body mass index is 17.5 kg/m².        Medical Problems       Resolved Problems  Date Reviewed: 4/8/2025   None         MESSAGE TO PCP (Merline Dinero) FOR FOLLOW UP:   Thank you for allowing us to participate in the care of your patient, Ileana Seaman, who was hospitalized from 4/5/2025 through 04/09/25 with the admitting diagnosis of bronchopneumonia.   Patient found to have bronchopneumonia and volume overload.  Patient was treated with IV Lasix.  Should continue as needed dosing on discharge.  Also treated with IV steroids and IV antibiotics.  Should continue 2 more days of antibiotics as well as prednisone taper on discharge.  Patient to follow-up closely with pulmonary on discharge.  Patient remained at baseline O2, 2 L, throughout entire hospital stay.  Venous duplex was negative for DVT.    Medication Changes:  Start cefdinir twice daily for 2 more days  Prednisone taper  Start Lasix 20 mg as needed  Outpatient testing recommended:  None  If you have any additional questions or would like to discuss further, please feel free to contact me.  Barbra Dunn PA-C  [facility], Hospitalist, [phone]     Admission Date:   Admission Orders (From admission, onward)       Ordered        04/06/25 0931  INPATIENT ADMISSION  Once            04/05/25 1500  Place in Observation  Once                          Discharge Date: 04/09/25    Consultations During Hospital Stay:  None none    Procedures Performed:   None    Significant Findings / Test Results:   Chest x-ray with mild patchy right base pneumonia  CT chest with mild new patchy  "opacity in right lower lobe and mild new bilateral lower lobe tree-in-bud nodularity, greater on the left, compatible with bronchopneumonia/bronchiolitis.  Evolving discoid opacity in the right upper lobe with cavitation and involving nodularity in the right superior segment right lower lobe since October 2024.  Stable 7 mm low-attenuation right lower lobe nodule since October.  Recommend follow-up chest CT in 6 months.    Incidental Findings:   None    Test Results Pending at Discharge (will require follow up):   None     Complications: None    Reason for Admission: Bronchopneumonia    Hospital Course:   Ileana Seaman is a 64 y.o. female patient who originally presented to the hospital on 4/5/2025 due to shortness of breath.  Patient found to have bronchopneumonia and volume overload.  Patient was treated with IV Lasix.  Should continue as needed dosing on discharge.  Also treated with IV steroids and IV antibiotics.  Should continue 2 more days of antibiotics as well as prednisone taper on discharge.  Patient to follow-up closely with pulmonary on discharge.  Patient remained at baseline O2, 2 L, throughout entire hospital stay.  Venous duplex was negative for DVT      Please see above list of diagnoses and related plan for additional information.     Condition at Discharge: good    Discharge Day Visit / Exam:   Subjective: Patient states that she is feeling better today.  Denies chest pain, states she was a little short of breath this morning, but feeling better.  Agreeable to discharge plan at this time and outpatient follow-up.  Vitals: Blood Pressure: 115/75 (04/09/25 0605)  Pulse: 83 (04/09/25 0605)  Temperature: (!) 97.3 °F (36.3 °C) (04/09/25 0605)  Temp Source: Temporal (04/06/25 1432)  Respirations: 18 (04/08/25 1403)  Height: 5' 1\" (154.9 cm) (04/07/25 1225)  Weight - Scale: 42 kg (92 lb 9.5 oz) (04/09/25 0559)  SpO2: 98 % (04/09/25 0800)  Physical Exam  Vitals reviewed.   Constitutional:       " General: She is not in acute distress.     Appearance: She is ill-appearing (chronic). She is not toxic-appearing.      Comments: frail   HENT:      Head: Normocephalic and atraumatic.      Mouth/Throat:      Mouth: Mucous membranes are moist.   Cardiovascular:      Rate and Rhythm: Normal rate and regular rhythm.      Heart sounds: No murmur heard.  Pulmonary:      Effort: Pulmonary effort is normal. No respiratory distress.      Breath sounds: No stridor. No wheezing, rhonchi or rales.      Comments: Diminished breath sounds  No wheezing  Wet cough on exam  Abdominal:      General: Bowel sounds are normal. There is no distension.      Palpations: Abdomen is soft. There is no mass.      Tenderness: There is no abdominal tenderness.   Musculoskeletal:      Right lower leg: No edema.      Left lower leg: No edema.   Skin:     General: Skin is warm and dry.   Neurological:      Mental Status: She is alert and oriented to person, place, and time.   Psychiatric:         Mood and Affect: Mood normal.         Behavior: Behavior normal.          Discussion with Family: Patient declined call to .     Discharge instructions/Information to patient and family:   See after visit summary for information provided to patient and family.      Provisions for Follow-Up Care:  See after visit summary for information related to follow-up care and any pertinent home health orders.      Mobility at time of Discharge:   Basic Mobility Inpatient Raw Score: 23  JH-HLM Goal: 7: Walk 25 feet or more  JH-HLM Achieved: 7: Walk 25 feet or more  HLM Goal achieved. Continue to encourage appropriate mobility.     Disposition:   Home    Planned Readmission: None    Discharge Medications:  See after visit summary for reconciled discharge medications provided to patient and/or family.      Administrative Statements   Discharge Statement:  I have spent a total time of 45 minutes in caring for this patient on the day of the  visit/encounter..    **Please Note: This note may have been constructed using a voice recognition system**

## 2025-04-09 NOTE — ASSESSMENT & PLAN NOTE
At baseline O2 2L  Start solumedrol 40mg iv bid, wean as able   Prednisone taper on discharge  Azithro x3 doses  Continue nebs and inhalers

## 2025-04-09 NOTE — PLAN OF CARE
Problem: PAIN - ADULT  Goal: Verbalizes/displays adequate comfort level or baseline comfort level  Description: Interventions:- Encourage patient to monitor pain and request assistance- Assess pain using appropriate pain scale- Administer analgesics based on type and severity of pain and evaluate response- Implement non-pharmacological measures as appropriate and evaluate response- Consider cultural and social influences on pain and pain management- Notify physician/advanced practitioner if interventions unsuccessful or patient reports new pain  Outcome: Progressing     Problem: INFECTION - ADULT  Goal: Absence or prevention of progression during hospitalization  Description: INTERVENTIONS:- Assess and monitor for signs and symptoms of infection- Monitor lab/diagnostic results- Monitor all insertion sites, i.e. indwelling lines, tubes, and drains- Monitor endotracheal if appropriate and nasal secretions for changes in amount and color- [place] appropriate cooling/warming therapies per order- Administer medications as ordered- Instruct and encourage patient and family to use good hand hygiene technique- Identify and instruct in appropriate isolation precautions for identified infection/condition  Outcome: Progressing     Problem: SAFETY ADULT  Goal: Patient will remain free of falls  Description: INTERVENTIONS:- Educate patient/family on patient safety including physical limitations- Instruct patient to call for assistance with activity - Consult OT/PT to assist with strengthening/mobility - Keep Call bell within reach- Keep bed low and locked with side rails adjusted as appropriate- Keep care items and personal belongings within reach- Initiate and maintain comfort rounds- Make Fall Risk Sign visible to staff- Offer Toileting every 2 Hours, in advance of need- Initiate/Maintain bed alarm- Obtain necessary fall risk management equipment:  non-skid socks- Apply yellow socks and bracelet for high fall risk  patients- Consider moving patient to room near nurses station  Outcome: Progressing  Goal: Maintain or return to baseline ADL function  Description: INTERVENTIONS:-  Assess patient's ability to carry out ADLs; assess patient's baseline for ADL function and identify physical deficits which impact ability to perform ADLs (bathing, care of mouth/teeth, toileting, grooming, dressing, etc.)- Assess/evaluate cause of self-care deficits - Assess range of motion- Assess patient's mobility; develop plan if impaired- Assess patient's need for assistive devices and provide as appropriate- Encourage maximum independence but intervene and supervise when necessary- Involve family in performance of ADLs- Assess for home care needs following discharge - Consider OT consult to assist with ADL evaluation and planning for discharge- Provide patient education as appropriate  Outcome: Progressing  Goal: Maintains/Returns to pre admission functional level  Description: INTERVENTIONS:- Perform AM-PAC 6 Click Basic Mobility/ Daily Activity assessment daily.- Set and communicate daily mobility goal to care team and patient/family/caregiver. - Collaborate with rehabilitation services on mobility goals if consulted- Perform Range of Motion 3 times a day.- Reposition patient every 2 hours.- Dangle patient 3 times a day- Stand patient 3 times a day- Ambulate patient 3 times a day- Out of bed to chair 3 times a day - Out of bed for meals 3 times a day- Out of bed for toileting- Record patient progress and toleration of activity level   Outcome: Progressing     Problem: DISCHARGE PLANNING  Goal: Discharge to home or other facility with appropriate resources  Description: INTERVENTIONS:- Identify barriers to discharge w/patient and caregiver- Arrange for needed discharge resources and transportation as appropriate- Identify discharge learning needs (meds, wound care, etc.)- Arrange for interpretive services to assist at discharge as needed- Refer  to Case Management Department for coordinating discharge planning if the patient needs post-hospital services based on physician/advanced practitioner order or complex needs related to functional status, cognitive ability, or social support system  Outcome: Progressing     Problem: Knowledge Deficit  Goal: Patient/family/caregiver demonstrates understanding of disease process, treatment plan, medications, and discharge instructions  Description: Complete learning assessment and assess knowledge base.Interventions:- Provide teaching at level of understanding- Provide teaching via preferred learning methods  Outcome: Progressing     Problem: RESPIRATORY - ADULT  Goal: Achieves optimal ventilation and oxygenation  Description: INTERVENTIONS:- Assess for changes in respiratory status- Assess for changes in mentation and behavior- Position to facilitate oxygenation and minimize respiratory effort- Oxygen administered by appropriate delivery if ordered- Initiate smoking cessation education as indicated- Encourage broncho-pulmonary hygiene including cough, deep breathe, Incentive Spirometry- Assess the need for suctioning and aspirate as needed- Assess and instruct to report SOB or any respiratory difficulty- Respiratory Therapy support as indicated  Outcome: Progressing     Problem: Nutrition/Hydration-ADULT  Goal: Nutrient/Hydration intake appropriate for improving, restoring or maintaining nutritional needs  Description: Monitor and assess patient's nutrition/hydration status for malnutrition. Collaborate with interdisciplinary team and initiate plan and interventions as ordered.  Monitor patient's weight and dietary intake as ordered or per policy. Utilize nutrition screening tool and intervene as necessary. Determine patient's food preferences and provide high-protein, high-caloric foods as appropriate. INTERVENTIONS:- Monitor oral intake, urinary output, labs, and treatment plans- Assess nutrition and hydration  status and recommend course of action- Evaluate amount of meals eaten- Assist patient with eating if necessary - Allow adequate time for meals- Recommend/ encourage appropriate diets, oral nutritional supplements, and vitamin/mineral supplements- Order, calculate, and assess calorie counts as needed- Recommend, monitor, and adjust tube feedings and TPN/PPN based on assessed needs- Assess need for intravenous fluids- Provide specific nutrition/hydration education as appropriate- Include patient/family/caregiver in decisions related to nutrition  Outcome: Progressing

## 2025-04-10 NOTE — UTILIZATION REVIEW
NOTIFICATION OF ADMISSION DISCHARGE   This is a Notification of Discharge from [facility]. Please be advised that this patient has been discharge from our facility. Below you will find the admission and discharge date and time including the patient’s disposition.   UTILIZATION REVIEW CONTACT:  Utilization Review Assistants  Network Utilization Review Department  Phone: [phone] x carefully listen to the prompts. All voicemails are confidential.  Email: [email or web address]     ADMISSION INFORMATION  PRESENTATION DATE: 4/5/2025  8:25 AM  OBERVATION ADMISSION DATE: 04/05/2025 1500  INPATIENT ADMISSION DATE: 4/6/25  9:31 AM   DISCHARGE DATE: 4/9/2025  1:47 PM   DISPOSITION:Home/Self Care    Network Utilization Review Department  ATTENTION: Please call with any questions or concerns to [phone] and carefully listen to the prompts so that you are directed to the right person. All voicemails are confidential.   For Discharge needs, contact Care Management DC Support Team at [phone] opt. 2  Send all requests for admission clinical reviews, approved or denied determinations and any other requests to dedicated fax number below belonging to the campus where the patient is receiving treatment. List of dedicated fax numbers for the Facilities:  FACILITY NAME UR FAX NUMBER   ADMISSION DENIALS (Administrative/Medical Necessity) [phone]   DISCHARGE SUPPORT TEAM ([facility]) [phone]   PARENT CHILD HEALTH (Maternity/NICU/Pediatrics) [phone]   [facility] [phone]   [facility] [phone]   [facility] [phone]   [facility] [phone]   [facility] [phone]   [facility] [phone]   [facility] [phone]   [facility] [phone]   [facility]  [facility] [phone]   [facility] [phone]   [facility] [phone]   [facility] [phone]

## 2025-04-30 ENCOUNTER — APPOINTMENT (EMERGENCY)
Dept: RADIOLOGY | Facility: HOSPITAL | Age: 65
DRG: 871 | End: 2025-04-30
Payer: COMMERCIAL

## 2025-04-30 ENCOUNTER — APPOINTMENT (EMERGENCY)
Dept: CT IMAGING | Facility: HOSPITAL | Age: 65
DRG: 871 | End: 2025-04-30
Payer: COMMERCIAL

## 2025-04-30 ENCOUNTER — HOSPITAL ENCOUNTER (INPATIENT)
Facility: HOSPITAL | Age: 65
LOS: 7 days | Discharge: HOME HEALTH | DRG: 871 | End: 2025-05-07
Attending: EMERGENCY MEDICINE | Admitting: FAMILY MEDICINE
Payer: COMMERCIAL

## 2025-04-30 DIAGNOSIS — J96.21 ACUTE ON CHRONIC HYPOXIC RESPIRATORY FAILURE (HCC): ICD-10-CM

## 2025-04-30 DIAGNOSIS — J44.1 COPD WITH ACUTE EXACERBATION (HCC): ICD-10-CM

## 2025-04-30 DIAGNOSIS — K52.9 GASTROENTERITIS: ICD-10-CM

## 2025-04-30 DIAGNOSIS — J18.9 PNEUMONIA OF RIGHT LOWER LOBE DUE TO INFECTIOUS ORGANISM: ICD-10-CM

## 2025-04-30 DIAGNOSIS — Z72.0 TOBACCO USE: ICD-10-CM

## 2025-04-30 DIAGNOSIS — A31.9 MYCOBACTERIAL INFECTION, NON-TB: ICD-10-CM

## 2025-04-30 DIAGNOSIS — J18.9 PNEUMONIA: Primary | ICD-10-CM

## 2025-04-30 DIAGNOSIS — R79.0 LOW MAGNESIUM LEVEL: ICD-10-CM

## 2025-04-30 DIAGNOSIS — E87.6 HYPOKALEMIA: ICD-10-CM

## 2025-04-30 DIAGNOSIS — R10.13 EPIGASTRIC PAIN: ICD-10-CM

## 2025-04-30 LAB
ALBUMIN SERPL BCG-MCNC: 3.8 G/DL (ref 3.5–5)
ALP SERPL-CCNC: 91 U/L (ref 34–104)
ALT SERPL W P-5'-P-CCNC: 13 U/L (ref 7–52)
ANION GAP SERPL CALCULATED.3IONS-SCNC: 11 MMOL/L (ref 4–13)
APTT PPP: 27 SECONDS (ref 23–34)
AST SERPL W P-5'-P-CCNC: 11 U/L (ref 13–39)
ATRIAL RATE: 124 BPM
BASE EX.OXY STD BLDV CALC-SCNC: 54.3 % (ref 60–80)
BASE EXCESS BLDV CALC-SCNC: 4.1 MMOL/L
BASOPHILS # BLD AUTO: 0.02 THOUSANDS/ÂΜL (ref 0–0.1)
BASOPHILS NFR BLD AUTO: 0 % (ref 0–1)
BILIRUB SERPL-MCNC: 0.56 MG/DL (ref 0.2–1)
BILIRUB UR QL STRIP: NEGATIVE
BNP SERPL-MCNC: 61 PG/ML (ref 0–100)
BUN SERPL-MCNC: 5 MG/DL (ref 5–25)
CALCIUM SERPL-MCNC: 9.2 MG/DL (ref 8.4–10.2)
CARDIAC TROPONIN I PNL SERPL HS: 13 NG/L (ref ?–50)
CHLORIDE SERPL-SCNC: 94 MMOL/L (ref 96–108)
CLARITY UR: CLEAR
CO2 SERPL-SCNC: 32 MMOL/L (ref 21–32)
COLOR UR: YELLOW
CREAT SERPL-MCNC: 0.48 MG/DL (ref 0.6–1.3)
D DIMER PPP FEU-MCNC: 0.76 UG/ML FEU
EOSINOPHIL # BLD AUTO: 0.01 THOUSAND/ÂΜL (ref 0–0.61)
EOSINOPHIL NFR BLD AUTO: 0 % (ref 0–6)
ERYTHROCYTE [DISTWIDTH] IN BLOOD BY AUTOMATED COUNT: 13.3 % (ref 11.6–15.1)
FLUAV AG UPPER RESP QL IA.RAPID: NEGATIVE
FLUBV AG UPPER RESP QL IA.RAPID: NEGATIVE
GFR SERPL CREATININE-BSD FRML MDRD: 101 ML/MIN/1.73SQ M
GLUCOSE SERPL-MCNC: 180 MG/DL (ref 65–140)
GLUCOSE UR STRIP-MCNC: ABNORMAL MG/DL
HCO3 BLDV-SCNC: 31 MMOL/L (ref 24–30)
HCT VFR BLD AUTO: 41 % (ref 34.8–46.1)
HGB BLD-MCNC: 13 G/DL (ref 11.5–15.4)
HGB UR QL STRIP.AUTO: NEGATIVE
IMM GRANULOCYTES # BLD AUTO: 0.03 THOUSAND/UL (ref 0–0.2)
IMM GRANULOCYTES NFR BLD AUTO: 0 % (ref 0–2)
INR PPP: 1.06 (ref 0.85–1.19)
KETONES UR STRIP-MCNC: NEGATIVE MG/DL
LACTATE SERPL-SCNC: 1.2 MMOL/L (ref 0.5–2)
LEUKOCYTE ESTERASE UR QL STRIP: NEGATIVE
LYMPHOCYTES # BLD AUTO: 1.17 THOUSANDS/ÂΜL (ref 0.6–4.47)
LYMPHOCYTES NFR BLD AUTO: 11 % (ref 14–44)
MAGNESIUM SERPL-MCNC: 1.5 MG/DL (ref 1.9–2.7)
MCH RBC QN AUTO: 27.4 PG (ref 26.8–34.3)
MCHC RBC AUTO-ENTMCNC: 31.7 G/DL (ref 31.4–37.4)
MCV RBC AUTO: 86 FL (ref 82–98)
MONOCYTES # BLD AUTO: 0.76 THOUSAND/ÂΜL (ref 0.17–1.22)
MONOCYTES NFR BLD AUTO: 7 % (ref 4–12)
NEUTROPHILS # BLD AUTO: 8.55 THOUSANDS/ÂΜL (ref 1.85–7.62)
NEUTS SEG NFR BLD AUTO: 82 % (ref 43–75)
NITRITE UR QL STRIP: NEGATIVE
NRBC BLD AUTO-RTO: 0 /100 WBCS
O2 CT BLDV-SCNC: 10.9 ML/DL
P AXIS: -24 DEGREES
PCO2 BLDV: 56 MM HG (ref 42–50)
PH BLDV: 7.36 [PH] (ref 7.3–7.4)
PH UR STRIP.AUTO: 7.5 [PH]
PLATELET # BLD AUTO: 333 THOUSANDS/UL (ref 149–390)
PMV BLD AUTO: 12 FL (ref 8.9–12.7)
PO2 BLDV: 31.2 MM HG (ref 35–45)
POTASSIUM SERPL-SCNC: 2.7 MMOL/L (ref 3.5–5.3)
PR INTERVAL: 90 MS
PROCALCITONIN SERPL-MCNC: <0.05 NG/ML
PROT SERPL-MCNC: 7.2 G/DL (ref 6.4–8.4)
PROT UR STRIP-MCNC: NEGATIVE MG/DL
PROTHROMBIN TIME: 14.2 SECONDS (ref 12.3–15)
QRS AXIS: -60 DEGREES
QRSD INTERVAL: 86 MS
QT INTERVAL: 304 MS
QTC INTERVAL: 436 MS
RBC # BLD AUTO: 4.75 MILLION/UL (ref 3.81–5.12)
SARS-COV+SARS-COV-2 AG RESP QL IA.RAPID: NEGATIVE
SODIUM SERPL-SCNC: 137 MMOL/L (ref 135–147)
SP GR UR STRIP.AUTO: 1.01 (ref 1–1.03)
T WAVE AXIS: 0 DEGREES
UROBILINOGEN UR QL STRIP.AUTO: 0.2 E.U./DL
VENTRICULAR RATE: 124 BPM
WBC # BLD AUTO: 10.54 THOUSAND/UL (ref 4.31–10.16)

## 2025-04-30 PROCEDURE — 87449 NOS EACH ORGANISM AG IA: CPT

## 2025-04-30 PROCEDURE — 94640 AIRWAY INHALATION TREATMENT: CPT

## 2025-04-30 PROCEDURE — 84145 PROCALCITONIN (PCT): CPT

## 2025-04-30 PROCEDURE — 99223 1ST HOSP IP/OBS HIGH 75: CPT

## 2025-04-30 PROCEDURE — 85379 FIBRIN DEGRADATION QUANT: CPT | Performed by: PHYSICIAN ASSISTANT

## 2025-04-30 PROCEDURE — 82805 BLOOD GASES W/O2 SATURATION: CPT | Performed by: PHYSICIAN ASSISTANT

## 2025-04-30 PROCEDURE — 71046 X-RAY EXAM CHEST 2 VIEWS: CPT

## 2025-04-30 PROCEDURE — 83735 ASSAY OF MAGNESIUM: CPT | Performed by: PHYSICIAN ASSISTANT

## 2025-04-30 PROCEDURE — 96361 HYDRATE IV INFUSION ADD-ON: CPT

## 2025-04-30 PROCEDURE — 96367 TX/PROPH/DG ADDL SEQ IV INF: CPT

## 2025-04-30 PROCEDURE — 81003 URINALYSIS AUTO W/O SCOPE: CPT | Performed by: PHYSICIAN ASSISTANT

## 2025-04-30 PROCEDURE — 94664 DEMO&/EVAL PT USE INHALER: CPT

## 2025-04-30 PROCEDURE — 87804 INFLUENZA ASSAY W/OPTIC: CPT | Performed by: PHYSICIAN ASSISTANT

## 2025-04-30 PROCEDURE — 94760 N-INVAS EAR/PLS OXIMETRY 1: CPT

## 2025-04-30 PROCEDURE — 85730 THROMBOPLASTIN TIME PARTIAL: CPT | Performed by: PHYSICIAN ASSISTANT

## 2025-04-30 PROCEDURE — 93005 ELECTROCARDIOGRAM TRACING: CPT

## 2025-04-30 PROCEDURE — 71275 CT ANGIOGRAPHY CHEST: CPT

## 2025-04-30 PROCEDURE — 85610 PROTHROMBIN TIME: CPT | Performed by: PHYSICIAN ASSISTANT

## 2025-04-30 PROCEDURE — 99285 EMERGENCY DEPT VISIT HI MDM: CPT | Performed by: PHYSICIAN ASSISTANT

## 2025-04-30 PROCEDURE — 96375 TX/PRO/DX INJ NEW DRUG ADDON: CPT

## 2025-04-30 PROCEDURE — 85025 COMPLETE CBC W/AUTO DIFF WBC: CPT | Performed by: PHYSICIAN ASSISTANT

## 2025-04-30 PROCEDURE — 36415 COLL VENOUS BLD VENIPUNCTURE: CPT | Performed by: PHYSICIAN ASSISTANT

## 2025-04-30 PROCEDURE — 87811 SARS-COV-2 COVID19 W/OPTIC: CPT | Performed by: PHYSICIAN ASSISTANT

## 2025-04-30 PROCEDURE — 83880 ASSAY OF NATRIURETIC PEPTIDE: CPT | Performed by: PHYSICIAN ASSISTANT

## 2025-04-30 PROCEDURE — 80053 COMPREHEN METABOLIC PANEL: CPT | Performed by: PHYSICIAN ASSISTANT

## 2025-04-30 PROCEDURE — 87040 BLOOD CULTURE FOR BACTERIA: CPT | Performed by: PHYSICIAN ASSISTANT

## 2025-04-30 PROCEDURE — 96365 THER/PROPH/DIAG IV INF INIT: CPT

## 2025-04-30 PROCEDURE — 84484 ASSAY OF TROPONIN QUANT: CPT | Performed by: PHYSICIAN ASSISTANT

## 2025-04-30 PROCEDURE — 99285 EMERGENCY DEPT VISIT HI MDM: CPT

## 2025-04-30 PROCEDURE — 83605 ASSAY OF LACTIC ACID: CPT

## 2025-04-30 RX ORDER — DICYCLOMINE HYDROCHLORIDE 10 MG/1
10 CAPSULE ORAL
Status: DISCONTINUED | OUTPATIENT
Start: 2025-04-30 | End: 2025-05-07 | Stop reason: HOSPADM

## 2025-04-30 RX ORDER — ATORVASTATIN CALCIUM 40 MG/1
40 TABLET, FILM COATED ORAL DAILY
Status: DISCONTINUED | OUTPATIENT
Start: 2025-05-01 | End: 2025-05-07 | Stop reason: HOSPADM

## 2025-04-30 RX ORDER — HEPARIN SODIUM 5000 [USP'U]/ML
5000 INJECTION, SOLUTION INTRAVENOUS; SUBCUTANEOUS EVERY 12 HOURS SCHEDULED
Status: DISCONTINUED | OUTPATIENT
Start: 2025-04-30 | End: 2025-05-07 | Stop reason: HOSPADM

## 2025-04-30 RX ORDER — METHYLPREDNISOLONE SODIUM SUCCINATE 40 MG/ML
40 INJECTION, POWDER, LYOPHILIZED, FOR SOLUTION INTRAMUSCULAR; INTRAVENOUS EVERY 8 HOURS SCHEDULED
Status: DISCONTINUED | OUTPATIENT
Start: 2025-04-30 | End: 2025-05-01

## 2025-04-30 RX ORDER — SIMETHICONE 80 MG
80 TABLET,CHEWABLE ORAL EVERY 6 HOURS PRN
Status: DISCONTINUED | OUTPATIENT
Start: 2025-04-30 | End: 2025-05-07 | Stop reason: HOSPADM

## 2025-04-30 RX ORDER — SUCRALFATE 1 G/1
1 TABLET ORAL 4 TIMES DAILY
Status: DISCONTINUED | OUTPATIENT
Start: 2025-04-30 | End: 2025-05-07 | Stop reason: HOSPADM

## 2025-04-30 RX ORDER — POTASSIUM CHLORIDE 1500 MG/1
40 TABLET, EXTENDED RELEASE ORAL ONCE
Status: COMPLETED | OUTPATIENT
Start: 2025-04-30 | End: 2025-04-30

## 2025-04-30 RX ORDER — DOCUSATE SODIUM 100 MG/1
100 CAPSULE, LIQUID FILLED ORAL 2 TIMES DAILY
Status: DISCONTINUED | OUTPATIENT
Start: 2025-04-30 | End: 2025-05-07 | Stop reason: HOSPADM

## 2025-04-30 RX ORDER — NICOTINE 21 MG/24HR
1 PATCH, TRANSDERMAL 24 HOURS TRANSDERMAL DAILY
Status: DISCONTINUED | OUTPATIENT
Start: 2025-05-01 | End: 2025-05-07 | Stop reason: HOSPADM

## 2025-04-30 RX ORDER — IPRATROPIUM BROMIDE AND ALBUTEROL SULFATE 2.5; .5 MG/3ML; MG/3ML
3 SOLUTION RESPIRATORY (INHALATION) ONCE
Status: COMPLETED | OUTPATIENT
Start: 2025-04-30 | End: 2025-04-30

## 2025-04-30 RX ORDER — ACETAMINOPHEN 325 MG/1
650 TABLET ORAL EVERY 6 HOURS PRN
Status: DISCONTINUED | OUTPATIENT
Start: 2025-04-30 | End: 2025-05-07 | Stop reason: HOSPADM

## 2025-04-30 RX ORDER — HEPARIN SODIUM 5000 [USP'U]/ML
5000 INJECTION, SOLUTION INTRAVENOUS; SUBCUTANEOUS EVERY 8 HOURS SCHEDULED
Status: DISCONTINUED | OUTPATIENT
Start: 2025-04-30 | End: 2025-04-30

## 2025-04-30 RX ORDER — CEFTRIAXONE 1 G/50ML
1000 INJECTION, SOLUTION INTRAVENOUS EVERY 24 HOURS
Status: DISCONTINUED | OUTPATIENT
Start: 2025-05-01 | End: 2025-05-01

## 2025-04-30 RX ORDER — FAMOTIDINE 20 MG/1
20 TABLET, FILM COATED ORAL 2 TIMES DAILY
Status: DISCONTINUED | OUTPATIENT
Start: 2025-04-30 | End: 2025-05-07 | Stop reason: HOSPADM

## 2025-04-30 RX ORDER — FUROSEMIDE 20 MG/1
20 TABLET ORAL DAILY PRN
Status: DISCONTINUED | OUTPATIENT
Start: 2025-04-30 | End: 2025-04-30

## 2025-04-30 RX ORDER — CEFTRIAXONE 1 G/50ML
1000 INJECTION, SOLUTION INTRAVENOUS ONCE
Status: COMPLETED | OUTPATIENT
Start: 2025-04-30 | End: 2025-04-30

## 2025-04-30 RX ORDER — GUAIFENESIN 600 MG/1
600 TABLET, EXTENDED RELEASE ORAL EVERY 12 HOURS SCHEDULED
Status: DISCONTINUED | OUTPATIENT
Start: 2025-04-30 | End: 2025-05-01

## 2025-04-30 RX ORDER — MAGNESIUM SULFATE 1 G/100ML
1 INJECTION INTRAVENOUS ONCE
Status: COMPLETED | OUTPATIENT
Start: 2025-04-30 | End: 2025-04-30

## 2025-04-30 RX ORDER — LEVALBUTEROL INHALATION SOLUTION 1.25 MG/3ML
1.25 SOLUTION RESPIRATORY (INHALATION)
Status: DISCONTINUED | OUTPATIENT
Start: 2025-04-30 | End: 2025-05-07 | Stop reason: HOSPADM

## 2025-04-30 RX ORDER — AZITHROMYCIN 250 MG/1
500 TABLET, FILM COATED ORAL EVERY 24 HOURS
Status: DISCONTINUED | OUTPATIENT
Start: 2025-04-30 | End: 2025-05-01

## 2025-04-30 RX ORDER — PANTOPRAZOLE SODIUM 40 MG/1
40 TABLET, DELAYED RELEASE ORAL
Status: DISCONTINUED | OUTPATIENT
Start: 2025-05-01 | End: 2025-05-01

## 2025-04-30 RX ORDER — BENZONATATE 100 MG/1
100 CAPSULE ORAL 3 TIMES DAILY PRN
Status: DISCONTINUED | OUTPATIENT
Start: 2025-04-30 | End: 2025-05-07 | Stop reason: HOSPADM

## 2025-04-30 RX ORDER — CEFTRIAXONE 2 G/50ML
2000 INJECTION, SOLUTION INTRAVENOUS EVERY 24 HOURS
Status: DISCONTINUED | OUTPATIENT
Start: 2025-04-30 | End: 2025-04-30

## 2025-04-30 RX ORDER — ASPIRIN 81 MG/1
81 TABLET ORAL DAILY
Status: DISCONTINUED | OUTPATIENT
Start: 2025-05-01 | End: 2025-05-07 | Stop reason: HOSPADM

## 2025-04-30 RX ORDER — BUDESONIDE 0.5 MG/2ML
0.5 INHALANT ORAL
Status: DISCONTINUED | OUTPATIENT
Start: 2025-04-30 | End: 2025-05-01

## 2025-04-30 RX ORDER — METHYLPREDNISOLONE SODIUM SUCCINATE 125 MG/2ML
125 INJECTION, POWDER, LYOPHILIZED, FOR SOLUTION INTRAMUSCULAR; INTRAVENOUS ONCE
Status: COMPLETED | OUTPATIENT
Start: 2025-04-30 | End: 2025-04-30

## 2025-04-30 RX ADMIN — SUCRALFATE 1 G: 1 TABLET ORAL at 21:48

## 2025-04-30 RX ADMIN — SODIUM CHLORIDE 1000 ML: 0.9 INJECTION, SOLUTION INTRAVENOUS at 12:33

## 2025-04-30 RX ADMIN — METHYLPREDNISOLONE SODIUM SUCCINATE 125 MG: 125 INJECTION, POWDER, FOR SOLUTION INTRAMUSCULAR; INTRAVENOUS at 12:31

## 2025-04-30 RX ADMIN — GUAIFENESIN 600 MG: 600 TABLET, EXTENDED RELEASE ORAL at 21:48

## 2025-04-30 RX ADMIN — ACETAMINOPHEN 650 MG: 325 TABLET ORAL at 19:46

## 2025-04-30 RX ADMIN — POTASSIUM CHLORIDE 40 MEQ: 1500 TABLET, EXTENDED RELEASE ORAL at 13:05

## 2025-04-30 RX ADMIN — MAGNESIUM SULFATE HEPTAHYDRATE 1 G: 1 INJECTION, SOLUTION INTRAVENOUS at 13:08

## 2025-04-30 RX ADMIN — IOHEXOL 60 ML: 350 INJECTION, SOLUTION INTRAVENOUS at 14:47

## 2025-04-30 RX ADMIN — AZITHROMYCIN DIHYDRATE 500 MG: 250 TABLET, FILM COATED ORAL at 17:47

## 2025-04-30 RX ADMIN — DOCUSATE SODIUM 100 MG: 100 CAPSULE, LIQUID FILLED ORAL at 17:46

## 2025-04-30 RX ADMIN — BUDESONIDE 0.5 MG: 0.5 INHALANT RESPIRATORY (INHALATION) at 19:48

## 2025-04-30 RX ADMIN — IPRATROPIUM BROMIDE AND ALBUTEROL SULFATE 3 ML: .5; 3 SOLUTION RESPIRATORY (INHALATION) at 12:27

## 2025-04-30 RX ADMIN — CEFTRIAXONE 1000 MG: 1 INJECTION, SOLUTION INTRAVENOUS at 14:53

## 2025-04-30 RX ADMIN — DICYCLOMINE HYDROCHLORIDE 10 MG: 10 CAPSULE ORAL at 17:47

## 2025-04-30 RX ADMIN — LEVALBUTEROL HYDROCHLORIDE 1.25 MG: 1.25 SOLUTION RESPIRATORY (INHALATION) at 19:48

## 2025-04-30 RX ADMIN — HEPARIN SODIUM 5000 UNITS: 5000 INJECTION INTRAVENOUS; SUBCUTANEOUS at 21:50

## 2025-04-30 RX ADMIN — FAMOTIDINE 20 MG: 20 TABLET, FILM COATED ORAL at 17:47

## 2025-04-30 RX ADMIN — METHYLPREDNISOLONE SODIUM SUCCINATE 40 MG: 40 INJECTION, POWDER, FOR SOLUTION INTRAMUSCULAR; INTRAVENOUS at 21:49

## 2025-04-30 RX ADMIN — IPRATROPIUM BROMIDE 0.5 MG: 0.5 SOLUTION RESPIRATORY (INHALATION) at 19:48

## 2025-04-30 NOTE — ASSESSMENT & PLAN NOTE
Presents to ED with shortness of breath  SIRS met: SIRS: 2/4; temperature, tachycardia, tachypnea, and WBC  Pending lactic acid and procalcitonin.  Source of infection: Pneumonia  UA: Noninfectious  CXR: Right upper and lower lobe pneumonia  Blood cultures pending  CTA chest demonstrating right upper and new worsening right lower lobe pneumonia.  Sputum culture, urine Legionella/strep pending  AFB pending given history of mycobacterium  IVF given in total of 1 L  IV antibiotics; ceftriaxone initially given in the ED; will continue ceftriaxone and azithromycin; follow up with cultures and deescalate as appropriate  ID consulted given history of MARIS and recent frequent hospitalizations for PNA/COPD flare.

## 2025-04-30 NOTE — ASSESSMENT & PLAN NOTE
With history of severe emphysema on 2 L nasal cannula at baseline.  Progressively worsening shortness of breath requiring increased O2 over the past few days.  Workup in ED with elevated D-dimer, meeting SIRS criteria with tachypnea, tachycardia, leukocytosis.    CTA chest negative for PE however demonstrating right lower lobe pneumonia and severe emphysema  BNP WNL, trops normal  VBG  Suspect worsened respiratory failure in the setting of pneumonia rather than COPD exacerbation given infectious markers.  Status post 1 dose IV ceftriaxone, DuoNeb, 125 mg IV Solu-Medrol in the ER  Continue in hospital on IV ceftriaxone and azithromycin  Continue with her maintenance Trelegy, Mucinex.  Respiratory protocol, I&S, OOB as tolerated  Wean O2 as tolerated to maintain sats greater than 88%

## 2025-04-30 NOTE — ASSESSMENT & PLAN NOTE
Underwent bronchoscopy 5/2024 for cavitary lesion biopsy, growing Mycobacterium intracellular.    Following outpatient with infectious disease regarding MARIS infection.  She was unable to tolerate the rifabutin regimen given hepatotoxicity and is instead being monitored with sputum cultures and AFB.  Is due for culture this upcoming week.  Follow sputum culture and AFB

## 2025-04-30 NOTE — H&P
H&P - Hospitalist   Name: Ileana Seaman 65 y.o. female I MRN: [id number]  Unit/Bed#: -01 I Date of Admission: 4/30/2025   Date of Service: 4/30/2025 I Hospital Day: 0     Assessment & Plan  Pneumonia of right lower lobe due to infectious organism  Lab Results   Component Value Date    WBC 10.54 (H) 04/30/2025    PROCALCITONI <0.05 04/06/2025   Follows with infectious disease outpatient for MARIS infection.  Unable to tolerate therapy due to hepatotoxicity while on rifabutin.  Instead they are monitoring sputum cultures closely off treatment.  CXR progressive pneumonia in right upper lobe superimposed on known cavitary nodular lesions  CTA chest: Worsening right upper lobe pneumonia and new superior segment right lower lobe pneumonia  COVID/Flu/RSV negative  Urine strep and legionella ordered, Sputum culture ordered.  AFB ordered  Blood Cultures pending  Procalcitonin pending  ID consulted given history of MARIS and recurrent and worsening PNA.  Pulm consulted.  Initially given ceftriaxone in the emergency department, will continue ceftriaxone and azithromycin and monitor cultures and titrate therapy as indicated  Provide supplemental oxygen for saturations > 90%; wean as tolerated - currently on 4 L nasal cannula  Aspiration Precautions, Respiratory Protocol, Incentive Spirometry   Acute on chronic hypoxic respiratory failure (HCC)  With history of severe emphysema on 2 L nasal cannula at baseline.  Progressively worsening shortness of breath requiring increased O2 over the past few days.  Workup in ED with elevated D-dimer, meeting SIRS criteria with tachypnea, tachycardia, leukocytosis.    CTA chest negative for PE however demonstrating right lower lobe pneumonia and severe emphysema  BNP WNL, trops normal  VBG  Suspect worsened respiratory failure in the setting of pneumonia rather than COPD exacerbation given infectious markers.  Status post 1 dose IV ceftriaxone, DuoNeb, 125 mg IV Solu-Medrol in the  ER  Continue in hospital on IV ceftriaxone and azithromycin  Continue with her maintenance Trelegy, Mucinex.  Respiratory protocol, I&S, OOB as tolerated  Wean O2 as tolerated to maintain sats greater than 88%  Sepsis (HCC)  Presents to ED with shortness of breath  SIRS met: SIRS: 2/4; temperature, tachycardia, tachypnea, and WBC  Pending lactic acid and procalcitonin.  Source of infection: Pneumonia  UA: Noninfectious  CXR: Right upper and lower lobe pneumonia  Blood cultures pending  CTA chest demonstrating right upper and new worsening right lower lobe pneumonia.  Sputum culture, urine Legionella/strep pending  AFB pending given history of mycobacterium  IVF given in total of 1 L  IV antibiotics; ceftriaxone initially given in the ED; will continue ceftriaxone and azithromycin; follow up with cultures and deescalate as appropriate  ID consulted given history of MARIS and recent frequent hospitalizations for PNA/COPD flare.    COPD, severe (HCC)  On Trelegy for maintenance.  With as needed DuoNebs  Does not appear to be in acute exacerbation  Status post one-time dose IV Solu-Medrol 125 mg  Monitor off steroids given acute infection  Mycobacterial infection, non-TB  Underwent bronchoscopy 5/2024 for cavitary lesion biopsy, growing Mycobacterium intracellular.    Following outpatient with infectious disease regarding MARIS infection.  She was unable to tolerate the rifabutin regimen given hepatotoxicity and is instead being monitored with sputum cultures and AFB.  Is due for culture this upcoming week.  Follow sputum culture and AFB  Pulmonary nodule  Noted on CT chest.  Reviewed with patient.  Continue follow-up with pulmonology  Hypomagnesemia  Initially 1.5 with reciprocal hypokalemia  Monitor and replete  Hypokalemia  Initially 2.7 with reciprocal hypomagnesemia  Monitor and replete      VTE Pharmacologic Prophylaxis:   Moderate Risk (Score 3-4) - Pharmacological DVT Prophylaxis Ordered: heparin.  Code Status:  Level 1 - Full Code   Discussion with family: Patient declined call to .     Anticipated Length of Stay: Patient will be admitted on an inpatient basis with an anticipated length of stay of greater than 2 midnights secondary to PNA, COPD, hypoxia, SEPSIS.    History of Present Illness   Chief Complaint: ROHINI Seaman is a 65 y.o. female with a PMH of COPD, CHF, bronchial pneumonia, mycobacteria infection non-TB, afib, chronic hypoxia, malnutrition, former smoker  who presents with worsening shortness of breath over the past 2 days..  Patient has had recent frequent hospitalizations for COPD versus pneumonia exacerbations.  States that these have been ongoing for the past year ever since being diagnosed with MARIS by her pulmonologist.  States she does follow-up with an infectious disease specialist and was unable to complete MARIS treatment due to hepatotoxicity.  At this time they are monitoring off of treatment with sputum cultures.    Currently complaining of fatigue, shortness of breath, productive cough, decreased appetite, chest tightness, and some leg swelling.  She is on 2 L nasal cannula at baseline since being diagnosed with MARIS.    CTA chest negative for PE however showing worsening right sided pneumonia extending into the right lower lobe.  White count elevated, tachycardic and tachypneic qualifying her for sepsis.  Blood cultures drawn in ED.  She received 1 L NS in the ER.  Will admit under sepsis and pneumonia pathway.  Continue on IV ceftriaxone and azithromycin.  Infectious disease consulted given history of MARIS and frequent recent pneumonia infections.  Avoiding steroids at this time given sepsis.  Consider addition with improvement of infection.  Continue with nebs at this time.    Review of Systems   Constitutional:  Negative for chills and fever.   HENT:  Negative for ear pain and sore throat.    Eyes:  Negative for pain and visual disturbance.   Respiratory:  Positive for  cough and shortness of breath.    Cardiovascular:  Negative for chest pain and palpitations.   Gastrointestinal:  Negative for abdominal pain and vomiting.   Genitourinary:  Negative for dysuria and hematuria.   Musculoskeletal:  Negative for arthralgias and back pain.   Skin:  Negative for color change and rash.   Neurological:  Negative for seizures and syncope.   All other systems reviewed and are negative.      Historical Information   Past Medical History:   Diagnosis Date    COPD (chronic obstructive pulmonary disease) (HCC)     Hiatal hernia      Past Surgical History:   Procedure Laterality Date    CHOLECYSTECTOMY       Social History     Tobacco Use    Smoking status: Some Days     Current packs/day: 0.25     Types: Cigarettes    Smokeless tobacco: Never   Vaping Use    Vaping status: Never Used   Substance and Sexual Activity    Alcohol use: Never    Drug use: Never    Sexual activity: Not on file     E-Cigarette/Vaping    E-Cigarette Use Never User      E-Cigarette/Vaping Substances     History reviewed. No pertinent family history.  Social History:  Marital Status: Single   Occupation: retired  Patient Pre-hospital Living Situation: Home  Patient Pre-hospital Level of Mobility: walks  Patient Pre-hospital Diet Restrictions: none    Meds/Allergies   I have reviewed home medications with patient personally.  Prior to Admission medications    Medication Sig Start Date End Date Taking? Authorizing Provider   albuterol (PROVENTIL HFA,VENTOLIN HFA) 90 mcg/act inhaler Inhale 2 puffs every 6 (six) hours as needed for wheezing 1/26/25   Rolando Parker MD   aspirin (Aspirin 81) 81 mg EC tablet Take 1 tablet (81 mg total) by mouth daily 12/17/24   Rolando Parker MD   atorvastatin (LIPITOR) 40 mg tablet Take 1 tablet (40 mg total) by mouth daily 12/17/24   Rolando Parker MD   dicyclomine (BENTYL) 10 mg capsule Take 1 capsule (10 mg total) by mouth 3 (three) times a day before meals 12/17/24   Rolando HENDRIX  MD Keith   docusate sodium (COLACE) 100 mg capsule Take 100 mg by mouth 2 (two) times a day 11/13/24   Historical Provider, MD   famotidine (PEPCID) 20 mg tablet Take 1 tablet (20 mg total) by mouth 2 (two) times a day 12/6/24   Carlos Turcios PA-C   fluticasone-umeclidinium-vilanterol (Trelegy Ellipta) 100-62.5-25 mcg/actuation inhaler Inhale 1 puff daily Rinse mouth after use.    Historical Provider, MD   furosemide (LASIX) 20 mg tablet Take 1 tablet (20 mg total) by mouth daily as needed (lower extremity swelling) 4/9/25   Barbra Dunn PA-C   ipratropium-albuterol (DUO-NEB) 0.5-2.5 mg/3 mL nebulizer solution Take 3 mL by nebulization 4 (four) times a day 11/23/24   Mary Altamirano MD   ipratropium-albuterol (DUO-NEB) 0.5-2.5 mg/3 mL nebulizer solution Take 3 mL by nebulization every 6 (six) hours as needed for wheezing or shortness of breath 4/5/25   Ponce Osorio DO   omeprazole (PriLOSEC) 40 MG capsule Take 1 capsule (40 mg total) by mouth 2 (two) times a day 30 minutes before meal 11/8/24 4/5/25  Rolando Parker MD   ondansetron (ZOFRAN) 4 mg tablet Take 1 tablet (4 mg total) by mouth every 6 (six) hours 12/6/24   Carlos Turcios PA-C   simethicone (MYLICON) 80 mg chewable tablet Chew 1 tablet (80 mg total) every 6 (six) hours as needed for flatulence 12/17/24   Rolando Parker MD   sucralfate (CARAFATE) 1 g tablet Take 1 tablet (1 g total) by mouth 4 (four) times a day for 14 days 12/6/24 4/5/25  Carlos Turcios PA-C     No Known Allergies    Objective :  Temp:  [97 °F (36.1 °C)-99.6 °F (37.6 °C)] 97 °F (36.1 °C)  HR:  [106-133] 111  BP: ()/(53-83) 115/74  Resp:  [20-32] 20  SpO2:  [87 %-93 %] 89 %  O2 Device: Nasal cannula  Nasal Cannula O2 Flow Rate (L/min):  [2 L/min] 2 L/min    Physical Exam  Vitals reviewed.   Constitutional:       General: She is not in acute distress.     Appearance: Normal appearance. She is not toxic-appearing.   HENT:      Head: Normocephalic and  atraumatic.   Eyes:      General: No scleral icterus.  Cardiovascular:      Rate and Rhythm: Regular rhythm. Tachycardia present.      Heart sounds: Normal heart sounds.   Pulmonary:      Effort: Pulmonary effort is normal. No respiratory distress.      Breath sounds: No stridor. Wheezing and rhonchi (right sided diffuse) present.      Comments: Requiring 4L NC  Abdominal:      General: Abdomen is flat. Bowel sounds are normal. There is no distension.      Palpations: Abdomen is soft.      Tenderness: There is no abdominal tenderness.   Musculoskeletal:         General: Normal range of motion.      Cervical back: Normal range of motion.      Right lower leg: Edema (trace) present.      Left lower leg: Edema (trace) present.   Skin:     Coloration: Skin is not jaundiced or pale.   Neurological:      General: No focal deficit present.      Mental Status: She is alert and oriented to person, place, and time. Mental status is at baseline.            Lab Results: I have reviewed the following results:  Results from last 7 days   Lab Units 04/30/25  1219   WBC Thousand/uL 10.54*   HEMOGLOBIN g/dL 13.0   HEMATOCRIT % 41.0   PLATELETS Thousands/uL 333   SEGS PCT % 82*   LYMPHO PCT % 11*   MONO PCT % 7   EOS PCT % 0     Results from last 7 days   Lab Units 04/30/25  1219   SODIUM mmol/L 137   POTASSIUM mmol/L 2.7*   CHLORIDE mmol/L 94*   CO2 mmol/L 32   BUN mg/dL 5   CREATININE mg/dL 0.48*   ANION GAP mmol/L 11   CALCIUM mg/dL 9.2   ALBUMIN g/dL 3.8   TOTAL BILIRUBIN mg/dL 0.56   ALK PHOS U/L 91   ALT U/L 13   AST U/L 11*   GLUCOSE RANDOM mg/dL 180*     Results from last 7 days   Lab Units 04/30/25  1219   INR  1.06         Lab Results   Component Value Date    HGBA1C 6.5 (H) 11/13/2024    HGBA1C 6.4 (H) 10/04/2024    HGBA1C 5.9 (H) 05/22/2023           Imaging Results Review: I reviewed radiology reports from this admission including: CT chest.  Other Study Results Review: Pathology reports reviewed.    Administrative  Statements     ** Please Note: This note has been constructed using a voice recognition system. **

## 2025-04-30 NOTE — ASSESSMENT & PLAN NOTE
Lab Results   Component Value Date    WBC 10.54 (H) 04/30/2025    PROCALCITONI <0.05 04/06/2025   Follows with infectious disease outpatient for MARIS infection.  Unable to tolerate therapy due to hepatotoxicity while on rifabutin.  Instead they are monitoring sputum cultures closely off treatment.  CXR progressive pneumonia in right upper lobe superimposed on known cavitary nodular lesions  CTA chest: Worsening right upper lobe pneumonia and new superior segment right lower lobe pneumonia  COVID/Flu/RSV negative  Urine strep and legionella ordered, Sputum culture ordered.  AFB ordered  Blood Cultures pending  Procalcitonin pending  ID consulted given history of MARIS and recurrent and worsening PNA.  Pulm consulted.  Initially given ceftriaxone in the emergency department, will continue ceftriaxone and azithromycin and monitor cultures and titrate therapy as indicated  Provide supplemental oxygen for saturations > 90%; wean as tolerated - currently on 4 L nasal cannula  Aspiration Precautions, Respiratory Protocol, Incentive Spirometry

## 2025-04-30 NOTE — ASSESSMENT & PLAN NOTE
On Trelegy for maintenance.  With as needed DuoNebs  Does not appear to be in acute exacerbation  Status post one-time dose IV Solu-Medrol 125 mg  Monitor off steroids given acute infection

## 2025-04-30 NOTE — ED PROVIDER NOTES
Time reflects when diagnosis was documented in both MDM as applicable and the Disposition within this note       Time User Action Codes Description Comment    4/30/2025  3:28 PM RubychrissyEdilma Add [J18.9] Pneumonia     4/30/2025  3:28 PM RubychrissyEdilma Add [E87.6] Hypokalemia     4/30/2025  3:30 PM Edilma Elliott Add [R79.0] Low magnesium level     4/30/2025  4:35 PM Lojek, Hermila Add [A31.9] Mycobacterial infection, non-TB     4/30/2025  4:35 PM Lojek, Hermila Add [J18.9] Pneumonia of right lower lobe due to infectious organism     4/30/2025  5:49 PM Lojek, Hermila Add [I50.33] Acute on chronic diastolic (congestive) heart failure (HCC)     4/30/2025  5:49 PM Lojek, Hermila Remove [I50.33] Acute on chronic diastolic (congestive) heart failure (HCC)     4/30/2025  5:49 PM Lojek, Hermila Add [J44.1] COPD with acute exacerbation (HCC)     4/30/2025  5:49 PM Lojek, Hermila Remove [J44.1] COPD with acute exacerbation (HCC)     4/30/2025  5:49 PM Lojek, Hermila Add [J96.21] Acute on chronic hypoxic respiratory failure (HCC)           ED Disposition       ED Disposition   Admit    Condition   Stable    Date/Time   Wed Apr 30, 2025  3:28 PM    Comment   Case was discussed with Dr. Cantrell and the patient's admission status was agreed to be Admission Status: inpatient status to the service of Dr. Cantrell .               Assessment & Plan       Medical Decision Making  Patient presented to the emergency department and a MSE was performed. The patient was evaluated for complaint related to acute shortness of breath. Patient is potentially at risk for, but not limited to, acute coronary syndrome, arrhythmia, myocardial infarction, pulmonary embolism, pneumothorax, infectious process of the lungs such as pneumonia, reactive airway disease, asthma, COPD exacerbation, cardiomyopathy, congestive heart failure or viral syndrome. Several of these diagnoses have been evaluated and ruled out by history and physical. As needed, patient will  be further evaluated with laboratory and imaging studies. Higher level diagnostics, such as CT imaging or ultrasound, may also be required. Please see work-up portion of the note for further evaluation of patient's risk. Socioeconomic factors were also considered as part of the decision-making process. Unless otherwise stated in the chart or patient is admitted as elsewhere documented, any previously prescribed medications will be maintained.       Problems Addressed:  Hypokalemia: acute illness or injury  Low magnesium level: acute illness or injury  Pneumonia: acute illness or injury    Amount and/or Complexity of Data Reviewed  Labs: ordered. Decision-making details documented in ED Course.  Radiology: ordered.  ECG/medicine tests: ordered.  Discussion of management or test interpretation with external provider(s): Medicine for admission     Risk  Prescription drug management.  Decision regarding hospitalization.        ED Course as of 04/30/25 1841 Wed Apr 30, 2025   1246 WBC(!): 10.54   1246 Potassium(!): 2.7   1246 MAGNESIUM(!): 1.5   1247 FLU/COVID Rapid Antigen (30 min. TAT) - Preferred screening test in ED  negative   1248 D-Dimer, Quant(!): 0.76  Will get CT PE    1306 hs TnI 0hr: 13   1306 BNP: 61   1334 Chest xray: Progressive pneumonia in the right upper lobe superimposed on known cavitary nodular lesions.        1525 CTA PE: No pulmonary embolism.     Severe background emphysema with worsening right upper lobe pneumonia and new superior segment right lower lobe pneumonia. Reactive mediastinal and right hilar adenopathy.     Stable 7 mm right lower lobe nodule.     1527 I discussed all results and findings with the patient and family at bedside.  Recommended admission for continued care and treatment which both were agreeable to message sent to medicine requesting admission   1530 Medicine Dr. Cantrell accepted the patient for admission        Medications   sodium chloride 0.9 % bolus 1,000 mL (0 mL  Intravenous Stopped 4/30/25 1500)   methylPREDNISolone sodium succinate (Solu-MEDROL) injection 125 mg (125 mg Intravenous Given 4/30/25 1231)   ipratropium-albuterol (DUO-NEB) 0.5-2.5 mg/3 mL inhalation solution 3 mL (3 mL Nebulization Given 4/30/25 1227)   potassium chloride (Klor-Con M20) CR tablet 40 mEq (40 mEq Oral Given 4/30/25 1305)   magnesium sulfate IVPB (premix) SOLN 1 g (0 g Intravenous Stopped 4/30/25 1430)   cefTRIAXone (ROCEPHIN) IVPB (premix in dextrose) 1,000 mg 50 mL (0 mg Intravenous Stopped 4/30/25 1525)   iohexol (OMNIPAQUE) 350 MG/ML injection (MULTI-DOSE) 60 mL (60 mL Intravenous Given 4/30/25 1447)       ED Risk Strat Scores                    No data recorded        SBIRT 22yo+      Flowsheet Row Most Recent Value   Initial Alcohol Screen: US AUDIT-C     1. How often do you have a drink containing alcohol? 0 Filed at: 04/30/2025 1159   2. How many drinks containing alcohol do you have on a typical day you are drinking?  0 Filed at: 04/30/2025 1150   3b. FEMALE Any Age, or MALE 65+: How often do you have 4 or more drinks on one occassion? 0 Filed at: 04/30/2025 1155   Audit-C Score 0 Filed at: 04/30/2025 1159   LANDY: How many times in the past year have you...    Used an illegal drug or used a prescription medication for non-medical reasons? Never Filed at: 04/30/2025 1156                            History of Present Illness       Chief Complaint   Patient presents with    Shortness of Breath     Patient c/o worsening sob for 2 days       Past Medical History:   Diagnosis Date    COPD (chronic obstructive pulmonary disease) (HCC)     Hiatal hernia       Past Surgical History:   Procedure Laterality Date    CHOLECYSTECTOMY        History reviewed. No pertinent family history.   Social History     Tobacco Use    Smoking status: Some Days     Current packs/day: 0.25     Types: Cigarettes    Smokeless tobacco: Never   Vaping Use    Vaping status: Never Used   Substance Use Topics    Alcohol  use: Never    Drug use: Never      E-Cigarette/Vaping    E-Cigarette Use Never User       E-Cigarette/Vaping Substances      I have reviewed and agree with the history as documented.     65 year old female presents to the ED for evaluation of SOB. Patient with past medical history of COPD, CHF, bronchial pneumonia, mycobacteria infection, non-TB, DM2, afib, chorinc hypoxia, nutrition, elevated liver enzymes, anxiety, hypokalemia. Patient is a former smoker. She requried O2 NC oxygen at baseline.  Patient reports over last 2 days she has had increased weakness and fatigue.  States she is feeling more short of breath than usual.  Reports she has a chronic cough and is bringing up some phlegm.  She reports she has been taking her diuretic pill and has been frequently.  Reports she still has some minimal leg swelling.  Patient denies any chest pain.  She denies any palpitations.  Patient denies fevers.  She denies any loss at home being sick.  She reports some wheezing.  She denies abdominal pain nausea vomiting or diarrhea.  Reports decreased appetite and states she is a little p.o. intake over the last 2 days.        Review of Systems   Constitutional:  Positive for appetite change and fatigue. Negative for chills, diaphoresis and fever.   HENT: Negative.     Respiratory:  Positive for cough, shortness of breath and wheezing.    Cardiovascular:  Positive for leg swelling. Negative for chest pain and palpitations.   Gastrointestinal: Negative.    Genitourinary: Negative.    Musculoskeletal: Negative.    Skin: Negative.    Neurological:  Positive for weakness.   All other systems reviewed and are negative.          Objective       ED Triage Vitals   Temperature Pulse Blood Pressure Respirations SpO2 Patient Position - Orthostatic VS   04/30/25 1146 04/30/25 1146 04/30/25 1146 04/30/25 1146 04/30/25 1146 04/30/25 1146   99.6 °F (37.6 °C) (!) 133 150/83 (!) 24 (!) 87 % Lying      Temp Source Heart Rate Source BP Location  FiO2 (%) Pain Score    04/30/25 1146 04/30/25 1146 04/30/25 1146 -- 04/30/25 1605    Temporal Monitor Left arm  8      Vitals      Date and Time Temp Pulse SpO2 Resp BP Pain Score FACES Pain Rating User   04/30/25 1727 -- 98 91 % -- -- -- -- AP   04/30/25 1608 97 °F (36.1 °C) 111 89 % 20 115/74 -- -- DII   04/30/25 1605 -- -- -- -- -- 8 -- AP   04/30/25 1545 -- 116 91 % 32 122/59 -- -- MB   04/30/25 1430 -- 106 92 % 30 122/59 -- -- MB   04/30/25 1400 -- 106 93 % 30 91/53 -- -- MB   04/30/25 1146 99.6 °F (37.6 °C) 133 87 % 24 150/83 -- -- AFG            Physical Exam  Vitals and nursing note reviewed.   Constitutional:       General: She is not in acute distress.     Appearance: She is well-developed. She is ill-appearing. She is not toxic-appearing or diaphoretic.   HENT:      Head: Normocephalic.      Mouth/Throat:      Mouth: Mucous membranes are moist.   Eyes:      Extraocular Movements: Extraocular movements intact.      Pupils: Pupils are equal, round, and reactive to light.   Cardiovascular:      Rate and Rhythm: Regular rhythm. Tachycardia present.   Pulmonary:      Effort: Pulmonary effort is normal.      Breath sounds: Decreased breath sounds and wheezing present.   Chest:      Chest wall: No mass or tenderness.   Abdominal:      Palpations: Abdomen is soft.      Tenderness: There is no abdominal tenderness.   Musculoskeletal:      Cervical back: Normal range of motion.      Right lower leg: Edema (1+) present.      Left lower leg: Edema (1+) present.   Skin:     General: Skin is warm and dry.      Findings: No ecchymosis or erythema.   Neurological:      General: No focal deficit present.      Mental Status: She is alert and oriented to person, place, and time.   Psychiatric:         Mood and Affect: Mood normal.         Results Reviewed       Procedure Component Value Units Date/Time    Procalcitonin [[id number]]  (Normal) Collected: 04/30/25 1219    Lab Status: Final result Specimen: Blood from Arm, Right  Updated: 04/30/25 1702     Procalcitonin <0.05 ng/ml     Blood culture #2 [[id number]] Collected: 04/30/25 1405    Lab Status: In process Specimen: Blood Updated: 04/30/25 1433    Blood culture #1 [[id number]] Collected: 04/30/25 1419    Lab Status: In process Specimen: Blood from Arm, Left Updated: 04/30/25 1433    HS Troponin 0hr (reflex protocol) [[id number]]  (Normal) Collected: 04/30/25 1219    Lab Status: Final result Specimen: Blood from Arm, Right Updated: 04/30/25 1251     hs TnI 0hr 13 ng/L     B-Type Natriuretic Peptide(BNP) [[id number]]  (Normal) Collected: 04/30/25 1219    Lab Status: Final result Specimen: Blood from Arm, Right Updated: 04/30/25 1251     BNP 61 pg/mL     Comprehensive metabolic panel [[id number]]  (Abnormal) Collected: 04/30/25 1219    Lab Status: Final result Specimen: Blood from Arm, Right Updated: 04/30/25 1246     Sodium 137 mmol/L      Potassium 2.7 mmol/L      Chloride 94 mmol/L      CO2 32 mmol/L      ANION GAP 11 mmol/L      BUN 5 mg/dL      Creatinine 0.48 mg/dL      Glucose 180 mg/dL      Calcium 9.2 mg/dL      AST 11 U/L      ALT 13 U/L      Alkaline Phosphatase 91 U/L      Total Protein 7.2 g/dL      Albumin 3.8 g/dL      Total Bilirubin 0.56 mg/dL      eGFR 101 ml/min/1.73sq m     Narrative:      National Kid[employer] guidelines for Chronic Kidney Disease (CKD):     Stage 1 with normal or high GFR (GFR > 90 mL/min/1.73 square meters)    Stage 2 Mild CKD (GFR = 60-89 mL/min/1.73 square meters)    Stage 3A Moderate CKD (GFR = 45-59 mL/min/1.73 square meters)    Stage 3B Moderate CKD (GFR = 30-44 mL/min/1.73 square meters)    Stage 4 Severe CKD (GFR = 15-29 mL/min/1.73 square meters)    Stage 5 End Stage CKD (GFR <15 mL/min/1.73 square meters)  Note: GFR calculation is accurate only with a steady state creatinine    Magnesium [[id number]]  (Abnormal) Collected: 04/30/25 1219    Lab Status: Final result Specimen: Blood from Arm, Right Updated: 04/30/25 1245      Magnesium 1.5 mg/dL     D-Dimer [[id number]]  (Abnormal) Collected: 04/30/25 1219    Lab Status: Final result Specimen: Blood from Arm, Right Updated: 04/30/25 1244     D-Dimer, Quant 0.76 ug/ml FEU     Narrative:      In the evaluation for possible pulmonary embolism, in the appropriate (Well's Score of 4 or less) patient, the age adjusted d-dimer cutoff for this patient can be calculated as:    Age x 0.01 (in ug/mL) for Age-adjusted D-dimer exclusion threshold for a patient over 50 years.    FLU/COVID Rapid Antigen (30 min. TAT) - Preferred screening test in ED [[id number]]  (Normal) Collected: 04/30/25 1221    Lab Status: Final result Specimen: Nares from Nose Updated: 04/30/25 1242     SARS COV Rapid Antigen Negative     Influenza A Rapid Antigen Negative     Influenza B Rapid Antigen Negative    Narrative:      This test has been performed using the [employer] Rhea 2 FLU+SARS Antigen test under the Emergency Use Authorization (EUA). This test has been validated by the  and verified by the performing laboratory. The Rhea uses lateral flow immunofluorescent sandwich assay to detect SARS-COV, Influenza A and Influenza B Antigen.     The [employer] Rhea 2 SARS Antigen test does not differentiate between SARS-CoV and SARS-CoV-2.     Negative results are presumptive and may be confirmed with a molecular assay, if necessary, for patient management. Negative results do not rule out SARS-CoV-2 or influenza infection and should not be used as the sole basis for treatment or patient management decisions. A negative test result may occur if the level of antigen in a sample is below the limit of detection of this test.     Positive results are indicative of the presence of viral antigens, but do not rule out bacterial infection or co-infection with other viruses.     All test results should be used as an adjunct to clinical observations and other information available to the provider.    FOR PEDIATRIC PATIENTS -  copy/paste COVID Guidelines URL to browser: [email or web address]    Protime-INR [[id number]]  (Normal) Collected: 04/30/25 1219    Lab Status: Final result Specimen: Blood from Arm, Right Updated: 04/30/25 1241     Protime 14.2 seconds      INR 1.06    Narrative:      INR Therapeutic Range    Indication                                             INR Range      Atrial Fibrillation                                               2.0-3.0  Hypercoagulable State                                    2.0.2.3  Left Ventricular Asist Device                            2.0-3.0  Mechanical Heart Valve                                  -    Aortic(with afib, MI, embolism, HF, LA enlargement,    and/or coagulopathy)                                     2.0-3.0 (2.5-3.5)     Mitral                                                             2.5-3.5  Prosthetic/Bioprosthetic Heart Valve               2.0-3.0  Venous thromboembolism (VTE: VT, PE        2.0-3.0    APTT [[id number]]  (Normal) Collected: 04/30/25 1219    Lab Status: Final result Specimen: Blood from Arm, Right Updated: 04/30/25 1241     PTT 27 seconds     UA w Reflex to Microscopic w Reflex to Culture [[id number]]  (Abnormal) Collected: 04/30/25 1218    Lab Status: Final result Specimen: Urine, Clean Catch Updated: 04/30/25 1233     Color, UA Yellow     Clarity, UA Clear     Specific Gravity, UA 1.015     pH, UA 7.5     Leukocytes, UA Negative     Nitrite, UA Negative     Protein, UA Negative mg/dl      Glucose,  (1/10%) mg/dl      Ketones, UA Negative mg/dl      Urobilinogen, UA 0.2 E.U./dl      Bilirubin, UA Negative     Occult Blood, UA Negative    Blood gas, venous [[id number]]  (Abnormal) Collected: 04/30/25 1219    Lab Status: Final result Specimen: Blood from Arm, Right Updated: 04/30/25 1229     pH, Terence 7.361     pCO2, Terence 56.0 mm Hg      pO2, Terence 31.2 mm Hg      HCO3, Terence 31.0 mmol/L      Base Excess, Terence 4.1  mmol/L      O2 Content, Terence 10.9 ml/dL      O2 HGB, VENOUS 54.3 %     CBC and differential [[id number]]  (Abnormal) Collected: 04/30/25 1219    Lab Status: Final result Specimen: Blood from Arm, Right Updated: 04/30/25 1226     WBC 10.54 Thousand/uL      RBC 4.75 Million/uL      Hemoglobin 13.0 g/dL      Hematocrit 41.0 %      MCV 86 fL      MCH 27.4 pg      MCHC 31.7 g/dL      RDW 13.3 %      MPV 12.0 fL      Platelets 333 Thousands/uL      nRBC 0 /100 WBCs      Segmented % 82 %      Immature Grans % 0 %      Lymphocytes % 11 %      Monocytes % 7 %      Eosinophils Relative 0 %      Basophils Relative 0 %      Absolute Neutrophils 8.55 Thousands/µL      Absolute Immature Grans 0.03 Thousand/uL      Absolute Lymphocytes 1.17 Thousands/µL      Absolute Monocytes 0.76 Thousand/µL      Eosinophils Absolute 0.01 Thousand/µL      Basophils Absolute 0.02 Thousands/µL             CTA chest pe study   Final Interpretation by Ashvin Garcia MD (04/30 1523)      No pulmonary embolism.      Severe background emphysema with worsening right upper lobe pneumonia and new superior segment right lower lobe pneumonia. Reactive mediastinal and right hilar adenopathy.      Stable 7 mm right lower lobe nodule.                  Workstation performed: [id number]         XR chest 2 views   Final Interpretation by Donato Pozo MD (04/30 1325)      Progressive pneumonia in the right upper lobe superimposed on known cavitary nodular lesions.      Resident: NAHOMI GRIER I, the attending radiologist, have reviewed the images and agree with the final report above.      Workstation performed: [id number]             ECG 12 Lead Documentation Only    Date/Time: 4/30/2025 12:28 PM    Performed by: Edilma Elliott PA-C  Authorized by: Edilma Elliott PA-C    Patient location:  ED  Interpretation:     Interpretation: non-specific    Rate:     ECG rate:  124    ECG rate assessment: tachycardic    Rhythm:     Rhythm: sinus tachycardia     Ectopy:     Ectopy: none    QRS:     QRS axis:  Left    QRS intervals:  Normal  Conduction:     Conduction: normal        ED Medication and Procedure Management   Prior to Admission Medications   Prescriptions Last Dose Informant Patient Reported? Taking?   albuterol (PROVENTIL HFA,VENTOLIN HFA) 90 mcg/act inhaler   No Yes   Sig: Inhale 2 puffs every 6 (six) hours as needed for wheezing   aspirin (Aspirin 81) 81 mg EC tablet 4/30/2025  No Yes   Sig: Take 1 tablet (81 mg total) by mouth daily   atorvastatin (LIPITOR) 40 mg tablet 4/30/2025  No Yes   Sig: Take 1 tablet (40 mg total) by mouth daily   dicyclomine (BENTYL) 10 mg capsule   No No   Sig: Take 1 capsule (10 mg total) by mouth 3 (three) times a day before meals   docusate sodium (COLACE) 100 mg capsule 4/30/2025 Morning  Yes Yes   Sig: Take 100 mg by mouth 2 (two) times a day   famotidine (PEPCID) 20 mg tablet 4/30/2025 Morning  No Yes   Sig: Take 1 tablet (20 mg total) by mouth 2 (two) times a day   fluticasone-umeclidinium-vilanterol (Trelegy Ellipta) 100-62.5-25 mcg/actuation inhaler Not Taking  Yes No   Sig: Inhale 1 puff daily Rinse mouth after use.   Patient not taking: Reported on 4/30/2025   furosemide (LASIX) 20 mg tablet   No Yes   Sig: Take 1 tablet (20 mg total) by mouth daily as needed (lower extremity swelling)   ipratropium-albuterol (DUO-NEB) 0.5-2.5 mg/3 mL nebulizer solution 4/30/2025  No Yes   Sig: Take 3 mL by nebulization every 6 (six) hours as needed for wheezing or shortness of breath   omeprazole (PriLOSEC) 40 MG capsule 4/30/2025  No Yes   Sig: Take 1 capsule (40 mg total) by mouth 2 (two) times a day 30 minutes before meal   ondansetron (ZOFRAN) 4 mg tablet Not Taking  No No   Sig: Take 1 tablet (4 mg total) by mouth every 6 (six) hours   Patient not taking: Reported on 4/30/2025   simethicone (MYLICON) 80 mg chewable tablet 4/30/2025  No Yes   Sig: Chew 1 tablet (80 mg total) every 6 (six) hours as needed for flatulence    sucralfate (CARAFATE) 1 g tablet   No Yes   Sig: Take 1 tablet (1 g total) by mouth 4 (four) times a day for 14 days      Facility-Administered Medications: None     Current Discharge Medication List        CONTINUE these medications which have NOT CHANGED    Details   albuterol (PROVENTIL HFA,VENTOLIN HFA) 90 mcg/act inhaler Inhale 2 puffs every 6 (six) hours as needed for wheezing  Qty: 18 g, Refills: 0    Comments: Substitution to a formulary equivalent within the same pharmaceutical class is authorized.  Associated Diagnoses: COPD with acute exacerbation (HCC)      aspirin (Aspirin 81) 81 mg EC tablet Take 1 tablet (81 mg total) by mouth daily  Qty: 30 tablet, Refills: 0    Associated Diagnoses: Celiac artery stenosis (HCC)      atorvastatin (LIPITOR) 40 mg tablet Take 1 tablet (40 mg total) by mouth daily  Qty: 30 tablet, Refills: 0    Associated Diagnoses: Celiac artery stenosis (HCC)      docusate sodium (COLACE) 100 mg capsule Take 100 mg by mouth 2 (two) times a day      famotidine (PEPCID) 20 mg tablet Take 1 tablet (20 mg total) by mouth 2 (two) times a day  Qty: 30 tablet, Refills: 0    Associated Diagnoses: Gastroenteritis      furosemide (LASIX) 20 mg tablet Take 1 tablet (20 mg total) by mouth daily as needed (lower extremity swelling)  Qty: 30 tablet, Refills: 0    Associated Diagnoses: COPD exacerbation (HCC)      ipratropium-albuterol (DUO-NEB) 0.5-2.5 mg/3 mL nebulizer solution Take 3 mL by nebulization every 6 (six) hours as needed for wheezing or shortness of breath  Qty: 180 mL, Refills: 0    Associated Diagnoses: COPD exacerbation (HCC)      omeprazole (PriLOSEC) 40 MG capsule Take 1 capsule (40 mg total) by mouth 2 (two) times a day 30 minutes before meal  Qty: 60 capsule, Refills: 0    Associated Diagnoses: Epigastric pain      simethicone (MYLICON) 80 mg chewable tablet Chew 1 tablet (80 mg total) every 6 (six) hours as needed for flatulence  Qty: 30 tablet, Refills: 0    Associated  Diagnoses: Epigastric pain      sucralfate (CARAFATE) 1 g tablet Take 1 tablet (1 g total) by mouth 4 (four) times a day for 14 days  Qty: 56 tablet, Refills: 0    Associated Diagnoses: Gastroenteritis      dicyclomine (BENTYL) 10 mg capsule Take 1 capsule (10 mg total) by mouth 3 (three) times a day before meals  Qty: 90 capsule, Refills: 0    Associated Diagnoses: Epigastric pain      fluticasone-umeclidinium-vilanterol (Trelegy Ellipta) 100-62.5-25 mcg/actuation inhaler Inhale 1 puff daily Rinse mouth after use.      ondansetron (ZOFRAN) 4 mg tablet Take 1 tablet (4 mg total) by mouth every 6 (six) hours  Qty: 12 tablet, Refills: 0    Associated Diagnoses: Gastroenteritis           No discharge procedures on file.  ED SEPSIS DOCUMENTATION   Time reflects when diagnosis was documented in both MDM as applicable and the Disposition within this note       Time User Action Codes Description Comment    4/30/2025  3:28 PM Edilma Elliott Add [J18.9] Pneumonia     4/30/2025  3:28 PM Edilma Elliott Add [E87.6] Hypokalemia     4/30/2025  3:30 PM Edilma Elliott Add [R79.0] Low magnesium level     4/30/2025  4:35 PM LoSandra lopezy Add [A31.9] Mycobacterial infection, non-TB     4/30/2025  4:35 PM ReinajekSandray Add [J18.9] Pneumonia of right lower lobe due to infectious organism     4/30/2025  5:49 PM FlokSandray Add [I50.33] Acute on chronic diastolic (congestive) heart failure (HCC)     4/30/2025  5:49 PM LojekSandray Remove [I50.33] Acute on chronic diastolic (congestive) heart failure (HCC)     4/30/2025  5:49 PM LojekSandray Add [J44.1] COPD with acute exacerbation (HCC)     4/30/2025  5:49 PM Lojek Hermila Remove [J44.1] COPD with acute exacerbation (HCC)     4/30/2025  5:49 PM LojekSandray Add [J96.21] Acute on chronic hypoxic respiratory failure (HCC)                  Edilma Elliott PA-C  04/30/25 1841

## 2025-05-01 LAB
ALBUMIN SERPL BCG-MCNC: 3.3 G/DL (ref 3.5–5)
ALP SERPL-CCNC: 83 U/L (ref 34–104)
ALT SERPL W P-5'-P-CCNC: 11 U/L (ref 7–52)
ANION GAP SERPL CALCULATED.3IONS-SCNC: 7 MMOL/L (ref 4–13)
AST SERPL W P-5'-P-CCNC: 8 U/L (ref 13–39)
BASOPHILS # BLD AUTO: 0.01 THOUSANDS/ÂΜL (ref 0–0.1)
BASOPHILS NFR BLD AUTO: 0 % (ref 0–1)
BILIRUB SERPL-MCNC: 0.27 MG/DL (ref 0.2–1)
BUN SERPL-MCNC: 8 MG/DL (ref 5–25)
CALCIUM ALBUM COR SERPL-MCNC: 9.6 MG/DL (ref 8.3–10.1)
CALCIUM SERPL-MCNC: 9 MG/DL (ref 8.4–10.2)
CHLORIDE SERPL-SCNC: 103 MMOL/L (ref 96–108)
CO2 SERPL-SCNC: 32 MMOL/L (ref 21–32)
CREAT SERPL-MCNC: 0.5 MG/DL (ref 0.6–1.3)
EOSINOPHIL # BLD AUTO: 0 THOUSAND/ÂΜL (ref 0–0.61)
EOSINOPHIL NFR BLD AUTO: 0 % (ref 0–6)
ERYTHROCYTE [DISTWIDTH] IN BLOOD BY AUTOMATED COUNT: 13.3 % (ref 11.6–15.1)
GFR SERPL CREATININE-BSD FRML MDRD: 100 ML/MIN/1.73SQ M
GLUCOSE SERPL-MCNC: 170 MG/DL (ref 65–140)
GLUCOSE SERPL-MCNC: 191 MG/DL (ref 65–140)
GLUCOSE SERPL-MCNC: 252 MG/DL (ref 65–140)
HCT VFR BLD AUTO: 34.8 % (ref 34.8–46.1)
HGB BLD-MCNC: 11.2 G/DL (ref 11.5–15.4)
IMM GRANULOCYTES # BLD AUTO: 0.03 THOUSAND/UL (ref 0–0.2)
IMM GRANULOCYTES NFR BLD AUTO: 0 % (ref 0–2)
L PNEUMO1 AG UR QL IA.RAPID: NEGATIVE
LYMPHOCYTES # BLD AUTO: 0.77 THOUSANDS/ÂΜL (ref 0.6–4.47)
LYMPHOCYTES NFR BLD AUTO: 9 % (ref 14–44)
MAGNESIUM SERPL-MCNC: 2 MG/DL (ref 1.9–2.7)
MCH RBC QN AUTO: 28.1 PG (ref 26.8–34.3)
MCHC RBC AUTO-ENTMCNC: 32.2 G/DL (ref 31.4–37.4)
MCV RBC AUTO: 87 FL (ref 82–98)
MONOCYTES # BLD AUTO: 0.47 THOUSAND/ÂΜL (ref 0.17–1.22)
MONOCYTES NFR BLD AUTO: 5 % (ref 4–12)
NEUTROPHILS # BLD AUTO: 7.42 THOUSANDS/ÂΜL (ref 1.85–7.62)
NEUTS SEG NFR BLD AUTO: 86 % (ref 43–75)
NRBC BLD AUTO-RTO: 0 /100 WBCS
PLATELET # BLD AUTO: 318 THOUSANDS/UL (ref 149–390)
PMV BLD AUTO: 11.7 FL (ref 8.9–12.7)
POTASSIUM SERPL-SCNC: 3.8 MMOL/L (ref 3.5–5.3)
PROCALCITONIN SERPL-MCNC: <0.05 NG/ML
PROT SERPL-MCNC: 6.3 G/DL (ref 6.4–8.4)
RBC # BLD AUTO: 3.99 MILLION/UL (ref 3.81–5.12)
S PNEUM AG UR QL: NEGATIVE
SODIUM SERPL-SCNC: 142 MMOL/L (ref 135–147)
WBC # BLD AUTO: 8.7 THOUSAND/UL (ref 4.31–10.16)

## 2025-05-01 PROCEDURE — 94668 MNPJ CHEST WALL SBSQ: CPT

## 2025-05-01 PROCEDURE — 80053 COMPREHEN METABOLIC PANEL: CPT

## 2025-05-01 PROCEDURE — G0545 PR INHERENT VISIT TO INPT: HCPCS | Performed by: INTERNAL MEDICINE

## 2025-05-01 PROCEDURE — 94760 N-INVAS EAR/PLS OXIMETRY 1: CPT

## 2025-05-01 PROCEDURE — 94669 MECHANICAL CHEST WALL OSCILL: CPT

## 2025-05-01 PROCEDURE — 84145 PROCALCITONIN (PCT): CPT

## 2025-05-01 PROCEDURE — 99255 IP/OBS CONSLTJ NEW/EST HI 80: CPT | Performed by: INTERNAL MEDICINE

## 2025-05-01 PROCEDURE — 83735 ASSAY OF MAGNESIUM: CPT

## 2025-05-01 PROCEDURE — 82948 REAGENT STRIP/BLOOD GLUCOSE: CPT

## 2025-05-01 PROCEDURE — 94664 DEMO&/EVAL PT USE INHALER: CPT

## 2025-05-01 PROCEDURE — 92610 EVALUATE SWALLOWING FUNCTION: CPT

## 2025-05-01 PROCEDURE — 99232 SBSQ HOSP IP/OBS MODERATE 35: CPT | Performed by: FAMILY MEDICINE

## 2025-05-01 PROCEDURE — 94640 AIRWAY INHALATION TREATMENT: CPT

## 2025-05-01 PROCEDURE — 99223 1ST HOSP IP/OBS HIGH 75: CPT | Performed by: INTERNAL MEDICINE

## 2025-05-01 PROCEDURE — 85025 COMPLETE CBC W/AUTO DIFF WBC: CPT

## 2025-05-01 RX ORDER — INSULIN LISPRO 100 [IU]/ML
1-5 INJECTION, SOLUTION INTRAVENOUS; SUBCUTANEOUS
Status: DISCONTINUED | OUTPATIENT
Start: 2025-05-01 | End: 2025-05-07 | Stop reason: HOSPADM

## 2025-05-01 RX ORDER — GUAIFENESIN 600 MG/1
1200 TABLET, EXTENDED RELEASE ORAL EVERY 12 HOURS SCHEDULED
Status: DISCONTINUED | OUTPATIENT
Start: 2025-05-01 | End: 2025-05-07 | Stop reason: HOSPADM

## 2025-05-01 RX ORDER — ALBUTEROL SULFATE 0.83 MG/ML
2.5 SOLUTION RESPIRATORY (INHALATION) EVERY 6 HOURS PRN
Status: DISCONTINUED | OUTPATIENT
Start: 2025-05-01 | End: 2025-05-07 | Stop reason: HOSPADM

## 2025-05-01 RX ORDER — METHYLPREDNISOLONE SODIUM SUCCINATE 40 MG/ML
40 INJECTION, POWDER, LYOPHILIZED, FOR SOLUTION INTRAMUSCULAR; INTRAVENOUS EVERY 12 HOURS SCHEDULED
Status: DISCONTINUED | OUTPATIENT
Start: 2025-05-01 | End: 2025-05-05

## 2025-05-01 RX ORDER — PANTOPRAZOLE SODIUM 40 MG/1
40 TABLET, DELAYED RELEASE ORAL
Status: DISCONTINUED | OUTPATIENT
Start: 2025-05-01 | End: 2025-05-07 | Stop reason: HOSPADM

## 2025-05-01 RX ORDER — CEFTRIAXONE 2 G/50ML
2000 INJECTION, SOLUTION INTRAVENOUS EVERY 24 HOURS
Status: DISCONTINUED | OUTPATIENT
Start: 2025-05-01 | End: 2025-05-04

## 2025-05-01 RX ORDER — AZITHROMYCIN 250 MG/1
500 TABLET, FILM COATED ORAL EVERY 24 HOURS
Status: COMPLETED | OUTPATIENT
Start: 2025-05-01 | End: 2025-05-03

## 2025-05-01 RX ORDER — SODIUM CHLORIDE FOR INHALATION 3 %
4 VIAL, NEBULIZER (ML) INHALATION
Status: DISPENSED | OUTPATIENT
Start: 2025-05-01 | End: 2025-05-03

## 2025-05-01 RX ORDER — AZITHROMYCIN 250 MG/1
500 TABLET, FILM COATED ORAL EVERY 24 HOURS
Status: DISCONTINUED | OUTPATIENT
Start: 2025-05-01 | End: 2025-05-01

## 2025-05-01 RX ADMIN — IPRATROPIUM BROMIDE 0.5 MG: 0.5 SOLUTION RESPIRATORY (INHALATION) at 08:00

## 2025-05-01 RX ADMIN — HEPARIN SODIUM 5000 UNITS: 5000 INJECTION INTRAVENOUS; SUBCUTANEOUS at 08:47

## 2025-05-01 RX ADMIN — ASPIRIN 81 MG: 81 TABLET, COATED ORAL at 08:48

## 2025-05-01 RX ADMIN — FAMOTIDINE 20 MG: 20 TABLET, FILM COATED ORAL at 08:48

## 2025-05-01 RX ADMIN — DICYCLOMINE HYDROCHLORIDE 10 MG: 10 CAPSULE ORAL at 17:02

## 2025-05-01 RX ADMIN — LEVALBUTEROL HYDROCHLORIDE 1.25 MG: 1.25 SOLUTION RESPIRATORY (INHALATION) at 13:18

## 2025-05-01 RX ADMIN — ATORVASTATIN CALCIUM 40 MG: 40 TABLET, FILM COATED ORAL at 08:48

## 2025-05-01 RX ADMIN — DOCUSATE SODIUM 100 MG: 100 CAPSULE, LIQUID FILLED ORAL at 08:48

## 2025-05-01 RX ADMIN — METHYLPREDNISOLONE SODIUM SUCCINATE 40 MG: 40 INJECTION, POWDER, FOR SOLUTION INTRAMUSCULAR; INTRAVENOUS at 13:08

## 2025-05-01 RX ADMIN — DICYCLOMINE HYDROCHLORIDE 10 MG: 10 CAPSULE ORAL at 06:12

## 2025-05-01 RX ADMIN — INSULIN LISPRO 1 UNITS: 100 INJECTION, SOLUTION INTRAVENOUS; SUBCUTANEOUS at 17:05

## 2025-05-01 RX ADMIN — PANTOPRAZOLE SODIUM 40 MG: 40 TABLET, DELAYED RELEASE ORAL at 06:12

## 2025-05-01 RX ADMIN — SUCRALFATE 1 G: 1 TABLET ORAL at 21:01

## 2025-05-01 RX ADMIN — SODIUM CHLORIDE SOLN NEBU 3% 4 ML: 3 NEBU SOLN at 19:34

## 2025-05-01 RX ADMIN — BUDESONIDE 0.5 MG: 0.5 INHALANT RESPIRATORY (INHALATION) at 08:00

## 2025-05-01 RX ADMIN — AZITHROMYCIN DIHYDRATE 500 MG: 250 TABLET, FILM COATED ORAL at 17:02

## 2025-05-01 RX ADMIN — DOCUSATE SODIUM 100 MG: 100 CAPSULE, LIQUID FILLED ORAL at 17:06

## 2025-05-01 RX ADMIN — IPRATROPIUM BROMIDE 0.5 MG: 0.5 SOLUTION RESPIRATORY (INHALATION) at 13:18

## 2025-05-01 RX ADMIN — LEVALBUTEROL HYDROCHLORIDE 1.25 MG: 1.25 SOLUTION RESPIRATORY (INHALATION) at 08:00

## 2025-05-01 RX ADMIN — SUCRALFATE 1 G: 1 TABLET ORAL at 17:02

## 2025-05-01 RX ADMIN — HEPARIN SODIUM 5000 UNITS: 5000 INJECTION INTRAVENOUS; SUBCUTANEOUS at 21:00

## 2025-05-01 RX ADMIN — SUCRALFATE 1 G: 1 TABLET ORAL at 13:08

## 2025-05-01 RX ADMIN — METHYLPREDNISOLONE SODIUM SUCCINATE 40 MG: 40 INJECTION, POWDER, FOR SOLUTION INTRAMUSCULAR; INTRAVENOUS at 21:01

## 2025-05-01 RX ADMIN — GUAIFENESIN 600 MG: 600 TABLET, EXTENDED RELEASE ORAL at 08:48

## 2025-05-01 RX ADMIN — FAMOTIDINE 20 MG: 20 TABLET, FILM COATED ORAL at 17:02

## 2025-05-01 RX ADMIN — METHYLPREDNISOLONE SODIUM SUCCINATE 40 MG: 40 INJECTION, POWDER, FOR SOLUTION INTRAMUSCULAR; INTRAVENOUS at 06:12

## 2025-05-01 RX ADMIN — GUAIFENESIN 1200 MG: 600 TABLET, EXTENDED RELEASE ORAL at 21:00

## 2025-05-01 RX ADMIN — LEVALBUTEROL HYDROCHLORIDE 1.25 MG: 1.25 SOLUTION RESPIRATORY (INHALATION) at 19:34

## 2025-05-01 RX ADMIN — SUCRALFATE 1 G: 1 TABLET ORAL at 08:52

## 2025-05-01 RX ADMIN — PANTOPRAZOLE SODIUM 40 MG: 40 TABLET, DELAYED RELEASE ORAL at 17:02

## 2025-05-01 RX ADMIN — NICOTINE 1 PATCH: 21 PATCH, EXTENDED RELEASE TRANSDERMAL at 08:47

## 2025-05-01 RX ADMIN — DICYCLOMINE HYDROCHLORIDE 10 MG: 10 CAPSULE ORAL at 13:08

## 2025-05-01 RX ADMIN — IPRATROPIUM BROMIDE 0.5 MG: 0.5 SOLUTION RESPIRATORY (INHALATION) at 19:34

## 2025-05-01 RX ADMIN — CEFTRIAXONE 2000 MG: 2 INJECTION, SOLUTION INTRAVENOUS at 13:11

## 2025-05-01 NOTE — ASSESSMENT & PLAN NOTE
Likely multifactorial in setting of advanced COPD , chronic diastolic CHF, MARIS infection  Currently on baseline 2 lpm o2     Await pulmonology evaluation, continue airway clearance, chest PT, steroid management per primary/pulm team   Encourage ongoing tobacco cessation

## 2025-05-01 NOTE — CASE MANAGEMENT
Case Management Assessment & Discharge Planning Note    Patient name Ileana Seaman  Location /-01 MRN [id number]  : 1960 Date 2025       Current Admission Date: 2025  Current Admission Diagnosis:Pneumonia of right lower lobe due to infectious organism   Patient Active Problem List    Diagnosis Date Noted Date Diagnosed    Pneumonia of right lower lobe due to infectious organism 2025     Acute on chronic diastolic (congestive) heart failure (HCC) 2025     Smoker 2025     Sepsis (HCC) 2025     Hypokalemia 2025     Anxiety 2024     Mild protein-calorie malnutrition (HCC) 2024     Celiac artery stenosis (HCC) 2024     Elevated liver enzymes 2024     Pulmonary nodule 2024     Moderate protein-calorie malnutrition (HCC) 2024     COPD exacerbation (HCC) 2024     Atrial fibrillation (HCC) 2024     Acute on chronic hypoxic respiratory failure (HCC) 2024     Epigastric pain 2024     New onset atrial fibrillation (HCC) 10/06/2024     Type 2 diabetes mellitus without complication, without long-term current use of insulin (HCC) 10/05/2024     Dysphagia 10/05/2024     Bronchopneumonia 10/04/2024     COPD with acute exacerbation (HCC) 10/04/2024     Mycobacterial infection, 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     Hypomagnesemia 10/04/2024       LOS (days): 1  Geometric Mean LOS (GMLOS) (days): 4.9  Days to GMLOS:4.1     OBJECTIVE:  PATIENT READMITTED TO HOSPITAL  Risk of Unplanned Readmission Score: 41         Current admission status: Inpatient       Preferred Pharmacy:   [id number] - [place], [place] - [address]  [address]  [place] [address]  Phone: [phone] Fax: [phone]    Primary Care Provider: Merline Dinero    Primary Insurance: [facility] [place] COM[place]TE [facility]  [facility]    ASSESSMENT:  Active Health Care Proxies    There  are no active Health Care Proxies on file.       Advance Directives  Does patient have a Health Care POA?: No  Was patient offered paperwork?: Yes (provided to patient)  Does patient currently have a Health Care decision maker?: Yes, please see Health Care Proxy section  Does patient have Advance Directives?: No  Was patient offered paperwork?: Yes (provided to patient)  Primary Contact: Ayde Seaman- daughter         Readmission Root Cause  30 Day Readmission: Yes  During your hospital stay, did someone (provider, nurse, ) explain your care to you in a way you could understand?: Yes  Did you feel medically stable to leave the hospital?: Yes  Were you able to pay for your medication at the pharmacy?: Yes  Did you have reliable transportation to take you to your appointments?: Yes  During previous admission, was a post-acute recommendation made?: No  Patient was readmitted due to: Shortness of Breath- Patient c/o worsening shortness of breath for 2 days, DX: Pneumonia of right lower lobe due to infectious organism  Action Plan: IV ABT, Pulmonology and infectious disease consulted    Patient Information  Admitted from:: Home  Mental Status: Alert  During Assessment patient was accompanied by: Not accompanied during assessment  Assessment information provided by:: Patient  Primary Caregiver: Self  Support Systems: Self, Family members, Children  County [facility] Residence: [facility]  What [facility] do you live in?: [place]  Home entry access options. Select all that apply.: Stairs  Number of steps to enter home.: 2  Do the steps have railings?: Yes  Type of Current Residence: 2 story home  Upon entering residence, is there a bedroom on the main floor (no further steps)?: Yes (sleeps on a recliner on the 1st floor)  Upon entering residence, is there a bathroom on the main floor (no further steps)?: No  Indicate which floors of current residence have a bathroom (select all the apply):: 2nd Floor  Number of steps  to 2nd floor from main floor: One Flight  Living Arrangements: Lives Alone  Is patient a ?: Yes ([employer])  Is patient active with [facility] ( Affairs)?: No    Activities of Daily Living Prior to Admission  Functional Status: Independent  Completes ADLs independently?: Yes  Ambulates independently?: Yes  Does patient use assisted devices?: Yes  Assisted Devices (DME) used: Home Oxygen concentrator, Portable Oxygen tanks  DME Company Name (respiratory supplies): [employer]  O2 Rate(s): 2 LPM continous  Does patient currently own DME?: Yes  What DME does the patient currently own?: Home Oxygen concentrator, Portable Oxygen tanks  Does patient have a history of Outpatient Therapy (PT/OT)?: No  Does the patient have a history of Short-Term Rehab?: No  Does patient have a history of HHC?: No  Does patient currently have HHC?: No         Patient Information Continued  Income Source: Pension/[facility]  Does patient have prescription coverage?: Yes  Can the patient afford their medications and any related supplies (such as glucometers or test strips)?: Yes  Does patient receive dialysis treatments?: No  Does patient have a history of substance abuse?: No  Does patient have a history of Mental Health Diagnosis?: Yes (Anxiety related to COPD)  Is patient receiving treatment for mental health?: Yes (Medication managed)  Has patient received inpatient treatment related to mental health in the last 2 years?: No  Pt stated she had a virtual appointment with [facility] for anxiety, but canceled appointment and never rescheduled. Pt not interested in rescheduling at this time.        Means of Transportation  Means of Transport to [facility]:: Family transport      Social Determinants of Health (SDOH)      Flowsheet Row Most Recent Value   Housing Stability    In the last 12 months, was there a time when you were not able to pay the mortgage or rent on time? N   In the past 12 months, how many times have you moved where you  "were living? 0   At any time in the past 12 months, were you homeless or living in a shelter (including now)? N   Transportation Needs    In the past 12 months, has lack of transportation kept you from medical appointments or from getting medications? no   In the past 12 months, has lack of transportation kept you from meetings, work, or from getting things needed for daily living? No   Food Insecurity    Within the past 12 months, you worried that your food would run out before you got the money to buy more. Never true   Within the past 12 months, the food you bought just didn't last and you didn't have money to get more. Never true   Utilities    In the past 12 months has the electric, gas, oil, or water company threatened to shut off services in your home? No            DISCHARGE DETAILS:    Discharge planning discussed with:: patient    CM met with pt to review role of CM and discuss any supports needed upon discharge. Baseline information obtained, pt indicates she lives alone in a 2 story home with 2 BENJAMIN. Pt indicates she sleeps on a recliner on the 1st floor. Pt indicates her bathroom is located on the 2nd floor, CM discussed ordering a bedside commode for the 1st floor, pt declined bedside commode stated \" I'm fine going to the 2nd floor, I just take my time\".  Pt indicates she is independent with ADLs, ambulates without any assistive devices, pt wears oxygen at 2 LPM continuously, pt has an oxygen concentrator and portable oxygen tanks at home supplied by [employer]. Pt indicates her children or friends provide transportation to appointments. Pt has a portable oxygen at the hospital for transport to home when ready for discharge. Pt indicated she would be interested in meals on wheels. CM made a referral to [facility] for meals on wheels.   Pt interested in completing a living will/POA paperwork, CM provided copy of Durable Healthcare POA and Health Care Treatment Instructions, Living Will to " complete and provided [facility] resource guide information and contact information/number provided for legal services through [facility] to assist with questions regarding distribution of her personal belongings after she passes. Pt thankful for resources.        Freedom of Choice: Yes     CM contacted family/caregiver?: Yes  Were Treatment Team discharge recommendations reviewed with patient/caregiver?: Yes  Did patient/caregiver verbalize understanding of patient care needs?: Yes       Contacts  Patient Contacts: Ayde Valentines- daughter  Relationship to Patient:: Family  Contact Method: Phone  Phone Number: [phone]  Reason/Outcome: Discharge Planning

## 2025-05-01 NOTE — ASSESSMENT & PLAN NOTE
With history of severe emphysema on 2 L nasal cannula at baseline.  Progressively worsening shortness of breath requiring increased O2 over the past few days.  Secondary to her worsening pneumonia and new pneumonia and COPD exacerbation currently she is down to 2 L

## 2025-05-01 NOTE — SPEECH THERAPY NOTE
Speech Language/Pathology  Speech-Language Pathology Bedside Swallow Evaluation      Patient Name: Ileana Seaman    Today's Date: 5/1/2025    Summary   Consult received for bedside swallow assessment. Pt admitted w/ RLL PNA, acute on chronic respiratory failure, and sepsis. PMHx includes COPD, mycobacterial infection, pulmonary nodule, hypokalemia and hypomagnesemia.   Pt denies oral/pharyngeal phase dysphagia, endorses suspected esophageal dysphagia. Reports she coughs excessively at night and wakes up with her esophagus/throat burning. Sleeps in a recliner chair.   Had VBS at [facility] 10/2024 w/ no aspiration/penetration but significant esophageal retention.   Limited assessment as pt reports she is focused on improving her SPO2. Currently 89-90% on 2L. Pt reports she likes it to be over 92% or else she feels woozy. Dentition is natural, missing some. Reports at home she eats mostly canned soup due to being too exhausted to cook, reports she did not realize they were high in sodium. Is being set up with MOW.   Slow mastication/clearance of solids, extremely limited intake. No overt s/s aspiration w/ solids/liquids.   Will complete VBS tomorrow   Consider GI consult d/t reported nocturnal symptoms    Risk/s for Aspiration: Low-Moderate, more likely bottom up rather than prandial     Recommended Diet: regular diet and thin liquids   Recommended Form of Meds: whole with liquid   Aspiration precautions and swallowing strategies: upright posture  Other Recommendations: Continue frequent oral care,        Current Medical Status     Ileana Seaman is a 65 y.o. female with a PMH of COPD, CHF, bronchial pneumonia, mycobacteria infection non-TB, afib, chronic hypoxia, malnutrition, former smoker  who presents with worsening shortness of breath over the past 2 days..  Patient has had recent frequent hospitalizations for COPD versus pneumonia exacerbations.  States that these have been ongoing for the past year ever since  being diagnosed with MARIS by her pulmonologist.  States she does follow-up with an infectious disease specialist and was unable to complete MARIS treatment due to hepatotoxicity.  At this time they are monitoring off of treatment with sputum cultures.     Currently complaining of fatigue, shortness of breath, productive cough, decreased appetite, chest tightness, and some leg swelling.  She is on 2 L nasal cannula at baseline since being diagnosed with MARIS.     CTA chest negative for PE however showing worsening right sided pneumonia extending into the right lower lobe.  White count elevated, tachycardic and tachypneic qualifying her for sepsis.  Blood cultures drawn in ED.  She received 1 L NS in the ER.  Will admit under sepsis and pneumonia pathway.  Continue on IV ceftriaxone and azithromycin.  Infectious disease consulted given history of MARIS and frequent recent pneumonia infections.  Avoiding steroids at this time given sepsis.  Consider addition with improvement of infection.  Continue with nebs at this time.    Current Precautions:   Fall      Allergies:  No known food allergies    Past medical history:  Please see H&P for details    Special Studies:  EGD:  IMPRESSION:  The esophagus appeared normal.  Small type I hiatal hernia  Mild erythematous mucosa, consistent with gastritis in the stomach and duodenal bulb; performed cold forceps biopsy  The 2nd part of the duodenum appeared normal.        RECOMMENDATION:  Follow up with GI Clinic   Return to the floor for ongoing care.  Consider outpatient Esophageal pH impedance testing  Continue GERD lifestyle & Diet modifications.  Avoid NSAIDs- Ibuprofen or similar OTC pain and antiinflammatory.  Avoid alcohol use.  Avoid smoking.  Continue PPI (eg:Pantoprazole or omeprazole).  Plan of care discussed with Primary team.     VBS:     General Information;  Pt is a 64 y.o. female with a PMH remarkable for COPD, mycobacterial infection, DM2, AFIB.    Current concerns for  dysphagia include RLL PNA and hx of dysphagia w/ globus sensation.   A VFSS was recommended to assess oropharyngeal stage swallowing skills at this time. Pt was viewed in lateral position and assessed with thin and nectar thick liquid (by teaspoon, single and successive cup/straw sips), puree, soft moist food (sandwich) and solid food (cracker) and a13mm pill with thin liquid.       Oral stage:  Pt presented with minimal oral stage dysphagia.     Lip closure:  no escape  Mastication: slow prolonged with complete re-collection  Bolus Transport/Lingual Motion: brisk  Oral residue:   at least mild residue on oral structures,   Tongue Control:  posterior escape of less than half of the bolus  Swallow Initiation: bolus head in pyriforms     Pharyngeal stage:  Pt presented with WFL pharyngeal dysphagia.      Soft palate elevation:  no bolus between soft palate and pharyngeal wall   Laryngeal elevation: complete    Anterior hyoid excursion:  complete   Epiglottic movement:  complete  Laryngeal vestibule closure:   complete   Tongue base retraction:  narrow  column of contrast/air between TB and PW  Pharyngeal Stripping: complete   Pharyngeal Contraction: did not complete AP view  PES opening:    partial with partial obstruction to flow    Pharyngeal Residue:  No significant pharyngeal residue     Management of food/liquid/barium tablet follows:   All food, liquid and the barium tablet passed through the pharynx with no laryngeal penetration, aspiration or significant pharyngeal residue noted today.    Significant esophageal retention noted     Penetration/Aspiration:  Thin: PAS - 1  Puree: PAS- 1  Solid: PAS- 1  Response to Aspiration: N/A           8-Point Penetration-Aspiration Scale   1 Material does not enter the airway   2 Material enters the airway, remains above the vocal folds, and is ejected  from the  airway    3 Material enters the airway, remains above the vocal folds, and is not ejected from the airway   4  Material enters the airway, contacts the vocal folds, and is ejected from the airway   5 Material enters the airway, contacts the vocal folds, and is not ejected from the airway    6 Material enters the airway, passes below the vocal folds and is ejected into the larynx or out of the airway    7 Material enters the airway, passes below the vocal folds, and is not ejected from the trachea despite effort    8 Material enters the airway, passes below the vocal folds, and no effort is made to eject          Strategies and Efficacy: Required liquid wash following solids to decrease globus sensation/ esophageal retention     Aspiration Response and Efficacy:  N/A     Esophageal stage:  Brief view of esophagus was completed.  Re: esophageal clearance -esophageal retention is mid esophagus with retrograde flow below the PES      Assessment Summary:    Pt presents with minimal oropharyngeal dysphagia characterized by impaired BOT strength resulting in posterior spillage of liquids to the pyriforms. Laryngeal elevation and excursion and epiglottic inversion were all WFL. Airway protection was adequate  There was no aspiration or penetration on the study. No significant pharyngeal residuals however pt did utilize piecemeal deglutition IND.  Stasis noted mid-esophagus and slow passage of solids/pill noted. Mild retrograde movement below PES with thin liquids. Pt reports globus sensation in pharynx when barium pill was lodged mid sternum. Eventually cleared w/ subsequent liquid washes.     .  Note: Images are available for review in PACS as desired.        Recommendations:   Recommended Diet:  soft/level 3 diet and thin liquids   Recommended Form of Medications: whole with puree   Aspiration precautions and compensatory swallowing strategies: upright posture and alternating bites and sips  Consider referral to:  GI consult  SLP Dysphagia therapy recommended: None at this time    Results Reviewed with: patient   Pt/Family  Education: Completed. Education completed w/ images from VBS. Pt expressed understanding and reported she would like to remain on altered diet at this time    CTA chest:  PULMONARY ARTERIAL TREE:  No pulmonary embolus.  LUNGS: Severe background emphysema. Worsening consolidation involving the right upper lobe consistent with worsening pneumonia. New consolidation in the superior segment of right lower lobe also related to pneumonia. Stable benign-appearing 7 mm right   lower lobe nodule image 4/82. No endotracheal or endobronchial lesion.  PLEURA: Unremarkable.    Social/Education/Vocational Hx:  Pt lives alone    Swallow Information   Current Risks for Dysphagia & Aspiration: known history of dysphagia  Current Symptoms/Concerns: change in respiratory status  Current Diet: regular diet and thin liquids   Baseline Diet: regular diet and thin liquids      Baseline Assessment   Behavior/Cognition: alert  Speech/Language Status: able to participate in basic conversation  Patient Positioning: upright in bed  Pain Status/Interventions/Response to Interventions:   No report of or nonverbal indications of pain.       Swallow Mechanism Exam  Facial: symmetrical  Labial: WFL  Lingual: WFL  Velum: symmetrical  Mandible: adequate ROM  Dentition: limited dentition  Vocal quality:rough   Volitional Cough: strong/productive   Respiratory Status: on 2L      Consistencies Assessed and Performance   Consistencies Administered: thin liquids, puree, and soft solids    Oral Stage: mild  Mastication was prolonged with the materials administered today.  Bolus formation and transfer were functional with no significant oral residue noted.  No overt s/s reduced oral control.    Pharyngeal Stage:  suspect grossly WFL  Swallow Mechanics:  Swallowing initiation appeared prompt.  Laryngeal rise was palpated and judged to be within functional limits.  No coughing, throat clearing, change in vocal quality or respiratory status noted today.      Esophageal Concerns: belching and burning in throat nocturnally    Summary and Recommendations (see above)    Results Reviewed with: patient and MD     Treatment Recommended: VBS     Frequency of treatment: TBD    Dysphagia LTG  -Patient will demonstrate safe and effective oral intake (without overt s/s significant oral/pharyngeal dysphagia including s/s penetration or aspiration) for the highest appropriate diet level.     Short Term Goals:    -Patient will comply with a Video/Modified Barium Swallow study for more complete assessment of swallowing anatomy/physiology/aspiration risk and to assess efficacy of treatment techniques so as to best guide treatment plan    Leann Mendoza MS CCC-SLP  5/1/2025

## 2025-05-01 NOTE — PLAN OF CARE
Pr  Problem: Knowledge Deficit  Goal: Patient/family/caregiver demonstrates understanding of disease process, treatment plan, medications, and discharge instructions  Description: Complete learning assessment and assess knowledge base.Interventions:- Provide teaching at level of understanding- Provide teaching via preferred learning methods  Outcome: Progressing        Problem: INFECTION - ADULT  Goal: Absence or prevention of progression during hospitalization  Description: INTERVENTIONS:- Assess and monitor for signs and symptoms of infection- Monitor lab/diagnostic results- Monitor all insertion sites, i.e. indwelling lines, tubes, and drains- Monitor endotracheal if appropriate and nasal secretions for changes in amount and color- [place] appropriate cooling/warming therapies per order- Administer medications as ordered- Instruct and encourage patient and family to use good hand hygiene technique- Identify and instruct in appropriate isolation precautions for identified infection/condition  Outcome: Progressing

## 2025-05-01 NOTE — RESPIRATORY THERAPY NOTE
RT Protocol Note  Ileana Seaman 65 y.o. female MRN: [id number]  Unit/Bed#: -01 Encounter: [id number]    Assessment    Principal Problem:    Pneumonia of right lower lobe due to infectious organism  Active Problems:    Mycobacterial infection, non-TB    Hypomagnesemia    Type 2 diabetes mellitus without complication, without long-term current use of insulin (HCC)    COPD, severe (HCC)    Acute on chronic hypoxic respiratory failure (HCC)    Pulmonary nodule    Hypokalemia    Sepsis (HCC)      Home Pulmonary Medications:         Past Medical History:   Diagnosis Date    COPD (chronic obstructive pulmonary disease) (HCC)     Hiatal hernia      Social History     Socioeconomic History    Marital status: Single     Spouse name: None    Number of children: None    Years of education: None    Highest education level: None   Occupational History    None   Tobacco Use    Smoking status: Some Days     Current packs/day: 0.25     Types: Cigarettes    Smokeless tobacco: Never   Vaping Use    Vaping status: Never Used   Substance and Sexual Activity    Alcohol use: Never    Drug use: Never    Sexual activity: None   Other Topics Concern    None   Social History Narrative    None     Social Drivers of Health     Financial Resource Strain: Low Risk  (2/17/2025)    Received from [facility]    Financial Resource Strain     Do you have any trouble paying for your medications, or do you think you might in the future?: No     Does your family have trouble paying for medicine? (Household - for ages 0-17 years): Not on file   Food Insecurity: No Food Insecurity (4/30/2025)    Nursing - Inadequate Food Risk Classification     Worried About Running Out of Food in the Last Year: Never true     Ran Out of Food in the Last Year: Never true     Ran Out of Food in the Last Year: Never true   Transportation Needs: No Transportation Needs (4/30/2025)    Nursing - Transportation Risk Classification     Lack of Transportation: Not on file  "    Lack of Transportation: No   Physical Activity: Not on file   Stress: Not on file   Social Connections: Socially Integrated (2025)    Received from [facility]     How often do you feel lonely or isolated from those around you?: Never   Intimate Partner Violence: Unknown (2025)    Nursing IPS     Feels Physically and Emotionally Safe: Not on file     Physically Hurt by Someone: Not on file     Humiliated or Emotionally Abused by Someone: Not on file     Physically Hurt by Someone: No     Hurt or Threatened by Someone: No   Housing Stability: Unknown (2025)    Nursing: Inadequate Housing Risk Classification     Has Housing: Not on file     Worried About Losing Housing: Not on file     Unable to Get Utilities: Not on file     Unable to Pay for Housing in the Last Year: No     Has Housin       Subjective         Objective    Physical Exam:   Assessment Type: During-treatment  General Appearance: Alert, Awake  Respiratory Pattern: Labored, Tachypneic  Chest Assessment: Chest expansion symmetrical  Bilateral Breath Sounds: Expiratory wheezes  O2 Device: 2L    Vitals:  Blood pressure 115/74, pulse 98, temperature (!) 97 °F (36.1 °C), resp. rate 20, height 5' 1\" (1.549 m), weight 42.6 kg (94 lb), SpO2 94%.          Imaging and other studies:     O2 Device: 2L     Plan    Respiratory Plan: (P) Mild Distress pathway        Resp Comments: PT w ho copd admitted w/ pneumonia. exp wheeze bilaterally ,on home O2 @2L. Pt states tx is making her feel SOB   home reg is trelegy daily and duoneb QID   pt states she only quit smoking a month ago   "

## 2025-05-01 NOTE — ASSESSMENT & PLAN NOTE
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  Sputum AFB cultures in February were negative for MARIS.     Check sputum for AFB   Recommend outpatient fu with ID and pulm and consideration for resuming therapy/possible graded challenge. This is a complex decision and she will need close follow up and multidisciplinary evaluation   Recommend nutrition consult

## 2025-05-01 NOTE — ASSESSMENT & PLAN NOTE
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  Unable to tolerate therapy due to hepatotoxicity while on rifabutin.  Instead they are monitoring sputum cultures closely off treatment.  CXR progressive pneumonia in right upper lobe superimposed on known cavitary nodular lesions  CTA chest: Worsening right upper lobe pneumonia and new superior segment right lower lobe pneumonia  COVID/Flu/RSV negative  Urine Legionella urine strep is negative sputum cultures pending.  AFB ordered  Blood Cultures pending  Procalcitonin x 2 is negative  ID consulted given history of MARIS and recurrent and worsening PNA.  Pulm consulted.  Patient recently admitted in beginning of April and had Rocephin and Zithromax will leave Zithromax for atypicals but switch Rocephin to Unasyn  Ask speech to see eval for aspiration

## 2025-05-01 NOTE — UTILIZATION REVIEW
Initial Clinical Review    Admission: Date/Time/Statement:   Admission Orders (From admission, onward)       Ordered        04/30/25 1530  INPATIENT ADMISSION  Once                          Orders Placed This Encounter   Procedures    INPATIENT ADMISSION     Standing Status:   Standing     Number of Occurrences:   1     Level of Care:   Med Surg [16]     Estimated length of stay:   More than 2 Midnights     Certification:   I certify that inpatient services are medically necessary for this patient for a duration of greater than two midnights. See H&P and MD Progress Notes for additional information about the patient's course of treatment.     ED Arrival Information       Expected   -    Arrival   4/30/2025 11:41    Acuity   Emergent              Means of arrival   Wheelchair    Escorted by   [place]    Service   Hospitalist    Admission type   Emergency              Arrival complaint   SOB             Chief Complaint   Patient presents with    Shortness of Breath     Patient c/o worsening sob for 2 days       Initial Presentation: 65 y.o. female to ED via WC from home  Present to ED with worsening shortness of breath. Endorses  fatigue, shortness of breath, productive cough, decreased appetite, chest tightness, and some leg swelling. She is on 2 L nasal cannula at baseline since being diagnosed with MARIS.   PMHX COPD, CHF, bronchial pneumonia, mycobacteria infection non-TB, afib, chronic hypoxia, malnutrition   Admitted to [facility] with DX: Pneumonia of right lower lobe due to infectious organism   on exam: T99.6; tachy; hypotensive; tachypnea; lungs with wheezing/ rhonchi - requiring 4L O2; WBC 10.54; K 2.7  CTA chest negative for PE however showing worsening right sided pneumonia extending into the right lower lobe.   CXR progressive pneumonia in right upper lobe superimposed on known cavitary nodular lesions  PLAN: cont iv abx; cont solumedrol iv; cont DuoNebs; monitor labs; f/u blood / sputum cx; ID consulted;  titrate O2; f/u Urine strep and legionella; trend procal       Anticipated Length of Stay/Certification Statement: Patient will be admitted on an inpatient basis with an anticipated length of stay of greater than 2 midnights secondary to PNA, COPD, hypoxia, SEPSIS.       Date: 5/1/25      Day 2: Inpatient   Still is coughing but is not bringing anything up something is stuck in there complaining some shortness of breath; lungs with wheezing; O2 @ 4L  Plan: cont iv abx; cont solumedrol iv; cont DuoNebs; monitor labs; f/u blood / sputum cx; ID consulted; f/u Urine strep and legionella; trend procal; f/u Speech - swallow eval; titrate O2      PULMONARY CONSULT   Assessment: Acute on chronic respiratory failure / Advanced COPD/emphysema-with exacerbation / Acute on chronic bronchitis / RUL infiltrates / Tobacco abuse disorder / Mycobacterium avium complex. Agree with ID assessment, does not appear to be progression/acute bacterial pneumonia. Likely acute on chronic bronchitis, possibly viral with retained secretions/focal/small mucoid impaction and plugs. Noted some improvement with current regimen of steroids, nebulized inhalers  Plan: Started on azithromycin for COPD exacerbation for 3 days. Enhance mucus clearance therapy. Added percussion vest, 3% sodium chloride via nebulizer. Encouraged to use flutter valve frequently/incentive spirometer. Reduce steroids to Solu-Medrol 40 mg q12. Wean FiO2 for SpO2 above 88%      ID CONSULT   Sepsis: V/s SIRS.  Evidenced by tachycardia and tachypnea. Secondary to COPD exacerbation, consider aspiration pneumonitis, bacterial pneumonia is less likely in absence of fever or leukocytosis. Progressive MARIS infection is also less likely as cause for acute symptoms. Clinically unchanged but afebrile and hemodynamically stable without leukocytosis.   Plan: Antibiotics. Check daily CBC, CMP while inpatient to monitor for any evolving antibiotic toxicity or treatment failure    Pneumonia of  right lower lobe due to infectious organism: Patient has chronic respiratory symptoms with 2 days of acute exacerbation of dyspnea and chest congestion.  Recently hospitalized on 4/5 for similar symptoms. There is radiologic concern for pneumonia as CT chest notes worsening consolidation involving right upper lobe and superior segment of right lower lobe with reactive mediastinal and right hilar adenopathy.  However this may represent a radiologic lag, she has no worsening hypoxia, fever, leukocytosis or significant worsening of productive cough to suggest pneumonia.   Plan: Discontinue amp-sulbactam and azithro and resume ceftriaxone 2 q24h. Trend procalcitonin, check sputum routine culture. Continue pulmonary toilet, bronchodilators and steroids. Anticipate 5 days of therapy. Await speech therapy evaluation      Date: 5/2/25     Day 3: Has surpassed a 2nd midnight with active treatments and services. Require additional inpatient hospital stay due to pneumonia and COPD exacerbation   starting to bring up mucus still short of breath. Exam: O2 @3L via nc; lungs with rales; K 5.6; Cr 0.55; AG 3.0; Urine Legionella urine strep is negative.  Procalcitonin x 2 is negative   Plan: cont iv abx; cont solumedrol iv; cont DuoNebs; monitor labs; f/u blood / sputum cx; f/u Speech - swallow eval; f/u AFB; titrate O2      ED Treatment-Medication Administration from 04/30/2025 1140 to 04/30/2025 1601         Date/Time Order Dose Route Action     04/30/2025 1233 sodium chloride 0.9 % bolus 1,000 mL 1,000 mL Intravenous New Bag     04/30/2025 1231 methylPREDNISolone sodium succinate (Solu-MEDROL) injection 125 mg 125 mg Intravenous Given     04/30/2025 1227 ipratropium-albuterol (DUO-NEB) 0.5-2.5 mg/3 mL inhalation solution 3 mL 3 mL Nebulization Given     04/30/2025 1305 potassium chloride (Klor-Con M20) CR tablet 40 mEq 40 mEq Oral Given     04/30/2025 1308 magnesium sulfate IVPB (premix) SOLN 1 g 1 g Intravenous New Bag      04/30/2025 1453 cefTRIAXone (ROCEPHIN) IVPB (premix in dextrose) 1,000 mg 50 mL 1,000 mg Intravenous New Bag     04/30/2025 1447 iohexol (OMNIPAQUE) 350 MG/ML injection (MULTI-DOSE) 60 mL 60 mL Intravenous Given            Scheduled Medications:  ampicillin-sulbactam, 3 g, Intravenous, Q6H  aspirin, 81 mg, Oral, Daily  atorvastatin, 40 mg, Oral, Daily  azithromycin, 500 mg, Oral, Q24H  budesonide, 0.5 mg, Nebulization, Q12H  dicyclomine, 10 mg, Oral, TID AC  docusate sodium, 100 mg, Oral, BID  famotidine, 20 mg, Oral, BID  guaiFENesin, 1,200 mg, Oral, Q12H MAYCO  heparin (porcine), 5,000 Units, Subcutaneous, Q12H MAYCO  ipratropium, 0.5 mg, Nebulization, TID  levalbuterol, 1.25 mg, Nebulization, TID  methylPREDNISolone sodium succinate, 40 mg, Intravenous, Q8H MAYCO  nicotine, 1 patch, Transdermal, Daily  pantoprazole, 40 mg, Oral, Early Morning  sucralfate, 1 g, Oral, 4x Daily      Continuous IV Infusions: None       PRN Meds:  acetaminophen, 650 mg, Oral, Q6H PRN  benzonatate, 100 mg, Oral, TID PRN  simethicone, 80 mg, Oral, Q6H PRN      ED Triage Vitals   Temperature Pulse Respirations Blood Pressure SpO2 Pain Score   04/30/25 1146 04/30/25 1146 04/30/25 1146 04/30/25 1146 04/30/25 1146 04/30/25 1605   99.6 °F (37.6 °C) (!) 133 (!) 24 150/83 (!) 87 % 8     Weight (last 2 days)       Date/Time Weight    04/30/25 1604 42.6 (94)    04/30/25 1146 43.4 (95.68)            Vital Signs (last 3 days)       Date/Time Temp Pulse Resp BP MAP (mmHg) SpO2 Calculated FIO2 (%) - Nasal Cannula Nasal Cannula O2 Flow Rate (L/min) O2 Device Patient Position - Orthostatic VS Pain    05/02/25 0826 -- -- -- -- -- -- 32 3 L/min -- -- No Pain    05/02/25 07:23:49 97 °F (36.1 °C) 87 20 142/87 105 95 % -- -- -- -- --    05/02/25 0722 -- -- -- -- -- 92 % -- -- -- -- --    05/01/25 22:55:42 97.3 °F (36.3 °C) 92 17 113/72 86 93 % -- -- -- -- --    05/01/25 2017 -- -- -- -- -- 94 % 36 4 L/min Nasal cannula -- No Pain    05/01/25 1938 -- 107 24 -- --  89 % 36 4 L/min Nasal cannula -- --    05/01/25 15:24:43 97 °F (36.1 °C) 104 17 104/68 80 88 % -- -- -- -- --    05/01/25 0846 -- -- -- -- -- -- -- -- -- -- No Pain    05/01/25 0800 -- -- -- -- -- 93 % 28 2 L/min Nasal cannula -- --    05/01/25 07:27:27 97 °F (36.1 °C) 80 18 121/80 94 91 % -- -- -- -- --    04/30/25 22:46:30 97 °F (36.1 °C) 96 17 110/66 81 91 % -- -- -- -- --    04/30/25 2030 -- -- -- -- -- -- 28 2 L/min Nasal cannula -- --    04/30/25 1950 -- -- -- -- -- 94 % 28 2 L/min Nasal cannula -- --    04/30/25 1946 -- -- -- -- -- -- -- -- -- -- 4    04/30/25 1727 -- 98 -- -- -- 91 % -- -- -- -- --    04/30/25 1609 -- -- -- -- -- -- -- -- Nasal cannula -- --    04/30/25 16:08:59 97 °F (36.1 °C) 111 20 115/74 88 89 % -- -- -- -- --    04/30/25 1605 -- -- -- -- -- -- -- -- -- -- 8    04/30/25 1545 -- 116 32 122/59 -- 91 % 28 2 L/min Nasal cannula Sitting --    04/30/25 1430 -- 106 30 122/59 84 92 % 28 2 L/min Nasal cannula Sitting --    04/30/25 1400 -- 106 30 91/53 66 93 % 28 2 L/min Nasal cannula Sitting --    04/30/25 1157 -- -- -- -- -- -- -- -- Nasal cannula -- --    04/30/25 1146 99.6 °F (37.6 °C) 133 24 150/83 -- 87 % 28 2 L/min Nasal cannula Lying --              Pertinent Labs/Diagnostic Test Results:   Radiology:  CTA chest pe study   Final Interpretation by Ashvin Garcia MD (04/30 1523)      No pulmonary embolism.      Severe background emphysema with worsening right upper lobe pneumonia and new superior segment right lower lobe pneumonia. Reactive mediastinal and right hilar adenopathy.      Stable 7 mm right lower lobe nodule.                  Workstation performed: [id number]         XR chest 2 views   Final Interpretation by Donato Pozo MD (04/30 1325)      Progressive pneumonia in the right upper lobe superimposed on known cavitary nodular lesions.      Resident: NAHOMI GRIER I, the attending radiologist, have reviewed the images and agree with the final report above.       Workstation performed: [id number]         FL barium swallow video w speech    (Results Pending)     Cardiology:  ECG 12 lead   Final Result by Zhou Cox DO (04/30 1307)   Sinus tachycardia with short OH   Left axis deviation   Inferior infarct , age undetermined   Abnormal ECG   Confirmed by Zhou Cox ([id number]) on 4/30/2025 1:07:56 PM          Results from last 7 days   Lab Units 05/01/25  0549 04/30/25  1219   WBC Thousand/uL 8.70 10.54*   HEMOGLOBIN g/dL 11.2* 13.0   HEMATOCRIT % 34.8 41.0   PLATELETS Thousands/uL 318 333   TOTAL NEUT ABS Thousands/µL 7.42 8.55*        Results from last 7 days   Lab Units 05/02/25  0649 05/01/25  0931 04/30/25  1219   SODIUM mmol/L 143 142 137   POTASSIUM mmol/L 5.6* 3.8 2.7*   CHLORIDE mmol/L 106 103 94*   CO2 mmol/L 34* 32 32   ANION GAP mmol/L 3* 7 11   BUN mg/dL 16 8 5   CREATININE mg/dL 0.55* 0.50* 0.48*   EGFR ml/min/1.73sq m 97 100 101   CALCIUM mg/dL 9.1 9.0 9.2   MAGNESIUM mg/dL 2.2 2.0 1.5*     Results from last 7 days   Lab Units 05/01/25  0931 04/30/25  1219   AST U/L 8* 11*   ALT U/L 11 13   ALK PHOS U/L 83 91   TOTAL PROTEIN g/dL 6.3* 7.2   ALBUMIN g/dL 3.3* 3.8   TOTAL BILIRUBIN mg/dL 0.27 0.56        Results from last 7 days   Lab Units 05/02/25  0649 05/01/25  0931 04/30/25  1219   GLUCOSE RANDOM mg/dL 185* 252* 180*      Results from last 7 days   Lab Units 04/30/25  1219   PH KRISTI  7.361   PCO2 KRISTI mm Hg 56.0*   PO2 KRISTI mm Hg 31.2*   HCO3 KRISTI mmol/L 31.0*   BASE EXC KRISTI mmol/L 4.1   O2 CONTENT KRISTI ml/dL 10.9   O2 HGB, VENOUS % 54.3*        Results from last 7 days   Lab Units 04/30/25  1219   HS TNI 0HR ng/L 13     Results from last 7 days   Lab Units 04/30/25  1219   D-DIMER QUANTITATIVE ug/ml FEU 0.76*     Results from last 7 days   Lab Units 04/30/25  1219   PROTIME seconds 14.2   INR  1.06   PTT seconds 27        Results from last 7 days   Lab Units 05/01/25  0545 04/30/25  1219   PROCALCITONIN ng/ml <0.05 <0.05     Results from last 7 days    Lab Units 04/30/25  1648   LACTIC ACID mmol/L 1.2        Results from last 7 days   Lab Units 04/30/25  1219   BNP pg/mL 61        Results from last 7 days   Lab Units 04/30/25  1218   CLARITY UA  Clear   COLOR UA  Yellow   SPEC GRAV UA  1.015   PH UA  7.5   GLUCOSE UA mg/dl 100 (1/10%)*   KETONES UA mg/dl Negative   BLOOD UA  Negative   PROTEIN UA mg/dl Negative   NITRITE UA  Negative   BILIRUBIN UA  Negative   UROBILINOGEN UA E.U./dl 0.2   LEUKOCYTES UA  Negative     Results from last 7 days   Lab Units 04/30/25  1830   STREP PNEUMONIAE ANTIGEN, URINE  Negative   LEGIONELLA URINARY ANTIGEN  Negative            Results from last 7 days   Lab Units 04/30/25  1419 04/30/25  1405   BLOOD CULTURE  No Growth at 24 hrs. No Growth at 24 hrs.          Past Medical History:   Diagnosis Date    COPD (chronic obstructive pulmonary disease) ([facility])     Hiatal hernia      Present on Admission:   Acute on chronic hypoxic respiratory failure ([facility])   COPD exacerbation ([facility])   (Resolved) Hypokalemia   (Resolved) Hypomagnesemia   Type 2 diabetes mellitus without complication, without long-term current use of insulin ([facility])   Sepsis (HCC)   Pulmonary nodule   Mycobacterial infection, non-TB      Admitting Diagnosis: Hypokalemia [E87.6]  Pneumonia [J18.9]  SOB (shortness of breath) [R06.02]  Low magnesium level [R79.0]  Age/Sex: 65 y.o. female    Network Utilization Review Department  ATTENTION: Please call with any questions or concerns to [phone] and carefully listen to the prompts so that you are directed to the right person. All voicemails are confidential.   For Discharge needs, contact Care Management DC Support Team at [phone] opt. 2  Send all requests for admission clinical reviews, approved or denied determinations and any other requests to dedicated fax number below belonging to the campus where the patient is receiving treatment. List of dedicated fax numbers for the Facilities:  FACILITY NAME UR FAX NUMBER    ADMISSION DENIALS (Administrative/Medical Necessity) [phone]   DISCHARGE SUPPORT TEAM (NETWORK) [phone]   PARENT CHILD HEALTH (Maternity/NICU/Pediatrics) [phone]   [facility] [phone]   [facility] [phone]   [facility] [phone]   [facility] [phone]   [facility] [phone]   [facility] [phone]   [facility] [phone]   [facility] [phone]   [facility] [phone]   [facility] [phone]   [facility] [phone]   [facility] [phone]

## 2025-05-01 NOTE — ASSESSMENT & PLAN NOTE
"Lab Results   Component Value Date    HGBA1C 6.5 (H) 11/13/2024       No results for input(s): \"POCGLU\" in the last 72 hours.    Blood Sugar Average: Last 72 hrs:    "

## 2025-05-01 NOTE — CONSULTS
TeleConsultation - Pulmonary Medicine  Ileana Seaman 65 y.o. female MRN: [id number]  Unit/Bed#: -01 Encounter: [id number]    Patient Information: Ileana Seaman 65 y.o. female MRN: [id number]  Unit/Bed#: -01 Encounter: [id number]  PCP: Merline Dinero  Date of Consultation: 05/01/25  Requesting Physician: Rand Cantrell MD      REQUIRED DOCUMENTATION:     1. This service was provided via Telemedicine.  2. Provider located at [facility] office.  3. TeleMed provider: Abhishek Rodriguez MD.  4. Identify all parties in room with patient during tele consult:    5.Patient was then informed that this was a Telemedicine visit and that the exam was being conducted confidentially over secure lines. My office door was closed. No one else was in the room.  Patient acknowledged consent and understanding of privacy and security of the Telemedicine visit, and gave us permission to have the assistant stay in the room in order to assist with the history and to conduct the exam.  I informed the patient that I have reviewed their record in Epic and presented the opportunity for them to ask any questions regarding the visit today.  The patient agreed to participate.     Reason For Consultation:   Acute on chronic respiratory failure      Assessment:  Acute on chronic respiratory failure  Advanced COPD/emphysema-with exacerbation  Acute on chronic bronchitis  RUL infiltrates  Tobacco abuse disorder  Mycobacterium avium complex    Discussion:  Agree with ID assessment, does not appear to be progression/acute bacterial pneumonia  Likely acute on chronic bronchitis, possibly viral with retained secretions/focal/small mucoid impaction and plugs   Noted some improvement with current regimen of steroids, nebulized inhalers     Recommendations:  Started on azithromycin for COPD exacerbation for 3 days  Enhance mucus clearance therapy  Added percussion vest, 3% sodium chloride via nebulizer  Encouraged to use  flutter valve frequently/incentive spirometer  Reduce steroids to Solu-Medrol 40 mg q12  Wean FiO2 for SpO2 above 88%  Discussed with the patient, her background advanced COPD/emphysema, with progression she might not be back to her baseline as she were 6 months ago  Consider outpatient palliative care referral    HPI:   65 y.o. female with a h/o COPD, tobacco abuse disorder, chronic hypoxic respiratory failure 2 LPM, hiatal hernia, acid reflux, NTM/MAC lung infection    Presented to the ER 4/30 with shortness of breath, also fatigue, more productive cough/decreased appetite.  Met sepsis criteria, tachycardia/leukocytosis and tachypnea.  Admitted to  IM, started on ceftriaxone in the ED added azithromycin, also started on IV steroids.  CT chest showed no pulmonary embolism, severe background emphysema with increased opacity of the RUL, new RLL infiltrates and reactive mediastinal adenopathy.  Normal PCT x 2, negative urine antigen for strep pneumoniae/Legionella.    On my assessment, pt is resting in chair, still feeling chest congestion/difficult to expectorate sputum.  She is too worried about SpO2 dropped to 88% on 2 LPM supplemental oxygen.  Feels more congested in the chest.  Despite that, feels slightly better compared to yesterday.  No fever or chills.  Reports quitting smoking few months ago, + exposure to secondhand smoking from her children.  No recent travel, possible exposure to sick contact from her grandchildren.    Review of Systems  As per hpi, all other systems reviewed and were negative      Studies:    Imaging Studies: I have personally reviewed pertinent films in PACS    EKG, Pathology, and Other Studies: I have personally reviewed pertinent films in PACS    Pulmonary Results (PFTs, PSG): Reviewed    Historical Information   Past Medical History:   Diagnosis Date    COPD (chronic obstructive pulmonary disease) (HCC)     Hiatal hernia      Past Surgical History:   Procedure Laterality Date     "CHOLECYSTECTOMY       Social History   Social History     Substance and Sexual Activity   Alcohol Use Never     Social History     Substance and Sexual Activity   Drug Use Never     Social History     Tobacco Use   Smoking Status Some Days    Current packs/day: 0.25    Types: Cigarettes   Smokeless Tobacco Never     E-Cigarette/Vaping    E-Cigarette Use Never User      E-Cigarette/Vaping Substances         Family History: History reviewed. No pertinent family history.    Meds/Allergies   all current active meds have been reviewed    No Known Allergies    Objective   Vitals: Blood pressure 121/80, pulse 80, temperature (!) 97 °F (36.1 °C), resp. rate 18, height 5' 1\" (1.549 m), weight 42.6 kg (94 lb), SpO2 93%.,Body mass index is 17.76 kg/m².    Intake/Output Summary (Last 24 hours) at 5/1/2025 1428  Last data filed at 5/1/2025 0900  Gross per 24 hour   Intake 2630 ml   Output 1000 ml   Net 1630 ml     Invasive Devices       Peripheral Intravenous Line  Duration             Peripheral IV 04/30/25 Right Antecubital 1 day    Peripheral IV 04/30/25 Distal;Right;Ventral (anterior) Forearm <1 day                    Physical Exam  Body mass index is 17.76 kg/m².   Gen: not in acute distress, frail, ill-appearing, nontoxic  Neck/Eyes: supple  Ear: normal appearance, no significant hearing impairment  Chest: normal respiratory efforts, poor chest/air movement, no audible wheezing  CV: No tachycardia  Abdomen: soft, non tender  Extremities:  No observed deformity   Neuro: AAO X3, no focal motor deficit       Lab Results: I have personally reviewed pertinent lab results.        Portions of the record may have been created with voice recognition software.  Occasional wrong word or \"sound a like\" substitutions may have occurred due to the inherent limitations of voice recognition software.  Read the chart carefully and recognize, using context, where substitutions have occurred.      "

## 2025-05-01 NOTE — ASSESSMENT & PLAN NOTE
V/s SIRS.  Evidenced by tachycardia and tachypnea.  Secondary to COPD exacerbation, consider aspiration pneumonitis, bacterial pneumonia is less likely in absence of fever or leukocytosis. Progressive MARIS infection is also less likely as cause for acute symptoms.  Clinically unchanged but afebrile and hemodynamically stable without leukocytosis    Antibiotics and additional management as below   Check daily CBC, CMP while inpatient to monitor for any evolving antibiotic toxicity or treatment failure   Continue supportive care, monitor clinical course

## 2025-05-01 NOTE — ASSESSMENT & PLAN NOTE
Patient has chronic respiratory symptoms with 2 days of acute exacerbation of dyspnea and chest congestion.  Recently hospitalized on 4/5 for similar symptoms. There is radiologic concern for pneumonia as CT chest notes worsening consolidation involving right upper lobe and superior segment of right lower lobe with reactive mediastinal and right hilar adenopathy.  However this may represent a radiologic lag, she has no worsening hypoxia, fever, leukocytosis or significant worsening of productive cough to suggest pneumonia     Discontinue amp-sulbactam and azithro and resume ceftriaxone 2 q24h   Trend procalcitonin, check sputum routine culture   Continue pulmonary toilet, bronchodilators and steroids as above   Anticipate 5 days of therapy    Await speech therapy evaluation

## 2025-05-01 NOTE — CONSULTS
Consultation - Infectious Disease   Name: Ileana Seaman 65 y.o. female I MRN: [id number]  Unit/Bed#: -01 I Date of Admission: 4/30/2025   Date of Service: 5/1/2025 I Hospital Day: 1   Inpatient consult to Infectious Diseases  Consult performed by: Janet Carter MD  Consult ordered by: Hermila Dior PA-C        Administrative Statements   VIRTUAL CARE DOCUMENTATION:     1. This service was provided via Telemedicine using [employer]     2. Parties in the room with patient during teleconsult Patient only    3. Confidentiality My office door was closed     4. Participants No one else was in the room    5. Patient acknowledged consent and understanding of privacy and security of the  Telemedicine consult. I informed the patient that I have reviewed their record in Epic and presented the opportunity for them to ask any questions regarding the visit today.  The patient agreed to participate.    6. I have spent a total time of 90 minutes in caring for this patient on the day of the visit/encounter including Diagnostic results, Prognosis, Risks and benefits of tx options, Risk factor reductions, Impressions, Counseling / Coordination of care, Documenting in the medical record, Reviewing/placing orders in the medical record (including tests, medications, and/or procedures), Obtaining or reviewing history  , and Communicating with other healthcare professionals , not including the time spent for establishing the audio/video connection.       Physician Requesting Evaluation: Rand Cantrell MD   Reason for Evaluation / Principal Problem: Possible pneumonia    Assessment & Plan  Sepsis (HCC)  V/s SIRS.  Evidenced by tachycardia and tachypnea.  Secondary to COPD exacerbation, consider aspiration pneumonitis, bacterial pneumonia is less likely in absence of fever or leukocytosis. Progressive MARIS infection is also less likely as cause for acute symptoms.  Clinically unchanged but afebrile and hemodynamically  stable without leukocytosis    Antibiotics and additional management as below   Check daily CBC, CMP while inpatient to monitor for any evolving antibiotic toxicity or treatment failure   Continue supportive care, monitor clinical course    Acute on chronic hypoxic respiratory failure (HCC)  Likely multifactorial in setting of advanced COPD , chronic diastolic CHF, MARIS infection  Currently on baseline 2 lpm o2     Await pulmonology evaluation, continue airway clearance, chest PT, steroid management per primary/pulm team   Encourage ongoing tobacco cessation  Pneumonia of right lower lobe due to infectious organism  Patient has chronic respiratory symptoms with 2 days of acute exacerbation of dyspnea and chest congestion.  Recently hospitalized on 4/5 for similar symptoms. There is radiologic concern for pneumonia as CT chest notes worsening consolidation involving right upper lobe and superior segment of right lower lobe with reactive mediastinal and right hilar adenopathy.  However this may represent a radiologic lag, she has no worsening hypoxia, fever, leukocytosis or significant worsening of productive cough to suggest pneumonia     Discontinue amp-sulbactam and azithro and resume ceftriaxone 2 q24h   Trend procalcitonin, check sputum routine culture   Continue pulmonary toilet, bronchodilators and steroids as above   Anticipate 5 days of therapy    Await speech therapy evaluation  Mycobacterial infection, non-TB  Patient has compatible ATS criteria/clinical, radiologic findings with confirmed lung biopsy changes (RUL and RLL) of necrotizing granulomas with cultures positive for MARIS in 5/2024  She was initiated on 3 drug therapy last fall but developed acute transaminitis/possible DILI and therapy has been withheld since December.  Sputum AFB cultures in February were negative for MARIS.     Check sputum for AFB   Recommend outpatient fu with ID and pulm and consideration for resuming therapy/possible graded  "challenge. This is a complex decision and she will need close follow up and multidisciplinary evaluation   Recommend nutrition consult   Type 2 diabetes mellitus without complication, without long-term current use of insulin ([facility])  Lab Results   Component Value Date    HGBA1C 6.5 (H) 11/13/2024       No results for input(s): \"POCGLU\" in the last 72 hours.    Blood Sugar Average: Last 72 hrs:        Antibiotics:  Amp-sulbactam  Azithromycin    History of Present Illness   Ileana Seaman is a 65 y.o. year old female with COPD, chronic tobacco use and chronic hypoxic and hypercarbic respiratory failure on 2 L oxygen.    She was diagnosed with pulmonary MARIS infection based on clinical, radiologic and microbiologic studies (+ BAL culture and biopsy in May 2024).  She is followed by [facility] and had been on azithromycin, ethambutol and rifabutin therapy initiated August 2024.  Patient did not tolerate her treatment regime and had multiple hospitalizations for epigastric pain, constipation followed by admission in December for acute transaminitis.  Further therapy was held and surveillance was elected, follow-up sputum cultures in February have been AFB negative.  She was hospitalized from 4/5 to 4/9 with COPD exacerbation, acute on chronic diastolic CHF possible bronchopneumonia and completed a course of antibiotics with cefdinir.  Prior to this she was admitted in January for similar symptoms.  She returned to the ER on 4/30 with 2 days of worsening shortness of breath, fatigue as well as decreased appetite, poor oral intake.  She has a chronic cough and reports worsening cough but she is unable to expectorate any phlegm .  All symptoms are worsened from her chronic baseline.  She reports sick contacts.  A complete review of systems is negative other than that noted in the HPI.    Medical History Review: I have reviewed the patient's PMH, PSH, Social History, Family History, Meds, and Allergies     Objective :  Temp:  [97 " °F (36.1 °C)-99.6 °F (37.6 °C)] 97 °F (36.1 °C)  HR:  [] 80  BP: ()/(53-83) 121/80  Resp:  [17-32] 18  SpO2:  [87 %-94 %] 93 %  O2 Device: Nasal cannula  Nasal Cannula O2 Flow Rate (L/min):  [2 L/min] 2 L/min    General:  No acute distress, appearing thin and frail and chronically ill  Psychiatric:  Awake and alert, anxious  Pulmonary:  Normal respiratory excursion without accessory muscle use. Diminished with b/l diffuse wheezing  Abdomen:  Soft, nontender  Extremities:  1+ b/l le edema  Skin:  No rashes      Lab Results: I have reviewed the following results:  Results from last 7 days   Lab Units 05/01/25  0549 04/30/25  1219   WBC Thousand/uL 8.70 10.54*   HEMOGLOBIN g/dL 11.2* 13.0   PLATELETS Thousands/uL 318 333     Results from last 7 days   Lab Units 05/01/25  0931 04/30/25  1219   SODIUM mmol/L 142 137   POTASSIUM mmol/L 3.8 2.7*   CHLORIDE mmol/L 103 94*   CO2 mmol/L 32 32   BUN mg/dL 8 5   CREATININE mg/dL 0.50* 0.48*   EGFR ml/min/1.73sq m 100 101   CALCIUM mg/dL 9.0 9.2   AST U/L 8* 11*   ALT U/L 11 13   ALK PHOS U/L 83 91   ALBUMIN g/dL 3.3* 3.8     Results from last 7 days   Lab Units 04/30/25  1830 04/30/25  1419 04/30/25  1405   BLOOD CULTURE   --  Received in Microbiology Lab. Culture in Progress. Received in Microbiology Lab. Culture in Progress.   LEGIONELLA URINARY ANTIGEN  Negative  --   --      Results from last 7 days   Lab Units 05/01/25  0545 04/30/25  1219   PROCALCITONIN ng/ml <0.05 <0.05             Results from last 7 days   Lab Units 04/30/25  1219   D-DIMER QUANTITATIVE ug/ml FEU 0.76*

## 2025-05-01 NOTE — ASSESSMENT & PLAN NOTE
On Trelegy for maintenance.    Currently actively wheezing continue Solu-Medrol 40 mg IV every 8 hours, continue Xopenex and Atrovent on Pulmicort  Consult to pulmonology

## 2025-05-01 NOTE — ASSESSMENT & PLAN NOTE
Underwent bronchoscopy 5/2024 for cavitary lesion biopsy, growing Mycobacterium intracellular.    Following outpatient with infectious disease regarding MARIS infection.  She was unable to tolerate the rifabutin regimen given hepatotoxicity and is instead being monitored with sputum cultures and AFB.  Is due for culture this upcoming week.  Follow sputum culture and AFB  ID consulted

## 2025-05-01 NOTE — PROGRESS NOTES
Progress Note - Hospitalist   Name: Ileana Seaman 65 y.o. female I MRN: [id number]  Unit/Bed#: -01 I Date of Admission: 4/30/2025   Date of Service: 5/1/2025 I Hospital Day: 1    Assessment & Plan  Pneumonia of right lower lobe due to infectious organism  Lab Results   Component Value Date    WBC 8.70 05/01/2025    PROCALCITONI <0.05 05/01/2025   Follows with infectious disease outpatient for MARIS infection.  Unable to tolerate therapy due to hepatotoxicity while on rifabutin.  Instead they are monitoring sputum cultures closely off treatment.  CXR progressive pneumonia in right upper lobe superimposed on known cavitary nodular lesions  CTA chest: Worsening right upper lobe pneumonia and new superior segment right lower lobe pneumonia  COVID/Flu/RSV negative  Urine Legionella urine strep is negative sputum cultures pending.  AFB ordered  Blood Cultures pending  Procalcitonin x 2 is negative  ID consulted given history of MARIS and recurrent and worsening PNA.  Pulm consulted.  Patient recently admitted in beginning of April and had Rocephin and Zithromax will leave Zithromax for atypicals but switch Rocephin to Unasyn  Ask speech to see eval for aspiration   Acute on chronic hypoxic respiratory failure (HCC)  With history of severe emphysema on 2 L nasal cannula at baseline.  Progressively worsening shortness of breath requiring increased O2 over the past few days.  Secondary to her worsening pneumonia and new pneumonia and COPD exacerbation currently she is down to 2 L  Sepsis (HCC)  Presents to ED with shortness of breath  SIRS met: SIRS: 2/4; temperature, tachycardia, tachypnea,  Secondary to worsening pneumonia and new pneumonia forming she was recently admitted in the beginning of April and completed Rocephin and Zithromax and now pneumonia is back again and worsened will change antibiotics to Unasyn believe Zithromax for atypicals infectious disease consultation pulmonology consultation  COPD  exacerbation (HCC)  On Trelegy for maintenance.    Currently actively wheezing continue Solu-Medrol 40 mg IV every 8 hours, continue Xopenex and Atrovent on Pulmicort  Consult to pulmonology  Mycobacterial infection, non-TB  Underwent bronchoscopy 5/2024 for cavitary lesion biopsy, growing Mycobacterium intracellular.    Following outpatient with infectious disease regarding MARIS infection.  She was unable to tolerate the rifabutin regimen given hepatotoxicity and is instead being monitored with sputum cultures and AFB.  Is due for culture this upcoming week.  Follow sputum culture and AFB  ID consulted  Pulmonary nodule  Noted on CT chest.  Reviewed with patient.  Continue follow-up with pulmonology  Hypomagnesemia  Initially 1.5 with reciprocal hypokalemia  Monitor and replete  Hypokalemia  Initially 2.7 with reciprocal hypomagnesemia  Monitor and replete    VTE Pharmacologic Prophylaxis:   Moderate Risk (Score 3-4) - Pharmacological DVT Prophylaxis Ordered: heparin.    Mobility:   Basic Mobility Inpatient Raw Score: 23  JH-HLM Goal: 7: Walk 25 feet or more  JH-HLM Achieved: 7: Walk 25 feet or more  JH-HLM Goal NOT achieved. Continue with multidisciplinary rounding and encourage appropriate mobility to improve upon JH-HLM goals.    Patient Centered Rounds: I performed bedside rounds with nursing staff today.   Discussions with Specialists or Other Care Team Provider: will discuss with id and pulm    Education and Discussions with Family / Patient: patient will update family     Current Length of Stay: 1 day(s)  Current Patient Status: Inpatient   Certification Statement: The patient will continue to require additional inpatient hospital stay due to secondary to COPD exacerbation new pneumonia and worsening pneumonia  Discharge Plan: Anticipate discharge in >72 hrs to home.    Code Status: Level 1 - Full Code    Subjective   Patient seen and examined is coughing but is not bringing anything up something is stuck in  there complaining some shortness of breath    Objective :  Temp:  [97 °F (36.1 °C)-99.6 °F (37.6 °C)] 97 °F (36.1 °C)  HR:  [] 80  BP: ()/(53-83) 121/80  Resp:  [17-32] 18  SpO2:  [87 %-94 %] 93 %  O2 Device: Nasal cannula  Nasal Cannula O2 Flow Rate (L/min):  [2 L/min] 2 L/min    Body mass index is 17.76 kg/m².     Input and Output Summary (last 24 hours):     Intake/Output Summary (Last 24 hours) at 5/1/2025 0946  Last data filed at 5/1/2025 0900  Gross per 24 hour   Intake 2630 ml   Output 1000 ml   Net 1630 ml       Physical Exam  Vitals and nursing note reviewed.   Constitutional:       General: She is not in acute distress.     Appearance: She is well-developed.   HENT:      Head: Normocephalic and atraumatic.   Eyes:      Conjunctiva/sclera: Conjunctivae normal.   Cardiovascular:      Rate and Rhythm: Normal rate and regular rhythm.      Heart sounds: No murmur heard.  Pulmonary:      Effort: Pulmonary effort is normal. No respiratory distress.      Breath sounds: Wheezing present.   Abdominal:      Palpations: Abdomen is soft.      Tenderness: There is no abdominal tenderness.   Musculoskeletal:         General: No swelling.      Cervical back: Neck supple.   Skin:     General: Skin is warm and dry.      Capillary Refill: Capillary refill takes less than 2 seconds.   Neurological:      Mental Status: She is alert and oriented to person, place, and time.   Psychiatric:         Mood and Affect: Mood normal.           Lines/Drains:              Lab Results: I have reviewed the following results:   Results from last 7 days   Lab Units 05/01/25  0549   WBC Thousand/uL 8.70   HEMOGLOBIN g/dL 11.2*   HEMATOCRIT % 34.8   PLATELETS Thousands/uL 318   SEGS PCT % 86*   LYMPHO PCT % 9*   MONO PCT % 5   EOS PCT % 0     Results from last 7 days   Lab Units 04/30/25  1219   SODIUM mmol/L 137   POTASSIUM mmol/L 2.7*   CHLORIDE mmol/L 94*   CO2 mmol/L 32   BUN mg/dL 5   CREATININE mg/dL 0.48*   ANION GAP mmol/L  11   CALCIUM mg/dL 9.2   ALBUMIN g/dL 3.8   TOTAL BILIRUBIN mg/dL 0.56   ALK PHOS U/L 91   ALT U/L 13   AST U/L 11*   GLUCOSE RANDOM mg/dL 180*     Results from last 7 days   Lab Units 04/30/25  1219   INR  1.06             Results from last 7 days   Lab Units 05/01/25  0545 04/30/25  1648 04/30/25  1219   LACTIC ACID mmol/L  --  1.2  --    PROCALCITONIN ng/ml <0.05  --  <0.05       Recent Cultures (last 7 days):   Results from last 7 days   Lab Units 04/30/25  1830 04/30/25  1419 04/30/25  1405   BLOOD CULTURE   --  Received in Microbiology Lab. Culture in Progress. Received in Microbiology Lab. Culture in Progress.   LEGIONELLA URINARY ANTIGEN  Negative  --   --        Imaging Results Review: No pertinent imaging studies reviewed.  Other Study Results Review: No additional pertinent studies reviewed.    Last 24 Hours Medication List:     Current Facility-Administered Medications:     acetaminophen (TYLENOL) tablet 650 mg, Q6H PRN    ampicillin-sulbactam (UNASYN) 3 g in sodium chloride 0.9 % 100 mL IVPB, Q6H    aspirin (ECOTRIN LOW STRENGTH) EC tablet 81 mg, Daily    atorvastatin (LIPITOR) tablet 40 mg, Daily    azithromycin (ZITHROMAX) tablet 500 mg, Q24H    benzonatate (TESSALON PERLES) capsule 100 mg, TID PRN    budesonide (PULMICORT) inhalation solution 0.5 mg, Q12H    dicyclomine (BENTYL) capsule 10 mg, TID AC    docusate sodium (COLACE) capsule 100 mg, BID    famotidine (PEPCID) tablet 20 mg, BID    guaiFENesin (MUCINEX) 12 hr tablet 1,200 mg, Q12H MAYCO    heparin (porcine) subcutaneous injection 5,000 Units, Q12H MAYCO    ipratropium (ATROVENT) 0.02 % inhalation solution 0.5 mg, TID    levalbuterol (XOPENEX) inhalation solution 1.25 mg, TID    methylPREDNISolone sodium succinate (Solu-MEDROL) injection 40 mg, Q8H MAYCO    nicotine (NICODERM CQ) 21 mg/24 hr TD 24 hr patch 1 patch, Daily    pantoprazole (PROTONIX) EC tablet 40 mg, Early Morning    simethicone (MYLICON) chewable tablet 80 mg, Q6H PRN    sucralfate  (CARAFATE) tablet 1 g, 4x Daily    Administrative Statements   Today, Patient Was Seen By: Rand Cantrell MD  I have spent a total time of >35 minutes in caring for this patient on the day of the visit/encounter including Patient and family education, Documenting in the medical record, and Reviewing/placing orders in the medical record (including tests, medications, and/or procedures).    **Please Note: This note may have been constructed using a voice recognition system.**

## 2025-05-01 NOTE — ASSESSMENT & PLAN NOTE
Presents to ED with shortness of breath  SIRS met: SIRS: 2/4; temperature, tachycardia, tachypnea,  Secondary to worsening pneumonia and new pneumonia forming she was recently admitted in the beginning of April and completed Rocephin and Zithromax and now pneumonia is back again and worsened will change antibiotics to Unasyn believe Zithromax for atypicals infectious disease consultation pulmonology consultation

## 2025-05-02 ENCOUNTER — APPOINTMENT (INPATIENT)
Dept: RADIOLOGY | Facility: HOSPITAL | Age: 65
DRG: 871 | End: 2025-05-02
Payer: COMMERCIAL

## 2025-05-02 PROBLEM — E87.6 HYPOKALEMIA: Status: RESOLVED | Noted: 2025-01-23 | Resolved: 2025-05-02

## 2025-05-02 PROBLEM — E87.5 HYPERKALEMIA: Status: ACTIVE | Noted: 2025-05-02

## 2025-05-02 PROBLEM — E83.42 HYPOMAGNESEMIA: Status: RESOLVED | Noted: 2024-10-04 | Resolved: 2025-05-02

## 2025-05-02 PROBLEM — F17.210 CIGARETTE NICOTINE DEPENDENCE WITHOUT COMPLICATION: Status: ACTIVE | Noted: 2025-05-02

## 2025-05-02 LAB
ANION GAP SERPL CALCULATED.3IONS-SCNC: 3 MMOL/L (ref 4–13)
BUN SERPL-MCNC: 16 MG/DL (ref 5–25)
CALCIUM SERPL-MCNC: 9.1 MG/DL (ref 8.4–10.2)
CHLORIDE SERPL-SCNC: 106 MMOL/L (ref 96–108)
CO2 SERPL-SCNC: 34 MMOL/L (ref 21–32)
CREAT SERPL-MCNC: 0.55 MG/DL (ref 0.6–1.3)
EST. AVERAGE GLUCOSE BLD GHB EST-MCNC: 166 MG/DL
GFR SERPL CREATININE-BSD FRML MDRD: 97 ML/MIN/1.73SQ M
GLUCOSE SERPL-MCNC: 165 MG/DL (ref 65–140)
GLUCOSE SERPL-MCNC: 179 MG/DL (ref 65–140)
GLUCOSE SERPL-MCNC: 185 MG/DL (ref 65–140)
GLUCOSE SERPL-MCNC: 193 MG/DL (ref 65–140)
GLUCOSE SERPL-MCNC: 214 MG/DL (ref 65–140)
HBA1C MFR BLD: 7.4 %
MAGNESIUM SERPL-MCNC: 2.2 MG/DL (ref 1.9–2.7)
POTASSIUM SERPL-SCNC: 3.9 MMOL/L (ref 3.5–5.3)
POTASSIUM SERPL-SCNC: 5.6 MMOL/L (ref 3.5–5.3)
SODIUM SERPL-SCNC: 143 MMOL/L (ref 135–147)

## 2025-05-02 PROCEDURE — 99233 SBSQ HOSP IP/OBS HIGH 50: CPT | Performed by: INTERNAL MEDICINE

## 2025-05-02 PROCEDURE — 83036 HEMOGLOBIN GLYCOSYLATED A1C: CPT | Performed by: FAMILY MEDICINE

## 2025-05-02 PROCEDURE — 80048 BASIC METABOLIC PNL TOTAL CA: CPT | Performed by: FAMILY MEDICINE

## 2025-05-02 PROCEDURE — 99232 SBSQ HOSP IP/OBS MODERATE 35: CPT | Performed by: INTERNAL MEDICINE

## 2025-05-02 PROCEDURE — 94668 MNPJ CHEST WALL SBSQ: CPT

## 2025-05-02 PROCEDURE — 83735 ASSAY OF MAGNESIUM: CPT | Performed by: FAMILY MEDICINE

## 2025-05-02 PROCEDURE — 99232 SBSQ HOSP IP/OBS MODERATE 35: CPT | Performed by: FAMILY MEDICINE

## 2025-05-02 PROCEDURE — 94640 AIRWAY INHALATION TREATMENT: CPT

## 2025-05-02 PROCEDURE — 94669 MECHANICAL CHEST WALL OSCILL: CPT

## 2025-05-02 PROCEDURE — 92611 MOTION FLUOROSCOPY/SWALLOW: CPT

## 2025-05-02 PROCEDURE — 87070 CULTURE OTHR SPECIMN AEROBIC: CPT | Performed by: FAMILY MEDICINE

## 2025-05-02 PROCEDURE — 94760 N-INVAS EAR/PLS OXIMETRY 1: CPT

## 2025-05-02 PROCEDURE — 82948 REAGENT STRIP/BLOOD GLUCOSE: CPT

## 2025-05-02 PROCEDURE — 87205 SMEAR GRAM STAIN: CPT | Performed by: FAMILY MEDICINE

## 2025-05-02 PROCEDURE — 74230 X-RAY XM SWLNG FUNCJ C+: CPT

## 2025-05-02 PROCEDURE — 84132 ASSAY OF SERUM POTASSIUM: CPT | Performed by: FAMILY MEDICINE

## 2025-05-02 RX ADMIN — GUAIFENESIN 1200 MG: 600 TABLET, EXTENDED RELEASE ORAL at 21:41

## 2025-05-02 RX ADMIN — FAMOTIDINE 20 MG: 20 TABLET, FILM COATED ORAL at 17:16

## 2025-05-02 RX ADMIN — PANTOPRAZOLE SODIUM 40 MG: 40 TABLET, DELAYED RELEASE ORAL at 17:16

## 2025-05-02 RX ADMIN — INSULIN LISPRO 1 UNITS: 100 INJECTION, SOLUTION INTRAVENOUS; SUBCUTANEOUS at 11:19

## 2025-05-02 RX ADMIN — LEVALBUTEROL HYDROCHLORIDE 1.25 MG: 1.25 SOLUTION RESPIRATORY (INHALATION) at 07:20

## 2025-05-02 RX ADMIN — SODIUM CHLORIDE SOLN NEBU 3% 4 ML: 3 NEBU SOLN at 07:20

## 2025-05-02 RX ADMIN — IPRATROPIUM BROMIDE 0.5 MG: 0.5 SOLUTION RESPIRATORY (INHALATION) at 07:20

## 2025-05-02 RX ADMIN — GUAIFENESIN 1200 MG: 600 TABLET, EXTENDED RELEASE ORAL at 08:26

## 2025-05-02 RX ADMIN — DICYCLOMINE HYDROCHLORIDE 10 MG: 10 CAPSULE ORAL at 17:16

## 2025-05-02 RX ADMIN — DICYCLOMINE HYDROCHLORIDE 10 MG: 10 CAPSULE ORAL at 11:17

## 2025-05-02 RX ADMIN — ATORVASTATIN CALCIUM 40 MG: 40 TABLET, FILM COATED ORAL at 08:26

## 2025-05-02 RX ADMIN — SODIUM CHLORIDE SOLN NEBU 3% 4 ML: 3 NEBU SOLN at 13:24

## 2025-05-02 RX ADMIN — CEFTRIAXONE 2000 MG: 2 INJECTION, SOLUTION INTRAVENOUS at 12:59

## 2025-05-02 RX ADMIN — LEVALBUTEROL HYDROCHLORIDE 1.25 MG: 1.25 SOLUTION RESPIRATORY (INHALATION) at 19:19

## 2025-05-02 RX ADMIN — DICYCLOMINE HYDROCHLORIDE 10 MG: 10 CAPSULE ORAL at 05:12

## 2025-05-02 RX ADMIN — SUCRALFATE 1 G: 1 TABLET ORAL at 08:26

## 2025-05-02 RX ADMIN — INSULIN LISPRO 1 UNITS: 100 INJECTION, SOLUTION INTRAVENOUS; SUBCUTANEOUS at 08:26

## 2025-05-02 RX ADMIN — NICOTINE 1 PATCH: 21 PATCH, EXTENDED RELEASE TRANSDERMAL at 08:27

## 2025-05-02 RX ADMIN — METHYLPREDNISOLONE SODIUM SUCCINATE 40 MG: 40 INJECTION, POWDER, FOR SOLUTION INTRAMUSCULAR; INTRAVENOUS at 21:41

## 2025-05-02 RX ADMIN — PANTOPRAZOLE SODIUM 40 MG: 40 TABLET, DELAYED RELEASE ORAL at 05:12

## 2025-05-02 RX ADMIN — IPRATROPIUM BROMIDE 0.5 MG: 0.5 SOLUTION RESPIRATORY (INHALATION) at 13:24

## 2025-05-02 RX ADMIN — ASPIRIN 81 MG: 81 TABLET, COATED ORAL at 08:26

## 2025-05-02 RX ADMIN — SODIUM ZIRCONIUM CYCLOSILICATE 10 G: 10 POWDER, FOR SUSPENSION ORAL at 11:17

## 2025-05-02 RX ADMIN — IPRATROPIUM BROMIDE 0.5 MG: 0.5 SOLUTION RESPIRATORY (INHALATION) at 19:19

## 2025-05-02 RX ADMIN — AZITHROMYCIN DIHYDRATE 500 MG: 250 TABLET, FILM COATED ORAL at 17:15

## 2025-05-02 RX ADMIN — SODIUM CHLORIDE SOLN NEBU 3% 4 ML: 3 NEBU SOLN at 19:19

## 2025-05-02 RX ADMIN — METHYLPREDNISOLONE SODIUM SUCCINATE 40 MG: 40 INJECTION, POWDER, FOR SOLUTION INTRAMUSCULAR; INTRAVENOUS at 08:26

## 2025-05-02 RX ADMIN — HEPARIN SODIUM 5000 UNITS: 5000 INJECTION INTRAVENOUS; SUBCUTANEOUS at 21:41

## 2025-05-02 RX ADMIN — HEPARIN SODIUM 5000 UNITS: 5000 INJECTION INTRAVENOUS; SUBCUTANEOUS at 08:26

## 2025-05-02 RX ADMIN — SUCRALFATE 1 G: 1 TABLET ORAL at 17:15

## 2025-05-02 RX ADMIN — SUCRALFATE 1 G: 1 TABLET ORAL at 21:41

## 2025-05-02 RX ADMIN — LEVALBUTEROL HYDROCHLORIDE 1.25 MG: 1.25 SOLUTION RESPIRATORY (INHALATION) at 13:24

## 2025-05-02 RX ADMIN — FAMOTIDINE 20 MG: 20 TABLET, FILM COATED ORAL at 08:26

## 2025-05-02 RX ADMIN — SUCRALFATE 1 G: 1 TABLET ORAL at 11:17

## 2025-05-02 RX ADMIN — DOCUSATE SODIUM 100 MG: 100 CAPSULE, LIQUID FILLED ORAL at 17:15

## 2025-05-02 RX ADMIN — INSULIN LISPRO 1 UNITS: 100 INJECTION, SOLUTION INTRAVENOUS; SUBCUTANEOUS at 17:16

## 2025-05-02 RX ADMIN — DOCUSATE SODIUM 100 MG: 100 CAPSULE, LIQUID FILLED ORAL at 08:26

## 2025-05-02 NOTE — ASSESSMENT & PLAN NOTE
Currently on 3L NC, baseline is 2 L nasal cannula continuously.   Pulmonary toilet:  Increase activity as tolerated, out of bed as tolerated, cough and deep breathing exercises encourage, incentive spirometry q.1 hour  Maintain saturations greater than or equal to 88%.

## 2025-05-02 NOTE — ASSESSMENT & PLAN NOTE
Patient has chronic respiratory symptoms with 2 days of acute exacerbation of dyspnea and chest congestion.  Recently hospitalized on 4/5 for similar symptoms. There is radiologic concern for pneumonia as CT chest notes worsening consolidation involving right upper lobe and superior segment of right lower lobe with reactive mediastinal and right hilar adenopathy.  However this may represent a radiologic lag, she has no worsening hypoxia, fever, leukocytosis or significant worsening of productive cough to suggest pneumonia  Concern for underlying GERD/aspiration     Continue ceftriaxone 2 q24h   Trend procalcitonin, check sputum routine culture   Continue pulmonary toilet, bronchodilators and steroids as above   Anticipate 5 days of therapy through 5/4, if patient improves can dc on cefdinir or augmentin   Await VBS and speech therapy evaluation and recommend fu with GI

## 2025-05-02 NOTE — ASSESSMENT & PLAN NOTE
Underwent bronchoscopy 5/2024 for cavitary lesion biopsy, growing Mycobacterium intracellular.    Following outpatient with infectious disease regarding MARIS infection.  She was unable to tolerate the rifabutin regimen given hepatotoxicity and is instead being monitored with sputum cultures and AFB.  Is due for culture this upcoming week.  Follow sputum culture and AFB still pending to be collected  ID consulted discussed with ID will need outpatient follow-up with ID and pulmonary to discuss restarting treatment

## 2025-05-02 NOTE — ASSESSMENT & PLAN NOTE
Lab Results   Component Value Date    HGBA1C 6.5 (H) 11/13/2024       Recent Labs     05/01/25  1644 05/01/25  2102 05/02/25  0723   POCGLU 170* 191* 165*       Blood Sugar Average: Last 72 hrs:  (P) [id number]  Placed on sliding scale check and hemoglobin A1c-7.4 overall sugar stable

## 2025-05-02 NOTE — PROGRESS NOTES
Progress Note - Hospitalist   Name: Ileana Seaman 65 y.o. female I MRN: [id number]  Unit/Bed#: -01 I Date of Admission: 4/30/2025   Date of Service: 5/2/2025 I Hospital Day: 2    Assessment & Plan  Pneumonia of right lower lobe due to infectious organism  Lab Results   Component Value Date    WBC 8.70 05/01/2025    PROCALCITONI <0.05 05/01/2025   Follows with infectious disease outpatient for MARIS infection.  Unable to tolerate therapy due to hepatotoxicity while on rifabutin.  Instead they are monitoring sputum cultures closely off treatment.  CXR progressive pneumonia in right upper lobe superimposed on known cavitary nodular lesions  CTA chest: Worsening right upper lobe pneumonia and new superior segment right lower lobe pneumonia  COVID/Flu/RSV negative  Urine Legionella urine strep is negative sputum cultures pending.  AFB ordered pending  Blood Cultures pending  Procalcitonin x 2 is negative  ID consulted given history of MARIS and recurrent and worsening PNA.  As discussed with speech she could be aspirating due to GI issues as she does have hiatal hernia and what she describes waking up in the morning is reflux increase her Protonix to 40 mg p.o. twice daily she is undergoing a swallow study to speech today to evaluate for any aspiration in terms of food consistency and liquid consistency discussed case with ID and pulmonology.  She is estelita complete Zithromax for 3 days for the COPD exacerbation but she is estelita complete Rocephin for 5 days total  Also pulmonary added vest therapy and saline nebulizer treatment to help patient cough up mucus continue Mucinex as well  Acute on chronic hypoxic respiratory failure (HCC)  With history of severe emphysema on 2 L nasal cannula at baseline.  Progressively worsening shortness of breath requiring increased O2 over the past few days.  Secondary to her worsening pneumonia and new pneumonia and COPD exacerbation yesterday she required 4 L of oxygen now she is  trending down to 3  Sepsis (HCC)  Presents to ED with shortness of breath  SIRS met: SIRS: 2/4; temperature, tachycardia, tachypnea,  Secondary to worsening pneumonia and new pneumonia forming she was recently admitted in the beginning of April and completed as discussed with infectious disease and pulmonary.  Factious disease she is estelita be on Rocephin for 5 days and Zithromax to be completed for COPD exacerbation  resolved  COPD exacerbation (HCC)  On Trelegy for maintenance.    Decreased Solu-Medrol to 40 mg IV every 12 hours by pulmonary continue Xopenex and Atrovent on Pulmicort  Discussed case with pulmonology  Mycobacterial infection, non-TB  Underwent bronchoscopy 5/2024 for cavitary lesion biopsy, growing Mycobacterium intracellular.    Following outpatient with infectious disease regarding MARIS infection.  She was unable to tolerate the rifabutin regimen given hepatotoxicity and is instead being monitored with sputum cultures and AFB.  Is due for culture this upcoming week.  Follow sputum culture and AFB still pending to be collected  ID consulted discussed with ID will need outpatient follow-up with ID and pulmonary to discuss restarting treatment  Pulmonary nodule  Noted on CT chest.  Reviewed with patient.  Continue follow-up with pulmonology  Cigarette nicotine dependence without complication    Hyperkalemia  Placed on 2 g potassium diet will give 10 of Lokelma repeat potassium at noon    VTE Pharmacologic Prophylaxis:   Moderate Risk (Score 3-4) - Pharmacological DVT Prophylaxis Ordered: heparin.    Mobility:   Basic Mobility Inpatient Raw Score: 22  JH-HLM Goal: 7: Walk 25 feet or more  JH-HLM Achieved: 7: Walk 25 feet or more  JH-HLM Goal achieved. Continue to encourage appropriate mobility.    Patient Centered Rounds: I performed bedside rounds with nursing staff today.   Discussions with Specialists or Other Care Team Provider: Will discuss with infectious disease and pulmonary    Education and  Discussions with Family / Patient: pt    Current Length of Stay: 2 day(s)  Current Patient Status: Inpatient   Certification Statement: The patient will continue to require additional inpatient hospital stay due to pneumonia and COPD exacerbation  Discharge Plan: Anticipate discharge in >72 hrs to home.    Code Status: Level 1 - Full Code    Subjective   Patient seen and examined starting to bring up mucus still short of breath    Objective :  Temp:  [97 °F (36.1 °C)-97.3 °F (36.3 °C)] 97 °F (36.1 °C)  HR:  [] 87  BP: (104-142)/(68-87) 142/87  Resp:  [17-24] 20  SpO2:  [88 %-95 %] 95 %  O2 Device: Nasal cannula  Nasal Cannula O2 Flow Rate (L/min):  [3 L/min-4 L/min] 3 L/min    Body mass index is 17.76 kg/m².     Input and Output Summary (last 24 hours):     Intake/Output Summary (Last 24 hours) at 5/2/2025 0944  Last data filed at 5/2/2025 0908  Gross per 24 hour   Intake 780 ml   Output 900 ml   Net -120 ml       Physical Exam  Vitals and nursing note reviewed.   Constitutional:       General: She is not in acute distress.     Appearance: She is well-developed.   HENT:      Head: Normocephalic and atraumatic.   Eyes:      Conjunctiva/sclera: Conjunctivae normal.   Cardiovascular:      Rate and Rhythm: Normal rate and regular rhythm.      Heart sounds: No murmur heard.  Pulmonary:      Effort: Pulmonary effort is normal. No respiratory distress.      Breath sounds: Rales present.   Abdominal:      Palpations: Abdomen is soft.      Tenderness: There is no abdominal tenderness.   Musculoskeletal:         General: No swelling.      Cervical back: Neck supple.   Skin:     General: Skin is warm and dry.      Capillary Refill: Capillary refill takes less than 2 seconds.   Neurological:      Mental Status: She is alert and oriented to person, place, and time.   Psychiatric:         Mood and Affect: Mood normal.           Lines/Drains:              Lab Results: I have reviewed the following results:   Results from  last 7 days   Lab Units 05/01/25  0549   WBC Thousand/uL 8.70   HEMOGLOBIN g/dL 11.2*   HEMATOCRIT % 34.8   PLATELETS Thousands/uL 318   SEGS PCT % 86*   LYMPHO PCT % 9*   MONO PCT % 5   EOS PCT % 0     Results from last 7 days   Lab Units 05/02/25  0649 05/01/25  0931   SODIUM mmol/L 143 142   POTASSIUM mmol/L 5.6* 3.8   CHLORIDE mmol/L 106 103   CO2 mmol/L 34* 32   BUN mg/dL 16 8   CREATININE mg/dL 0.55* 0.50*   ANION GAP mmol/L 3* 7   CALCIUM mg/dL 9.1 9.0   ALBUMIN g/dL  --  3.3*   TOTAL BILIRUBIN mg/dL  --  0.27   ALK PHOS U/L  --  83   ALT U/L  --  11   AST U/L  --  8*   GLUCOSE RANDOM mg/dL 185* 252*     Results from last 7 days   Lab Units 04/30/25  1219   INR  1.06     Results from last 7 days   Lab Units 05/02/25  0723 05/01/25  2102 05/01/25  1644   POC GLUCOSE mg/dl 165* 191* 170*         Results from last 7 days   Lab Units 05/01/25  0545 04/30/25  1648 04/30/25  1219   LACTIC ACID mmol/L  --  1.2  --    PROCALCITONIN ng/ml <0.05  --  <0.05       Recent Cultures (last 7 days):   Results from last 7 days   Lab Units 04/30/25  1830 04/30/25  1419 04/30/25  1405   BLOOD CULTURE   --  No Growth at 24 hrs. No Growth at 24 hrs.   LEGIONELLA URINARY ANTIGEN  Negative  --   --        Imaging Results Review: No pertinent imaging studies reviewed.  Other Study Results Review: No additional pertinent studies reviewed.    Last 24 Hours Medication List:     Current Facility-Administered Medications:     acetaminophen (TYLENOL) tablet 650 mg, Q6H PRN    albuterol inhalation solution 2.5 mg, Q6H PRN    aspirin (ECOTRIN LOW STRENGTH) EC tablet 81 mg, Daily    atorvastatin (LIPITOR) tablet 40 mg, Daily    azithromycin (ZITHROMAX) tablet 500 mg, Q24H    benzonatate (TESSALON PERLES) capsule 100 mg, TID PRN    cefTRIAXone (ROCEPHIN) IVPB (premix in dextrose) 2,000 mg 50 mL, Q24H, Last Rate: 2,000 mg (05/01/25 1311)    dicyclomine (BENTYL) capsule 10 mg, TID AC    docusate sodium (COLACE) capsule 100 mg, BID    famotidine  (PEPCID) tablet 20 mg, BID    guaiFENesin (MUCINEX) 12 hr tablet 1,200 mg, Q12H MAYCO    heparin (porcine) subcutaneous injection 5,000 Units, Q12H MAYCO    insulin lispro (HumALOG/ADMELOG) 100 units/mL subcutaneous injection 1-5 Units, TID AC **AND** Fingerstick Glucose (POCT), TID AC    ipratropium (ATROVENT) 0.02 % inhalation solution 0.5 mg, TID    levalbuterol (XOPENEX) inhalation solution 1.25 mg, TID    methylPREDNISolone sodium succinate (Solu-MEDROL) injection 40 mg, Q12H MAYCO    nicotine (NICODERM CQ) 21 mg/24 hr TD 24 hr patch 1 patch, Daily    pantoprazole (PROTONIX) EC tablet 40 mg, BID AC    simethicone (MYLICON) chewable tablet 80 mg, Q6H PRN    sodium chloride 3 % inhalation solution 4 mL, TID    Sodium Zirconium Cyclosilicate (Lokelma) 10 g, Once    sucralfate (CARAFATE) tablet 1 g, 4x Daily    Administrative Statements   Today, Patient Was Seen By: Rand Cantrell MD  I have spent a total time of >35 minutes in caring for this patient on the day of the visit/encounter including Documenting in the medical record, Reviewing/placing orders in the medical record (including tests, medications, and/or procedures), and Communicating with other healthcare professionals .    **Please Note: This note may have been constructed using a voice recognition system.**

## 2025-05-02 NOTE — ASSESSMENT & PLAN NOTE
On Trelegy for maintenance.    Decreased Solu-Medrol to 40 mg IV every 12 hours by pulmonary continue Xopenex and Atrovent on Pulmicort  Discussed case with pulmonology

## 2025-05-02 NOTE — ASSESSMENT & PLAN NOTE
With history of severe emphysema on 2 L nasal cannula at baseline.  Progressively worsening shortness of breath requiring increased O2 over the past few days.  Secondary to her worsening pneumonia and new pneumonia and COPD exacerbation yesterday she required 4 L of oxygen now she is trending down to 3

## 2025-05-02 NOTE — PROCEDURES
Speech Pathology Videofluoroscopic Swallow Study (VFSS/VBSS/MBSS)      Patient Name: Ileana Seaman    Today's Date: 5/2/2025    General Information;  Pt is a 65 y.o. female with a PMH remarkable for RLL PNA, acute on chronic respiratory failure, sepsis, COPD exacerbation, hx smoking, hyperkalemia.    Current concerns for dysphagia include re-occurring RLL PNA.  A VFSS was recommended to assess oropharyngeal stage swallowing skills at this time. Pt was viewed in lateral position and assessed with thin  liquid (by teaspoon, single and successive cup/straw sips), puree, soft moist food (sandwich) and solid food (cracker) and a13mm pill with thin liquid.      Oral stage:  Pt presented with mild oral stage dysphagia.    Lip closure:  no escape  Mastication:   slow prolonged with complete re-collection,   Bolus Transport/Lingual Motion: brisk  Oral residue: , trace residue  Tongue Control:posterior escape of  greater than half of the bolus  Swallow Initiation: bolus head in pyriforms    Pharyngeal stage:  Pt presented with WFL pharyngeal phase    Soft palate elevation:  no bolus between soft palate and pharyngeal wall   Laryngeal elevation: complete    Anterior hyoid excursion:  complete     Epiglottic movement:  complete   Laryngeal vestibule closure:   complete    Tongue base retraction:   wide column of contrast/air between TB and PW  Pharyngeal Stripping: complete  Pharyngeal Contraction: did not complete AP view  PES opening:  complete    Pharyngeal Residue:  N/A    Management of food/liquid/barium tablet follows:   All food, liquid and the barium tablet passed through the pharynx with no laryngeal penetration, aspiration or significant pharyngeal residue noted today.      Penetration/Aspiration:  Thin: PAS - 1  Puree: PAS- 1  Solid: PAS- 1  Response to Aspiration: N/A           8-Point Penetration-Aspiration Scale   1 Material does not enter the airway   2 Material enters the airway, remains above the  vocal folds, and is ejected  from the  airway    3 Material enters the airway, remains above the vocal folds, and is not ejected from the airway   4 Material enters the airway, contacts the vocal folds, and is ejected from the airway   5 Material enters the airway, contacts the vocal folds, and is not ejected from the airway    6 Material enters the airway, passes below the vocal folds and is ejected into the larynx or out of the airway    7 Material enters the airway, passes below the vocal folds, and is not ejected from the trachea despite effort    8 Material enters the airway, passes below the vocal folds, and no effort is made to eject         Strategies and Efficacy: N/A    Aspiration Response and Efficacy:  N/A    Esophageal stage:  Brief view of esophagus was completed.  Re: esophageal clearance-  esophageal retention noted     Assessment Summary:    Pt presents with mild oropharyngeal dysphagia characterized by impaired mastication d/t lack of upper dentition and impaired BOT strength resulting in posterior spillage to the valleculae w/ solids and the pyriforms w/ liquids  Laryngeal elevation/excursion and epiglottic inversion was WFL.  No aspiration/penetration on study and no significant pharyngeal retention.    Esophageal retention noted w/ barium pill and esophageal screen.      Note: Images are available for review in PACS as desired.      Functional Oral Intake Scale (FOIS)  (Does not include liquids)  Nothing by mouth (NPO).  Tube dependent with minimal attempts of food or liquid.  Tube dependent with consistent intake of food or liquid.  Total oral diet of a single consistency.  Total oral diet with multiple consistencies but requiring special preparation or compensations.  Total oral diet with multiple consistencies without special preparations, but with specific food limitations.  Total oral diet with no restriction.     Recommendations:   Recommended Diet:  regular diet and thin liquids    Recommended Form of Medications: whole with liquid   Aspiration precautions and compensatory swallowing strategies: upright posture  Consider referral to:  N/A  SLP Dysphagia therapy recommended: N/A    Results Reviewed with: patient and MD   Pt/Family Education: Completed    Leann Mendoza MS CCC-SLP  5/2/2025

## 2025-05-02 NOTE — ASSESSMENT & PLAN NOTE
Diagnosed with MARIS in May 2024, treated with triple therapy last fall but developed acute transaminitis/possible DILI prompting therapy to be held since December 2024.   ID following, appreciate their input.   Repeat AFB pending  Follow up without outpatient ID and pulm

## 2025-05-02 NOTE — PROGRESS NOTES
Progress Note - Infectious Disease   Name: Ileana Seaman 65 y.o. female I MRN: [id number]  Unit/Bed#: -01 I Date of Admission: 4/30/2025   Date of Service: 5/2/2025 I Hospital Day: 2       Administrative Statements   VIRTUAL CARE DOCUMENTATION:     1. This service was provided via Telemedicine using [employer] Kit     2. Parties in the room with patient during teleconsult Patient only    3. Confidentiality My office door was closed     4. Participants No one else was in the room    5. Patient acknowledged consent and understanding of privacy and security of the  Telemedicine consult. I informed the patient that I have reviewed their record in Epic and presented the opportunity for them to ask any questions regarding the visit today.  The patient agreed to participate.    6. I have spent a total time of 50 minutes in caring for this patient on the day of the visit/encounter including Diagnostic results, Risks and benefits of tx options, Impressions, Counseling / Coordination of care, Documenting in the medical record, Reviewing/placing orders in the medical record (including tests, medications, and/or procedures), Obtaining or reviewing history  , and Communicating with other healthcare professionals , not including the time spent for establishing the audio/video connection.       Assessment & Plan  Sepsis (HCC)  V/s SIRS.  Evidenced by tachycardia and tachypnea.  Secondary to COPD exacerbation, consider aspiration pneumonitis, bacterial pneumonia is less likely in absence of fever or leukocytosis. Progressive MARIS infection is also less likely as cause for acute symptoms.  Clinically unchanged but afebrile and hemodynamically stable without leukocytosis    Antibiotics and additional management as below   Check daily CBC, CMP while inpatient to monitor for any evolving antibiotic toxicity or treatment failure   Continue supportive care, monitor clinical course    Acute on chronic hypoxic respiratory failure  (HCC)  Likely multifactorial in setting of advanced COPD , chronic diastolic CHF, MARIS infection  Currently on 3lpm o2, baseline 2 lpm o2     Management per pulmonology, continue airway clearance, chest PT, steroid management per primary/pulm team   Continue speech therapy   Encourage ongoing tobacco cessation  Pneumonia of right lower lobe due to infectious organism  Patient has chronic respiratory symptoms with 2 days of acute exacerbation of dyspnea and chest congestion.  Recently hospitalized on 4/5 for similar symptoms. There is radiologic concern for pneumonia as CT chest notes worsening consolidation involving right upper lobe and superior segment of right lower lobe with reactive mediastinal and right hilar adenopathy.  However this may represent a radiologic lag, she has no worsening hypoxia, fever, leukocytosis or significant worsening of productive cough to suggest pneumonia  Concern for underlying GERD/aspiration     Continue ceftriaxone 2 q24h   Trend procalcitonin, check sputum routine culture   Continue pulmonary toilet, bronchodilators and steroids as above   Anticipate 5 days of therapy through 5/4, if patient improves can dc on cefdinir or augmentin   Await VBS and speech therapy evaluation and recommend fu with GI  Mycobacterial infection, non-TB  Patient has compatible ATS criteria/clinical, radiologic findings with confirmed lung biopsy changes (RUL and RLL) of necrotizing granulomas with cultures positive for MARIS in 5/2024  She was initiated on 3 drug therapy last fall but developed acute transaminitis/possible DILI and therapy has been withheld since December.  Sputum AFB cultures in February were negative for MARIS.     Check sputum for AFB   Recommend outpatient fu with ID and pulm and consideration for resuming therapy/possible graded challenge. This is a complex decision and she will need close follow up and multidisciplinary evaluation   Recommend nutrition consult and consider palliative  care consult given advanced copd and recurrent hospitalizations  Type 2 diabetes mellitus without complication, without long-term current use of insulin ([facility])  Lab Results   Component Value Date    HGBA1C 6.5 (H) 11/13/2024       Recent Labs     05/01/25  1644 05/01/25  2102 05/02/25  0723   POCGLU 170* 191* 165*       Blood Sugar Average: Last 72 hrs:  (P) [id number]        Antibiotics:  ceftriaxone    Subjective   Patient continues with shortness of breath; no pain, continues with chest congestion and only coughing up mucus. No new symptoms.    Objective :  Temp:  [97 °F (36.1 °C)-97.3 °F (36.3 °C)] 97 °F (36.1 °C)  HR:  [] 87  BP: (104-142)/(68-87) 142/87  Resp:  [17-24] 20  SpO2:  [88 %-95 %] 95 %  O2 Device: Nasal cannula  Nasal Cannula O2 Flow Rate (L/min):  [3 L/min-4 L/min] 3 L/min    General:  No acute distress, appearing thin and frail and chronically ill  Psychiatric:  Awake and alert  Pulmonary:  Normal respiratory excursion without accessory muscle use. Diminished BS with b/l diffuse wheezing  Abdomen:  Soft, nontender  Extremities:  1+ b/l le edema  Skin:  No rashes      Lab Results: I have reviewed the following results:  Results from last 7 days   Lab Units 05/01/25  0549 04/30/25  1219   WBC Thousand/uL 8.70 10.54*   HEMOGLOBIN g/dL 11.2* 13.0   PLATELETS Thousands/uL 318 333     Results from last 7 days   Lab Units 05/02/25  0649 05/01/25  0931 04/30/25  1219   SODIUM mmol/L 143 142 137   POTASSIUM mmol/L 5.6* 3.8 2.7*   CHLORIDE mmol/L 106 103 94*   CO2 mmol/L 34* 32 32   BUN mg/dL 16 8 5   CREATININE mg/dL 0.55* 0.50* 0.48*   EGFR ml/min/1.73sq m 97 100 101   CALCIUM mg/dL 9.1 9.0 9.2   AST U/L  --  8* 11*   ALT U/L  --  11 13   ALK PHOS U/L  --  83 91   ALBUMIN g/dL  --  3.3* 3.8     Results from last 7 days   Lab Units 04/30/25  1830 04/30/25  1419 04/30/25  1405   BLOOD CULTURE   --  No Growth at 24 hrs. No Growth at 24 hrs.   LEGIONELLA URINARY ANTIGEN  Negative  --   --       Results from last 7 days   Lab Units 05/01/25  0545 04/30/25  1219   PROCALCITONIN ng/ml <0.05 <0.05             Results from last 7 days   Lab Units 04/30/25  1219   D-DIMER QUANTITATIVE ug/ml FEU 0.76*

## 2025-05-02 NOTE — ASSESSMENT & PLAN NOTE
Presents to ED with shortness of breath  SIRS met: SIRS: 2/4; temperature, tachycardia, tachypnea,  Secondary to worsening pneumonia and new pneumonia forming she was recently admitted in the beginning of April and completed as discussed with infectious disease and pulmonary.  Factious disease she is estelita be on Rocephin for 5 days and Zithromax to be completed for COPD exacerbation  resolved

## 2025-05-02 NOTE — ASSESSMENT & PLAN NOTE
Continue Solumedrol 40 mg IV BID today. Transition to prednisone 40 mg po tomorrow. Taper by 10 mg every 3 days until completion.   Continue Xopenex/Atrovent/3% nebs TID  At discharge, resume Trelegy, albuterol HFA, Kirstie

## 2025-05-02 NOTE — MALNUTRITION/BMI
This medical record reflects one or more clinical indicators suggestive of underweight.    BMI Findings:  Adult BMI Classifications: Underweight < 18.5        Body mass index is 17.76 kg/m².     See Nutrition note dated 5/2/25 for additional details.  Completed nutrition assessment is viewable in the nutrition documentation.

## 2025-05-02 NOTE — ASSESSMENT & PLAN NOTE
ID input appreciated- continue ABX per their recommendations, currently on ceftriaxone, day#3/5.   Trend WBC, PCT, temps  Pulmonary toilet as above  Supportive care of cough.  Repeat CT chest in 6-8 weeks

## 2025-05-02 NOTE — PLAN OF CARE
Problem: Potential for Falls  Goal: Patient will remain free of falls  Description: INTERVENTIONS:- Educate patient/family on patient safety including physical limitations- Instruct patient to call for assistance with activity - Consult OT/PT to assist with strengthening/mobility - Keep Call bell within reach- Keep bed low and locked with side rails adjusted as appropriate- Keep care items and personal belongings within reach- Initiate and maintain comfort rounds- Make Fall Risk Sign visible to staff- Offer Toileting every  Hours, in advance of need- Initiate/Maintain alarm- Obtain necessary fall risk management equipment: - Apply yellow socks and bracelet for high fall risk patients- Consider moving patient to room near nurses station  INTERVENTIONS:- Educate patient/family on patient safety including physical limitations- Instruct patient to call for assistance with activity - Consult OT/PT to assist with strengthening/mobility - Keep Call bell within reach- Keep bed low and locked with side rails adjusted as appropriate- Keep care items and personal belongings within reach- Initiate and maintain comfort rounds- Make Fall Risk Sign visible to staff- Offer Toileting every  Hours, in advance of need- Initiate/Maintain alarm- Obtain necessary fall risk management equipment: - Apply yellow socks and bracelet for high fall risk patients- Consider moving patient to room near nurses station  Outcome: Progressing     Problem: PAIN - ADULT  Goal: Verbalizes/displays adequate comfort level or baseline comfort level  Description: Interventions:- Encourage patient to monitor pain and request assistance- Assess pain using appropriate pain scale- Administer analgesics based on type and severity of pain and evaluate response- Implement non-pharmacological measures as appropriate and evaluate response- Consider cultural and social influences on pain and pain management- Notify physician/advanced practitioner if interventions  unsuccessful or patient reports new pain  Outcome: Progressing     Problem: INFECTION - ADULT  Goal: Absence or prevention of progression during hospitalization  Description: INTERVENTIONS:- Assess and monitor for signs and symptoms of infection- Monitor lab/diagnostic results- Monitor all insertion sites, i.e. indwelling lines, tubes, and drains- Monitor endotracheal if appropriate and nasal secretions for changes in amount and color- [place] appropriate cooling/warming therapies per order- Administer medications as ordered- Instruct and encourage patient and family to use good hand hygiene technique- Identify and instruct in appropriate isolation precautions for identified infection/condition  Outcome: Progressing  Goal: Absence of fever/infection during neutropenic period  Description: INTERVENTIONS:- Monitor WBC  Outcome: Progressing

## 2025-05-02 NOTE — ASSESSMENT & PLAN NOTE
Likely multifactorial in setting of advanced COPD , chronic diastolic CHF, MARIS infection  Currently on 3lpm o2, baseline 2 lpm o2     Management per pulmonology, continue airway clearance, chest PT, steroid management per primary/pulm team   Continue speech therapy   Encourage ongoing tobacco cessation

## 2025-05-02 NOTE — PROGRESS NOTES
Progress Note - Pulmonology   Name: Ileana Seaman 65 y.o. female I MRN: [id number]  Unit/Bed#: -01 I Date of Admission: 4/30/2025   Date of Service: 5/2/2025 I Hospital Day: 2    Assessment & Plan  Acute on chronic hypoxic respiratory failure (HCC)  Currently on 3L NC, baseline is 2 L nasal cannula continuously.   Pulmonary toilet:  Increase activity as tolerated, out of bed as tolerated, cough and deep breathing exercises encourage, incentive spirometry q.1 hour  Maintain saturations greater than or equal to 88%.   COPD exacerbation (HCC)  Continue Solumedrol 40 mg IV BID today. Transition to prednisone 40 mg po tomorrow. Taper by 10 mg every 3 days until completion.   Continue Xopenex/Atrovent/3% nebs TID  At discharge, resume Trelegy, albuterol HFA, DuoNebs  Pneumonia of right lower lobe due to infectious organism  ID input appreciated- continue ABX per their recommendations, currently on ceftriaxone, day#3/5.   Trend WBC, PCT, temps  Pulmonary toilet as above  Supportive care of cough.  Repeat CT chest in 6-8 weeks  Mycobacterial infection, non-TB  Diagnosed with MARIS in May 2024, treated with triple therapy last fall but developed acute transaminitis/possible DILI prompting therapy to be held since December 2024.   ID following, appreciate their input.   Repeat AFB pending  Follow up without outpatient ID and pulm   Pulmonary nodule  7 mm RLL nodule requires continued outpatient surveillance, no plans for inpatient intervention.  Cigarette nicotine dependence without complication  Complete smoking cessation encouraged  NRT while inpatient     Follow-up with pulmonary and ID as outpatient. If Still here on Monday, pulmonary will evaluate again.     24 Hour Events : none reported  Subjective : Patient was seen and examined today. No overnight events. Patients states that she still feels very SOB with minimal activities. Also has cough productive of yellow phlegm. Complains of chest tightness without  audible wheeze. No fevers or chills. No chest pain.     Objective :  Temp:  [97 °F (36.1 °C)-97.3 °F (36.3 °C)] 97 °F (36.1 °C)  HR:  [] 87  BP: (104-142)/(68-87) 142/87  Resp:  [17-24] 20  SpO2:  [88 %-95 %] 95 %  O2 Device: Nasal cannula  Nasal Cannula O2 Flow Rate (L/min):  [3 L/min-4 L/min] 3 L/min    Physical Exam  Vitals reviewed.   Constitutional:       Appearance: Normal appearance. She is well-developed.   HENT:      Head: Normocephalic and atraumatic.      Nose: Nose normal.      Mouth/Throat:      Mouth: Mucous membranes are moist.   Eyes:      Extraocular Movements: Extraocular movements intact.   Cardiovascular:      Rate and Rhythm: Normal rate and regular rhythm.      Heart sounds: Normal heart sounds.   Pulmonary:      Effort: Pulmonary effort is normal. No respiratory distress.      Breath sounds: No rhonchi or rales.      Comments: Decreased with scant wheeze bilaterally  Musculoskeletal:         General: No swelling.   Skin:     General: Skin is warm and dry.   Neurological:      Mental Status: She is alert. Mental status is at baseline.   Psychiatric:         Mood and Affect: Mood normal.         Behavior: Behavior normal.         Lab Results: I have reviewed the following results:   .     05/01/25  0931 05/02/25  0649   SODIUM 142 143   K 3.8 5.6*    106   CO2 32 34*   BUN 8 16   CREATININE 0.50* 0.55*   GLUC 252* 185*   MG 2.0 2.2   AST 8*  --    ALT 11  --    ALB 3.3*  --    TBILI 0.27  --    ALKPHOS 83  --      ABG: No new results in last 24 hours.

## 2025-05-02 NOTE — PLAN OF CARE
Problem: Potential for Falls  Goal: Patient will remain free of falls  Description: INTERVENTIONS:- Educate patient/family on patient safety including physical limitations- Instruct patient to call for assistance with activity - Consult OT/PT to assist with strengthening/mobility - Keep Call bell within reach- Keep bed low and locked with side rails adjusted as appropriate- Keep care items and personal belongings within reach- Initiate and maintain comfort rounds- Make Fall Risk Sign visible to staff- Offer Toileting every   Hours, in advance of need- Initiate/Maintain  alarm- Obtain necessary fall risk management equipment:  - Apply yellow socks and bracelet for high fall risk patients- Consider moving patient to room near nurses station  INTERVENTIONS:- Educate patient/family on patient safety including physical limitations- Instruct patient to call for assistance with activity - Consult OT/PT to assist with strengthening/mobility - Keep Call bell within reach- Keep bed low and locked with side rails adjusted as appropriate- Keep care items and personal belongings within reach- Initiate and maintain comfort rounds- Make Fall Risk Sign visible to staff- Offer Toileting every   Hours, in advance of need- Initiate/Maintain  alarm- Obtain necessary fall risk management equipment:  - Apply yellow socks and bracelet for high fall risk patients- Consider moving patient to room near nurses station  Outcome: Progressing     Problem: PAIN - ADULT  Goal: Verbalizes/displays adequate comfort level or baseline comfort level  Description: Interventions:- Encourage patient to monitor pain and request assistance- Assess pain using appropriate pain scale- Administer analgesics based on type and severity of pain and evaluate response- Implement non-pharmacological measures as appropriate and evaluate response- Consider cultural and social influences on pain and pain management- Notify physician/advanced practitioner if  interventions unsuccessful or patient reports new pain  Outcome: Progressing     Problem: INFECTION - ADULT  Goal: Absence or prevention of progression during hospitalization  Description: INTERVENTIONS:- Assess and monitor for signs and symptoms of infection- Monitor lab/diagnostic results- Monitor all insertion sites, i.e. indwelling lines, tubes, and drains- Monitor endotracheal if appropriate and nasal secretions for changes in amount and color- [place] appropriate cooling/warming therapies per order- Administer medications as ordered- Instruct and encourage patient and family to use good hand hygiene technique- Identify and instruct in appropriate isolation precautions for identified infection/condition  Outcome: Progressing  Goal: Absence of fever/infection during neutropenic period  Description: INTERVENTIONS:- Monitor WBC  Outcome: Progressing     Problem: SAFETY ADULT  Goal: Patient will remain free of falls  Description: INTERVENTIONS:- Educate patient/family on patient safety including physical limitations- Instruct patient to call for assistance with activity - Consult OT/PT to assist with strengthening/mobility - Keep Call bell within reach- Keep bed low and locked with side rails adjusted as appropriate- Keep care items and personal belongings within reach- Initiate and maintain comfort rounds- Make Fall Risk Sign visible to staff- Offer Toileting every   Hours, in advance of need- Initiate/Maintain  alarm- Obtain necessary fall risk management equipment:  - Apply yellow socks and bracelet for high fall risk patients- Consider moving patient to room near nurses station  INTERVENTIONS:- Educate patient/family on patient safety including physical limitations- Instruct patient to call for assistance with activity - Consult OT/PT to assist with strengthening/mobility - Keep Call bell within reach- Keep bed low and locked with side rails adjusted as appropriate- Keep care items and personal belongings  within reach- Initiate and maintain comfort rounds- Make Fall Risk Sign visible to staff- Offer Toileting every   Hours, in advance of need- Initiate/Maintain  alarm- Obtain necessary fall risk management equipment:  - Apply yellow socks and bracelet for high fall risk patients- Consider moving patient to room near nurses station  Outcome: Progressing  Goal: Maintain or return to baseline ADL function  Description: INTERVENTIONS:-  Assess patient's ability to carry out ADLs; assess patient's baseline for ADL function and identify physical deficits which impact ability to perform ADLs (bathing, care of mouth/teeth, toileting, grooming, dressing, etc.)- Assess/evaluate cause of self-care deficits - Assess range of motion- Assess patient's mobility; develop plan if impaired- Assess patient's need for assistive devices and provide as appropriate- Encourage maximum independence but intervene and supervise when necessary- Involve family in performance of ADLs- Assess for home care needs following discharge - Consider OT consult to assist with ADL evaluation and planning for discharge- Provide patient education as appropriate  Outcome: Progressing  Goal: Maintains/Returns to pre admission functional level  Description: INTERVENTIONS:- Perform AM-PAC 6 Click Basic Mobility/ Daily Activity assessment daily.- Set and communicate daily mobility goal to care team and patient/family/caregiver. - Collaborate with rehabilitation services on mobility goals if consulted- Perform Range of Motion   times a day.- Reposition patient every   hours.- Dangle patient   times a day- Stand patient   times a day- Ambulate patient   times a day- Out of bed to chair   times a day - Out of bed for meals    times a day- Out of bed for toileting- Record patient progress and toleration of activity level   Outcome: Progressing     Problem: DISCHARGE PLANNING  Goal: Discharge to home or other facility with appropriate resources  Description:  INTERVENTIONS:- Identify barriers to discharge w/patient and caregiver- Arrange for needed discharge resources and transportation as appropriate- Identify discharge learning needs (meds, wound care, etc.)- Arrange for interpretive services to assist at discharge as needed- Refer to Case Management Department for coordinating discharge planning if the patient needs post-hospital services based on physician/advanced practitioner order or complex needs related to functional status, cognitive ability, or social support system  Outcome: Progressing     Problem: Knowledge Deficit  Goal: Patient/family/caregiver demonstrates understanding of disease process, treatment plan, medications, and discharge instructions  Description: Complete learning assessment and assess knowledge base.Interventions:- Provide teaching at level of understanding- Provide teaching via preferred learning methods  Outcome: Progressing

## 2025-05-02 NOTE — ASSESSMENT & PLAN NOTE
Lab Results   Component Value Date    HGBA1C 6.5 (H) 11/13/2024       Recent Labs     05/01/25  1644 05/01/25  2102 05/02/25  0723   POCGLU 170* 191* 165*       Blood Sugar Average: Last 72 hrs:  (P) [id number]

## 2025-05-02 NOTE — ASSESSMENT & PLAN NOTE
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  Sputum AFB cultures in February were negative for MARIS.     Check sputum for AFB   Recommend outpatient fu with ID and pulm and consideration for resuming therapy/possible graded challenge. This is a complex decision and she will need close follow up and multidisciplinary evaluation   Recommend nutrition consult and consider palliative care consult given advanced copd and recurrent hospitalizations

## 2025-05-02 NOTE — ASSESSMENT & PLAN NOTE
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  Unable to tolerate therapy due to hepatotoxicity while on rifabutin.  Instead they are monitoring sputum cultures closely off treatment.  CXR progressive pneumonia in right upper lobe superimposed on known cavitary nodular lesions  CTA chest: Worsening right upper lobe pneumonia and new superior segment right lower lobe pneumonia  COVID/Flu/RSV negative  Urine Legionella urine strep is negative sputum cultures pending.  AFB ordered pending  Blood Cultures pending  Procalcitonin x 2 is negative  ID consulted given history of MARIS and recurrent and worsening PNA.  As discussed with speech she could be aspirating due to GI issues as she does have hiatal hernia and what she describes waking up in the morning is reflux increase her Protonix to 40 mg p.o. twice daily she is undergoing a swallow study to speech today to evaluate for any aspiration in terms of food consistency and liquid consistency discussed case with ID and pulmonology.  She is estelita complete Zithromax for 3 days for the COPD exacerbation but she is estleita complete Rocephin for 5 days total  Also pulmonary added vest therapy and saline nebulizer treatment to help patient cough up mucus continue Mucinex as well

## 2025-05-03 PROBLEM — E87.5 HYPERKALEMIA: Status: RESOLVED | Noted: 2025-05-02 | Resolved: 2025-05-03

## 2025-05-03 LAB
ANION GAP SERPL CALCULATED.3IONS-SCNC: 6 MMOL/L (ref 4–13)
BUN SERPL-MCNC: 15 MG/DL (ref 5–25)
CALCIUM SERPL-MCNC: 8.5 MG/DL (ref 8.4–10.2)
CHLORIDE SERPL-SCNC: 103 MMOL/L (ref 96–108)
CO2 SERPL-SCNC: 28 MMOL/L (ref 21–32)
CREAT SERPL-MCNC: 0.47 MG/DL (ref 0.6–1.3)
GFR SERPL CREATININE-BSD FRML MDRD: 102 ML/MIN/1.73SQ M
GLUCOSE SERPL-MCNC: 144 MG/DL (ref 65–140)
GLUCOSE SERPL-MCNC: 150 MG/DL (ref 65–140)
GLUCOSE SERPL-MCNC: 185 MG/DL (ref 65–140)
GLUCOSE SERPL-MCNC: 219 MG/DL (ref 65–140)
GLUCOSE SERPL-MCNC: 230 MG/DL (ref 65–140)
POTASSIUM SERPL-SCNC: 4.2 MMOL/L (ref 3.5–5.3)
SODIUM SERPL-SCNC: 137 MMOL/L (ref 135–147)

## 2025-05-03 PROCEDURE — 87116 MYCOBACTERIA CULTURE: CPT | Performed by: FAMILY MEDICINE

## 2025-05-03 PROCEDURE — 99232 SBSQ HOSP IP/OBS MODERATE 35: CPT | Performed by: FAMILY MEDICINE

## 2025-05-03 PROCEDURE — 94668 MNPJ CHEST WALL SBSQ: CPT

## 2025-05-03 PROCEDURE — 94760 N-INVAS EAR/PLS OXIMETRY 1: CPT

## 2025-05-03 PROCEDURE — 82948 REAGENT STRIP/BLOOD GLUCOSE: CPT

## 2025-05-03 PROCEDURE — 94669 MECHANICAL CHEST WALL OSCILL: CPT

## 2025-05-03 PROCEDURE — 94640 AIRWAY INHALATION TREATMENT: CPT

## 2025-05-03 PROCEDURE — 80048 BASIC METABOLIC PNL TOTAL CA: CPT | Performed by: FAMILY MEDICINE

## 2025-05-03 PROCEDURE — 87206 SMEAR FLUORESCENT/ACID STAI: CPT | Performed by: FAMILY MEDICINE

## 2025-05-03 RX ADMIN — DICYCLOMINE HYDROCHLORIDE 10 MG: 10 CAPSULE ORAL at 11:34

## 2025-05-03 RX ADMIN — HEPARIN SODIUM 5000 UNITS: 5000 INJECTION INTRAVENOUS; SUBCUTANEOUS at 08:40

## 2025-05-03 RX ADMIN — AZITHROMYCIN DIHYDRATE 500 MG: 250 TABLET, FILM COATED ORAL at 17:04

## 2025-05-03 RX ADMIN — PANTOPRAZOLE SODIUM 40 MG: 40 TABLET, DELAYED RELEASE ORAL at 05:54

## 2025-05-03 RX ADMIN — GUAIFENESIN 1200 MG: 600 TABLET, EXTENDED RELEASE ORAL at 08:40

## 2025-05-03 RX ADMIN — INSULIN LISPRO 1 UNITS: 100 INJECTION, SOLUTION INTRAVENOUS; SUBCUTANEOUS at 17:02

## 2025-05-03 RX ADMIN — METHYLPREDNISOLONE SODIUM SUCCINATE 40 MG: 40 INJECTION, POWDER, FOR SOLUTION INTRAMUSCULAR; INTRAVENOUS at 08:40

## 2025-05-03 RX ADMIN — LEVALBUTEROL HYDROCHLORIDE 1.25 MG: 1.25 SOLUTION RESPIRATORY (INHALATION) at 13:52

## 2025-05-03 RX ADMIN — ATORVASTATIN CALCIUM 40 MG: 40 TABLET, FILM COATED ORAL at 08:40

## 2025-05-03 RX ADMIN — FAMOTIDINE 20 MG: 20 TABLET, FILM COATED ORAL at 08:40

## 2025-05-03 RX ADMIN — DICYCLOMINE HYDROCHLORIDE 10 MG: 10 CAPSULE ORAL at 05:54

## 2025-05-03 RX ADMIN — GUAIFENESIN 1200 MG: 600 TABLET, EXTENDED RELEASE ORAL at 21:15

## 2025-05-03 RX ADMIN — IPRATROPIUM BROMIDE 0.5 MG: 0.5 SOLUTION RESPIRATORY (INHALATION) at 07:52

## 2025-05-03 RX ADMIN — PANTOPRAZOLE SODIUM 40 MG: 40 TABLET, DELAYED RELEASE ORAL at 17:04

## 2025-05-03 RX ADMIN — SUCRALFATE 1 G: 1 TABLET ORAL at 08:40

## 2025-05-03 RX ADMIN — SODIUM CHLORIDE SOLN NEBU 3% 4 ML: 3 NEBU SOLN at 07:52

## 2025-05-03 RX ADMIN — ASPIRIN 81 MG: 81 TABLET, COATED ORAL at 08:40

## 2025-05-03 RX ADMIN — DOCUSATE SODIUM 100 MG: 100 CAPSULE, LIQUID FILLED ORAL at 08:41

## 2025-05-03 RX ADMIN — DOCUSATE SODIUM 100 MG: 100 CAPSULE, LIQUID FILLED ORAL at 17:04

## 2025-05-03 RX ADMIN — DICYCLOMINE HYDROCHLORIDE 10 MG: 10 CAPSULE ORAL at 17:04

## 2025-05-03 RX ADMIN — IPRATROPIUM BROMIDE 0.5 MG: 0.5 SOLUTION RESPIRATORY (INHALATION) at 13:52

## 2025-05-03 RX ADMIN — INSULIN LISPRO 1 UNITS: 100 INJECTION, SOLUTION INTRAVENOUS; SUBCUTANEOUS at 11:53

## 2025-05-03 RX ADMIN — IPRATROPIUM BROMIDE 0.5 MG: 0.5 SOLUTION RESPIRATORY (INHALATION) at 19:37

## 2025-05-03 RX ADMIN — HEPARIN SODIUM 5000 UNITS: 5000 INJECTION INTRAVENOUS; SUBCUTANEOUS at 21:15

## 2025-05-03 RX ADMIN — SUCRALFATE 1 G: 1 TABLET ORAL at 17:04

## 2025-05-03 RX ADMIN — METHYLPREDNISOLONE SODIUM SUCCINATE 40 MG: 40 INJECTION, POWDER, FOR SOLUTION INTRAMUSCULAR; INTRAVENOUS at 21:15

## 2025-05-03 RX ADMIN — LEVALBUTEROL HYDROCHLORIDE 1.25 MG: 1.25 SOLUTION RESPIRATORY (INHALATION) at 19:37

## 2025-05-03 RX ADMIN — FAMOTIDINE 20 MG: 20 TABLET, FILM COATED ORAL at 17:04

## 2025-05-03 RX ADMIN — NICOTINE 1 PATCH: 21 PATCH, EXTENDED RELEASE TRANSDERMAL at 08:42

## 2025-05-03 RX ADMIN — CEFTRIAXONE 2000 MG: 2 INJECTION, SOLUTION INTRAVENOUS at 13:32

## 2025-05-03 RX ADMIN — SUCRALFATE 1 G: 1 TABLET ORAL at 21:15

## 2025-05-03 RX ADMIN — SUCRALFATE 1 G: 1 TABLET ORAL at 11:34

## 2025-05-03 RX ADMIN — LEVALBUTEROL HYDROCHLORIDE 1.25 MG: 1.25 SOLUTION RESPIRATORY (INHALATION) at 07:52

## 2025-05-03 NOTE — ASSESSMENT & PLAN NOTE
Presents to ED with shortness of breath  SIRS met: SIRS: 2/4; temperature, tachycardia, tachypnea,  Secondary to worsening pneumonia and new pneumonia forming she was recently admitted in the beginning of April and completed as discussed with infectious disease and pulmonary.  Factious disease she is estelita be on Rocephin for 3/5 days and Zithromax to be completed for COPD exacerbation  resolved

## 2025-05-03 NOTE — PROGRESS NOTES
Progress Note - Hospitalist   Name: Ileana Seaman 65 y.o. female I MRN: [id number]  Unit/Bed#: -01 I Date of Admission: 4/30/2025   Date of Service: 5/3/2025 I Hospital Day: 3    Assessment & Plan  Pneumonia of right lower lobe due to infectious organism  Lab Results   Component Value Date    WBC 8.70 05/01/2025    PROCALCITONI <0.05 05/01/2025   Follows with infectious disease outpatient for MARIS infection.  Unable to tolerate therapy due to hepatotoxicity while on rifabutin.  Instead they are monitoring sputum cultures closely off treatment.  CXR progressive pneumonia in right upper lobe superimposed on known cavitary nodular lesions  CTA chest: Worsening right upper lobe pneumonia and new superior segment right lower lobe pneumonia  COVID/Flu/RSV negative  Urine Legionella urine strep is negative sputum cultures pending.  AFB ordered pending  Blood Cultures pending  Procalcitonin x 2 is negative  ID consulted given history of MARIS and recurrent and worsening PNA.  As discussed with speech she could be aspirating due to GI issues as she does have hiatal hernia and what she describes waking up in the morning is reflux increase her Protonix to 40 mg p.o. twice daily she is undergoing a swallow study to speech today to evaluate for any aspiration in terms of food consistency and liquid consistency discussed case with ID and pulmonology.  She is estelita complete Zithromax for 3 days for the COPD exacerbation but she is estelita complete Rocephin for 5 days total 3/5  Also pulmonary added vest therapy and saline nebulizer treatment to help patient cough up mucus continue Mucinex as well  Acute on chronic hypoxic respiratory failure (HCC)  With history of severe emphysema on 2 L nasal cannula at baseline.  Progressively worsening shortness of breath requiring increased O2 over the past few days.  Secondary to her worsening pneumonia and new pneumonia and COPD exacerbation yesterday she required 4 L of oxygen now she  is trending down to 3  Sepsis (HCC)  Presents to ED with shortness of breath  SIRS met: SIRS: 2/4; temperature, tachycardia, tachypnea,  Secondary to worsening pneumonia and new pneumonia forming she was recently admitted in the beginning of April and completed as discussed with infectious disease and pulmonary.  Factious disease she is estelita be on Rocephin for 3/5 days and Zithromax to be completed for COPD exacerbation  resolved  COPD exacerbation (HCC)  On Trelegy for maintenance.    Decreased Solu-Medrol to 40 mg IV every 12 hours by pulmonary continue Xopenex and Atrovent on Pulmicort  Discussed case with pulmonology  Mycobacterial infection, non-TB  Underwent bronchoscopy 5/2024 for cavitary lesion biopsy, growing Mycobacterium intracellular.    Following outpatient with infectious disease regarding MARIS infection.  She was unable to tolerate the rifabutin regimen given hepatotoxicity and is instead being monitored with sputum cultures and AFB.  Is due for culture this upcoming week.  Follow sputum culture and AFB still pending to be collected  ID consulted discussed with ID will need outpatient follow-up with ID and pulmonary to discuss restarting treatment  Pulmonary nodule  Noted on CT chest.  Reviewed with patient.  Continue follow-up with pulmonology  Cigarette nicotine dependence without complication      VTE Pharmacologic Prophylaxis:   Moderate Risk (Score 3-4) - Pharmacological DVT Prophylaxis Ordered: heparin.    Mobility:   Basic Mobility Inpatient Raw Score: 22  JH-HLM Goal: 7: Walk 25 feet or more  JH-HLM Achieved: 7: Walk 25 feet or more  JH-HLM Goal achieved. Continue to encourage appropriate mobility.    Patient Centered Rounds: I performed bedside rounds with nursing staff today.   Discussions with Specialists or Other Care Team Provider: None    Education and Discussions with Family / Patient: Patient will update family    Current Length of Stay: 3 day(s)  Current Patient Status: Inpatient    Certification Statement: The patient will continue to require additional inpatient hospital stay due to secondary to pneumonia and COPD exacerbation  Discharge Plan: Anticipate discharge in 48-72 hrs to home.    Code Status: Level 1 - Full Code    Subjective   Seen and examined she is coughing up more sputum so feeling a little better than on the day of admission    Objective :  Temp:  [96.8 °F (36 °C)-97 °F (36.1 °C)] 96.8 °F (36 °C)  HR:  [] 70  BP: (124-143)/(80-83) 143/83  Resp:  [16-18] 17  SpO2:  [89 %-97 %] 97 %  O2 Device: Nasal cannula  Nasal Cannula O2 Flow Rate (L/min):  [2.5 L/min-3 L/min] 3 L/min    Body mass index is 17.76 kg/m².     Input and Output Summary (last 24 hours):     Intake/Output Summary (Last 24 hours) at 5/3/2025 1145  Last data filed at 5/3/2025 0900  Gross per 24 hour   Intake 300 ml   Output 1000 ml   Net -700 ml       Physical Exam  Vitals and nursing note reviewed.   Constitutional:       General: She is not in acute distress.     Appearance: She is well-developed.   HENT:      Head: Normocephalic and atraumatic.   Eyes:      Conjunctiva/sclera: Conjunctivae normal.   Cardiovascular:      Rate and Rhythm: Normal rate and regular rhythm.      Heart sounds: No murmur heard.  Pulmonary:      Effort: Pulmonary effort is normal. No respiratory distress.      Breath sounds: Rales present. No wheezing.   Abdominal:      Palpations: Abdomen is soft.      Tenderness: There is no abdominal tenderness.   Musculoskeletal:         General: No swelling.      Cervical back: Neck supple.   Skin:     General: Skin is warm and dry.      Capillary Refill: Capillary refill takes less than 2 seconds.   Neurological:      Mental Status: She is alert and oriented to person, place, and time.   Psychiatric:         Mood and Affect: Mood normal.           Lines/Drains:              Lab Results: I have reviewed the following results:   Results from last 7 days   Lab Units 05/01/25  0549   WBC  Thousand/uL 8.70   HEMOGLOBIN g/dL 11.2*   HEMATOCRIT % 34.8   PLATELETS Thousands/uL 318   SEGS PCT % 86*   LYMPHO PCT % 9*   MONO PCT % 5   EOS PCT % 0     Results from last 7 days   Lab Units 05/03/25  0457 05/02/25  0649 05/01/25  0931   SODIUM mmol/L 137   < > 142   POTASSIUM mmol/L 4.2   < > 3.8   CHLORIDE mmol/L 103   < > 103   CO2 mmol/L 28   < > 32   BUN mg/dL 15   < > 8   CREATININE mg/dL 0.47*   < > 0.50*   ANION GAP mmol/L 6   < > 7   CALCIUM mg/dL 8.5   < > 9.0   ALBUMIN g/dL  --   --  3.3*   TOTAL BILIRUBIN mg/dL  --   --  0.27   ALK PHOS U/L  --   --  83   ALT U/L  --   --  11   AST U/L  --   --  8*   GLUCOSE RANDOM mg/dL 185*   < > 252*    < > = values in this interval not displayed.     Results from last 7 days   Lab Units 04/30/25  1219   INR  1.06     Results from last 7 days   Lab Units 05/03/25  1123 05/03/25  0719 05/02/25  2111 05/02/25  1630 05/02/25  1119 05/02/25  0723 05/01/25  2102 05/01/25  1644   POC GLUCOSE mg/dl 150* 144* 179* 214* 193* 165* 191* 170*     Results from last 7 days   Lab Units 05/02/25  0649   HEMOGLOBIN A1C % 7.4*     Results from last 7 days   Lab Units 05/01/25  0545 04/30/25  1648 04/30/25  1219   LACTIC ACID mmol/L  --  1.2  --    PROCALCITONIN ng/ml <0.05  --  <0.05       Recent Cultures (last 7 days):   Results from last 7 days   Lab Units 05/02/25  1607 04/30/25  1830 04/30/25  1419 04/30/25  1405   BLOOD CULTURE   --   --  No Growth at 48 hrs. No Growth at 48 hrs.   SPUTUM CULTURE  Culture too young- will reincubate  --   --   --    GRAM STAIN RESULT  2+ Gram negative rods*  2+ Gram positive cocci in pairs*  1+ Epithelial cells per low power field*  No polys seen*  --   --   --    LEGIONELLA URINARY ANTIGEN   --  Negative  --   --        Imaging Results Review: No pertinent imaging studies reviewed.  Other Study Results Review: No additional pertinent studies reviewed.    Last 24 Hours Medication List:     Current Facility-Administered Medications:      acetaminophen (TYLENOL) tablet 650 mg, Q6H PRN    albuterol inhalation solution 2.5 mg, Q6H PRN    aspirin (ECOTRIN LOW STRENGTH) EC tablet 81 mg, Daily    atorvastatin (LIPITOR) tablet 40 mg, Daily    azithromycin (ZITHROMAX) tablet 500 mg, Q24H    benzonatate (TESSALON PERLES) capsule 100 mg, TID PRN    cefTRIAXone (ROCEPHIN) IVPB (premix in dextrose) 2,000 mg 50 mL, Q24H, Last Rate: 2,000 mg (05/02/25 1259)    dicyclomine (BENTYL) capsule 10 mg, TID AC    docusate sodium (COLACE) capsule 100 mg, BID    famotidine (PEPCID) tablet 20 mg, BID    guaiFENesin (MUCINEX) 12 hr tablet 1,200 mg, Q12H MAYCO    heparin (porcine) subcutaneous injection 5,000 Units, Q12H MAYCO    insulin lispro (HumALOG/ADMELOG) 100 units/mL subcutaneous injection 1-5 Units, TID AC **AND** Fingerstick Glucose (POCT), TID AC    ipratropium (ATROVENT) 0.02 % inhalation solution 0.5 mg, TID    levalbuterol (XOPENEX) inhalation solution 1.25 mg, TID    methylPREDNISolone sodium succinate (Solu-MEDROL) injection 40 mg, Q12H MAYCO    nicotine (NICODERM CQ) 21 mg/24 hr TD 24 hr patch 1 patch, Daily    pantoprazole (PROTONIX) EC tablet 40 mg, BID AC    simethicone (MYLICON) chewable tablet 80 mg, Q6H PRN    sodium chloride 3 % inhalation solution 4 mL, TID    sucralfate (CARAFATE) tablet 1 g, 4x Daily    Administrative Statements   Today, Patient Was Seen By: Rand Cantrell MD  I have spent a total time of >35 minutes in caring for this patient on the day of the visit/encounter including Patient and family education, Documenting in the medical record, and Reviewing/placing orders in the medical record (including tests, medications, and/or procedures).    **Please Note: This note may have been constructed using a voice recognition system.**

## 2025-05-03 NOTE — PLAN OF CARE
Problem: Potential for Falls  Goal: Patient will remain free of falls  Description: INTERVENTIONS:- Educate patient/family on patient safety including physical limitations- Instruct patient to call for assistance with activity - Consult OT/PT to assist with strengthening/mobility - Keep Call bell within reach- Keep bed low and locked with side rails adjusted as appropriate- Keep care items and personal belongings within reach- Initiate and maintain comfort rounds- Make Fall Risk Sign visible to staff- Offer Toileting every  Hours, in advance of need- Initiate/Maintain alarm- Obtain necessary fall risk management equipment: - Apply yellow socks and bracelet for high fall risk patients- Consider moving patient to room near nurses station  INTERVENTIONS:- Educate patient/family on patient safety including physical limitations- Instruct patient to call for assistance with activity - Consult OT/PT to assist with strengthening/mobility - Keep Call bell within reach- Keep bed low and locked with side rails adjusted as appropriate- Keep care items and personal belongings within reach- Initiate and maintain comfort rounds- Make Fall Risk Sign visible to staff- Offer Toileting every  Hours, in advance of need- Initiate/Maintain alarm- Obtain necessary fall risk management equipment: - Apply yellow socks and bracelet for high fall risk patients- Consider moving patient to room near nurses station  Outcome: Progressing     Problem: PAIN - ADULT  Goal: Verbalizes/displays adequate comfort level or baseline comfort level  Description: Interventions:- Encourage patient to monitor pain and request assistance- Assess pain using appropriate pain scale- Administer analgesics based on type and severity of pain and evaluate response- Implement non-pharmacological measures as appropriate and evaluate response- Consider cultural and social influences on pain and pain management- Notify physician/advanced practitioner if interventions  unsuccessful or patient reports new pain  Outcome: Progressing     Problem: INFECTION - ADULT  Goal: Absence or prevention of progression during hospitalization  Description: INTERVENTIONS:- Assess and monitor for signs and symptoms of infection- Monitor lab/diagnostic results- Monitor all insertion sites, i.e. indwelling lines, tubes, and drains- Monitor endotracheal if appropriate and nasal secretions for changes in amount and color- Byron appropriate cooling/warming therapies per order- Administer medications as ordered- Instruct and encourage patient and family to use good hand hygiene technique- Identify and instruct in appropriate isolation precautions for identified infection/condition  Outcome: Progressing  Goal: Absence of fever/infection during neutropenic period  Description: INTERVENTIONS:- Monitor WBC  Outcome: Progressing     Problem: SAFETY ADULT  Goal: Patient will remain free of falls  Description: INTERVENTIONS:- Educate patient/family on patient safety including physical limitations- Instruct patient to call for assistance with activity - Consult OT/PT to assist with strengthening/mobility - Keep Call bell within reach- Keep bed low and locked with side rails adjusted as appropriate- Keep care items and personal belongings within reach- Initiate and maintain comfort rounds- Make Fall Risk Sign visible to staff- Offer Toileting every  Hours, in advance of need- Initiate/Maintain alarm- Obtain necessary fall risk management equipment: - Apply yellow socks and bracelet for high fall risk patients- Consider moving patient to room near nurses station  INTERVENTIONS:- Educate patient/family on patient safety including physical limitations- Instruct patient to call for assistance with activity - Consult OT/PT to assist with strengthening/mobility - Keep Call bell within reach- Keep bed low and locked with side rails adjusted as appropriate- Keep care items and personal belongings within reach-  Initiate and maintain comfort rounds- Make Fall Risk Sign visible to staff- Offer Toileting every  Hours, in advance of need- Initiate/Maintain alarm- Obtain necessary fall risk management equipment: - Apply yellow socks and bracelet for high fall risk patients- Consider moving patient to room near nurses station  Outcome: Progressing  Goal: Maintain or return to baseline ADL function  Description: INTERVENTIONS:-  Assess patient's ability to carry out ADLs; assess patient's baseline for ADL function and identify physical deficits which impact ability to perform ADLs (bathing, care of mouth/teeth, toileting, grooming, dressing, etc.)- Assess/evaluate cause of self-care deficits - Assess range of motion- Assess patient's mobility; develop plan if impaired- Assess patient's need for assistive devices and provide as appropriate- Encourage maximum independence but intervene and supervise when necessary- Involve family in performance of ADLs- Assess for home care needs following discharge - Consider OT consult to assist with ADL evaluation and planning for discharge- Provide patient education as appropriate  Outcome: Progressing  Goal: Maintains/Returns to pre admission functional level  Description: INTERVENTIONS:- Perform AM-PAC 6 Click Basic Mobility/ Daily Activity assessment daily.- Set and communicate daily mobility goal to care team and patient/family/caregiver. - Collaborate with rehabilitation services on mobility goals if consulted- Perform Range of Motion  times a day.- Reposition patient every  hours.- Dangle patient  times a day- Stand patient  times a day- Ambulate patient  times a day- Out of bed to chair  times a day - Out of bed for meals  times a day- Out of bed for toileting- Record patient progress and toleration of activity level   Outcome: Progressing     Problem: DISCHARGE PLANNING  Goal: Discharge to home or other facility with appropriate resources  Description: INTERVENTIONS:- Identify barriers  to discharge w/patient and caregiver- Arrange for needed discharge resources and transportation as appropriate- Identify discharge learning needs (meds, wound care, etc.)- Arrange for interpretive services to assist at discharge as needed- Refer to Case Management Department for coordinating discharge planning if the patient needs post-hospital services based on physician/advanced practitioner order or complex needs related to functional status, cognitive ability, or social support system  Outcome: Progressing     Problem: Knowledge Deficit  Goal: Patient/family/caregiver demonstrates understanding of disease process, treatment plan, medications, and discharge instructions  Description: Complete learning assessment and assess knowledge base.Interventions:- Provide teaching at level of understanding- Provide teaching via preferred learning methods  Outcome: Progressing     Problem: Nutrition/Hydration-ADULT  Goal: Nutrient/Hydration intake appropriate for improving, restoring or maintaining nutritional needs  Description: Monitor and assess patient's nutrition/hydration status for malnutrition. Collaborate with interdisciplinary team and initiate plan and interventions as ordered.  Monitor patient's weight and dietary intake as ordered or per policy. Utilize nutrition screening tool and intervene as necessary. Determine patient's food preferences and provide high-protein, high-caloric foods as appropriate. INTERVENTIONS:- Monitor oral intake, urinary output, labs, and treatment plans- Assess nutrition and hydration status and recommend course of action- Evaluate amount of meals eaten- Assist patient with eating if necessary - Allow adequate time for meals- Recommend/ encourage appropriate diets, oral nutritional supplements, and vitamin/mineral supplements- Order, calculate, and assess calorie counts as needed- Recommend, monitor, and adjust tube feedings and TPN/PPN based on assessed needs- Assess need for  intravenous fluids- Provide specific nutrition/hydration education as appropriate- Include patient/family/caregiver in decisions related to nutrition  Outcome: Progressing

## 2025-05-03 NOTE — PLAN OF CARE
Problem: Potential for Falls  Goal: Patient will remain free of falls  Description: INTERVENTIONS:- Educate patient/family on patient safety including physical limitations- Instruct patient to call for assistance with activity - Consult OT/PT to assist with strengthening/mobility - Keep Call bell within reach- Keep bed low and locked with side rails adjusted as appropriate- Keep care items and personal belongings within reach- Initiate and maintain comfort rounds- Make Fall Risk Sign visible to staff- Offer Toileting every  Hours, in advance of need- Initiate/Maintain alarm- Obtain necessary fall risk management equipment: - Apply yellow socks and bracelet for high fall risk patients- Consider moving patient to room near nurses station  INTERVENTIONS:- Educate patient/family on patient safety including physical limitations- Instruct patient to call for assistance with activity - Consult OT/PT to assist with strengthening/mobility - Keep Call bell within reach- Keep bed low and locked with side rails adjusted as appropriate- Keep care items and personal belongings within reach- Initiate and maintain comfort rounds- Make Fall Risk Sign visible to staff- Offer Toileting every  Hours, in advance of need- Initiate/Maintain alarm- Obtain necessary fall risk management equipment: - Apply yellow socks and bracelet for high fall risk patients- Consider moving patient to room near nurses station  Outcome: Progressing     Problem: PAIN - ADULT  Goal: Verbalizes/displays adequate comfort level or baseline comfort level  Description: Interventions:- Encourage patient to monitor pain and request assistance- Assess pain using appropriate pain scale- Administer analgesics based on type and severity of pain and evaluate response- Implement non-pharmacological measures as appropriate and evaluate response- Consider cultural and social influences on pain and pain management- Notify physician/advanced practitioner if interventions  unsuccessful or patient reports new pain  Outcome: Progressing     Problem: INFECTION - ADULT  Goal: Absence or prevention of progression during hospitalization  Description: INTERVENTIONS:- Assess and monitor for signs and symptoms of infection- Monitor lab/diagnostic results- Monitor all insertion sites, i.e. indwelling lines, tubes, and drains- Monitor endotracheal if appropriate and nasal secretions for changes in amount and color- [place] appropriate cooling/warming therapies per order- Administer medications as ordered- Instruct and encourage patient and family to use good hand hygiene technique- Identify and instruct in appropriate isolation precautions for identified infection/condition  Outcome: Progressing  Goal: Absence of fever/infection during neutropenic period  Description: INTERVENTIONS:- Monitor WBC  Outcome: Progressing     Problem: SAFETY ADULT  Goal: Patient will remain free of falls  Description: INTERVENTIONS:- Educate patient/family on patient safety including physical limitations- Instruct patient to call for assistance with activity - Consult OT/PT to assist with strengthening/mobility - Keep Call bell within reach- Keep bed low and locked with side rails adjusted as appropriate- Keep care items and personal belongings within reach- Initiate and maintain comfort rounds- Make Fall Risk Sign visible to staff- Offer Toileting every  Hours, in advance of need- Initiate/Maintain alarm- Obtain necessary fall risk management equipment: - Apply yellow socks and bracelet for high fall risk patients- Consider moving patient to room near nurses station  INTERVENTIONS:- Educate patient/family on patient safety including physical limitations- Instruct patient to call for assistance with activity - Consult OT/PT to assist with strengthening/mobility - Keep Call bell within reach- Keep bed low and locked with side rails adjusted as appropriate- Keep care items and personal belongings within reach-  Initiate and maintain comfort rounds- Make Fall Risk Sign visible to staff- Offer Toileting every  Hours, in advance of need- Initiate/Maintain alarm- Obtain necessary fall risk management equipment: - Apply yellow socks and bracelet for high fall risk patients- Consider moving patient to room near nurses station  Outcome: Progressing  Goal: Maintain or return to baseline ADL function  Description: INTERVENTIONS:-  Assess patient's ability to carry out ADLs; assess patient's baseline for ADL function and identify physical deficits which impact ability to perform ADLs (bathing, care of mouth/teeth, toileting, grooming, dressing, etc.)- Assess/evaluate cause of self-care deficits - Assess range of motion- Assess patient's mobility; develop plan if impaired- Assess patient's need for assistive devices and provide as appropriate- Encourage maximum independence but intervene and supervise when necessary- Involve family in performance of ADLs- Assess for home care needs following discharge - Consider OT consult to assist with ADL evaluation and planning for discharge- Provide patient education as appropriate  Outcome: Progressing  Goal: Maintains/Returns to pre admission functional level  Description: INTERVENTIONS:- Perform AM-PAC 6 Click Basic Mobility/ Daily Activity assessment daily.- Set and communicate daily mobility goal to care team and patient/family/caregiver. - Collaborate with rehabilitation services on mobility goals if consulted- Perform Range of Motion  times a day.- Reposition patient every  hours.- Dangle patient  times a day- Stand patient  times a day- Ambulate patient  times a day- Out of bed to chair  times a day - Out of bed for meals  times a day- Out of bed for toileting- Record patient progress and toleration of activity level   Outcome: Progressing     Problem: DISCHARGE PLANNING  Goal: Discharge to home or other facility with appropriate resources  Description: INTERVENTIONS:- Identify barriers  to discharge w/patient and caregiver- Arrange for needed discharge resources and transportation as appropriate- Identify discharge learning needs (meds, wound care, etc.)- Arrange for interpretive services to assist at discharge as needed- Refer to Case Management Department for coordinating discharge planning if the patient needs post-hospital services based on physician/advanced practitioner order or complex needs related to functional status, cognitive ability, or social support system  Outcome: Progressing     Problem: Knowledge Deficit  Goal: Patient/family/caregiver demonstrates understanding of disease process, treatment plan, medications, and discharge instructions  Description: Complete learning assessment and assess knowledge base.Interventions:- Provide teaching at level of understanding- Provide teaching via preferred learning methods  Outcome: Progressing     Problem: Nutrition/Hydration-ADULT  Goal: Nutrient/Hydration intake appropriate for improving, restoring or maintaining nutritional needs  Description: Monitor and assess patient's nutrition/hydration status for malnutrition. Collaborate with interdisciplinary team and initiate plan and interventions as ordered.  Monitor patient's weight and dietary intake as ordered or per policy. Utilize nutrition screening tool and intervene as necessary. Determine patient's food preferences and provide high-protein, high-caloric foods as appropriate. INTERVENTIONS:- Monitor oral intake, urinary output, labs, and treatment plans- Assess nutrition and hydration status and recommend course of action- Evaluate amount of meals eaten- Assist patient with eating if necessary - Allow adequate time for meals- Recommend/ encourage appropriate diets, oral nutritional supplements, and vitamin/mineral supplements- Order, calculate, and assess calorie counts as needed- Recommend, monitor, and adjust tube feedings and TPN/PPN based on assessed needs- Assess need for  intravenous fluids- Provide specific nutrition/hydration education as appropriate- Include patient/family/caregiver in decisions related to nutrition  Outcome: Progressing

## 2025-05-03 NOTE — PLAN OF CARE
Problem: Potential for Falls  Goal: Patient will remain free of falls  Description: INTERVENTIONS:- Educate patient/family on patient safety including physical limitations- Instruct patient to call for assistance with activity - Consult OT/PT to assist with strengthening/mobility - Keep Call bell within reach- Keep bed low and locked with side rails adjusted as appropriate- Keep care items and personal belongings within reach- Initiate and maintain comfort rounds- Make Fall Risk Sign visible to staff- Offer Toileting every  Hours, in advance of need- Initiate/Maintain alarm- Obtain necessary fall risk management equipment: - Apply yellow socks and bracelet for high fall risk patients- Consider moving patient to room near nurses station  INTERVENTIONS:- Educate patient/family on patient safety including physical limitations- Instruct patient to call for assistance with activity - Consult OT/PT to assist with strengthening/mobility - Keep Call bell within reach- Keep bed low and locked with side rails adjusted as appropriate- Keep care items and personal belongings within reach- Initiate and maintain comfort rounds- Make Fall Risk Sign visible to staff- Offer Toileting every  Hours, in advance of need- Initiate/Maintain alarm- Obtain necessary fall risk management equipment: - Apply yellow socks and bracelet for high fall risk patients- Consider moving patient to room near nurses station  Outcome: Progressing     Problem: PAIN - ADULT  Goal: Verbalizes/displays adequate comfort level or baseline comfort level  Description: Interventions:- Encourage patient to monitor pain and request assistance- Assess pain using appropriate pain scale- Administer analgesics based on type and severity of pain and evaluate response- Implement non-pharmacological measures as appropriate and evaluate response- Consider cultural and social influences on pain and pain management- Notify physician/advanced practitioner if interventions  unsuccessful or patient reports new pain  Outcome: Progressing     Problem: INFECTION - ADULT  Goal: Absence or prevention of progression during hospitalization  Description: INTERVENTIONS:- Assess and monitor for signs and symptoms of infection- Monitor lab/diagnostic results- Monitor all insertion sites, i.e. indwelling lines, tubes, and drains- Monitor endotracheal if appropriate and nasal secretions for changes in amount and color- [place] appropriate cooling/warming therapies per order- Administer medications as ordered- Instruct and encourage patient and family to use good hand hygiene technique- Identify and instruct in appropriate isolation precautions for identified infection/condition  Outcome: Progressing  Goal: Absence of fever/infection during neutropenic period  Description: INTERVENTIONS:- Monitor WBC  Outcome: Progressing     Problem: SAFETY ADULT  Goal: Patient will remain free of falls  Description: INTERVENTIONS:- Educate patient/family on patient safety including physical limitations- Instruct patient to call for assistance with activity - Consult OT/PT to assist with strengthening/mobility - Keep Call bell within reach- Keep bed low and locked with side rails adjusted as appropriate- Keep care items and personal belongings within reach- Initiate and maintain comfort rounds- Make Fall Risk Sign visible to staff- Offer Toileting every  Hours, in advance of need- Initiate/Maintain alarm- Obtain necessary fall risk management equipment: - Apply yellow socks and bracelet for high fall risk patients- Consider moving patient to room near nurses station  INTERVENTIONS:- Educate patient/family on patient safety including physical limitations- Instruct patient to call for assistance with activity - Consult OT/PT to assist with strengthening/mobility - Keep Call bell within reach- Keep bed low and locked with side rails adjusted as appropriate- Keep care items and personal belongings within reach-  Initiate and maintain comfort rounds- Make Fall Risk Sign visible to staff- Offer Toileting every  Hours, in advance of need- Initiate/Maintain alarm- Obtain necessary fall risk management equipment: - Apply yellow socks and bracelet for high fall risk patients- Consider moving patient to room near nurses station  Outcome: Progressing  Goal: Maintain or return to baseline ADL function  Description: INTERVENTIONS:-  Assess patient's ability to carry out ADLs; assess patient's baseline for ADL function and identify physical deficits which impact ability to perform ADLs (bathing, care of mouth/teeth, toileting, grooming, dressing, etc.)- Assess/evaluate cause of self-care deficits - Assess range of motion- Assess patient's mobility; develop plan if impaired- Assess patient's need for assistive devices and provide as appropriate- Encourage maximum independence but intervene and supervise when necessary- Involve family in performance of ADLs- Assess for home care needs following discharge - Consider OT consult to assist with ADL evaluation and planning for discharge- Provide patient education as appropriate  Outcome: Progressing  Goal: Maintains/Returns to pre admission functional level  Description: INTERVENTIONS:- Perform AM-PAC 6 Click Basic Mobility/ Daily Activity assessment daily.- Set and communicate daily mobility goal to care team and patient/family/caregiver. - Collaborate with rehabilitation services on mobility goals if consulted- Perform Range of Motion  times a day.- Reposition patient every  hours.- Dangle patient  times a day- Stand patient  times a day- Ambulate patient  times a day- Out of bed to chair  times a day - Out of bed for meals  times a day- Out of bed for toileting- Record patient progress and toleration of activity level   Outcome: Progressing     Problem: DISCHARGE PLANNING  Goal: Discharge to home or other facility with appropriate resources  Description: INTERVENTIONS:- Identify barriers  to discharge w/patient and caregiver- Arrange for needed discharge resources and transportation as appropriate- Identify discharge learning needs (meds, wound care, etc.)- Arrange for interpretive services to assist at discharge as needed- Refer to Case Management Department for coordinating discharge planning if the patient needs post-hospital services based on physician/advanced practitioner order or complex needs related to functional status, cognitive ability, or social support system  Outcome: Progressing     Problem: Knowledge Deficit  Goal: Patient/family/caregiver demonstrates understanding of disease process, treatment plan, medications, and discharge instructions  Description: Complete learning assessment and assess knowledge base.Interventions:- Provide teaching at level of understanding- Provide teaching via preferred learning methods  Outcome: Progressing     Problem: Nutrition/Hydration-ADULT  Goal: Nutrient/Hydration intake appropriate for improving, restoring or maintaining nutritional needs  Description: Monitor and assess patient's nutrition/hydration status for malnutrition. Collaborate with interdisciplinary team and initiate plan and interventions as ordered.  Monitor patient's weight and dietary intake as ordered or per policy. Utilize nutrition screening tool and intervene as necessary. Determine patient's food preferences and provide high-protein, high-caloric foods as appropriate. INTERVENTIONS:- Monitor oral intake, urinary output, labs, and treatment plans- Assess nutrition and hydration status and recommend course of action- Evaluate amount of meals eaten- Assist patient with eating if necessary - Allow adequate time for meals- Recommend/ encourage appropriate diets, oral nutritional supplements, and vitamin/mineral supplements- Order, calculate, and assess calorie counts as needed- Recommend, monitor, and adjust tube feedings and TPN/PPN based on assessed needs- Assess need for  intravenous fluids- Provide specific nutrition/hydration education as appropriate- Include patient/family/caregiver in decisions related to nutrition  Outcome: Progressing

## 2025-05-03 NOTE — ASSESSMENT & PLAN NOTE
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  Unable to tolerate therapy due to hepatotoxicity while on rifabutin.  Instead they are monitoring sputum cultures closely off treatment.  CXR progressive pneumonia in right upper lobe superimposed on known cavitary nodular lesions  CTA chest: Worsening right upper lobe pneumonia and new superior segment right lower lobe pneumonia  COVID/Flu/RSV negative  Urine Legionella urine strep is negative sputum cultures pending.  AFB ordered pending  Blood Cultures pending  Procalcitonin x 2 is negative  ID consulted given history of MARIS and recurrent and worsening PNA.  As discussed with speech she could be aspirating due to GI issues as she does have hiatal hernia and what she describes waking up in the morning is reflux increase her Protonix to 40 mg p.o. twice daily she is undergoing a swallow study to speech today to evaluate for any aspiration in terms of food consistency and liquid consistency discussed case with ID and pulmonology.  She is estelita complete Zithromax for 3 days for the COPD exacerbation but she is estelita complete Rocephin for 5 days total 3/5  Also pulmonary added vest therapy and saline nebulizer treatment to help patient cough up mucus continue Mucinex as well

## 2025-05-04 LAB
ANION GAP SERPL CALCULATED.3IONS-SCNC: 7 MMOL/L (ref 4–13)
BACTERIA SPT RESP CULT: ABNORMAL
BUN SERPL-MCNC: 15 MG/DL (ref 5–25)
CALCIUM SERPL-MCNC: 8.5 MG/DL (ref 8.4–10.2)
CHLORIDE SERPL-SCNC: 102 MMOL/L (ref 96–108)
CO2 SERPL-SCNC: 29 MMOL/L (ref 21–32)
CREAT SERPL-MCNC: 0.53 MG/DL (ref 0.6–1.3)
GFR SERPL CREATININE-BSD FRML MDRD: 98 ML/MIN/1.73SQ M
GLUCOSE SERPL-MCNC: 141 MG/DL (ref 65–140)
GLUCOSE SERPL-MCNC: 159 MG/DL (ref 65–140)
GLUCOSE SERPL-MCNC: 169 MG/DL (ref 65–140)
GLUCOSE SERPL-MCNC: 181 MG/DL (ref 65–140)
GLUCOSE SERPL-MCNC: 199 MG/DL (ref 65–140)
GRAM STN SPEC: ABNORMAL
POTASSIUM SERPL-SCNC: 4.2 MMOL/L (ref 3.5–5.3)
SODIUM SERPL-SCNC: 138 MMOL/L (ref 135–147)

## 2025-05-04 PROCEDURE — 94669 MECHANICAL CHEST WALL OSCILL: CPT

## 2025-05-04 PROCEDURE — 99232 SBSQ HOSP IP/OBS MODERATE 35: CPT | Performed by: FAMILY MEDICINE

## 2025-05-04 PROCEDURE — 94760 N-INVAS EAR/PLS OXIMETRY 1: CPT

## 2025-05-04 PROCEDURE — 94640 AIRWAY INHALATION TREATMENT: CPT

## 2025-05-04 PROCEDURE — 94668 MNPJ CHEST WALL SBSQ: CPT

## 2025-05-04 PROCEDURE — 80048 BASIC METABOLIC PNL TOTAL CA: CPT | Performed by: FAMILY MEDICINE

## 2025-05-04 PROCEDURE — 82948 REAGENT STRIP/BLOOD GLUCOSE: CPT

## 2025-05-04 RX ORDER — CEFTRIAXONE 2 G/50ML
2000 INJECTION, SOLUTION INTRAVENOUS EVERY 24 HOURS
Status: COMPLETED | OUTPATIENT
Start: 2025-05-04 | End: 2025-05-04

## 2025-05-04 RX ADMIN — ATORVASTATIN CALCIUM 40 MG: 40 TABLET, FILM COATED ORAL at 09:11

## 2025-05-04 RX ADMIN — DICYCLOMINE HYDROCHLORIDE 10 MG: 10 CAPSULE ORAL at 11:34

## 2025-05-04 RX ADMIN — ASPIRIN 81 MG: 81 TABLET, COATED ORAL at 09:10

## 2025-05-04 RX ADMIN — METHYLPREDNISOLONE SODIUM SUCCINATE 40 MG: 40 INJECTION, POWDER, FOR SOLUTION INTRAMUSCULAR; INTRAVENOUS at 09:10

## 2025-05-04 RX ADMIN — DICYCLOMINE HYDROCHLORIDE 10 MG: 10 CAPSULE ORAL at 17:24

## 2025-05-04 RX ADMIN — IPRATROPIUM BROMIDE 0.5 MG: 0.5 SOLUTION RESPIRATORY (INHALATION) at 14:48

## 2025-05-04 RX ADMIN — SUCRALFATE 1 G: 1 TABLET ORAL at 17:24

## 2025-05-04 RX ADMIN — FAMOTIDINE 20 MG: 20 TABLET, FILM COATED ORAL at 09:11

## 2025-05-04 RX ADMIN — FAMOTIDINE 20 MG: 20 TABLET, FILM COATED ORAL at 17:24

## 2025-05-04 RX ADMIN — PANTOPRAZOLE SODIUM 40 MG: 40 TABLET, DELAYED RELEASE ORAL at 17:23

## 2025-05-04 RX ADMIN — DOCUSATE SODIUM 100 MG: 100 CAPSULE, LIQUID FILLED ORAL at 09:11

## 2025-05-04 RX ADMIN — GUAIFENESIN 1200 MG: 600 TABLET, EXTENDED RELEASE ORAL at 09:11

## 2025-05-04 RX ADMIN — SUCRALFATE 1 G: 1 TABLET ORAL at 21:11

## 2025-05-04 RX ADMIN — LEVALBUTEROL HYDROCHLORIDE 1.25 MG: 1.25 SOLUTION RESPIRATORY (INHALATION) at 14:48

## 2025-05-04 RX ADMIN — HEPARIN SODIUM 5000 UNITS: 5000 INJECTION INTRAVENOUS; SUBCUTANEOUS at 09:10

## 2025-05-04 RX ADMIN — DICYCLOMINE HYDROCHLORIDE 10 MG: 10 CAPSULE ORAL at 05:45

## 2025-05-04 RX ADMIN — NICOTINE 1 PATCH: 21 PATCH, EXTENDED RELEASE TRANSDERMAL at 09:10

## 2025-05-04 RX ADMIN — IPRATROPIUM BROMIDE 0.5 MG: 0.5 SOLUTION RESPIRATORY (INHALATION) at 07:47

## 2025-05-04 RX ADMIN — SUCRALFATE 1 G: 1 TABLET ORAL at 11:34

## 2025-05-04 RX ADMIN — INSULIN LISPRO 1 UNITS: 100 INJECTION, SOLUTION INTRAVENOUS; SUBCUTANEOUS at 11:34

## 2025-05-04 RX ADMIN — METHYLPREDNISOLONE SODIUM SUCCINATE 40 MG: 40 INJECTION, POWDER, FOR SOLUTION INTRAMUSCULAR; INTRAVENOUS at 21:11

## 2025-05-04 RX ADMIN — GUAIFENESIN 1200 MG: 600 TABLET, EXTENDED RELEASE ORAL at 21:11

## 2025-05-04 RX ADMIN — LEVALBUTEROL HYDROCHLORIDE 1.25 MG: 1.25 SOLUTION RESPIRATORY (INHALATION) at 19:24

## 2025-05-04 RX ADMIN — HEPARIN SODIUM 5000 UNITS: 5000 INJECTION INTRAVENOUS; SUBCUTANEOUS at 21:11

## 2025-05-04 RX ADMIN — INSULIN LISPRO 1 UNITS: 100 INJECTION, SOLUTION INTRAVENOUS; SUBCUTANEOUS at 17:22

## 2025-05-04 RX ADMIN — SUCRALFATE 1 G: 1 TABLET ORAL at 09:11

## 2025-05-04 RX ADMIN — DOCUSATE SODIUM 100 MG: 100 CAPSULE, LIQUID FILLED ORAL at 17:23

## 2025-05-04 RX ADMIN — LEVALBUTEROL HYDROCHLORIDE 1.25 MG: 1.25 SOLUTION RESPIRATORY (INHALATION) at 07:47

## 2025-05-04 RX ADMIN — IPRATROPIUM BROMIDE 0.5 MG: 0.5 SOLUTION RESPIRATORY (INHALATION) at 19:24

## 2025-05-04 RX ADMIN — CEFTRIAXONE 2000 MG: 2 INJECTION, SOLUTION INTRAVENOUS at 13:18

## 2025-05-04 RX ADMIN — PANTOPRAZOLE SODIUM 40 MG: 40 TABLET, DELAYED RELEASE ORAL at 05:45

## 2025-05-04 NOTE — ASSESSMENT & PLAN NOTE
Presents to ED with shortness of breath  SIRS met: SIRS: 2/4; temperature, tachycardia, tachypnea,  Secondary to worsening pneumonia and new pneumonia forming she was recently admitted in the beginning of April and completed as discussed with infectious disease and pulmonary.  Factious disease she is estelita be on Rocephin for 5/5 days and Zithromax to be completed for COPD exacerbation  resolved

## 2025-05-04 NOTE — PLAN OF CARE
Problem: Potential for Falls  Goal: Patient will remain free of falls  Description: INTERVENTIONS:- Educate patient/family on patient safety including physical limitations- Instruct patient to call for assistance with activity - Consult OT/PT to assist with strengthening/mobility - Keep Call bell within reach- Keep bed low and locked with side rails adjusted as appropriate- Keep care items and personal belongings within reach- Initiate and maintain comfort rounds- Make Fall Risk Sign visible to staff- Offer Toileting every  Hours, in advance of need- Initiate/Maintain alarm- Obtain necessary fall risk management equipment: - Apply yellow socks and bracelet for high fall risk patients- Consider moving patient to room near nurses station  INTERVENTIONS:- Educate patient/family on patient safety including physical limitations- Instruct patient to call for assistance with activity - Consult OT/PT to assist with strengthening/mobility - Keep Call bell within reach- Keep bed low and locked with side rails adjusted as appropriate- Keep care items and personal belongings within reach- Initiate and maintain comfort rounds- Make Fall Risk Sign visible to staff- Offer Toileting every  Hours, in advance of need- Initiate/Maintain alarm- Obtain necessary fall risk management equipment: - Apply yellow socks and bracelet for high fall risk patients- Consider moving patient to room near nurses station  Outcome: Progressing     Problem: PAIN - ADULT  Goal: Verbalizes/displays adequate comfort level or baseline comfort level  Description: Interventions:- Encourage patient to monitor pain and request assistance- Assess pain using appropriate pain scale- Administer analgesics based on type and severity of pain and evaluate response- Implement non-pharmacological measures as appropriate and evaluate response- Consider cultural and social influences on pain and pain management- Notify physician/advanced practitioner if interventions  unsuccessful or patient reports new pain  Outcome: Progressing     Problem: INFECTION - ADULT  Goal: Absence or prevention of progression during hospitalization  Description: INTERVENTIONS:- Assess and monitor for signs and symptoms of infection- Monitor lab/diagnostic results- Monitor all insertion sites, i.e. indwelling lines, tubes, and drains- Monitor endotracheal if appropriate and nasal secretions for changes in amount and color- [place] appropriate cooling/warming therapies per order- Administer medications as ordered- Instruct and encourage patient and family to use good hand hygiene technique- Identify and instruct in appropriate isolation precautions for identified infection/condition  Outcome: Progressing  Goal: Absence of fever/infection during neutropenic period  Description: INTERVENTIONS:- Monitor WBC  Outcome: Progressing     Problem: SAFETY ADULT  Goal: Patient will remain free of falls  Description: INTERVENTIONS:- Educate patient/family on patient safety including physical limitations- Instruct patient to call for assistance with activity - Consult OT/PT to assist with strengthening/mobility - Keep Call bell within reach- Keep bed low and locked with side rails adjusted as appropriate- Keep care items and personal belongings within reach- Initiate and maintain comfort rounds- Make Fall Risk Sign visible to staff- Offer Toileting every  Hours, in advance of need- Initiate/Maintain alarm- Obtain necessary fall risk management equipment: - Apply yellow socks and bracelet for high fall risk patients- Consider moving patient to room near nurses station  INTERVENTIONS:- Educate patient/family on patient safety including physical limitations- Instruct patient to call for assistance with activity - Consult OT/PT to assist with strengthening/mobility - Keep Call bell within reach- Keep bed low and locked with side rails adjusted as appropriate- Keep care items and personal belongings within reach-  Initiate and maintain comfort rounds- Make Fall Risk Sign visible to staff- Offer Toileting every  Hours, in advance of need- Initiate/Maintain alarm- Obtain necessary fall risk management equipment: - Apply yellow socks and bracelet for high fall risk patients- Consider moving patient to room near nurses station  Outcome: Progressing  Goal: Maintain or return to baseline ADL function  Description: INTERVENTIONS:-  Assess patient's ability to carry out ADLs; assess patient's baseline for ADL function and identify physical deficits which impact ability to perform ADLs (bathing, care of mouth/teeth, toileting, grooming, dressing, etc.)- Assess/evaluate cause of self-care deficits - Assess range of motion- Assess patient's mobility; develop plan if impaired- Assess patient's need for assistive devices and provide as appropriate- Encourage maximum independence but intervene and supervise when necessary- Involve family in performance of ADLs- Assess for home care needs following discharge - Consider OT consult to assist with ADL evaluation and planning for discharge- Provide patient education as appropriate  Outcome: Progressing  Goal: Maintains/Returns to pre admission functional level  Description: INTERVENTIONS:- Perform AM-PAC 6 Click Basic Mobility/ Daily Activity assessment daily.- Set and communicate daily mobility goal to care team and patient/family/caregiver. - Collaborate with rehabilitation services on mobility goals if consulted- Perform Range of Motion  times a day.- Reposition patient every  hours.- Dangle patient  times a day- Stand patient  times a day- Ambulate patient  times a day- Out of bed to chair  times a day - Out of bed for meals  times a day- Out of bed for toileting- Record patient progress and toleration of activity level   Outcome: Progressing     Problem: DISCHARGE PLANNING  Goal: Discharge to home or other facility with appropriate resources  Description: INTERVENTIONS:- Identify barriers  to discharge w/patient and caregiver- Arrange for needed discharge resources and transportation as appropriate- Identify discharge learning needs (meds, wound care, etc.)- Arrange for interpretive services to assist at discharge as needed- Refer to Case Management Department for coordinating discharge planning if the patient needs post-hospital services based on physician/advanced practitioner order or complex needs related to functional status, cognitive ability, or social support system  Outcome: Progressing     Problem: Knowledge Deficit  Goal: Patient/family/caregiver demonstrates understanding of disease process, treatment plan, medications, and discharge instructions  Description: Complete learning assessment and assess knowledge base.Interventions:- Provide teaching at level of understanding- Provide teaching via preferred learning methods  Outcome: Progressing     Problem: Nutrition/Hydration-ADULT  Goal: Nutrient/Hydration intake appropriate for improving, restoring or maintaining nutritional needs  Description: Monitor and assess patient's nutrition/hydration status for malnutrition. Collaborate with interdisciplinary team and initiate plan and interventions as ordered.  Monitor patient's weight and dietary intake as ordered or per policy. Utilize nutrition screening tool and intervene as necessary. Determine patient's food preferences and provide high-protein, high-caloric foods as appropriate. INTERVENTIONS:- Monitor oral intake, urinary output, labs, and treatment plans- Assess nutrition and hydration status and recommend course of action- Evaluate amount of meals eaten- Assist patient with eating if necessary - Allow adequate time for meals- Recommend/ encourage appropriate diets, oral nutritional supplements, and vitamin/mineral supplements- Order, calculate, and assess calorie counts as needed- Recommend, monitor, and adjust tube feedings and TPN/PPN based on assessed needs- Assess need for  intravenous fluids- Provide specific nutrition/hydration education as appropriate- Include patient/family/caregiver in decisions related to nutrition  Outcome: Progressing

## 2025-05-04 NOTE — ASSESSMENT & PLAN NOTE
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  Unable to tolerate therapy due to hepatotoxicity while on rifabutin.  Instead they are monitoring sputum cultures closely off treatment.  CXR progressive pneumonia in right upper lobe superimposed on known cavitary nodular lesions  CTA chest: Worsening right upper lobe pneumonia and new superior segment right lower lobe pneumonia  COVID/Flu/RSV negative  Urine Legionella urine strep is negative sputum cultures pending.  AFB ordered pending  Blood Cultures pending  Procalcitonin x 2 is negative  ID consulted given history of MARIS and recurrent and worsening PNA.  As discussed with speech she could be aspirating due to GI issues as she does have hiatal hernia and what she describes waking up in the morning is reflux increase her Protonix to 40 mg p.o. twice daily she is undergoing a swallow study to speech today to evaluate for any aspiration in terms of food consistency and liquid consistency discussed case with ID and pulmonology.  She is estelita complete Zithromax for 3 days for the COPD exacerbation but she is estelita complete Rocephin for 5 days total 5/5  Also pulmonary added vest therapy and saline nebulizer treatment to help patient cough up mucus continue Mucinex as well

## 2025-05-04 NOTE — ASSESSMENT & PLAN NOTE
With history of severe emphysema on 2 L nasal cannula at baseline.  Progressively worsening shortness of breath requiring increased O2 over the past few days.  Secondary to her worsening pneumonia and new pneumonia and COPD exacerbation yesterday she required 4 L of oxygen now she is trending down to 2

## 2025-05-04 NOTE — ASSESSMENT & PLAN NOTE
On Trelegy for maintenance.    Decreased Solu-Medrol to 40 mg IV every 12 hours by pulmonary continue Xopenex and Atrovent on Pulmicort anticipate to switch to prednisone p.o. in 24 hours  Discussed case with pulmonology

## 2025-05-04 NOTE — PLAN OF CARE
Problem: PAIN - ADULT  Goal: Verbalizes/displays adequate comfort level or baseline comfort level  Description: Interventions:- Encourage patient to monitor pain and request assistance- Assess pain using appropriate pain scale- Administer analgesics based on type and severity of pain and evaluate response- Implement non-pharmacological measures as appropriate and evaluate response- Consider cultural and social influences on pain and pain management- Notify physician/advanced practitioner if interventions unsuccessful or patient reports new pain  Outcome: Progressing     Problem: INFECTION - ADULT  Goal: Absence or prevention of progression during hospitalization  Description: INTERVENTIONS:- Assess and monitor for signs and symptoms of infection- Monitor lab/diagnostic results- Monitor all insertion sites, i.e. indwelling lines, tubes, and drains- Monitor endotracheal if appropriate and nasal secretions for changes in amount and color- [place] appropriate cooling/warming therapies per order- Administer medications as ordered- Instruct and encourage patient and family to use good hand hygiene technique- Identify and instruct in appropriate isolation precautions for identified infection/condition  Outcome: Progressing  Goal: Absence of fever/infection during neutropenic period  Description: INTERVENTIONS:- Monitor WBC  Outcome: Progressing

## 2025-05-04 NOTE — PROGRESS NOTES
Progress Note - Hospitalist   Name: Ileana Seaman 65 y.o. female I MRN: [id number]  Unit/Bed#: -01 I Date of Admission: 4/30/2025   Date of Service: 5/4/2025 I Hospital Day: 4    Assessment & Plan  Pneumonia of right lower lobe due to infectious organism  Lab Results   Component Value Date    WBC 8.70 05/01/2025    PROCALCITONI <0.05 05/01/2025   Follows with infectious disease outpatient for MARIS infection.  Unable to tolerate therapy due to hepatotoxicity while on rifabutin.  Instead they are monitoring sputum cultures closely off treatment.  CXR progressive pneumonia in right upper lobe superimposed on known cavitary nodular lesions  CTA chest: Worsening right upper lobe pneumonia and new superior segment right lower lobe pneumonia  COVID/Flu/RSV negative  Urine Legionella urine strep is negative sputum cultures pending.  AFB ordered pending  Blood Cultures pending  Procalcitonin x 2 is negative  ID consulted given history of MARIS and recurrent and worsening PNA.  As discussed with speech she could be aspirating due to GI issues as she does have hiatal hernia and what she describes waking up in the morning is reflux increase her Protonix to 40 mg p.o. twice daily she is undergoing a swallow study to speech today to evaluate for any aspiration in terms of food consistency and liquid consistency discussed case with ID and pulmonology.  She is estelita complete Zithromax for 3 days for the COPD exacerbation but she is estelita complete Rocephin for 5 days total 5/5  Also pulmonary added vest therapy and saline nebulizer treatment to help patient cough up mucus continue Mucinex as well  Acute on chronic hypoxic respiratory failure (HCC)  With history of severe emphysema on 2 L nasal cannula at baseline.  Progressively worsening shortness of breath requiring increased O2 over the past few days.  Secondary to her worsening pneumonia and new pneumonia and COPD exacerbation yesterday she required 4 L of oxygen now she  is trending down to 2  Sepsis (HCC)  Presents to ED with shortness of breath  SIRS met: SIRS: 2/4; temperature, tachycardia, tachypnea,  Secondary to worsening pneumonia and new pneumonia forming she was recently admitted in the beginning of April and completed as discussed with infectious disease and pulmonary.  Factious disease she is estelita be on Rocephin for 5/5 days and Zithromax to be completed for COPD exacerbation  resolved  COPD exacerbation (HCC)  On Trelegy for maintenance.    Decreased Solu-Medrol to 40 mg IV every 12 hours by pulmonary continue Xopenex and Atrovent on Pulmicort anticipate to switch to prednisone p.o. in 24 hours  Discussed case with pulmonology  Mycobacterial infection, non-TB  Underwent bronchoscopy 5/2024 for cavitary lesion biopsy, growing Mycobacterium intracellular.    Following outpatient with infectious disease regarding MARIS infection.  She was unable to tolerate the rifabutin regimen given hepatotoxicity and is instead being monitored with sputum cultures and AFB.  Is due for culture this upcoming week.  Follow sputum culture and AFB still pending to be collected  ID consulted discussed with ID will need outpatient follow-up with ID and pulmonary to discuss restarting treatment  Pulmonary nodule  Noted on CT chest.  Reviewed with patient.  Continue follow-up with pulmonology  Cigarette nicotine dependence without complication      VTE Pharmacologic Prophylaxis:   Moderate Risk (Score 3-4) - Pharmacological DVT Prophylaxis Ordered: heparin.    Mobility:   Basic Mobility Inpatient Raw Score: 22  JH-HLM Goal: 7: Walk 25 feet or more  JH-HLM Achieved: 7: Walk 25 feet or more  JH-HLM Goal achieved. Continue to encourage appropriate mobility.    Patient Centered Rounds: I performed bedside rounds with nursing staff today.   Discussions with Specialists or Other Care Team Provider: Discussed with nursing    Education and Discussions with Family / Patient: Patient    Current Length of  Stay: 4 day(s)  Current Patient Status: Inpatient   Certification Statement: The patient will continue to require additional inpatient hospital stay due to COPD exacerbation  Discharge Plan: Anticipate discharge tomorrow to home.    Code Status: Level 1 - Full Code    Subjective   Patient seen and examined shortness of breath improved from the beginning    Objective :  Temp:  [97.2 °F (36.2 °C)-97.3 °F (36.3 °C)] 97.2 °F (36.2 °C)  HR:  [] 65  BP: (127-146)/(76-81) 146/81  Resp:  [17-18] 17  SpO2:  [89 %-98 %] 97 %  O2 Device: Nasal cannula  Nasal Cannula O2 Flow Rate (L/min):  [2 L/min-3 L/min] 2 L/min    Body mass index is 17.76 kg/m².     Input and Output Summary (last 24 hours):     Intake/Output Summary (Last 24 hours) at 5/4/2025 0978  Last data filed at 5/4/2025 0841  Gross per 24 hour   Intake 720 ml   Output 600 ml   Net 120 ml       Physical Exam  Vitals and nursing note reviewed.   Constitutional:       General: She is not in acute distress.     Appearance: She is well-developed.   HENT:      Head: Normocephalic and atraumatic.   Eyes:      Conjunctiva/sclera: Conjunctivae normal.   Cardiovascular:      Rate and Rhythm: Normal rate and regular rhythm.      Heart sounds: No murmur heard.  Pulmonary:      Effort: Pulmonary effort is normal. No respiratory distress.      Breath sounds: Normal breath sounds. No wheezing or rales.   Abdominal:      General: There is no distension.      Palpations: Abdomen is soft.      Tenderness: There is no abdominal tenderness.   Musculoskeletal:         General: No swelling.      Cervical back: Neck supple.   Skin:     General: Skin is warm and dry.      Capillary Refill: Capillary refill takes less than 2 seconds.   Neurological:      Mental Status: She is alert and oriented to person, place, and time.   Psychiatric:         Mood and Affect: Mood normal.           Lines/Drains:              Lab Results: I have reviewed the following results:   Results from last 7  days   Lab Units 05/01/25  0549   WBC Thousand/uL 8.70   HEMOGLOBIN g/dL 11.2*   HEMATOCRIT % 34.8   PLATELETS Thousands/uL 318   SEGS PCT % 86*   LYMPHO PCT % 9*   MONO PCT % 5   EOS PCT % 0     Results from last 7 days   Lab Units 05/04/25  0535 05/02/25  0649 05/01/25  0931   SODIUM mmol/L 138   < > 142   POTASSIUM mmol/L 4.2   < > 3.8   CHLORIDE mmol/L 102   < > 103   CO2 mmol/L 29   < > 32   BUN mg/dL 15   < > 8   CREATININE mg/dL 0.53*   < > 0.50*   ANION GAP mmol/L 7   < > 7   CALCIUM mg/dL 8.5   < > 9.0   ALBUMIN g/dL  --   --  3.3*   TOTAL BILIRUBIN mg/dL  --   --  0.27   ALK PHOS U/L  --   --  83   ALT U/L  --   --  11   AST U/L  --   --  8*   GLUCOSE RANDOM mg/dL 181*   < > 252*    < > = values in this interval not displayed.     Results from last 7 days   Lab Units 04/30/25  1219   INR  1.06     Results from last 7 days   Lab Units 05/04/25  0727 05/03/25  2113 05/03/25  1615 05/03/25  1123 05/03/25  0719 05/02/25  2111 05/02/25  1630 05/02/25  1119 05/02/25  0723 05/01/25  2102 05/01/25  1644   POC GLUCOSE mg/dl 141* 230* 219* 150* 144* 179* 214* 193* 165* 191* 170*     Results from last 7 days   Lab Units 05/02/25  0649   HEMOGLOBIN A1C % 7.4*     Results from last 7 days   Lab Units 05/01/25  0545 04/30/25  1648 04/30/25  1219   LACTIC ACID mmol/L  --  1.2  --    PROCALCITONIN ng/ml <0.05  --  <0.05       Recent Cultures (last 7 days):   Results from last 7 days   Lab Units 05/02/25  1607 04/30/25  1830 04/30/25  1419 04/30/25  1405   BLOOD CULTURE   --   --  No Growth at 72 hrs. No Growth at 72 hrs.   SPUTUM CULTURE  3+ Growth of  --   --   --    GRAM STAIN RESULT  2+ Gram negative rods*  2+ Gram positive cocci in pairs*  1+ Epithelial cells per low power field*  No polys seen*  --   --   --    LEGIONELLA URINARY ANTIGEN   --  Negative  --   --        Imaging Results Review: No pertinent imaging studies reviewed.  Other Study Results Review: No additional pertinent studies reviewed.    Last 24  Hours Medication List:     Current Facility-Administered Medications:     acetaminophen (TYLENOL) tablet 650 mg, Q6H PRN    albuterol inhalation solution 2.5 mg, Q6H PRN    aspirin (ECOTRIN LOW STRENGTH) EC tablet 81 mg, Daily    atorvastatin (LIPITOR) tablet 40 mg, Daily    benzonatate (TESSALON PERLES) capsule 100 mg, TID PRN    cefTRIAXone (ROCEPHIN) IVPB (premix in dextrose) 2,000 mg 50 mL, Q24H    dicyclomine (BENTYL) capsule 10 mg, TID AC    docusate sodium (COLACE) capsule 100 mg, BID    famotidine (PEPCID) tablet 20 mg, BID    guaiFENesin (MUCINEX) 12 hr tablet 1,200 mg, Q12H MAYCO    heparin (porcine) subcutaneous injection 5,000 Units, Q12H MAYCO    insulin lispro (HumALOG/ADMELOG) 100 units/mL subcutaneous injection 1-5 Units, TID AC **AND** Fingerstick Glucose (POCT), TID AC    ipratropium (ATROVENT) 0.02 % inhalation solution 0.5 mg, TID    levalbuterol (XOPENEX) inhalation solution 1.25 mg, TID    methylPREDNISolone sodium succinate (Solu-MEDROL) injection 40 mg, Q12H MAYCO    nicotine (NICODERM CQ) 21 mg/24 hr TD 24 hr patch 1 patch, Daily    pantoprazole (PROTONIX) EC tablet 40 mg, BID AC    simethicone (MYLICON) chewable tablet 80 mg, Q6H PRN    sucralfate (CARAFATE) tablet 1 g, 4x Daily    Administrative Statements   Today, Patient Was Seen By: Rand Cantrell MD  I have spent a total time of >35 minutes in caring for this patient on the day of the visit/encounter including Documenting in the medical record and Reviewing/placing orders in the medical record (including tests, medications, and/or procedures).    **Please Note: This note may have been constructed using a voice recognition system.**

## 2025-05-05 PROBLEM — J18.0 BRONCHOPNEUMONIA: Status: RESOLVED | Noted: 2024-10-04 | Resolved: 2025-05-05

## 2025-05-05 LAB
BACTERIA BLD CULT: NORMAL
BACTERIA BLD CULT: NORMAL
GLUCOSE SERPL-MCNC: 133 MG/DL (ref 65–140)
GLUCOSE SERPL-MCNC: 195 MG/DL (ref 65–140)
GLUCOSE SERPL-MCNC: 197 MG/DL (ref 65–140)
GLUCOSE SERPL-MCNC: 211 MG/DL (ref 65–140)

## 2025-05-05 PROCEDURE — 94668 MNPJ CHEST WALL SBSQ: CPT

## 2025-05-05 PROCEDURE — 99232 SBSQ HOSP IP/OBS MODERATE 35: CPT | Performed by: INTERNAL MEDICINE

## 2025-05-05 PROCEDURE — 94664 DEMO&/EVAL PT USE INHALER: CPT

## 2025-05-05 PROCEDURE — 94640 AIRWAY INHALATION TREATMENT: CPT

## 2025-05-05 PROCEDURE — 99232 SBSQ HOSP IP/OBS MODERATE 35: CPT | Performed by: FAMILY MEDICINE

## 2025-05-05 PROCEDURE — 94760 N-INVAS EAR/PLS OXIMETRY 1: CPT

## 2025-05-05 PROCEDURE — 82948 REAGENT STRIP/BLOOD GLUCOSE: CPT

## 2025-05-05 PROCEDURE — 94669 MECHANICAL CHEST WALL OSCILL: CPT

## 2025-05-05 RX ORDER — PREDNISONE 20 MG/1
40 TABLET ORAL DAILY
Status: DISCONTINUED | OUTPATIENT
Start: 2025-05-06 | End: 2025-05-07 | Stop reason: HOSPADM

## 2025-05-05 RX ADMIN — IPRATROPIUM BROMIDE 0.5 MG: 0.5 SOLUTION RESPIRATORY (INHALATION) at 08:24

## 2025-05-05 RX ADMIN — ATORVASTATIN CALCIUM 40 MG: 40 TABLET, FILM COATED ORAL at 08:39

## 2025-05-05 RX ADMIN — HEPARIN SODIUM 5000 UNITS: 5000 INJECTION INTRAVENOUS; SUBCUTANEOUS at 21:20

## 2025-05-05 RX ADMIN — DICYCLOMINE HYDROCHLORIDE 10 MG: 10 CAPSULE ORAL at 12:27

## 2025-05-05 RX ADMIN — HEPARIN SODIUM 5000 UNITS: 5000 INJECTION INTRAVENOUS; SUBCUTANEOUS at 08:39

## 2025-05-05 RX ADMIN — FAMOTIDINE 20 MG: 20 TABLET, FILM COATED ORAL at 17:07

## 2025-05-05 RX ADMIN — FAMOTIDINE 20 MG: 20 TABLET, FILM COATED ORAL at 08:39

## 2025-05-05 RX ADMIN — DOCUSATE SODIUM 100 MG: 100 CAPSULE, LIQUID FILLED ORAL at 17:07

## 2025-05-05 RX ADMIN — DOCUSATE SODIUM 100 MG: 100 CAPSULE, LIQUID FILLED ORAL at 08:39

## 2025-05-05 RX ADMIN — SUCRALFATE 1 G: 1 TABLET ORAL at 21:20

## 2025-05-05 RX ADMIN — SUCRALFATE 1 G: 1 TABLET ORAL at 17:07

## 2025-05-05 RX ADMIN — IPRATROPIUM BROMIDE 0.5 MG: 0.5 SOLUTION RESPIRATORY (INHALATION) at 19:36

## 2025-05-05 RX ADMIN — INSULIN LISPRO 1 UNITS: 100 INJECTION, SOLUTION INTRAVENOUS; SUBCUTANEOUS at 17:08

## 2025-05-05 RX ADMIN — GUAIFENESIN 1200 MG: 600 TABLET, EXTENDED RELEASE ORAL at 08:40

## 2025-05-05 RX ADMIN — LEVALBUTEROL HYDROCHLORIDE 1.25 MG: 1.25 SOLUTION RESPIRATORY (INHALATION) at 08:24

## 2025-05-05 RX ADMIN — LEVALBUTEROL HYDROCHLORIDE 1.25 MG: 1.25 SOLUTION RESPIRATORY (INHALATION) at 19:36

## 2025-05-05 RX ADMIN — NICOTINE 1 PATCH: 21 PATCH, EXTENDED RELEASE TRANSDERMAL at 08:40

## 2025-05-05 RX ADMIN — PANTOPRAZOLE SODIUM 40 MG: 40 TABLET, DELAYED RELEASE ORAL at 17:07

## 2025-05-05 RX ADMIN — PANTOPRAZOLE SODIUM 40 MG: 40 TABLET, DELAYED RELEASE ORAL at 06:26

## 2025-05-05 RX ADMIN — IPRATROPIUM BROMIDE 0.5 MG: 0.5 SOLUTION RESPIRATORY (INHALATION) at 14:11

## 2025-05-05 RX ADMIN — GUAIFENESIN 1200 MG: 600 TABLET, EXTENDED RELEASE ORAL at 21:20

## 2025-05-05 RX ADMIN — SUCRALFATE 1 G: 1 TABLET ORAL at 12:27

## 2025-05-05 RX ADMIN — METHYLPREDNISOLONE SODIUM SUCCINATE 40 MG: 40 INJECTION, POWDER, FOR SOLUTION INTRAMUSCULAR; INTRAVENOUS at 08:40

## 2025-05-05 RX ADMIN — DICYCLOMINE HYDROCHLORIDE 10 MG: 10 CAPSULE ORAL at 17:07

## 2025-05-05 RX ADMIN — DICYCLOMINE HYDROCHLORIDE 10 MG: 10 CAPSULE ORAL at 06:26

## 2025-05-05 RX ADMIN — SUCRALFATE 1 G: 1 TABLET ORAL at 08:39

## 2025-05-05 RX ADMIN — LEVALBUTEROL HYDROCHLORIDE 1.25 MG: 1.25 SOLUTION RESPIRATORY (INHALATION) at 14:11

## 2025-05-05 RX ADMIN — ASPIRIN 81 MG: 81 TABLET, COATED ORAL at 08:39

## 2025-05-05 NOTE — PROGRESS NOTES
Progress Note - Pulmonology   Name: Ileana Seaman 65 y.o. female I MRN: [id number]  Unit/Bed#: -01 I Date of Admission: 4/30/2025   Date of Service: 5/5/2025 I Hospital Day: 5     Assessment & Plan  Acute on chronic hypoxic respiratory failure (HCC)  Now at baseline oxygen with SpO2 mid to high 90%  No significant wheezing or rhonchi on exam  Continue pulmonary toilet:  Increase activity as tolerated, out of bed as tolerated, cough and deep breathing exercises encourage, incentive spirometry q.1 hour  Could benefit from outpatient pulmonary rehab, pt will need referral on discharge/or request from PCP to a local [facility] pulmonary rehab center in [place]  Consider palliative care referral  COPD exacerbation (HCC)  Near resolved exacerbation  Prednisone 40 mg from tomorrow for 4 days, then taper by 10 mg every 3 days until completion.   Continue Xopenex/Atrovent/3% nebs TID  At discharge, resume Trelegy, albuterol HFA, DuoNebs  Pneumonia of right lower lobe due to infectious organism  Appreciate ID input, no indication for triple therapy for MAC  Needs a repeat CT chest in 6-8 weeks  Mycobacterial infection, non-TB  Diagnosed with MARIS in May 2024, treated with triple therapy last fall but developed acute transaminitis/possible DILI prompting therapy to be held since December 2024.   ID following, appreciate their input.   Repeat AFB pending  Follow up without outpatient ID and pulm   Pulmonary nodule  7 mm RLL nodule requires continued outpatient surveillance, no plans for inpatient intervention.  Cigarette nicotine dependence without complication  Complete smoking cessation encouraged  NRT while inpatient   Pt will follow-up with her primary pulmonologist at [facility]  Pulmonary will sign off, please do not hesitate to call with any questions      24 Hour Events : No overnight acute events   Subjective : Feeling better, now near baseline.    Objective :  Temp:  [96.6 °F (35.9 °C)-97 °F (36.1 °C)] 96.6 °F (35.9  °C)  HR:  [86-94] 86  BP: (129-144)/(71-90) 144/90  Resp:  [16-18] 18  SpO2:  [93 %-98 %] 94 %  O2 Device: Nasal cannula  Nasal Cannula O2 Flow Rate (L/min):  [2 L/min] 2 L/min    Physical Exam      Body mass index is 17.76 kg/m².   Gen: not in acute distress, ill-appearing, frail, nontoxic  Neck/Eyes: supple, no JVD appreciated PERRL  Ear: normal appearance, no significant hearing impairment  Nose:  normal nasal mucosa, no drainage  Chest: normal respiratory efforts, diminished but clear sounds bilaterally, minimal scattered rhonchi  CV: RRR, no murmurs appreciated, no edema  Abdomen: soft, non tender  Extremities:  No observed deformity   Skin: unremarkable  Neuro: AAO X3, no focal motor deficit       Lab Results: I have reviewed the following results:   No new results in last 24 hours.  ABG: No new results in last 24 hours.    Imaging Results Review: No pertinent imaging studies reviewed.  Other Study Results Review: No additional pertinent studies reviewed.  PFT Results Reviewed: reviewed   no

## 2025-05-05 NOTE — ASSESSMENT & PLAN NOTE
On Trelegy for maintenance.    No wheezing down to her baseline 2 L switch over to prednisone 40 mg daily and taper by 10 mg every 3 days  Discussed case with pulmonology

## 2025-05-05 NOTE — PLAN OF CARE
Problem: INFECTION - ADULT  Goal: Absence or prevention of progression during hospitalization  Description: INTERVENTIONS:- Assess and monitor for signs and symptoms of infection- Monitor lab/diagnostic results- Monitor all insertion sites, i.e. indwelling lines, tubes, and drains- Monitor endotracheal if appropriate and nasal secretions for changes in amount and color- [place] appropriate cooling/warming therapies per order- Administer medications as ordered- Instruct and encourage patient and family to use good hand hygiene technique- Identify and instruct in appropriate isolation precautions for identified infection/condition  Outcome: Progressing     Problem: Nutrition/Hydration-ADULT  Goal: Nutrient/Hydration intake appropriate for improving, restoring or maintaining nutritional needs  Description: Monitor and assess patient's nutrition/hydration status for malnutrition. Collaborate with interdisciplinary team and initiate plan and interventions as ordered.  Monitor patient's weight and dietary intake as ordered or per policy. Utilize nutrition screening tool and intervene as necessary. Determine patient's food preferences and provide high-protein, high-caloric foods as appropriate. INTERVENTIONS:- Monitor oral intake, urinary output, labs, and treatment plans- Assess nutrition and hydration status and recommend course of action- Evaluate amount of meals eaten- Assist patient with eating if necessary - Allow adequate time for meals- Recommend/ encourage appropriate diets, oral nutritional supplements, and vitamin/mineral supplements- Order, calculate, and assess calorie counts as needed- Recommend, monitor, and adjust tube feedings and TPN/PPN based on assessed needs- Assess need for intravenous fluids- Provide specific nutrition/hydration education as appropriate- Include patient/family/caregiver in decisions related to nutrition  Monitor and assess patient's nutrition/hydration status for malnutrition.  Collaborate with interdisciplinary team and initiate plan and interventions as ordered.  Monitor patient's weight and dietary intake as ordered or per policy. Utilize nutrition screening tool and intervene as necessary. Determine patient's food preferences and provide high-protein, high-caloric foods as appropriate. INTERVENTIONS:- Monitor oral intake, urinary output, labs, and treatment plans- Assess nutrition and hydration status and recommend course of action- Evaluate amount of meals eaten- Assist patient with eating if necessary - Allow adequate time for meals- Recommend/ encourage appropriate diets, oral nutritional supplements, and vitamin/mineral supplements- Order, calculate, and assess calorie counts as needed- Recommend, monitor, and adjust tube feedings and TPN/PPN based on assessed needs- Assess need for intravenous fluids- Provide specific nutrition/hydration education as appropriate- Include patient/family/caregiver in decisions related to nutrition  Outcome: Progressing

## 2025-05-05 NOTE — ASSESSMENT & PLAN NOTE
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  Unable to tolerate therapy due to hepatotoxicity while on rifabutin.  Instead they are monitoring sputum cultures closely off treatment.  CXR progressive pneumonia in right upper lobe superimposed on known cavitary nodular lesions  CTA chest: Worsening right upper lobe pneumonia and new superior segment right lower lobe pneumonia  COVID/Flu/RSV negative  Urine Legionella urine strep is negative sputum cultures pending.  AFB ordered pending  Blood Cultures pending  Procalcitonin x 2 is negative  ID consulted given history of MARIS and recurrent and worsening PNA.  As discussed with speech she could be aspirating due to GI issues as she does have hiatal hernia and what she describes waking up in the morning is reflux increase her Protonix to 40 mg p.o. twice daily she is undergoing a swallow study to speech today to evaluate for any aspiration in terms of food consistency and liquid consistency discussed case with ID and pulmonology.  She is estelita complete Zithromax for 3 days for the COPD exacerbation but she has  completed Rocephin for 5 days total 5/5  Also pulmonary added vest therapy and saline nebulizer treatment to help patient cough up mucus continue Mucinex as well

## 2025-05-05 NOTE — ASSESSMENT & PLAN NOTE
Appreciate ID input, no indication for triple therapy for MAC  Needs a repeat CT chest in 6-8 weeks

## 2025-05-05 NOTE — PROGRESS NOTES
Progress Note - Hospitalist   Name: Ileana Seaman 65 y.o. female I MRN: [id number]  Unit/Bed#: -01 I Date of Admission: 4/30/2025   Date of Service: 5/5/2025 I Hospital Day: 5    Assessment & Plan  Pneumonia of right lower lobe due to infectious organism  Lab Results   Component Value Date    WBC 8.70 05/01/2025    PROCALCITONI <0.05 05/01/2025   Follows with infectious disease outpatient for MARIS infection.  Unable to tolerate therapy due to hepatotoxicity while on rifabutin.  Instead they are monitoring sputum cultures closely off treatment.  CXR progressive pneumonia in right upper lobe superimposed on known cavitary nodular lesions  CTA chest: Worsening right upper lobe pneumonia and new superior segment right lower lobe pneumonia  COVID/Flu/RSV negative  Urine Legionella urine strep is negative sputum cultures pending.  AFB ordered pending  Blood Cultures pending  Procalcitonin x 2 is negative  ID consulted given history of MARIS and recurrent and worsening PNA.  As discussed with speech she could be aspirating due to GI issues as she does have hiatal hernia and what she describes waking up in the morning is reflux increase her Protonix to 40 mg p.o. twice daily she is undergoing a swallow study to speech today to evaluate for any aspiration in terms of food consistency and liquid consistency discussed case with ID and pulmonology.  She is estelita complete Zithromax for 3 days for the COPD exacerbation but she has  completed Rocephin for 5 days total 5/5  Also pulmonary added vest therapy and saline nebulizer treatment to help patient cough up mucus continue Mucinex as well  Acute on chronic hypoxic respiratory failure (HCC)  With history of severe emphysema on 2 L nasal cannula at baseline.  Progressively worsening shortness of breath requiring increased O2 over the past few days.  Secondary to her worsening pneumonia and new pneumonia and COPD exacerbation yesterday she required 4 L of oxygen now she is  trending down to 2  Sepsis (HCC)  Presents to ED with shortness of breath  SIRS met: SIRS: 2/4; temperature, tachycardia, tachypnea,  Secondary to worsening pneumonia and new pneumonia forming she was recently admitted in the beginning of April and completed as discussed with infectious disease and pulmonary.  Factious disease she is estelita be on Rocephin for 5/5 days and Zithromax to be completed for COPD exacerbation  resolved  COPD exacerbation (HCC)  On Trelegy for maintenance.    No wheezing down to her baseline 2 L switch over to prednisone 40 mg daily and taper by 10 mg every 3 days  Discussed case with pulmonology  Mycobacterial infection, non-TB  Underwent bronchoscopy 5/2024 for cavitary lesion biopsy, growing Mycobacterium intracellular.    Following outpatient with infectious disease regarding MARIS infection.  She was unable to tolerate the rifabutin regimen given hepatotoxicity and is instead being monitored with sputum cultures and AFB.  Is due for culture this upcoming week.  Follow sputum culture negative and AFB AFB for now is negative  ID consulted discussed with ID will need outpatient follow-up with ID and pulmonary to discuss restarting treatment  Pulmonary nodule  Noted on CT chest.  Reviewed with patient.  Continue follow-up with pulmonology  Cigarette nicotine dependence without complication      VTE Pharmacologic Prophylaxis:   Moderate Risk (Score 3-4) - Pharmacological DVT Prophylaxis Ordered: heparin.    Mobility:   Basic Mobility Inpatient Raw Score: 22  JH-HLM Goal: 7: Walk 25 feet or more  JH-HLM Achieved: 7: Walk 25 feet or more  JH-HLM Goal achieved. Continue to encourage appropriate mobility.    Patient Centered Rounds: I performed bedside rounds with nursing staff today.   Discussions with Specialists or Other Care Team Provider: None    Education and Discussions with Family / Patient: pt    Current Length of Stay: 5 day(s)  Current Patient Status: Inpatient   Certification Statement:  The patient will continue to require additional inpatient hospital stay due to secondary to COPD exacerbation  Discharge Plan: Anticipate discharge tomorrow to home.    Code Status: Level 1 - Full Code    Subjective   Seen and examined some sore throat little congested today    Objective :  Temp:  [96.6 °F (35.9 °C)-97 °F (36.1 °C)] 96.6 °F (35.9 °C)  HR:  [86-94] 86  BP: (129-144)/(71-90) 144/90  Resp:  [16-18] 18  SpO2:  [92 %-98 %] 96 %  O2 Device: Nasal cannula  Nasal Cannula O2 Flow Rate (L/min):  [2 L/min] 2 L/min    Body mass index is 17.76 kg/m².     Input and Output Summary (last 24 hours):     Intake/Output Summary (Last 24 hours) at 5/5/2025 0944  Last data filed at 5/5/2025 0900  Gross per 24 hour   Intake 640 ml   Output 1400 ml   Net -760 ml       Physical Exam  Vitals and nursing note reviewed.   Constitutional:       General: She is not in acute distress.     Appearance: She is well-developed.   HENT:      Head: Normocephalic and atraumatic.   Eyes:      Conjunctiva/sclera: Conjunctivae normal.   Cardiovascular:      Rate and Rhythm: Normal rate and regular rhythm.      Heart sounds: No murmur heard.  Pulmonary:      Effort: Pulmonary effort is normal. No respiratory distress.      Breath sounds: Normal breath sounds. No wheezing or rales.   Abdominal:      Palpations: Abdomen is soft.      Tenderness: There is no abdominal tenderness.   Musculoskeletal:         General: No swelling.      Cervical back: Neck supple.   Skin:     General: Skin is warm and dry.      Capillary Refill: Capillary refill takes less than 2 seconds.   Neurological:      Mental Status: She is alert and oriented to person, place, and time.   Psychiatric:         Mood and Affect: Mood normal.           Lines/Drains:              Lab Results: I have reviewed the following results:   Results from last 7 days   Lab Units 05/01/25  0549   WBC Thousand/uL 8.70   HEMOGLOBIN g/dL 11.2*   HEMATOCRIT % 34.8   PLATELETS Thousands/uL 318    SEGS PCT % 86*   LYMPHO PCT % 9*   MONO PCT % 5   EOS PCT % 0     Results from last 7 days   Lab Units 05/04/25  0535 05/02/25  0649 05/01/25  0931   SODIUM mmol/L 138   < > 142   POTASSIUM mmol/L 4.2   < > 3.8   CHLORIDE mmol/L 102   < > 103   CO2 mmol/L 29   < > 32   BUN mg/dL 15   < > 8   CREATININE mg/dL 0.53*   < > 0.50*   ANION GAP mmol/L 7   < > 7   CALCIUM mg/dL 8.5   < > 9.0   ALBUMIN g/dL  --   --  3.3*   TOTAL BILIRUBIN mg/dL  --   --  0.27   ALK PHOS U/L  --   --  83   ALT U/L  --   --  11   AST U/L  --   --  8*   GLUCOSE RANDOM mg/dL 181*   < > 252*    < > = values in this interval not displayed.     Results from last 7 days   Lab Units 04/30/25  1219   INR  1.06     Results from last 7 days   Lab Units 05/05/25  0807 05/04/25  2116 05/04/25  1613 05/04/25  1030 05/04/25  0727 05/03/25  2113 05/03/25  1615 05/03/25  1123 05/03/25  0719 05/02/25  2111 05/02/25  1630 05/02/25  1119   POC GLUCOSE mg/dl 133 159* 199* 169* 141* 230* 219* 150* 144* 179* 214* 193*     Results from last 7 days   Lab Units 05/02/25  0649   HEMOGLOBIN A1C % 7.4*     Results from last 7 days   Lab Units 05/01/25  0545 04/30/25  1648 04/30/25  1219   LACTIC ACID mmol/L  --  1.2  --    PROCALCITONIN ng/ml <0.05  --  <0.05       Recent Cultures (last 7 days):   Results from last 7 days   Lab Units 05/02/25  1607 04/30/25  1830 04/30/25  1419 04/30/25  1405   BLOOD CULTURE   --   --  No Growth After 4 Days. No Growth After 4 Days.   SPUTUM CULTURE  3+ Growth of  --   --   --    GRAM STAIN RESULT  2+ Gram negative rods*  2+ Gram positive cocci in pairs*  1+ Epithelial cells per low power field*  No polys seen*  --   --   --    LEGIONELLA URINARY ANTIGEN   --  Negative  --   --        Imaging Results Review: No pertinent imaging studies reviewed.  Other Study Results Review: No additional pertinent studies reviewed.    Last 24 Hours Medication List:     Current Facility-Administered Medications:     acetaminophen (TYLENOL) tablet  650 mg, Q6H PRN    albuterol inhalation solution 2.5 mg, Q6H PRN    aspirin (ECOTRIN LOW STRENGTH) EC tablet 81 mg, Daily    atorvastatin (LIPITOR) tablet 40 mg, Daily    benzonatate (TESSALON PERLES) capsule 100 mg, TID PRN    dicyclomine (BENTYL) capsule 10 mg, TID AC    docusate sodium (COLACE) capsule 100 mg, BID    famotidine (PEPCID) tablet 20 mg, BID    guaiFENesin (MUCINEX) 12 hr tablet 1,200 mg, Q12H MAYCO    heparin (porcine) subcutaneous injection 5,000 Units, Q12H MAYCO    insulin lispro (HumALOG/ADMELOG) 100 units/mL subcutaneous injection 1-5 Units, TID AC **AND** Fingerstick Glucose (POCT), TID AC    ipratropium (ATROVENT) 0.02 % inhalation solution 0.5 mg, TID    levalbuterol (XOPENEX) inhalation solution 1.25 mg, TID    nicotine (NICODERM CQ) 21 mg/24 hr TD 24 hr patch 1 patch, Daily    pantoprazole (PROTONIX) EC tablet 40 mg, BID AC    [START ON 5/6/2025] predniSONE tablet 40 mg, Daily    simethicone (MYLICON) chewable tablet 80 mg, Q6H PRN    sucralfate (CARAFATE) tablet 1 g, 4x Daily    Administrative Statements   Today, Patient Was Seen By: Rand Cantrell MD  I have spent a total time of >35 minutes in caring for this patient on the day of the visit/encounter including Documenting in the medical record and Reviewing/placing orders in the medical record (including tests, medications, and/or procedures).    **Please Note: This note may have been constructed using a voice recognition system.**

## 2025-05-05 NOTE — NURSING NOTE
Assumed pt care at 0245. Pt resting comfortably in bed. Pt is not exhibiting any signs of distress or discomfort. VSS on masimo. Pt breathing is normal and symmetrical while sleeping. Plan of care updated.

## 2025-05-05 NOTE — ASSESSMENT & PLAN NOTE
Now at baseline oxygen with SpO2 mid to high 90%  No significant wheezing or rhonchi on exam  Continue pulmonary toilet:  Increase activity as tolerated, out of bed as tolerated, cough and deep breathing exercises encourage, incentive spirometry q.1 hour  Could benefit from outpatient pulmonary rehab, pt will need referral on discharge/or request from PCP to a local [facility] pulmonary rehab center in [place]  Consider palliative care referral

## 2025-05-05 NOTE — ASSESSMENT & PLAN NOTE
Underwent bronchoscopy 5/2024 for cavitary lesion biopsy, growing Mycobacterium intracellular.    Following outpatient with infectious disease regarding MARIS infection.  She was unable to tolerate the rifabutin regimen given hepatotoxicity and is instead being monitored with sputum cultures and AFB.  Is due for culture this upcoming week.  Follow sputum culture negative and AFB AFB for now is negative  ID consulted discussed with ID will need outpatient follow-up with ID and pulmonary to discuss restarting treatment

## 2025-05-05 NOTE — ASSESSMENT & PLAN NOTE
Near resolved exacerbation  Prednisone 40 mg from tomorrow for 4 days, then taper by 10 mg every 3 days until completion.   Continue Xopenex/Atrovent/3% nebs TID  At discharge, resume Trelegy, albuterol HFA, DuoNebs

## 2025-05-06 ENCOUNTER — APPOINTMENT (INPATIENT)
Dept: RADIOLOGY | Facility: HOSPITAL | Age: 65
DRG: 871 | End: 2025-05-06
Payer: COMMERCIAL

## 2025-05-06 PROBLEM — R10.9 ABDOMINAL PAIN: Status: ACTIVE | Noted: 2025-05-06

## 2025-05-06 LAB
GLUCOSE SERPL-MCNC: 126 MG/DL (ref 65–140)
GLUCOSE SERPL-MCNC: 184 MG/DL (ref 65–140)
GLUCOSE SERPL-MCNC: 223 MG/DL (ref 65–140)
GLUCOSE SERPL-MCNC: 99 MG/DL (ref 65–140)
MYCOBACTERIUM SPEC CULT: NORMAL
RHODAMINE-AURAMINE STN SPEC: NORMAL

## 2025-05-06 PROCEDURE — 99232 SBSQ HOSP IP/OBS MODERATE 35: CPT | Performed by: STUDENT IN AN ORGANIZED HEALTH CARE EDUCATION/TRAINING PROGRAM

## 2025-05-06 PROCEDURE — 74022 RADEX COMPL AQT ABD SERIES: CPT

## 2025-05-06 PROCEDURE — 94760 N-INVAS EAR/PLS OXIMETRY 1: CPT

## 2025-05-06 PROCEDURE — 94640 AIRWAY INHALATION TREATMENT: CPT

## 2025-05-06 PROCEDURE — 94664 DEMO&/EVAL PT USE INHALER: CPT

## 2025-05-06 PROCEDURE — 82948 REAGENT STRIP/BLOOD GLUCOSE: CPT

## 2025-05-06 RX ORDER — BISACODYL 10 MG
10 SUPPOSITORY, RECTAL RECTAL DAILY PRN
Status: DISCONTINUED | OUTPATIENT
Start: 2025-05-06 | End: 2025-05-07 | Stop reason: HOSPADM

## 2025-05-06 RX ORDER — POLYETHYLENE GLYCOL 3350 17 G/17G
17 POWDER, FOR SOLUTION ORAL DAILY
Status: DISCONTINUED | OUTPATIENT
Start: 2025-05-06 | End: 2025-05-07 | Stop reason: HOSPADM

## 2025-05-06 RX ORDER — MAGNESIUM HYDROXIDE/ALUMINUM HYDROXICE/SIMETHICONE 120; 1200; 1200 MG/30ML; MG/30ML; MG/30ML
30 SUSPENSION ORAL ONCE
Status: COMPLETED | OUTPATIENT
Start: 2025-05-06 | End: 2025-05-06

## 2025-05-06 RX ADMIN — SIMETHICONE 80 MG: 80 TABLET, CHEWABLE ORAL at 06:48

## 2025-05-06 RX ADMIN — LEVALBUTEROL HYDROCHLORIDE 1.25 MG: 1.25 SOLUTION RESPIRATORY (INHALATION) at 19:49

## 2025-05-06 RX ADMIN — SUCRALFATE 1 G: 1 TABLET ORAL at 17:00

## 2025-05-06 RX ADMIN — NICOTINE 1 PATCH: 21 PATCH, EXTENDED RELEASE TRANSDERMAL at 08:07

## 2025-05-06 RX ADMIN — LEVALBUTEROL HYDROCHLORIDE 1.25 MG: 1.25 SOLUTION RESPIRATORY (INHALATION) at 07:53

## 2025-05-06 RX ADMIN — IPRATROPIUM BROMIDE 0.5 MG: 0.5 SOLUTION RESPIRATORY (INHALATION) at 14:08

## 2025-05-06 RX ADMIN — ACETAMINOPHEN 650 MG: 325 TABLET ORAL at 05:59

## 2025-05-06 RX ADMIN — ALUMINUM HYDROXIDE, MAGNESIUM HYDROXIDE, AND DIMETHICONE 30 ML: 200; 20; 200 SUSPENSION ORAL at 13:18

## 2025-05-06 RX ADMIN — DICYCLOMINE HYDROCHLORIDE 10 MG: 10 CAPSULE ORAL at 05:58

## 2025-05-06 RX ADMIN — HEPARIN SODIUM 5000 UNITS: 5000 INJECTION INTRAVENOUS; SUBCUTANEOUS at 08:07

## 2025-05-06 RX ADMIN — GUAIFENESIN 1200 MG: 600 TABLET, EXTENDED RELEASE ORAL at 20:42

## 2025-05-06 RX ADMIN — SUCRALFATE 1 G: 1 TABLET ORAL at 08:07

## 2025-05-06 RX ADMIN — FAMOTIDINE 20 MG: 20 TABLET, FILM COATED ORAL at 08:06

## 2025-05-06 RX ADMIN — DOCUSATE SODIUM 100 MG: 100 CAPSULE, LIQUID FILLED ORAL at 08:06

## 2025-05-06 RX ADMIN — DICYCLOMINE HYDROCHLORIDE 10 MG: 10 CAPSULE ORAL at 12:05

## 2025-05-06 RX ADMIN — DOCUSATE SODIUM 100 MG: 100 CAPSULE, LIQUID FILLED ORAL at 17:00

## 2025-05-06 RX ADMIN — GUAIFENESIN 1200 MG: 600 TABLET, EXTENDED RELEASE ORAL at 08:06

## 2025-05-06 RX ADMIN — SUCRALFATE 1 G: 1 TABLET ORAL at 20:43

## 2025-05-06 RX ADMIN — IPRATROPIUM BROMIDE 0.5 MG: 0.5 SOLUTION RESPIRATORY (INHALATION) at 19:49

## 2025-05-06 RX ADMIN — ASPIRIN 81 MG: 81 TABLET, COATED ORAL at 08:07

## 2025-05-06 RX ADMIN — SUCRALFATE 1 G: 1 TABLET ORAL at 12:05

## 2025-05-06 RX ADMIN — PANTOPRAZOLE SODIUM 40 MG: 40 TABLET, DELAYED RELEASE ORAL at 05:58

## 2025-05-06 RX ADMIN — PREDNISONE 40 MG: 20 TABLET ORAL at 08:06

## 2025-05-06 RX ADMIN — POLYETHYLENE GLYCOL 3350 17 G: 17 POWDER, FOR SOLUTION ORAL at 08:07

## 2025-05-06 RX ADMIN — INSULIN LISPRO 1 UNITS: 100 INJECTION, SOLUTION INTRAVENOUS; SUBCUTANEOUS at 16:59

## 2025-05-06 RX ADMIN — HEPARIN SODIUM 5000 UNITS: 5000 INJECTION INTRAVENOUS; SUBCUTANEOUS at 20:43

## 2025-05-06 RX ADMIN — PANTOPRAZOLE SODIUM 40 MG: 40 TABLET, DELAYED RELEASE ORAL at 17:00

## 2025-05-06 RX ADMIN — IPRATROPIUM BROMIDE 0.5 MG: 0.5 SOLUTION RESPIRATORY (INHALATION) at 07:53

## 2025-05-06 RX ADMIN — ATORVASTATIN CALCIUM 40 MG: 40 TABLET, FILM COATED ORAL at 08:06

## 2025-05-06 RX ADMIN — DICYCLOMINE HYDROCHLORIDE 10 MG: 10 CAPSULE ORAL at 17:00

## 2025-05-06 RX ADMIN — FAMOTIDINE 20 MG: 20 TABLET, FILM COATED ORAL at 17:00

## 2025-05-06 RX ADMIN — LEVALBUTEROL HYDROCHLORIDE 1.25 MG: 1.25 SOLUTION RESPIRATORY (INHALATION) at 14:08

## 2025-05-06 NOTE — PLAN OF CARE
Problem: Potential for Falls  Goal: Patient will remain free of falls  Description: INTERVENTIONS:- Educate patient/family on patient safety including physical limitations- Instruct patient to call for assistance with activity - Consult OT/PT to assist with strengthening/mobility - Keep Call bell within reach- Keep bed low and locked with side rails adjusted as appropriate- Keep care items and personal belongings within reach- Initiate and maintain comfort rounds- Make Fall Risk Sign visible to staff- Offer Toileting every  Hours, in advance of need- Initiate/Maintain alarm- Obtain necessary fall risk management equipment: - Apply yellow socks and bracelet for high fall risk patients- Consider moving patient to room near nurses station  INTERVENTIONS:- Educate patient/family on patient safety including physical limitations- Instruct patient to call for assistance with activity - Consult OT/PT to assist with strengthening/mobility - Keep Call bell within reach- Keep bed low and locked with side rails adjusted as appropriate- Keep care items and personal belongings within reach- Initiate and maintain comfort rounds- Make Fall Risk Sign visible to staff- Offer Toileting every  Hours, in advance of need- Initiate/Maintain alarm- Obtain necessary fall risk management equipment: - Apply yellow socks and bracelet for high fall risk patients- Consider moving patient to room near nurses station  Outcome: Progressing     Problem: PAIN - ADULT  Goal: Verbalizes/displays adequate comfort level or baseline comfort level  Description: Interventions:- Encourage patient to monitor pain and request assistance- Assess pain using appropriate pain scale- Administer analgesics based on type and severity of pain and evaluate response- Implement non-pharmacological measures as appropriate and evaluate response- Consider cultural and social influences on pain and pain management- Notify physician/advanced practitioner if interventions  unsuccessful or patient reports new pain  Outcome: Progressing     Problem: INFECTION - ADULT  Goal: Absence or prevention of progression during hospitalization  Description: INTERVENTIONS:- Assess and monitor for signs and symptoms of infection- Monitor lab/diagnostic results- Monitor all insertion sites, i.e. indwelling lines, tubes, and drains- Monitor endotracheal if appropriate and nasal secretions for changes in amount and color- [place] appropriate cooling/warming therapies per order- Administer medications as ordered- Instruct and encourage patient and family to use good hand hygiene technique- Identify and instruct in appropriate isolation precautions for identified infection/condition  Outcome: Progressing  Goal: Absence of fever/infection during neutropenic period  Description: INTERVENTIONS:- Monitor WBC  Outcome: Progressing     Problem: SAFETY ADULT  Goal: Patient will remain free of falls  Description: INTERVENTIONS:- Educate patient/family on patient safety including physical limitations- Instruct patient to call for assistance with activity - Consult OT/PT to assist with strengthening/mobility - Keep Call bell within reach- Keep bed low and locked with side rails adjusted as appropriate- Keep care items and personal belongings within reach- Initiate and maintain comfort rounds- Make Fall Risk Sign visible to staff- Offer Toileting every  Hours, in advance of need- Initiate/Maintain alarm- Obtain necessary fall risk management equipment: - Apply yellow socks and bracelet for high fall risk patients- Consider moving patient to room near nurses station  INTERVENTIONS:- Educate patient/family on patient safety including physical limitations- Instruct patient to call for assistance with activity - Consult OT/PT to assist with strengthening/mobility - Keep Call bell within reach- Keep bed low and locked with side rails adjusted as appropriate- Keep care items and personal belongings within reach-  Initiate and maintain comfort rounds- Make Fall Risk Sign visible to staff- Offer Toileting every  Hours, in advance of need- Initiate/Maintain alarm- Obtain necessary fall risk management equipment: - Apply yellow socks and bracelet for high fall risk patients- Consider moving patient to room near nurses station  Outcome: Progressing  Goal: Maintain or return to baseline ADL function  Description: INTERVENTIONS:-  Assess patient's ability to carry out ADLs; assess patient's baseline for ADL function and identify physical deficits which impact ability to perform ADLs (bathing, care of mouth/teeth, toileting, grooming, dressing, etc.)- Assess/evaluate cause of self-care deficits - Assess range of motion- Assess patient's mobility; develop plan if impaired- Assess patient's need for assistive devices and provide as appropriate- Encourage maximum independence but intervene and supervise when necessary- Involve family in performance of ADLs- Assess for home care needs following discharge - Consider OT consult to assist with ADL evaluation and planning for discharge- Provide patient education as appropriate  Outcome: Progressing  Goal: Maintains/Returns to pre admission functional level  Description: INTERVENTIONS:- Perform AM-PAC 6 Click Basic Mobility/ Daily Activity assessment daily.- Set and communicate daily mobility goal to care team and patient/family/caregiver. - Collaborate with rehabilitation services on mobility goals if consulted- Perform Range of Motion  times a day.- Reposition patient every  hours.- Dangle patient  times a day- Stand patient  times a day- Ambulate patient  times a day- Out of bed to chair  times a day - Out of bed for meals  times a day- Out of bed for toileting- Record patient progress and toleration of activity level   Outcome: Progressing

## 2025-05-06 NOTE — PROGRESS NOTES
Patient:    MRN:  [id number]    aDmien Request ID:  [id number]    Level of care reserved:  Home Health Agency    Partner Reserved:  [place] [id number] [phone]    Clinical needs requested:    Geography searched:  [id number]    Start of Service:    Request sent:  11:54am EDT on 5/6/2025 by Wendy Matthews    Partner reserved:  1:55pm EDT on 5/6/2025 by Wendy Matthews    Choice list shared:  1:55pm EDT on 5/6/2025 by Wendy Matthews

## 2025-05-06 NOTE — PLAN OF CARE
Problem: Potential for Falls  Goal: Patient will remain free of falls  Description: INTERVENTIONS:- Educate patient/family on patient safety including physical limitations- Instruct patient to call for assistance with activity - Consult OT/PT to assist with strengthening/mobility - Keep Call bell within reach- Keep bed low and locked with side rails adjusted as appropriate- Keep care items and personal belongings within reach- Initiate and maintain comfort rounds- Make Fall Risk Sign visible to staff- Offer Toileting every  Hours, in advance of need- Initiate/Maintain alarm- Obtain necessary fall risk management equipment: - Apply yellow socks and bracelet for high fall risk patients- Consider moving patient to room near nurses station  INTERVENTIONS:- Educate patient/family on patient safety including physical limitations- Instruct patient to call for assistance with activity - Consult OT/PT to assist with strengthening/mobility - Keep Call bell within reach- Keep bed low and locked with side rails adjusted as appropriate- Keep care items and personal belongings within reach- Initiate and maintain comfort rounds- Make Fall Risk Sign visible to staff- Offer Toileting every  Hours, in advance of need- Initiate/Maintain alarm- Obtain necessary fall risk management equipment: - Apply yellow socks and bracelet for high fall risk patients- Consider moving patient to room near nurses station  Outcome: Progressing     Problem: PAIN - ADULT  Goal: Verbalizes/displays adequate comfort level or baseline comfort level  Description: Interventions:- Encourage patient to monitor pain and request assistance- Assess pain using appropriate pain scale- Administer analgesics based on type and severity of pain and evaluate response- Implement non-pharmacological measures as appropriate and evaluate response- Consider cultural and social influences on pain and pain management- Notify physician/advanced practitioner if interventions  unsuccessful or patient reports new pain  Outcome: Progressing     Problem: INFECTION - ADULT  Goal: Absence or prevention of progression during hospitalization  Description: INTERVENTIONS:- Assess and monitor for signs and symptoms of infection- Monitor lab/diagnostic results- Monitor all insertion sites, i.e. indwelling lines, tubes, and drains- Monitor endotracheal if appropriate and nasal secretions for changes in amount and color- [place] appropriate cooling/warming therapies per order- Administer medications as ordered- Instruct and encourage patient and family to use good hand hygiene technique- Identify and instruct in appropriate isolation precautions for identified infection/condition  Outcome: Progressing  Goal: Absence of fever/infection during neutropenic period  Description: INTERVENTIONS:- Monitor WBC  Outcome: Progressing     Problem: SAFETY ADULT  Goal: Patient will remain free of falls  Description: INTERVENTIONS:- Educate patient/family on patient safety including physical limitations- Instruct patient to call for assistance with activity - Consult OT/PT to assist with strengthening/mobility - Keep Call bell within reach- Keep bed low and locked with side rails adjusted as appropriate- Keep care items and personal belongings within reach- Initiate and maintain comfort rounds- Make Fall Risk Sign visible to staff- Offer Toileting every  Hours, in advance of need- Initiate/Maintain alarm- Obtain necessary fall risk management equipment: - Apply yellow socks and bracelet for high fall risk patients- Consider moving patient to room near nurses station  INTERVENTIONS:- Educate patient/family on patient safety including physical limitations- Instruct patient to call for assistance with activity - Consult OT/PT to assist with strengthening/mobility - Keep Call bell within reach- Keep bed low and locked with side rails adjusted as appropriate- Keep care items and personal belongings within reach-  Initiate and maintain comfort rounds- Make Fall Risk Sign visible to staff- Offer Toileting every  Hours, in advance of need- Initiate/Maintain alarm- Obtain necessary fall risk management equipment: - Apply yellow socks and bracelet for high fall risk patients- Consider moving patient to room near nurses station  Outcome: Progressing  Goal: Maintain or return to baseline ADL function  Description: INTERVENTIONS:-  Assess patient's ability to carry out ADLs; assess patient's baseline for ADL function and identify physical deficits which impact ability to perform ADLs (bathing, care of mouth/teeth, toileting, grooming, dressing, etc.)- Assess/evaluate cause of self-care deficits - Assess range of motion- Assess patient's mobility; develop plan if impaired- Assess patient's need for assistive devices and provide as appropriate- Encourage maximum independence but intervene and supervise when necessary- Involve family in performance of ADLs- Assess for home care needs following discharge - Consider OT consult to assist with ADL evaluation and planning for discharge- Provide patient education as appropriate  Outcome: Progressing  Goal: Maintains/Returns to pre admission functional level  Description: INTERVENTIONS:- Perform AM-PAC 6 Click Basic Mobility/ Daily Activity assessment daily.- Set and communicate daily mobility goal to care team and patient/family/caregiver. - Collaborate with rehabilitation services on mobility goals if consulted- Perform Range of Motion  times a day.- Reposition patient every  hours.- Dangle patient  times a day- Stand patient  times a day- Ambulate patient  times a day- Out of bed to chair  times a day - Out of bed for meals  times a day- Out of bed for toileting- Record patient progress and toleration of activity level   Outcome: Progressing     Problem: DISCHARGE PLANNING  Goal: Discharge to home or other facility with appropriate resources  Description: INTERVENTIONS:- Identify barriers  to discharge w/patient and caregiver- Arrange for needed discharge resources and transportation as appropriate- Identify discharge learning needs (meds, wound care, etc.)- Arrange for interpretive services to assist at discharge as needed- Refer to Case Management Department for coordinating discharge planning if the patient needs post-hospital services based on physician/advanced practitioner order or complex needs related to functional status, cognitive ability, or social support system  Outcome: Progressing     Problem: Knowledge Deficit  Goal: Patient/family/caregiver demonstrates understanding of disease process, treatment plan, medications, and discharge instructions  Description: Complete learning assessment and assess knowledge base.Interventions:- Provide teaching at level of understanding- Provide teaching via preferred learning methods  Outcome: Progressing     Problem: Nutrition/Hydration-ADULT  Goal: Nutrient/Hydration intake appropriate for improving, restoring or maintaining nutritional needs  Description: Monitor and assess patient's nutrition/hydration status for malnutrition. Collaborate with interdisciplinary team and initiate plan and interventions as ordered.  Monitor patient's weight and dietary intake as ordered or per policy. Utilize nutrition screening tool and intervene as necessary. Determine patient's food preferences and provide high-protein, high-caloric foods as appropriate. INTERVENTIONS:- Monitor oral intake, urinary output, labs, and treatment plans- Assess nutrition and hydration status and recommend course of action- Evaluate amount of meals eaten- Assist patient with eating if necessary - Allow adequate time for meals- Recommend/ encourage appropriate diets, oral nutritional supplements, and vitamin/mineral supplements- Order, calculate, and assess calorie counts as needed- Recommend, monitor, and adjust tube feedings and TPN/PPN based on assessed needs- Assess need for  intravenous fluids- Provide specific nutrition/hydration education as appropriate- Include patient/family/caregiver in decisions related to nutrition  Monitor and assess patient's nutrition/hydration status for malnutrition. Collaborate with interdisciplinary team and initiate plan and interventions as ordered.  Monitor patient's weight and dietary intake as ordered or per policy. Utilize nutrition screening tool and intervene as necessary. Determine patient's food preferences and provide high-protein, high-caloric foods as appropriate. INTERVENTIONS:- Monitor oral intake, urinary output, labs, and treatment plans- Assess nutrition and hydration status and recommend course of action- Evaluate amount of meals eaten- Assist patient with eating if necessary - Allow adequate time for meals- Recommend/ encourage appropriate diets, oral nutritional supplements, and vitamin/mineral supplements- Order, calculate, and assess calorie counts as needed- Recommend, monitor, and adjust tube feedings and TPN/PPN based on assessed needs- Assess need for intravenous fluids- Provide specific nutrition/hydration education as appropriate- Include patient/family/caregiver in decisions related to nutrition  Outcome: Progressing

## 2025-05-06 NOTE — ASSESSMENT & PLAN NOTE
Patient complains of significant epigastric tenderness, did not eat breakfast, however did tolerate lunch and dinner  Obstruction series completed showed moderate colonic stool burden  Patient given IV MiraLAX  Refusing enema/suppository  Given 1 dose of Mylanta

## 2025-05-06 NOTE — RESPIRATORY THERAPY NOTE
Respiratory Note     05/06/25 0753   Inhalation Therapy Tx   $ Inhalation Therapy Performed Yes   $ Pulse Oximetry Spot Check Charge Completed   SpO2 95 %   Pre-Treatment Pulse 96   Pre-Treatment Respirations 20   Duration 15   Breath Sounds Pre-Treatment Bilateral Diminished;Expiratory wheeze   Breath Sounds Post-Treatment Bilateral Diminished;Expiratory wheezes   Post-Treatment Pulse 101   Post-Treatment Respirations 20   Delivery Source UDN;Oxygen   Position Sitting   Treatment Tolerance Tolerated well   Resp Comments Pt c/o abdominal pain, RN aware, vest therapy held.

## 2025-05-06 NOTE — PROGRESS NOTES
Progress Note - Hospitalist   Name: Ileana Seaman 65 y.o. female I MRN: [id number]  Unit/Bed#: -01 I Date of Admission: 4/30/2025   Date of Service: 5/6/2025 I Hospital Day: 6    Assessment & Plan  Pneumonia of right lower lobe due to infectious organism  Lab Results   Component Value Date    WBC 8.70 05/01/2025    PROCALCITONI <0.05 05/01/2025   Follows with infectious disease outpatient for MARIS infection.  Unable to tolerate therapy due to hepatotoxicity while on rifabutin.  Instead they are monitoring sputum cultures closely off treatment.  CXR progressive pneumonia in right upper lobe superimposed on known cavitary nodular lesions  CTA chest: Worsening right upper lobe pneumonia and new superior segment right lower lobe pneumonia  COVID/Flu/RSV negative  Urine Legionella urine strep is negative sputum cultures pending.  AFB ordered pending  Blood Cultures pending  Procalcitonin x 2 is negative  ID consulted given history of MARIS and recurrent and worsening PNA.  As discussed with speech she could be aspirating due to GI issues as she does have hiatal hernia and what she describes waking up in the morning is reflux increase her Protonix to 40 mg p.o. twice daily she is undergoing a swallow study to speech today to evaluate for any aspiration in terms of food consistency and liquid consistency discussed case with ID and pulmonology.  She is estelita complete Zithromax for 3 days for the COPD exacerbation but she has  completed Rocephin for 5 days total 5/5  Also pulmonary added vest therapy and saline nebulizer treatment to help patient cough up mucus continue Mucinex as well  Acute on chronic hypoxic respiratory failure (HCC)  With history of severe emphysema on 2 L nasal cannula at baseline.  Progressively worsening shortness of breath requiring increased O2 over the past few days.  Secondary to her worsening pneumonia and new pneumonia and COPD exacerbation yesterday she required 4 L of oxygen now she is  trending down to 2  Sepsis (HCC)  Presents to ED with shortness of breath  SIRS met: SIRS: 2/4; temperature, tachycardia, tachypnea,  Secondary to worsening pneumonia and new pneumonia forming she was recently admitted in the beginning of April and completed as discussed with infectious disease and pulmonary.  Factious disease she is estelita be on Rocephin for 5/5 days and Zithromax to be completed for COPD exacerbation  resolved  COPD exacerbation (HCC)  On Trelegy for maintenance.    No wheezing down to her baseline 2 L switch over to prednisone 40 mg daily and taper by 10 mg every 3 days  Discussed case with pulmonology  Mycobacterial infection, non-TB  Underwent bronchoscopy 5/2024 for cavitary lesion biopsy, growing Mycobacterium intracellular.    Following outpatient with infectious disease regarding MARIS infection.  She was unable to tolerate the rifabutin regimen given hepatotoxicity and is instead being monitored with sputum cultures and AFB.  Is due for culture this upcoming week.  Follow sputum culture negative and AFB AFB for now is negative  ID consulted discussed with ID will need outpatient follow-up with ID and pulmonary to discuss restarting treatment  Pulmonary nodule  Noted on CT chest.  Reviewed with patient.  Continue follow-up with pulmonology  Abdominal pain  Patient complains of significant epigastric tenderness, did not eat breakfast, however did tolerate lunch and dinner  Obstruction series completed showed moderate colonic stool burden  Patient given IV MiraLAX  Refusing enema/suppository  Given 1 dose of Mylanta    VTE Pharmacologic Prophylaxis:   High Risk (Score >/= 5) - Pharmacological DVT Prophylaxis Ordered: heparin. Sequential Compression Devices Ordered.    Mobility:   Basic Mobility Inpatient Raw Score: 24  JH-HLM Goal: 8: Walk 250 feet or more  JH-HLM Achieved: 8: Walk 250 feet ot more  JH-HLM Goal achieved. Continue to encourage appropriate mobility.    Patient Centered Rounds: I  performed bedside rounds with nursing staff today.   Discussions with Specialists or Other Care Team Provider: CM    Education and Discussions with Family / Patient: Patient declined call to .     Current Length of Stay: 6 day(s)  Current Patient Status: Inpatient   Certification Statement: The patient will continue to require additional inpatient hospital stay due to abdominal pain  Discharge Plan: Anticipate discharge tomorrow to home.    Code Status: Level 1 - Full Code    Subjective   Patient seen and examined bedside.  No acute events overnight.  This morning patient complains of epigastric tenderness and pain.  She reports constipation x 2 days with no bowel movement.  She reports decreased appetite for her breakfast however did tolerate lunch.  Got 1 dose of MiraLAX with 2 small bowel movements noted slight improvement in pain.  No other acute complaints at this time.    Objective :  Temp:  [97 °F (36.1 °C)-97.2 °F (36.2 °C)] 97.2 °F (36.2 °C)  HR:  [] 100  BP: (110-155)/(73-91) 155/91  Resp:  [17-18] 18  SpO2:  [93 %-99 %] 95 %  O2 Device: Nasal cannula  Nasal Cannula O2 Flow Rate (L/min):  [2 L/min] 2 L/min    Body mass index is 17.76 kg/m².     Input and Output Summary (last 24 hours):     Intake/Output Summary (Last 24 hours) at 5/6/2025 0841  Last data filed at 5/5/2025 1300  Gross per 24 hour   Intake 600 ml   Output --   Net 600 ml       Physical Exam  Vitals and nursing note reviewed.   Constitutional:       General: She is not in acute distress.     Appearance: She is ill-appearing (Chronically). She is not toxic-appearing.   HENT:      Head: Normocephalic and atraumatic.   Eyes:      Extraocular Movements: Extraocular movements intact.      Pupils: Pupils are equal, round, and reactive to light.   Cardiovascular:      Rate and Rhythm: Normal rate and regular rhythm.   Pulmonary:      Effort: Pulmonary effort is normal.      Breath sounds: Normal breath sounds.   Abdominal:       Palpations: Abdomen is soft.      Tenderness: There is abdominal tenderness (Epigastric).   Musculoskeletal:         General: No swelling.   Skin:     General: Skin is warm.   Neurological:      General: No focal deficit present.      Mental Status: She is alert and oriented to person, place, and time. Mental status is at baseline.   Psychiatric:         Mood and Affect: Mood normal.         Behavior: Behavior normal.           Lines/Drains:              Lab Results: I have reviewed the following results:   Results from last 7 days   Lab Units 05/01/25  0549   WBC Thousand/uL 8.70   HEMOGLOBIN g/dL 11.2*   HEMATOCRIT % 34.8   PLATELETS Thousands/uL 318   SEGS PCT % 86*   LYMPHO PCT % 9*   MONO PCT % 5   EOS PCT % 0     Results from last 7 days   Lab Units 05/04/25  0535 05/02/25  0649 05/01/25  0931   SODIUM mmol/L 138   < > 142   POTASSIUM mmol/L 4.2   < > 3.8   CHLORIDE mmol/L 102   < > 103   CO2 mmol/L 29   < > 32   BUN mg/dL 15   < > 8   CREATININE mg/dL 0.53*   < > 0.50*   ANION GAP mmol/L 7   < > 7   CALCIUM mg/dL 8.5   < > 9.0   ALBUMIN g/dL  --   --  3.3*   TOTAL BILIRUBIN mg/dL  --   --  0.27   ALK PHOS U/L  --   --  83   ALT U/L  --   --  11   AST U/L  --   --  8*   GLUCOSE RANDOM mg/dL 181*   < > 252*    < > = values in this interval not displayed.     Results from last 7 days   Lab Units 04/30/25  1219   INR  1.06     Results from last 7 days   Lab Units 05/06/25  0741 05/05/25  2111 05/05/25  1624 05/05/25  1215 05/05/25  0807 05/04/25  2116 05/04/25  1613 05/04/25  1030 05/04/25  0727 05/03/25 2113 05/03/25  1615 05/03/25  1123   POC GLUCOSE mg/dl 99 197* 195* 211* 133 159* 199* 169* 141* 230* 219* 150*     Results from last 7 days   Lab Units 05/02/25  0649   HEMOGLOBIN A1C % 7.4*     Results from last 7 days   Lab Units 05/01/25  0545 04/30/25  1648 04/30/25  1219   LACTIC ACID mmol/L  --  1.2  --    PROCALCITONIN ng/ml <0.05  --  <0.05       Recent Cultures (last 7 days):   Results from last 7 days    Lab Units 05/02/25  1607 04/30/25  1830 04/30/25  1419 04/30/25  1405   BLOOD CULTURE   --   --  No Growth After 5 Days. No Growth After 5 Days.   SPUTUM CULTURE  3+ Growth of  --   --   --    GRAM STAIN RESULT  2+ Gram negative rods*  2+ Gram positive cocci in pairs*  1+ Epithelial cells per low power field*  No polys seen*  --   --   --    LEGIONELLA URINARY ANTIGEN   --  Negative  --   --              Last 24 Hours Medication List:     Current Facility-Administered Medications:     acetaminophen (TYLENOL) tablet 650 mg, Q6H PRN    albuterol inhalation solution 2.5 mg, Q6H PRN    aspirin (ECOTRIN LOW STRENGTH) EC tablet 81 mg, Daily    atorvastatin (LIPITOR) tablet 40 mg, Daily    benzonatate (TESSALON PERLES) capsule 100 mg, TID PRN    bisacodyl (DULCOLAX) rectal suppository 10 mg, Daily PRN    dicyclomine (BENTYL) capsule 10 mg, TID AC    docusate sodium (COLACE) capsule 100 mg, BID    famotidine (PEPCID) tablet 20 mg, BID    guaiFENesin (MUCINEX) 12 hr tablet 1,200 mg, Q12H MAYCO    heparin (porcine) subcutaneous injection 5,000 Units, Q12H MAYCO    insulin lispro (HumALOG/ADMELOG) 100 units/mL subcutaneous injection 1-5 Units, TID AC **AND** Fingerstick Glucose (POCT), TID AC    ipratropium (ATROVENT) 0.02 % inhalation solution 0.5 mg, TID    levalbuterol (XOPENEX) inhalation solution 1.25 mg, TID    nicotine (NICODERM CQ) 21 mg/24 hr TD 24 hr patch 1 patch, Daily    pantoprazole (PROTONIX) EC tablet 40 mg, BID AC    polyethylene glycol (MIRALAX) packet 17 g, Daily    predniSONE tablet 40 mg, Daily    simethicone (MYLICON) chewable tablet 80 mg, Q6H PRN    sucralfate (CARAFATE) tablet 1 g, 4x Daily    Administrative Statements   Today, Patient Was Seen By: Mary Altamirano MD      **Please Note: This note may have been constructed using a voice recognition system.**

## 2025-05-06 NOTE — CASE MANAGEMENT
Case Management Discharge Planning Note    Patient name Ileana Seaman  Location /-01 MRN [id number]  : 1960 Date 2025       Current Admission Date: 2025  Current Admission Diagnosis:Pneumonia of right lower lobe due to infectious organism   Patient Active Problem List    Diagnosis Date Noted Date Diagnosed    Abdominal pain 2025     Cigarette nicotine dependence without complication 2025     Pneumonia of right lower lobe due to infectious organism 2025     Acute on chronic diastolic (congestive) heart failure (HCC) 2025     Smoker 2025     Sepsis (HCC) 2025     Anxiety 2024     Mild protein-calorie malnutrition (HCC) 2024     Celiac artery stenosis (HCC) 2024     Elevated liver enzymes 2024     Pulmonary nodule 2024     Moderate protein-calorie malnutrition (HCC) 2024     COPD exacerbation (HCC) 2024     Atrial fibrillation (HCC) 2024     Acute on chronic hypoxic respiratory failure (HCC) 2024     Epigastric pain 2024     New onset atrial fibrillation (HCC) 10/06/2024     Type 2 diabetes mellitus without complication, without long-term current use of insulin (HCC) 10/05/2024     Dysphagia 10/05/2024     COPD with acute exacerbation (HCC) 10/04/2024     Mycobacterial infection, 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       LOS (days): 6  Geometric Mean LOS (GMLOS) (days): 4.9  Days to GMLOS:-1     OBJECTIVE:  Risk of Unplanned Readmission Score: 38.76         Current admission status: Inpatient   Preferred Pharmacy:   [id number] - [place], [place] - [address]  [address]  [place] [address]  Phone: [phone] Fax: [phone]    Primary Care Provider: Merline Dinero    Primary Insurance: [facility] [place]  REP  [facility]    DISCHARGE DETAILS:    Discharge planning discussed with:: patient  Freedom of Choice:  Yes  Comments - Freedom of Choice: pt has COPD, acute on chronic respiratory failure, and pneumonia, offered to set up HHC services. patient is agreeable to HHC at home. [place] referral placed in aidin.    Discussed Freedom of Choice with patient. List of agencies given to patient via in person. patient aware the list is custom filtered for them by preference  and that [facility] post acute providers are designated. Only accepting agency was [facility], pt agreeable to [facility] for home care services, reserved in aidin.     Were Treatment Team discharge recommendations reviewed with patient/caregiver?: Yes  Did patient/caregiver verbalize understanding of patient care needs?: Yes  Were patient/caregiver advised of the risks associated with not following Treatment Team discharge recommendations?: Yes         Requested Home Health Care         Is the patient interested in HHC at discharge?: Yes  Home Health Discipline requested:: Nursing, Occupational Therapy, Physical Therapy  Home Health Agency Name:: [facility] External Referral Reason (only applicable if external HHA name selected): Patient has established relationship with provider  Home Health Follow-Up Provider:: PCP  Home Health Services Needed:: COPD Management, Evaluate Functional Status and Safety, Gait/ADL Training, Oxygen Via Nasal Cannula, Strengthening/Theraputic Exercises to Improve Function, Other (comment) (medication management)  Homebound Criteria Met:: Uses an Assist Device (i.e. cane, walker, etc), Requires the Assistance of Another Person for Safe Ambulation or to Leave the Home  Supporting Clincal Findings:: Requires Oxygen, Limited Endurance    DME Referral Provided  Referral made for DME?: No    Other Referral/Resources/Interventions Provided:  Interventions: HHC    Would you like to participate in our [facility] service program?  : No - Declined    Treatment Team Recommendation: Home with Home Health Care  Discharge Destination  Plan:: Home with Home Health Care  Transport at Discharge : Family                             IMM Given (Date):: 05/06/25  IMM Given to:: Patient

## 2025-05-07 VITALS
HEIGHT: 61 IN | BODY MASS INDEX: 17.75 KG/M2 | HEART RATE: 112 BPM | OXYGEN SATURATION: 93 % | WEIGHT: 94 LBS | RESPIRATION RATE: 16 BRPM | TEMPERATURE: 97 F | DIASTOLIC BLOOD PRESSURE: 69 MMHG | SYSTOLIC BLOOD PRESSURE: 119 MMHG

## 2025-05-07 PROBLEM — E43 SEVERE PROTEIN-CALORIE MALNUTRITION (HCC): Status: ACTIVE | Noted: 2025-05-07

## 2025-05-07 LAB
ANION GAP SERPL CALCULATED.3IONS-SCNC: 5 MMOL/L (ref 4–13)
BUN SERPL-MCNC: 11 MG/DL (ref 5–25)
CALCIUM SERPL-MCNC: 8.5 MG/DL (ref 8.4–10.2)
CHLORIDE SERPL-SCNC: 101 MMOL/L (ref 96–108)
CO2 SERPL-SCNC: 33 MMOL/L (ref 21–32)
CREAT SERPL-MCNC: 0.48 MG/DL (ref 0.6–1.3)
GFR SERPL CREATININE-BSD FRML MDRD: 101 ML/MIN/1.73SQ M
GLUCOSE SERPL-MCNC: 103 MG/DL (ref 65–140)
GLUCOSE SERPL-MCNC: 106 MG/DL (ref 65–140)
GLUCOSE SERPL-MCNC: 193 MG/DL (ref 65–140)
POTASSIUM SERPL-SCNC: 3.9 MMOL/L (ref 3.5–5.3)
SODIUM SERPL-SCNC: 139 MMOL/L (ref 135–147)

## 2025-05-07 PROCEDURE — 80048 BASIC METABOLIC PNL TOTAL CA: CPT | Performed by: STUDENT IN AN ORGANIZED HEALTH CARE EDUCATION/TRAINING PROGRAM

## 2025-05-07 PROCEDURE — 94640 AIRWAY INHALATION TREATMENT: CPT

## 2025-05-07 PROCEDURE — 82948 REAGENT STRIP/BLOOD GLUCOSE: CPT

## 2025-05-07 PROCEDURE — 99239 HOSP IP/OBS DSCHRG MGMT >30: CPT | Performed by: STUDENT IN AN ORGANIZED HEALTH CARE EDUCATION/TRAINING PROGRAM

## 2025-05-07 PROCEDURE — 94760 N-INVAS EAR/PLS OXIMETRY 1: CPT

## 2025-05-07 PROCEDURE — 94668 MNPJ CHEST WALL SBSQ: CPT

## 2025-05-07 RX ORDER — BENZONATATE 100 MG/1
100 CAPSULE ORAL 3 TIMES DAILY PRN
Qty: 20 CAPSULE | Refills: 0 | Status: ON HOLD | OUTPATIENT
Start: 2025-05-07 | End: 2025-05-18

## 2025-05-07 RX ORDER — SUCRALFATE 1 G/1
1 TABLET ORAL 4 TIMES DAILY
Qty: 56 TABLET | Refills: 0 | Status: ON HOLD | OUTPATIENT
Start: 2025-05-07 | End: 2025-05-21

## 2025-05-07 RX ORDER — POLYETHYLENE GLYCOL 3350 17 G/17G
17 POWDER, FOR SOLUTION ORAL DAILY
Qty: 30 EACH | Refills: 0 | Status: ON HOLD | OUTPATIENT
Start: 2025-05-08 | End: 2025-05-18 | Stop reason: CLARIF

## 2025-05-07 RX ORDER — PREDNISONE 10 MG/1
TABLET ORAL
Qty: 22 TABLET | Refills: 0 | Status: ON HOLD | OUTPATIENT
Start: 2025-05-08 | End: 2025-05-18 | Stop reason: CLARIF

## 2025-05-07 RX ORDER — FLUTICASONE FUROATE, UMECLIDINIUM BROMIDE AND VILANTEROL TRIFENATATE 100; 62.5; 25 UG/1; UG/1; UG/1
1 POWDER RESPIRATORY (INHALATION) DAILY
Qty: 60 BLISTER | Refills: 0 | Status: ON HOLD | OUTPATIENT
Start: 2025-05-07

## 2025-05-07 RX ORDER — NICOTINE 21 MG/24HR
1 PATCH, TRANSDERMAL 24 HOURS TRANSDERMAL DAILY
Qty: 28 PATCH | Refills: 0 | Status: ON HOLD | OUTPATIENT
Start: 2025-05-08

## 2025-05-07 RX ORDER — GUAIFENESIN 1200 MG/1
1200 TABLET, EXTENDED RELEASE ORAL EVERY 12 HOURS SCHEDULED
Qty: 14 TABLET | Refills: 0 | Status: SHIPPED | OUTPATIENT
Start: 2025-05-07 | End: 2025-05-14

## 2025-05-07 RX ADMIN — PANTOPRAZOLE SODIUM 40 MG: 40 TABLET, DELAYED RELEASE ORAL at 05:50

## 2025-05-07 RX ADMIN — POLYETHYLENE GLYCOL 3350 17 G: 17 POWDER, FOR SOLUTION ORAL at 08:56

## 2025-05-07 RX ADMIN — LEVALBUTEROL HYDROCHLORIDE 1.25 MG: 1.25 SOLUTION RESPIRATORY (INHALATION) at 13:33

## 2025-05-07 RX ADMIN — PREDNISONE 40 MG: 20 TABLET ORAL at 08:56

## 2025-05-07 RX ADMIN — HEPARIN SODIUM 5000 UNITS: 5000 INJECTION INTRAVENOUS; SUBCUTANEOUS at 08:56

## 2025-05-07 RX ADMIN — IPRATROPIUM BROMIDE 0.5 MG: 0.5 SOLUTION RESPIRATORY (INHALATION) at 13:34

## 2025-05-07 RX ADMIN — DICYCLOMINE HYDROCHLORIDE 10 MG: 10 CAPSULE ORAL at 05:50

## 2025-05-07 RX ADMIN — INSULIN LISPRO 1 UNITS: 100 INJECTION, SOLUTION INTRAVENOUS; SUBCUTANEOUS at 11:42

## 2025-05-07 RX ADMIN — IPRATROPIUM BROMIDE 0.5 MG: 0.5 SOLUTION RESPIRATORY (INHALATION) at 09:10

## 2025-05-07 RX ADMIN — DOCUSATE SODIUM 100 MG: 100 CAPSULE, LIQUID FILLED ORAL at 08:56

## 2025-05-07 RX ADMIN — DICYCLOMINE HYDROCHLORIDE 10 MG: 10 CAPSULE ORAL at 11:42

## 2025-05-07 RX ADMIN — GUAIFENESIN 1200 MG: 600 TABLET, EXTENDED RELEASE ORAL at 08:56

## 2025-05-07 RX ADMIN — ATORVASTATIN CALCIUM 40 MG: 40 TABLET, FILM COATED ORAL at 08:56

## 2025-05-07 RX ADMIN — FAMOTIDINE 20 MG: 20 TABLET, FILM COATED ORAL at 08:56

## 2025-05-07 RX ADMIN — SUCRALFATE 1 G: 1 TABLET ORAL at 08:56

## 2025-05-07 RX ADMIN — ASPIRIN 81 MG: 81 TABLET, COATED ORAL at 08:56

## 2025-05-07 RX ADMIN — SUCRALFATE 1 G: 1 TABLET ORAL at 11:42

## 2025-05-07 RX ADMIN — LEVALBUTEROL HYDROCHLORIDE 1.25 MG: 1.25 SOLUTION RESPIRATORY (INHALATION) at 09:10

## 2025-05-07 RX ADMIN — NICOTINE 1 PATCH: 21 PATCH, EXTENDED RELEASE TRANSDERMAL at 08:56

## 2025-05-07 NOTE — ASSESSMENT & PLAN NOTE
With history of severe emphysema on 2 L nasal cannula at baseline.  Progressively worsening shortness of breath requiring increased O2 over the past few days.  Resolved.  Patient is back to baseline O2 requirement

## 2025-05-07 NOTE — MALNUTRITION/BMI
This medical record reflects one or more clinical indicators suggestive of malnutrition and underweight.    Malnutrition Findings:   Adult Malnutrition type: Chronic illness  Adult Degree of Malnutrition: Other severe protein calorie malnutrition  Malnutrition Characteristics: Fat loss, Muscle loss                360 Statement: Chronic severe malnutrition related to inadequate oral intake/condition as evidenced by severe muscle loss to interroseous, quadriceps, gastrocnemius, temporalis muscles; severe fat loss to orbitals and buccal pads. Treated with: Continue regular diet. +snacks in between meals at AM/PM. d/c Nepro pt does not like, order ensure plus high PRO BID vanilla/strawberry flavors. Reviewed reflux nutrition therapy with pt and provided with handout. Recommend daily weights during admission for monitoring.    BMI Findings:  Adult BMI Classifications: Underweight < 18.5        Body mass index is 17.76 kg/m².     See Nutrition note dated 5/7/25 for additional details.  Completed nutrition assessment is viewable in the nutrition documentation.

## 2025-05-07 NOTE — ASSESSMENT & PLAN NOTE
On Trelegy for maintenance.    No wheezing down to her baseline 2 L switch over to prednisone 40 mg daily and taper by 10 mg every 3 days until stop  Discussed case with pulmonology

## 2025-05-07 NOTE — PLAN OF CARE
Problem: Potential for Falls  Goal: Patient will remain free of falls  Description: INTERVENTIONS:- Educate patient/family on patient safety including physical limitations- Instruct patient to call for assistance with activity - Consult OT/PT to assist with strengthening/mobility - Keep Call bell within reach- Keep bed low and locked with side rails adjusted as appropriate- Keep care items and personal belongings within reach- Initiate and maintain comfort rounds- Make Fall Risk Sign visible to staff- Offer Toileting every  Hours, in advance of need- Initiate/Maintain alarm- Obtain necessary fall risk management equipment: - Apply yellow socks and bracelet for high fall risk patients- Consider moving patient to room near nurses station  INTERVENTIONS:- Educate patient/family on patient safety including physical limitations- Instruct patient to call for assistance with activity - Consult OT/PT to assist with strengthening/mobility - Keep Call bell within reach- Keep bed low and locked with side rails adjusted as appropriate- Keep care items and personal belongings within reach- Initiate and maintain comfort rounds- Make Fall Risk Sign visible to staff- Offer Toileting every  Hours, in advance of need- Initiate/Maintain alarm- Obtain necessary fall risk management equipment: - Apply yellow socks and bracelet for high fall risk patients- Consider moving patient to room near nurses station  Outcome: Progressing     Problem: PAIN - ADULT  Goal: Verbalizes/displays adequate comfort level or baseline comfort level  Description: Interventions:- Encourage patient to monitor pain and request assistance- Assess pain using appropriate pain scale- Administer analgesics based on type and severity of pain and evaluate response- Implement non-pharmacological measures as appropriate and evaluate response- Consider cultural and social influences on pain and pain management- Notify physician/advanced practitioner if interventions  unsuccessful or patient reports new pain  Outcome: Progressing     Problem: INFECTION - ADULT  Goal: Absence or prevention of progression during hospitalization  Description: INTERVENTIONS:- Assess and monitor for signs and symptoms of infection- Monitor lab/diagnostic results- Monitor all insertion sites, i.e. indwelling lines, tubes, and drains- Monitor endotracheal if appropriate and nasal secretions for changes in amount and color- [place] appropriate cooling/warming therapies per order- Administer medications as ordered- Instruct and encourage patient and family to use good hand hygiene technique- Identify and instruct in appropriate isolation precautions for identified infection/condition  Outcome: Progressing  Goal: Absence of fever/infection during neutropenic period  Description: INTERVENTIONS:- Monitor WBC  Outcome: Progressing     Problem: SAFETY ADULT  Goal: Patient will remain free of falls  Description: INTERVENTIONS:- Educate patient/family on patient safety including physical limitations- Instruct patient to call for assistance with activity - Consult OT/PT to assist with strengthening/mobility - Keep Call bell within reach- Keep bed low and locked with side rails adjusted as appropriate- Keep care items and personal belongings within reach- Initiate and maintain comfort rounds- Make Fall Risk Sign visible to staff- Offer Toileting every  Hours, in advance of need- Initiate/Maintain alarm- Obtain necessary fall risk management equipment: - Apply yellow socks and bracelet for high fall risk patients- Consider moving patient to room near nurses station  INTERVENTIONS:- Educate patient/family on patient safety including physical limitations- Instruct patient to call for assistance with activity - Consult OT/PT to assist with strengthening/mobility - Keep Call bell within reach- Keep bed low and locked with side rails adjusted as appropriate- Keep care items and personal belongings within reach-  Initiate and maintain comfort rounds- Make Fall Risk Sign visible to staff- Offer Toileting every  Hours, in advance of need- Initiate/Maintain alarm- Obtain necessary fall risk management equipment: - Apply yellow socks and bracelet for high fall risk patients- Consider moving patient to room near nurses station  Outcome: Progressing  Goal: Maintain or return to baseline ADL function  Description: INTERVENTIONS:-  Assess patient's ability to carry out ADLs; assess patient's baseline for ADL function and identify physical deficits which impact ability to perform ADLs (bathing, care of mouth/teeth, toileting, grooming, dressing, etc.)- Assess/evaluate cause of self-care deficits - Assess range of motion- Assess patient's mobility; develop plan if impaired- Assess patient's need for assistive devices and provide as appropriate- Encourage maximum independence but intervene and supervise when necessary- Involve family in performance of ADLs- Assess for home care needs following discharge - Consider OT consult to assist with ADL evaluation and planning for discharge- Provide patient education as appropriate  Outcome: Progressing  Goal: Maintains/Returns to pre admission functional level  Description: INTERVENTIONS:- Perform AM-PAC 6 Click Basic Mobility/ Daily Activity assessment daily.- Set and communicate daily mobility goal to care team and patient/family/caregiver. - Collaborate with rehabilitation services on mobility goals if consulted- Perform Range of Motion  times a day.- Reposition patient every  hours.- Dangle patient  times a day- Stand patient  times a day- Ambulate patient  times a day- Out of bed to chair  times a day - Out of bed for meals  times a day- Out of bed for toileting- Record patient progress and toleration of activity level   Outcome: Progressing

## 2025-05-07 NOTE — CASE MANAGEMENT
Case Management Discharge Planning Note    Patient name Ileana Seaman  Location /-01 MRN [id number]  : 1960 Date 2025       Current Admission Date: 2025  Current Admission Diagnosis:Pneumonia of right lower lobe due to infectious organism   Patient Active Problem List    Diagnosis Date Noted Date Diagnosed    Severe protein-calorie malnutrition (HCC) 2025     Abdominal pain 2025     Cigarette nicotine dependence without complication 2025     Pneumonia of right lower lobe due to infectious organism 2025     Acute on chronic diastolic (congestive) heart failure (HCC) 2025     Smoker 2025     Sepsis (HCC) 2025     Anxiety 2024     Mild protein-calorie malnutrition (HCC) 2024     Celiac artery stenosis (HCC) 2024     Elevated liver enzymes 2024     Pulmonary nodule 2024     Moderate protein-calorie malnutrition (HCC) 2024     COPD exacerbation (HCC) 2024     Atrial fibrillation (HCC) 2024     Acute on chronic hypoxic respiratory failure (HCC) 2024     Epigastric pain 2024     New onset atrial fibrillation (HCC) 10/06/2024     Type 2 diabetes mellitus without complication, without long-term current use of insulin (HCC) 10/05/2024     Dysphagia 10/05/2024     COPD with acute exacerbation (HCC) 10/04/2024     Mycobacterial infection, 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       LOS (days): 7  Geometric Mean LOS (GMLOS) (days): 4.9  Days to GMLOS:-2     OBJECTIVE:  Risk of Unplanned Readmission Score: 39.77         Current admission status: Inpatient   Preferred Pharmacy:   [id number] - [place], [place] - [address]  [address]  [place] [address]  Phone: [phone] Fax: [phone]    Primary Care Provider: Merline Dinero    Primary Insurance: [facility] [place] COMPLETE [facility] RE[facility]  Secondary Insurance: [facility]    DISCHARGE DETAILS:    Discharge  planning discussed with:: patient  Freedom of Choice: Yes  Comments - Freedom of Choice: [facility] home care   [facility] aware pt is discharging home today, order for HHC uploaded to aidin. Transportation discussed, pts daughter will provide transportation to home around 1530. Pt has a portable oxygen tank at hospital for transport home.                              Other Referral/Resources/Interventions Provided:  Interventions: HHC- [facility]          Treatment Team Recommendation: Home with Home Health Care  Discharge Destination Plan:: Home with Home Health Care  Transport at Discharge : Family- daughter

## 2025-05-07 NOTE — DISCHARGE SUMMARY
Discharge Summary - Hospitalist   Name: Ileana Seaman 65 y.o. female I MRN: [id number]  Unit/Bed#: -01 I Date of Admission: 4/30/2025   Date of Service: 5/7/2025 I Hospital Day: 7     Assessment & Plan  Pneumonia of right lower lobe due to infectious organism  Lab Results   Component Value Date    WBC 8.70 05/01/2025    PROCALCITONI <0.05 05/01/2025   Follows with infectious disease outpatient for MARIS infection.  Unable to tolerate therapy due to hepatotoxicity while on rifabutin.  Instead they are monitoring sputum cultures closely off treatment.  CXR progressive pneumonia in right upper lobe superimposed on known cavitary nodular lesions  CTA chest: Worsening right upper lobe pneumonia and new superior segment right lower lobe pneumonia  COVID/Flu/RSV negative  Urine Legionella urine strep is negative sputum cultures pending.  AFB negative  Blood Cultures x 2 negative  Procalcitonin x 2 is negative  ID consulted given history of MARIS and recurrent and worsening PNA.  As discussed with speech she could be aspirating due to GI issues as she does have hiatal hernia and what she describes waking up in the morning is reflux increase her Protonix to 40 mg p.o. twice daily she is undergoing a swallow study to speech to evaluate for any aspiration in terms of food consistency and liquid consistency discussed case with ID and pulmonology.  She completed Zithromax for 3 days for the COPD exacerbation but she has  completed Rocephin for 5 days total 5/5  She was also treated with vest therapy and saline nebulizer treatment   continue Mucinex  Acute on chronic hypoxic respiratory failure (HCC)  With history of severe emphysema on 2 L nasal cannula at baseline.  Progressively worsening shortness of breath requiring increased O2 over the past few days.  Resolved.  Patient is back to baseline O2 requirement  Sepsis (HCC)  Presents to ED with shortness of breath  SIRS met: SIRS: 2/4; temperature, tachycardia,  tachypnea,  Secondary to worsening pneumonia and new pneumonia forming she was recently admitted in the beginning of April and completed as discussed with infectious disease and pulmonary.  Factious disease she is estelita be on Rocephin for 5/5 days and Zithromax to be completed for COPD exacerbation  resolved  COPD exacerbation (HCC)  On Trelegy for maintenance.    No wheezing down to her baseline 2 L switch over to prednisone 40 mg daily and taper by 10 mg every 3 days until stop  Discussed case with pulmonology  Mycobacterial infection, non-TB  Underwent bronchoscopy 5/2024 for cavitary lesion biopsy, growing Mycobacterium intracellular.    Following outpatient with infectious disease regarding MARIS infection.  She was unable to tolerate the rifabutin regimen given hepatotoxicity and is instead being monitored with sputum cultures and AFB.  Is due for culture this upcoming week.  Follow sputum culture negative and AFB AFB for now is negative  ID consulted discussed with ID will need outpatient follow-up with ID and pulmonary to discuss restarting treatment  Pulmonary nodule  Noted on CT chest.  Reviewed with patient.  Continue follow-up with pulmonology  Abdominal pain  Patient complains of significant epigastric tenderness, did not eat breakfast, however did tolerate lunch and dinner  Obstruction series completed showed moderate colonic stool burden  Patient given IV MiraLAX  Refusing enema/suppository  Given 1 dose of Mylanta  Improved prior to discharge  Severe protein-calorie malnutrition (HCC)  Malnutrition Findings:   Adult Malnutrition type: Chronic illness  Adult Degree of Malnutrition: Other severe protein calorie malnutrition  Malnutrition Characteristics: Fat loss, Muscle loss                  360 Statement: Chronic severe malnutrition related to inadequate oral intake/condition as evidenced by severe muscle loss to interroseous, quadriceps, gastrocnemius, temporalis muscles; severe fat loss to orbitals  and buccal pads. Treated with: Continue regular diet. +snacks in between meals at AM/PM. d/c Nepro pt does not like, order ensure plus high PRO BID vanilla/strawberry flavors. Reviewed reflux nutrition therapy with pt and provided with handout. Recommend daily weights during admission for monitoring.    BMI Findings:  Adult BMI Classifications: Underweight < 18.5        Body mass index is 17.76 kg/m².        Medical Problems       Resolved Problems  Date Reviewed: 5/5/2025          Resolved    Hypomagnesemia 5/2/2025     Resolved by  Rand Cantrell MD    Hypokalemia 5/2/2025     Resolved by  Rand Cantrell MD    Hyperkalemia 5/3/2025     Resolved by  Rand Cantrell MD          Admission Date:   Admission Orders (From admission, onward)       Ordered        04/30/25 1530  INPATIENT ADMISSION  Once                          Discharge Date: 05/07/25    Consultations During Hospital Stay:  Infectious disease and pulmonology    Procedures Performed:   None    Significant Findings / Test Results:   XR abdomen obstruction series   Final Result by Donato Pozo MD (05/06 1205)      1.  Moderate excess colonic stool burden without obstruction or other acute abdominal findings.      2.  Persistent right upper lobe pneumonia.         Resident: Vishal Pollard I, the attending radiologist, have reviewed the images and agree with the final report above.      Workstation performed: [id number]         FL barium swallow video w speech   Final Result by LUCIUS ADRIAN (05/02 1112)      CTA chest pe study   Final Result by Ashvin Garcia MD (04/30 1523)      No pulmonary embolism.      Severe background emphysema with worsening right upper lobe pneumonia and new superior segment right lower lobe pneumonia. Reactive mediastinal and right hilar adenopathy.      Stable 7 mm right lower lobe nodule.                  Workstation performed: [id number]         XR chest 2 views   Final Result by Donato Pozo,  MD (04/30 1325)      Progressive pneumonia in the right upper lobe superimposed on known cavitary nodular lesions.      Resident: NAHOMI GRIER I, the attending radiologist, have reviewed the images and agree with the final report above.      Workstation performed: [id number]               Incidental Findings:   As above      Test Results Pending at Discharge (will require follow up):   None     Complications: None    Reason for Admission: Shortness of breath    Hospital Course:   Ileana Seaman is a 65 y.o. female patient who originally presented to the hospital on 4/30/2025 due to shortness of breath over the past 2 days.  Of note patient has had recent frequent hospitalizations for COPD and pneumonia over the last year.  She reports that her symptoms have been ongoing for the past year since being diagnosed with MARIS by per pulmonologist.  Patient reports that she did follow-up with infectious disease specialist however was unable to complete MAC treatment due to hepatotoxicity.  On admission patient complained of fatigue, shortness of breath, productive cough, decreased appetite, chest tightness and leg swelling.  Patient has been on baseline O2 of 2 L nasal cannula.  On admission CT chest negative for PE however showed worsening right-sided pneumonia with associated elevated white count and tachycardic and tachypneic qualifying for sepsis.  Patient was admitted for sepsis and pneumonia.  She was treated with IV antibiotics.  Pulmonology was consulted, and patient was treated for COPD exacerbation with IV steroids.  Patient improved throughout hospitalization and was transition to p.o. steroid  taper prior to discharge.  Patient was medically stable for discharge home.  She needs to follow-up with pulmonology and infectious disease in the outpatient setting.    Please see above list of diagnoses and related plan for additional information.     Condition at Discharge: fair    Discharge Day Visit /  "Exam:   Subjective: Patient seen and examined at bedside.  No acute events overnight.  Patient reports that her abdominal pain has improved.  She had multiple bowel movements overnight.  She was seen tolerating her breakfast.  No other acute complaints at this time.  Vitals: Blood Pressure: 116/80 (05/07/25 0739)  Pulse: 93 (05/07/25 0739)  Temperature: (!) 97 °F (36.1 °C) (05/07/25 0739)  Temp Source: Temporal (05/07/25 0739)  Respirations: 18 (05/07/25 0739)  Height: 5' 1\" (154.9 cm) (05/02/25 1335)  Weight - Scale: 42.6 kg (94 lb) (04/30/25 1604)  SpO2: 98 % (05/07/25 1336)  Physical Exam   Vitals and nursing note reviewed.   Constitutional:       General: She is not in acute distress.     Appearance: She is ill-appearing (Chronically). She is not toxic-appearing.   HENT:      Head: Normocephalic and atraumatic.   Eyes:      Extraocular Movements: Extraocular movements intact.      Pupils: Pupils are equal, round, and reactive to light.   Cardiovascular:      Rate and Rhythm: Normal rate and regular rhythm.   Pulmonary:      Effort: Pulmonary effort is normal.      Breath sounds: Normal breath sounds.   Abdominal:      Palpations: Abdomen is soft.   Musculoskeletal:         General: No swelling.   Skin:     General: Skin is warm.   Neurological:      General: No focal deficit present.      Mental Status: She is alert and oriented to person, place, and time. Mental status is at baseline.   Psychiatric:         Mood and Affect: Mood normal.         Behavior: Behavior normal.     Discussion with Family: Patient declined call to .     Discharge instructions/Information to patient and family:   See after visit summary for information provided to patient and family.      Provisions for Follow-Up Care:  See after visit summary for information related to follow-up care and any pertinent home health orders.      Mobility at time of Discharge:   Basic Mobility Inpatient Raw Score: 23  -HLM Goal: 7: Walk 25 " feet or more  JH-HLM Achieved: 7: Walk 25 feet or more  HLM Goal achieved. Continue to encourage appropriate mobility.     Disposition:   Home with [facility]NA Services (Reminder: Complete face to face encounter)    Planned Readmission: No    Discharge Medications:  See after visit summary for reconciled discharge medications provided to patient and/or family.      Administrative Statements   Discharge Statement:      **Please Note: This note may have been constructed using a voice recognition system**

## 2025-05-07 NOTE — ASSESSMENT & PLAN NOTE
Patient complains of significant epigastric tenderness, did not eat breakfast, however did tolerate lunch and dinner  Obstruction series completed showed moderate colonic stool burden  Patient given IV MiraLAX  Refusing enema/suppository  Given 1 dose of Mylanta  Improved prior to discharge

## 2025-05-07 NOTE — PLAN OF CARE
Problem: Potential for Falls  Goal: Patient will remain free of falls  Description: INTERVENTIONS:- Educate patient/family on patient safety including physical limitations- Instruct patient to call for assistance with activity - Consult OT/PT to assist with strengthening/mobility - Keep Call bell within reach- Keep bed low and locked with side rails adjusted as appropriate- Keep care items and personal belongings within reach- Initiate and maintain comfort rounds- Make Fall Risk Sign visible to staff- Offer Toileting every  Hours, in advance of need- Initiate/Maintain alarm- Obtain necessary fall risk management equipment: - Apply yellow socks and bracelet for high fall risk patients- Consider moving patient to room near nurses station  INTERVENTIONS:- Educate patient/family on patient safety including physical limitations- Instruct patient to call for assistance with activity - Consult OT/PT to assist with strengthening/mobility - Keep Call bell within reach- Keep bed low and locked with side rails adjusted as appropriate- Keep care items and personal belongings within reach- Initiate and maintain comfort rounds- Make Fall Risk Sign visible to staff- Offer Toileting every  Hours, in advance of need- Initiate/Maintain alarm- Obtain necessary fall risk management equipment: - Apply yellow socks and bracelet for high fall risk patients- Consider moving patient to room near nurses station  Outcome: Progressing     Problem: PAIN - ADULT  Goal: Verbalizes/displays adequate comfort level or baseline comfort level  Description: Interventions:- Encourage patient to monitor pain and request assistance- Assess pain using appropriate pain scale- Administer analgesics based on type and severity of pain and evaluate response- Implement non-pharmacological measures as appropriate and evaluate response- Consider cultural and social influences on pain and pain management- Notify physician/advanced practitioner if interventions  unsuccessful or patient reports new pain  Outcome: Progressing     Problem: INFECTION - ADULT  Goal: Absence or prevention of progression during hospitalization  Description: INTERVENTIONS:- Assess and monitor for signs and symptoms of infection- Monitor lab/diagnostic results- Monitor all insertion sites, i.e. indwelling lines, tubes, and drains- Monitor endotracheal if appropriate and nasal secretions for changes in amount and color- [place] appropriate cooling/warming therapies per order- Administer medications as ordered- Instruct and encourage patient and family to use good hand hygiene technique- Identify and instruct in appropriate isolation precautions for identified infection/condition  Outcome: Progressing  Goal: Absence of fever/infection during neutropenic period  Description: INTERVENTIONS:- Monitor WBC  Outcome: Progressing     Problem: SAFETY ADULT  Goal: Patient will remain free of falls  Description: INTERVENTIONS:- Educate patient/family on patient safety including physical limitations- Instruct patient to call for assistance with activity - Consult OT/PT to assist with strengthening/mobility - Keep Call bell within reach- Keep bed low and locked with side rails adjusted as appropriate- Keep care items and personal belongings within reach- Initiate and maintain comfort rounds- Make Fall Risk Sign visible to staff- Offer Toileting every  Hours, in advance of need- Initiate/Maintain alarm- Obtain necessary fall risk management equipment: - Apply yellow socks and bracelet for high fall risk patients- Consider moving patient to room near nurses station  INTERVENTIONS:- Educate patient/family on patient safety including physical limitations- Instruct patient to call for assistance with activity - Consult OT/PT to assist with strengthening/mobility - Keep Call bell within reach- Keep bed low and locked with side rails adjusted as appropriate- Keep care items and personal belongings within reach-  Initiate and maintain comfort rounds- Make Fall Risk Sign visible to staff- Offer Toileting every  Hours, in advance of need- Initiate/Maintain alarm- Obtain necessary fall risk management equipment: - Apply yellow socks and bracelet for high fall risk patients- Consider moving patient to room near nurses station  Outcome: Progressing  Goal: Maintain or return to baseline ADL function  Description: INTERVENTIONS:-  Assess patient's ability to carry out ADLs; assess patient's baseline for ADL function and identify physical deficits which impact ability to perform ADLs (bathing, care of mouth/teeth, toileting, grooming, dressing, etc.)- Assess/evaluate cause of self-care deficits - Assess range of motion- Assess patient's mobility; develop plan if impaired- Assess patient's need for assistive devices and provide as appropriate- Encourage maximum independence but intervene and supervise when necessary- Involve family in performance of ADLs- Assess for home care needs following discharge - Consider OT consult to assist with ADL evaluation and planning for discharge- Provide patient education as appropriate  Outcome: Progressing  Goal: Maintains/Returns to pre admission functional level  Description: INTERVENTIONS:- Perform AM-PAC 6 Click Basic Mobility/ Daily Activity assessment daily.- Set and communicate daily mobility goal to care team and patient/family/caregiver. - Collaborate with rehabilitation services on mobility goals if consulted- Perform Range of Motion  times a day.- Reposition patient every  hours.- Dangle patient  times a day- Stand patient  times a day- Ambulate patient  times a day- Out of bed to chair  times a day - Out of bed for meals  times a day- Out of bed for toileting- Record patient progress and toleration of activity level   Outcome: Progressing     Problem: DISCHARGE PLANNING  Goal: Discharge to home or other facility with appropriate resources  Description: INTERVENTIONS:- Identify barriers  to discharge w/patient and caregiver- Arrange for needed discharge resources and transportation as appropriate- Identify discharge learning needs (meds, wound care, etc.)- Arrange for interpretive services to assist at discharge as needed- Refer to Case Management Department for coordinating discharge planning if the patient needs post-hospital services based on physician/advanced practitioner order or complex needs related to functional status, cognitive ability, or social support system  Outcome: Progressing     Problem: Knowledge Deficit  Goal: Patient/family/caregiver demonstrates understanding of disease process, treatment plan, medications, and discharge instructions  Description: Complete learning assessment and assess knowledge base.Interventions:- Provide teaching at level of understanding- Provide teaching via preferred learning methods  Outcome: Progressing     Problem: Nutrition/Hydration-ADULT  Goal: Nutrient/Hydration intake appropriate for improving, restoring or maintaining nutritional needs  Description: Monitor and assess patient's nutrition/hydration status for malnutrition. Collaborate with interdisciplinary team and initiate plan and interventions as ordered.  Monitor patient's weight and dietary intake as ordered or per policy. Utilize nutrition screening tool and intervene as necessary. Determine patient's food preferences and provide high-protein, high-caloric foods as appropriate. INTERVENTIONS:- Monitor oral intake, urinary output, labs, and treatment plans- Assess nutrition and hydration status and recommend course of action- Evaluate amount of meals eaten- Assist patient with eating if necessary - Allow adequate time for meals- Recommend/ encourage appropriate diets, oral nutritional supplements, and vitamin/mineral supplements- Order, calculate, and assess calorie counts as needed- Recommend, monitor, and adjust tube feedings and TPN/PPN based on assessed needs- Assess need for  intravenous fluids- Provide specific nutrition/hydration education as appropriate- Include patient/family/caregiver in decisions related to nutrition  Monitor and assess patient's nutrition/hydration status for malnutrition. Collaborate with interdisciplinary team and initiate plan and interventions as ordered.  Monitor patient's weight and dietary intake as ordered or per policy. Utilize nutrition screening tool and intervene as necessary. Determine patient's food preferences and provide high-protein, high-caloric foods as appropriate. INTERVENTIONS:- Monitor oral intake, urinary output, labs, and treatment plans- Assess nutrition and hydration status and recommend course of action- Evaluate amount of meals eaten- Assist patient with eating if necessary - Allow adequate time for meals- Recommend/ encourage appropriate diets, oral nutritional supplements, and vitamin/mineral supplements- Order, calculate, and assess calorie counts as needed- Recommend, monitor, and adjust tube feedings and TPN/PPN based on assessed needs- Assess need for intravenous fluids- Provide specific nutrition/hydration education as appropriate- Include patient/family/caregiver in decisions related to nutrition  Outcome: Progressing

## 2025-05-07 NOTE — ASSESSMENT & PLAN NOTE
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  Unable to tolerate therapy due to hepatotoxicity while on rifabutin.  Instead they are monitoring sputum cultures closely off treatment.  CXR progressive pneumonia in right upper lobe superimposed on known cavitary nodular lesions  CTA chest: Worsening right upper lobe pneumonia and new superior segment right lower lobe pneumonia  COVID/Flu/RSV negative  Urine Legionella urine strep is negative sputum cultures pending.  AFB negative  Blood Cultures x 2 negative  Procalcitonin x 2 is negative  ID consulted given history of MARIS and recurrent and worsening PNA.  As discussed with speech she could be aspirating due to GI issues as she does have hiatal hernia and what she describes waking up in the morning is reflux increase her Protonix to 40 mg p.o. twice daily she is undergoing a swallow study to speech to evaluate for any aspiration in terms of food consistency and liquid consistency discussed case with ID and pulmonology.  She completed Zithromax for 3 days for the COPD exacerbation but she has  completed Rocephin for 5 days total 5/5  She was also treated with vest therapy and saline nebulizer treatment   continue Mucinex

## 2025-05-07 NOTE — ASSESSMENT & PLAN NOTE
Malnutrition Findings:   Adult Malnutrition type: Chronic illness  Adult Degree of Malnutrition: Other severe protein calorie malnutrition  Malnutrition Characteristics: Fat loss, Muscle loss                  360 Statement: Chronic severe malnutrition related to inadequate oral intake/condition as evidenced by severe muscle loss to interroseous, quadriceps, gastrocnemius, temporalis muscles; severe fat loss to orbitals and buccal pads. Treated with: Continue regular diet. +snacks in between meals at AM/PM. d/c Nepro pt does not like, order ensure plus high PRO BID vanilla/strawberry flavors. Reviewed reflux nutrition therapy with pt and provided with handout. Recommend daily weights during admission for monitoring.    BMI Findings:  Adult BMI Classifications: Underweight < 18.5        Body mass index is 17.76 kg/m².

## 2025-05-08 NOTE — UTILIZATION REVIEW
NOTIFICATION OF ADMISSION DISCHARGE   This is a Notification of Discharge from [facility]. Please be advised that this patient has been discharge from our facility. Below you will find the admission and discharge date and time including the patient’s disposition.   UTILIZATION REVIEW CONTACT:  Utilization Review Assistants  Network Utilization Review Department  Phone: [phone] x carefully listen to the prompts. All voicemails are confidential.  Email: [email or web address]     ADMISSION INFORMATION  PRESENTATION DATE: 4/30/2025 11:43 AM  OBERVATION ADMISSION DATE: N/A  INPATIENT ADMISSION DATE: 4/30/25  3:30 PM   DISCHARGE DATE: 5/7/2025  4:26 PM   DISPOSITION:Home with Home Health Care    [facility] Utilization Review Department  ATTENTION: Please call with any questions or concerns to [phone] and carefully listen to the prompts so that you are directed to the right person. All voicemails are confidential.   For Discharge needs, contact Care Management DC Support Team at [phone] opt. 2  Send all requests for admission clinical reviews, approved or denied determinations and any other requests to dedicated fax number below belonging to the campus where the patient is receiving treatment. List of dedicated fax numbers for the Facilities:  FACILITY NAME UR FAX NUMBER   ADMISSION DENIALS (Administrative/Medical Necessity) [phone]   DISCHARGE SUPPORT TEAM ([facility]) [phone]   PARENT CHILD HEALTH (Maternity/NICU/Pediatrics) [phone]   [facility] [phone]   [facility] [phone]   Critical access hospital [phone]   [facility] [phone]   [facility] [phone]   [facility] [phone]   [facility] [phone]   [facility] [phone]   [facility]  [facility] [phone]   [facility] [phone]   [facility] [phone]   [facility] [phone]

## 2025-05-13 LAB
MYCOBACTERIUM SPEC CULT: NORMAL
RHODAMINE-AURAMINE STN SPEC: NORMAL

## 2025-05-18 ENCOUNTER — APPOINTMENT (EMERGENCY)
Dept: RADIOLOGY | Facility: HOSPITAL | Age: 65
DRG: 871 | End: 2025-05-18
Payer: COMMERCIAL

## 2025-05-18 ENCOUNTER — HOSPITAL ENCOUNTER (INPATIENT)
Facility: HOSPITAL | Age: 65
LOS: 6 days | Discharge: HOSPICE | DRG: 871 | End: 2025-05-24
Attending: EMERGENCY MEDICINE | Admitting: FAMILY MEDICINE
Payer: COMMERCIAL

## 2025-05-18 DIAGNOSIS — J44.1 COPD WITH ACUTE EXACERBATION (HCC): ICD-10-CM

## 2025-05-18 DIAGNOSIS — J18.9 PNEUMONIA: ICD-10-CM

## 2025-05-18 DIAGNOSIS — J96.21 ACUTE ON CHRONIC HYPOXIC RESPIRATORY FAILURE (HCC): Primary | ICD-10-CM

## 2025-05-18 DIAGNOSIS — A31.9 MYCOBACTERIAL INFECTION, NON-TB: ICD-10-CM

## 2025-05-18 DIAGNOSIS — E44.0 MODERATE PROTEIN-CALORIE MALNUTRITION (HCC): ICD-10-CM

## 2025-05-18 DIAGNOSIS — R00.0 TACHYCARDIA: ICD-10-CM

## 2025-05-18 DIAGNOSIS — J44.1 COPD EXACERBATION (HCC): ICD-10-CM

## 2025-05-18 DIAGNOSIS — I50.33 ACUTE ON CHRONIC DIASTOLIC (CONGESTIVE) HEART FAILURE (HCC): ICD-10-CM

## 2025-05-18 DIAGNOSIS — I10 HTN (HYPERTENSION): ICD-10-CM

## 2025-05-18 PROBLEM — I48.91 ATRIAL FIBRILLATION (HCC): Status: RESOLVED | Noted: 2024-11-19 | Resolved: 2025-05-18

## 2025-05-18 PROBLEM — A41.9 SEPSIS WITHOUT ACUTE ORGAN DYSFUNCTION (HCC): Status: ACTIVE | Noted: 2025-05-18

## 2025-05-18 PROBLEM — J41.8 MIXED SIMPLE AND MUCOPURULENT CHRONIC BRONCHITIS (HCC): Status: ACTIVE | Noted: 2024-10-04

## 2025-05-18 LAB
2HR DELTA HS TROPONIN: 2 NG/L
ALBUMIN SERPL BCG-MCNC: 3.9 G/DL (ref 3.5–5)
ALP SERPL-CCNC: 92 U/L (ref 34–104)
ALT SERPL W P-5'-P-CCNC: 15 U/L (ref 7–52)
ANION GAP SERPL CALCULATED.3IONS-SCNC: 8 MMOL/L (ref 4–13)
APTT PPP: 25 SECONDS (ref 23–34)
AST SERPL W P-5'-P-CCNC: 9 U/L (ref 13–39)
BACTERIA UR QL AUTO: NORMAL /HPF
BASE EX.OXY STD BLDV CALC-SCNC: 65.1 % (ref 60–80)
BASE EXCESS BLDV CALC-SCNC: -2 MMOL/L
BASOPHILS # BLD AUTO: 0.04 THOUSANDS/ÂΜL (ref 0–0.1)
BASOPHILS NFR BLD AUTO: 0 % (ref 0–1)
BILIRUB SERPL-MCNC: 0.55 MG/DL (ref 0.2–1)
BILIRUB UR QL STRIP: NEGATIVE
BNP SERPL-MCNC: 58 PG/ML (ref 0–100)
BUN SERPL-MCNC: 7 MG/DL (ref 5–25)
CALCIUM SERPL-MCNC: 8.9 MG/DL (ref 8.4–10.2)
CARDIAC TROPONIN I PNL SERPL HS: 11 NG/L (ref ?–50)
CARDIAC TROPONIN I PNL SERPL HS: 13 NG/L (ref ?–50)
CHLORIDE SERPL-SCNC: 94 MMOL/L (ref 96–108)
CLARITY UR: CLEAR
CO2 SERPL-SCNC: 30 MMOL/L (ref 21–32)
COLOR UR: YELLOW
CREAT SERPL-MCNC: 0.49 MG/DL (ref 0.6–1.3)
CRP SERPL QL: 119.5 MG/L
EOSINOPHIL # BLD AUTO: 0.01 THOUSAND/ÂΜL (ref 0–0.61)
EOSINOPHIL NFR BLD AUTO: 0 % (ref 0–6)
ERYTHROCYTE [DISTWIDTH] IN BLOOD BY AUTOMATED COUNT: 14.8 % (ref 11.6–15.1)
ERYTHROCYTE [SEDIMENTATION RATE] IN BLOOD: 27 MM/HOUR (ref 0–29)
FLUAV AG UPPER RESP QL IA.RAPID: NEGATIVE
FLUBV AG UPPER RESP QL IA.RAPID: NEGATIVE
GFR SERPL CREATININE-BSD FRML MDRD: 101 ML/MIN/1.73SQ M
GLUCOSE SERPL-MCNC: 182 MG/DL (ref 65–140)
GLUCOSE SERPL-MCNC: 225 MG/DL (ref 65–140)
GLUCOSE SERPL-MCNC: 288 MG/DL (ref 65–140)
GLUCOSE UR STRIP-MCNC: NEGATIVE MG/DL
HCO3 BLDV-SCNC: 24.2 MMOL/L (ref 24–30)
HCT VFR BLD AUTO: 41.8 % (ref 34.8–46.1)
HGB BLD-MCNC: 13 G/DL (ref 11.5–15.4)
HGB UR QL STRIP.AUTO: ABNORMAL
IMM GRANULOCYTES # BLD AUTO: 0.05 THOUSAND/UL (ref 0–0.2)
IMM GRANULOCYTES NFR BLD AUTO: 0 % (ref 0–2)
INR PPP: 1.08 (ref 0.85–1.19)
KETONES UR STRIP-MCNC: NEGATIVE MG/DL
LACTATE SERPL-SCNC: 1.6 MMOL/L (ref 0.5–2)
LEUKOCYTE ESTERASE UR QL STRIP: NEGATIVE
LYMPHOCYTES # BLD AUTO: 1.04 THOUSANDS/ÂΜL (ref 0.6–4.47)
LYMPHOCYTES NFR BLD AUTO: 8 % (ref 14–44)
MCH RBC QN AUTO: 27.4 PG (ref 26.8–34.3)
MCHC RBC AUTO-ENTMCNC: 31.1 G/DL (ref 31.4–37.4)
MCV RBC AUTO: 88 FL (ref 82–98)
MONOCYTES # BLD AUTO: 0.93 THOUSAND/ÂΜL (ref 0.17–1.22)
MONOCYTES NFR BLD AUTO: 7 % (ref 4–12)
NEUTROPHILS # BLD AUTO: 10.48 THOUSANDS/ÂΜL (ref 1.85–7.62)
NEUTS SEG NFR BLD AUTO: 85 % (ref 43–75)
NITRITE UR QL STRIP: NEGATIVE
NON-SQ EPI CELLS URNS QL MICRO: NORMAL /HPF
NRBC BLD AUTO-RTO: 0 /100 WBCS
O2 CT BLDV-SCNC: 12.8 ML/DL
PCO2 BLDV: 46.8 MM HG (ref 42–50)
PH BLDV: 7.33 [PH] (ref 7.3–7.4)
PH UR STRIP.AUTO: 7.5 [PH]
PLATELET # BLD AUTO: 244 THOUSANDS/UL (ref 149–390)
PMV BLD AUTO: 11.9 FL (ref 8.9–12.7)
PO2 BLDV: 37.1 MM HG (ref 35–45)
POTASSIUM SERPL-SCNC: 3.2 MMOL/L (ref 3.5–5.3)
PROCALCITONIN SERPL-MCNC: <0.05 NG/ML
PROCALCITONIN SERPL-MCNC: <0.05 NG/ML
PROT SERPL-MCNC: 6.9 G/DL (ref 6.4–8.4)
PROT UR STRIP-MCNC: NEGATIVE MG/DL
PROTHROMBIN TIME: 14.4 SECONDS (ref 12.3–15)
RBC # BLD AUTO: 4.75 MILLION/UL (ref 3.81–5.12)
RBC #/AREA URNS AUTO: NORMAL /HPF
SARS-COV+SARS-COV-2 AG RESP QL IA.RAPID: NEGATIVE
SODIUM SERPL-SCNC: 132 MMOL/L (ref 135–147)
SP GR UR STRIP.AUTO: 1.01 (ref 1–1.03)
UROBILINOGEN UR QL STRIP.AUTO: 0.2 E.U./DL
WBC # BLD AUTO: 12.55 THOUSAND/UL (ref 4.31–10.16)
WBC #/AREA URNS AUTO: NORMAL /HPF

## 2025-05-18 PROCEDURE — 96361 HYDRATE IV INFUSION ADD-ON: CPT

## 2025-05-18 PROCEDURE — 85652 RBC SED RATE AUTOMATED: CPT | Performed by: EMERGENCY MEDICINE

## 2025-05-18 PROCEDURE — 84145 PROCALCITONIN (PCT): CPT | Performed by: EMERGENCY MEDICINE

## 2025-05-18 PROCEDURE — 99223 1ST HOSP IP/OBS HIGH 75: CPT | Performed by: FAMILY MEDICINE

## 2025-05-18 PROCEDURE — 93005 ELECTROCARDIOGRAM TRACING: CPT

## 2025-05-18 PROCEDURE — 85730 THROMBOPLASTIN TIME PARTIAL: CPT | Performed by: EMERGENCY MEDICINE

## 2025-05-18 PROCEDURE — 36415 COLL VENOUS BLD VENIPUNCTURE: CPT | Performed by: EMERGENCY MEDICINE

## 2025-05-18 PROCEDURE — 94644 CONT INHLJ TX 1ST HOUR: CPT

## 2025-05-18 PROCEDURE — 80053 COMPREHEN METABOLIC PANEL: CPT | Performed by: EMERGENCY MEDICINE

## 2025-05-18 PROCEDURE — 82805 BLOOD GASES W/O2 SATURATION: CPT | Performed by: EMERGENCY MEDICINE

## 2025-05-18 PROCEDURE — 87081 CULTURE SCREEN ONLY: CPT | Performed by: FAMILY MEDICINE

## 2025-05-18 PROCEDURE — 86140 C-REACTIVE PROTEIN: CPT | Performed by: EMERGENCY MEDICINE

## 2025-05-18 PROCEDURE — 71045 X-RAY EXAM CHEST 1 VIEW: CPT

## 2025-05-18 PROCEDURE — 96375 TX/PRO/DX INJ NEW DRUG ADDON: CPT

## 2025-05-18 PROCEDURE — 82948 REAGENT STRIP/BLOOD GLUCOSE: CPT

## 2025-05-18 PROCEDURE — 94640 AIRWAY INHALATION TREATMENT: CPT

## 2025-05-18 PROCEDURE — 94664 DEMO&/EVAL PT USE INHALER: CPT

## 2025-05-18 PROCEDURE — 87040 BLOOD CULTURE FOR BACTERIA: CPT | Performed by: EMERGENCY MEDICINE

## 2025-05-18 PROCEDURE — 96374 THER/PROPH/DIAG INJ IV PUSH: CPT

## 2025-05-18 PROCEDURE — 94760 N-INVAS EAR/PLS OXIMETRY 1: CPT

## 2025-05-18 PROCEDURE — 81001 URINALYSIS AUTO W/SCOPE: CPT | Performed by: EMERGENCY MEDICINE

## 2025-05-18 PROCEDURE — 87804 INFLUENZA ASSAY W/OPTIC: CPT | Performed by: EMERGENCY MEDICINE

## 2025-05-18 PROCEDURE — 99285 EMERGENCY DEPT VISIT HI MDM: CPT

## 2025-05-18 PROCEDURE — 84484 ASSAY OF TROPONIN QUANT: CPT | Performed by: EMERGENCY MEDICINE

## 2025-05-18 PROCEDURE — 84145 PROCALCITONIN (PCT): CPT | Performed by: FAMILY MEDICINE

## 2025-05-18 PROCEDURE — 99285 EMERGENCY DEPT VISIT HI MDM: CPT | Performed by: EMERGENCY MEDICINE

## 2025-05-18 PROCEDURE — 87811 SARS-COV-2 COVID19 W/OPTIC: CPT | Performed by: EMERGENCY MEDICINE

## 2025-05-18 PROCEDURE — 85610 PROTHROMBIN TIME: CPT | Performed by: EMERGENCY MEDICINE

## 2025-05-18 PROCEDURE — 83605 ASSAY OF LACTIC ACID: CPT | Performed by: EMERGENCY MEDICINE

## 2025-05-18 PROCEDURE — 83880 ASSAY OF NATRIURETIC PEPTIDE: CPT | Performed by: EMERGENCY MEDICINE

## 2025-05-18 PROCEDURE — 85025 COMPLETE CBC W/AUTO DIFF WBC: CPT | Performed by: EMERGENCY MEDICINE

## 2025-05-18 RX ORDER — DOCUSATE SODIUM 100 MG/1
100 CAPSULE, LIQUID FILLED ORAL 2 TIMES DAILY
Status: DISCONTINUED | OUTPATIENT
Start: 2025-05-18 | End: 2025-05-24 | Stop reason: HOSPADM

## 2025-05-18 RX ORDER — ASPIRIN 81 MG/1
81 TABLET ORAL DAILY
Status: DISCONTINUED | OUTPATIENT
Start: 2025-05-19 | End: 2025-05-24 | Stop reason: HOSPADM

## 2025-05-18 RX ORDER — FAMOTIDINE 20 MG/1
20 TABLET, FILM COATED ORAL 2 TIMES DAILY
Status: DISCONTINUED | OUTPATIENT
Start: 2025-05-18 | End: 2025-05-24 | Stop reason: HOSPADM

## 2025-05-18 RX ORDER — HEPARIN SODIUM 5000 [USP'U]/ML
5000 INJECTION, SOLUTION INTRAVENOUS; SUBCUTANEOUS EVERY 8 HOURS SCHEDULED
Status: DISCONTINUED | OUTPATIENT
Start: 2025-05-18 | End: 2025-05-24 | Stop reason: HOSPADM

## 2025-05-18 RX ORDER — ACETAMINOPHEN 325 MG/1
650 TABLET ORAL EVERY 6 HOURS PRN
Status: DISCONTINUED | OUTPATIENT
Start: 2025-05-18 | End: 2025-05-24 | Stop reason: HOSPADM

## 2025-05-18 RX ORDER — ATORVASTATIN CALCIUM 40 MG/1
40 TABLET, FILM COATED ORAL DAILY
Status: DISCONTINUED | OUTPATIENT
Start: 2025-05-19 | End: 2025-05-24 | Stop reason: HOSPADM

## 2025-05-18 RX ORDER — DICYCLOMINE HYDROCHLORIDE 10 MG/1
10 CAPSULE ORAL
Status: DISCONTINUED | OUTPATIENT
Start: 2025-05-18 | End: 2025-05-24 | Stop reason: HOSPADM

## 2025-05-18 RX ORDER — SODIUM CHLORIDE FOR INHALATION 0.9 %
12 VIAL, NEBULIZER (ML) INHALATION ONCE
Status: COMPLETED | OUTPATIENT
Start: 2025-05-18 | End: 2025-05-18

## 2025-05-18 RX ORDER — LIDOCAINE 50 MG/G
1 PATCH TOPICAL DAILY
Status: DISCONTINUED | OUTPATIENT
Start: 2025-05-19 | End: 2025-05-18

## 2025-05-18 RX ORDER — HEPARIN SODIUM 5000 [USP'U]/ML
5000 INJECTION, SOLUTION INTRAVENOUS; SUBCUTANEOUS EVERY 8 HOURS SCHEDULED
Status: DISCONTINUED | OUTPATIENT
Start: 2025-05-18 | End: 2025-05-18

## 2025-05-18 RX ORDER — AZITHROMYCIN 250 MG/1
500 TABLET, FILM COATED ORAL EVERY 24 HOURS
Status: DISCONTINUED | OUTPATIENT
Start: 2025-05-18 | End: 2025-05-24 | Stop reason: HOSPADM

## 2025-05-18 RX ORDER — LIDOCAINE 50 MG/G
1 PATCH TOPICAL DAILY
Status: DISCONTINUED | OUTPATIENT
Start: 2025-05-18 | End: 2025-05-24 | Stop reason: HOSPADM

## 2025-05-18 RX ORDER — VANCOMYCIN HYDROCHLORIDE 1 G/200ML
1000 INJECTION, SOLUTION INTRAVENOUS EVERY 12 HOURS
Status: DISCONTINUED | OUTPATIENT
Start: 2025-05-18 | End: 2025-05-19

## 2025-05-18 RX ORDER — NICOTINE 21 MG/24HR
21 PATCH, TRANSDERMAL 24 HOURS TRANSDERMAL DAILY
Status: DISCONTINUED | OUTPATIENT
Start: 2025-05-18 | End: 2025-05-24 | Stop reason: HOSPADM

## 2025-05-18 RX ORDER — ACETAMINOPHEN 325 MG/1
975 TABLET ORAL ONCE
Status: COMPLETED | OUTPATIENT
Start: 2025-05-18 | End: 2025-05-18

## 2025-05-18 RX ORDER — INSULIN LISPRO 100 [IU]/ML
1-6 INJECTION, SOLUTION INTRAVENOUS; SUBCUTANEOUS
Status: DISCONTINUED | OUTPATIENT
Start: 2025-05-18 | End: 2025-05-24 | Stop reason: HOSPADM

## 2025-05-18 RX ORDER — SIMETHICONE 80 MG
80 TABLET,CHEWABLE ORAL EVERY 6 HOURS PRN
Status: DISCONTINUED | OUTPATIENT
Start: 2025-05-18 | End: 2025-05-24 | Stop reason: HOSPADM

## 2025-05-18 RX ORDER — ALBUTEROL SULFATE 5 MG/ML
10 SOLUTION RESPIRATORY (INHALATION) ONCE
Status: COMPLETED | OUTPATIENT
Start: 2025-05-18 | End: 2025-05-18

## 2025-05-18 RX ORDER — CEFEPIME HYDROCHLORIDE 2 G/50ML
2000 INJECTION, SOLUTION INTRAVENOUS EVERY 8 HOURS
Status: DISCONTINUED | OUTPATIENT
Start: 2025-05-18 | End: 2025-05-19

## 2025-05-18 RX ORDER — FLUTICASONE FUROATE AND VILANTEROL 100; 25 UG/1; UG/1
1 POWDER RESPIRATORY (INHALATION) DAILY
Status: DISCONTINUED | OUTPATIENT
Start: 2025-05-18 | End: 2025-05-24 | Stop reason: HOSPADM

## 2025-05-18 RX ORDER — VANCOMYCIN HYDROCHLORIDE 1 G/200ML
25 INJECTION, SOLUTION INTRAVENOUS ONCE
Status: COMPLETED | OUTPATIENT
Start: 2025-05-18 | End: 2025-05-18

## 2025-05-18 RX ORDER — NICOTINE 21 MG/24HR
21 PATCH, TRANSDERMAL 24 HOURS TRANSDERMAL DAILY
Status: DISCONTINUED | OUTPATIENT
Start: 2025-05-19 | End: 2025-05-18

## 2025-05-18 RX ORDER — HYDRALAZINE HYDROCHLORIDE 20 MG/ML
10 INJECTION INTRAMUSCULAR; INTRAVENOUS ONCE
Status: COMPLETED | OUTPATIENT
Start: 2025-05-18 | End: 2025-05-18

## 2025-05-18 RX ORDER — METHYLPREDNISOLONE SODIUM SUCCINATE 125 MG/2ML
125 INJECTION, POWDER, LYOPHILIZED, FOR SOLUTION INTRAMUSCULAR; INTRAVENOUS ONCE
Status: COMPLETED | OUTPATIENT
Start: 2025-05-18 | End: 2025-05-18

## 2025-05-18 RX ORDER — IPRATROPIUM BROMIDE AND ALBUTEROL SULFATE 2.5; .5 MG/3ML; MG/3ML
3 SOLUTION RESPIRATORY (INHALATION) EVERY 6 HOURS PRN
Status: DISCONTINUED | OUTPATIENT
Start: 2025-05-18 | End: 2025-05-24 | Stop reason: HOSPADM

## 2025-05-18 RX ADMIN — ALBUTEROL SULFATE 10 MG: 2.5 SOLUTION RESPIRATORY (INHALATION) at 12:16

## 2025-05-18 RX ADMIN — ISODIUM CHLORIDE 12 ML: 0.03 SOLUTION RESPIRATORY (INHALATION) at 12:16

## 2025-05-18 RX ADMIN — HEPARIN SODIUM 5000 UNITS: 5000 INJECTION INTRAVENOUS; SUBCUTANEOUS at 15:39

## 2025-05-18 RX ADMIN — CEFEPIME HYDROCHLORIDE 2000 MG: 2 INJECTION, SOLUTION INTRAVENOUS at 22:30

## 2025-05-18 RX ADMIN — VANCOMYCIN HYDROCHLORIDE 1000 MG: 1 INJECTION, SOLUTION INTRAVENOUS at 21:19

## 2025-05-18 RX ADMIN — HYDRALAZINE HYDROCHLORIDE 10 MG: 20 INJECTION INTRAMUSCULAR; INTRAVENOUS at 13:17

## 2025-05-18 RX ADMIN — VANCOMYCIN HYDROCHLORIDE 1000 MG: 1 INJECTION, SOLUTION INTRAVENOUS at 16:41

## 2025-05-18 RX ADMIN — METHYLPREDNISOLONE SODIUM SUCCINATE 125 MG: 125 INJECTION, POWDER, FOR SOLUTION INTRAMUSCULAR; INTRAVENOUS at 12:44

## 2025-05-18 RX ADMIN — ACETAMINOPHEN 975 MG: 325 TABLET ORAL at 12:44

## 2025-05-18 RX ADMIN — NICOTINE 21 MG: 21 PATCH, EXTENDED RELEASE TRANSDERMAL at 18:47

## 2025-05-18 RX ADMIN — CEFEPIME HYDROCHLORIDE 2000 MG: 2 INJECTION, SOLUTION INTRAVENOUS at 15:40

## 2025-05-18 RX ADMIN — INSULIN LISPRO 2 UNITS: 100 INJECTION, SOLUTION INTRAVENOUS; SUBCUTANEOUS at 16:43

## 2025-05-18 RX ADMIN — LIDOCAINE 5% 1 PATCH: 700 PATCH TOPICAL at 18:48

## 2025-05-18 RX ADMIN — DICYCLOMINE HYDROCHLORIDE 10 MG: 10 CAPSULE ORAL at 15:39

## 2025-05-18 RX ADMIN — AZITHROMYCIN DIHYDRATE 500 MG: 250 TABLET, FILM COATED ORAL at 15:39

## 2025-05-18 RX ADMIN — DOXYCYCLINE 100 MG: 100 INJECTION, POWDER, LYOPHILIZED, FOR SOLUTION INTRAVENOUS at 13:21

## 2025-05-18 RX ADMIN — IPRATROPIUM BROMIDE 1 MG: 0.5 SOLUTION RESPIRATORY (INHALATION) at 12:16

## 2025-05-18 RX ADMIN — FAMOTIDINE 20 MG: 20 TABLET, FILM COATED ORAL at 16:44

## 2025-05-18 RX ADMIN — SODIUM CHLORIDE 1278 ML: 0.9 INJECTION, SOLUTION INTRAVENOUS at 12:44

## 2025-05-18 RX ADMIN — DOCUSATE SODIUM 100 MG: 100 CAPSULE, LIQUID FILLED ORAL at 17:00

## 2025-05-18 RX ADMIN — HEPARIN SODIUM 5000 UNITS: 5000 INJECTION INTRAVENOUS; SUBCUTANEOUS at 21:19

## 2025-05-18 NOTE — H&P
"H&P - Hospitalist   Name: Ileana Seaman 65 y.o. female I MRN: [id number]  Unit/Bed#: -01 I Date of Admission: 5/18/2025   Date of Service: 5/18/2025 I Hospital Day: 0     Assessment & Plan  Mycobacterial infection, non-TB  Follows with infectious disease outpatient for MARIS infection.  Unable to tolerate therapy due to hepatotoxicity while on rifabutin.  Instead they are monitoring sputum cultures closely off treatment.  CXR progressive pneumonia in right upper lobe superimposed on known cavitary nodular lesions  Previous CTA chest: Worsening right upper lobe pneumonia and new superior segment right lower lobe pneumonia  COVID/Flu/RSV negative    Previous AFB negative  Blood Cultures ordered  ID consulted given history of MARIS and recurrent and worsening PNA.  Place on vanco , cefepime and zithromax.consult pulm  Sepsis without acute organ dysfunction (HCC)  Patient presents with low-grade fever, tachycardia, tachypnea and leukocytosis appears to have worsening pneumonia on chest x-ray.  Will treat with IV fluids antibiotics cultures done and pending  Mixed simple and mucopurulent chronic bronchitis (HCC)  Place On DuoNeb treatments avoid steroids at this time we will asked pulmonology for evaluation  Type 2 diabetes mellitus without complication, without long-term current use of insulin (HCC)  Lab Results   Component Value Date    HGBA1C 7.4 (H) 05/02/2025       No results for input(s): \"POCGLU\" in the last 72 hours.    Blood Sugar Average: Last 72 hrs:    Placed on Insulin sliding scale    Severe protein calorie malnutrition:  Severe protein calorie malnutrition noted with BMI of 17.78.    VTE Pharmacologic Prophylaxis:   Moderate Risk (Score 3-4) - Pharmacological DVT Prophylaxis Ordered: heparin.  Code Status: Level 3 - DNAR and DNI as per patient  Discussion with family:     Anticipated Length of Stay: Patient will be admitted on an inpatient basis with an anticipated length of stay of greater than 2 " midnights secondary to worsening pneumonia.    History of Present Illness   Chief Complaint: Shortness of breath    Ileana Seaman is a 65 y.o. female with a PMH of mycobacterial infection who presents with worsening shortness of breath, low-grade fevers and chills.  Complaining of no improvement after hour-long nebulizer treatment and also blood pressures were elevated on presentation.  Patient was recently discharged from the hospital and completed her course of medications and presented again as she got sick again she has known history of nontuberculous mycobacterial infection however unable to tolerate medications due to side effects.    Review of Systems   Constitutional:  Positive for appetite change and fatigue. Negative for chills and fever.   HENT:  Negative for hearing loss, sore throat and trouble swallowing.    Eyes:  Negative for photophobia, discharge and visual disturbance.   Respiratory:  Positive for chest tightness and shortness of breath.    Cardiovascular:  Positive for palpitations. Negative for chest pain.   Gastrointestinal:  Negative for abdominal pain, blood in stool and vomiting.   Endocrine: Negative for polydipsia and polyuria.   Genitourinary:  Negative for difficulty urinating, dysuria, flank pain and hematuria.   Musculoskeletal:  Negative for back pain and gait problem.   Skin:  Negative for rash.   Allergic/Immunologic: Negative for environmental allergies and food allergies.   Neurological:  Positive for weakness. Negative for dizziness, seizures, syncope and headaches.   Hematological:  Does not bruise/bleed easily.   Psychiatric/Behavioral:  Negative for behavioral problems. The patient is nervous/anxious.    All other systems reviewed and are negative.      Historical Information   Past Medical History:   Diagnosis Date    COPD (chronic obstructive pulmonary disease) (HCC)     Hiatal hernia      Past Surgical History:   Procedure Laterality Date    CHOLECYSTECTOMY       Social  History     Tobacco Use    Smoking status: Former     Current packs/day: 0.00     Types: Cigarettes     Quit date: 1980     Years since quittin.0    Smokeless tobacco: Never   Vaping Use    Vaping status: Never Used   Substance and Sexual Activity    Alcohol use: Never    Drug use: Never    Sexual activity: Not on file     E-Cigarette/Vaping    E-Cigarette Use Never User      E-Cigarette/Vaping Substances     Family History   Problem Relation Age of Onset    Hypertension Father      Social History:  Marital Status: Single     Meds/Allergies   I have reviewed home medications with patient personally.  Prior to Admission medications    Medication Sig Start Date End Date Taking? Authorizing Provider   albuterol (PROVENTIL HFA,VENTOLIN HFA) 90 mcg/act inhaler Inhale 2 puffs every 6 (six) hours as needed for wheezing 25  Yes Rolando Parker MD   aspirin (Aspirin 81) 81 mg EC tablet Take 1 tablet (81 mg total) by mouth daily 24  Yes Rolando Parker MD   atorvastatin (LIPITOR) 40 mg tablet Take 1 tablet (40 mg total) by mouth daily 24  Yes Rolando Parker MD   dicyclomine (BENTYL) 10 mg capsule Take 1 capsule (10 mg total) by mouth 3 (three) times a day before meals 24  Yes Rolando Parker MD   docusate sodium (COLACE) 100 mg capsule Take 100 mg by mouth in the morning and 100 mg in the evening. 24  Yes Historical Provider, MD   famotidine (PEPCID) 20 mg tablet Take 1 tablet (20 mg total) by mouth 2 (two) times a day 24  Yes Carlos Turcios PA-C   fluticasone-umeclidinium-vilanterol (Trelegy Ellipta) 100-62.5-25 mcg/actuation inhaler Inhale 1 puff daily Rinse mouth after use. 25  Yes Mary Altamirano MD   ipratropium-albuterol (DUO-NEB) 0.5-2.5 mg/3 mL nebulizer solution Take 3 mL by nebulization every 6 (six) hours as needed for wheezing or shortness of breath 25  Yes Ponce Osorio DO   nicotine (NICODERM CQ) 21 mg/24 hr TD 24 hr patch Place 1 patch on  the skin over 24 hours daily 5/8/25  Yes Mary Altamirano MD   omeprazole (PriLOSEC) 40 MG capsule Take 1 capsule (40 mg total) by mouth 2 (two) times a day 30 minutes before meal 11/8/24 5/18/25 Yes Rolando Parker MD   simethicone (MYLICON) 80 mg chewable tablet Chew 1 tablet (80 mg total) every 6 (six) hours as needed for flatulence 12/17/24  Yes Rolando Parker MD   sucralfate (CARAFATE) 1 g tablet Take 1 tablet (1 g total) by mouth 4 (four) times a day for 14 days 5/7/25 5/21/25 Yes Mary Altamirano MD   benzonatate (TESSALON PERLES) 100 mg capsule Take 1 capsule (100 mg total) by mouth 3 (three) times a day as needed for cough  Patient not taking: Reported on 5/18/2025 5/7/25 5/18/25  Mary Altamirano MD   furosemide (LASIX) 20 mg tablet Take 1 tablet (20 mg total) by mouth daily as needed (lower extremity swelling)  Patient not taking: Reported on 5/18/2025 4/9/25 5/18/25  Barbra Dunn PA-C   polyethylene glycol (MIRALAX) 17 g packet Take 17 g by mouth daily  Patient not taking: Reported on 5/18/2025 5/8/25 5/18/25  Mary Altamirano MD   predniSONE 10 mg tablet Take 4 tablets (40 mg total) by mouth daily for 1 day, THEN 3 tablets (30 mg total) daily for 3 days, THEN 2 tablets (20 mg total) daily for 3 days, THEN 1 tablet (10 mg total) daily for 3 days. Do not start before May 8, 2025.  Patient not taking: Reported on 5/18/2025 5/8/25 5/18/25  Mary Altamirano MD     No Known Allergies    Objective :  Temp:  [97.2 °F (36.2 °C)-100.3 °F (37.9 °C)] 97.2 °F (36.2 °C)  HR:  [129-146] 129  BP: ()/() 95/61  Resp:  [18-34] 28  SpO2:  [94 %-97 %] 95 %  O2 Device: None (Room air)  Nasal Cannula O2 Flow Rate (L/min):  [2 L/min] 2 L/min    Physical Exam  Vitals and nursing note reviewed.   Constitutional:       Appearance: She is ill-appearing.   HENT:      Head: Normocephalic and atraumatic.      Right Ear: External ear normal.      Left Ear: External ear normal.      Nose: Nose normal.       Mouth/Throat:      Pharynx: Oropharynx is clear.     Eyes:      Pupils: Pupils are equal, round, and reactive to light.       Cardiovascular:      Rate and Rhythm: Regular rhythm. Tachycardia present.      Heart sounds: Normal heart sounds.   Pulmonary:      Effort: Pulmonary effort is normal.      Breath sounds: Rales present. No wheezing.   Abdominal:      General: Bowel sounds are normal.      Palpations: Abdomen is soft.      Tenderness: There is no abdominal tenderness.     Musculoskeletal:         General: Normal range of motion.      Cervical back: Normal range of motion and neck supple.     Skin:     General: Skin is warm and dry.      Capillary Refill: Capillary refill takes less than 2 seconds.     Neurological:      General: No focal deficit present.      Mental Status: She is alert and oriented to person, place, and time.     Psychiatric:         Mood and Affect: Mood normal.            Lines/Drains:            Lab Results: I have reviewed the following results:  Results from last 7 days   Lab Units 05/18/25  1205   WBC Thousand/uL 12.55*   HEMOGLOBIN g/dL 13.0   HEMATOCRIT % 41.8   PLATELETS Thousands/uL 244   SEGS PCT % 85*   LYMPHO PCT % 8*   MONO PCT % 7   EOS PCT % 0     Results from last 7 days   Lab Units 05/18/25  1205   SODIUM mmol/L 132*   POTASSIUM mmol/L 3.2*   CHLORIDE mmol/L 94*   CO2 mmol/L 30   BUN mg/dL 7   CREATININE mg/dL 0.49*   ANION GAP mmol/L 8   CALCIUM mg/dL 8.9   ALBUMIN g/dL 3.9   TOTAL BILIRUBIN mg/dL 0.55   ALK PHOS U/L 92   ALT U/L 15   AST U/L 9*   GLUCOSE RANDOM mg/dL 182*     Results from last 7 days   Lab Units 05/18/25  1205   INR  1.08         Lab Results   Component Value Date    HGBA1C 7.4 (H) 05/02/2025    HGBA1C 6.5 (H) 11/13/2024    HGBA1C 6.4 (H) 10/04/2024     Results from last 7 days   Lab Units 05/18/25  1205   LACTIC ACID mmol/L 1.6   PROCALCITONIN ng/ml <0.05       Imaging Results Review: I reviewed radiology reports from this admission including: chest  xray.  Other Study Results Review: EKG was reviewed.     Administrative Statements       ** Please Note: This note has been constructed using a voice recognition system. **

## 2025-05-18 NOTE — ASSESSMENT & PLAN NOTE
"Lab Results   Component Value Date    HGBA1C 7.4 (H) 05/02/2025       No results for input(s): \"POCGLU\" in the last 72 hours.    Blood Sugar Average: Last 72 hrs:    Placed on Insulin sliding scale    "

## 2025-05-18 NOTE — ED PROVIDER NOTES
Time reflects when diagnosis was documented in both MDM as applicable and the Disposition within this note       Time User Action Codes Description Comment    5/18/2025  1:11 PM Marleylizbeth Rey Add [J44.1] COPD exacerbation (HCC)     5/18/2025  1:12 PM Gregory Rey Add [J18.9] Pneumonia     5/18/2025  1:24 PM Gregory Rey Add [I10] HTN (hypertension)     5/18/2025  1:25 PM Gregory Rey Add [R00.0] Tachycardia           ED Disposition       ED Disposition   Admit    Condition   Stable    Date/Time   Sun May 18, 2025  1:11 PM    Comment   Case was discussed with Dr. Santizo and the patient's admission status was agreed to be Admission Status: inpatient status to the service of Dr. Santizo.               Assessment & Plan       Medical Decision Making  Problems Addressed:  COPD exacerbation (HCC): acute illness or injury  Pneumonia: acute illness or injury    Amount and/or Complexity of Data Reviewed  Labs: ordered. Decision-making details documented in ED Course.  Radiology: ordered and independent interpretation performed. Decision-making details documented in ED Course.  ECG/medicine tests: ordered and independent interpretation performed. Decision-making details documented in ED Course.    Risk  OTC drugs.  Prescription drug management.  Decision regarding hospitalization.        ED Course as of 05/18/25 1325   Sun May 18, 2025   1320 On reevaluation patient still very short of breath tachypneic now more tachycardic after hour-long breathing treatment and hypertensive.  Will refer to hospitalist for further evaluation monitoring and treatment.   1324 Spoke with Dr. Santizo hospitalist on-call reviewed case and findings in the emergency department management.  Accepts for admission.         Medications   sodium chloride 0.9 % bolus 1,278 mL (1,278 mL Intravenous New Bag 5/18/25 1244)   doxycycline (VIBRAMYCIN) 100 mg in sodium chloride 0.9 % 100 mL IVPB (100 mg Intravenous New Bag 5/18/25 1321)   acetaminophen (TYLENOL)  "tablet 975 mg (975 mg Oral Given 5/18/25 1244)   methylPREDNISolone sodium succinate (Solu-MEDROL) injection 125 mg (125 mg Intravenous Given 5/18/25 1244)   albuterol inhalation solution 10 mg (10 mg Nebulization Given 5/18/25 1216)   ipratropium (ATROVENT) 0.02 % inhalation solution 1 mg (1 mg Nebulization Given 5/18/25 1216)   sodium chloride 0.9 % inhalation solution 12 mL (12 mL Nebulization Given 5/18/25 1216)   hydrALAZINE (APRESOLINE) injection 10 mg (10 mg Intravenous Given 5/18/25 1317)       ED Risk Strat Scores                    No data recorded        SBIRT 20yo+      Flowsheet Row Most Recent Value   Initial Alcohol Screen: US AUDIT-C     1. How often do you have a drink containing alcohol? 0 Filed at: 05/18/2025 1311   2. How many drinks containing alcohol do you have on a typical day you are drinking?  0 Filed at: 05/18/2025 1311   3b. FEMALE Any Age, or MALE 65+: How often do you have 4 or more drinks on one occassion? 0 Filed at: 05/18/2025 1311   Audit-C Score 0 Filed at: 05/18/2025 1311   LANDY: How many times in the past year have you...    Used an illegal drug or used a prescription medication for non-medical reasons? Never Filed at: 05/18/2025 1311                            History of Present Illness       Chief Complaint   Patient presents with    Shortness of Breath     Patient c/o \"trouble catching my breath.\"  Started this am.  Patient with h/o COPD and recent pneumonia.         Past Medical History:   Diagnosis Date    COPD (chronic obstructive pulmonary disease) (HCC)     Hiatal hernia       Past Surgical History:   Procedure Laterality Date    CHOLECYSTECTOMY        History reviewed. No pertinent family history.   Social History[1]   E-Cigarette/Vaping    E-Cigarette Use Never User       E-Cigarette/Vaping Substances      I have reviewed and agree with the history as documented.     Patient is a 65-year-old female history of COPD presents emergency department due to shortness of breath " started this morning still present worsening patient arrives tachypneic and tachycardic with low-grade fever.  Denies chest pain.        History provided by:  Patient      Review of Systems   Constitutional:  Positive for fatigue and fever.   HENT:  Positive for congestion.    Respiratory:  Positive for cough, shortness of breath and wheezing.    Cardiovascular:  Negative for chest pain.   Gastrointestinal:  Negative for abdominal pain, nausea and vomiting.   All other systems reviewed and are negative.          Objective       ED Triage Vitals [05/18/25 1149]   Temperature Pulse Blood Pressure Respirations SpO2 Patient Position - Orthostatic VS   100.3 °F (37.9 °C) (!) 130 133/89 22 94 % Sitting      Temp Source Heart Rate Source BP Location FiO2 (%) Pain Score    Temporal Monitor Left arm -- No Pain      Vitals      Date and Time Temp Pulse SpO2 Resp BP Pain Score FACES Pain Rating User   05/18/25 1317 -- -- -- -- 183/101 -- -- AR   05/18/25 1315 -- 146 94 % 34 183/101 -- -- AR   05/18/25 1300 -- 138 95 % 33 217/104 -- -- AR   05/18/25 1244 -- -- -- -- -- Med Not Given for Pain - for MAR use only -- AR   05/18/25 1215 -- -- 97 % -- -- -- -- TLS   05/18/25 1149 100.3 °F (37.9 °C) 130 94 % 22 133/89 No Pain -- DW            Physical Exam  Vitals and nursing note reviewed.   Constitutional:       General: She is not in acute distress.     Appearance: Normal appearance.   HENT:      Head: Normocephalic and atraumatic.      Nose: Nose normal.     Eyes:      Conjunctiva/sclera: Conjunctivae normal.       Cardiovascular:      Rate and Rhythm: Regular rhythm. Tachycardia present.   Pulmonary:      Effort: Tachypnea and respiratory distress present.      Breath sounds: Examination of the right-upper field reveals rhonchi. Examination of the right-middle field reveals rhonchi. Examination of the right-lower field reveals decreased breath sounds. Examination of the left-lower field reveals decreased breath sounds. Decreased  breath sounds and rhonchi present.     Skin:     General: Skin is dry.     Neurological:      General: No focal deficit present.      Mental Status: She is alert and oriented to person, place, and time.         Results Reviewed       Procedure Component Value Units Date/Time    UA w Reflex to Microscopic w Reflex to Culture [[id number]] Collected: 05/18/25 1318    Lab Status: In process Specimen: Urine, Clean Catch Updated: 05/18/25 1321    B-Type Natriuretic Peptide(BNP) [[id number]]  (Normal) Collected: 05/18/25 1205    Lab Status: Final result Specimen: Blood from Arm, Left Updated: 05/18/25 1300     BNP 58 pg/mL     Blood culture #2 [[id number]] Collected: 05/18/25 1244    Lab Status: In process Specimen: Blood from Arm, Right Updated: 05/18/25 1251    Procalcitonin [[id number]]  (Normal) Collected: 05/18/25 1205    Lab Status: Final result Specimen: Blood from Arm, Left Updated: 05/18/25 1251     Procalcitonin <0.05 ng/ml     HS Troponin 0hr (reflex protocol) [[id number]]  (Normal) Collected: 05/18/25 1205    Lab Status: Final result Specimen: Blood from Arm, Left Updated: 05/18/25 1251     hs TnI 0hr 11 ng/L     HS Troponin I 2hr [[id number]]     Lab Status: No result Specimen: Blood     HS Troponin I 4hr [[id number]]     Lab Status: No result Specimen: Blood     FLU/COVID Rapid Antigen (30 min. TAT) - Preferred screening test in ED [[id number]]  (Normal) Collected: 05/18/25 1205    Lab Status: Final result Specimen: Nares from Nose Updated: 05/18/25 1246     SARS COV Rapid Antigen Negative     Influenza A Rapid Antigen Negative     Influenza B Rapid Antigen Negative    Narrative:      This test has been performed using the ROCKI Rhea 2 FLU+SARS Antigen test under the Emergency Use Authorization (EUA). This test has been validated by the  and verified by the performing laboratory. The Rhea uses lateral flow immunofluorescent sandwich assay to detect SARS-COV, Influenza A and Influenza B Antigen.     The  Quidel Rhea 2 SARS Antigen test does not differentiate between SARS-CoV and SARS-CoV-2.     Negative results are presumptive and may be confirmed with a molecular assay, if necessary, for patient management. Negative results do not rule out SARS-CoV-2 or influenza infection and should not be used as the sole basis for treatment or patient management decisions. A negative test result may occur if the level of antigen in a sample is below the limit of detection of this test.     Positive results are indicative of the presence of viral antigens, but do not rule out bacterial infection or co-infection with other viruses.     All test results should be used as an adjunct to clinical observations and other information available to the provider.    FOR PEDIATRIC PATIENTS - copy/paste COVID Guidelines URL to browser: [email or web address]    Lactic acid, plasma (w/reflex if result > 2.0) [[id number]]  (Normal) Collected: 05/18/25 1205    Lab Status: Final result Specimen: Blood from Arm, Left Updated: 05/18/25 1244     LACTIC ACID 1.6 mmol/L     Narrative:      Result may be elevated if tourniquet was used during collection.    Protime-INR [[id number]]  (Normal) Collected: 05/18/25 1205    Lab Status: Final result Specimen: Blood from Arm, Left Updated: 05/18/25 1241     Protime 14.4 seconds      INR 1.08    Narrative:      INR Therapeutic Range    Indication                                             INR Range      Atrial Fibrillation                                               2.0-3.0  Hypercoagulable State                                    2.0.2.3  Left Ventricular Asist Device                            2.0-3.0  Mechanical Heart Valve                                  -    Aortic(with afib, MI, embolism, HF, LA enlargement,    and/or coagulopathy)                                     2.0-3.0 (2.5-3.5)     Mitral                                                              2.5-3.5  Prosthetic/Bioprosthetic Heart Valve               2.0-3.0  Venous thromboembolism (VTE: VT, PE        2.0-3.0    APTT [[id number]]  (Normal) Collected: 05/18/25 1205    Lab Status: Final result Specimen: Blood from Arm, Left Updated: 05/18/25 1241     PTT 25 seconds     Comprehensive metabolic panel [[id number]]  (Abnormal) Collected: 05/18/25 1205    Lab Status: Final result Specimen: Blood from Arm, Left Updated: 05/18/25 1240     Sodium 132 mmol/L      Potassium 3.2 mmol/L      Chloride 94 mmol/L      CO2 30 mmol/L      ANION GAP 8 mmol/L      BUN 7 mg/dL      Creatinine 0.49 mg/dL      Glucose 182 mg/dL      Calcium 8.9 mg/dL      AST 9 U/L      ALT 15 U/L      Alkaline Phosphatase 92 U/L      Total Protein 6.9 g/dL      Albumin 3.9 g/dL      Total Bilirubin 0.55 mg/dL      eGFR 101 ml/min/1.73sq m     Narrative:      National Kidney Disease Foundation guidelines for Chronic Kidney Disease (CKD):     Stage 1 with normal or high GFR (GFR > 90 mL/min/1.73 square meters)    Stage 2 Mild CKD (GFR = 60-89 mL/min/1.73 square meters)    Stage 3A Moderate CKD (GFR = 45-59 mL/min/1.73 square meters)    Stage 3B Moderate CKD (GFR = 30-44 mL/min/1.73 square meters)    Stage 4 Severe CKD (GFR = 15-29 mL/min/1.73 square meters)    Stage 5 End Stage CKD (GFR <15 mL/min/1.73 square meters)  Note: GFR calculation is accurate only with a steady state creatinine    C-reactive protein [[id number]]  (Abnormal) Collected: 05/18/25 1205    Lab Status: Final result Specimen: Blood from Arm, Left Updated: 05/18/25 1240     .5 mg/L     Sedimentation rate, automated [[id number]]  (Normal) Collected: 05/18/25 1205    Lab Status: Final result Specimen: Blood from Arm, Left Updated: 05/18/25 1231     Sed Rate 27 mm/hour     CBC and differential [[id number]]  (Abnormal) Collected: 05/18/25 1205    Lab Status: Final result Specimen: Blood from Arm, Left Updated: 05/18/25 1227     WBC 12.55 Thousand/uL      RBC 4.75 Million/uL       Hemoglobin 13.0 g/dL      Hematocrit 41.8 %      MCV 88 fL      MCH 27.4 pg      MCHC 31.1 g/dL      RDW 14.8 %      MPV 11.9 fL      Platelets 244 Thousands/uL      nRBC 0 /100 WBCs      Segmented % 85 %      Immature Grans % 0 %      Lymphocytes % 8 %      Monocytes % 7 %      Eosinophils Relative 0 %      Basophils Relative 0 %      Absolute Neutrophils 10.48 Thousands/µL      Absolute Immature Grans 0.05 Thousand/uL      Absolute Lymphocytes 1.04 Thousands/µL      Absolute Monocytes 0.93 Thousand/µL      Eosinophils Absolute 0.01 Thousand/µL      Basophils Absolute 0.04 Thousands/µL     Blood gas, venous [[id number]] Collected: 05/18/25 1205    Lab Status: Final result Specimen: Blood from Arm, Left Updated: 05/18/25 1227     pH, Terence 7.331     pCO2, Terence 46.8 mm Hg      pO2, Terence 37.1 mm Hg      HCO3, Terence 24.2 mmol/L      Base Excess, Terence -2.0 mmol/L      O2 Content, Terence 12.8 ml/dL      O2 HGB, VENOUS 65.1 %     Blood culture #1 [[id number]] Collected: 05/18/25 1205    Lab Status: In process Specimen: Blood from Arm, Left Updated: 05/18/25 1221            XR chest 1 view portable   ED Interpretation by Rey Jenkins DO (05/18 1223)   Right upper lobe pneumonia cavitary lesions          ECG 12 Lead Documentation Only    Date/Time: 5/18/2025 12:27 PM    Performed by: Rey Jenkins DO  Authorized by: Rey Jenkins DO    ECG reviewed by me, the ED Provider: yes    Patient location:  ED  Previous ECG:     Comparison to cardiac monitor: Yes    Quality:     Tracing quality:  Limited by artifact  Rate:     ECG rate:  127    ECG rate assessment: tachycardic    Rhythm:     Rhythm: sinus tachycardia    QRS:     QRS intervals:  Normal  Conduction:     Conduction: normal    ST segments:     ST segments:  Normal  T waves:     T waves: normal        ED Medication and Procedure Management   Prior to Admission Medications   Prescriptions Last Dose Informant Patient Reported? Taking?   albuterol (PROVENTIL HFA,VENTOLIN HFA)  90 mcg/act inhaler   No No   Sig: Inhale 2 puffs every 6 (six) hours as needed for wheezing   aspirin (Aspirin 81) 81 mg EC tablet   No No   Sig: Take 1 tablet (81 mg total) by mouth daily   atorvastatin (LIPITOR) 40 mg tablet   No No   Sig: Take 1 tablet (40 mg total) by mouth daily   benzonatate (TESSALON PERLES) 100 mg capsule   No No   Sig: Take 1 capsule (100 mg total) by mouth 3 (three) times a day as needed for cough   dicyclomine (BENTYL) 10 mg capsule   No No   Sig: Take 1 capsule (10 mg total) by mouth 3 (three) times a day before meals   docusate sodium (COLACE) 100 mg capsule   Yes No   Sig: Take 100 mg by mouth 2 (two) times a day   famotidine (PEPCID) 20 mg tablet   No No   Sig: Take 1 tablet (20 mg total) by mouth 2 (two) times a day   fluticasone-umeclidinium-vilanterol (Trelegy Ellipta) 100-62.5-25 mcg/actuation inhaler   No No   Sig: Inhale 1 puff daily Rinse mouth after use.   furosemide (LASIX) 20 mg tablet   No No   Sig: Take 1 tablet (20 mg total) by mouth daily as needed (lower extremity swelling)   ipratropium-albuterol (DUO-NEB) 0.5-2.5 mg/3 mL nebulizer solution   No No   Sig: Take 3 mL by nebulization every 6 (six) hours as needed for wheezing or shortness of breath   nicotine (NICODERM CQ) 21 mg/24 hr TD 24 hr patch   No No   Sig: Place 1 patch on the skin over 24 hours daily   omeprazole (PriLOSEC) 40 MG capsule   No No   Sig: Take 1 capsule (40 mg total) by mouth 2 (two) times a day 30 minutes before meal   polyethylene glycol (MIRALAX) 17 g packet   No No   Sig: Take 17 g by mouth daily   predniSONE 10 mg tablet   No No   Sig: Take 4 tablets (40 mg total) by mouth daily for 1 day, THEN 3 tablets (30 mg total) daily for 3 days, THEN 2 tablets (20 mg total) daily for 3 days, THEN 1 tablet (10 mg total) daily for 3 days. Do not start before May 8, 2025.   simethicone (MYLICON) 80 mg chewable tablet   No No   Sig: Chew 1 tablet (80 mg total) every 6 (six) hours as needed for flatulence    sucralfate (CARAFATE) 1 g tablet   No No   Sig: Take 1 tablet (1 g total) by mouth 4 (four) times a day for 14 days      Facility-Administered Medications: None     Patient's Medications   Discharge Prescriptions    No medications on file     No discharge procedures on file.  ED SEPSIS DOCUMENTATION   Time reflects when diagnosis was documented in both MDM as applicable and the Disposition within this note       Time User Action Codes Description Comment    2025  1:11 PM Rey Jenkins [J44.1] COPD exacerbation (HCC)     2025  1:12 PM Rey Jenkins [J18.9] Pneumonia     2025  1:24 PM Rey Jenkins [I10] HTN (hypertension)     2025  1:25 PM Rey Jenkins [R00.0] Tachycardia                      [1]   Social History  Tobacco Use    Smoking status: Former     Current packs/day: 0.00     Types: Cigarettes     Quit date: 1980     Years since quittin.0    Smokeless tobacco: Never   Vaping Use    Vaping status: Never Used   Substance Use Topics    Alcohol use: Never    Drug use: Never        Rey Jenkins DO  25 1325

## 2025-05-18 NOTE — ED NOTES
Patient states she missed a step and fell she is in a lot of pain under her ribs and by hip    She is transferred to Nurse for triage     ekg     Marlyn Hart, RN  05/18/25 3081

## 2025-05-18 NOTE — RESPIRATORY THERAPY NOTE
Respiratory Note     05/18/25 1215   Respiratory Assessment   Assessment Type During-treatment   General Appearance Alert;Awake   Respiratory Pattern Dyspnea with exertion   Chest Assessment Chest expansion symmetrical   Resp Comments Pt here with SOB, given 1hr UDN tx as ordered.   O2 Device 2LPM NC   Additional Assessments   SpO2 97 %

## 2025-05-18 NOTE — ASSESSMENT & PLAN NOTE
Patient presents with low-grade fever, tachycardia, tachypnea and leukocytosis appears to have worsening pneumonia on chest x-ray.  Will treat with IV fluids antibiotics cultures done and pending

## 2025-05-18 NOTE — ASSESSMENT & PLAN NOTE
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  Unable to tolerate therapy due to hepatotoxicity while on rifabutin.  Instead they are monitoring sputum cultures closely off treatment.  CXR progressive pneumonia in right upper lobe superimposed on known cavitary nodular lesions  Previous CTA chest: Worsening right upper lobe pneumonia and new superior segment right lower lobe pneumonia  COVID/Flu/RSV negative    Previous AFB negative  Blood Cultures ordered  ID consulted given history of MARIS and recurrent and worsening PNA.  Place on vanco , cefepime and zithromax.consult pulm

## 2025-05-18 NOTE — RESPIRATORY THERAPY NOTE
RT Protocol Note  Ileana Seaman 65 y.o. female MRN: [id number]  Unit/Bed#: -01 Encounter: [id number]    Assessment    Principal Problem:    Mycobacterial infection, non-TB  Active Problems:    Mixed simple and mucopurulent chronic bronchitis (HCC)    Type 2 diabetes mellitus without complication, without long-term current use of insulin (HCC)    Sepsis without acute organ dysfunction (HCC)      Home Pulmonary Medications:  Trelegy Daily, PRN duoneb       Past Medical History:   Diagnosis Date    COPD (chronic obstructive pulmonary disease) (HCC)     Hiatal hernia      Social History     Socioeconomic History    Marital status: Single     Spouse name: None    Number of children: None    Years of education: None    Highest education level: None   Occupational History    None   Tobacco Use    Smoking status: Former     Current packs/day: 0.00     Types: Cigarettes     Quit date: 1980     Years since quittin.0    Smokeless tobacco: Never   Vaping Use    Vaping status: Never Used   Substance and Sexual Activity    Alcohol use: Never    Drug use: Never    Sexual activity: None   Other Topics Concern    None   Social History Narrative    None     Social Drivers of Health     Financial Resource Strain: Low Risk  (2025)    Received from [facility]    Financial Resource Strain     Do you have any trouble paying for your medications, or do you think you might in the future?: No     Does your family have trouble paying for medicine? (Household - for ages 0-17 years): Not on file   Food Insecurity: No Food Insecurity (2025)    Nursing - Inadequate Food Risk Classification     Worried About Running Out of Food in the Last Year: Never true     Ran Out of Food in the Last Year: Never true     Ran Out of Food in the Last Year: Never true   Transportation Needs: No Transportation Needs (2025)    Nursing - Transportation Risk Classification     Lack of Transportation: Not on file     Lack of  "Transportation: No   Physical Activity: Not on file   Stress: Not on file   Social Connections: Socially Integrated (2025)    Received from [facility]     How often do you feel lonely or isolated from those around you?: Never   Intimate Partner Violence: Unknown (2025)    Nursing IPS     Feels Physically and Emotionally Safe: Not on file     Physically Hurt by Someone: Not on file     Humiliated or Emotionally Abused by Someone: Not on file     Physically Hurt by Someone: No     Hurt or Threatened by Someone: No   Housing Stability: Unknown (2025)    Nursing: Inadequate Housing Risk Classification     Has Housing: Not on file     Worried About Losing Housing: Not on file     Unable to Get Utilities: Not on file     Unable to Pay for Housing in the Last Year: No     Has Housin       Subjective         Objective    Physical Exam:   Assessment Type: Assess only  General Appearance: Alert, Awake  Respiratory Pattern: Dyspnea with exertion, Tachypneic  Chest Assessment: Chest expansion symmetrical  Bilateral Breath Sounds: Expiratory wheezes, Diminished  O2 Device: 2LPM NC    Vitals:  Blood pressure 95/61, pulse (!) 129, temperature (!) 97.2 °F (36.2 °C), resp. rate (!) 28, height 5' 1\" (1.549 m), weight 42.7 kg (94 lb 1.6 oz), SpO2 95%.          Imaging and other studies:     O2 Device: 2LPM NC     Plan    Respiratory Plan: Home Bronchodilator Patient pathway        Resp Comments: (P) pt admitted due to Mycobacterial infection. Pt currently SOB, hr long breathing tx given in ed. Pt does have hx of COPD, takes Trelegy daily, and prn duonebs. Pt  not in resp distress at this time will cont with home regime. no other changes made.   "

## 2025-05-18 NOTE — PROGRESS NOTES
Ileana Seaman is a 65 y.o. female who is currently ordered Vancomycin IV with management by the Pharmacy Consult service.  Relevant clinical data and objective / subjective history reviewed.  Vancomycin Assessment:  Indication and Goal AUC/Trough: Pneumonia (goal -600, trough >10)  Clinical Status: stable  Micro:     Renal Function:  SCr: 0.49 mg/dL  CrCl: 75.1 mL/min  Renal replacement: Not on dialysis  Days of Therapy: 1  Current Dose: 1000 mg IV once  Vancomycin Plan:  New Dosin mg IV every 12 hours  Estimated AUC: 581 mcg*hr/mL  Estimated Trough: 13.5 mcg/mL  Next Level: 25 @ 0600  Renal Function Monitoring: Daily BMP and UOP  Pharmacy will continue to follow closely for s/sx of nephrotoxicity, infusion reactions and appropriateness of therapy.  BMP and CBC will be ordered per protocol. We will continue to follow the patient’s culture results and clinical progress daily.    Margarito Mazariegos, Pharmacist

## 2025-05-18 NOTE — PLAN OF CARE
Problem: Potential for Falls  Goal: Patient will remain free of falls  Description: INTERVENTIONS:  - Educate patient/family on patient safety including physical limitations  - Instruct patient to call for assistance with activity   - Consider consulting OT/PT to assist with strengthening/mobility based on AM PAC & JH-HLM score  - Consult OT/PT to assist with strengthening/mobility   - Keep Call bell within reach  - Keep bed low and locked with side rails adjusted as appropriate  - Keep care items and personal belongings within reach  - Initiate and maintain comfort rounds  - Make Fall Risk Sign visible to staff  - Offer Toileting every 2 Hours, in advance of need  - Initiate/Maintain bed/chair alarm  - Apply yellow socks and bracelet for high fall risk patients  - Consider moving patient to room near nurses station  Outcome: Progressing     Problem: PAIN - ADULT  Goal: Verbalizes/displays adequate comfort level or baseline comfort level  Description: Interventions:  - Encourage patient to monitor pain and request assistance  - Assess pain using appropriate pain scale  - Administer analgesics as ordered based on type and severity of pain and evaluate response  - Implement non-pharmacological measures as appropriate and evaluate response  - Consider cultural and social influences on pain and pain management  - Notify physician/advanced practitioner if interventions unsuccessful or patient reports new pain  - Educate patient/family on pain management process including their role and importance of  reporting pain   - Provide non-pharmacologic/complimentary pain relief interventions  Outcome: Progressing     Problem: INFECTION - ADULT  Goal: Absence or prevention of progression during hospitalization  Description: INTERVENTIONS:  - Assess and monitor for signs and symptoms of infection  - Monitor lab/diagnostic results  - Monitor all insertion sites, i.e. indwelling lines, tubes, and drains  - Monitor endotracheal if  appropriate and nasal secretions for changes in amount and color  - Alexander appropriate cooling/warming therapies per order  - Administer medications as ordered  - Instruct and encourage patient and family to use good hand hygiene technique  - Identify and instruct in appropriate isolation precautions for identified infection/condition  Outcome: Progressing  Goal: Absence of fever/infection during neutropenic period  Description: INTERVENTIONS:  - Monitor WBC  - Perform strict hand hygiene  - Limit to healthy visitors only  - No plants, dried, fresh or silk flowers with garcia in patient room  Outcome: Progressing     Problem: DISCHARGE PLANNING  Goal: Discharge to home or other facility with appropriate resources  Description: INTERVENTIONS:  - Identify barriers to discharge w/patient and caregiver  - Arrange for needed discharge resources and transportation as appropriate  - Identify discharge learning needs (meds, wound care, etc.)  - Arrange for interpretive services to assist at discharge as needed  - Refer to Case Management Department for coordinating discharge planning if the patient needs post-hospital services based on physician/advanced practitioner order or complex needs related to functional status, cognitive ability, or social support system  Outcome: Progressing     Problem: Knowledge Deficit  Goal: Patient/family/caregiver demonstrates understanding of disease process, treatment plan, medications, and discharge instructions  Description: Complete learning assessment and assess knowledge base.  Interventions:  - Provide teaching at level of understanding  - Provide teaching via preferred learning methods  Outcome: Progressing     Problem: CARDIOVASCULAR - ADULT  Goal: Maintains optimal cardiac output and hemodynamic stability  Description: INTERVENTIONS:  - Monitor I/O, vital signs and rhythm  - Monitor for S/S and trends of decreased cardiac output  - Administer and titrate ordered vasoactive  medications to optimize hemodynamic stability  - Assess quality of pulses, skin color and temperature  - Assess for signs of decreased coronary artery perfusion  - Instruct patient to report change in severity of symptoms  Outcome: Progressing  Goal: Absence of cardiac dysrhythmias or at baseline rhythm  Description: INTERVENTIONS:  - Continuous cardiac monitoring, vital signs, obtain 12 lead EKG if ordered  - Administer antiarrhythmic and heart rate control medications as ordered  - Monitor electrolytes and administer replacement therapy as ordered  Outcome: Progressing     Problem: RESPIRATORY - ADULT  Goal: Achieves optimal ventilation and oxygenation  Description: INTERVENTIONS:  - Assess for changes in respiratory status  - Assess for changes in mentation and behavior  - Position to facilitate oxygenation and minimize respiratory effort  - Oxygen administered by appropriate delivery if ordered  - Initiate smoking cessation education as indicated  - Encourage broncho-pulmonary hygiene including cough, deep breathe, Incentive Spirometry  - Assess the need for suctioning and aspirate as needed  - Assess and instruct to report SOB or any respiratory difficulty  - Respiratory Therapy support as indicated  Outcome: Progressing

## 2025-05-19 LAB
ALBUMIN SERPL BCG-MCNC: 3.1 G/DL (ref 3.5–5)
ALP SERPL-CCNC: 72 U/L (ref 34–104)
ALT SERPL W P-5'-P-CCNC: 11 U/L (ref 7–52)
ANION GAP SERPL CALCULATED.3IONS-SCNC: 5 MMOL/L (ref 4–13)
AST SERPL W P-5'-P-CCNC: 7 U/L (ref 13–39)
ATRIAL RATE: 127 BPM
BILIRUB SERPL-MCNC: 0.29 MG/DL (ref 0.2–1)
BUN SERPL-MCNC: 10 MG/DL (ref 5–25)
CALCIUM ALBUM COR SERPL-MCNC: 9.5 MG/DL (ref 8.3–10.1)
CALCIUM SERPL-MCNC: 8.8 MG/DL (ref 8.4–10.2)
CHLORIDE SERPL-SCNC: 106 MMOL/L (ref 96–108)
CO2 SERPL-SCNC: 30 MMOL/L (ref 21–32)
CREAT SERPL-MCNC: 0.45 MG/DL (ref 0.6–1.3)
DME PARACHUTE DELIVERY DATE REQUESTED: NORMAL
DME PARACHUTE ITEM DESCRIPTION: NORMAL
DME PARACHUTE ORDER STATUS: NORMAL
DME PARACHUTE SUPPLIER NAME: NORMAL
DME PARACHUTE SUPPLIER PHONE: NORMAL
ERYTHROCYTE [DISTWIDTH] IN BLOOD BY AUTOMATED COUNT: 15 % (ref 11.6–15.1)
GFR SERPL CREATININE-BSD FRML MDRD: 104 ML/MIN/1.73SQ M
GLUCOSE SERPL-MCNC: 110 MG/DL (ref 65–140)
GLUCOSE SERPL-MCNC: 136 MG/DL (ref 65–140)
GLUCOSE SERPL-MCNC: 148 MG/DL (ref 65–140)
GLUCOSE SERPL-MCNC: 231 MG/DL (ref 65–140)
GLUCOSE SERPL-MCNC: 240 MG/DL (ref 65–140)
HCT VFR BLD AUTO: 35 % (ref 34.8–46.1)
HGB BLD-MCNC: 11.1 G/DL (ref 11.5–15.4)
MCH RBC QN AUTO: 28.1 PG (ref 26.8–34.3)
MCHC RBC AUTO-ENTMCNC: 31.7 G/DL (ref 31.4–37.4)
MCV RBC AUTO: 89 FL (ref 82–98)
MRSA NOSE QL CULT: NORMAL
P AXIS: 27 DEGREES
PLATELET # BLD AUTO: 223 THOUSANDS/UL (ref 149–390)
PMV BLD AUTO: 12 FL (ref 8.9–12.7)
POTASSIUM SERPL-SCNC: 4 MMOL/L (ref 3.5–5.3)
PR INTERVAL: 86 MS
PROCALCITONIN SERPL-MCNC: 0.15 NG/ML
PROT SERPL-MCNC: 5.3 G/DL (ref 6.4–8.4)
QRS AXIS: 79 DEGREES
QRSD INTERVAL: 84 MS
QT INTERVAL: 290 MS
QTC INTERVAL: 421 MS
RBC # BLD AUTO: 3.95 MILLION/UL (ref 3.81–5.12)
SODIUM SERPL-SCNC: 141 MMOL/L (ref 135–147)
T WAVE AXIS: 62 DEGREES
VENTRICULAR RATE: 127 BPM
WBC # BLD AUTO: 12.03 THOUSAND/UL (ref 4.31–10.16)

## 2025-05-19 PROCEDURE — 97166 OT EVAL MOD COMPLEX 45 MIN: CPT

## 2025-05-19 PROCEDURE — 85027 COMPLETE CBC AUTOMATED: CPT | Performed by: FAMILY MEDICINE

## 2025-05-19 PROCEDURE — 94640 AIRWAY INHALATION TREATMENT: CPT

## 2025-05-19 PROCEDURE — 84145 PROCALCITONIN (PCT): CPT | Performed by: FAMILY MEDICINE

## 2025-05-19 PROCEDURE — 99255 IP/OBS CONSLTJ NEW/EST HI 80: CPT | Performed by: INTERNAL MEDICINE

## 2025-05-19 PROCEDURE — 94664 DEMO&/EVAL PT USE INHALER: CPT

## 2025-05-19 PROCEDURE — 93010 ELECTROCARDIOGRAM REPORT: CPT | Performed by: INTERNAL MEDICINE

## 2025-05-19 PROCEDURE — 99223 1ST HOSP IP/OBS HIGH 75: CPT | Performed by: INTERNAL MEDICINE

## 2025-05-19 PROCEDURE — 94760 N-INVAS EAR/PLS OXIMETRY 1: CPT

## 2025-05-19 PROCEDURE — G0545 PR INHERENT VISIT TO INPT: HCPCS | Performed by: INTERNAL MEDICINE

## 2025-05-19 PROCEDURE — 99232 SBSQ HOSP IP/OBS MODERATE 35: CPT | Performed by: FAMILY MEDICINE

## 2025-05-19 PROCEDURE — 80053 COMPREHEN METABOLIC PANEL: CPT | Performed by: FAMILY MEDICINE

## 2025-05-19 PROCEDURE — 82948 REAGENT STRIP/BLOOD GLUCOSE: CPT

## 2025-05-19 PROCEDURE — 97162 PT EVAL MOD COMPLEX 30 MIN: CPT

## 2025-05-19 RX ORDER — LEVALBUTEROL INHALATION SOLUTION 1.25 MG/3ML
1.25 SOLUTION RESPIRATORY (INHALATION)
Status: DISCONTINUED | OUTPATIENT
Start: 2025-05-19 | End: 2025-05-24 | Stop reason: HOSPADM

## 2025-05-19 RX ORDER — PREDNISONE 20 MG/1
40 TABLET ORAL DAILY
Status: COMPLETED | OUTPATIENT
Start: 2025-05-19 | End: 2025-05-23

## 2025-05-19 RX ORDER — SODIUM CHLORIDE FOR INHALATION 3 %
4 VIAL, NEBULIZER (ML) INHALATION
Status: COMPLETED | OUTPATIENT
Start: 2025-05-19 | End: 2025-05-20

## 2025-05-19 RX ADMIN — FLUTICASONE FUROATE AND VILANTEROL TRIFENATATE 1 PUFF: 100; 25 POWDER RESPIRATORY (INHALATION) at 09:05

## 2025-05-19 RX ADMIN — VANCOMYCIN HYDROCHLORIDE 1000 MG: 1 INJECTION, SOLUTION INTRAVENOUS at 10:24

## 2025-05-19 RX ADMIN — ASPIRIN 81 MG: 81 TABLET, COATED ORAL at 09:04

## 2025-05-19 RX ADMIN — DICYCLOMINE HYDROCHLORIDE 10 MG: 10 CAPSULE ORAL at 06:09

## 2025-05-19 RX ADMIN — IPRATROPIUM BROMIDE 0.5 MG: 0.5 SOLUTION RESPIRATORY (INHALATION) at 19:44

## 2025-05-19 RX ADMIN — LEVALBUTEROL HYDROCHLORIDE 1.25 MG: 1.25 SOLUTION RESPIRATORY (INHALATION) at 14:53

## 2025-05-19 RX ADMIN — IPRATROPIUM BROMIDE 0.5 MG: 0.5 SOLUTION RESPIRATORY (INHALATION) at 14:53

## 2025-05-19 RX ADMIN — NICOTINE 21 MG: 21 PATCH, EXTENDED RELEASE TRANSDERMAL at 09:07

## 2025-05-19 RX ADMIN — ATORVASTATIN CALCIUM 40 MG: 40 TABLET, FILM COATED ORAL at 09:04

## 2025-05-19 RX ADMIN — IPRATROPIUM BROMIDE 0.5 MG: 0.5 SOLUTION RESPIRATORY (INHALATION) at 10:15

## 2025-05-19 RX ADMIN — DOCUSATE SODIUM 100 MG: 100 CAPSULE, LIQUID FILLED ORAL at 17:25

## 2025-05-19 RX ADMIN — DOCUSATE SODIUM 100 MG: 100 CAPSULE, LIQUID FILLED ORAL at 09:04

## 2025-05-19 RX ADMIN — DICYCLOMINE HYDROCHLORIDE 10 MG: 10 CAPSULE ORAL at 16:05

## 2025-05-19 RX ADMIN — LEVALBUTEROL HYDROCHLORIDE 1.25 MG: 1.25 SOLUTION RESPIRATORY (INHALATION) at 19:44

## 2025-05-19 RX ADMIN — SODIUM CHLORIDE SOLN NEBU 3% 4 ML: 3 NEBU SOLN at 14:53

## 2025-05-19 RX ADMIN — CEFEPIME HYDROCHLORIDE 2000 MG: 2 INJECTION, SOLUTION INTRAVENOUS at 06:08

## 2025-05-19 RX ADMIN — PREDNISONE 40 MG: 20 TABLET ORAL at 09:17

## 2025-05-19 RX ADMIN — HEPARIN SODIUM 5000 UNITS: 5000 INJECTION INTRAVENOUS; SUBCUTANEOUS at 06:09

## 2025-05-19 RX ADMIN — DICYCLOMINE HYDROCHLORIDE 10 MG: 10 CAPSULE ORAL at 10:59

## 2025-05-19 RX ADMIN — INSULIN LISPRO 3 UNITS: 100 INJECTION, SOLUTION INTRAVENOUS; SUBCUTANEOUS at 16:05

## 2025-05-19 RX ADMIN — HEPARIN SODIUM 5000 UNITS: 5000 INJECTION INTRAVENOUS; SUBCUTANEOUS at 23:06

## 2025-05-19 RX ADMIN — FAMOTIDINE 20 MG: 20 TABLET, FILM COATED ORAL at 17:25

## 2025-05-19 RX ADMIN — LIDOCAINE 5% 1 PATCH: 700 PATCH TOPICAL at 09:04

## 2025-05-19 RX ADMIN — AZITHROMYCIN DIHYDRATE 500 MG: 250 TABLET, FILM COATED ORAL at 13:57

## 2025-05-19 RX ADMIN — FAMOTIDINE 20 MG: 20 TABLET, FILM COATED ORAL at 09:04

## 2025-05-19 RX ADMIN — LEVALBUTEROL HYDROCHLORIDE 1.25 MG: 1.25 SOLUTION RESPIRATORY (INHALATION) at 10:15

## 2025-05-19 RX ADMIN — SODIUM CHLORIDE SOLN NEBU 3% 4 ML: 3 NEBU SOLN at 19:44

## 2025-05-19 RX ADMIN — HEPARIN SODIUM 5000 UNITS: 5000 INJECTION INTRAVENOUS; SUBCUTANEOUS at 13:57

## 2025-05-19 RX ADMIN — SODIUM CHLORIDE SOLN NEBU 3% 4 ML: 3 NEBU SOLN at 10:15

## 2025-05-19 NOTE — UTILIZATION REVIEW
"Initial Clinical Review    Admission: Date/Time/Statement:   Admission Orders (From admission, onward)       Ordered        05/18/25 1324  INPATIENT ADMISSION  Once                          Orders Placed This Encounter   Procedures    INPATIENT ADMISSION     Standing Status:   Standing     Number of Occurrences:   1     Level of Care:   Med Surg [16]     Estimated length of stay:   More than 2 Midnights     Certification:   I certify that inpatient services are medically necessary for this patient for a duration of greater than two midnights. See H&P and MD Progress Notes for additional information about the patient's course of treatment.     ED Arrival Information       Expected   -    Arrival   5/18/2025 11:42    Acuity   Urgent              Means of arrival   Wheelchair    Escorted by   Family Member    Service   Hospitalist    Admission type   Emergency              Arrival complaint   sob             Chief Complaint   Patient presents with    Shortness of Breath     Patient c/o \"trouble catching my breath.\"  Started this am.  Patient with h/o COPD and recent pneumonia.         Initial Presentation: 65 y.o. female with PMHx including mycobacterial infection who presented on 5/18/25 to ED due to worsening shortness of breath, low-grade fevers and chills.  Complaining of no improvement after hour-long nebulizer treatment and also blood pressures were elevated on presentation.  Patient was recently discharged from the hospital and completed her course of medications. 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 Unable to tolerate therapy due to hepatotoxicity while on rifabutin. Instead they are monitoring sputum cultures closely off treatment. In the ED, febrile at 100.3, tachycardic at 130, 94% on 2L O2. Labs revealed WBC 12.55, Na 132, K 3.2, Cr 0.49, AST 9, glucose 288, .5. EKG ST with short TX. CXR persistent RLL nodule. Given IVF bolus, tylenol, IV steroid, neb tx, hydralazine and IV " doxycycline in ED.     Plan:  Admit Inpatient status Dx Mycobacterial infection, non-TB :   med surg, fu on blood cxs, ID consult, start IV vanco, IV cefepime and PO zithromax. Continue IVF. Monitor blood sugars and start SSI.     Anticipated Length of Stay/Certification Statement: Patient will be admitted on an inpatient basis with an anticipated length of stay of greater than 2 midnights secondary to worsening pneumonia.     Date: 5/19   Day 2:  Continue doxycycline, CM following.     5/19 Per ID: Discontinue vanc, cefepime. Continue azithro x 3 days for COPD exacerbation. Recommend GI evaluation for possibility of aspiration pneumonitis. Check IgG, IgM and IGA levels. FU recent sputum for AFB culture. Continue pulmonary toilet, bronchodilators and steroids as per pulm. Recommend bronschoscopy with BAL for routine, AFB, fungal culture if patient fails to improve.    5/19 Per Pulmonology:   Changed to nebulized only treatment: Atrovent/Xopenex tid, 3% sodium chloride for 2 days  Steroid burst: prednisone 40 mg for 5 days then stop   Abx as per ID, if will dc abx recommend azithromycin for 3 days   If felt the need for triple therapy for MAC, we could arrange for bronchoscopy/for a deeper sample with BAL     Date: 5/20  Day 3: Has surpassed a 2nd midnight with active treatments and services. Complained of some abdominal discomfort after eating. Denies any worsening shortness of breath or productive cough. At home amounts of oxygen. Diminshed breath sounds at bases. ID and Pulmonology following, fu on cxs, labs and monitor VS, accucheck w/ SSI.     ED Treatment-Medication Administration from 05/18/2025 1141 to 05/18/2025 1400         Date/Time Order Dose Route Action     05/18/2025 1244 sodium chloride 0.9 % bolus 1,278 mL 1,278 mL Intravenous New Bag     05/18/2025 1244 acetaminophen (TYLENOL) tablet 975 mg 975 mg Oral Given     05/18/2025 1244 methylPREDNISolone sodium succinate (Solu-MEDROL) injection 125 mg 125  mg Intravenous Given     05/18/2025 1216 albuterol inhalation solution 10 mg 10 mg Nebulization Given     05/18/2025 1216 ipratropium (ATROVENT) 0.02 % inhalation solution 1 mg 1 mg Nebulization Given     05/18/2025 1216 sodium chloride 0.9 % inhalation solution 12 mL 12 mL Nebulization Given     05/18/2025 1317 hydrALAZINE (APRESOLINE) injection 10 mg 10 mg Intravenous Given     05/18/2025 1321 doxycycline (VIBRAMYCIN) 100 mg in sodium chloride 0.9 % 100 mL IVPB 100 mg Intravenous New Bag            Scheduled Medications:  aspirin, 81 mg, Oral, Daily  atorvastatin, 40 mg, Oral, Daily  azithromycin, 500 mg, Oral, Q24H  dicyclomine, 10 mg, Oral, TID AC  docusate sodium, 100 mg, Oral, BID  famotidine, 20 mg, Oral, BID  Fluticasone Furoate-Vilanterol, 1 puff, Inhalation, Daily  heparin (porcine), 5,000 Units, Subcutaneous, Q8H MAYCO  insulin lispro, 1-6 Units, Subcutaneous, TID AC  ipratropium, 0.5 mg, Nebulization, TID  levalbuterol, 1.25 mg, Nebulization, TID  lidocaine, 1 patch, Topical, Daily  nicotine, 21 mg, Transdermal, Daily  predniSONE, 40 mg, Oral, Daily  sodium chloride, 4 mL, Nebulization, TID  sucralfate, 1 g, Oral, 4x Daily      Continuous IV Infusions: none     PRN Meds:  acetaminophen, 650 mg, Oral, Q6H PRN  ipratropium-albuterol, 3 mL, Nebulization, Q6H PRN x1  simethicone, 80 mg, Oral, Q6H PRN      ED Triage Vitals [05/18/25 1149]   Temperature Pulse Respirations Blood Pressure SpO2 Pain Score   100.3 °F (37.9 °C) (!) 130 22 133/89 94 % No Pain     Weight (last 2 days)       Date/Time Weight    05/18/25 14:07:31 42.7 (94.1)            Vital Signs (last 3 days)       Date/Time Temp Pulse Resp BP MAP (mmHg) SpO2 Calculated FIO2 (%) - Nasal Cannula Nasal Cannula O2 Flow Rate (L/min) O2 Device Patient Position - Orthostatic VS Pain    05/20/25 0830 -- -- -- -- -- -- 28 2 L/min Nasal cannula -- No Pain    05/20/25 0728 -- -- -- -- -- 96 % 28 2 L/min Nasal cannula -- --    05/20/25 07:18:27 97 °F (36.1 °C) 80  18 150/88 109 98 % -- -- -- -- --    05/19/25 2306 -- -- -- -- -- 95 % 28 2 L/min Nasal cannula -- No Pain    05/19/25 21:10:51 97.2 °F (36.2 °C) 111 -- 100/68 79 94 % -- -- -- -- --    05/19/25 1946 -- -- -- -- -- 96 % -- -- -- -- --    05/19/25 15:07:38 97.2 °F (36.2 °C) 89 17 100/65 77 99 % -- -- -- -- --    05/19/25 1454 -- -- -- -- -- 97 % 28 2 L/min -- -- --    05/19/25 1113 -- -- -- -- -- -- -- -- -- -- No Pain    05/19/25 10:54:46 97.2 °F (36.2 °C) 92 18 123/76 92 100 % -- -- -- -- --    05/19/25 1015 -- -- -- -- -- 95 % -- -- -- -- --    05/19/25 0845 -- -- -- -- -- -- 28 2 L/min Nasal cannula -- No Pain    05/19/25 07:05:48 97.2 °F (36.2 °C) 87 18 122/75 91 96 % -- -- -- -- --    05/18/25 2300 -- -- -- -- -- -- 28 2 L/min Nasal cannula -- --    05/18/25 2253 96.6 °F (35.9 °C) 104 21 122/74 90 95 % -- -- -- -- --    05/18/25 19:35:45 -- 101 -- 100/65 77 96 % -- -- -- -- 8    05/18/25 15:51:20 97.2 °F (36.2 °C) 118 24 101/61 74 96 % -- -- -- -- --    05/18/25 1453 97.2 °F (36.2 °C) 129 28 95/61 -- 95 % -- -- -- -- --    05/18/25 1432 -- 129 28 -- -- 95 % 28 2 L/min None (Room air) -- 10 - Worst Possible Pain    05/18/25 14:07:31 97.2 °F (36.2 °C) 136 18 95/61 72 94 % -- -- -- -- --    05/18/25 1345 -- 143 31 103/54 71 96 % -- -- None (Room air) Lying --    05/18/25 1330 -- 144 31 118/57 80 97 % 28 2 L/min Nasal cannula Lying --    05/18/25 1317 -- -- -- 183/101 -- -- -- -- -- -- --    05/18/25 1315 -- 146 34 183/101 135 94 % 28 2 L/min Nasal cannula Lying --    05/18/25 1311 -- -- -- -- -- -- -- -- Nasal cannula -- --    05/18/25 1300 -- 138 33 217/104 138 95 % -- -- Nasal cannula Lying --    05/18/25 1244 -- -- -- -- -- -- -- -- -- -- Med Not Given for Pain - for MAR use only    05/18/25 1215 -- -- -- -- -- 97 % 28 2 L/min Nasal cannula -- --    05/18/25 1149 100.3 °F (37.9 °C) 130 22 133/89 -- 94 % 28 2 L/min Nasal cannula Sitting No Pain              Pertinent Labs/Diagnostic Test Results:    Radiology:  XR chest 1 view portable   ED Interpretation by Rey Jenkins DO (05/18 1223)   Right upper lobe pneumonia cavitary lesions      Final Interpretation by Inés Gutierrez MD (05/18 1522)      Persistent right upper lobe pneumonia.      Right lower lobe nodule corresponding with the CT.            Workstation performed: [id number]           Cardiology:  ECG 12 lead   Final Result by Jovon Gandara MD (05/19 0753)   Sinus tachycardia with short [place]   Otherwise normal ECG   When compared with ECG of 30-Apr-2025 11:53,   QRS axis Shifted right   Criteria for Inferior infarct are no longer Present   T wave inversion no longer evident in Inferior leads   Confirmed by Jovon Gandara ([id number]) on 5/19/2025 7:53:07 AM        GI:  No orders to display           Results from last 7 days   Lab Units 05/19/25  0510 05/18/25  1205   WBC Thousand/uL 12.03* 12.55*   HEMOGLOBIN g/dL 11.1* 13.0   HEMATOCRIT % 35.0 41.8   PLATELETS Thousands/uL 223 244   TOTAL NEUT ABS Thousands/µL  --  10.48*         Results from last 7 days   Lab Units 05/19/25  0510 05/18/25  1205   SODIUM mmol/L 141 132*   POTASSIUM mmol/L 4.0 3.2*   CHLORIDE mmol/L 106 94*   CO2 mmol/L 30 30   ANION GAP mmol/L 5 8   BUN mg/dL 10 7   CREATININE mg/dL 0.45* 0.49*   EGFR ml/min/1.73sq m 104 101   CALCIUM mg/dL 8.8 8.9     Results from last 7 days   Lab Units 05/19/25  0510 05/18/25  1205   AST U/L 7* 9*   ALT U/L 11 15   ALK PHOS U/L 72 92   TOTAL PROTEIN g/dL 5.3* 6.9   ALBUMIN g/dL 3.1* 3.9   TOTAL BILIRUBIN mg/dL 0.29 0.55     Results from last 7 days   Lab Units 05/20/25  1100 05/20/25  0719 05/19/25  2108 05/19/25  1550 05/19/25  1052 05/19/25  0705 05/18/25  2120 05/18/25  1549   POC GLUCOSE mg/dl 219* 135 240* 231* 110 136 288* 225*     Results from last 7 days   Lab Units 05/19/25  0510 05/18/25  1205   GLUCOSE RANDOM mg/dL 148* 182*     Results from last 7 days   Lab Units 05/18/25  1205   PH KRISTI  7.331   PCO2 KRISTI mm Hg 46.8   PO2 KRISTI mm Hg 37.1    HCO3 KRISTI mmol/L 24.2   BASE EXC KRISTI mmol/L -2.0   O2 CONTENT KRISTI ml/dL 12.8   O2 HGB, VENOUS % 65.1     Results from last 7 days   Lab Units 05/18/25  1419 05/18/25  1205   HS TNI 0HR ng/L  --  11   HS TNI 2HR ng/L 13  --    HSTNI D2 ng/L 2  --          Results from last 7 days   Lab Units 05/18/25  1205   PROTIME seconds 14.4   INR  1.08   PTT seconds 25         Results from last 7 days   Lab Units 05/19/25  0510 05/18/25  1419 05/18/25  1205   PROCALCITONIN ng/ml 0.15 <0.05 <0.05     Results from last 7 days   Lab Units 05/18/25  1205   LACTIC ACID mmol/L 1.6     Results from last 7 days   Lab Units 05/18/25  1205   BNP pg/mL 58     Results from last 7 days   Lab Units 05/18/25  1205   CRP mg/L 119.5*   SED RATE mm/hour 27     Results from last 7 days   Lab Units 05/18/25  1318   CLARITY UA  Clear   COLOR UA  Yellow   SPEC GRAV UA  1.010   PH UA  7.5   GLUCOSE UA mg/dl Negative   KETONES UA mg/dl Negative   BLOOD UA  Trace-Intact*   PROTEIN UA mg/dl Negative   NITRITE UA  Negative   BILIRUBIN UA  Negative   UROBILINOGEN UA E.U./dl 0.2   LEUKOCYTES UA  Negative   WBC UA /hpf 0-1   RBC UA /hpf 0-1   BACTERIA UA /hpf None Seen   EPITHELIAL CELLS WET PREP /hpf Occasional     Results from last 7 days   Lab Units 05/18/25  1244 05/18/25  1205   BLOOD CULTURE  No Growth at 24 hrs. No Growth at 24 hrs.       Past Medical History:   Diagnosis Date    COPD (chronic obstructive pulmonary disease) (HCC)     Hiatal hernia      Present on Admission:   Mycobacterial infection, non-TB   Mixed simple and mucopurulent chronic bronchitis (HCC)   Type 2 diabetes mellitus without complication, without long-term current use of insulin (HCC)   (Resolved) Atrial fibrillation (HCC)   (Resolved) Acute on chronic diastolic (congestive) heart failure (HCC)      Admitting Diagnosis: Pneumonia [J18.9]  HTN (hypertension) [I10]  SOB (shortness of breath) [R06.02]  Tachycardia [R00.0]  COPD exacerbation (HCC) [J44.1]  Age/Sex: 65 y.o.  female    Network Utilization Review Department  ATTENTION: Please call with any questions or concerns to [phone] and carefully listen to the prompts so that you are directed to the right person. All voicemails are confidential.   For Discharge needs, contact Care Management DC Support Team at [phone] opt. [phone]  Send all requests for admission clinical reviews, approved or denied determinations and any other requests to dedicated fax number below belonging to the campus where the patient is receiving treatment. List of dedicated fax numbers for the Facilities:  FACILITY NAME UR FAX NUMBER   ADMISSION DENIALS (Administrative/Medical Necessity) [phone]   DISCHARGE SUPPORT TEAM (NETWORK) [phone]   PARENT CHILD HEALTH (Maternity/NICU/Pediatrics) [phone]   [facility] [phone]   [facility] [phone]   [facility] [phone]   [facility] [phone]   [facility] [phone]   [facility] [phone]   [facility] [phone]   [facility] [phone]   [facility] [phone]   [facility] [phone]   [facility] [phone]   [facility] [phone]

## 2025-05-19 NOTE — PROGRESS NOTES
Progress Note - Hospitalist   Name: Ileana Seaman 65 y.o. female I MRN: [id number]  Unit/Bed#: -01 I Date of Admission: 5/18/2025   Date of Service: 5/19/2025 I Hospital Day: 1    Assessment & Plan  Mycobacterial infection, non-TB  Follows with infectious disease outpatient for MARIS infection.  Unable to tolerate therapy due to hepatotoxicity while on rifabutin.  Instead they are monitoring sputum cultures closely off treatment.  CXR persistent pneumonia in right upper lobe   Previous CTA chest: Worsening right upper lobe pneumonia and new superior segment right lower lobe pneumonia  COVID/Flu/RSV negative    Previous AFB negative  Blood Cultures ordered  ID consulted given history of MARIS and recurrent  PNA.  discussed with pulmonology will defer to infectious disease if they want repeat bronchoscopy done and deep cultures are not.  Placed on vanco , cefepime and zithromax.further antibiotic adjustment as per infectious disease  Sepsis without acute organ dysfunction (HCC)  Patient presents with low-grade fever, tachycardia, tachypnea and leukocytosis appears to have persistent pneumonia on chest x-ray.  Will treat with IV fluids antibiotics cultures done and pending  Stop iv fluids today  Mixed simple and mucopurulent chronic bronchitis (HCC)  Place On DuoNeb treatments . place on 5 days of prednisone 40 mg daily.    Appreciate Pulmonology for evaluation  Asked case management to prepare high utilizer plan for her.  Will see if patient and daughter agree for subacute rehab due to recurrent readmissions and ED visits  Cont 2l o2 as per home regimen  Type 2 diabetes mellitus without complication, without long-term current use of insulin (HCC)  Lab Results   Component Value Date    HGBA1C 7.4 (H) 05/02/2025       Recent Labs     05/18/25  1549 05/18/25  2120 05/19/25  0705 05/19/25  1052   POCGLU 225* 288* 136 110       Blood Sugar Average: Last 72 hrs:  (P) 189.75  Placed on Insulin sliding scale. Blood  sugars are controlled      VTE Pharmacologic Prophylaxis:   Moderate Risk (Score 3-4) - Pharmacological DVT Prophylaxis Ordered: heparin.    Mobility:   Basic Mobility Inpatient Raw Score: 21  JH-HLM Goal: 6: Walk 10 steps or more  JH-HLM Achieved: 6: Walk 10 steps or more  JH-HLM Goal achieved. Continue to encourage appropriate mobility.    Patient Centered Rounds: I performed bedside rounds with nursing staff today.   Discussions with Specialists or Other Care Team Provider: alison santos    Education and Discussions with Family / Patient: will update family    Current Length of Stay: 1 day(s)  Current Patient Status: Inpatient   Certification Statement: The patient will continue to require additional inpatient hospital stay due to pneumonia  Discharge Plan: Anticipate discharge in 48-72 hrs to discharge location to be determined pending rehab evaluations.    Code Status: Level 3 - DNAR and DNI    Subjective   Patient states that her breathing is a little bit better compared to yesterday also her tachycardia has improved she feels a little better overall.    Objective :  Temp:  [96.6 °F (35.9 °C)-97.2 °F (36.2 °C)] 97.2 °F (36.2 °C)  HR:  [] 92  BP: ()/() 123/76  Resp:  [18-34] 18  SpO2:  [94 %-100 %] 100 %  O2 Device: Nasal cannula  Nasal Cannula O2 Flow Rate (L/min):  [2 L/min] 2 L/min    Body mass index is 17.78 kg/m².     Input and Output Summary (last 24 hours):     Intake/Output Summary (Last 24 hours) at 5/19/2025 1224  Last data filed at 5/19/2025 1140  Gross per 24 hour   Intake 910 ml   Output 2400 ml   Net -1490 ml       Physical Exam  Vitals and nursing note reviewed.   Constitutional:       Appearance: She is ill-appearing.      Comments: Frail female with severe generalized muscle wasting noted   HENT:      Head: Normocephalic and atraumatic.      Right Ear: External ear normal.      Left Ear: External ear normal.      Nose: Nose normal.      Mouth/Throat:      Pharynx: Oropharynx is  clear.     Eyes:      Pupils: Pupils are equal, round, and reactive to light.       Cardiovascular:      Rate and Rhythm: Normal rate and regular rhythm.      Heart sounds: Normal heart sounds.   Pulmonary:      Effort: Pulmonary effort is normal.      Breath sounds: Rhonchi present.   Abdominal:      General: Bowel sounds are normal.      Palpations: Abdomen is soft.      Tenderness: There is no abdominal tenderness.     Musculoskeletal:         General: Normal range of motion.      Cervical back: Normal range of motion and neck supple.     Skin:     General: Skin is warm and dry.      Capillary Refill: Capillary refill takes less than 2 seconds.     Neurological:      General: No focal deficit present.      Mental Status: She is alert and oriented to person, place, and time.     Psychiatric:         Mood and Affect: Mood normal.           Lines/Drains:              Lab Results: I have reviewed the following results:   Results from last 7 days   Lab Units 05/19/25  0510 05/18/25  1205   WBC Thousand/uL 12.03* 12.55*   HEMOGLOBIN g/dL 11.1* 13.0   HEMATOCRIT % 35.0 41.8   PLATELETS Thousands/uL 223 244   SEGS PCT %  --  85*   LYMPHO PCT %  --  8*   MONO PCT %  --  7   EOS PCT %  --  0     Results from last 7 days   Lab Units 05/19/25  0510   SODIUM mmol/L 141   POTASSIUM mmol/L 4.0   CHLORIDE mmol/L 106   CO2 mmol/L 30   BUN mg/dL 10   CREATININE mg/dL 0.45*   ANION GAP mmol/L 5   CALCIUM mg/dL 8.8   ALBUMIN g/dL 3.1*   TOTAL BILIRUBIN mg/dL 0.29   ALK PHOS U/L 72   ALT U/L 11   AST U/L 7*   GLUCOSE RANDOM mg/dL 148*     Results from last 7 days   Lab Units 05/18/25  1205   INR  1.08     Results from last 7 days   Lab Units 05/19/25  1052 05/19/25  0705 05/18/25  2120 05/18/25  1549   POC GLUCOSE mg/dl 110 136 288* 225*         Results from last 7 days   Lab Units 05/19/25  0510 05/18/25  1419 05/18/25  1205   LACTIC ACID mmol/L  --   --  1.6   PROCALCITONIN ng/ml 0.15 <0.05 <0.05       Recent Cultures (last 7  days):   Results from last 7 days   Lab Units 05/18/25  1244 05/18/25  1205   BLOOD CULTURE  Received in Microbiology Lab. Culture in Progress. Received in Microbiology Lab. Culture in Progress.       Imaging Results Review: I reviewed radiology reports from this admission including: chest xray and CT chest.  Other Study Results Review: EKG was reviewed.     Last 24 Hours Medication List:     Current Facility-Administered Medications:     acetaminophen (TYLENOL) tablet 650 mg, Q6H PRN    aspirin (ECOTRIN LOW STRENGTH) EC tablet 81 mg, Daily    atorvastatin (LIPITOR) tablet 40 mg, Daily    cefepime (MAXIPIME) IVPB (premix in dextrose) 2,000 mg 50 mL, Q8H, Last Rate: 2,000 mg (05/19/25 0608) **AND** [COMPLETED] vancomycin (VANCOCIN) IVPB (premix in dextrose) 1,000 mg 200 mL, Once, Last Rate: 1,000 mg (05/18/25 1641) **AND** azithromycin (ZITHROMAX) tablet 500 mg, Q24H **AND** MRSA culture, Once **AND** Pharmacy general consult, Once    dicyclomine (BENTYL) capsule 10 mg, TID AC    docusate sodium (COLACE) capsule 100 mg, BID    famotidine (PEPCID) tablet 20 mg, BID    Fluticasone Furoate-Vilanterol 100-25 mcg/actuation 1 puff, Daily **AND** [DISCONTINUED] umeclidinium 62.5 mcg/actuation inhaler AEPB 1 puff, Daily    heparin (porcine) subcutaneous injection 5,000 Units, Q8H MAYCO    insulin lispro (HumALOG/ADMELOG) 100 units/mL subcutaneous injection 1-6 Units, TID AC **AND** Fingerstick Glucose (POCT), TID AC    ipratropium (ATROVENT) 0.02 % inhalation solution 0.5 mg, TID    ipratropium-albuterol (DUO-NEB) 0.5-2.5 mg/3 mL inhalation solution 3 mL, Q6H PRN    levalbuterol (XOPENEX) inhalation solution 1.25 mg, TID    lidocaine (LIDODERM) 5 % patch 1 patch, Daily    nicotine (NICODERM CQ) 21 mg/24 hr TD 24 hr patch 21 mg, Daily    predniSONE tablet 40 mg, Daily    simethicone (MYLICON) chewable tablet 80 mg, Q6H PRN    sodium chloride 3 % inhalation solution 4 mL, TID    vancomycin (VANCOCIN) IVPB (premix in dextrose)  1,000 mg 200 mL, Q12H, Last Rate: 1,000 mg (05/19/25 1024)    Administrative Statements   Today, Patient Was Seen By: Modesta Santizo MD      **Please Note: This note may have been constructed using a voice recognition system.**

## 2025-05-19 NOTE — ASSESSMENT & PLAN NOTE
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  Sputum AFB cultures in February were negative for MARIS.                 FU sputum for AFB and consider bronch/biopsy as above if patient fails to improve               Recommend outpatient fu with ID and pulm and consideration for resuming therapy/possible graded challenge. This is a complex decision and she will need close follow up and multidisciplinary evaluation               Recommend nutrition consult and consider palliative care consult given advanced copd and recurrent hospitalization

## 2025-05-19 NOTE — PHYSICAL THERAPY NOTE
PHYSICAL THERAPY EVALUATION        NAME:  Ileana Seaman  DATE: 05/19/25    AGE:   65 y.o.  Mrn:   [id number]  ADMIT DX:  Pneumonia [J18.9]  HTN (hypertension) [I10]  SOB (shortness of breath) [R06.02]  Tachycardia [R00.0]  COPD exacerbation (HCC) [J44.1]    Past Medical History:   Diagnosis Date    COPD (chronic obstructive pulmonary disease) (HCC)     Hiatal hernia      Length Of Stay: 1  Performed at least 2 patient identifiers during session: Name and Birthday    PHYSICAL THERAPY EVALUATION:       05/19/25 1113   PT Last Visit   PT Visit Date 05/19/25   Note Type   Note type Evaluation   Pain Assessment   Pain Assessment Tool 0-10   Pain Score No Pain   Restrictions/Precautions   Weight Bearing Precautions Per Order No   Other Precautions Chair Alarm;Bed Alarm;Fall Risk;Multiple lines;O2;Telemetry  (anxious)   Home Living   Type of Home House   Home Layout Multi-level;Bed/bath upstairs  (2 BENJAMIN B rails; 2nd flr bed/bath FF stairs B rails)   Bathroom Shower/Tub Tub/shower unit   Bathroom Toilet Standard   Bathroom Equipment Grab bars in shower   Home Equipment   (Home O2 2L)   Additional Comments Reports sleeping mostly on 1st flr in her recliner.   Prior Function   Level of [place] Independent with ADLs;Independent with functional mobility   Lives With Alone   Receives Help From Friend(s)  (friend Albert & dtr)   IADLs Family/Friend/Other provides transportation;Family/Friend/Other provides meals;Independent with medication management  (dtr does most of the cooking)   Falls in the last 6 months 0   Comments IND no AD   General   Additional Pertinent History 13 encounters at ED/hospital within the last year   Family/Caregiver Present   (friend)   Cognition   Overall Cognitive Status WFL   Arousal/Participation Alert   Orientation Level Oriented X4   Following Commands Follows one step commands without difficulty   RLE Assessment   RLE Assessment WNL   LLE Assessment   LLE Assessment WNL   Light Touch   RLE  "Light Touch Grossly intact   LLE Light Touch Grossly intact   Proprioception   RLE Proprioception Grossly intact   LLE Proprioception Grossly Intact   Bed Mobility   Supine to Sit 6  Modified independent   Sit to Supine 6  Modified independent   Transfers   Sit to Stand 6  Modified independent   Stand to Sit 6  Modified independent   Stand pivot 5  Supervision   Additional Comments No AD   Ambulation/Elevation   Gait Assistance 5  Supervision   Assistive Device None   Distance 21 ft  (pt declines ambulating in hallway)   Stair Management Assistance Not tested  (pt declines stating, \"I'm good with the stairs.\")   Balance   Static Sitting Normal   Dynamic Sitting Good   Static Standing Good   Dynamic Standing Fair +   Ambulatory Fair +   Activity Tolerance   Activity Tolerance Patient tolerated treatment well   Assessment   Prognosis Good   Problem List Decreased endurance;Impaired balance;Decreased mobility   Barriers to Discharge None   Barriers to Discharge Comments has good support from friend and dtr   Goals   STG Expiration Date 06/02/25   PT Treatment Day 0   Plan   Treatment/Interventions Functional transfer training;LE strengthening/ROM;Elevations;Therapeutic exercise;Endurance training;Patient/family training;Equipment eval/education;Gait training;Compensatory technique education   PT Frequency 1-2x/wk   Discharge Recommendation   Rehab Resource Intensity Level, PT   (pulmonary rehab)   AM-PAC Basic Mobility Inpatient   Turning in Flat Bed Without Bedrails 4   Lying on Back to Sitting on Edge of Flat Bed Without Bedrails 4   Moving Bed to Chair 3   Standing Up From Chair Using Arms 4   Walk in Room 3   Climb 3-5 Stairs With Railing 3   Basic Mobility Inpatient Raw Score 21   Basic Mobility Standardized Score 45.55   [facility] Highest Level Of Mobility   -HLM Goal 6: Walk 10 steps or more   JH-HLM Achieved 6: Walk 10 steps or more   End of Consult   Patient Position at End of Consult Supine;Bed/Chair " alarm activated;All needs within reach  (friend present)   End of Consult Comments 2L O2 with SpO2 96%     (Please find full objective findings from PT assessment regarding body systems outlined above).     Assessment: Pt is a 65 y.o. female seen for PT evaluation s/p admission to [facility] on 5/18/2025 with Mycobacterial infection, non-TB.  Order placed for PT services.  Upon evaluation: Pt is presenting with impaired functional mobility due to decreased strength, decreased endurance, impaired balance, and fall risk requiring SPV assistance for ambulation with no AD. Pt's clinical presentation is currently evolving given the functional mobility deficits above, coupled with fall risks as indicated by AM-PAC 6-Clicks: 21/24 as well as polypharmacy and combined with medical complications of abnormal renal lab values, abnormal WBCs, and abnormal blood sugars.  Pt's PMHx and comorbidities that may affect physical performance and progress include: COPD and DM. Personal factors affecting pt at time of IE include: anxiety, depression, and inability to perform IADLs. Pt will benefit from continued skilled PT services to address deficits as defined above and to maximize level of functional mobility to facilitate return toward PLOF and improved QOL. From PT/mobility standpoint, recommendation at time of d/c would be pulmonary rehab pending progress.     The patient's AM-PAC Basic Mobility Inpatient Short Form Raw Score is 21. A Raw score of greater than 16 suggests the patient may benefit from discharge to home. Please also refer to the recommendation of the Physical Therapist for safe discharge planning.       Goals: Pt will ambulate with no AD for >/= 250 ft with  modified I  to decrease risk for falls and improve activity tolerance and to access home environment. Pt will complete >/= 12 steps with with unilateral handrail with modified I to return to home with BENJAMIN and return to multilevel home.           Cornelio Ramos, PT,DPT

## 2025-05-19 NOTE — OCCUPATIONAL THERAPY NOTE
Occupational Therapy Evaluation     Patient Name: Ileana Seaman  Today's Date: 5/19/2025  Problem List  Principal Problem:    Mycobacterial infection, non-TB  Active Problems:    Mixed simple and mucopurulent chronic bronchitis (HCC)    Type 2 diabetes mellitus without complication, without long-term current use of insulin (HCC)    Sepsis without acute organ dysfunction (HCC)    Past Medical History  Past Medical History:   Diagnosis Date    COPD (chronic obstructive pulmonary disease) (HCC)     Hiatal hernia      Past Surgical History  Past Surgical History:   Procedure Laterality Date    CHOLECYSTECTOMY          05/19/25 7525   Note Type   Note type Evaluation   Pain Assessment   Pain Assessment Tool Lujan-Baker FACES   Lujan-Baker FACES Pain Rating 4   Pain Location/Orientation Orientation: Bilateral;Location: Rib Cage   Restrictions/Precautions   Other Precautions Chair Alarm;Bed Alarm;Telemetry;Pain;Fall Risk;O2  (2 L O2 NC)   Home Living   Type of Home House  (1+1 BENJAMIN B HRs)   Home Layout Multi-level;Bed/bath upstairs  (Has been sleeping downstairs in the recliner since January)   Bathroom Shower/Tub Tub/shower unit   Bathroom Toilet Standard   Bathroom Equipment Grab bars in shower   Home Equipment Quad cane;Other (Comment)  (Oxygen tank. Wants a portable oxygen machine)   Additional Comments Pt reports not using AD for functional mobility PTA.   Prior Function   Level of [place] Independent with ADLs;Independent with functional mobility   Lives With Alone   Receives Help From Family;Friend(s)   IADLs Family/Friend/Other provides transportation;Family/Friend/Other provides meals;Independent with medication management   Falls in the last 6 months 0   Vocational Retired  (Worked a computer line)   Comments 2 L O2 NC chronically. Reports daughter is going to be likely moving in with her for a short time   ADL   UB Dressing Assistance 6  Modified independent   UB Dressing Deficit Verbal  cueing;Supervision/safety;Increased time to complete   LB Dressing Assistance 5  Supervision/Setup   LB Dressing Deficit Don/doff R sock;Don/doff L sock   Additional Comments Pt able to doff/don socks while seated on a lower surface. She demonstrates ability to complete LB ADLs with supervision and UB ADLs at mod I.   Bed Mobility   Supine to Sit 6  Modified independent   Additional items HOB elevated   Sit to Supine 6  Modified independent   Additional items HOB elevated   Transfers   Sit to Stand 6  Modified independent   Additional items Verbal cues   Stand to Sit 6  Modified independent   Additional items Verbal cues   Stand pivot 5  Supervision   Additional items Increased time required;Verbal cues   Toilet transfer 6  Modified independent   Additional items Standard toilet   Additional Comments no AD   Functional Mobility   Functional Mobility 5  Supervision   Additional Comments Pt participates in functional household distance in room with supervision and no AD, pt able to manage O2 lines.   Balance   Static Sitting Normal   Dynamic Sitting Good   Static Standing Good   Dynamic Standing Fair +   Ambulatory Fair +   Activity Tolerance   Activity Tolerance Patient tolerated treatment well   Medical Staff Made Aware PT, Cornelio   Nurse Made Aware Khadra DELEON   RUE Assessment   RUE Assessment WFL   LUE Assessment   LUE Assessment WFL   Hand Function   Gross Motor Coordination Functional   Fine Motor Coordination Functional   Hand Function Comments Pt is R hand dominant.   Sensation   Light Touch No apparent deficits   Vision-Basic Assessment   Current Vision Wears glasses only for reading   Cognition   Overall Cognitive Status WFL   Arousal/Participation Alert;Cooperative   Attention Within functional limits   Orientation Level Oriented X4   Memory Within functional limits   Following Commands Follows one step commands without difficulty   Assessment   Limitation Decreased endurance;Decreased self-care  trans;Decreased high-level ADLs   Prognosis Good   Assessment Pt is a 65 y.o. female, [facility] to [facility]18/[facility]25 d/t experiencing SOB. Dx: Mycobacterial infection, non-TB. Pt with PMHx impacting their performance during ADL tasks, including: COPD, hiatal hernia, DM II, mixed simple and mucopurulent chronic bronchitis. Prior to admission to the hospital Pt was performing ADLs without physical assistance. IADLs with physical assistance. Functional transfers/ambulation without physical assistance. Cognitive status PTA was WFL. OT order placed to assess Pt's ADLs, cognitive status, and performance during functional tasks in order to maximize safety and independence while making most appropriate d/c recommendations. Pt's clinical presentation is currently evolving given new onset deficits that affect Pt's occupational performance and ability to safely return to PLOF including decrease activity tolerance, decrease performance during ADL tasks, decrease safety awareness , increased pain, decrease generalized strength, decrease activity engagement, decrease performance during functional transfers, steps to enter home, limited family support, and limited insight to deficits combined with medical complications of abnormal renal lab values, abnormal CBC, and abnormal blood sugars. Personal factors affecting Pt at time of initial evaluation include: step(s) to enter environment, multi-level environment, limited home support, inability to perform IADLs, inability to navigate community distances, limited insight into impairments, and decreased initiation and engagement. Pt will benefit from continued skilled OT services to address deficits as defined above and to maximize level independence/participation during ADLs and functional tasks to facilitate return toward PLOF and improved quality of life. From an occupational therapy standpoint, Level III (Minimum Resource Intensity) is recommended upon d/c.   Goals   Patient Goals to  get up and walk   Plan   Treatment Interventions Functional transfer training;UE strengthening/ROM;Endurance training;Patient/family training;Equipment evaluation/education;Compensatory technique education;Continued evaluation;Energy conservation;Activityengagement;ADL retraining;Cardiac education   Goal Expiration Date 06/02/25   OT Frequency 1-2x/wk   Discharge Recommendation   Rehab Resource Intensity Level, OT III (Minimum Resource Intensity)  (Would benefit from pulmonary rehab)   Equipment Recommended Bedside commode   Commode Type Standard   AM-PAC Daily Activity Inpatient   Lower Body Dressing 3   Bathing 3   Toileting 3   Upper Body Dressing 4   Grooming 4   Eating 4   Daily Activity Raw Score 21   Daily Activity Standardized Score (Calc for Raw Score >=11) 44.27   AM-PAC Applied Cognition Inpatient   Following a Speech/Presentation 3   Understanding Ordinary Conversation 4   Taking Medications 4   Remembering Where Things Are Placed or Put Away 4   Remembering List of 4-5 Errands 4   Taking Care of Complicated Tasks 3   Applied Cognition Raw Score 22   Applied Cognition Standardized Score 47.83   End of Consult   Patient Position at End of Consult Bedside chair;Bed/Chair alarm activated;All needs within reach     The patient's raw score on the -PAC Daily Activity Inpatient Short Form is 21. A raw score of greater than or equal to 19 suggests the patient may benefit from discharge to home. Please refer to the recommendation of the Occupational Therapist for safe discharge planning.    Pt goals to be met by 6/2/2025:    Pt will demonstrate ability to complete LB dressing @ I in order to increase safety and independence during meaningful tasks.   Pt will demonstrate ability to tomas/doff socks/shoes while sitting EOB/chair @ I in order to increase safety and independence during meaningful tasks.   Pt will demonstrate ability to complete toileting tasks including CM and pericare @ I in order to increase  safety and independence during meaningful tasks.  Pt will demonstrate ability to complete EOB, chair, toilet/commode transfers @ I in order to increase performance and participation during functional tasks.  Pt will demonstrate ability to stand for 25 minutes while maintaining F+ balance.  Pt will demonstrate ability to tolerate 30 minute OT session with no vc'ing for deep breathing or use of energy conservation techniques in order to increase activity tolerance during functional tasks.   Pt will demonstrate Good carryover of use of energy conservation/compensatory strategies during ADLs and functional tasks in order to increase safety and reduce risk for falls.   Pt will demonstrate Good attention and participation in continued evaluation of functional ambulation house hold distances in order to assist with safe d/c planning.  Pt will attend to continued cognitive assessments 100% of the time in order to provide most appropriate d/c recommendations.   Pt will follow 100% simple 2-step commands and be A&O x4 consistently with environmental cues to increase participation in functional activities.   Pt will identify 3 areas of interest/hobbies and 1 intervention on how to incorporate into daily life in order to increase interaction with environment and peers as well as increase participation in meaningful tasks.     Griffin Lantigua MS, OTR/L

## 2025-05-19 NOTE — ASSESSMENT & PLAN NOTE
Lab Results   Component Value Date    HGBA1C 7.4 (H) 05/02/2025       Recent Labs     05/18/25  1549 05/18/25  2120 05/19/25  0705 05/19/25  1052   POCGLU 225* 288* 136 110       Blood Sugar Average: Last 72 hrs:  (P) 189.75

## 2025-05-19 NOTE — CASE MANAGEMENT
Case Management Assessment & Discharge Planning Note    Patient name Ileana Seaman  Location /-01 MRN [id number]  : 1960 Date 2025       Current Admission Date: 2025  Current Admission Diagnosis:Mycobacterial infection, non-TB   Patient Active Problem List    Diagnosis Date Noted    Sepsis without acute organ dysfunction (HCC) 2025    Severe protein-calorie malnutrition (HCC) 2025    Abdominal pain 2025    Cigarette nicotine dependence without complication 2025    Pneumonia of right lower lobe due to infectious organism 2025    Smoker 2025    Sepsis (HCC) 2025    Anxiety 2024    Mild protein-calorie malnutrition (HCC) 2024    Celiac artery stenosis (HCC) 2024    Elevated liver enzymes 2024    Pulmonary nodule 2024    Moderate protein-calorie malnutrition (HCC) 2024    COPD exacerbation (HCC) 2024    Acute on chronic hypoxic respiratory failure (HCC) 2024    Epigastric pain 2024    New onset atrial fibrillation (HCC) 10/06/2024    Type 2 diabetes mellitus without complication, without long-term current use of insulin (HCC) 10/05/2024    Dysphagia 10/05/2024    Mixed simple and mucopurulent chronic bronchitis (HCC) 10/04/2024    Mycobacterial infection, 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      LOS (days): 1  Geometric Mean LOS (GMLOS) (days):   Days to GMLOS:     OBJECTIVE:  PATIENT READMITTED TO HOSPITAL  Risk of Unplanned Readmission Score: 38.56         Current admission status: Inpatient       Preferred Pharmacy:   [id number] - [place], [place] - [address]  [address]  [place] [address]  Phone: [phone] Fax: [phone]    Primary Care Provider: Merline Dinero    Primary Insurance: [facility] [place]  [place]  [facility]    ASSESSMENT:  Active Health Care Proxies    There are no active Health Care Proxies on  file.       Advance Directives  Does patient have a Health Care POA?: No  Was patient offered paperwork?: Yes  Does patient currently have a Health Care decision maker?: Yes, please see Health Care Proxy section (daughter, Ayde)  Does patient have Advance Directives?: No  Primary Contact: aleida chen         Readmission Root Cause  30 Day Readmission: Yes  During your hospital stay, did someone (provider, nurse, ) explain your care to you in a way you could understand?: Yes  Did you feel medically stable to leave the hospital?: Yes  Were you able to pay for your medication at the pharmacy?: Yes  Did you have reliable transportation to take you to your appointments?: Yes  During previous admission, was a post-acute recommendation made?: Yes  What post-acute resources were offered?: [facility]  Patient was readmitted due to: sepsis RT mycobacterial infection, bronchitis  Action Plan: pt referred to the High Utilizer Team.  pt had been set up with [facility] at time of dc, but pt is refusing [facility] in her home    Patient Information  Admitted from:: Home  Mental Status: Alert  During Assessment patient was accompanied by: Not accompanied during assessment  Assessment information provided by:: Patient  Primary Caregiver: Self  Support Systems: Daughter  County of Residence: [facility]  Home entry access options. 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)  Is patient active with [facility] [facility]:  No    Activities of Daily Living Prior to Admission  Functional Status: Independent  Completes ADLs independently?: Yes  Ambulates independently?: Yes  Does patient use assisted devices?: No  Does patient currently own DME?: Yes  What DME does the patient currently own?: Home Oxygen concentrator, Portable Oxygen tanks (thru [employer])  Does patient have a history of Outpatient Therapy (PT/OT)?: No  Does the patient have a history of Short-Term Rehab?: No  Does patient have a history of HHC?: Yes ([facility])  Does patient currently have HHC?: No         Patient Information Continued  Income Source: Pension/MCC  Does patient have prescription coverage?: Yes  Can the patient afford their medications and any related supplies (such as glucometers or test strips)?: Yes  Does patient receive dialysis treatments?: No  Does patient have a history of substance abuse?: No  Does patient have a history of Mental Health Diagnosis?: Yes (anxiety)  Is patient receiving treatment for mental health?: Yes  Has patient received inpatient treatment related to mental health in the last 2 years?: No         Means of Transportation  Means of Transport to Appts:: Family transport          DISCHARGE DETAILS:    Discharge planning discussed with:: patient  Freedom of Choice: Yes     CM contacted family/caregiver?: No- see comments (declined)             Contacts  Patient Contacts: Aydesuzanne Valentines- daughter  Relationship to Patient:: Family                   Would you like to participate in our [facility] service program?  : No - Declined              A BSC has been recommended for pt at time of discharge, pt is agreeable.  CM placing order for BSC in [place] to [facility]

## 2025-05-19 NOTE — PROGRESS NOTES
Pastoral Care Progress Note  CH in consult with physician initiated support visit to pt. Pt received CH upright in bed, no family present.    Pt shared about many complex traumas that have shaped her life story. Pt had 2 infants die after birth, and she has 3 living children. Pt's spouse has left the family unit and there exists emotional injury from that. Pt financially supported and raised children on her own for many years. One of the pt's children was removed from her home for a time to receive juvenile rehabilitation.      Pt shared regarding many medical interventions necessary for both her and her children, but exhibited poor health literacy regarding the willie of some of the conditions. Pt shared that she has smoked her entire life, and none of her siblings who smoked have pulmonary disease.    Pt shared she is independent and wishes to remain living alone. Her dtr assists her when needed, and pt shared that dtr may move in with Ileana. Pt attests that she feels her children are well established and don't need her at this time.    Pt's readmissions were discussed during rounds: CH assesses that repeat admissions may be related to pt's experience of her illness emotionally, and a misunderstanding that a hospital stay is a necessary intervention. CH assesses pt has few trustworthy or reliable supports in life, and the hospital system provides care to her that she trusts. Pt attests she does not distrust medical care.      05/19/25 1100   Clinical Encounter Type   Visited With Patient   Referral From Physician

## 2025-05-19 NOTE — SPEECH THERAPY NOTE
"Orders received and appreciated, chart review completed.  Per NSG patient reporting discomfort with PO to staff, including she was not able to \"eat this.\"  Subsequent downgrade to mechanical soft solids for comfort/tolerance.  Today patient with complaints of not liking the mechanical soft solids.  She endorses discomfort with certain PO items as it relates to her esophageal health.  Pt reporting that when she has a \"flare\" and \"inflammation\" with her hiatal hernia/esophageal status, she is unable to consume certain items as they feel \"stuck right here\" gesturing to middle of chest.  Pt reporting when this happens she also feels SOB.  Patient feels these outlying symptoms have been addressed medically and resolved, pt requesting return to baseline diet as her concerns were not oropharyngeal.  Patient does not require a formal dysphagia evaluation at this time and ST to sign off on care.  Please return patient to baseline diet of regular solids.  Please reconsult with any changes.  "

## 2025-05-19 NOTE — ASSESSMENT & PLAN NOTE
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  Unable to tolerate therapy due to hepatotoxicity while on rifabutin.  Instead they are monitoring sputum cultures closely off treatment.  CXR persistent pneumonia in right upper lobe   Previous CTA chest: Worsening right upper lobe pneumonia and new superior segment right lower lobe pneumonia  COVID/Flu/RSV negative    Previous AFB negative  Blood Cultures ordered  ID consulted given history of MRAIS and recurrent  PNA.  discussed with pulmonology will defer to infectious disease if they want repeat bronchoscopy done and deep cultures are not.  Placed on vanco , cefepime and zithromax.further antibiotic adjustment as per infectious disease

## 2025-05-19 NOTE — NURSING NOTE
Patient refusing bed and chair alarms. The risks and benefits explained to patient. Elida insists on no alarms. Provider made aware and refusal form signed and put into chart.

## 2025-05-19 NOTE — ASSESSMENT & PLAN NOTE
Patient presents with low-grade fever, tachycardia, tachypnea and leukocytosis appears to have persistent pneumonia on chest x-ray.  Will treat with IV fluids antibiotics cultures done and pending  Stop iv fluids today

## 2025-05-19 NOTE — ASSESSMENT & PLAN NOTE
Lab Results   Component Value Date    HGBA1C 7.4 (H) 05/02/2025       Recent Labs     05/18/25  1549 05/18/25  2120 05/19/25  0705 05/19/25  1052   POCGLU 225* 288* 136 110       Blood Sugar Average: Last 72 hrs:  (P) 189.75  Placed on Insulin sliding scale. Blood sugars are controlled

## 2025-05-19 NOTE — PLAN OF CARE
Problem: Potential for Falls  Goal: Patient will remain free of falls  Description: INTERVENTIONS:  - Educate patient/family on patient safety including physical limitations  - Instruct patient to call for assistance with activity   - Consider consulting OT/PT to assist with strengthening/mobility based on AM PAC & JH-HLM score  - Consult OT/PT to assist with strengthening/mobility   - Keep Call bell within reach  - Keep bed low and locked with side rails adjusted as appropriate  - Keep care items and personal belongings within reach  - Initiate and maintain comfort rounds  - Make Fall Risk Sign visible to staff  - Offer Toileting every    Hours, in advance of need  - Initiate/Maintain     alarm  - Obtain necessary fall risk management equipment:     - Apply yellow socks and bracelet for high fall risk patients  - Consider moving patient to room near nurses station  Outcome: Progressing     Problem: PAIN - ADULT  Goal: Verbalizes/displays adequate comfort level or baseline comfort level  Description: Interventions:  - Encourage patient to monitor pain and request assistance  - Assess pain using appropriate pain scale  - Administer analgesics as ordered based on type and severity of pain and evaluate response  - Implement non-pharmacological measures as appropriate and evaluate response  - Consider cultural and social influences on pain and pain management  - Notify physician/advanced practitioner if interventions unsuccessful or patient reports new pain  - Educate patient/family on pain management process including their role and importance of  reporting pain   - Provide non-pharmacologic/complimentary pain relief interventions  Outcome: Progressing     Problem: INFECTION - ADULT  Goal: Absence or prevention of progression during hospitalization  Description: INTERVENTIONS:  - Assess and monitor for signs and symptoms of infection  - Monitor lab/diagnostic results  - Monitor all insertion sites, i.e. indwelling  lines, tubes, and drains  - Monitor endotracheal if appropriate and nasal secretions for changes in amount and color  - [place] appropriate cooling/warming therapies per order  - Administer medications as ordered  - Instruct and encourage patient and family to use good hand hygiene technique  - Identify and instruct in appropriate isolation precautions for identified infection/condition  Outcome: Progressing  Goal: Absence of fever/infection during neutropenic period  Description: INTERVENTIONS:  - Monitor WBC  - Perform strict hand hygiene  - Limit to healthy visitors only  - No plants, dried, fresh or silk flowers with garcia in patient room  Outcome: Progressing     Problem: DISCHARGE PLANNING  Goal: Discharge to home or other facility with appropriate resources  Description: INTERVENTIONS:  - Identify barriers to discharge w/patient and caregiver  - Arrange for needed discharge resources and transportation as appropriate  - Identify discharge learning needs (meds, wound care, etc.)  - Arrange for interpretive services to assist at discharge as needed  - Refer to Case Management Department for coordinating discharge planning if the patient needs post-hospital services based on physician/advanced practitioner order or complex needs related to functional status, cognitive ability, or social support system  Outcome: Progressing     Problem: Knowledge Deficit  Goal: Patient/family/caregiver demonstrates understanding of disease process, treatment plan, medications, and discharge instructions  Description: Complete learning assessment and assess knowledge base.  Interventions:  - Provide teaching at level of understanding  - Provide teaching via preferred learning methods  Outcome: Progressing     Problem: CARDIOVASCULAR - ADULT  Goal: Maintains optimal cardiac output and hemodynamic stability  Description: INTERVENTIONS:  - Monitor I/O, vital signs and rhythm  - Monitor for S/S and trends of decreased cardiac  output  - Administer and titrate ordered vasoactive medications to optimize hemodynamic stability  - Assess quality of pulses, skin color and temperature  - Assess for signs of decreased coronary artery perfusion  - Instruct patient to report change in severity of symptoms  Outcome: Progressing  Goal: Absence of cardiac dysrhythmias or at baseline rhythm  Description: INTERVENTIONS:  - Continuous cardiac monitoring, vital signs, obtain 12 lead EKG if ordered  - Administer antiarrhythmic and heart rate control medications as ordered  - Monitor electrolytes and administer replacement therapy as ordered  Outcome: Progressing     Problem: RESPIRATORY - ADULT  Goal: Achieves optimal ventilation and oxygenation  Description: INTERVENTIONS:  - Assess for changes in respiratory status  - Assess for changes in mentation and behavior  - Position to facilitate oxygenation and minimize respiratory effort  - Oxygen administered by appropriate delivery if ordered  - Initiate smoking cessation education as indicated  - Encourage broncho-pulmonary hygiene including cough, deep breathe, Incentive Spirometry  - Assess the need for suctioning and aspirate as needed  - Assess and instruct to report SOB or any respiratory difficulty  - Respiratory Therapy support as indicated  Outcome: Progressing     Problem: Prexisting or High Potential for Compromised Skin Integrity  Goal: Skin integrity is maintained or improved  Description: INTERVENTIONS:  - Identify patients at risk for skin breakdown  - Assess and monitor skin integrity including under and around medical devices   - Assess and monitor nutrition and hydration status  - Monitor labs  - Assess for incontinence   - Turn and reposition patient  - Assist with mobility/ambulation  - Relieve pressure over cristiane prominences   - Avoid friction and shearing  - Provide appropriate hygiene as needed including keeping skin clean and dry  - Evaluate need for skin moisturizer/barrier cream  -  Collaborate with interdisciplinary team  - Patient/family teaching  - Consider wound care consult    Assess:  - Review Myles scale daily  - Clean and moisturize skin every     - Inspect skin when repositioning, toileting, and assisting with ADLS  - Assess under medical devices such as    every     - Assess extremities for adequate circulation and sensation     Bed Management:  - Have minimal linens on bed & keep smooth, unwrinkled  - Change linens as needed when moist or perspiring  - Avoid sitting or lying in one position for more than      hours while in bed?Keep HOB at   degrees   - Toileting:  - Offer bedside commode  - Assess for incontinence every     - Use incontinent care products after each incontinent episode such as       Activity:  - Mobilize patient    times a day  - Encourage activity and walks on unit  - Encourage or provide ROM exercises   - Turn and reposition patient every      Hours  - Use appropriate equipment to lift or move patient in bed  - Instruct/ Assist with weight shifting every    when out of bed in chair  - Consider limitation of chair time    hour intervals    Skin Care:  - Avoid use of baby powder, tape, friction and shearing, hot water or constrictive clothing  - Relieve pressure over bony prominences using     - Do not massage red bony areas    Next Steps:  - Teach patient strategies to minimize risks such as     - Consider consults to  interdisciplinary teams such as       Outcome: Progressing     Problem: Potential for Falls  Goal: Patient will remain free of falls  Description: INTERVENTIONS:  - Educate patient/family on patient safety including physical limitations  - Instruct patient to call for assistance with activity   - Consider consulting OT/PT to assist with strengthening/mobility based on AM PAC & JH-HLM score  - Consult OT/PT to assist with strengthening/mobility   - Keep Call bell within reach  - Keep bed low and locked with side rails adjusted as appropriate  - Keep  care items and personal belongings within reach  - Initiate and maintain comfort rounds  - Make Fall Risk Sign visible to staff  - Offer Toileting every    Hours, in advance of need  - Initiate/Maintain   alarm  - Obtain necessary fall risk management equipment:     - Apply yellow socks and bracelet for high fall risk patients  - Consider moving patient to room near nurses station  Outcome: Progressing     Problem: PAIN - ADULT  Goal: Verbalizes/displays adequate comfort level or baseline comfort level  Description: Interventions:  - Encourage patient to monitor pain and request assistance  - Assess pain using appropriate pain scale  - Administer analgesics as ordered based on type and severity of pain and evaluate response  - Implement non-pharmacological measures as appropriate and evaluate response  - Consider cultural and social influences on pain and pain management  - Notify physician/advanced practitioner if interventions unsuccessful or patient reports new pain  - Educate patient/family on pain management process including their role and importance of  reporting pain   - Provide non-pharmacologic/complimentary pain relief interventions  Outcome: Progressing     Problem: INFECTION - ADULT  Goal: Absence or prevention of progression during hospitalization  Description: INTERVENTIONS:  - Assess and monitor for signs and symptoms of infection  - Monitor lab/diagnostic results  - Monitor all insertion sites, i.e. indwelling lines, tubes, and drains  - Monitor endotracheal if appropriate and nasal secretions for changes in amount and color  - [place] appropriate cooling/warming therapies per order  - Administer medications as ordered  - Instruct and encourage patient and family to use good hand hygiene technique  - Identify and instruct in appropriate isolation precautions for identified infection/condition  Outcome: Progressing  Goal: Absence of fever/infection during neutropenic period  Description:  INTERVENTIONS:  - Monitor WBC  - Perform strict hand hygiene  - Limit to healthy visitors only  - No plants, dried, fresh or silk flowers with garcia in patient room  Outcome: Progressing     Problem: DISCHARGE PLANNING  Goal: Discharge to home or other facility with appropriate resources  Description: INTERVENTIONS:  - Identify barriers to discharge w/patient and caregiver  - Arrange for needed discharge resources and transportation as appropriate  - Identify discharge learning needs (meds, wound care, etc.)  - Arrange for interpretive services to assist at discharge as needed  - Refer to Case Management Department for coordinating discharge planning if the patient needs post-hospital services based on physician/advanced practitioner order or complex needs related to functional status, cognitive ability, or social support system  Outcome: Progressing     Problem: Knowledge Deficit  Goal: Patient/family/caregiver demonstrates understanding of disease process, treatment plan, medications, and discharge instructions  Description: Complete learning assessment and assess knowledge base.  Interventions:  - Provide teaching at level of understanding  - Provide teaching via preferred learning methods  Outcome: Progressing     Problem: CARDIOVASCULAR - ADULT  Goal: Maintains optimal cardiac output and hemodynamic stability  Description: INTERVENTIONS:  - Monitor I/O, vital signs and rhythm  - Monitor for S/S and trends of decreased cardiac output  - Administer and titrate ordered vasoactive medications to optimize hemodynamic stability  - Assess quality of pulses, skin color and temperature  - Assess for signs of decreased coronary artery perfusion  - Instruct patient to report change in severity of symptoms  Outcome: Progressing  Goal: Absence of cardiac dysrhythmias or at baseline rhythm  Description: INTERVENTIONS:  - Continuous cardiac monitoring, vital signs, obtain 12 lead EKG if ordered  - Administer antiarrhythmic and  heart rate control medications as ordered  - Monitor electrolytes and administer replacement therapy as ordered  Outcome: Progressing     Problem: RESPIRATORY - ADULT  Goal: Achieves optimal ventilation and oxygenation  Description: INTERVENTIONS:  - Assess for changes in respiratory status  - Assess for changes in mentation and behavior  - Position to facilitate oxygenation and minimize respiratory effort  - Oxygen administered by appropriate delivery if ordered  - Initiate smoking cessation education as indicated  - Encourage broncho-pulmonary hygiene including cough, deep breathe, Incentive Spirometry  - Assess the need for suctioning and aspirate as needed  - Assess and instruct to report SOB or any respiratory difficulty  - Respiratory Therapy support as indicated  Outcome: Progressing     Problem: Prexisting or High Potential for Compromised Skin Integrity  Goal: Skin integrity is maintained or improved  Description: INTERVENTIONS:  - Identify patients at risk for skin breakdown  - Assess and monitor skin integrity including under and around medical devices   - Assess and monitor nutrition and hydration status  - Monitor labs  - Assess for incontinence   - Turn and reposition patient  - Assist with mobility/ambulation  - Relieve pressure over cristiane prominences   - Avoid friction and shearing  - Provide appropriate hygiene as needed including keeping skin clean and dry  - Evaluate need for skin moisturizer/barrier cream  - Collaborate with interdisciplinary team  - Patient/family teaching  - Consider wound care consult    Assess:  - Review Myles scale daily  - Clean and moisturize skin every     - Inspect skin when repositioning, toileting, and assisting with ADLS  - Assess under medical devices such as    every       - Assess extremities for adequate circulation and sensation     Bed Management:  - Have minimal linens on bed & keep smooth, unwrinkled  - Change linens as needed when moist or perspiring  -  Avoid sitting or lying in one position for more than    hours while in bed?Keep HOB at   degrees   - Toileting:  - Offer bedside commode  - Assess for incontinence every       - Use incontinent care products after each incontinent episode such as       Activity:  - Mobilize patient    times a day  - Encourage activity and walks on unit  - Encourage or provide ROM exercises   - Turn and reposition patient every    Hours  - Use appropriate equipment to lift or move patient in bed  - Instruct/ Assist with weight shifting every    when out of bed in chair  - Consider limitation of chair time      hour intervals    Skin Care:  - Avoid use of baby powder, tape, friction and shearing, hot water or constrictive clothing  - Relieve pressure over bony prominences using     - Do not massage red bony areas    Next Steps:  - Teach patient strategies to minimize risks such as     - Consider consults to  interdisciplinary teams such as       Outcome: Progressing     Problem: Potential for Falls  Goal: Patient will remain free of falls  Description: INTERVENTIONS:  - Educate patient/family on patient safety including physical limitations  - Instruct patient to call for assistance with activity   - Consider consulting OT/PT to assist with strengthening/mobility based on AM PAC & -HL score  - Consult OT/PT to assist with strengthening/mobility   - Keep Call bell within reach  - Keep bed low and locked with side rails adjusted as appropriate  - Keep care items and personal belongings within reach  - Initiate and maintain comfort rounds  - Make Fall Risk Sign visible to staff  - Offer Toileting every    Hours, in advance of need  - Initiate/Maintain     alarm  - Obtain necessary fall risk management equipment:     - Apply yellow socks and bracelet for high fall risk patients  - Consider moving patient to room near nurses station  Outcome: Progressing     Problem: PAIN - ADULT  Goal: Verbalizes/displays adequate comfort level or  baseline comfort level  Description: Interventions:  - Encourage patient to monitor pain and request assistance  - Assess pain using appropriate pain scale  - Administer analgesics as ordered based on type and severity of pain and evaluate response  - Implement non-pharmacological measures as appropriate and evaluate response  - Consider cultural and social influences on pain and pain management  - Notify physician/advanced practitioner if interventions unsuccessful or patient reports new pain  - Educate patient/family on pain management process including their role and importance of  reporting pain   - Provide non-pharmacologic/complimentary pain relief interventions  Outcome: Progressing     Problem: INFECTION - ADULT  Goal: Absence or prevention of progression during hospitalization  Description: INTERVENTIONS:  - Assess and monitor for signs and symptoms of infection  - Monitor lab/diagnostic results  - Monitor all insertion sites, i.e. indwelling lines, tubes, and drains  - Monitor endotracheal if appropriate and nasal secretions for changes in amount and color  - [place] appropriate cooling/warming therapies per order  - Administer medications as ordered  - Instruct and encourage patient and family to use good hand hygiene technique  - Identify and instruct in appropriate isolation precautions for identified infection/condition  Outcome: Progressing  Goal: Absence of fever/infection during neutropenic period  Description: INTERVENTIONS:  - Monitor WBC  - Perform strict hand hygiene  - Limit to healthy visitors only  - No plants, dried, fresh or silk flowers with garcia in patient room  Outcome: Progressing     Problem: DISCHARGE PLANNING  Goal: Discharge to home or other facility with appropriate resources  Description: INTERVENTIONS:  - Identify barriers to discharge w/patient and caregiver  - Arrange for needed discharge resources and transportation as appropriate  - Identify discharge learning needs (meds,  wound care, etc.)  - Arrange for interpretive services to assist at discharge as needed  - Refer to Case Management Department for coordinating discharge planning if the patient needs post-hospital services based on physician/advanced practitioner order or complex needs related to functional status, cognitive ability, or social support system  Outcome: Progressing     Problem: Knowledge Deficit  Goal: Patient/family/caregiver demonstrates understanding of disease process, treatment plan, medications, and discharge instructions  Description: Complete learning assessment and assess knowledge base.  Interventions:  - Provide teaching at level of understanding  - Provide teaching via preferred learning methods  Outcome: Progressing     Problem: CARDIOVASCULAR - ADULT  Goal: Maintains optimal cardiac output and hemodynamic stability  Description: INTERVENTIONS:  - Monitor I/O, vital signs and rhythm  - Monitor for S/S and trends of decreased cardiac output  - Administer and titrate ordered vasoactive medications to optimize hemodynamic stability  - Assess quality of pulses, skin color and temperature  - Assess for signs of decreased coronary artery perfusion  - Instruct patient to report change in severity of symptoms  Outcome: Progressing  Goal: Absence of cardiac dysrhythmias or at baseline rhythm  Description: INTERVENTIONS:  - Continuous cardiac monitoring, vital signs, obtain 12 lead EKG if ordered  - Administer antiarrhythmic and heart rate control medications as ordered  - Monitor electrolytes and administer replacement therapy as ordered  Outcome: Progressing     Problem: RESPIRATORY - ADULT  Goal: Achieves optimal ventilation and oxygenation  Description: INTERVENTIONS:  - Assess for changes in respiratory status  - Assess for changes in mentation and behavior  - Position to facilitate oxygenation and minimize respiratory effort  - Oxygen administered by appropriate delivery if ordered  - Initiate smoking  cessation education as indicated  - Encourage broncho-pulmonary hygiene including cough, deep breathe, Incentive Spirometry  - Assess the need for suctioning and aspirate as needed  - Assess and instruct to report SOB or any respiratory difficulty  - Respiratory Therapy support as indicated  Outcome: Progressing     Problem: Prexisting or High Potential for Compromised Skin Integrity  Goal: Skin integrity is maintained or improved  Description: INTERVENTIONS:  - Identify patients at risk for skin breakdown  - Assess and monitor skin integrity including under and around medical devices   - Assess and monitor nutrition and hydration status  - Monitor labs  - Assess for incontinence   - Turn and reposition patient  - Assist with mobility/ambulation  - Relieve pressure over cristiane prominences   - Avoid friction and shearing  - Provide appropriate hygiene as needed including keeping skin clean and dry  - Evaluate need for skin moisturizer/barrier cream  - Collaborate with interdisciplinary team  - Patient/family teaching  - Consider wound care consult    Assess:  - Review Myles scale daily  - Clean and moisturize skin every       - Inspect skin when repositioning, toileting, and assisting with ADLS  - Assess under medical devices such as    every     - Assess extremities for adequate circulation and sensation     Bed Management:  - Have minimal linens on bed & keep smooth, unwrinkled  - Change linens as needed when moist or perspiring  - Avoid sitting or lying in one position for more than    hours while in bed?Keep HOB at   degrees   - Toileting:  - Offer bedside commode  - Assess for incontinence every       - Use incontinent care products after each incontinent episode such as       Activity:  - Mobilize patient    times a day  - Encourage activity and walks on unit  - Encourage or provide ROM exercises   - Turn and reposition patient every      Hours  - Use appropriate equipment to lift or move patient in bed  -  Instruct/ Assist with weight shifting every    when out of bed in chair  - Consider limitation of chair time    hour intervals    Skin Care:  - Avoid use of baby powder, tape, friction and shearing, hot water or constrictive clothing  - Relieve pressure over bony prominences using       - Do not massage red bony areas    Next Steps:  - Teach patient strategies to minimize risks such as       - Consider consults to  interdisciplinary teams such as     Outcome: Progressing     Problem: Potential for Falls  Goal: Patient will remain free of falls  Description: INTERVENTIONS:  - Educate patient/family on patient safety including physical limitations  - Instruct patient to call for assistance with activity   - Consider consulting OT/PT to assist with strengthening/mobility based on AM PAC & JH-HLM score  - Consult OT/PT to assist with strengthening/mobility   - Keep Call bell within reach  - Keep bed low and locked with side rails adjusted as appropriate  - Keep care items and personal belongings within reach  - Initiate and maintain comfort rounds  - Make Fall Risk Sign visible to staff  - Offer Toileting every      Hours, in advance of need  - Initiate/Maintain   alarm  - Obtain necessary fall risk management equipment:     - Apply yellow socks and bracelet for high fall risk patients  - Consider moving patient to room near nurses station  Outcome: Progressing     Problem: PAIN - ADULT  Goal: Verbalizes/displays adequate comfort level or baseline comfort level  Description: Interventions:  - Encourage patient to monitor pain and request assistance  - Assess pain using appropriate pain scale  - Administer analgesics as ordered based on type and severity of pain and evaluate response  - Implement non-pharmacological measures as appropriate and evaluate response  - Consider cultural and social influences on pain and pain management  - Notify physician/advanced practitioner if interventions unsuccessful or patient reports  new pain  - Educate patient/family on pain management process including their role and importance of  reporting pain   - Provide non-pharmacologic/complimentary pain relief interventions  Outcome: Progressing     Problem: INFECTION - ADULT  Goal: Absence or prevention of progression during hospitalization  Description: INTERVENTIONS:  - Assess and monitor for signs and symptoms of infection  - Monitor lab/diagnostic results  - Monitor all insertion sites, i.e. indwelling lines, tubes, and drains  - Monitor endotracheal if appropriate and nasal secretions for changes in amount and color  - [place] appropriate cooling/warming therapies per order  - Administer medications as ordered  - Instruct and encourage patient and family to use good hand hygiene technique  - Identify and instruct in appropriate isolation precautions for identified infection/condition  Outcome: Progressing  Goal: Absence of fever/infection during neutropenic period  Description: INTERVENTIONS:  - Monitor WBC  - Perform strict hand hygiene  - Limit to healthy visitors only  - No plants, dried, fresh or silk flowers with garcia in patient room  Outcome: Progressing     Problem: DISCHARGE PLANNING  Goal: Discharge to home or other facility with appropriate resources  Description: INTERVENTIONS:  - Identify barriers to discharge w/patient and caregiver  - Arrange for needed discharge resources and transportation as appropriate  - Identify discharge learning needs (meds, wound care, etc.)  - Arrange for interpretive services to assist at discharge as needed  - Refer to Case Management Department for coordinating discharge planning if the patient needs post-hospital services based on physician/advanced practitioner order or complex needs related to functional status, cognitive ability, or social support system  Outcome: Progressing     Problem: Knowledge Deficit  Goal: Patient/family/caregiver demonstrates understanding of disease process, treatment  plan, medications, and discharge instructions  Description: Complete learning assessment and assess knowledge base.  Interventions:  - Provide teaching at level of understanding  - Provide teaching via preferred learning methods  Outcome: Progressing     Problem: CARDIOVASCULAR - ADULT  Goal: Maintains optimal cardiac output and hemodynamic stability  Description: INTERVENTIONS:  - Monitor I/O, vital signs and rhythm  - Monitor for S/S and trends of decreased cardiac output  - Administer and titrate ordered vasoactive medications to optimize hemodynamic stability  - Assess quality of pulses, skin color and temperature  - Assess for signs of decreased coronary artery perfusion  - Instruct patient to report change in severity of symptoms  Outcome: Progressing  Goal: Absence of cardiac dysrhythmias or at baseline rhythm  Description: INTERVENTIONS:  - Continuous cardiac monitoring, vital signs, obtain 12 lead EKG if ordered  - Administer antiarrhythmic and heart rate control medications as ordered  - Monitor electrolytes and administer replacement therapy as ordered  Outcome: Progressing     Problem: RESPIRATORY - ADULT  Goal: Achieves optimal ventilation and oxygenation  Description: INTERVENTIONS:  - Assess for changes in respiratory status  - Assess for changes in mentation and behavior  - Position to facilitate oxygenation and minimize respiratory effort  - Oxygen administered by appropriate delivery if ordered  - Initiate smoking cessation education as indicated  - Encourage broncho-pulmonary hygiene including cough, deep breathe, Incentive Spirometry  - Assess the need for suctioning and aspirate as needed  - Assess and instruct to report SOB or any respiratory difficulty  - Respiratory Therapy support as indicated  Outcome: Progressing     Problem: Prexisting or High Potential for Compromised Skin Integrity  Goal: Skin integrity is maintained or improved  Description: INTERVENTIONS:  - Identify patients at risk  for skin breakdown  - Assess and monitor skin integrity including under and around medical devices   - Assess and monitor nutrition and hydration status  - Monitor labs  - Assess for incontinence   - Turn and reposition patient  - Assist with mobility/ambulation  - Relieve pressure over cristiane prominences   - Avoid friction and shearing  - Provide appropriate hygiene as needed including keeping skin clean and dry  - Evaluate need for skin moisturizer/barrier cream  - Collaborate with interdisciplinary team  - Patient/family teaching  - Consider wound care consult    Assess:  - Review Myles scale daily  - Clean and moisturize skin every     - Inspect skin when repositioning, toileting, and assisting with ADLS  - Assess under medical devices such as      every     - Assess extremities for adequate circulation and sensation     Bed Management:  - Have minimal linens on bed & keep smooth, unwrinkled  - Change linens as needed when moist or perspiring  - Avoid sitting or lying in one position for more than    hours while in bed?Keep HOB at     degrees   - Toileting:  - Offer bedside commode  - Assess for incontinence every     - Use incontinent care products after each incontinent episode such as       Activity:  - Mobilize patient      times a day  - Encourage activity and walks on unit  - Encourage or provide ROM exercises   - Turn and reposition patient every    Hours  - Use appropriate equipment to lift or move patient in bed  - Instruct/ Assist with weight shifting every      when out of bed in chair  - Consider limitation of chair time            hour intervals    Skin Care:  - Avoid use of baby powder, tape, friction and shearing, hot water or constrictive clothing  - Relieve pressure over bony prominences using       - Do not massage red bony areas    Next Steps:  - Teach patient strategies to minimize risks such as     - Consider consults to  interdisciplinary teams such as     Outcome: Progressing     Problem:  Potential for Falls  Goal: Patient will remain free of falls  Description: INTERVENTIONS:  - Educate patient/family on patient safety including physical limitations  - Instruct patient to call for assistance with activity   - Consider consulting OT/PT to assist with strengthening/mobility based on AM PAC & JH-HLM score  - Consult OT/PT to assist with strengthening/mobility   - Keep Call bell within reach  - Keep bed low and locked with side rails adjusted as appropriate  - Keep care items and personal belongings within reach  - Initiate and maintain comfort rounds  - Make Fall Risk Sign visible to staff  - Offer Toileting every      Hours, in advance of need  - Initiate/Maintain   alarm  - Obtain necessary fall risk management equipment:     - Apply yellow socks and bracelet for high fall risk patients  - Consider moving patient to room near nurses station  Outcome: Progressing     Problem: PAIN - ADULT  Goal: Verbalizes/displays adequate comfort level or baseline comfort level  Description: Interventions:  - Encourage patient to monitor pain and request assistance  - Assess pain using appropriate pain scale  - Administer analgesics as ordered based on type and severity of pain and evaluate response  - Implement non-pharmacological measures as appropriate and evaluate response  - Consider cultural and social influences on pain and pain management  - Notify physician/advanced practitioner if interventions unsuccessful or patient reports new pain  - Educate patient/family on pain management process including their role and importance of  reporting pain   - Provide non-pharmacologic/complimentary pain relief interventions  Outcome: Progressing     Problem: INFECTION - ADULT  Goal: Absence or prevention of progression during hospitalization  Description: INTERVENTIONS:  - Assess and monitor for signs and symptoms of infection  - Monitor lab/diagnostic results  - Monitor all insertion sites, i.e. indwelling lines, tubes,  and drains  - Monitor endotracheal if appropriate and nasal secretions for changes in amount and color  - [place] appropriate cooling/warming therapies per order  - Administer medications as ordered  - Instruct and encourage patient and family to use good hand hygiene technique  - Identify and instruct in appropriate isolation precautions for identified infection/condition  Outcome: Progressing  Goal: Absence of fever/infection during neutropenic period  Description: INTERVENTIONS:  - Monitor WBC  - Perform strict hand hygiene  - Limit to healthy visitors only  - No plants, dried, fresh or silk flowers with garcia in patient room  Outcome: Progressing     Problem: DISCHARGE PLANNING  Goal: Discharge to home or other facility with appropriate resources  Description: INTERVENTIONS:  - Identify barriers to discharge w/patient and caregiver  - Arrange for needed discharge resources and transportation as appropriate  - Identify discharge learning needs (meds, wound care, etc.)  - Arrange for interpretive services to assist at discharge as needed  - Refer to Case Management Department for coordinating discharge planning if the patient needs post-hospital services based on physician/advanced practitioner order or complex needs related to functional status, cognitive ability, or social support system  Outcome: Progressing     Problem: Knowledge Deficit  Goal: Patient/family/caregiver demonstrates understanding of disease process, treatment plan, medications, and discharge instructions  Description: Complete learning assessment and assess knowledge base.  Interventions:  - Provide teaching at level of understanding  - Provide teaching via preferred learning methods  Outcome: Progressing     Problem: CARDIOVASCULAR - ADULT  Goal: Maintains optimal cardiac output and hemodynamic stability  Description: INTERVENTIONS:  - Monitor I/O, vital signs and rhythm  - Monitor for S/S and trends of decreased cardiac output  - Administer  and titrate ordered vasoactive medications to optimize hemodynamic stability  - Assess quality of pulses, skin color and temperature  - Assess for signs of decreased coronary artery perfusion  - Instruct patient to report change in severity of symptoms  Outcome: Progressing  Goal: Absence of cardiac dysrhythmias or at baseline rhythm  Description: INTERVENTIONS:  - Continuous cardiac monitoring, vital signs, obtain 12 lead EKG if ordered  - Administer antiarrhythmic and heart rate control medications as ordered  - Monitor electrolytes and administer replacement therapy as ordered  Outcome: Progressing     Problem: RESPIRATORY - ADULT  Goal: Achieves optimal ventilation and oxygenation  Description: INTERVENTIONS:  - Assess for changes in respiratory status  - Assess for changes in mentation and behavior  - Position to facilitate oxygenation and minimize respiratory effort  - Oxygen administered by appropriate delivery if ordered  - Initiate smoking cessation education as indicated  - Encourage broncho-pulmonary hygiene including cough, deep breathe, Incentive Spirometry  - Assess the need for suctioning and aspirate as needed  - Assess and instruct to report SOB or any respiratory difficulty  - Respiratory Therapy support as indicated  Outcome: Progressing     Problem: Prexisting or High Potential for Compromised Skin Integrity  Goal: Skin integrity is maintained or improved  Description: INTERVENTIONS:  - Identify patients at risk for skin breakdown  - Assess and monitor skin integrity including under and around medical devices   - Assess and monitor nutrition and hydration status  - Monitor labs  - Assess for incontinence   - Turn and reposition patient  - Assist with mobility/ambulation  - Relieve pressure over cristiane prominences   - Avoid friction and shearing  - Provide appropriate hygiene as needed including keeping skin clean and dry  - Evaluate need for skin moisturizer/barrier cream  - Collaborate with  interdisciplinary team  - Patient/family teaching  - Consider wound care consult    Assess:  - Review Myles scale daily  - Clean and moisturize skin every       - Inspect skin when repositioning, toileting, and assisting with ADLS  - Assess under medical devices such as    every       - Assess extremities for adequate circulation and sensation     Bed Management:  - Have minimal linens on bed & keep smooth, unwrinkled  - Change linens as needed when moist or perspiring  - Avoid sitting or lying in one position for more than    hours while in bed?Keep HOB at   degrees   - Toileting:  - Offer bedside commode  - Assess for incontinence every       - Use incontinent care products after each incontinent episode such as       Activity:  - Mobilize patient      times a day  - Encourage activity and walks on unit  - Encourage or provide ROM exercises   - Turn and reposition patient every    Hours  - Use appropriate equipment to lift or move patient in bed  - Instruct/ Assist with weight shifting every    when out of bed in chair  - Consider limitation of chair time      hour intervals    Skin Care:  - Avoid use of baby powder, tape, friction and shearing, hot water or constrictive clothing  - Relieve pressure over bony prominences using       - Do not massage red bony areas    Next Steps:  - Teach patient strategies to minimize risks such as     - Consider consults to  interdisciplinary teams such as       Outcome: Progressing     Problem: Potential for Falls  Goal: Patient will remain free of falls  Description: INTERVENTIONS:  - Educate patient/family on patient safety including physical limitations  - Instruct patient to call for assistance with activity   - Consider consulting OT/PT to assist with strengthening/mobility based on AM PAC & JH-HLM score  - Consult OT/PT to assist with strengthening/mobility   - Keep Call bell within reach  - Keep bed low and locked with side rails adjusted as appropriate  - Keep care  items and personal belongings within reach  - Initiate and maintain comfort rounds  - Make Fall Risk Sign visible to staff  - Offer Toileting every    Hours, in advance of need  - Initiate/Maintain     alarm  - Obtain necessary fall risk management equipment:       - Apply yellow socks and bracelet for high fall risk patients  - Consider moving patient to room near nurses station  Outcome: Progressing     Problem: PAIN - ADULT  Goal: Verbalizes/displays adequate comfort level or baseline comfort level  Description: Interventions:  - Encourage patient to monitor pain and request assistance  - Assess pain using appropriate pain scale  - Administer analgesics as ordered based on type and severity of pain and evaluate response  - Implement non-pharmacological measures as appropriate and evaluate response  - Consider cultural and social influences on pain and pain management  - Notify physician/advanced practitioner if interventions unsuccessful or patient reports new pain  - Educate patient/family on pain management process including their role and importance of  reporting pain   - Provide non-pharmacologic/complimentary pain relief interventions  Outcome: Progressing     Problem: INFECTION - ADULT  Goal: Absence or prevention of progression during hospitalization  Description: INTERVENTIONS:  - Assess and monitor for signs and symptoms of infection  - Monitor lab/diagnostic results  - Monitor all insertion sites, i.e. indwelling lines, tubes, and drains  - Monitor endotracheal if appropriate and nasal secretions for changes in amount and color  - [place] appropriate cooling/warming therapies per order  - Administer medications as ordered  - Instruct and encourage patient and family to use good hand hygiene technique  - Identify and instruct in appropriate isolation precautions for identified infection/condition  Outcome: Progressing  Goal: Absence of fever/infection during neutropenic period  Description:  heart rate control medications as ordered  - Monitor electrolytes and administer replacement therapy as ordered  Outcome: Progressing     Problem: RESPIRATORY - ADULT  Goal: Achieves optimal ventilation and oxygenation  Description: INTERVENTIONS:  - Assess for changes in respiratory status  - Assess for changes in mentation and behavior  - Position to facilitate oxygenation and minimize respiratory effort  - Oxygen administered by appropriate delivery if ordered  - Initiate smoking cessation education as indicated  - Encourage broncho-pulmonary hygiene including cough, deep breathe, Incentive Spirometry  - Assess the need for suctioning and aspirate as needed  - Assess and instruct to report SOB or any respiratory difficulty  - Respiratory Therapy support as indicated  Outcome: Progressing     Problem: Prexisting or High Potential for Compromised Skin Integrity  Goal: Skin integrity is maintained or improved  Description: INTERVENTIONS:  - Identify patients at risk for skin breakdown  - Assess and monitor skin integrity including under and around medical devices   - Assess and monitor nutrition and hydration status  - Monitor labs  - Assess for incontinence   - Turn and reposition patient  - Assist with mobility/ambulation  - Relieve pressure over cristiane prominences   - Avoid friction and shearing  - Provide appropriate hygiene as needed including keeping skin clean and dry  - Evaluate need for skin moisturizer/barrier cream  - Collaborate with interdisciplinary team  - Patient/family teaching  - Consider wound care consult    Assess:  - Review Myles scale daily  - Clean and moisturize skin every     - Inspect skin when repositioning, toileting, and assisting with ADLS  - Assess under medical devices such as      every     - Assess extremities for adequate circulation and sensation     Bed Management:  - Have minimal linens on bed & keep smooth, unwrinkled  - Change linens as needed when moist or perspiring  -  Avoid sitting or lying in one position for more than    hours while in bed?Keep HOB at   degrees   - Toileting:  - Offer bedside commode  - Assess for incontinence every     - Use incontinent care products after each incontinent episode such as         Activity:  - Mobilize patient    times a day  - Encourage activity and walks on unit  - Encourage or provide ROM exercises   - Turn and reposition patient every    Hours  - Use appropriate equipment to lift or move patient in bed  - Instruct/ Assist with weight shifting every    when out of bed in chair  - Consider limitation of chair time    hour intervals    Skin Care:  - Avoid use of baby powder, tape, friction and shearing, hot water or constrictive clothing  - Relieve pressure over bony prominences using     - Do not massage red bony areas    Next Steps:  - Teach patient strategies to minimize risks such as     - Consider consults to  interdisciplinary teams such as     Outcome: Progressing

## 2025-05-19 NOTE — PROGRESS NOTES
Ileana Seaman is a 65 y.o. female who is currently ordered Vancomycin IV with management by the Pharmacy Consult service.  Relevant clinical data and objective / subjective history reviewed.  Vancomycin Assessment:  Indication and Goal AUC/Trough: Pneumonia (goal -600, trough >10)  Clinical Status: improving  Micro:   Pending  Renal Function:  SCr: 0.45 mg/dL  CrCl: 81 mL/min  Renal replacement: Not on dialysis  Days of Therapy: 2  Current Dose: 1000 mg IV q 12 hrs  Vancomycin Plan:  New Dosing: continue 1000 mg IV q 12 hrs  Estimated AUC: 537 mcg*hr/mL  Estimated Trough: 11.9 mcg/mL  Next Level: 5/20 at 0600  Renal Function Monitoring: Daily BMP and UOP  Pharmacy will continue to follow closely for s/sx of nephrotoxicity, infusion reactions and appropriateness of therapy.  BMP and CBC will be ordered per protocol. We will continue to follow the patient’s culture results and clinical progress daily.    Jann Candelaria, Pharmacist

## 2025-05-19 NOTE — ASSESSMENT & PLAN NOTE
V/s SIRS.  Evidenced by leukocytosis, tachycardia and tachypnea. Patient had low grade fever.  Secondary to COPD exacerbation. Consider aspiration pneumonitis as patient has underlying reflux. Bacterial pneumonia is less likely in absence of fever or worsening hypoxia. Progressive MARIS infection remains an unlikely cause for acute symptoms given negative recent sputum for AFB and rapid clinical recovery.  Clinically improved, afebrile and hemodynamically stable with mild persistent leukocytosis     Antibiotics and additional management as below               Check daily CBC, CMP while inpatient to monitor for any evolving antibiotic toxicity or treatment failure               Continue supportive care, monitor clinical course

## 2025-05-19 NOTE — ASSESSMENT & PLAN NOTE
Place On DuoNeb treatments . place on 5 days of prednisone 40 mg daily.    Appreciate Pulmonology for evaluation  Asked case management to prepare high utilizer plan for her.  Will see if patient and daughter agree for subacute rehab due to recurrent readmissions and ED visits  Cont 2l o2 as per home regimen

## 2025-05-19 NOTE — PLAN OF CARE
Problem: PHYSICAL THERAPY ADULT  Goal: Performs mobility at highest level of function for planned discharge setting.  See evaluation for individualized goals.  Description: Treatment/Interventions: Functional transfer training, LE strengthening/ROM, Elevations, Therapeutic exercise, Endurance training, Patient/family training, Equipment eval/education, Gait training, Compensatory technique education          See flowsheet documentation for full assessment, interventions and recommendations.  Note: Prognosis: Good  Problem List: Decreased endurance, Impaired balance, Decreased mobility  Assessment: Pt is a 65 y.o. female seen for PT evaluation s/p admission to [facility] on 5/18/2025 with Mycobacterial infection, non-TB.  Order placed for PT services.  Upon evaluation: Pt is presenting with impaired functional mobility due to decreased strength, decreased endurance, impaired balance, and fall risk requiring  SPV assistance for ambulation with no AD . Pt's clinical presentation is currently evolving given the functional mobility deficits above, coupled with fall risks as indicated by AM-PAC 6-Clicks: 21/24 as well as polypharmacy and combined with medical complications of abnormal renal lab values, abnormal WBCs, and abnormal blood sugars.  Pt's PMHx and comorbidities that may affect physical performance and progress include: COPD and DM. Personal factors affecting pt at time of IE include: anxiety, depression, and inability to perform IADLs. Pt will benefit from continued skilled PT services to address deficits as defined above and to maximize level of functional mobility to facilitate return toward PLOF and improved QOL. From PT/mobility standpoint, recommendation at time of d/c would be  pulmonary rehab  pending progress.  Barriers to Discharge: None  Barriers to Discharge Comments: has good support from friend and dtr  Rehab Resource Intensity Level, PT:  (pulmonary rehab)    See flowsheet  documentation for full assessment.

## 2025-05-19 NOTE — CONSULTS
Pulmonary Medicine-Consultation  Ileana Seaman 65 y.o. 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, treated with triple therapy up to last fall but developed hepatotoxicity, off treatment since 12/2024  Repeat AFP last visit was negative  RLL pneumonia  On recent admission, with RLL infiltrates  Negative PCT x 3 this admission empirically started on azithromycin/cefepime/vancomycin  RLL nodule  7 mm at RLL, noted on last CT PE in 4/2025, needs follow-up CT chest as an outpatient  Former tobacco abuse reports quit in 2/2025, possible exposure to secondhand smoking from her neighbor    Recommendations/discussion:  Changed to nebulized only treatment: Atrovent/Xopenex tid, 3% sodium chloride for 2 days  Steroid burst: prednisone 40 mg for 5 days then stop   Abx as per ID, if will dc abx recommend azithromycin for 3 days   If felt the need for triple therapy for MAC, we could arrange for bronchoscopy/for a deeper sample with BAL    Discussed with the primary team  Will continue to follow    Physician Requesting Consult: Modesta Santizo MD    Reason for Consult / Principal Problem: COPD/exacerbation     HPI:   65 y.o. female with a h/o advanced COPD, tobacco abuse, chronic respiratory failure, hiatal hernia, acid reflux, NTM/MAC lung infection    Presented to the ER 5/18 with shortness of breath, fatigue decreased appetite.  Noted a low-grade fever Tmax 100.3/tachycardia no hypotension.  Labs: Negative PCT x 3, mild leukocytosis at 12.55 WBCs, negative COVID/flu rapid antigen.  Chest x-ray showed RLL opacity, with RUL opacity as before.  Admitted to [facility], started on  "broad antibiotic azithromycin/cefepime/vancomycin.  Remained on baseline oxygen 2 LPM.    On my assessment, states that she feels better, not back to baseline yet due to pleuritic chest pain on the right side pt, some chest congestion more than baseline.    Reports wheezing well feeling well until Saturday when she went outside.  States that neighbors were smoking at their porch  it went to her windows, woke up at night with feeling shortness of breath, partially relieved with nebulized treatment and PRN inhalers.  After that felt so anxious who prompted the ER visit.  Reports well adherence to the home inhalers/Trelegy, states that she quit smoking in February.      Inpatient consult to Pulmonology  Consult performed by: Abhishek Rodriguez MD  Consult ordered by: Modesta Santizo MD          Review of Systems  As per hpi, all other systems reviewed and were negative      Studies:    Imaging Studies: I have personally reviewed pertinent films in PACS    EKG, Pathology, and Other Studies: I have personally reviewed pertinent films in PACS    Pulmonary Results (PFTs, PSG): None in file    Historical Information   Past Medical History:   Diagnosis Date    COPD (chronic obstructive pulmonary disease) (HCC)     Hiatal hernia      Past Surgical History:   Procedure Laterality Date    CHOLECYSTECTOMY       Social History   Social History     Substance and Sexual Activity   Alcohol Use Never     Social History     Substance and Sexual Activity   Drug Use Never     Tobacco Use History[1]  E-Cigarette/Vaping    E-Cigarette Use Never User      E-Cigarette/Vaping Substances         Family History:   Family History   Problem Relation Age of Onset    Hypertension Father        Meds/Allergies   all current active meds have been reviewed    Allergies[2]    Objective   Vitals: Blood pressure 122/75, pulse 87, temperature (!) 97.2 °F (36.2 °C), resp. rate 18, height 5' 1\" (1.549 m), weight 42.7 kg (94 lb 1.6 oz), SpO2 " "96%.,Body mass index is 17.78 kg/m².    Intake/Output Summary (Last 24 hours) at 2025 0821  Last data filed at 2025 0031  Gross per 24 hour   Intake 480 ml   Output 1500 ml   Net -1020 ml     Invasive Devices       Peripheral Intravenous Line  Duration             Peripheral IV 25 Dorsal (posterior);Right Forearm <1 day    Peripheral IV 25 Left;Ventral (anterior) Forearm <1 day                    Physical Exam  Body mass index is 17.78 kg/m².   Gen: not in acute distress, frail, ill-appearing  Neck/Eyes: supple, PERRL  Ear: normal appearance, no significant hearing impairment  Nose:  normal nasal mucosa, no drainage  Chest: normal respiratory efforts, diminished/mild rhonchi on the right  CV: RRR, no murmurs appreciated, no edema  Abdomen: soft, non tender  Extremities:  No observed deformity   Skin: unremarkable  Neuro: AAO X3, no focal motor deficit       Lab Results: I have personally reviewed pertinent lab results.          None    Portions of the record may have been created with voice recognition software.  Occasional wrong word or \"sound a like\" substitutions may have occurred due to the inherent limitations of voice recognition software.  Read the chart carefully and recognize, using context, where substitutions have occurred.         [1]   Social History  Tobacco Use   Smoking Status Former    Current packs/day: 0.00    Types: Cigarettes    Quit date: 1980    Years since quittin.0   Smokeless Tobacco Never   [2] No Known Allergies    "

## 2025-05-19 NOTE — CASE MANAGEMENT
Case Management Discharge Planning Note    Patient name Ileana Seaman  Location /-01 MRN [id number]  : 1960 Date 2025       Current Admission Date: 2025  Current Admission Diagnosis:Mycobacterial infection, non-TB   Patient Active Problem List    Diagnosis Date Noted    Sepsis without acute organ dysfunction (HCC) 2025    Severe protein-calorie malnutrition (HCC) 2025    Abdominal pain 2025    Cigarette nicotine dependence without complication 2025    Pneumonia of right lower lobe due to infectious organism 2025    Smoker 2025    Sepsis (HCC) 2025    Anxiety 2024    Mild protein-calorie malnutrition (HCC) 2024    Celiac artery stenosis (HCC) 2024    Elevated liver enzymes 2024    Pulmonary nodule 2024    Moderate protein-calorie malnutrition (HCC) 2024    COPD exacerbation (HCC) 2024    Acute on chronic hypoxic respiratory failure (HCC) 2024    Epigastric pain 2024    New onset atrial fibrillation (HCC) 10/06/2024    Type 2 diabetes mellitus without complication, without long-term current use of insulin (HCC) 10/05/2024    Dysphagia 10/05/2024    Mixed simple and mucopurulent chronic bronchitis (HCC) 10/04/2024    Mycobacterial infection, 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      LOS (days): 1  Geometric Mean LOS (GMLOS) (days):   Days to GMLOS:     OBJECTIVE:  Risk of Unplanned Readmission Score: 38.56         Current admission status: Inpatient   Preferred Pharmacy:   [id number] - [place], [place] - [address]  [address]  [place] [address]  Phone: [phone] Fax: [phone]    Primary Care Provider: Merline Dinero    Primary Insurance: [facility] [place] COM[place]TE  REP  [facility]    DISCHARGE DETAILS:        As per request, CM referring pt to the High Utilizer Plan Team

## 2025-05-19 NOTE — MALNUTRITION/BMI
This medical record reflects one or more clinical indicators suggestive of malnutrition.    Malnutrition Findings:   Adult Malnutrition type: Chronic illness  Adult Degree of Malnutrition: Malnutrition of moderate degree  Malnutrition Characteristics: Fat loss, Muscle loss                360 Statement: malnutrition of moderate degree in setting of chronic illness, evidenced by muscle loss/wasting of temporalis muscle, clavicles, deltoids, scapula, body fat/loss os subcutaneous fat/orbitals/triceps, treated with liberal diet and supplements    BMI Findings:  Adult BMI Classifications: Underweight < 18.5        Body mass index is 17.78 kg/m².     See Nutrition note dated 5/19/25 for additional details.  Completed nutrition assessment is viewable in the nutrition documentation.

## 2025-05-19 NOTE — ASSESSMENT & PLAN NOTE
Third hospitalization in 2 months. Chest radiography notes unchanged RUL and RLL opacity. She has no worsening hypoxia, fever, leukocytosis or significant worsening of productive cough to suggest pneumonia  Concern for underlying GERD/aspiration , recent VSS was negative for aspiration.  Consider possibility of post obstructive pneumonia                  Discontinue vanc, cefepime    Continue azithro x 3 days for COPD exacerbation   Recommend GI evaluation for possibility of aspiration pneumonitis   Check IgG, IgM and IGA levels   FU recent sputum for AFB culture.               Continue pulmonary toilet, bronchodilators and steroids as per pulm   Recommend bronschoscopy with BAL for routine, AFB, fungal culture if patient fails to improve

## 2025-05-19 NOTE — PLAN OF CARE
Problem: OCCUPATIONAL THERAPY ADULT  Goal: Performs self-care activities at highest level of function for planned discharge setting.  See evaluation for individualized goals.  Description: Treatment Interventions: Functional transfer training, UE strengthening/ROM, Endurance training, Patient/family training, Equipment evaluation/education, Compensatory technique education, Continued evaluation, Energy conservation, Activityengagement, ADL retraining, Cardiac education  Equipment Recommended: Bedside commode       See flowsheet documentation for full assessment, interventions and recommendations.   Note: Limitation: Decreased endurance, Decreased self-care trans, Decreased high-level ADLs  Prognosis: Good  Assessment: Pt is a 65 y.o. female, admitted to [facility] 5/18/2025 d/t experiencing SOB. Dx: Mycobacterial infection, non-TB. Pt with PMHx impacting their performance during ADL tasks, including: COPD, hiatal hernia, DM II, mixed simple and mucopurulent chronic bronchitis. Prior to admission to the hospital Pt was performing ADLs without physical assistance. IADLs with physical assistance. Functional transfers/ambulation without physical assistance. Cognitive status PTA was WFL. OT order placed to assess Pt's ADLs, cognitive status, and performance during functional tasks in order to maximize safety and independence while making most appropriate d/c recommendations. Pt's clinical presentation is currently evolving given new onset deficits that affect Pt's occupational performance and ability to safely return to OF including decrease activity tolerance, decrease performance during ADL tasks, decrease safety awareness , increased pain, decrease generalized strength, decrease activity engagement, decrease performance during functional transfers, steps to enter home, limited family support, and limited insight to deficits combined with medical complications of abnormal renal lab values, abnormal CBC, and abnormal blood  sugars. Personal factors affecting Pt at time of initial evaluation include: step(s) to enter environment, multi-level environment, limited home support, inability to perform IADLs, inability to navigate community distances, limited insight into impairments, and decreased initiation and engagement. Pt will benefit from continued skilled OT services to address deficits as defined above and to maximize level independence/participation during ADLs and functional tasks to facilitate return toward PLOF and improved quality of life. From an occupational therapy standpoint, Level III (Minimum Resource Intensity) is recommended upon d/c.     Rehab Resource Intensity Level, OT: III (Minimum Resource Intensity) (Would benefit from pulmonary rehab)

## 2025-05-19 NOTE — CONSULTS
Consultation - Infectious Disease   Name: Ileana Seaman 65 y.o. female I MRN: [id number]  Unit/Bed#: -01 I Date of Admission: 5/18/2025   Date of Service: 5/19/2025 I Hospital Day: 1   Inpatient consult to Infectious Diseases  Consult performed by: Janet Carter MD  Consult ordered by: Modesta Santizo MD        Administrative Statements   VIRTUAL CARE DOCUMENTATION:     1. This service was provided via Telemedicine using [employer]     2. Parties in the room with patient during teleconsult Patient only    3. Confidentiality My office door was closed     4. Participants No one else was in the room    5. Patient acknowledged consent and understanding of privacy and security of the  Telemedicine consult. I informed the patient that I have reviewed their record in Epic and presented the opportunity for them to ask any questions regarding the visit today.  The patient agreed to participate.    6. I have spent a total time of 90 minutes in caring for this patient on the day of the visit/encounter including Diagnostic results, Prognosis, Patient and family education, Importance of tx compliance, Risk factor reductions, Impressions, Counseling / Coordination of care, Documenting in the medical record, Reviewing/placing orders in the medical record (including tests, medications, and/or procedures), Obtaining or reviewing history  , and Communicating with other healthcare professionals , not including the time spent for establishing the audio/video connection.       Physician Requesting Evaluation: Modesta Santizo MD   Reason for Evaluation / Principal Problem: Pneumonia    Assessment & Plan  Sepsis without acute organ dysfunction (HCC)  V/s SIRS.  Evidenced by leukocytosis, tachycardia and tachypnea. Patient had low grade fever.  Secondary to COPD exacerbation. Consider aspiration pneumonitis as patient has underlying reflux. Bacterial pneumonia is less likely in absence of fever or worsening hypoxia.  Progressive MARIS infection remains an unlikely cause for acute symptoms given negative recent sputum for AFB and rapid clinical recovery.  Clinically improved, afebrile and hemodynamically stable with mild persistent leukocytosis     Antibiotics and additional management as below               Check daily CBC, CMP while inpatient to monitor for any evolving antibiotic toxicity or treatment failure               Continue supportive care, monitor clinical course     Mixed simple and mucopurulent chronic bronchitis (HCC)  Third hospitalization in 2 months. Chest radiography notes unchanged RUL and RLL opacity. She has no worsening hypoxia, fever, leukocytosis or significant worsening of productive cough to suggest pneumonia  Concern for underlying GERD/aspiration , recent VSS was negative for aspiration.  Consider possibility of post obstructive pneumonia                  Discontinue vanc, cefepime    Continue azithro x 3 days for COPD exacerbation   Recommend GI evaluation for possibility of aspiration pneumonitis   Check IgG, IgM and IGA levels   FU recent sputum for AFB culture.               Continue pulmonary toilet, bronchodilators and steroids as per pulm   Recommend bronschoscopy with BAL for routine, AFB, fungal culture if patient fails to improve         Mycobacterial infection, non-TB  Patient has compatible ATS criteria/clinical, radiologic findings with confirmed lung biopsy changes (RUL and RLL) of necrotizing granulomas with cultures positive for MARIS in 5/2024  She was initiated on 3 drug therapy last fall but developed acute transaminitis/possible DILI and therapy has been withheld since December.  Sputum AFB cultures in February were negative for MARIS.                 FU sputum for AFB and consider bronch/biopsy as above if patient fails to improve               Recommend outpatient fu with ID and pulm and consideration for resuming therapy/possible graded challenge. This is a complex decision and she  "will need close follow up and multidisciplinary evaluation               Recommend nutrition consult and consider palliative care consult given advanced copd and recurrent hospitalization  Type 2 diabetes mellitus without complication, without long-term current use of insulin ([facility])  Lab Results   Component Value Date    HGBA1C 7.4 (H) 05/02/2025       Recent Labs     05/18/25  1549 05/18/25  2120 05/19/25  0705 05/19/25  1052   POCGLU 225* 288* 136 110       Blood Sugar Average: Last 72 hrs:  (P) 189.75        Antibiotics:  Vancomycin, cefepime, azithromycin    History of Present Illness   Ileana Seaman is a 65 y.o. year old femalewith COPD, chronic tobacco use and chronic hypoxic and hypercarbic respiratory failure on 2 L oxygen. She was diagnosed with MARIS infection based on clinical, radiologic and microbiologic (+BAL cx and consistent path findings) in May 2024. She established care with ID at [facility] and started azithromycin, ethambutol, rifabutin per protocol the end of August.  Repeat sputum culture in September was negative for MAC.  Patient did not tolerate her treatment regime and had multiple hospitalizations for epigastric pain, constipation followed by admission in December for acute transaminitis.  Further therapy was held and surveillance was elected, follow-up sputum cultures in February have been AFB negative.  She was hospitalized from 4/5 to 4/9 with COPD exacerbation, acute on chronic diastolic CHF possible bronchopneumonia and completed a course of antibiotics with cefdinir.  Prior to this she was admitted in January for similar symptoms.  She was then readmitted on 4/30 with similar symptoms and CT noted \"worsening\" RLL pneumonia. However pneumonia was thought to be less likely and symptoms appeared more c/w COPD exacerbation.  She was treated with 5 days of ceftriaxone and a short course of steroids.  She improved clinically but returned to the ER on 5/18 with recurrent shortness of breath, " fatigue, decreased appetite with worsening radiologic findings.  A complete review of systems is negative other than that noted in the HPI.    Medical History Review: I have reviewed the patient's PMH, PSH, Social History, Family History, Meds, and Allergies     Objective :  Temp:  [96.6 °F (35.9 °C)-97.2 °F (36.2 °C)] 97.2 °F (36.2 °C)  HR:  [] 92  BP: ()/() 123/76  Resp:  [18-34] 18  SpO2:  [94 %-100 %] 100 %  O2 Device: Nasal cannula  Nasal Cannula O2 Flow Rate (L/min):  [2 L/min] 2 L/min    General:  No acute distress, appearing chronically ill  Psychiatric:  Awake and alert  Pulmonary:  Normal respiratory excursion without accessory muscle use, diminished breath sounds  Abdomen:  Soft, nontender  Extremities:  No edema  Skin:  No rashes        Lab Results: I have reviewed the following results:  Results from last 7 days   Lab Units 05/19/25  0510 05/18/25  1205   WBC Thousand/uL 12.03* 12.55*   HEMOGLOBIN g/dL 11.1* 13.0   PLATELETS Thousands/uL 223 244     Results from last 7 days   Lab Units 05/19/25  0510 05/18/25  1205   SODIUM mmol/L 141 132*   POTASSIUM mmol/L 4.0 3.2*   CHLORIDE mmol/L 106 94*   CO2 mmol/L 30 30   BUN mg/dL 10 7   CREATININE mg/dL 0.45* 0.49*   EGFR ml/min/1.73sq m 104 101   CALCIUM mg/dL 8.8 8.9   AST U/L 7* 9*   ALT U/L 11 15   ALK PHOS U/L 72 92   ALBUMIN g/dL 3.1* 3.9     Results from last 7 days   Lab Units 05/18/25  1244 05/18/25  1205   BLOOD CULTURE  Received in Microbiology Lab. Culture in Progress. Received in Microbiology Lab. Culture in Progress.     Results from last 7 days   Lab Units 05/19/25  0510 05/18/25  1419 05/18/25  1205   PROCALCITONIN ng/ml 0.15 <0.05 <0.05     Results from last 7 days   Lab Units 05/18/25  1205   CRP mg/L 119.5*

## 2025-05-20 ENCOUNTER — PATIENT OUTREACH (OUTPATIENT)
Dept: CASE MANAGEMENT | Facility: OTHER | Age: 65
End: 2025-05-20

## 2025-05-20 LAB
GLUCOSE SERPL-MCNC: 135 MG/DL (ref 65–140)
GLUCOSE SERPL-MCNC: 188 MG/DL (ref 65–140)
GLUCOSE SERPL-MCNC: 219 MG/DL (ref 65–140)
GLUCOSE SERPL-MCNC: 267 MG/DL (ref 65–140)
MYCOBACTERIUM SPEC CULT: NORMAL
RHODAMINE-AURAMINE STN SPEC: NORMAL
VANCOMYCIN SERPL-MCNC: 8 UG/ML (ref 10–20)

## 2025-05-20 PROCEDURE — G0545 PR INHERENT VISIT TO INPT: HCPCS | Performed by: INTERNAL MEDICINE

## 2025-05-20 PROCEDURE — 99232 SBSQ HOSP IP/OBS MODERATE 35: CPT | Performed by: INTERNAL MEDICINE

## 2025-05-20 PROCEDURE — 80202 ASSAY OF VANCOMYCIN: CPT | Performed by: FAMILY MEDICINE

## 2025-05-20 PROCEDURE — 82948 REAGENT STRIP/BLOOD GLUCOSE: CPT

## 2025-05-20 PROCEDURE — 99233 SBSQ HOSP IP/OBS HIGH 50: CPT | Performed by: INTERNAL MEDICINE

## 2025-05-20 PROCEDURE — 94760 N-INVAS EAR/PLS OXIMETRY 1: CPT

## 2025-05-20 PROCEDURE — 97116 GAIT TRAINING THERAPY: CPT

## 2025-05-20 PROCEDURE — 94640 AIRWAY INHALATION TREATMENT: CPT

## 2025-05-20 PROCEDURE — 97110 THERAPEUTIC EXERCISES: CPT

## 2025-05-20 RX ORDER — SUCRALFATE 1 G/1
1 TABLET ORAL 4 TIMES DAILY
Status: DISCONTINUED | OUTPATIENT
Start: 2025-05-20 | End: 2025-05-24 | Stop reason: HOSPADM

## 2025-05-20 RX ADMIN — LEVALBUTEROL HYDROCHLORIDE 1.25 MG: 1.25 SOLUTION RESPIRATORY (INHALATION) at 07:27

## 2025-05-20 RX ADMIN — FAMOTIDINE 20 MG: 20 TABLET, FILM COATED ORAL at 08:33

## 2025-05-20 RX ADMIN — IPRATROPIUM BROMIDE 0.5 MG: 0.5 SOLUTION RESPIRATORY (INHALATION) at 19:44

## 2025-05-20 RX ADMIN — SUCRALFATE 1 G: 1 TABLET ORAL at 17:20

## 2025-05-20 RX ADMIN — SUCRALFATE 1 G: 1 TABLET ORAL at 22:28

## 2025-05-20 RX ADMIN — DICYCLOMINE HYDROCHLORIDE 10 MG: 10 CAPSULE ORAL at 06:10

## 2025-05-20 RX ADMIN — FAMOTIDINE 20 MG: 20 TABLET, FILM COATED ORAL at 17:20

## 2025-05-20 RX ADMIN — NICOTINE 21 MG: 21 PATCH, EXTENDED RELEASE TRANSDERMAL at 08:32

## 2025-05-20 RX ADMIN — AZITHROMYCIN DIHYDRATE 500 MG: 250 TABLET, FILM COATED ORAL at 14:08

## 2025-05-20 RX ADMIN — LEVALBUTEROL HYDROCHLORIDE 1.25 MG: 1.25 SOLUTION RESPIRATORY (INHALATION) at 14:17

## 2025-05-20 RX ADMIN — HEPARIN SODIUM 5000 UNITS: 5000 INJECTION INTRAVENOUS; SUBCUTANEOUS at 06:10

## 2025-05-20 RX ADMIN — IPRATROPIUM BROMIDE 0.5 MG: 0.5 SOLUTION RESPIRATORY (INHALATION) at 14:17

## 2025-05-20 RX ADMIN — HEPARIN SODIUM 5000 UNITS: 5000 INJECTION INTRAVENOUS; SUBCUTANEOUS at 14:08

## 2025-05-20 RX ADMIN — HEPARIN SODIUM 5000 UNITS: 5000 INJECTION INTRAVENOUS; SUBCUTANEOUS at 22:25

## 2025-05-20 RX ADMIN — LEVALBUTEROL HYDROCHLORIDE 1.25 MG: 1.25 SOLUTION RESPIRATORY (INHALATION) at 19:44

## 2025-05-20 RX ADMIN — ATORVASTATIN CALCIUM 40 MG: 40 TABLET, FILM COATED ORAL at 08:33

## 2025-05-20 RX ADMIN — LIDOCAINE 5% 1 PATCH: 700 PATCH TOPICAL at 08:32

## 2025-05-20 RX ADMIN — INSULIN LISPRO 3 UNITS: 100 INJECTION, SOLUTION INTRAVENOUS; SUBCUTANEOUS at 15:54

## 2025-05-20 RX ADMIN — PREDNISONE 40 MG: 20 TABLET ORAL at 08:32

## 2025-05-20 RX ADMIN — SODIUM CHLORIDE SOLN NEBU 3% 4 ML: 3 NEBU SOLN at 07:27

## 2025-05-20 RX ADMIN — SODIUM CHLORIDE SOLN NEBU 3% 4 ML: 3 NEBU SOLN at 19:44

## 2025-05-20 RX ADMIN — DICYCLOMINE HYDROCHLORIDE 10 MG: 10 CAPSULE ORAL at 15:34

## 2025-05-20 RX ADMIN — ASPIRIN 81 MG: 81 TABLET, COATED ORAL at 08:32

## 2025-05-20 RX ADMIN — DOCUSATE SODIUM 100 MG: 100 CAPSULE, LIQUID FILLED ORAL at 17:21

## 2025-05-20 RX ADMIN — DICYCLOMINE HYDROCHLORIDE 10 MG: 10 CAPSULE ORAL at 11:22

## 2025-05-20 RX ADMIN — INSULIN LISPRO 2 UNITS: 100 INJECTION, SOLUTION INTRAVENOUS; SUBCUTANEOUS at 11:22

## 2025-05-20 RX ADMIN — FLUTICASONE FUROATE AND VILANTEROL TRIFENATATE 1 PUFF: 100; 25 POWDER RESPIRATORY (INHALATION) at 08:35

## 2025-05-20 RX ADMIN — SUCRALFATE 1 G: 1 TABLET ORAL at 11:22

## 2025-05-20 RX ADMIN — IPRATROPIUM BROMIDE 0.5 MG: 0.5 SOLUTION RESPIRATORY (INHALATION) at 07:28

## 2025-05-20 RX ADMIN — DOCUSATE SODIUM 100 MG: 100 CAPSULE, LIQUID FILLED ORAL at 08:32

## 2025-05-20 RX ADMIN — SODIUM CHLORIDE SOLN NEBU 3% 4 ML: 3 NEBU SOLN at 14:17

## 2025-05-20 NOTE — ASSESSMENT & PLAN NOTE
Third hospitalization in 2 months. Chest radiography notes unchanged RUL and RLL opacity. She has no worsening hypoxia, fever, leukocytosis or significant worsening of productive cough to suggest pneumonia  Consider underlying GERD/aspiration , recent VSS was negative for aspiration      Continue azithro x 3 days for COPD exacerbation   Recommend GI evaluation for possibility of aspiration pneumonitis   Check IgG, IgM and IGA levels   FU recent sputum for AFB culture.               Continue pulmonary toilet, bronchodilators and steroids as per pulm   Recommend bronschoscopy with BAL for routine, AFB, fungal culture if patient fails to improve

## 2025-05-20 NOTE — ASSESSMENT & PLAN NOTE
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  Unable to tolerate therapy due to hepatotoxicity while on rifabutin.  Instead they are monitoring sputum cultures closely off treatment.  CXR persistent pneumonia in right upper lobe   Previous CTA chest: Worsening right upper lobe pneumonia and new superior segment right lower lobe pneumonia  COVID/Flu/RSV negative    Previous AFB negative  Blood Cultures ordered  ID consulted given history of MARIS and recurrent  PNA.  discussed with pulmonology will defer to infectious disease if they want repeat bronchoscopy done and deep cultures are not.  Pulmonary recommending continuing pulmonary toilet, bronchodilators and steroids for underlying COPD exacerbation.  If patient fails to improve, recommendation will be for repeating sputum AFB and bronchoscopy and biopsy. ID recommending outpatient follow-up with pulmonology and infectious disease to consider resuming therapy or graded challenge.  Will also place recommendations for palliative medicine referral regarding advanced COPD and recurrent hospitalization upon discharge.

## 2025-05-20 NOTE — ASSESSMENT & PLAN NOTE
Lab Results   Component Value Date    HGBA1C 7.4 (H) 05/02/2025       Recent Labs     05/19/25  1052 05/19/25  1550 05/19/25  2108 05/20/25  0719   POCGLU 110 231* 240* 135       Blood Sugar Average: Last 72 hrs:  (P) 195

## 2025-05-20 NOTE — ASSESSMENT & PLAN NOTE
Improving COPD exacerbation since admission, likely triggered by viral tracheobronchitis, exposure to environmental allergens or secondhand smoking from her neighbor  Aggravated by anxiety/panic attacks  Continue  Atrovent/Xopenex tid  Prednisone 40 mg for 3 more days then stop  Continue 3% sodium chloride via nebulizer tid

## 2025-05-20 NOTE — ASSESSMENT & PLAN NOTE
Dx May 2024, treated with triple therapy up to last fall but developed hepatotoxicity, off treatment since 12/2024  Repeat AFP last admission was negative  Discussed with ID, possibly developing resistance to the azithromycin given the frequent courses given since discontinued treatment  She could benefit from bronchoscopy, check for resistant strain of the MAC or other fungal infection however deemed poor prognosis/candidate for triple therapy  Recommended goals of care discussion, if decided to proceed with full treatment we can arrange bronchoscopy/BAL

## 2025-05-20 NOTE — ASSESSMENT & PLAN NOTE
V/s SIRS.  Evidenced by leukocytosis, tachycardia and tachypnea. Patient had low grade fever.  Secondary to COPD exacerbation. Consider aspiration pneumonitis as patient has underlying reflux. Bacterial pneumonia is less likely in absence of fever or worsening hypoxia. Progressive MARIS infection remains an unlikely cause for acute symptoms given negative recent sputum for AFB and rapid clinical recovery.  Clinically unchanges, afebrile and hemodynamically stable with mild persistent leukocytosis     Antibiotics and additional management as below               Check daily CBC, CMP while inpatient to monitor for any evolving antibiotic toxicity or treatment failure               Continue supportive care, monitor clinical course

## 2025-05-20 NOTE — PLAN OF CARE
Problem: Potential for Falls  Goal: Patient will remain free of falls  Description: INTERVENTIONS:  - Educate patient/family on patient safety including physical limitations  - Instruct patient to call for assistance with activity   - Consider consulting OT/PT to assist with strengthening/mobility based on AM PAC & JH-HLM score  - Consult OT/PT to assist with strengthening/mobility   - Keep Call bell within reach  - Keep bed low and locked with side rails adjusted as appropriate  - Keep care items and personal belongings within reach  - Initiate and maintain comfort rounds  - Make Fall Risk Sign visible to staff  - Offer Toileting every 2 Hours, in advance of need  - Initiate/Maintain bed alarm  - Apply yellow socks and bracelet for high fall risk patients  - Consider moving patient to room near nurses station  5/20/2025 0306 by Ines Golden  Outcome: Progressing  5/20/2025 0306 by Ines Golden  Outcome: Progressing     Problem: PAIN - ADULT  Goal: Verbalizes/displays adequate comfort level or baseline comfort level  Description: Interventions:  - Encourage patient to monitor pain and request assistance  - Assess pain using appropriate pain scale  - Administer analgesics as ordered based on type and severity of pain and evaluate response  - Implement non-pharmacological measures as appropriate and evaluate response  - Consider cultural and social influences on pain and pain management  - Notify physician/advanced practitioner if interventions unsuccessful or patient reports new pain  - Educate patient/family on pain management process including their role and importance of  reporting pain   - Provide non-pharmacologic/complimentary pain relief interventions  5/20/2025 0306 by Ines Golden  Outcome: Progressing  5/20/2025 0306 by Ines Golden  Outcome: Progressing     Problem: INFECTION - ADULT  Goal: Absence or prevention of progression during hospitalization  Description: INTERVENTIONS:  - Assess  and monitor for signs and symptoms of infection  - Monitor lab/diagnostic results  - Monitor all insertion sites, i.e. indwelling lines, tubes, and drains  - Monitor endotracheal if appropriate and nasal secretions for changes in amount and color  - [place] appropriate cooling/warming therapies per order  - Administer medications as ordered  - Instruct and encourage patient and family to use good hand hygiene technique  - Identify and instruct in appropriate isolation precautions for identified infection/condition  5/20/2025 0306 by Ines Golden  Outcome: Progressing  5/20/2025 0306 by Ines Golden  Outcome: Progressing     Problem: CARDIOVASCULAR - ADULT  Goal: Maintains optimal cardiac output and hemodynamic stability  Description: INTERVENTIONS:  - Monitor I/O, vital signs and rhythm  - Monitor for S/S and trends of decreased cardiac output  - Administer and titrate ordered vasoactive medications to optimize hemodynamic stability  - Assess quality of pulses, skin color and temperature  - Assess for signs of decreased coronary artery perfusion  - Instruct patient to report change in severity of symptoms  5/20/2025 0306 by Ines Golden  Outcome: Progressing  5/20/2025 0306 by Ines Golden  Outcome: Progressing     Problem: RESPIRATORY - ADULT  Goal: Achieves optimal ventilation and oxygenation  Description: INTERVENTIONS:  - Assess for changes in respiratory status  - Assess for changes in mentation and behavior  - Position to facilitate oxygenation and minimize respiratory effort  - Oxygen administered by appropriate delivery if ordered  - Initiate smoking cessation education as indicated  - Encourage broncho-pulmonary hygiene including cough, deep breathe, Incentive Spirometry  - Assess the need for suctioning and aspirate as needed  - Assess and instruct to report SOB or any respiratory difficulty  - Respiratory Therapy support as indicated  5/20/2025 0306 by Ines Golden  Outcome:  Progressing  5/20/2025 0306 by Ines Golden  Outcome: Progressing     Problem: Nutrition/Hydration-ADULT  Goal: Nutrient/Hydration intake appropriate for improving, restoring or maintaining nutritional needs  Description: Monitor and assess patient's nutrition/hydration status for malnutrition. Collaborate with interdisciplinary team and initiate plan and interventions as ordered.  Monitor patient's weight and dietary intake as ordered or per policy. Utilize nutrition screening tool and intervene as necessary. Determine patient's food preferences and provide high-protein, high-caloric foods as appropriate.     INTERVENTIONS:  - Monitor oral intake, urinary output, labs, and treatment plans  - Assess nutrition and hydration status and recommend course of action  - Evaluate amount of meals eaten  - Assist patient with eating if necessary   - Allow adequate time for meals  - Recommend/ encourage appropriate diets, oral nutritional supplements, and vitamin/mineral supplements  - Order, calculate, and assess calorie counts as needed  - Recommend, monitor, and adjust tube feedings and TPN/PPN based on assessed needs  - Assess need for intravenous fluids  - Provide specific nutrition/hydration education as appropriate  - Include patient/family/caregiver in decisions related to nutrition  5/20/2025 0306 by Ines Golden  Outcome: Progressing  5/20/2025 0306 by Ines Golden  Outcome: Progressing

## 2025-05-20 NOTE — PROGRESS NOTES
Progress Note - Hospitalist   Name: Ileana Seaman 65 y.o. female I MRN: [id number]  Unit/Bed#: -01 I Date of Admission: 5/18/2025   Date of Service: 5/20/2025 I Hospital Day: 2    Assessment & Plan  Mycobacterial infection, non-TB  Follows with infectious disease outpatient for MARIS infection.  Unable to tolerate therapy due to hepatotoxicity while on rifabutin.  Instead they are monitoring sputum cultures closely off treatment.  CXR persistent pneumonia in right upper lobe   Previous CTA chest: Worsening right upper lobe pneumonia and new superior segment right lower lobe pneumonia  COVID/Flu/RSV negative    Previous AFB negative  Blood Cultures ordered  ID consulted given history of MARIS and recurrent  PNA.  discussed with pulmonology will defer to infectious disease if they want repeat bronchoscopy done and deep cultures are not.  Pulmonary recommending continuing pulmonary toilet, bronchodilators and steroids for underlying COPD exacerbation.  If patient fails to improve, recommendation will be for repeating sputum AFB and bronchoscopy and biopsy. ID recommending outpatient follow-up with pulmonology and infectious disease to consider resuming therapy or graded challenge.  Will also place recommendations for palliative medicine referral regarding advanced COPD and recurrent hospitalization upon discharge.  Sepsis without acute organ dysfunction (HCC)  Patient presents with low-grade fever, tachycardia, tachypnea and leukocytosis appears to have persistent pneumonia on chest x-ray.   Continue with antibiotics and supportive care.  Patient remains clinically improved afebrile hemodynamically stable with mild persistent leukocytosis.  Mixed simple and mucopurulent chronic bronchitis (HCC)  Place On DuoNeb treatments . place on 5 days of prednisone 40 mg daily.    Appreciate Pulmonology for evaluation  Asked case management to prepare high utilizer plan for her.    Continue azithromycin for the 3  days  Infectious disease and pulmonary input noted.  Patient has been placed on nebulized only treatment for 2 days.  Pulmonology recommended prednisone 40 mg for 5 days.  Type 2 diabetes mellitus without complication, without long-term current use of insulin ([facility])  Lab Results   Component Value Date    HGBA1C 7.4 (H) 05/02/2025       Recent Labs     05/19/25  1550 05/19/25  2108 05/20/25  0719 05/20/25  1100   POCGLU 231* 240* 135 219*       Blood Sugar Average: Last 72 hrs:  (P) 198  Placed on Insulin sliding scale. Blood sugars are controlled      VTE Pharmacologic Prophylaxis:   High Risk (Score >/= 5) - Pharmacological DVT Prophylaxis Ordered: heparin. Sequential Compression Devices Ordered.    Mobility:   Basic Mobility Inpatient Raw Score: 23  JH-HLM Goal: 7: Walk 25 feet or more  JH-HLM Achieved: 7: Walk 25 feet or more  JH-HLM Goal achieved. Continue to encourage appropriate mobility.    Patient Centered Rounds: I performed bedside rounds with nursing staff today.   Discussions with Specialists or Other Care Team Provider: alison mazariegos    Education and Discussions with Family / Patient: Patient declined call to .     Current Length of Stay: 2 day(s)  Current Patient Status: Inpatient   Certification Statement: The patient will continue to require additional inpatient hospital stay due to ongoing monitoring and ,manage  Discharge Plan: Anticipate discharge tomorrow to home.    Code Status: Level 3 - DNAR and DNI    Subjective   Seen and examined at bedside.  Complained of some abdominal discomfort after eating.  Denies any worsening shortness of breath or productive cough.  At home amounts of oxygen.    Objective :  Temp:  [97 °F (36.1 °C)-97.2 °F (36.2 °C)] 97 °F (36.1 °C)  HR:  [] 80  BP: (100-150)/(65-88) 150/88  Resp:  [17-18] 18  SpO2:  [94 %-99 %] 96 %  O2 Device: Nasal cannula  Nasal Cannula O2 Flow Rate (L/min):  [2 L/min] 2 L/min    Body mass index is 17.78 kg/m².     Input and Output  Summary (last 24 hours):     Intake/Output Summary (Last 24 hours) at 5/20/2025 1319  Last data filed at 5/20/2025 0901  Gross per 24 hour   Intake 850 ml   Output 1500 ml   Net -650 ml       Physical Exam  Constitutional:       General: She is not in acute distress.  HENT:      Head: Normocephalic.      Nose: Nose normal.      Mouth/Throat:      Mouth: Mucous membranes are moist.     Eyes:      Extraocular Movements: Extraocular movements intact.      Pupils: Pupils are equal, round, and reactive to light.       Cardiovascular:      Rate and Rhythm: Normal rate and regular rhythm.   Pulmonary:      Effort: Pulmonary effort is normal.      Breath sounds: Normal breath sounds.      Comments: Diminished breath sounds at bases.  Abdominal:      General: Abdomen is flat. There is no distension.      Palpations: Abdomen is soft.      Tenderness: There is no abdominal tenderness. There is no guarding.     Musculoskeletal:      Right lower leg: No edema.      Left lower leg: No edema.     Skin:     General: Skin is warm.     Neurological:      General: No focal deficit present.      Mental Status: She is alert and oriented to person, place, and time. Mental status is at baseline.           Lines/Drains:              Lab Results: I have reviewed the following results:   Results from last 7 days   Lab Units 05/19/25  0510 05/18/25  1205   WBC Thousand/uL 12.03* 12.55*   HEMOGLOBIN g/dL 11.1* 13.0   HEMATOCRIT % 35.0 41.8   PLATELETS Thousands/uL 223 244   SEGS PCT %  --  85*   LYMPHO PCT %  --  8*   MONO PCT %  --  7   EOS PCT %  --  0     Results from last 7 days   Lab Units 05/19/25  0510   SODIUM mmol/L 141   POTASSIUM mmol/L 4.0   CHLORIDE mmol/L 106   CO2 mmol/L 30   BUN mg/dL 10   CREATININE mg/dL 0.45*   ANION GAP mmol/L 5   CALCIUM mg/dL 8.8   ALBUMIN g/dL 3.1*   TOTAL BILIRUBIN mg/dL 0.29   ALK PHOS U/L 72   ALT U/L 11   AST U/L 7*   GLUCOSE RANDOM mg/dL 148*     Results from last 7 days   Lab Units 05/18/25  1205    INR  1.08     Results from last 7 days   Lab Units 05/20/25  1100 05/20/25  0719 05/19/25  2108 05/19/25  1550 05/19/25  1052 05/19/25  0705 05/18/25  2120 05/18/25  1549   POC GLUCOSE mg/dl 219* 135 240* 231* 110 136 288* 225*         Results from last 7 days   Lab Units 05/19/25  0510 05/18/25  1419 05/18/25  1205   LACTIC ACID mmol/L  --   --  1.6   PROCALCITONIN ng/ml 0.15 <0.05 <0.05       Recent Cultures (last 7 days):   Results from last 7 days   Lab Units 05/18/25  1244 05/18/25  1205   BLOOD CULTURE  No Growth at 24 hrs. No Growth at 24 hrs.             Last 24 Hours Medication List:     Current Facility-Administered Medications:     acetaminophen (TYLENOL) tablet 650 mg, Q6H PRN    aspirin (ECOTRIN LOW STRENGTH) EC tablet 81 mg, Daily    atorvastatin (LIPITOR) tablet 40 mg, Daily    [DISCONTINUED] cefepime (MAXIPIME) IVPB (premix in dextrose) 2,000 mg 50 mL, Q8H, Last Rate: 2,000 mg (05/19/25 0608) **AND** [COMPLETED] vancomycin (VANCOCIN) IVPB (premix in dextrose) 1,000 mg 200 mL, Once, Last Rate: 1,000 mg (05/18/25 1641) **AND** azithromycin (ZITHROMAX) tablet 500 mg, Q24H **AND** [COMPLETED] MRSA culture, Once **AND** [CANCELED] Pharmacy general consult, Once    dicyclomine (BENTYL) capsule 10 mg, TID AC    docusate sodium (COLACE) capsule 100 mg, BID    famotidine (PEPCID) tablet 20 mg, BID    Fluticasone Furoate-Vilanterol 100-25 mcg/actuation 1 puff, Daily **AND** [DISCONTINUED] umeclidinium 62.5 mcg/actuation inhaler AEPB 1 puff, Daily    heparin (porcine) subcutaneous injection 5,000 Units, Q8H MAYCO    insulin lispro (HumALOG/ADMELOG) 100 units/mL subcutaneous injection 1-6 Units, TID AC **AND** Fingerstick Glucose (POCT), TID AC    ipratropium (ATROVENT) 0.02 % inhalation solution 0.5 mg, TID    ipratropium-albuterol (DUO-NEB) 0.5-2.5 mg/3 mL inhalation solution 3 mL, Q6H PRN    levalbuterol (XOPENEX) inhalation solution 1.25 mg, TID    lidocaine (LIDODERM) 5 % patch 1 patch, Daily    nicotine  (NICODERM CQ) 21 mg/24 hr TD 24 hr patch 21 mg, Daily    predniSONE tablet 40 mg, Daily    simethicone (MYLICON) chewable tablet 80 mg, Q6H PRN    sodium chloride 3 % inhalation solution 4 mL, TID    sucralfate (CARAFATE) tablet 1 g, 4x Daily    Administrative Statements   Today, Patient Was Seen By: Juana Santana MD      **Please Note: This note may have been constructed using a voice recognition system.**

## 2025-05-20 NOTE — ASSESSMENT & PLAN NOTE
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  Sputum AFB cultures in February were negative for MARIS.  More recent AFB cultures are also negative                 FU sputum for AFB and consider bronch/biopsy as above if patient fails to improve               Recommend outpatient fu with ID and pulm and consideration for resuming therapy/possible graded challenge. This is a complex decision and she will need close follow up and multidisciplinary evaluation               Recommend nutrition consult and consider palliative care consult given advanced copd and recurrent hospitalization

## 2025-05-20 NOTE — PROGRESS NOTES
"Referral received via email. Referred to High Utilizer Care Plan Committee on 5/19/25 to be reviewed for careplan.  Case placed on department's Rising Risk Watch List.     Email received from High Utilizer Committee on 5/20/25.  Committee reviewed and determined a care plan is not appropriate at this time.  Per committee's findings, \"Given her advanced COPD a care plan would be limited in reducing her utilization\".      Patient is referred to OP CM as a Rising Risk Utilizer.    Per chart review, noted previous outreach attempts by OP RT CM in December 2024 which were unsuccessful.   High Utilizer program created and I added myself to case team.  Will continue to monitor case during current hospitalization and will plan for initial outreach attempt after hospital discharge.     "

## 2025-05-20 NOTE — ASSESSMENT & PLAN NOTE
Place On DuoNeb treatments . place on 5 days of prednisone 40 mg daily.    Appreciate Pulmonology for evaluation  Asked case management to prepare high utilizer plan for her.    Continue azithromycin for the 3 days  Infectious disease and pulmonary input noted.  Patient has been placed on nebulized only treatment for 2 days.  Pulmonology recommended prednisone 40 mg for 5 days.

## 2025-05-20 NOTE — PHYSICAL THERAPY NOTE
PHYSICAL THERAPY TREATMENT NOTE        NAME:  Ileana Seaman  DATE: 05/20/25    Length Of Stay: 2  Performed at least 2 patient identifiers during session: Name and Birthday       TREATMENT FLOW SHEET:       05/20/25 9972   PT Last Visit   PT Visit Date 05/20/25   Note Type   Note Type Treatment   Pain Assessment   Pain Assessment Tool 0-10   Pain Score 3   Pain Location/Orientation Orientation: Bilateral;Location: Rib Cage   Restrictions/Precautions   Weight Bearing Precautions Per Order No   Other Precautions Fall Risk;O2  (2L)   General   Chart Reviewed Yes   Family/Caregiver Present No   Cognition   Overall Cognitive Status WFL   Arousal/Participation Cooperative   Orientation Level Oriented X4   Comments anxious   Transfers   Sit to Stand 6  Modified independent   Stand to Sit 6  Modified independent   Stand pivot 5  Supervision   Additional Comments No AD   Ambulation/Elevation   Gait pattern Step through pattern   Gait Assistance 5  Supervision   Assistive Device None   Distance 168 ft   Stair Management Assistance 5  Supervision   Additional items Verbal cues   Stair Management Technique Two rails;Alternating pattern   Number of Stairs 3   Balance   Static Sitting Normal   Dynamic Sitting Good   Static Standing Good   Dynamic Standing Fair +   Ambulatory Fair +   Endurance Deficit   Endurance Deficit Yes   Endurance Deficit Description HR elevates to 123 bpm with exertion.  SpO2 dec to 89% with ambulation in hallway on 2L O2   Activity Tolerance   Activity Tolerance Patient tolerated treatment well   Exercises   Hip Flexion Standing;15 reps;AROM;Bilateral  (B UE support)   Knee AROM Standing;15 reps;AROM;Bilateral  (knee FLX with B UE support)   Ankle Pumps Standing;15 reps;Bilateral  (heel raises with B UE support)   Assessment   Prognosis Good   Assessment Pt tolerates tx session fairly well.  She continues to experience anxiety especially in regards to her respiratory status.  Pt agreeable to trialing  stairs today in hallway.  She remains on her baseline 2L O2.  Remains in agreement for pulmonary rehab.   Barriers to Discharge None   Barriers to Discharge Comments good support from friend & dtr   Goals   STG Expiration Date 06/02/25   PT Treatment Day 1   Plan   Progress Progressing toward goals   PT Frequency 1-2x/wk   Discharge Recommendation   Rehab Resource Intensity Level, PT   (pulmonary rehab)   AM-PAC Basic Mobility Inpatient   Turning in Flat Bed Without Bedrails 4   Lying on Back to Sitting on Edge of Flat Bed Without Bedrails 4   Moving Bed to Chair 3   Standing Up From Chair Using Arms 4   Walk in Room 3   Climb 3-5 Stairs With Railing 3   Basic Mobility Inpatient Raw Score 21   Basic Mobility Standardized Score 45.55   [facility] Highest Level Of Mobility   -HLM Goal 6: Walk 10 steps or more   -HLM Achieved 7: Walk 25 feet or more   Education   Education Provided Mobility training   Patient Demonstrates verbal understanding   End of Consult   Patient Position at End of Consult Bedside chair;All needs within reach   End of Consult Comments 2L O2 with SpO2 at 92%       The patient's AM-PAC Basic Mobility Inpatient Short Form Raw Score is 21. A Raw score of greater than 16 suggests the patient may benefit from discharge to home. Please also refer to the recommendation of the Physical Therapist for safe discharge planning.         Cornelio Ramos, PT,DPT

## 2025-05-20 NOTE — PROGRESS NOTES
Progress Note - Infectious Disease   Name: Ileana Seaman 65 y.o. female I MRN: [id number]  Unit/Bed#: -01 I Date of Admission: 5/18/2025   Date of Service: 5/20/2025 I Hospital Day: 2      Administrative Statements   VIRTUAL CARE DOCUMENTATION:     1. This service was provided via Telemedicine using [employer] Kit     2. Parties in the room with patient during teleconsult Patient only    3. Confidentiality My office door was closed     4. Participants No one else was in the room    5. Patient acknowledged consent and understanding of privacy and security of the  Telemedicine consult. I informed the patient that I have reviewed their record in Epic and presented the opportunity for them to ask any questions regarding the visit today.  The patient agreed to participate.    6. I have spent a total time of 50 minutes in caring for this patient on the day of the visit/encounter including Diagnostic results, Prognosis, Risk factor reductions, Impressions, Counseling / Coordination of care, Documenting in the medical record, Reviewing/placing orders in the medical record (including tests, medications, and/or procedures), Obtaining or reviewing history  , and Communicating with other healthcare professionals , not including the time spent for establishing the audio/video connection.       Assessment & Plan  Sepsis without acute organ dysfunction (HCC)  V/s SIRS.  Evidenced by leukocytosis, tachycardia and tachypnea. Patient had low grade fever.  Secondary to COPD exacerbation. Consider aspiration pneumonitis as patient has underlying reflux. Bacterial pneumonia is less likely in absence of fever or worsening hypoxia. Progressive MARIS infection remains an unlikely cause for acute symptoms given negative recent sputum for AFB and rapid clinical recovery.  Clinically unchanges, afebrile and hemodynamically stable with mild persistent leukocytosis     Antibiotics and additional management as below               Check  daily CBC, CMP while inpatient to monitor for any evolving antibiotic toxicity or treatment failure               Continue supportive care, monitor clinical course     Mixed simple and mucopurulent chronic bronchitis (HCC)  Third hospitalization in 2 months. Chest radiography notes unchanged RUL and RLL opacity. She has no worsening hypoxia, fever, leukocytosis or significant worsening of productive cough to suggest pneumonia  Consider underlying GERD/aspiration , recent VSS was negative for aspiration      Continue azithro x 3 days for COPD exacerbation   Recommend GI evaluation for possibility of aspiration pneumonitis   Check IgG, IgM and IGA levels   FU recent sputum for AFB culture.               Continue pulmonary toilet, bronchodilators and steroids as per pulm   Recommend bronschoscopy with BAL for routine, AFB, fungal culture if patient fails to improve         Mycobacterial infection, non-TB  Patient had compatible ATS criteria/clinical, radiologic findings with confirmed lung biopsy changes (RUL and RLL) of necrotizing granulomas with cultures positive for MARIS in 5/2024  She was initiated on 3 drug therapy last fall but developed acute transaminitis/possible DILI and therapy has been withheld since December.  Sputum AFB cultures in February were negative for MARIS.  More recent AFB cultures are also negative                 FU sputum for AFB and consider bronch/biopsy as above if patient fails to improve               Recommend outpatient fu with ID and pulm and consideration for resuming therapy/possible graded challenge. This is a complex decision and she will need close follow up and multidisciplinary evaluation               Recommend nutrition consult and consider palliative care consult given advanced copd and recurrent hospitalization  Type 2 diabetes mellitus without complication, without long-term current use of insulin (HCC)  Lab Results   Component Value Date    HGBA1C 7.4 (H) 05/02/2025        Recent Labs     05/19/25  1052 05/19/25  1550 05/19/25  2108 05/20/25  0719   POCGLU 110 231* 240* 135       Blood Sugar Average: Last 72 hrs:  (P) 195        Antibiotics:  azithro    Subjective   Patient has no fever, chills, sweats; no nausea, vomiting, diarrhea; no cough, shortness of breath; no pain. Still notes chest congestion and chest tightness.    Objective :  Temp:  [97 °F (36.1 °C)-97.2 °F (36.2 °C)] 97 °F (36.1 °C)  HR:  [] 80  BP: (100-150)/(65-88) 150/88  Resp:  [17-18] 18  SpO2:  [94 %-100 %] 96 %  O2 Device: Nasal cannula  Nasal Cannula O2 Flow Rate (L/min):  [2 L/min] 2 L/min    General:  No acute distress, appearing thin and frail and chronically ill  Psychiatric:  Awake and alert  Pulmonary:  Normal respiratory excursion without accessory muscle use, diminished breath sounds  Abdomen:  Soft, nontender  Extremities:  No edema  Skin:  No rashes        Lab Results: I have reviewed the following results:  Results from last 7 days   Lab Units 05/19/25  0510 05/18/25  1205   WBC Thousand/uL 12.03* 12.55*   HEMOGLOBIN g/dL 11.1* 13.0   PLATELETS Thousands/uL 223 244     Results from last 7 days   Lab Units 05/19/25  0510 05/18/25  1205   SODIUM mmol/L 141 132*   POTASSIUM mmol/L 4.0 3.2*   CHLORIDE mmol/L 106 94*   CO2 mmol/L 30 30   BUN mg/dL 10 7   CREATININE mg/dL 0.45* 0.49*   EGFR ml/min/1.73sq m 104 101   CALCIUM mg/dL 8.8 8.9   AST U/L 7* 9*   ALT U/L 11 15   ALK PHOS U/L 72 92   ALBUMIN g/dL 3.1* 3.9     Results from last 7 days   Lab Units 05/18/25  1646 05/18/25  1244 05/18/25  1205   BLOOD CULTURE   --  No Growth at 24 hrs. No Growth at 24 hrs.   MRSA CULTURE ONLY  No Methicillin Resistant Staphlyococcus aureus (MRSA) isolated  --   --      Results from last 7 days   Lab Units 05/19/25  0510 05/18/25  1419 05/18/25  1205   PROCALCITONIN ng/ml 0.15 <0.05 <0.05     Results from last 7 days   Lab Units 05/18/25  1205   CRP mg/L 119.5*

## 2025-05-20 NOTE — PLAN OF CARE
Problem: Potential for Falls  Goal: Patient will remain free of falls  Description: INTERVENTIONS:  - Educate patient/family on patient safety including physical limitations  - Instruct patient to call for assistance with activity   - Consider consulting OT/PT to assist with strengthening/mobility based on AM PAC & JH-HLM score  - Consult OT/PT to assist with strengthening/mobility   - Keep Call bell within reach  - Keep bed low and locked with side rails adjusted as appropriate  - Keep care items and personal belongings within reach  - Initiate and maintain comfort rounds  - Make Fall Risk Sign visible to staff  - Offer Toileting every    Hours, in advance of need  - Initiate/Maintain   alarm  - Obtain necessary fall risk management equipment:     - Apply yellow socks and bracelet for high fall risk patients  - Consider moving patient to room near nurses station  Outcome: Progressing     Problem: PAIN - ADULT  Goal: Verbalizes/displays adequate comfort level or baseline comfort level  Description: Interventions:  - Encourage patient to monitor pain and request assistance  - Assess pain using appropriate pain scale  - Administer analgesics as ordered based on type and severity of pain and evaluate response  - Implement non-pharmacological measures as appropriate and evaluate response  - Consider cultural and social influences on pain and pain management  - Notify physician/advanced practitioner if interventions unsuccessful or patient reports new pain  - Educate patient/family on pain management process including their role and importance of  reporting pain   - Provide non-pharmacologic/complimentary pain relief interventions  Outcome: Progressing     Problem: INFECTION - ADULT  Goal: Absence or prevention of progression during hospitalization  Description: INTERVENTIONS:  - Assess and monitor for signs and symptoms of infection  - Monitor lab/diagnostic results  - Monitor all insertion sites, i.e. indwelling  lines, tubes, and drains  - Monitor endotracheal if appropriate and nasal secretions for changes in amount and color  - [place] appropriate cooling/warming therapies per order  - Administer medications as ordered  - Instruct and encourage patient and family to use good hand hygiene technique  - Identify and instruct in appropriate isolation precautions for identified infection/condition  Outcome: Progressing  Goal: Absence of fever/infection during neutropenic period  Description: INTERVENTIONS:  - Monitor WBC  - Perform strict hand hygiene  - Limit to healthy visitors only  - No plants, dried, fresh or silk flowers with garcia in patient room  Outcome: Progressing     Problem: DISCHARGE PLANNING  Goal: Discharge to home or other facility with appropriate resources  Description: INTERVENTIONS:  - Identify barriers to discharge w/patient and caregiver  - Arrange for needed discharge resources and transportation as appropriate  - Identify discharge learning needs (meds, wound care, etc.)  - Arrange for interpretive services to assist at discharge as needed  - Refer to Case Management Department for coordinating discharge planning if the patient needs post-hospital services based on physician/advanced practitioner order or complex needs related to functional status, cognitive ability, or social support system  Outcome: Progressing     Problem: Knowledge Deficit  Goal: Patient/family/caregiver demonstrates understanding of disease process, treatment plan, medications, and discharge instructions  Description: Complete learning assessment and assess knowledge base.  Interventions:  - Provide teaching at level of understanding  - Provide teaching via preferred learning methods  Outcome: Progressing     Problem: CARDIOVASCULAR - ADULT  Goal: Maintains optimal cardiac output and hemodynamic stability  Description: INTERVENTIONS:  - Monitor I/O, vital signs and rhythm  - Monitor for S/S and trends of decreased cardiac  output  - Administer and titrate ordered vasoactive medications to optimize hemodynamic stability  - Assess quality of pulses, skin color and temperature  - Assess for signs of decreased coronary artery perfusion  - Instruct patient to report change in severity of symptoms  Outcome: Progressing  Goal: Absence of cardiac dysrhythmias or at baseline rhythm  Description: INTERVENTIONS:  - Continuous cardiac monitoring, vital signs, obtain 12 lead EKG if ordered  - Administer antiarrhythmic and heart rate control medications as ordered  - Monitor electrolytes and administer replacement therapy as ordered  Outcome: Progressing     Problem: RESPIRATORY - ADULT  Goal: Achieves optimal ventilation and oxygenation  Description: INTERVENTIONS:  - Assess for changes in respiratory status  - Assess for changes in mentation and behavior  - Position to facilitate oxygenation and minimize respiratory effort  - Oxygen administered by appropriate delivery if ordered  - Initiate smoking cessation education as indicated  - Encourage broncho-pulmonary hygiene including cough, deep breathe, Incentive Spirometry  - Assess the need for suctioning and aspirate as needed  - Assess and instruct to report SOB or any respiratory difficulty  - Respiratory Therapy support as indicated  Outcome: Progressing     Problem: Prexisting or High Potential for Compromised Skin Integrity  Goal: Skin integrity is maintained or improved  Description: INTERVENTIONS:  - Identify patients at risk for skin breakdown  - Assess and monitor skin integrity including under and around medical devices   - Assess and monitor nutrition and hydration status  - Monitor labs  - Assess for incontinence   - Turn and reposition patient  - Assist with mobility/ambulation  - Relieve pressure over cristiane prominences   - Avoid friction and shearing  - Provide appropriate hygiene as needed including keeping skin clean and dry  - Evaluate need for skin moisturizer/barrier cream  -  Collaborate with interdisciplinary team  - Patient/family teaching  - Consider wound care consult    Assess:  - Review Myles scale daily  - Clean and moisturize skin every     - Inspect skin when repositioning, toileting, and assisting with ADLS  - Assess under medical devices such as    every     - Assess extremities for adequate circulation and sensation     Bed Management:  - Have minimal linens on bed & keep smooth, unwrinkled  - Change linens as needed when moist or perspiring  - Avoid sitting or lying in one position for more than    hours while in bed?Keep HOB at     degrees   - Toileting:  - Offer bedside commode  - Assess for incontinence every     - Use incontinent care products after each incontinent episode such as       Activity:  - Mobilize patient    times a day  - Encourage activity and walks on unit  - Encourage or provide ROM exercises   - Turn and reposition patient every    Hours  - Use appropriate equipment to lift or move patient in bed  - Instruct/ Assist with weight shifting every    when out of bed in chair  - Consider limitation of chair time      hour intervals    Skin Care:  - Avoid use of baby powder, tape, friction and shearing, hot water or constrictive clothing  - Relieve pressure over bony prominences using     - Do not massage red bony areas    Next Steps:  - Teach patient strategies to minimize risks such as     - Consider consults to  interdisciplinary teams such as           Outcome: Progressing     Problem: Nutrition/Hydration-ADULT  Goal: Nutrient/Hydration intake appropriate for improving, restoring or maintaining nutritional needs  Description: Monitor and assess patient's nutrition/hydration status for malnutrition. Collaborate with interdisciplinary team and initiate plan and interventions as ordered.  Monitor patient's weight and dietary intake as ordered or per policy. Utilize nutrition screening tool and intervene as necessary. Determine patient's food preferences and  provide high-protein, high-caloric foods as appropriate.     INTERVENTIONS:  - Monitor oral intake, urinary output, labs, and treatment plans  - Assess nutrition and hydration status and recommend course of action  - Evaluate amount of meals eaten  - Assist patient with eating if necessary   - Allow adequate time for meals  - Recommend/ encourage appropriate diets, oral nutritional supplements, and vitamin/mineral supplements  - Order, calculate, and assess calorie counts as needed  - Recommend, monitor, and adjust tube feedings and TPN/PPN based on assessed needs  - Assess need for intravenous fluids  - Provide specific nutrition/hydration education as appropriate  - Include patient/family/caregiver in decisions related to nutrition  Outcome: Progressing

## 2025-05-20 NOTE — CASE MANAGEMENT
Case Management Discharge Planning Note    Patient name Ileana Seaman  Location /-01 MRN [id number]  : 1960 Date 2025       Current Admission Date: 2025  Current Admission Diagnosis:Mycobacterial infection, non-TB   Patient Active Problem List    Diagnosis Date Noted    Sepsis without acute organ dysfunction (HCC) 2025    Severe protein-calorie malnutrition (HCC) 2025    Abdominal pain 2025    Cigarette nicotine dependence without complication 2025    Pneumonia of right lower lobe due to infectious organism 2025    Smoker 2025    Sepsis (HCC) 2025    Anxiety 2024    Mild protein-calorie malnutrition (HCC) 2024    Celiac artery stenosis (HCC) 2024    Elevated liver enzymes 2024    Pulmonary nodule 2024    Moderate protein-calorie malnutrition (HCC) 2024    COPD exacerbation (HCC) 2024    Acute on chronic hypoxic respiratory failure (HCC) 2024    Epigastric pain 2024    New onset atrial fibrillation (HCC) 10/06/2024    Type 2 diabetes mellitus without complication, without long-term current use of insulin (HCC) 10/05/2024    Dysphagia 10/05/2024    Mixed simple and mucopurulent chronic bronchitis (HCC) 10/04/2024    Mycobacterial infection, 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      LOS (days): 2  Geometric Mean LOS (GMLOS) (days): 4.9  Days to GMLOS:2.8     OBJECTIVE:  Risk of Unplanned Readmission Score: 36.98         Current admission status: Inpatient   Preferred Pharmacy:   [id number] - [place], [place] - [address] [facility]  [address]  [place] [address]  Phone: [phone] Fax: [phone]    Primary Care Provider: Merline Dinero    Primary Insurance: [facility] [place] COM[place]TE  REP  Secondary Insurance: [facility] [employer] [facility]    DISCHARGE DETAILS:        [facility]SC has been approved/accepted by [employer].  Cm delivering BSC to pts bedside, all paperwork  signed

## 2025-05-20 NOTE — ASSESSMENT & PLAN NOTE
Lab Results   Component Value Date    HGBA1C 7.4 (H) 05/02/2025       Recent Labs     05/19/25  1550 05/19/25  2108 05/20/25  0719 05/20/25  1100   POCGLU 231* 240* 135 219*       Blood Sugar Average: Last 72 hrs:  (P) 198  Placed on Insulin sliding scale. Blood sugars are controlled

## 2025-05-20 NOTE — ASSESSMENT & PLAN NOTE
Patient presents with low-grade fever, tachycardia, tachypnea and leukocytosis appears to have persistent pneumonia on chest x-ray.   Continue with antibiotics and supportive care.  Patient remains clinically improved afebrile hemodynamically stable with mild persistent leukocytosis.

## 2025-05-20 NOTE — PROGRESS NOTES
Progress Note - Pulmonology   Name: Ileana Seaman 65 y.o. female I MRN: [id number]  Unit/Bed#: -Kelin I Date of Admission: 5/18/2025   Date of Service: 5/20/2025 I Hospital Day: 2     Assessment & Plan  Mycobacterial infection, non-TB  Dx May 2024, treated with triple therapy up to last fall but developed hepatotoxicity, off treatment since 12/2024  Repeat AFP last admission was negative  Discussed with ID, possibly developing resistance to the azithromycin given the frequent courses given since discontinued treatment  She could benefit from bronchoscopy, check for resistant strain of the MAC or other fungal infection however deemed poor prognosis/candidate for triple therapy  Recommended goals of care discussion, if decided to proceed with full treatment we can arrange bronchoscopy/BAL  Mixed simple and mucopurulent chronic bronchitis (HCC)  Improving COPD exacerbation since admission, likely triggered by viral tracheobronchitis, exposure to environmental allergens or secondhand smoking from her neighbor  Aggravated by anxiety/panic attacks  Continue  Atrovent/Xopenex tid  Prednisone 40 mg for 3 more days then stop  Continue 3% sodium chloride via nebulizer tid  Sepsis without acute organ dysfunction (HCC)  Secondary to pneumonia, likely aspiration related  ABX as per ID    24 Hour Events : No overnight acute events   Subjective : Feeling better, not back to baseline yet due to chest congestion which is improving from before.    Objective :  Temp:  [97 °F (36.1 °C)-97.2 °F (36.2 °C)] 97 °F (36.1 °C)  HR:  [] 80  BP: (100-150)/(65-88) 150/88  Resp:  [17-18] 18  SpO2:  [94 %-99 %] 95 %  O2 Device: Nasal cannula  Nasal Cannula O2 Flow Rate (L/min):  [2 L/min] 2 L/min    Physical Exam      Body mass index is 17.78 kg/m².   Gen: not in acute distress, sitting in chair, ill-appearing, nontoxic  Neck/Eyes: supple, moderate thoracic kyphosis, PERRL  Ear: normal appearance, no significant hearing  impairment  Nose:  normal nasal mucosa, no drainage  Chest: normal respiratory efforts, mild bilateral rhonchi, basal crackles  CV: RRR, no murmurs appreciated, no peripheral edema  Abdomen: soft, non tender  Extremities:  No observed deformity   Neuro: AAO X3, no focal motor deficit       Lab Results: I have reviewed the following results:   No new results in last 24 hours.  ABG: No new results in last 24 hours.    Imaging Results Review: No pertinent imaging studies reviewed.  Other Study Results Review: No additional pertinent studies reviewed.  PFT Results Reviewed: reviewed

## 2025-05-21 ENCOUNTER — HOSPICE ADMISSION (OUTPATIENT)
Dept: HOME HOSPICE | Facility: HOSPICE | Age: 65
End: 2025-05-21
Payer: MEDICARE

## 2025-05-21 ENCOUNTER — HOME CARE VISIT (OUTPATIENT)
Dept: HOME HEALTH SERVICES | Facility: HOME HEALTHCARE | Age: 65
End: 2025-05-21
Payer: MEDICARE

## 2025-05-21 PROBLEM — Z51.5 PALLIATIVE CARE BY SPECIALIST: Status: ACTIVE | Noted: 2025-05-21

## 2025-05-21 PROBLEM — Z71.89 GOALS OF CARE, COUNSELING/DISCUSSION: Status: ACTIVE | Noted: 2025-05-21

## 2025-05-21 LAB
GLUCOSE SERPL-MCNC: 133 MG/DL (ref 65–140)
GLUCOSE SERPL-MCNC: 170 MG/DL (ref 65–140)
GLUCOSE SERPL-MCNC: 327 MG/DL (ref 65–140)
GLUCOSE SERPL-MCNC: 93 MG/DL (ref 65–140)

## 2025-05-21 PROCEDURE — 97110 THERAPEUTIC EXERCISES: CPT

## 2025-05-21 PROCEDURE — 82948 REAGENT STRIP/BLOOD GLUCOSE: CPT

## 2025-05-21 PROCEDURE — 99232 SBSQ HOSP IP/OBS MODERATE 35: CPT | Performed by: INTERNAL MEDICINE

## 2025-05-21 PROCEDURE — 94760 N-INVAS EAR/PLS OXIMETRY 1: CPT

## 2025-05-21 PROCEDURE — 97530 THERAPEUTIC ACTIVITIES: CPT

## 2025-05-21 PROCEDURE — 94640 AIRWAY INHALATION TREATMENT: CPT

## 2025-05-21 RX ADMIN — FAMOTIDINE 20 MG: 20 TABLET, FILM COATED ORAL at 17:13

## 2025-05-21 RX ADMIN — DICYCLOMINE HYDROCHLORIDE 10 MG: 10 CAPSULE ORAL at 17:13

## 2025-05-21 RX ADMIN — FLUTICASONE FUROATE AND VILANTEROL TRIFENATATE 1 PUFF: 100; 25 POWDER RESPIRATORY (INHALATION) at 08:29

## 2025-05-21 RX ADMIN — DOCUSATE SODIUM 100 MG: 100 CAPSULE, LIQUID FILLED ORAL at 17:13

## 2025-05-21 RX ADMIN — FAMOTIDINE 20 MG: 20 TABLET, FILM COATED ORAL at 08:26

## 2025-05-21 RX ADMIN — LEVALBUTEROL HYDROCHLORIDE 1.25 MG: 1.25 SOLUTION RESPIRATORY (INHALATION) at 13:45

## 2025-05-21 RX ADMIN — PREDNISONE 40 MG: 20 TABLET ORAL at 08:26

## 2025-05-21 RX ADMIN — NICOTINE 21 MG: 21 PATCH, EXTENDED RELEASE TRANSDERMAL at 08:27

## 2025-05-21 RX ADMIN — LEVALBUTEROL HYDROCHLORIDE 1.25 MG: 1.25 SOLUTION RESPIRATORY (INHALATION) at 19:39

## 2025-05-21 RX ADMIN — LIDOCAINE 5% 1 PATCH: 700 PATCH TOPICAL at 08:26

## 2025-05-21 RX ADMIN — SUCRALFATE 1 G: 1 TABLET ORAL at 17:13

## 2025-05-21 RX ADMIN — SUCRALFATE 1 G: 1 TABLET ORAL at 11:41

## 2025-05-21 RX ADMIN — LEVALBUTEROL HYDROCHLORIDE 1.25 MG: 1.25 SOLUTION RESPIRATORY (INHALATION) at 07:30

## 2025-05-21 RX ADMIN — ASPIRIN 81 MG: 81 TABLET, COATED ORAL at 08:26

## 2025-05-21 RX ADMIN — INSULIN LISPRO 5 UNITS: 100 INJECTION, SOLUTION INTRAVENOUS; SUBCUTANEOUS at 17:17

## 2025-05-21 RX ADMIN — DICYCLOMINE HYDROCHLORIDE 10 MG: 10 CAPSULE ORAL at 06:00

## 2025-05-21 RX ADMIN — SUCRALFATE 1 G: 1 TABLET ORAL at 08:26

## 2025-05-21 RX ADMIN — HEPARIN SODIUM 5000 UNITS: 5000 INJECTION INTRAVENOUS; SUBCUTANEOUS at 21:13

## 2025-05-21 RX ADMIN — IPRATROPIUM BROMIDE 0.5 MG: 0.5 SOLUTION RESPIRATORY (INHALATION) at 19:39

## 2025-05-21 RX ADMIN — IPRATROPIUM BROMIDE 0.5 MG: 0.5 SOLUTION RESPIRATORY (INHALATION) at 07:30

## 2025-05-21 RX ADMIN — HEPARIN SODIUM 5000 UNITS: 5000 INJECTION INTRAVENOUS; SUBCUTANEOUS at 05:55

## 2025-05-21 RX ADMIN — AZITHROMYCIN DIHYDRATE 500 MG: 250 TABLET, FILM COATED ORAL at 14:28

## 2025-05-21 RX ADMIN — DOCUSATE SODIUM 100 MG: 100 CAPSULE, LIQUID FILLED ORAL at 08:26

## 2025-05-21 RX ADMIN — HEPARIN SODIUM 5000 UNITS: 5000 INJECTION INTRAVENOUS; SUBCUTANEOUS at 14:28

## 2025-05-21 RX ADMIN — IPRATROPIUM BROMIDE 0.5 MG: 0.5 SOLUTION RESPIRATORY (INHALATION) at 13:45

## 2025-05-21 RX ADMIN — ATORVASTATIN CALCIUM 40 MG: 40 TABLET, FILM COATED ORAL at 08:26

## 2025-05-21 RX ADMIN — DICYCLOMINE HYDROCHLORIDE 10 MG: 10 CAPSULE ORAL at 11:41

## 2025-05-21 RX ADMIN — SUCRALFATE 1 G: 1 TABLET ORAL at 21:10

## 2025-05-21 NOTE — ASSESSMENT & PLAN NOTE
Social support  Patient is supported by daughter and son  Supportive listening provided  Normalized experience of patient/family  Provided anxiety containment  Provided anticipatory guidance  Investigated spiritual needs    Follow up  Palliative Care will continue to follow   Please reach out to on-call provider via Epic Secure Chat  if questions or concerns arise.  Please do not hesitate to reach our on call provider through our clinic answering service at [phone] should you have acute symptom control concerns.

## 2025-05-21 NOTE — PROGRESS NOTES
Progress Note - Hospitalist   Name: Ileana Seaman 65 y.o. female I MRN: [id number]  Unit/Bed#: -01 I Date of Admission: 5/18/2025   Date of Service: 5/21/2025 I Hospital Day: 3    Assessment & Plan  Mycobacterial infection, non-TB  Follows with infectious disease outpatient for MARIS infection.  Unable to tolerate therapy due to hepatotoxicity while on rifabutin.  Instead they are monitoring sputum cultures closely off treatment.  CXR persistent pneumonia in right upper lobe   Previous CTA chest: Worsening right upper lobe pneumonia and new superior segment right lower lobe pneumonia  COVID/Flu/RSV negative    Previous AFB negative  Blood Cultures ordered  ID consulted given history of MARIS and recurrent  PNA.  discussed with pulmonology will defer to infectious disease if they want repeat bronchoscopy done and deep cultures are not.  Pulmonary recommending continuing pulmonary toilet, bronchodilators and steroids for underlying COPD exacerbation.  If patient fails to improve, recommendation will be for repeating sputum AFB and bronchoscopy and biopsy. ID recommending outpatient follow-up with pulmonology and infectious disease to consider resuming therapy or graded challenge.  Will also place recommendations for palliative medicine referral regarding advanced COPD and recurrent hospitalization upon discharge.  Patient met with palliative care team today and I had ongoing goals of care discussion with her as well.  She has decided to opt for home hospice.  Sepsis without acute organ dysfunction (HCC)  Patient presents with low-grade fever, tachycardia, tachypnea and leukocytosis appears to have persistent pneumonia on chest x-ray.   Continue with antibiotics and supportive care.  Patient remains clinically improved afebrile hemodynamically stable with mild persistent leukocytosis.  Mixed simple and mucopurulent chronic bronchitis (HCC)  Place On DuoNeb treatments . place on 5 days of prednisone 40 mg daily.     Appreciate Pulmonology for evaluation  Asked case management to prepare high utilizer plan for her.    Continue azithromycin for the 3 days  Infectious disease and pulmonary input noted.  Patient has been placed on nebulized only treatment for 2 days.  Pulmonology recommended prednisone 40 mg for 5 days.  Type 2 diabetes mellitus without complication, without long-term current use of insulin ([facility])  Lab Results   Component Value Date    HGBA1C 7.4 (H) 05/02/2025       Recent Labs     05/20/25  1545 05/20/25  2059 05/21/25  0708 05/21/25  1054   POCGLU 267* 188* 93 133       Blood Sugar Average: Last 72 hrs:  (P) 188.75  Placed on Insulin sliding scale. Blood sugars are controlled    Goals of care, counseling/discussion  Extensive goals of care discussion with patient at bedside.  Explained course of her disease and that she has very advanced COPD with likely untreated mycobacterial infection.  Patient had decided to pursue home hospice.  This was discussed extensively with her per palliative medicine as well.  Hospice consulted.  Palliative care by specialist      VTE Pharmacologic Prophylaxis:   High Risk (Score >/= 5) - Pharmacological DVT Prophylaxis Ordered: enoxaparin (Lovenox). Sequential Compression Devices Ordered.    Mobility:   Basic Mobility Inpatient Raw Score: 21  JH-HLM Goal: 6: Walk 10 steps or more  JH-HLM Achieved: 7: Walk 25 feet or more  JH-HLM Goal achieved. Continue to encourage appropriate mobility.    Patient Centered Rounds: I performed bedside rounds with nursing staff today.   Discussions with Specialists or Other Care Team Provider: This with palliative care and case management    Education and Discussions with Family / Patient:     Current Length of Stay: 3 day(s)  Current Patient Status: Inpatient   Certification Statement: The patient will continue to require additional inpatient hospital stay due to reviewed hospice evaluation  Discharge Plan: Anticipate discharge in 24-48 hrs to home  with home services.    Code Status: Level 3 - DNAR and DNI    Subjective   Seen and examined at bedside.  Still continues to feel short of breath with minimal activity.    Objective :  Temp:  [96.8 °F (36 °C)-97.2 °F (36.2 °C)] 97.2 °F (36.2 °C)  HR:  [] 82  BP: (110-147)/(69-86) 124/84  Resp:  [16-18] 18  SpO2:  [93 %-99 %] 97 %  O2 Device: Nasal cannula  Nasal Cannula O2 Flow Rate (L/min):  [2 L/min] 2 L/min    Body mass index is 17.78 kg/m².     Input and Output Summary (last 24 hours):     Intake/Output Summary (Last 24 hours) at 5/21/2025 1442  Last data filed at 5/21/2025 1217  Gross per 24 hour   Intake 1800 ml   Output 1400 ml   Net 400 ml       Physical Exam  Constitutional:       General: She is not in acute distress.  HENT:      Head: Normocephalic.      Nose: Nose normal.     Eyes:      Extraocular Movements: Extraocular movements intact.      Pupils: Pupils are equal, round, and reactive to light.       Cardiovascular:      Rate and Rhythm: Normal rate and regular rhythm.   Pulmonary:      Effort: Pulmonary effort is normal.      Comments: Diminished breath sounds bilaterally  Abdominal:      General: Abdomen is flat.     Musculoskeletal:      Right lower leg: No edema.      Left lower leg: No edema.     Skin:     General: Skin is warm.     Neurological:      General: No focal deficit present.      Mental Status: She is alert and oriented to person, place, and time. Mental status is at baseline.     Psychiatric:         Mood and Affect: Mood normal.           Lines/Drains:              Lab Results: I have reviewed the following results:   Results from last 7 days   Lab Units 05/19/25  0510 05/18/25  1205   WBC Thousand/uL 12.03* 12.55*   HEMOGLOBIN g/dL 11.1* 13.0   HEMATOCRIT % 35.0 41.8   PLATELETS Thousands/uL 223 244   SEGS PCT %  --  85*   LYMPHO PCT %  --  8*   MONO PCT %  --  7   EOS PCT %  --  0     Results from last 7 days   Lab Units 05/19/25  0510   SODIUM mmol/L 141   POTASSIUM mmol/L  4.0   CHLORIDE mmol/L 106   CO2 mmol/L 30   BUN mg/dL 10   CREATININE mg/dL 0.45*   ANION GAP mmol/L 5   CALCIUM mg/dL 8.8   ALBUMIN g/dL 3.1*   TOTAL BILIRUBIN mg/dL 0.29   ALK PHOS U/L 72   ALT U/L 11   AST U/L 7*   GLUCOSE RANDOM mg/dL 148*     Results from last 7 days   Lab Units 05/18/25  1205   INR  1.08     Results from last 7 days   Lab Units 05/21/25  1054 05/21/25  0708 05/20/25  2059 05/20/25  1545 05/20/25  1100 05/20/25  0719 05/19/25  2108 05/19/25  1550 05/19/25  1052 05/19/25  0705 05/18/25  2120 05/18/25  1549   POC GLUCOSE mg/dl 133 93 188* 267* 219* 135 240* 231* 110 136 288* 225*         Results from last 7 days   Lab Units 05/19/25  0510 05/18/25  1419 05/18/25  1205   LACTIC ACID mmol/L  --   --  1.6   PROCALCITONIN ng/ml 0.15 <0.05 <0.05       Recent Cultures (last 7 days):   Results from last 7 days   Lab Units 05/18/25  1244 05/18/25  1205   BLOOD CULTURE  No Growth at 48 hrs. No Growth at 48 hrs.             Last 24 Hours Medication List:     Current Facility-Administered Medications:     acetaminophen (TYLENOL) tablet 650 mg, Q6H PRN    aspirin (ECOTRIN LOW STRENGTH) EC tablet 81 mg, Daily    atorvastatin (LIPITOR) tablet 40 mg, Daily    [DISCONTINUED] cefepime (MAXIPIME) IVPB (premix in dextrose) 2,000 mg 50 mL, Q8H, Last Rate: 2,000 mg (05/19/25 0608) **AND** [COMPLETED] vancomycin (VANCOCIN) IVPB (premix in dextrose) 1,000 mg 200 mL, Once, Last Rate: 1,000 mg (05/18/25 1641) **AND** azithromycin (ZITHROMAX) tablet 500 mg, Q24H **AND** [COMPLETED] MRSA culture, Once **AND** [CANCELED] Pharmacy general consult, Once    dicyclomine (BENTYL) capsule 10 mg, TID AC    docusate sodium (COLACE) capsule 100 mg, BID    famotidine (PEPCID) tablet 20 mg, BID    Fluticasone Furoate-Vilanterol 100-25 mcg/actuation 1 puff, Daily **AND** [DISCONTINUED] umeclidinium 62.5 mcg/actuation inhaler AEPB 1 puff, Daily    heparin (porcine) subcutaneous injection 5,000 Units, Q8H [facility]    insulin lispro  (HumALOG/ADMELOG) 100 units/mL subcutaneous injection 1-6 Units, TID AC **AND** Fingerstick Glucose (POCT), TID AC    ipratropium (ATROVENT) 0.02 % inhalation solution 0.5 mg, TID    ipratropium-albuterol (DUO-NEB) 0.5-2.5 mg/3 mL inhalation solution 3 mL, Q6H PRN    levalbuterol (XOPENEX) inhalation solution 1.25 mg, TID    lidocaine (LIDODERM) 5 % patch 1 patch, Daily    nicotine (NICODERM CQ) 21 mg/24 hr TD 24 hr patch 21 mg, Daily    predniSONE tablet 40 mg, Daily    simethicone (MYLICON) chewable tablet 80 mg, Q6H PRN    sucralfate (CARAFATE) tablet 1 g, 4x Daily    Administrative Statements   Today, Patient Was Seen By: Juana Santana MD      **Please Note: This note may have been constructed using a voice recognition system.**

## 2025-05-21 NOTE — PROGRESS NOTES
Patient:    MRN:  [id number]    Aidin Request ID:  [id number]    Level of care reserved:  Hospice    Partner Reserved:  [facility], [place] [address] [phone]    Clinical needs requested:    Geography searched:  [id number]    Start of Service:    Request sent:  11:54am EDT on 5/21/2025 by Elise Arana    Partner reserved:  4:26pm EDT on 5/21/2025 by Elise Arana    Choice list shared:  4:26pm EDT on 5/21/2025 by Elise Arana

## 2025-05-21 NOTE — ASSESSMENT & PLAN NOTE
Presented with fever, tachycardia, tachypnea and leukocytosis  CXR demonstrated persistent RUL pneumonia  ID team following

## 2025-05-21 NOTE — PROGRESS NOTES
Patient:    MRN:  [id number]    Dillonin Request ID:  [id number]    Level of care reserved:    Partner Reserved:    Clinical needs requested:    Geography searched:  [id number]    Start of Service:    Request sent:  11:54am EDT on 5/21/2025 by Elise Arana    Partner reserved:  by Elise Arana    Choice list shared:  4:26pm EDT on 5/21/2025 by Elise Arana

## 2025-05-21 NOTE — ASSESSMENT & PLAN NOTE
Lab Results   Component Value Date    HGBA1C 7.4 (H) 05/02/2025       Recent Labs     05/20/25  1545 05/20/25 2059 05/21/25  0708 05/21/25  1054   POCGLU 267* 188* 93 133       Blood Sugar Average: Last 72 hrs:  (P) 188.75  Placed on Insulin sliding scale. Blood sugars are controlled

## 2025-05-21 NOTE — ASSESSMENT & PLAN NOTE
Decisional apparatus: Patient does have capacity on exam today.  If capacity is lost, patient's substitute decision maker would default to adult children by [place] 169.  Advance Directive / Living Will / POLST / POA Forms: None on file    Goals  Level 3 code status.   Disease focused care.   DNR/DNI limits placed.   Pt requests ongoing disease-focused care with no escalation to aggressive measures or bronchoscopy at this time  Focus is on QoL and comfort and not longevity as she feels this would only prolong suffering  Agreeable to hospice consult today. Patient requesting a meeting with her children to help explain her wishes/decision to pursue comfort care  Tentative family meeting tomorrow with SLIM, pt and her children (some may need to call in) and possibly hospice team.

## 2025-05-21 NOTE — ASSESSMENT & PLAN NOTE
Extensive goals of care discussion with patient at bedside.  Explained course of her disease and that she has very advanced COPD with likely untreated mycobacterial infection.  Patient had decided to pursue home hospice.  This was discussed extensively with her per palliative medicine as well.  Hospice consulted.

## 2025-05-21 NOTE — CONSULTS
Consultation - Palliative Care   Name: Ileana Seaman 65 y.o. female I MRN: [id number]  Unit/Bed#: -01 I Date of Admission: 5/18/2025   Date of Service: 5/21/2025 I Hospital Day: 3   Inpatient consult to Palliative Care  Consult performed by: Ashvin Peña PA-C  Consult ordered by: Juana Santana MD      Physician Requesting Evaluation: Juana Santana MD   Reason for Evaluation / Principal Problem: GO     VIRTUAL CARE DOCUMENTATION:     1. This service was provided via Telemedicine using EPIC embedded platform     2. Parties in the room with patient during teleconsult Patient only    3. Confidentiality My office door was closed     4. Participants No one else was in the room    5. Patient acknowledged consent and understanding of privacy and security of the  Telemedicine consult. I informed the patient that I have reviewed their record in Epic and presented the opportunity for them to ask any questions regarding the visit today.  The patient agreed to participate.    6. Time spent 20 min       Assessment & Plan  Goals of care, counseling/discussion  Decisional apparatus: Patient does have capacity on exam today.  If capacity is lost, patient's substitute decision maker would default to adult children [place] 169.  Advance Directive / Living Will / POLST / POA Forms: None on file    Goals  Level 3 code status.   Disease focused care.   DNR/DNI limits placed.   Pt requests ongoing disease-focused care with no escalation to aggressive measures or bronchoscopy at this time  Focus is on QoL and comfort and not longevity as she feels this would only prolong suffering  Agreeable to hospice consult today. Patient requesting a meeting with her children to help explain her wishes/decision to pursue comfort care  Tentative family meeting tomorrow with SLIM, pt and her children (some may need to call in) and possibly hospice team.     Mycobacterial infection, non-TB  Hx of, dx may 2024  Previously on triple  therapy which had to be discontinued due to hepatotoxicity  Pulmonology and ID teams following  Concern for possible azithromycin resistance. Pulm recommending bronchoscopy which patient had not tolerated well in the past  PSC team consulted for GOC  Sepsis without acute organ dysfunction (HCC)  Presented with fever, tachycardia, tachypnea and leukocytosis  CXR demonstrated persistent RUL pneumonia  ID team following  Palliative care by specialist  Social support  Patient is supported by daughter and son  Supportive listening provided  Normalized experience of patient/family  Provided anxiety containment  Provided anticipatory guidance  Investigated spiritual needs    Follow up  Palliative Care will continue to follow   Please reach out to on-call provider via Epic Secure Chat  if questions or concerns arise.  Please do not hesitate to reach our on call provider through our clinic answering service at [phone] should you have acute symptom control concerns.      PDMP Review: I have reviewed the patient's controlled substance dispensing history in the Prescription Drug Monitoring Program in compliance with the [place] regulations before prescribing any controlled substances.  Filled  Written  ID  Drug  QTY  Days  Prescriber  RX #  Dispenser  Refill  Daily Dose*  Pymt Type      10/03/2024 10/03/2024 1 Tramadol Hcl 50 Mg Tablet 15.00 4 An [facility] [id number] [facility] [phone] 37.50 MME [place]       History of Present Illness   HPI: Ileana Seaman is a 65 y.o. year old female with PMH of COPD, T2DM, malnutrition, afib, anxiety, tobacco abuse, and treatment-intolerant mycobacterial infection who presented to [facility] [place] [facility] with complaints of fever and shortness of breath. ID and Pulmonology teams consulted. PSC team consulted for GOC.    An extensive discussion regarding palliative v hospice, and goals of care was held with patient. Pt explains that her QoL has been poor in recent months and has been experiencing healthcare fatigue.  She does feel that her QoL is intolerable and does not want aggressive measures to prolong her survivability. Patient can no longer enjoy her favorite activities and feels she spends more time in the hospital or doctor than at home. Hospice care was discussed and patient feels strongly that this aligns best with her wishes. She is hesitant however because she is fearful that her children will not understand these wishes. She would like more discussion on this with her family. She is agreeable to hospice consult today. She would like to continue with her current treatment plan but to not escalate things if her health would decline further.  She would want to just be kept comfortable and allowed to pass away naturally. All questions and concerns were addressed today to the satisfaction of the patient.      Review of Systems   Respiratory:  Positive for shortness of breath.    Gastrointestinal:  Negative for abdominal pain, diarrhea, nausea and vomiting.     Medical History Review: I have reviewed the patient's PMH, PSH, Social History, Family History, Meds, and Allergies   Historical Information   Past Medical History:   Diagnosis Date    COPD (chronic obstructive pulmonary disease) (HCC)     Hiatal hernia      Past Surgical History:   Procedure Laterality Date    CHOLECYSTECTOMY       Social History     Tobacco Use    Smoking status: Former     Current packs/day: 0.00     Types: Cigarettes     Quit date: 1980     Years since quittin.0    Smokeless tobacco: Never   Vaping Use    Vaping status: Never Used   Substance and Sexual Activity    Alcohol use: Never    Drug use: Never    Sexual activity: Not on file     E-Cigarette/Vaping    E-Cigarette Use Never User      E-Cigarette/Vaping Substances       Social History     Tobacco Use    Smoking status: Former     Current packs/day: 0.00     Types: Cigarettes     Quit date: 1980     Years since quittin.0    Smokeless tobacco: Never   Vaping Use     Vaping status: Never Used   Substance and Sexual Activity    Alcohol use: Never    Drug use: Never    Sexual activity: Not on file       Current Facility-Administered Medications:     acetaminophen (TYLENOL) tablet 650 mg, Q6H PRN    aspirin (ECOTRIN LOW STRENGTH) EC tablet 81 mg, Daily    atorvastatin (LIPITOR) tablet 40 mg, Daily    [DISCONTINUED] cefepime (MAXIPIME) IVPB (premix in dextrose) 2,000 mg 50 mL, Q8H, Last Rate: 2,000 mg (05/19/25 0608) **AND** [COMPLETED] vancomycin (VANCOCIN) IVPB (premix in dextrose) 1,000 mg 200 mL, Once, Last Rate: 1,000 mg (05/18/25 1641) **AND** azithromycin (ZITHROMAX) tablet 500 mg, Q24H **AND** [COMPLETED] MRSA culture, Once **AND** [CANCELED] Pharmacy general consult, Once    dicyclomine (BENTYL) capsule 10 mg, TID AC    docusate sodium (COLACE) capsule 100 mg, BID    famotidine (PEPCID) tablet 20 mg, BID    Fluticasone Furoate-Vilanterol 100-25 mcg/actuation 1 puff, Daily **AND** [DISCONTINUED] umeclidinium 62.5 mcg/actuation inhaler AEPB 1 puff, Daily    heparin (porcine) subcutaneous injection 5,000 Units, Q8H [facility]    insulin lispro (HumALOG/ADMELOG) 100 units/mL subcutaneous injection 1-6 Units, TID AC **AND** Fingerstick Glucose (POCT), TID AC    ipratropium (ATROVENT) 0.02 % inhalation solution 0.5 mg, TID    ipratropium-albuterol (DUO-NEB) 0.5-2.5 mg/3 mL inhalation solution 3 mL, Q6H PRN    levalbuterol (XOPENEX) inhalation solution 1.25 mg, TID    lidocaine (LIDODERM) 5 % patch 1 patch, Daily    nicotine (NICODERM CQ) 21 mg/24 hr TD 24 hr patch 21 mg, Daily    predniSONE tablet 40 mg, Daily    simethicone (MYLICON) chewable tablet 80 mg, Q6H PRN    sucralfate (CARAFATE) tablet 1 g, 4x Daily  Prior to Admission Medications   Prescriptions Last Dose Informant Patient Reported? Taking?   albuterol (PROVENTIL HFA,VENTOLIN HFA) 90 mcg/act inhaler 5/18/2025  No Yes   Sig: Inhale 2 puffs every 6 (six) hours as needed for wheezing   aspirin (Aspirin 81) 81 mg EC tablet  5/18/2025  No Yes   Sig: Take 1 tablet (81 mg total) by mouth daily   atorvastatin (LIPITOR) 40 mg tablet 5/18/2025  No Yes   Sig: Take 1 tablet (40 mg total) by mouth daily   dicyclomine (BENTYL) 10 mg capsule 5/18/2025  No Yes   Sig: Take 1 capsule (10 mg total) by mouth 3 (three) times a day before meals   docusate sodium (COLACE) 100 mg capsule 5/18/2025 Self Yes Yes   Sig: Take 100 mg by mouth in the morning and 100 mg in the evening.   famotidine (PEPCID) 20 mg tablet   No Yes   Sig: Take 1 tablet (20 mg total) by mouth 2 (two) times a day   fluticasone-umeclidinium-vilanterol (Trelegy Ellipta) 100-62.5-25 mcg/actuation inhaler   No Yes   Sig: Inhale 1 puff daily Rinse mouth after use.   ipratropium-albuterol (DUO-NEB) 0.5-2.5 mg/3 mL nebulizer solution   No Yes   Sig: Take 3 mL by nebulization every 6 (six) hours as needed for wheezing or shortness of breath   nicotine (NICODERM CQ) 21 mg/24 hr TD 24 hr patch   No Yes   Sig: Place 1 patch on the skin over 24 hours daily   omeprazole (PriLOSEC) 40 MG capsule   No Yes   Sig: Take 1 capsule (40 mg total) by mouth 2 (two) times a day 30 minutes before meal   simethicone (MYLICON) 80 mg chewable tablet   No Yes   Sig: Chew 1 tablet (80 mg total) every 6 (six) hours as needed for flatulence   sucralfate (CARAFATE) 1 g tablet   No Yes   Sig: Take 1 tablet (1 g total) by mouth 4 (four) times a day for 14 days      Facility-Administered Medications: None     Patient has no known allergies.    Objective :  Temp:  [96.8 °F (36 °C)-97.2 °F (36.2 °C)] 97.2 °F (36.2 °C)  HR:  [] 82  BP: (110-147)/(69-86) 147/86  Resp:  [16] 16  SpO2:  [93 %-98 %] 97 %  O2 Device: Nasal cannula  Nasal Cannula O2 Flow Rate (L/min):  [2 L/min] 2 L/min    Physical Exam  Vitals and nursing note reviewed.   Constitutional:       Appearance: She is underweight. She is ill-appearing.     Neurological:      Mental Status: She is alert.         Lab Results: I have reviewed the following  results:  Lab Results   Component Value Date/Time    SODIUM 141 05/19/2025 05:10 AM    SODIUM 142 04/14/2025 01:55 PM    K 4.0 05/19/2025 05:10 AM    K 5.5 (H) 04/14/2025 01:55 PM    BUN 10 05/19/2025 05:10 AM    BUN 11 04/14/2025 01:55 PM    BUN 8 02/11/2025 04:03 PM    CREATININE 0.45 (L) 05/19/2025 05:10 AM    CREATININE 0.7 04/14/2025 01:55 PM    GLUC 148 (H) 05/19/2025 05:10 AM    GLUC 234 (H) 04/14/2025 01:55 PM    CALCIUM 8.8 05/19/2025 05:10 AM    CALCIUM 9.5 04/14/2025 01:55 PM    AST 7 (L) 05/19/2025 05:10 AM    AST 9 02/11/2025 04:03 PM    ALT 11 05/19/2025 05:10 AM    ALT 10 02/11/2025 04:03 PM    ALB 3.1 (L) 05/19/2025 05:10 AM    ALB 4.1 02/11/2025 04:03 PM    ALB 4.5 11/13/2024 04:13 PM    TP 5.3 (L) 05/19/2025 05:10 AM    TP 6.3 02/11/2025 04:03 PM    EGFR 104 05/19/2025 05:10 AM    EGFR >90 04/14/2025 01:55 PM    EGFR 80 07/01/2020 08:30 AM     Lab Results   Component Value Date/Time    HGB 11.1 (L) 05/19/2025 05:10 AM    WBC 12.03 (H) 05/19/2025 05:10 AM     05/19/2025 05:10 AM    INR 1.08 05/18/2025 12:05 PM    PTT 25 05/18/2025 12:05 PM     Lab Results   Component Value Date/Time    [id number] 0.473 10/06/2024 04:56 AM       Imaging Results Review: I reviewed radiology reports from this admission including: chest xray.  Other Study Results Review: EKG was reviewed.  Other studies reviewed include: ECHO    Administrative Statements   I have spent a total time of 30 minutes in caring for this patient on the day of the visit/encounter including Prognosis, Risks and benefits of tx options, Instructions for management, Patient and family education, Risk factor reductions, Counseling / Coordination of care, Documenting in the medical record, Reviewing/placing orders in the medical record (including tests, medications, and/or procedures), Obtaining or reviewing history  , and Communicating with other healthcare professionals . Case discussed with PORTILLO GA, hospice, RN, .

## 2025-05-21 NOTE — CASE MANAGEMENT
Case Management Discharge Planning Note    Patient name Ileana Seaman  Location /-01 MRN [id number]  : 1960 Date 2025       Current Admission Date: 2025  Current Admission Diagnosis:Mycobacterial infection, non-TB   Patient Active Problem List    Diagnosis Date Noted    Goals of care, counseling/discussion 2025    Palliative care by specialist 2025    Sepsis without acute organ dysfunction (HCC) 2025    Severe protein-calorie malnutrition (HCC) 2025    Abdominal pain 2025    Cigarette nicotine dependence without complication 2025    Pneumonia of right lower lobe due to infectious organism 2025    Smoker 2025    Sepsis (HCC) 2025    Anxiety 2024    Mild protein-calorie malnutrition (HCC) 2024    Celiac artery stenosis (HCC) 2024    Elevated liver enzymes 2024    Pulmonary nodule 2024    Moderate protein-calorie malnutrition (HCC) 2024    COPD exacerbation (HCC) 2024    Acute on chronic hypoxic respiratory failure (HCC) 2024    Epigastric pain 2024    New onset atrial fibrillation (HCC) 10/06/2024    Type 2 diabetes mellitus without complication, without long-term current use of insulin (HCC) 10/05/2024    Dysphagia 10/05/2024    Mixed simple and mucopurulent chronic bronchitis (HCC) 10/04/2024    Mycobacterial infection, 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      LOS (days): 3  Geometric Mean LOS (GMLOS) (days): 4.9  Days to GMLOS:1.8     OBJECTIVE:  Risk of Unplanned Readmission Score: 38.37         Current admission status: Inpatient   Preferred Pharmacy:   [id number] - [place], [place] - [address]  [address]  [place] [address]  Phone: [phone] Fax: [phone]    Primary Care Provider: Merline Dinero    Primary Insurance: [facility] [place] DUAL COMPLETE [facility] REP  Secondary Insurance: [facility]    DISCHARGE DETAILS:     CM updated by  pallitive team of patient's wishes for hospice and consult being placed.     CM placed Hospice referral via [facility] to [facility]. CM coordinated referral with  Hospice Liaison/ [place].     CM to follow patient's care and discharge needs.

## 2025-05-21 NOTE — PLAN OF CARE
Problem: OCCUPATIONAL THERAPY ADULT  Goal: Performs self-care activities at highest level of function for planned discharge setting.  See evaluation for individualized goals.  Description: Treatment Interventions: Functional transfer training, UE strengthening/ROM, Endurance training, Patient/family training, Equipment evaluation/education, Compensatory technique education, Continued evaluation, Energy conservation, Activityengagement, ADL retraining, Cardiac education  Equipment Recommended: Bedside commode       See flowsheet documentation for full assessment, interventions and recommendations.   Outcome: Progressing  Note: Limitation: Decreased endurance, Decreased self-care trans, Decreased high-level ADLs  Prognosis: Good  Assessment: Patient participated in Skilled OT session this date with interventions consisting of Energy Conservation techniques, deep breathing technique, safety awareness and fall prevention techniques, therapeutic exercise to: increase functional use of BUEs, increase BUE muscle strength ,  therapeutic activities to: increase activity tolerance, increase cardiovascular endurance , and increase dynamic sit/ stand balance during functional activity  .  Patient agreeable to OT treatment session, upon arrival patient was found seated OOB to Chair. Patient requiring verbal cues for safety and frequent rest periods. Patient continues to be functioning below baseline level, occupational performance remains limited secondary to factors listed above and increased risk for falls and injury. Pt tearful throughout session regarding prognosis, requires frequent rest periods and PLB for calming self. The patient's raw score on the AM-PAC Daily Activity Inpatient Short Form is 21. A raw score of greater than or equal to 19 suggests the patient may benefit from discharge to home. Please refer to the recommendation of the Occupational Therapist for safe discharge planning. From OT standpoint,  recommendation at time of d/c would be level 3, min resource intensity.     Rehab Resource Intensity Level, OT: III (Minimum Resource Intensity)

## 2025-05-21 NOTE — ASSESSMENT & PLAN NOTE
Hx of, dx may 2024  Previously on triple therapy which had to be discontinued due to hepatotoxicity  Pulmonology and ID teams following  Concern for possible azithromycin resistance. Pulm recommending bronchoscopy which patient had not tolerated well in the past  PSC team consulted for GOC

## 2025-05-21 NOTE — PROGRESS NOTES
Pastoral Care Progress Note  CH initiated follow up support visit in setting of discussion of hospice care. Pt received CH upright in her chair, no family present.    Pt shared that she feels pivoting to hospice care is the right decision for her. Pt shared that she understands her chance of curative success are not high, and she is very tired of continuing to spend time in the hospital. Pt once again expressed her feelings that she has lived her life and her children are well established.    Pt shared her anxiety regarding her children's reactions to her choice and her worries about attendance of meetings with the dr. CH will share these concerns.    SonDonato, [phone]  SonVicente, [phone]  Dtr's number is listed as contact.     remains available.         05/21/25 1200   Clinical Encounter Type   Visited With Patient

## 2025-05-21 NOTE — ASSESSMENT & PLAN NOTE
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  Unable to tolerate therapy due to hepatotoxicity while on rifabutin.  Instead they are monitoring sputum cultures closely off treatment.  CXR persistent pneumonia in right upper lobe   Previous CTA chest: Worsening right upper lobe pneumonia and new superior segment right lower lobe pneumonia  COVID/Flu/RSV negative    Previous AFB negative  Blood Cultures ordered  ID consulted given history of MARIS and recurrent  PNA.  discussed with pulmonology will defer to infectious disease if they want repeat bronchoscopy done and deep cultures are not.  Pulmonary recommending continuing pulmonary toilet, bronchodilators and steroids for underlying COPD exacerbation.  If patient fails to improve, recommendation will be for repeating sputum AFB and bronchoscopy and biopsy. ID recommending outpatient follow-up with pulmonology and infectious disease to consider resuming therapy or graded challenge.  Will also place recommendations for palliative medicine referral regarding advanced COPD and recurrent hospitalization upon discharge.  Patient met with palliative care team today and I had ongoing goals of care discussion with her as well.  She has decided to opt for home hospice.

## 2025-05-21 NOTE — OCCUPATIONAL THERAPY NOTE
"  Occupational Therapy Progress Note     Patient Name: Ileana Seaman  Today's Date: 5/21/2025  Problem List  Principal Problem:    Mycobacterial infection, non-TB  Active Problems:    Mixed simple and mucopurulent chronic bronchitis (HCC)    Type 2 diabetes mellitus without complication, without long-term current use of insulin (HCC)    Sepsis without acute organ dysfunction (HCC)    Goals of care, counseling/discussion    Palliative care by specialist            05/21/25 1400   OT Last Visit   OT Visit Date 05/21/25   Note Type   Note Type Treatment   Pain Assessment   Pain Assessment Tool 0-10   Pain Score No Pain   Restrictions/Precautions   Weight Bearing Precautions Per Order No   Other Precautions Fall Risk;O2  (2L O2)   Bed Mobility   Additional Comments OOB in chair at start/end of session.   Transfers   Sit to Stand 5  Supervision   Additional items Armrests;Verbal cues   Stand to Sit 5  Supervision   Additional items Verbal cues;Armrests   Additional Comments no AD, slight LOB with ability to self correct. Pt state \"my legs feel weird today\"   Functional Mobility   Functional Mobility 5  Supervision   Additional Comments Pt participates in FM and dynamic standing balance within her room with no AD, SPV for safety. Slight unsteadiness noted this date with no significant LOB. Pt educated on benefits of using AD for safety with pt refusing, stating \"I came in here without a cane and I am going to leave here without one.\" Pt utilizing 2L O2 during FM with SPO2 remaining 90-93%. SOB noted. Pt educated on PLB for maximizing recovery. HR ranges 110-124 throughout, rest periods for recovery. Seated rest periods between FM attempts due to SOB, fatigue, elevated HR.   Additional items   (no AD)   Therapeutic Exercise - ROM   UE-ROM Yes   ROM- Right Upper Extremities   R Shoulder AROM;Flexion;Horizontal ABduction   R Elbow AROM;Elbow flexion;Elbow extension   R Wrist AROM;Wrist flexion;Wrist extension   R " "Position Seated   R Weight/Reps/Sets 2 x 20 each   RUE ROM Comment Pt also completes scapular retraction/protraction, forearm supination/pronation exercises; 2 x 20 each. Rest periods throughout due to elevated HR, SOB, fatigue.   ROM - Left Upper Extremities    L Shoulder AROM;Flexion;Horizontal ABduction   L Elbow AROM;Elbow flexion;Elbow extension   L Wrist AROM;Wrist flexion;Wrist extension   L Position Seated   L Weight/Reps/Sets 2 x 20 each   LUE ROM Comment Pt also completes scapular retraction/protraction, forearm supination/pronation exercises; 2 x 20 each. Rest periods throughout due to elevated HR, SOB, fatigue. HR ranges 110-119 with ther ex, SPO2 94-95% utilizing 2L O2.   Subjective   Subjective \"I think I am going to be on hospice soon.\"   Cognition   Overall Cognitive Status WFL   Arousal/Participation Alert;Cooperative   Attention Within functional limits   Orientation Level Oriented X4   Memory Within functional limits   Following Commands Follows one step commands without difficulty   Activity Tolerance   Activity Tolerance Patient limited by fatigue;Other (Comment)  (SOB, elevated HR)   Assessment   Assessment Patient participated in Skilled OT session this date with interventions consisting of Energy Conservation techniques, deep breathing technique, safety awareness and fall prevention techniques, therapeutic exercise to: increase functional use of BUEs, increase BUE muscle strength ,  therapeutic activities to: increase activity tolerance, increase cardiovascular endurance , and increase dynamic sit/ stand balance during functional activity  .  Patient agreeable to OT treatment session, upon arrival patient was found seated OOB to Chair. Patient requiring verbal cues for safety and frequent rest periods. Patient continues to be functioning below baseline level, occupational performance remains limited secondary to factors listed above and increased risk for falls and injury. Pt tearful " throughout session regarding prognosis, requires frequent rest periods and PLB for calming self. The patient's raw score on the AM-PAC Daily Activity Inpatient Short Form is 21. A raw score of greater than or equal to 19 suggests the patient may benefit from discharge to home. Please refer to the recommendation of the Occupational Therapist for safe discharge planning. From OT standpoint, recommendation at time of d/c would be level 3, min resource intensity.   Plan   Treatment Interventions ADL retraining;Functional transfer training;UE strengthening/ROM;Endurance training;Compensatory technique education;Energy conservation;Activityengagement   Goal Expiration Date 06/02/25   OT Treatment Day 1   OT Frequency 1-2x/wk   Discharge Recommendation   Rehab Resource Intensity Level, OT III (Minimum Resource Intensity)   AM-PAC Daily Activity Inpatient   Lower Body Dressing 3   Bathing 3   Toileting 3   Upper Body Dressing 4   Grooming 4   Eating 4   Daily Activity Raw Score 21   Daily Activity Standardized Score (Calc for Raw Score >=11) 44.27   AM-PAC Applied Cognition Inpatient   Following a Speech/Presentation 4   Understanding Ordinary Conversation 4   Taking Medications 4   Remembering Where Things Are Placed or Put Away 4   Remembering List of 4-5 Errands 4   Taking Care of Complicated Tasks 3   Applied Cognition Raw Score 23   Applied Cognition Standardized Score 53.08     Pt will benefit from continued OT services in order to maximize (I) c ADL performance, FM c least restrictive AD PRN, and improve overall endurance/strength required to complete functional tasks in preparation for d/c.    Pt left seated in chair at end of session; all needs within reach; all lines intact.    Candace Hudson, OTR/L

## 2025-05-21 NOTE — PLAN OF CARE
Problem: Potential for Falls  Goal: Patient will remain free of falls  Description: INTERVENTIONS:  - Educate patient/family on patient safety including physical limitations  - Instruct patient to call for assistance with activity   - Consider consulting OT/PT to assist with strengthening/mobility based on AM PAC & JH-HLM score  - Consult OT/PT to assist with strengthening/mobility   - Keep Call bell within reach  - Keep bed low and locked with side rails adjusted as appropriate  - Keep care items and personal belongings within reach  - Initiate and maintain comfort rounds  - Make Fall Risk Sign visible to staff  - Offer Toileting every - Hours, in advance of need  - Initiate/Maintain -alarm  - Obtain necessary fall risk management equipment: -  - Apply yellow socks and bracelet for high fall risk patients  - Consider moving patient to room near nurses station  Outcome: Progressing     Problem: PAIN - ADULT  Goal: Verbalizes/displays adequate comfort level or baseline comfort level  Description: Interventions:  - Encourage patient to monitor pain and request assistance  - Assess pain using appropriate pain scale  - Administer analgesics as ordered based on type and severity of pain and evaluate response  - Implement non-pharmacological measures as appropriate and evaluate response  - Consider cultural and social influences on pain and pain management  - Notify physician/advanced practitioner if interventions unsuccessful or patient reports new pain  - Educate patient/family on pain management process including their role and importance of  reporting pain   - Provide non-pharmacologic/complimentary pain relief interventions  Outcome: Progressing     Problem: INFECTION - ADULT  Goal: Absence or prevention of progression during hospitalization  Description: INTERVENTIONS:  - Assess and monitor for signs and symptoms of infection  - Monitor lab/diagnostic results  - Monitor all insertion sites, i.e. indwelling lines,  tubes, and drains  - Monitor endotracheal if appropriate and nasal secretions for changes in amount and color  - [place] appropriate cooling/warming therapies per order  - Administer medications as ordered  - Instruct and encourage patient and family to use good hand hygiene technique  - Identify and instruct in appropriate isolation precautions for identified infection/condition  Outcome: Progressing  Goal: Absence of fever/infection during neutropenic period  Description: INTERVENTIONS:  - Monitor WBC  - Perform strict hand hygiene  - Limit to healthy visitors only  - No plants, dried, fresh or silk flowers with garcia in patient room  Outcome: Progressing     Problem: DISCHARGE PLANNING  Goal: Discharge to home or other facility with appropriate resources  Description: INTERVENTIONS:  - Identify barriers to discharge w/patient and caregiver  - Arrange for needed discharge resources and transportation as appropriate  - Identify discharge learning needs (meds, wound care, etc.)  - Arrange for interpretive services to assist at discharge as needed  - Refer to Case Management Department for coordinating discharge planning if the patient needs post-hospital services based on physician/advanced practitioner order or complex needs related to functional status, cognitive ability, or social support system  Outcome: Progressing     Problem: Knowledge Deficit  Goal: Patient/family/caregiver demonstrates understanding of disease process, treatment plan, medications, and discharge instructions  Description: Complete learning assessment and assess knowledge base.  Interventions:  - Provide teaching at level of understanding  - Provide teaching via preferred learning methods  Outcome: Progressing     Problem: CARDIOVASCULAR - ADULT  Goal: Maintains optimal cardiac output and hemodynamic stability  Description: INTERVENTIONS:  - Monitor I/O, vital signs and rhythm  - Monitor for S/S and trends of decreased cardiac output  -  Administer and titrate ordered vasoactive medications to optimize hemodynamic stability  - Assess quality of pulses, skin color and temperature  - Assess for signs of decreased coronary artery perfusion  - Instruct patient to report change in severity of symptoms  Outcome: Progressing  Goal: Absence of cardiac dysrhythmias or at baseline rhythm  Description: INTERVENTIONS:  - Continuous cardiac monitoring, vital signs, obtain 12 lead EKG if ordered  - Administer antiarrhythmic and heart rate control medications as ordered  - Monitor electrolytes and administer replacement therapy as ordered  Outcome: Progressing     Problem: RESPIRATORY - ADULT  Goal: Achieves optimal ventilation and oxygenation  Description: INTERVENTIONS:  - Assess for changes in respiratory status  - Assess for changes in mentation and behavior  - Position to facilitate oxygenation and minimize respiratory effort  - Oxygen administered by appropriate delivery if ordered  - Initiate smoking cessation education as indicated  - Encourage broncho-pulmonary hygiene including cough, deep breathe, Incentive Spirometry  - Assess the need for suctioning and aspirate as needed  - Assess and instruct to report SOB or any respiratory difficulty  - Respiratory Therapy support as indicated  Outcome: Progressing     Problem: Prexisting or High Potential for Compromised Skin Integrity  Goal: Skin integrity is maintained or improved  Description: INTERVENTIONS:  - Identify patients at risk for skin breakdown  - Assess and monitor skin integrity including under and around medical devices   - Assess and monitor nutrition and hydration status  - Monitor labs  - Assess for incontinence   - Turn and reposition patient  - Assist with mobility/ambulation  - Relieve pressure over cristiane prominences   - Avoid friction and shearing  - Provide appropriate hygiene as needed including keeping skin clean and dry  - Evaluate need for skin moisturizer/barrier cream  - Collaborate  with interdisciplinary team  - Patient/family teaching  - Consider wound care consult    Assess:  - Review Myles scale daily  - Clean and moisturize skin every -  - Inspect skin when repositioning, toileting, and assisting with ADLS  - Assess under medical devices such as - every -  - Assess extremities for adequate circulation and sensation     Bed Management:  - Have minimal linens on bed & keep smooth, unwrinkled  - Change linens as needed when moist or perspiring  - Avoid sitting or lying in one position for more than - hours while in bed?Keep HOB at -degrees   - Toileting:  - Offer bedside commode  - Assess for incontinence every -  - Use incontinent care products after each incontinent episode such as -    Activity:-  - Mobilize patient - times a day  - Encourage activity and walks on unit  - Encourage or provide ROM exercises   - Turn and reposition patient every - Hours  - Use appropriate equipment to lift or move patient in bed  - Instruct/ Assist with weight shifting every - when out of bed in chair  - Consider limitation of chair time - hour intervals    Skin Care:  - Avoid use of baby powder, tape, friction and shearing, hot water or constrictive clothing  - Relieve pressure over bony prominences using -  - Do not massage red bony areas    Next Steps:  - Teach patient strategies to minimize risks such as -  - Consider consults to  interdisciplinary teams such as -  Outcome: Progressing     Problem: Nutrition/Hydration-ADULT  Goal: Nutrient/Hydration intake appropriate for improving, restoring or maintaining nutritional needs  Description: Monitor and assess patient's nutrition/hydration status for malnutrition. Collaborate with interdisciplinary team and initiate plan and interventions as ordered.  Monitor patient's weight and dietary intake as ordered or per policy. Utilize nutrition screening tool and intervene as necessary. Determine patient's food preferences and provide high-protein, high-caloric  foods as appropriate.     INTERVENTIONS:  - Monitor oral intake, urinary output, labs, and treatment plans  - Assess nutrition and hydration status and recommend course of action  - Evaluate amount of meals eaten  - Assist patient with eating if necessary   - Allow adequate time for meals  - Recommend/ encourage appropriate diets, oral nutritional supplements, and vitamin/mineral supplements  - Order, calculate, and assess calorie counts as needed  - Recommend, monitor, and adjust tube feedings and TPN/PPN based on assessed needs  - Assess need for intravenous fluids  - Provide specific nutrition/hydration education as appropriate  - Include patient/family/caregiver in decisions related to nutrition  Outcome: Progressing

## 2025-05-21 NOTE — PHYSICAL THERAPY NOTE
05/21/25 1430   PT Last Visit   PT Visit Date 05/21/25   Note Type   Note Type Cancelled Session   Cancel Reasons Other                                         Physical Therapy Cancellation Note     Patient currently unavailable and busy working with OT.  Will continue to offer PT treatment as ordered and will progress as able when patient is available to participate.       Lali Wilson, PTA

## 2025-05-21 NOTE — PLAN OF CARE
Problem: Potential for Falls  Goal: Patient will remain free of falls  Description: INTERVENTIONS:  - Educate patient/family on patient safety including physical limitations  - Instruct patient to call for assistance with activity   - Consider consulting OT/PT to assist with strengthening/mobility based on AM PAC & JH-HLM score  - Consult OT/PT to assist with strengthening/mobility   - Keep Call bell within reach  - Keep bed low and locked with side rails adjusted as appropriate  - Keep care items and personal belongings within reach  - Initiate and maintain comfort rounds  - Make Fall Risk Sign visible to staff  - Offer Toileting every 2 Hours, in advance of need  - Initiate/Maintain bed alarm  - Obtain necessary fall risk management equipment: non skid socks  - Apply yellow socks and bracelet for high fall risk patients  - Consider moving patient to room near nurses station  Outcome: Progressing     Problem: PAIN - ADULT  Goal: Verbalizes/displays adequate comfort level or baseline comfort level  Description: Interventions:  - Encourage patient to monitor pain and request assistance  - Assess pain using appropriate pain scale  - Administer analgesics as ordered based on type and severity of pain and evaluate response  - Implement non-pharmacological measures as appropriate and evaluate response  - Consider cultural and social influences on pain and pain management  - Notify physician/advanced practitioner if interventions unsuccessful or patient reports new pain  - Educate patient/family on pain management process including their role and importance of  reporting pain   - Provide non-pharmacologic/complimentary pain relief interventions  Outcome: Progressing     Problem: INFECTION - ADULT  Goal: Absence or prevention of progression during hospitalization  Description: INTERVENTIONS:  - Assess and monitor for signs and symptoms of infection  - Monitor lab/diagnostic results  - Monitor all insertion sites, i.e.  indwelling lines, tubes, and drains  - Monitor endotracheal if appropriate and nasal secretions for changes in amount and color  - [place] appropriate cooling/warming therapies per order  - Administer medications as ordered  - Instruct and encourage patient and family to use good hand hygiene technique  - Identify and instruct in appropriate isolation precautions for identified infection/condition  Outcome: Progressing  Goal: Absence of fever/infection during neutropenic period  Description: INTERVENTIONS:  - Monitor WBC  - Perform strict hand hygiene  - Limit to healthy visitors only  - No plants, dried, fresh or silk flowers with garcia in patient room  Outcome: Progressing     Problem: DISCHARGE PLANNING  Goal: Discharge to home or other facility with appropriate resources  Description: INTERVENTIONS:  - Identify barriers to discharge w/patient and caregiver  - Arrange for needed discharge resources and transportation as appropriate  - Identify discharge learning needs (meds, wound care, etc.)  - Arrange for interpretive services to assist at discharge as needed  - Refer to Case Management Department for coordinating discharge planning if the patient needs post-hospital services based on physician/advanced practitioner order or complex needs related to functional status, cognitive ability, or social support system  Outcome: Progressing     Problem: Knowledge Deficit  Goal: Patient/family/caregiver demonstrates understanding of disease process, treatment plan, medications, and discharge instructions  Description: Complete learning assessment and assess knowledge base.  Interventions:  - Provide teaching at level of understanding  - Provide teaching via preferred learning methods  Outcome: Progressing     Problem: CARDIOVASCULAR - ADULT  Goal: Maintains optimal cardiac output and hemodynamic stability  Description: INTERVENTIONS:  - Monitor I/O, vital signs and rhythm  - Monitor for S/S and trends of decreased  cardiac output  - Administer and titrate ordered vasoactive medications to optimize hemodynamic stability  - Assess quality of pulses, skin color and temperature  - Assess for signs of decreased coronary artery perfusion  - Instruct patient to report change in severity of symptoms  Outcome: Progressing  Goal: Absence of cardiac dysrhythmias or at baseline rhythm  Description: INTERVENTIONS:  - Continuous cardiac monitoring, vital signs, obtain 12 lead EKG if ordered  - Administer antiarrhythmic and heart rate control medications as ordered  - Monitor electrolytes and administer replacement therapy as ordered  Outcome: Progressing     Problem: RESPIRATORY - ADULT  Goal: Achieves optimal ventilation and oxygenation  Description: INTERVENTIONS:  - Assess for changes in respiratory status  - Assess for changes in mentation and behavior  - Position to facilitate oxygenation and minimize respiratory effort  - Oxygen administered by appropriate delivery if ordered  - Initiate smoking cessation education as indicated  - Encourage broncho-pulmonary hygiene including cough, deep breathe, Incentive Spirometry  - Assess the need for suctioning and aspirate as needed  - Assess and instruct to report SOB or any respiratory difficulty  - Respiratory Therapy support as indicated  Outcome: Progressing     Problem: Nutrition/Hydration-ADULT  Goal: Nutrient/Hydration intake appropriate for improving, restoring or maintaining nutritional needs  Description: Monitor and assess patient's nutrition/hydration status for malnutrition. Collaborate with interdisciplinary team and initiate plan and interventions as ordered.  Monitor patient's weight and dietary intake as ordered or per policy. Utilize nutrition screening tool and intervene as necessary. Determine patient's food preferences and provide high-protein, high-caloric foods as appropriate.     INTERVENTIONS:  - Monitor oral intake, urinary output, labs, and treatment plans  - Assess  nutrition and hydration status and recommend course of action  - Evaluate amount of meals eaten  - Assist patient with eating if necessary   - Allow adequate time for meals  - Recommend/ encourage appropriate diets, oral nutritional supplements, and vitamin/mineral supplements  - Order, calculate, and assess calorie counts as needed  - Recommend, monitor, and adjust tube feedings and TPN/PPN based on assessed needs  - Assess need for intravenous fluids  - Provide specific nutrition/hydration education as appropriate  - Include patient/family/caregiver in decisions related to nutrition  Outcome: Progressing

## 2025-05-21 NOTE — HOSPICE NOTE
Received referral, patient approved for [facility] hospice (at home or SNF). I met with patient at bedside hospice services and medicare guidelines were reviewed with her and she was agreeable. She is wanting to have a meeting with all 3 of her children present as well. She was under the impression there would be an in person meeting on Friday. I am not available to meet in person on Friday but I could be available via phone. I also offered to call her children and review services via a conference call as well, but she was adamant on them all being present.  I updated FADY Olivares. I will continue to follow patient

## 2025-05-22 LAB
GLUCOSE SERPL-MCNC: 105 MG/DL (ref 65–140)
GLUCOSE SERPL-MCNC: 150 MG/DL (ref 65–140)
GLUCOSE SERPL-MCNC: 193 MG/DL (ref 65–140)
GLUCOSE SERPL-MCNC: 282 MG/DL (ref 65–140)

## 2025-05-22 PROCEDURE — 82948 REAGENT STRIP/BLOOD GLUCOSE: CPT

## 2025-05-22 PROCEDURE — 99232 SBSQ HOSP IP/OBS MODERATE 35: CPT | Performed by: INTERNAL MEDICINE

## 2025-05-22 PROCEDURE — 94640 AIRWAY INHALATION TREATMENT: CPT

## 2025-05-22 PROCEDURE — 94760 N-INVAS EAR/PLS OXIMETRY 1: CPT

## 2025-05-22 PROCEDURE — 94664 DEMO&/EVAL PT USE INHALER: CPT

## 2025-05-22 RX ADMIN — HEPARIN SODIUM 5000 UNITS: 5000 INJECTION INTRAVENOUS; SUBCUTANEOUS at 06:40

## 2025-05-22 RX ADMIN — INSULIN LISPRO 4 UNITS: 100 INJECTION, SOLUTION INTRAVENOUS; SUBCUTANEOUS at 15:33

## 2025-05-22 RX ADMIN — ATORVASTATIN CALCIUM 40 MG: 40 TABLET, FILM COATED ORAL at 08:08

## 2025-05-22 RX ADMIN — LEVALBUTEROL HYDROCHLORIDE 1.25 MG: 1.25 SOLUTION RESPIRATORY (INHALATION) at 08:13

## 2025-05-22 RX ADMIN — LIDOCAINE 5% 1 PATCH: 700 PATCH TOPICAL at 08:07

## 2025-05-22 RX ADMIN — DICYCLOMINE HYDROCHLORIDE 10 MG: 10 CAPSULE ORAL at 11:04

## 2025-05-22 RX ADMIN — SUCRALFATE 1 G: 1 TABLET ORAL at 16:57

## 2025-05-22 RX ADMIN — PREDNISONE 40 MG: 20 TABLET ORAL at 08:08

## 2025-05-22 RX ADMIN — DICYCLOMINE HYDROCHLORIDE 10 MG: 10 CAPSULE ORAL at 06:40

## 2025-05-22 RX ADMIN — LEVALBUTEROL HYDROCHLORIDE 1.25 MG: 1.25 SOLUTION RESPIRATORY (INHALATION) at 20:09

## 2025-05-22 RX ADMIN — INSULIN LISPRO 1 UNITS: 100 INJECTION, SOLUTION INTRAVENOUS; SUBCUTANEOUS at 11:01

## 2025-05-22 RX ADMIN — IPRATROPIUM BROMIDE 0.5 MG: 0.5 SOLUTION RESPIRATORY (INHALATION) at 20:09

## 2025-05-22 RX ADMIN — DOCUSATE SODIUM 100 MG: 100 CAPSULE, LIQUID FILLED ORAL at 08:08

## 2025-05-22 RX ADMIN — SUCRALFATE 1 G: 1 TABLET ORAL at 08:08

## 2025-05-22 RX ADMIN — NICOTINE 21 MG: 21 PATCH, EXTENDED RELEASE TRANSDERMAL at 08:07

## 2025-05-22 RX ADMIN — SUCRALFATE 1 G: 1 TABLET ORAL at 11:04

## 2025-05-22 RX ADMIN — FAMOTIDINE 20 MG: 20 TABLET, FILM COATED ORAL at 08:08

## 2025-05-22 RX ADMIN — DOCUSATE SODIUM 100 MG: 100 CAPSULE, LIQUID FILLED ORAL at 16:57

## 2025-05-22 RX ADMIN — ASPIRIN 81 MG: 81 TABLET, COATED ORAL at 08:08

## 2025-05-22 RX ADMIN — HEPARIN SODIUM 5000 UNITS: 5000 INJECTION INTRAVENOUS; SUBCUTANEOUS at 21:20

## 2025-05-22 RX ADMIN — SUCRALFATE 1 G: 1 TABLET ORAL at 21:20

## 2025-05-22 RX ADMIN — FAMOTIDINE 20 MG: 20 TABLET, FILM COATED ORAL at 16:57

## 2025-05-22 RX ADMIN — IPRATROPIUM BROMIDE 0.5 MG: 0.5 SOLUTION RESPIRATORY (INHALATION) at 08:13

## 2025-05-22 RX ADMIN — DICYCLOMINE HYDROCHLORIDE 10 MG: 10 CAPSULE ORAL at 15:33

## 2025-05-22 RX ADMIN — AZITHROMYCIN DIHYDRATE 500 MG: 250 TABLET, FILM COATED ORAL at 15:33

## 2025-05-22 RX ADMIN — HEPARIN SODIUM 5000 UNITS: 5000 INJECTION INTRAVENOUS; SUBCUTANEOUS at 13:31

## 2025-05-22 RX ADMIN — IPRATROPIUM BROMIDE 0.5 MG: 0.5 SOLUTION RESPIRATORY (INHALATION) at 13:42

## 2025-05-22 RX ADMIN — LEVALBUTEROL HYDROCHLORIDE 1.25 MG: 1.25 SOLUTION RESPIRATORY (INHALATION) at 13:42

## 2025-05-22 RX ADMIN — FLUTICASONE FUROATE AND VILANTEROL TRIFENATATE 1 PUFF: 100; 25 POWDER RESPIRATORY (INHALATION) at 08:07

## 2025-05-22 NOTE — PLAN OF CARE
Problem: Potential for Falls  Goal: Patient will remain free of falls  Description: INTERVENTIONS:  - Educate patient/family on patient safety including physical limitations  - Instruct patient to call for assistance with activity   - Consider consulting OT/PT to assist with strengthening/mobility based on AM PAC & JH-HLM score  - Consult OT/PT to assist with strengthening/mobility   - Keep Call bell within reach  - Keep bed low and locked with side rails adjusted as appropriate  - Keep care items and personal belongings within reach  - Initiate and maintain comfort rounds  - Make Fall Risk Sign visible to staff  - Offer Toileting every  Hours, in advance of need  - Initiate/Maintain alarm  - Obtain necessary fall risk management equipment:   - Apply yellow socks and bracelet for high fall risk patients  - Consider moving patient to room near nurses station  Outcome: Progressing     Problem: PAIN - ADULT  Goal: Verbalizes/displays adequate comfort level or baseline comfort level  Description: Interventions:  - Encourage patient to monitor pain and request assistance  - Assess pain using appropriate pain scale  - Administer analgesics as ordered based on type and severity of pain and evaluate response  - Implement non-pharmacological measures as appropriate and evaluate response  - Consider cultural and social influences on pain and pain management  - Notify physician/advanced practitioner if interventions unsuccessful or patient reports new pain  - Educate patient/family on pain management process including their role and importance of  reporting pain   - Provide non-pharmacologic/complimentary pain relief interventions  Outcome: Progressing     Problem: INFECTION - ADULT  Goal: Absence or prevention of progression during hospitalization  Description: INTERVENTIONS:  - Assess and monitor for signs and symptoms of infection  - Monitor lab/diagnostic results  - Monitor all insertion sites, i.e. indwelling lines,  tubes, and drains  - Monitor endotracheal if appropriate and nasal secretions for changes in amount and color  - [place] appropriate cooling/warming therapies per order  - Administer medications as ordered  - Instruct and encourage patient and family to use good hand hygiene technique  - Identify and instruct in appropriate isolation precautions for identified infection/condition  Outcome: Progressing  Goal: Absence of fever/infection during neutropenic period  Description: INTERVENTIONS:  - Monitor WBC  - Perform strict hand hygiene  - Limit to healthy visitors only  - No plants, dried, fresh or silk flowers with garcia in patient room  Outcome: Progressing     Problem: DISCHARGE PLANNING  Goal: Discharge to home or other facility with appropriate resources  Description: INTERVENTIONS:  - Identify barriers to discharge w/patient and caregiver  - Arrange for needed discharge resources and transportation as appropriate  - Identify discharge learning needs (meds, wound care, etc.)  - Arrange for interpretive services to assist at discharge as needed  - Refer to Case Management Department for coordinating discharge planning if the patient needs post-hospital services based on physician/advanced practitioner order or complex needs related to functional status, cognitive ability, or social support system  Outcome: Progressing     Problem: Knowledge Deficit  Goal: Patient/family/caregiver demonstrates understanding of disease process, treatment plan, medications, and discharge instructions  Description: Complete learning assessment and assess knowledge base.  Interventions:  - Provide teaching at level of understanding  - Provide teaching via preferred learning methods  Outcome: Progressing     Problem: CARDIOVASCULAR - ADULT  Goal: Maintains optimal cardiac output and hemodynamic stability  Description: INTERVENTIONS:  - Monitor I/O, vital signs and rhythm  - Monitor for S/S and trends of decreased cardiac output  -  Administer and titrate ordered vasoactive medications to optimize hemodynamic stability  - Assess quality of pulses, skin color and temperature  - Assess for signs of decreased coronary artery perfusion  - Instruct patient to report change in severity of symptoms  Outcome: Progressing  Goal: Absence of cardiac dysrhythmias or at baseline rhythm  Description: INTERVENTIONS:  - Continuous cardiac monitoring, vital signs, obtain 12 lead EKG if ordered  - Administer antiarrhythmic and heart rate control medications as ordered  - Monitor electrolytes and administer replacement therapy as ordered  Outcome: Progressing     Problem: RESPIRATORY - ADULT  Goal: Achieves optimal ventilation and oxygenation  Description: INTERVENTIONS:  - Assess for changes in respiratory status  - Assess for changes in mentation and behavior  - Position to facilitate oxygenation and minimize respiratory effort  - Oxygen administered by appropriate delivery if ordered  - Initiate smoking cessation education as indicated  - Encourage broncho-pulmonary hygiene including cough, deep breathe, Incentive Spirometry  - Assess the need for suctioning and aspirate as needed  - Assess and instruct to report SOB or any respiratory difficulty  - Respiratory Therapy support as indicated  Outcome: Progressing     Problem: Prexisting or High Potential for Compromised Skin Integrity  Goal: Skin integrity is maintained or improved  Description: INTERVENTIONS:  - Identify patients at risk for skin breakdown  - Assess and monitor skin integrity including under and around medical devices   - Assess and monitor nutrition and hydration status  - Monitor labs  - Assess for incontinence   - Turn and reposition patient  - Assist with mobility/ambulation  - Relieve pressure over cristiane prominences   - Avoid friction and shearing  - Provide appropriate hygiene as needed including keeping skin clean and dry  - Evaluate need for skin moisturizer/barrier cream  - Collaborate  with interdisciplinary team  - Patient/family teaching  - Consider wound care consult    Assess:  - Review Myles scale daily  - Clean and moisturize skin every   - Inspect skin when repositioning, toileting, and assisting with ADLS  - Assess under medical devices such as  every  - Assess extremities for adequate circulation and sensation     Bed Management:  - Have minimal linens on bed & keep smooth, unwrinkled  - Change linens as needed when moist or perspiring  - Avoid sitting or lying in one position for more than hours while in bed?Keep HOB at degrees   - Toileting:  - Offer bedside commode  - Assess for incontinence every   - Use incontinent care products after each incontinent episode such as     Activity:  - Mobilize patient  times a day  - Encourage activity and walks on unit  - Encourage or provide ROM exercises   - Turn and reposition patient every  Hours  - Use appropriate equipment to lift or move patient in bed  - Instruct/ Assist with weight shifting every  when out of bed in chair  - Consider limitation of chair time  hour intervals    Skin Care:  - Avoid use of baby powder, tape, friction and shearing, hot water or constrictive clothing  - Relieve pressure over bony prominences using   - Do not massage red bony areas    Next Steps:  - Teach patient strategies to minimize risks such as   - Consider consults to  interdisciplinary teams such as   Outcome: Progressing     Problem: Nutrition/Hydration-ADULT  Goal: Nutrient/Hydration intake appropriate for improving, restoring or maintaining nutritional needs  Description: Monitor and assess patient's nutrition/hydration status for malnutrition. Collaborate with interdisciplinary team and initiate plan and interventions as ordered.  Monitor patient's weight and dietary intake as ordered or per policy. Utilize nutrition screening tool and intervene as necessary. Determine patient's food preferences and provide high-protein, high-caloric foods as  appropriate.     INTERVENTIONS:  - Monitor oral intake, urinary output, labs, and treatment plans  - Assess nutrition and hydration status and recommend course of action  - Evaluate amount of meals eaten  - Assist patient with eating if necessary   - Allow adequate time for meals  - Recommend/ encourage appropriate diets, oral nutritional supplements, and vitamin/mineral supplements  - Order, calculate, and assess calorie counts as needed  - Recommend, monitor, and adjust tube feedings and TPN/PPN based on assessed needs  - Assess need for intravenous fluids  - Provide specific nutrition/hydration education as appropriate  - Include patient/family/caregiver in decisions related to nutrition  Outcome: Progressing

## 2025-05-22 NOTE — ASSESSMENT & PLAN NOTE
Malnutrition Findings:   Adult Malnutrition type: Chronic illness  Adult Degree of Malnutrition: Malnutrition of moderate degree  Malnutrition Characteristics: Fat loss, Muscle loss              Continue with nutritional supplementation.  BMI 17.    360 Statement: malnutrition of moderate degree in setting of chronic illness, evidenced by muscle loss/wasting of temporalis muscle, clavicles, deltoids, scapula, body fat/loss os subcutaneous fat/orbitals/triceps, treated with liberal diet and supplements    BMI Findings:  Adult BMI Classifications: Underweight < 18.5        Body mass index is 17.78 kg/m².

## 2025-05-22 NOTE — PROGRESS NOTES
Progress Note - Hospitalist   Name: Ileana Seaman 65 y.o. female I MRN: [id number]  Unit/Bed#: -01 I Date of Admission: 5/18/2025   Date of Service: 5/22/2025 I Hospital Day: 4    Assessment & Plan  Mycobacterial infection, non-TB  Follows with infectious disease outpatient for MARIS infection.  Unable to tolerate therapy due to hepatotoxicity while on rifabutin.  Instead they are monitoring sputum cultures closely off treatment.  CXR persistent pneumonia in right upper lobe   Previous CTA chest: Worsening right upper lobe pneumonia and new superior segment right lower lobe pneumonia  COVID/Flu/RSV negative    Previous AFB negative  Blood Cultures ordered  ID consulted given history of MARIS and recurrent  PNA.  discussed with pulmonology will defer to infectious disease if they want repeat bronchoscopy done and deep cultures are not.  Pulmonary recommending continuing pulmonary toilet, bronchodilators and steroids for underlying COPD exacerbation.  If patient fails to improve, recommendation will be for repeating sputum AFB and bronchoscopy and biopsy. ID recommending outpatient follow-up with pulmonology and infectious disease to consider resuming therapy or graded challenge.  Will also place recommendations for palliative medicine referral regarding advanced COPD and recurrent hospitalization upon discharge.  Patient met with palliative care team today and I had ongoing goals of care discussion with her as well.  She has decided to opt for home hospice.  Sepsis without acute organ dysfunction (HCC)  Patient presents with low-grade fever, tachycardia, tachypnea and leukocytosis appears to have persistent pneumonia on chest x-ray.   Continue with antibiotics and supportive care.  Patient remains clinically improved afebrile hemodynamically stable with mild persistent leukocytosis.  Mixed simple and mucopurulent chronic bronchitis (HCC)  Place On DuoNeb treatments . place on 5 days of prednisone 40 mg daily.     Appreciate Pulmonology for evaluation  Asked case management to prepare high utilizer plan for her.    Continue azithromycin for the 3 days  Infectious disease and pulmonary input noted.  Patient has been placed on nebulized only treatment for 2 days.  Pulmonology recommended prednisone 40 mg for 5 days.  Type 2 diabetes mellitus without complication, without long-term current use of insulin ([facility])  Lab Results   Component Value Date    HGBA1C 7.4 (H) 05/02/2025       Recent Labs     05/21/25  1054 05/21/25  1545 05/21/25  2043 05/22/25  0712   POCGLU 133 327* 170* 105       Blood Sugar Average: Last 72 hrs:  (P) [id number]  Placed on Insulin sliding scale. Blood sugars are controlled    Goals of care, counseling/discussion  Extensive goals of care discussion with patient at bedside.  Explained course of her disease and that she has very advanced COPD with likely untreated mycobacterial infection.  Patient had decided to pursue home hospice.  This was discussed extensively with her per palliative medicine as well.  Hospice consulted.  Palliative care by specialist    Severe protein-calorie malnutrition (HCC)  Malnutrition Findings:   Adult Malnutrition type: Chronic illness  Adult Degree of Malnutrition: Malnutrition of moderate degree  Malnutrition Characteristics: Fat loss, Muscle loss              Continue with nutritional supplementation.  BMI 17.    360 Statement: malnutrition of moderate degree in setting of chronic illness, evidenced by muscle loss/wasting of temporalis muscle, clavicles, deltoids, scapula, body fat/loss os subcutaneous fat/orbitals/triceps, treated with liberal diet and supplements    BMI Findings:  Adult BMI Classifications: Underweight < 18.5        Body mass index is 17.78 kg/m².       VTE Pharmacologic Prophylaxis:   High Risk (Score >/= 5) - Pharmacological DVT Prophylaxis Ordered: heparin. Sequential Compression Devices Ordered.    Mobility:   Basic Mobility Inpatient Raw  Score: 21  JH-HLM Goal: 6: Walk 10 steps or more  JH-HLM Achieved: 7: Walk 25 feet or more  JH-HLM Goal achieved. Continue to encourage appropriate mobility.    Patient Centered Rounds: I performed bedside rounds with nursing staff today.   Discussions with Specialists or Other Care Team Provider: Discussed with case management    Education and Discussions with Family / Patient: Updated  (daughter) via phone.    Current Length of Stay: 4 day(s)  Current Patient Status: Inpatient   Certification Statement: The patient will continue to require additional inpatient hospital stay due to await home hospice set up  Discharge Plan: Anticipate discharge in 24-48 hrs to home with home services.    Code Status: Level 3 - DNAR and DNI    Subjective   Patient seen and examined at bedside.  Complains of intermittent abdominal discomfort.  Denies any worsening shortness of breath productive cough fever or chills.  Remains on 2 L supplemental oxygen via nasal cannula.    Objective :  Temp:  [97 °F (36.1 °C)-97.2 °F (36.2 °C)] 97.2 °F (36.2 °C)  HR:  [83-99] 83  BP: (101-148)/(68-84) 148/81  Resp:  [17-18] 18  SpO2:  [94 %-97 %] 96 %  O2 Device: Nasal cannula  Nasal Cannula O2 Flow Rate (L/min):  [2 L/min] 2 L/min    Body mass index is 17.78 kg/m².     Input and Output Summary (last 24 hours):     Intake/Output Summary (Last 24 hours) at 5/22/2025 1022  Last data filed at 5/22/2025 0921  Gross per 24 hour   Intake 780 ml   Output 600 ml   Net 180 ml       Physical Exam  HENT:      Head: Normocephalic.      Nose: Nose normal.      Mouth/Throat:      Mouth: Mucous membranes are moist.     Eyes:      Extraocular Movements: Extraocular movements intact.      Pupils: Pupils are equal, round, and reactive to light.       Cardiovascular:      Rate and Rhythm: Normal rate and regular rhythm.   Pulmonary:      Comments: Diminished breath sounds bilaterally.  No wheezing or rales appreciated.  Abdominal:      General: There is  no distension.      Palpations: Abdomen is soft.     Musculoskeletal:      Cervical back: Neck supple.      Right lower leg: No edema.      Left lower leg: No edema.      Comments: No calf tenderness     Neurological:      General: No focal deficit present.      Mental Status: She is alert and oriented to person, place, and time. Mental status is at baseline.     Psychiatric:      Comments: Anxious and tremulous           Lines/Drains:              Lab Results: I have reviewed the following results:   Results from last 7 days   Lab Units 05/19/25  0510 05/18/25  1205   WBC Thousand/uL 12.03* 12.55*   HEMOGLOBIN g/dL 11.1* 13.0   HEMATOCRIT % 35.0 41.8   PLATELETS Thousands/uL 223 244   SEGS PCT %  --  85*   LYMPHO PCT %  --  8*   MONO PCT %  --  7   EOS PCT %  --  0     Results from last 7 days   Lab Units 05/19/25  0510   SODIUM mmol/L 141   POTASSIUM mmol/L 4.0   CHLORIDE mmol/L 106   CO2 mmol/L 30   BUN mg/dL 10   CREATININE mg/dL 0.45*   ANION GAP mmol/L 5   CALCIUM mg/dL 8.8   ALBUMIN g/dL 3.1*   TOTAL BILIRUBIN mg/dL 0.29   ALK PHOS U/L 72   ALT U/L 11   AST U/L 7*   GLUCOSE RANDOM mg/dL 148*     Results from last 7 days   Lab Units 05/18/25  1205   INR  1.08     Results from last 7 days   Lab Units 05/22/25  0712 05/21/25  2043 05/21/25  1545 05/21/25  1054 05/21/25  0708 05/20/25  2059 05/20/25  1545 05/20/25  1100 05/20/25  0719 05/19/25  2108 05/19/25  1550 05/19/25  1052   POC GLUCOSE mg/dl 105 170* 327* 133 93 188* 267* 219* 135 240* 231* 110         Results from last 7 days   Lab Units 05/19/25  0510 05/18/25  1419 05/18/25  1205   LACTIC ACID mmol/L  --   --  1.6   PROCALCITONIN ng/ml 0.15 <0.05 <0.05       Recent Cultures (last 7 days):   Results from last 7 days   Lab Units 05/18/25  1244 05/18/25  1205   BLOOD CULTURE  No Growth at 72 hrs. No Growth at 72 hrs.             Last 24 Hours Medication List:     Current Facility-Administered Medications:     acetaminophen (TYLENOL) tablet 650 mg, Q6H  PRN    aspirin (ECOTRIN LOW STRENGTH) EC tablet 81 mg, Daily    atorvastatin (LIPITOR) tablet 40 mg, Daily    [DISCONTINUED] cefepime (MAXIPIME) IVPB (premix in dextrose) 2,000 mg 50 mL, Q8H, Last Rate: 2,000 mg (05/19/25 0608) **AND** [COMPLETED] vancomycin (VANCOCIN) IVPB (premix in dextrose) 1,000 mg 200 mL, Once, Last Rate: 1,000 mg (05/18/25 1641) **AND** azithromycin (ZITHROMAX) tablet 500 mg, Q24H **AND** [COMPLETED] MRSA culture, Once **AND** [CANCELED] Pharmacy general consult, Once    dicyclomine (BENTYL) capsule 10 mg, TID AC    docusate sodium (COLACE) capsule 100 mg, BID    famotidine (PEPCID) tablet 20 mg, BID    Fluticasone Furoate-Vilanterol 100-25 mcg/actuation 1 puff, Daily **AND** [DISCONTINUED] umeclidinium 62.5 mcg/actuation inhaler AEPB 1 puff, Daily    heparin (porcine) subcutaneous injection 5,000 Units, Q8H MAYCO    insulin lispro (HumALOG/ADMELOG) 100 units/mL subcutaneous injection 1-6 Units, TID AC **AND** Fingerstick Glucose (POCT), TID AC    ipratropium (ATROVENT) 0.02 % inhalation solution 0.5 mg, TID    ipratropium-albuterol (DUO-NEB) 0.5-2.5 mg/3 mL inhalation solution 3 mL, Q6H PRN    levalbuterol (XOPENEX) inhalation solution 1.25 mg, TID    lidocaine (LIDODERM) 5 % patch 1 patch, Daily    nicotine (NICODERM CQ) 21 mg/24 hr TD 24 hr patch 21 mg, Daily    predniSONE tablet 40 mg, Daily    simethicone (MYLICON) chewable tablet 80 mg, Q6H PRN    sucralfate (CARAFATE) tablet 1 g, 4x Daily    Administrative Statements   Today, Patient Was Seen By: Juana Santana MD      **Please Note: This note may have been constructed using a voice recognition system.**

## 2025-05-22 NOTE — PHYSICAL THERAPY NOTE
05/22/25 1153   PT Last Visit   PT Visit Date 05/22/25   Note Type   Note Type Cancelled Session   Cancel Reasons Other                                          Physical Therapy Cancellation Note     PT treatment cancelled due to patient agreeable and plans to have home hospice care upon DC.         Lali Wilson, PTA

## 2025-05-22 NOTE — PLAN OF CARE
Problem: Potential for Falls  Goal: Patient will remain free of falls  Description: INTERVENTIONS:  - Educate patient/family on patient safety including physical limitations  - Instruct patient to call for assistance with activity   - Consider consulting OT/PT to assist with strengthening/mobility based on AM PAC & JH-HLM score  - Consult OT/PT to assist with strengthening/mobility   - Keep Call bell within reach  - Keep bed low and locked with side rails adjusted as appropriate  - Keep care items and personal belongings within reach  - Initiate and maintain comfort rounds  - Make Fall Risk Sign visible to staff  - Offer Toileting every -- Hours, in advance of need  - Initiate/Maintain -alarm  - Obtain necessary fall risk management equipment: -  - Apply yellow socks and bracelet for high fall risk patients  - Consider moving patient to room near nurses station  Outcome: Progressing     Problem: PAIN - ADULT  Goal: Verbalizes/displays adequate comfort level or baseline comfort level  Description: Interventions:  - Encourage patient to monitor pain and request assistance  - Assess pain using appropriate pain scale  - Administer analgesics as ordered based on type and severity of pain and evaluate response  - Implement non-pharmacological measures as appropriate and evaluate response  - Consider cultural and social influences on pain and pain management  - Notify physician/advanced practitioner if interventions unsuccessful or patient reports new pain  - Educate patient/family on pain management process including their role and importance of  reporting pain   - Provide non-pharmacologic/complimentary pain relief interventions  Outcome: Progressing     Problem: INFECTION - ADULT  Goal: Absence or prevention of progression during hospitalization  Description: INTERVENTIONS:  - Assess and monitor for signs and symptoms of infection  - Monitor lab/diagnostic results  - Monitor all insertion sites, i.e. indwelling lines,  tubes, and drains  - Monitor endotracheal if appropriate and nasal secretions for changes in amount and color  - [place] appropriate cooling/warming therapies per order  - Administer medications as ordered  - Instruct and encourage patient and family to use good hand hygiene technique  - Identify and instruct in appropriate isolation precautions for identified infection/condition  Outcome: Progressing  Goal: Absence of fever/infection during neutropenic period  Description: INTERVENTIONS:  - Monitor WBC  - Perform strict hand hygiene  - Limit to healthy visitors only  - No plants, dried, fresh or silk flowers with garcia in patient room  Outcome: Progressing     Problem: DISCHARGE PLANNING  Goal: Discharge to home or other facility with appropriate resources  Description: INTERVENTIONS:  - Identify barriers to discharge w/patient and caregiver  - Arrange for needed discharge resources and transportation as appropriate  - Identify discharge learning needs (meds, wound care, etc.)  - Arrange for interpretive services to assist at discharge as needed  - Refer to Case Management Department for coordinating discharge planning if the patient needs post-hospital services based on physician/advanced practitioner order or complex needs related to functional status, cognitive ability, or social support system  Outcome: Progressing     Problem: Knowledge Deficit  Goal: Patient/family/caregiver demonstrates understanding of disease process, treatment plan, medications, and discharge instructions  Description: Complete learning assessment and assess knowledge base.  Interventions:  - Provide teaching at level of understanding  - Provide teaching via preferred learning methods  Outcome: Progressing     Problem: CARDIOVASCULAR - ADULT  Goal: Maintains optimal cardiac output and hemodynamic stability  Description: INTERVENTIONS:  - Monitor I/O, vital signs and rhythm  - Monitor for S/S and trends of decreased cardiac output  -  Administer and titrate ordered vasoactive medications to optimize hemodynamic stability  - Assess quality of pulses, skin color and temperature  - Assess for signs of decreased coronary artery perfusion  - Instruct patient to report change in severity of symptoms  Outcome: Progressing  Goal: Absence of cardiac dysrhythmias or at baseline rhythm  Description: INTERVENTIONS:  - Continuous cardiac monitoring, vital signs, obtain 12 lead EKG if ordered  - Administer antiarrhythmic and heart rate control medications as ordered  - Monitor electrolytes and administer replacement therapy as ordered  Outcome: Progressing     Problem: RESPIRATORY - ADULT  Goal: Achieves optimal ventilation and oxygenation  Description: INTERVENTIONS:  - Assess for changes in respiratory status  - Assess for changes in mentation and behavior  - Position to facilitate oxygenation and minimize respiratory effort  - Oxygen administered by appropriate delivery if ordered  - Initiate smoking cessation education as indicated  - Encourage broncho-pulmonary hygiene including cough, deep breathe, Incentive Spirometry  - Assess the need for suctioning and aspirate as needed  - Assess and instruct to report SOB or any respiratory difficulty  - Respiratory Therapy support as indicated  Outcome: Progressing     Problem: Prexisting or High Potential for Compromised Skin Integrity  Goal: Skin integrity is maintained or improved  Description: INTERVENTIONS:  - Identify patients at risk for skin breakdown  - Assess and monitor skin integrity including under and around medical devices   - Assess and monitor nutrition and hydration status  - Monitor labs  - Assess for incontinence   - Turn and reposition patient  - Assist with mobility/ambulation  - Relieve pressure over cristiane prominences   - Avoid friction and shearing  - Provide appropriate hygiene as needed including keeping skin clean and dry  - Evaluate need for skin moisturizer/barrier cream  - Collaborate  with interdisciplinary team  - Patient/family teaching  - Consider wound care consult    Assess:  - Review Myles scale daily  - Clean and moisturize skin every -  - Inspect skin when repositioning, toileting, and assisting with ADLS  - Assess under medical devices such as - every -  - Assess extremities for adequate circulation and sensation     Bed Management:  - Have minimal linens on bed & keep smooth, unwrinkled  - Change linens as needed when moist or perspiring  - Avoid sitting or lying in one position for more than - hours while in bed?Keep HOB at -degrees   - Toileting:  - Offer bedside commode  - Assess for incontinence every -  - Use incontinent care products after each incontinent episode such as -    Activity:  - Mobilize patient - times a day  - Encourage activity and walks on unit  - Encourage or provide ROM exercises   - Turn and reposition patient every - Hours  - Use appropriate equipment to lift or move patient in bed  - Instruct/ Assist with weight shifting every - when out of bed in chair  - Consider limitation of chair time - hour intervals    Skin Care:  - Avoid use of baby powder, tape, friction and shearing, hot water or constrictive clothing  - Relieve pressure over bony prominences using -  - Do not massage red bony areas    Next Steps:  - Teach patient strategies to minimize risks such as -  - Consider consults to  interdisciplinary teams such as -  Outcome: Progressing     Problem: Nutrition/Hydration-ADULT  Goal: Nutrient/Hydration intake appropriate for improving, restoring or maintaining nutritional needs  Description: Monitor and assess patient's nutrition/hydration status for malnutrition. Collaborate with interdisciplinary team and initiate plan and interventions as ordered.  Monitor patient's weight and dietary intake as ordered or per policy. Utilize nutrition screening tool and intervene as necessary. Determine patient's food preferences and provide high-protein, high-caloric  foods as appropriate.     INTERVENTIONS:  - Monitor oral intake, urinary output, labs, and treatment plans  - Assess nutrition and hydration status and recommend course of action  - Evaluate amount of meals eaten  - Assist patient with eating if necessary   - Allow adequate time for meals  - Recommend/ encourage appropriate diets, oral nutritional supplements, and vitamin/mineral supplements  - Order, calculate, and assess calorie counts as needed  - Recommend, monitor, and adjust tube feedings and TPN/PPN based on assessed needs  - Assess need for intravenous fluids  - Provide specific nutrition/hydration education as appropriate  - Include patient/family/caregiver in decisions related to nutrition  Outcome: Progressing

## 2025-05-22 NOTE — ASSESSMENT & PLAN NOTE
Lab Results   Component Value Date    HGBA1C 7.4 (H) 05/02/2025       Recent Labs     05/21/25  1054 05/21/25  1545 05/21/25  2043 05/22/25  0712   POCGLU 133 327* 170* 105       Blood Sugar Average: Last 72 hrs:  (P) [id number]  Placed on Insulin sliding scale. Blood sugars are controlled

## 2025-05-22 NOTE — RESPIRATORY THERAPY NOTE
Respiratory Note     05/22/25 0813   Respiratory Assessment   Assessment Type During-treatment   General Appearance Alert;Awake   Respiratory Pattern Dyspnea with exertion   Chest Assessment Chest expansion symmetrical   Bilateral Breath Sounds Diminished   Cough Non-productive   Resp Comments Pt stable on 2PLM NC, doing well with UDN tx.   O2 Device 2LPM NC

## 2025-05-22 NOTE — CASE MANAGEMENT
Case Management Discharge Planning Note    Patient name Ileana Seaman  Location /-01 MRN [id number]  : 1960 Date 2025       Current Admission Date: 2025  Current Admission Diagnosis:Mycobacterial infection, non-TB   Patient Active Problem List    Diagnosis Date Noted    Goals of care, counseling/discussion 2025    Palliative care by specialist 2025    Sepsis without acute organ dysfunction (HCC) 2025    Severe protein-calorie malnutrition (HCC) 2025    Abdominal pain 2025    Cigarette nicotine dependence without complication 2025    Pneumonia of right lower lobe due to infectious organism 2025    Smoker 2025    Sepsis (HCC) 2025    Anxiety 2024    Mild protein-calorie malnutrition (HCC) 2024    Celiac artery stenosis (HCC) 2024    Elevated liver enzymes 2024    Pulmonary nodule 2024    Moderate protein-calorie malnutrition (HCC) 2024    COPD exacerbation (HCC) 2024    Acute on chronic hypoxic respiratory failure (HCC) 2024    Epigastric pain 2024    New onset atrial fibrillation (HCC) 10/06/2024    Type 2 diabetes mellitus without complication, without long-term current use of insulin (HCC) 10/05/2024    Dysphagia 10/05/2024    Mixed simple and mucopurulent chronic bronchitis (HCC) 10/04/2024    Mycobacterial infection, 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      LOS (days): 4  Geometric Mean LOS (GMLOS) (days): 4.9  Days to GMLOS:0.8     OBJECTIVE:  Risk of Unplanned Readmission Score: 35.95         Current admission status: Inpatient   Preferred Pharmacy:   [id number] - [place], [place] - [address]  [address]  [place] [address]  Phone: [phone] Fax: [phone]    Primary Care Provider: Merline Dinero    Primary Insurance: [facility] [place] DUAL COMPLETE [facility] REP  Secondary Insurance: [facility]    DISCHARGE DETAILS:    Discharge  planning discussed with:: Patient  Freedom of Choice: Yes  Comments - [place] of Choice: [facility] - [facility]          Requested Home Health Care         Is the patient interested in HHC at discharge?: No    DME Referral Provided  Referral made for DME?: No    Other Referral/Resources/Interventions Provided:  Interventions: Hospice  Referral Comments: [facility]    Would you like to participate in our [facility] service program?  : No - Declined    Treatment Team Recommendation: Home, Hospice  Discharge Destination Plan:: Home, Hospice  Transport at Discharge : Family       CM met with patient and confirmed DC plan for home hospice with [facility]. She is agreeable to DC Saturday Monday. Her daughter will transport her home - her tank is here in the hospital.  She is working with her CHRN on meeting to discuss hospice DC. Her sons and daughter are aware of DC plan to home on hospice. Both sons work until 6pm so they struggle to attend meeting in person. Aware of palliative to be in attendance via video and  hospice by phone. Her daughter will be here in person. CM offered if sons are unable to attend - hospice agency can try over the weekend or at a later time with nursing to try and accommodate son's schedule.   She is aware of  Hospice working on DME order ([facility] specifically).   She asked for assistance with financial will - CM said that they will pass along to  hospice to see how to proceed.     CM updated hospice liaison on the same. She was in contact with family and patient. She will continue to support patient in DC planning.    CM to follow patient's care and discharge needs.

## 2025-05-22 NOTE — PLAN OF CARE
Problem: Potential for Falls  Goal: Patient will remain free of falls  Description: INTERVENTIONS:  - Educate patient/family on patient safety including physical limitations  - Instruct patient to call for assistance with activity   - Consider consulting OT/PT to assist with strengthening/mobility based on AM PAC & JH-HLM score  - Consult OT/PT to assist with strengthening/mobility   - Keep Call bell within reach  - Keep bed low and locked with side rails adjusted as appropriate  - Keep care items and personal belongings within reach  - Initiate and maintain comfort rounds  - Make Fall Risk Sign visible to staff  - Offer Toileting every - Hours, in advance of need  - Initiate/Maintain -alarm  - Obtain necessary fall risk management equipment: -  - Apply yellow socks and bracelet for high fall risk patients  - Consider moving patient to room near nurses station  Outcome: Progressing     Problem: PAIN - ADULT  Goal: Verbalizes/displays adequate comfort level or baseline comfort level  Description: Interventions:  - Encourage patient to monitor pain and request assistance  - Assess pain using appropriate pain scale  - Administer analgesics as ordered based on type and severity of pain and evaluate response  - Implement non-pharmacological measures as appropriate and evaluate response  - Consider cultural and social influences on pain and pain management  - Notify physician/advanced practitioner if interventions unsuccessful or patient reports new pain  - Educate patient/family on pain management process including their role and importance of  reporting pain   - Provide non-pharmacologic/complimentary pain relief interventions  Outcome: Progressing     Problem: INFECTION - ADULT  Goal: Absence or prevention of progression during hospitalization  Description: INTERVENTIONS:  - Assess and monitor for signs and symptoms of infection  - Monitor lab/diagnostic results  - Monitor all insertion sites, i.e. indwelling lines,  tubes, and drains  - Monitor endotracheal if appropriate and nasal secretions for changes in amount and color  - [place] appropriate cooling/warming therapies per order  - Administer medications as ordered  - Instruct and encourage patient and family to use good hand hygiene technique  - Identify and instruct in appropriate isolation precautions for identified infection/condition  Outcome: Progressing  Goal: Absence of fever/infection during neutropenic period  Description: INTERVENTIONS:  - Monitor WBC  - Perform strict hand hygiene  - Limit to healthy visitors only  - No plants, dried, fresh or silk flowers with garcia in patient room  Outcome: Progressing     Problem: DISCHARGE PLANNING  Goal: Discharge to home or other facility with appropriate resources  Description: INTERVENTIONS:  - Identify barriers to discharge w/patient and caregiver  - Arrange for needed discharge resources and transportation as appropriate  - Identify discharge learning needs (meds, wound care, etc.)  - Arrange for interpretive services to assist at discharge as needed  - Refer to Case Management Department for coordinating discharge planning if the patient needs post-hospital services based on physician/advanced practitioner order or complex needs related to functional status, cognitive ability, or social support system  Outcome: Progressing     Problem: Knowledge Deficit  Goal: Patient/family/caregiver demonstrates understanding of disease process, treatment plan, medications, and discharge instructions  Description: Complete learning assessment and assess knowledge base.  Interventions:  - Provide teaching at level of understanding  - Provide teaching via preferred learning methods  Outcome: Progressing     Problem: CARDIOVASCULAR - ADULT  Goal: Maintains optimal cardiac output and hemodynamic stability  Description: INTERVENTIONS:  - Monitor I/O, vital signs and rhythm  - Monitor for S/S and trends of decreased cardiac output  -  Administer and titrate ordered vasoactive medications to optimize hemodynamic stability  - Assess quality of pulses, skin color and temperature  - Assess for signs of decreased coronary artery perfusion  - Instruct patient to report change in severity of symptoms  Outcome: Progressing  Goal: Absence of cardiac dysrhythmias or at baseline rhythm  Description: INTERVENTIONS:  - Continuous cardiac monitoring, vital signs, obtain 12 lead EKG if ordered  - Administer antiarrhythmic and heart rate control medications as ordered  - Monitor electrolytes and administer replacement therapy as ordered  Outcome: Progressing     Problem: RESPIRATORY - ADULT  Goal: Achieves optimal ventilation and oxygenation  Description: INTERVENTIONS:  - Assess for changes in respiratory status  - Assess for changes in mentation and behavior  - Position to facilitate oxygenation and minimize respiratory effort  - Oxygen administered by appropriate delivery if ordered  - Initiate smoking cessation education as indicated  - Encourage broncho-pulmonary hygiene including cough, deep breathe, Incentive Spirometry  - Assess the need for suctioning and aspirate as needed  - Assess and instruct to report SOB or any respiratory difficulty  - Respiratory Therapy support as indicated  Outcome: Progressing     Problem: Prexisting or High Potential for Compromised Skin Integrity  Goal: Skin integrity is maintained or improved  Description: INTERVENTIONS:  - Identify patients at risk for skin breakdown  - Assess and monitor skin integrity including under and around medical devices   - Assess and monitor nutrition and hydration status  - Monitor labs  - Assess for incontinence   - Turn and reposition patient  - Assist with mobility/ambulation  - Relieve pressure over cristiane prominences   - Avoid friction and shearing  - Provide appropriate hygiene as needed including keeping skin clean and dry  - Evaluate need for skin moisturizer/barrier cream  - Collaborate  with interdisciplinary team  - Patient/family teaching  - Consider wound care consult    Assess:  - Review Myles scale daily  - Clean and moisturize skin every -  - Inspect skin when repositioning, toileting, and assisting with ADLS  - Assess under medical devices such as - every -  - Assess extremities for adequate circulation and sensation     Bed Management:  - Have minimal linens on bed & keep smooth, unwrinkled  - Change linens as needed when moist or perspiring  - Avoid sitting or lying in one position for more than - hours while in bed?Keep HOB at -degrees   - Toileting:  - Offer bedside commode  - Assess for incontinence every -  - Use incontinent care products after each incontinent episode such as -    Activity:  - Mobilize patient - times a day  - Encourage activity and walks on unit  - Encourage or provide ROM exercises   - Turn and reposition patient every - Hours  - Use appropriate equipment to lift or move patient in bed  - Instruct/ Assist with weight shifting every - when out of bed in chair  - Consider limitation of chair time - hour intervals    Skin Care:  - Avoid use of baby powder, tape, friction and shearing, hot water or constrictive clothing  - Relieve pressure over bony prominences using -  - Do not massage red bony areas    Next Steps:  - Teach patient strategies to minimize risks such as -  - Consider consults to  interdisciplinary teams such as -  Outcome: Progressing     Problem: Nutrition/Hydration-ADULT  Goal: Nutrient/Hydration intake appropriate for improving, restoring or maintaining nutritional needs  Description: Monitor and assess patient's nutrition/hydration status for malnutrition. Collaborate with interdisciplinary team and initiate plan and interventions as ordered.  Monitor patient's weight and dietary intake as ordered or per policy. Utilize nutrition screening tool and intervene as necessary. Determine patient's food preferences and provide high-protein, high-caloric  foods as appropriate.     INTERVENTIONS:  - Monitor oral intake, urinary output, labs, and treatment plans  - Assess nutrition and hydration status and recommend course of action  - Evaluate amount of meals eaten  - Assist patient with eating if necessary   - Allow adequate time for meals  - Recommend/ encourage appropriate diets, oral nutritional supplements, and vitamin/mineral supplements  - Order, calculate, and assess calorie counts as needed  - Recommend, monitor, and adjust tube feedings and TPN/PPN based on assessed needs  - Assess need for intravenous fluids  - Provide specific nutrition/hydration education as appropriate  - Include patient/family/caregiver in decisions related to nutrition  Outcome: Progressing

## 2025-05-22 NOTE — RESPIRATORY THERAPY NOTE
Respiratory Note     05/22/25 0715   Respiratory Assessment   Resp Comments Pt will eat breakfast first, will come back with LUKASZ peralta.

## 2025-05-23 ENCOUNTER — PATIENT OUTREACH (OUTPATIENT)
Dept: CASE MANAGEMENT | Facility: OTHER | Age: 65
End: 2025-05-23

## 2025-05-23 LAB
BACTERIA BLD CULT: NORMAL
BACTERIA BLD CULT: NORMAL
GLUCOSE SERPL-MCNC: 100 MG/DL (ref 65–140)
GLUCOSE SERPL-MCNC: 164 MG/DL (ref 65–140)
GLUCOSE SERPL-MCNC: 172 MG/DL (ref 65–140)
GLUCOSE SERPL-MCNC: 248 MG/DL (ref 65–140)

## 2025-05-23 PROCEDURE — 94640 AIRWAY INHALATION TREATMENT: CPT

## 2025-05-23 PROCEDURE — 94664 DEMO&/EVAL PT USE INHALER: CPT

## 2025-05-23 PROCEDURE — 82948 REAGENT STRIP/BLOOD GLUCOSE: CPT

## 2025-05-23 PROCEDURE — 99232 SBSQ HOSP IP/OBS MODERATE 35: CPT | Performed by: INTERNAL MEDICINE

## 2025-05-23 PROCEDURE — 94760 N-INVAS EAR/PLS OXIMETRY 1: CPT

## 2025-05-23 RX ADMIN — ATORVASTATIN CALCIUM 40 MG: 40 TABLET, FILM COATED ORAL at 07:46

## 2025-05-23 RX ADMIN — HEPARIN SODIUM 5000 UNITS: 5000 INJECTION INTRAVENOUS; SUBCUTANEOUS at 14:03

## 2025-05-23 RX ADMIN — SUCRALFATE 1 G: 1 TABLET ORAL at 11:25

## 2025-05-23 RX ADMIN — IPRATROPIUM BROMIDE 0.5 MG: 0.5 SOLUTION RESPIRATORY (INHALATION) at 20:03

## 2025-05-23 RX ADMIN — IPRATROPIUM BROMIDE 0.5 MG: 0.5 SOLUTION RESPIRATORY (INHALATION) at 07:15

## 2025-05-23 RX ADMIN — INSULIN LISPRO 3 UNITS: 100 INJECTION, SOLUTION INTRAVENOUS; SUBCUTANEOUS at 16:37

## 2025-05-23 RX ADMIN — IPRATROPIUM BROMIDE 0.5 MG: 0.5 SOLUTION RESPIRATORY (INHALATION) at 13:15

## 2025-05-23 RX ADMIN — DOCUSATE SODIUM 100 MG: 100 CAPSULE, LIQUID FILLED ORAL at 07:45

## 2025-05-23 RX ADMIN — LEVALBUTEROL HYDROCHLORIDE 1.25 MG: 1.25 SOLUTION RESPIRATORY (INHALATION) at 20:03

## 2025-05-23 RX ADMIN — DOCUSATE SODIUM 100 MG: 100 CAPSULE, LIQUID FILLED ORAL at 16:37

## 2025-05-23 RX ADMIN — SUCRALFATE 1 G: 1 TABLET ORAL at 16:37

## 2025-05-23 RX ADMIN — FAMOTIDINE 20 MG: 20 TABLET, FILM COATED ORAL at 07:45

## 2025-05-23 RX ADMIN — PREDNISONE 40 MG: 20 TABLET ORAL at 07:46

## 2025-05-23 RX ADMIN — HEPARIN SODIUM 5000 UNITS: 5000 INJECTION INTRAVENOUS; SUBCUTANEOUS at 06:09

## 2025-05-23 RX ADMIN — DICYCLOMINE HYDROCHLORIDE 10 MG: 10 CAPSULE ORAL at 16:37

## 2025-05-23 RX ADMIN — LIDOCAINE 5% 1 PATCH: 700 PATCH TOPICAL at 07:48

## 2025-05-23 RX ADMIN — ASPIRIN 81 MG: 81 TABLET, COATED ORAL at 07:45

## 2025-05-23 RX ADMIN — SUCRALFATE 1 G: 1 TABLET ORAL at 22:25

## 2025-05-23 RX ADMIN — FLUTICASONE FUROATE AND VILANTEROL TRIFENATATE 1 PUFF: 100; 25 POWDER RESPIRATORY (INHALATION) at 07:49

## 2025-05-23 RX ADMIN — LEVALBUTEROL HYDROCHLORIDE 1.25 MG: 1.25 SOLUTION RESPIRATORY (INHALATION) at 07:15

## 2025-05-23 RX ADMIN — HEPARIN SODIUM 5000 UNITS: 5000 INJECTION INTRAVENOUS; SUBCUTANEOUS at 22:25

## 2025-05-23 RX ADMIN — DICYCLOMINE HYDROCHLORIDE 10 MG: 10 CAPSULE ORAL at 11:25

## 2025-05-23 RX ADMIN — AZITHROMYCIN DIHYDRATE 500 MG: 250 TABLET, FILM COATED ORAL at 14:11

## 2025-05-23 RX ADMIN — SUCRALFATE 1 G: 1 TABLET ORAL at 07:45

## 2025-05-23 RX ADMIN — DICYCLOMINE HYDROCHLORIDE 10 MG: 10 CAPSULE ORAL at 06:08

## 2025-05-23 RX ADMIN — NICOTINE 21 MG: 21 PATCH, EXTENDED RELEASE TRANSDERMAL at 07:48

## 2025-05-23 RX ADMIN — INSULIN LISPRO 1 UNITS: 100 INJECTION, SOLUTION INTRAVENOUS; SUBCUTANEOUS at 11:25

## 2025-05-23 RX ADMIN — FAMOTIDINE 20 MG: 20 TABLET, FILM COATED ORAL at 16:37

## 2025-05-23 RX ADMIN — LEVALBUTEROL HYDROCHLORIDE 1.25 MG: 1.25 SOLUTION RESPIRATORY (INHALATION) at 13:15

## 2025-05-23 NOTE — RESPIRATORY THERAPY NOTE
Respiratory Note     05/23/25 0715   Respiratory Assessment   Assessment Type During-treatment   General Appearance Alert;Awake   Respiratory Pattern Dyspnea with exertion   Chest Assessment Chest expansion symmetrical   Bilateral Breath Sounds Diminished   Resp Comments Pt stable on 2LPM NC, doing well with UDN tx.   O2 Device 2LPM NC

## 2025-05-23 NOTE — PROGRESS NOTES
Per chart review patient remains hospitalized.  Discharge plan is for patient to be discharged home with hospice with the next 24 - 72 hours.  RRU program closed and I removed myself from care team.

## 2025-05-23 NOTE — ASSESSMENT & PLAN NOTE
Lab Results   Component Value Date    HGBA1C 7.4 (H) 05/02/2025       Recent Labs     05/22/25  1530 05/22/25  2134 05/23/25  0709 05/23/25  1106   POCGLU 282* 193* 100 172*       Blood Sugar Average: Last 72 hrs:  (P) 181  Placed on Insulin sliding scale. Blood sugars are controlled

## 2025-05-23 NOTE — CASE MANAGEMENT
Case Management Progress Note    Patient name Ileana Seaman  Location /-01 MRN [id number]  : 1960 Date 2025       LOS (days): 5  Geometric Mean LOS (GMLOS) (days): 4.9  Days to GMLOS:-0.1        OBJECTIVE:        Current admission status: Inpatient  Preferred Pharmacy:   [id number] - [place], [place] - [address] [facility]  [address]  [place] [address]  Phone: [phone] Fax: [phone]    Primary Care Provider: Merline Dinero    Primary Insurance: [facility] [place] [employer] [facility] [employer]  Secondary Insurance: [employer] [facility]    PROGRESS NOTE:    Requested to follow up with patient to whether the 2 PM meeting was occurring with patients family.     Spoke to patient, support provided.  Patient shared she wanted a family meeting ( 2 boys/daughter) as she is concern how her boys will take the entire situation-- per Sandy they would fight her decision, however if they heard how poor her prognosis was, she feels the adjustment to Hospice would be easier. She said her boys have not been contacting her in the last day or so. Both work either 6-6 or 7-7 the eldest son, Rubén was trying to reach [facility]. Patient says to cancel the meeting, no one is going to come to the 2 PM meeting. Patients daughter is trying to get the 2 sons to meet tonight around 8 PM to have a family meeting in the room to discuss together.    Per Sandy the plan is still to dc tomorrow as her daughter will be picking her up.  Updated Palliative Care, Hospice and provider with this information.

## 2025-05-23 NOTE — ASSESSMENT & PLAN NOTE
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  Unable to tolerate therapy due to hepatotoxicity while on rifabutin.  Instead they are monitoring sputum cultures closely off treatment.  CXR persistent pneumonia in right upper lobe   Previous CTA chest: Worsening right upper lobe pneumonia and new superior segment right lower lobe pneumonia  COVID/Flu/RSV negative    Previous AFB negative  Blood Cultures ordered  ID consulted given history of MARIS and recurrent  PNA.  discussed with pulmonology will defer to infectious disease if they want repeat bronchoscopy done and deep cultures are not.  Pulmonary recommending continuing pulmonary toilet, bronchodilators and steroids for underlying COPD exacerbation.  If patient fails to improve, recommendation will be for repeating sputum AFB and bronchoscopy and biopsy. ID recommending outpatient follow-up with pulmonology and infectious disease to consider resuming therapy or graded challenge.  Will also place recommendations for palliative medicine referral regarding advanced COPD and recurrent hospitalization upon discharge.  Patient met with palliative care team and  I had ongoing goals of care discussion with her as well.  She has decided to opt for home hospice.  Hospice consulted   Plan for discharge home with home hospice in a.m.

## 2025-05-23 NOTE — PLAN OF CARE
Problem: PAIN - ADULT  Goal: Verbalizes/displays adequate comfort level or baseline comfort level  Description: Interventions:  - Encourage patient to monitor pain and request assistance  - Assess pain using appropriate pain scale  - Administer analgesics as ordered based on type and severity of pain and evaluate response  - Implement non-pharmacological measures as appropriate and evaluate response  - Consider cultural and social influences on pain and pain management  - Notify physician/advanced practitioner if interventions unsuccessful or patient reports new pain  - Educate patient/family on pain management process including their role and importance of  reporting pain   - Provide non-pharmacologic/complimentary pain relief interventions  Outcome: Progressing     Problem: INFECTION - ADULT  Goal: Absence or prevention of progression during hospitalization  Description: INTERVENTIONS:  - Assess and monitor for signs and symptoms of infection  - Monitor lab/diagnostic results  - Monitor all insertion sites, i.e. indwelling lines, tubes, and drains  - Monitor endotracheal if appropriate and nasal secretions for changes in amount and color  - [place] appropriate cooling/warming therapies per order  - Administer medications as ordered  - Instruct and encourage patient and family to use good hand hygiene technique  - Identify and instruct in appropriate isolation precautions for identified infection/condition  Outcome: Progressing  Goal: Absence of fever/infection during neutropenic period  Description: INTERVENTIONS:  - Monitor WBC  - Perform strict hand hygiene  - Limit to healthy visitors only  - No plants, dried, fresh or silk flowers with garcia in patient room  Outcome: Progressing     Problem: DISCHARGE PLANNING  Goal: Discharge to home or other facility with appropriate resources  Description: INTERVENTIONS:  - Identify barriers to discharge w/patient and caregiver  - Arrange for needed discharge resources  and transportation as appropriate  - Identify discharge learning needs (meds, wound care, etc.)  - Arrange for interpretive services to assist at discharge as needed  - Refer to Case Management Department for coordinating discharge planning if the patient needs post-hospital services based on physician/advanced practitioner order or complex needs related to functional status, cognitive ability, or social support system  Outcome: Progressing     Problem: Knowledge Deficit  Goal: Patient/family/caregiver demonstrates understanding of disease process, treatment plan, medications, and discharge instructions  Description: Complete learning assessment and assess knowledge base.  Interventions:  - Provide teaching at level of understanding  - Provide teaching via preferred learning methods  Outcome: Progressing     Problem: CARDIOVASCULAR - ADULT  Goal: Maintains optimal cardiac output and hemodynamic stability  Description: INTERVENTIONS:  - Monitor I/O, vital signs and rhythm  - Monitor for S/S and trends of decreased cardiac output  - Administer and titrate ordered vasoactive medications to optimize hemodynamic stability  - Assess quality of pulses, skin color and temperature  - Assess for signs of decreased coronary artery perfusion  - Instruct patient to report change in severity of symptoms  Outcome: Progressing  Goal: Absence of cardiac dysrhythmias or at baseline rhythm  Description: INTERVENTIONS:  - Continuous cardiac monitoring, vital signs, obtain 12 lead EKG if ordered  - Administer antiarrhythmic and heart rate control medications as ordered  - Monitor electrolytes and administer replacement therapy as ordered  Outcome: Progressing     Problem: RESPIRATORY - ADULT  Goal: Achieves optimal ventilation and oxygenation  Description: INTERVENTIONS:  - Assess for changes in respiratory status  - Assess for changes in mentation and behavior  - Position to facilitate oxygenation and minimize respiratory effort  -  Oxygen administered by appropriate delivery if ordered  - Initiate smoking cessation education as indicated  - Encourage broncho-pulmonary hygiene including cough, deep breathe, Incentive Spirometry  - Assess the need for suctioning and aspirate as needed  - Assess and instruct to report SOB or any respiratory difficulty  - Respiratory Therapy support as indicated  Outcome: Progressing     Problem: Nutrition/Hydration-ADULT  Goal: Nutrient/Hydration intake appropriate for improving, restoring or maintaining nutritional needs  Description: Monitor and assess patient's nutrition/hydration status for malnutrition. Collaborate with interdisciplinary team and initiate plan and interventions as ordered.  Monitor patient's weight and dietary intake as ordered or per policy. Utilize nutrition screening tool and intervene as necessary. Determine patient's food preferences and provide high-protein, high-caloric foods as appropriate.     INTERVENTIONS:  - Monitor oral intake, urinary output, labs, and treatment plans  - Assess nutrition and hydration status and recommend course of action  - Evaluate amount of meals eaten  - Assist patient with eating if necessary   - Allow adequate time for meals  - Recommend/ encourage appropriate diets, oral nutritional supplements, and vitamin/mineral supplements  - Order, calculate, and assess calorie counts as needed  - Recommend, monitor, and adjust tube feedings and TPN/PPN based on assessed needs  - Assess need for intravenous fluids  - Provide specific nutrition/hydration education as appropriate  - Include patient/family/caregiver in decisions related to nutrition  Outcome: Progressing

## 2025-05-23 NOTE — PROGRESS NOTES
Progress Note - Hospitalist   Name: Ileana Seaman 65 y.o. female I MRN: [id number]  Unit/Bed#: -01 I Date of Admission: 5/18/2025   Date of Service: 5/23/2025 I Hospital Day: 5    Assessment & Plan  Mycobacterial infection, non-TB  Follows with infectious disease outpatient for MARIS infection.  Unable to tolerate therapy due to hepatotoxicity while on rifabutin.  Instead they are monitoring sputum cultures closely off treatment.  CXR persistent pneumonia in right upper lobe   Previous CTA chest: Worsening right upper lobe pneumonia and new superior segment right lower lobe pneumonia  COVID/Flu/RSV negative    Previous AFB negative  Blood Cultures ordered  ID consulted given history of MARIS and recurrent  PNA.  discussed with pulmonology will defer to infectious disease if they want repeat bronchoscopy done and deep cultures are not.  Pulmonary recommending continuing pulmonary toilet, bronchodilators and steroids for underlying COPD exacerbation.  If patient fails to improve, recommendation will be for repeating sputum AFB and bronchoscopy and biopsy. ID recommending outpatient follow-up with pulmonology and infectious disease to consider resuming therapy or graded challenge.  Will also place recommendations for palliative medicine referral regarding advanced COPD and recurrent hospitalization upon discharge.  Patient met with palliative care team and  I had ongoing goals of care discussion with her as well.  She has decided to opt for home hospice.  Hospice consulted   Plan for discharge home with home hospice in a.m.  Sepsis without acute organ dysfunction (HCC)  Patient presents with low-grade fever, tachycardia, tachypnea and leukocytosis appears to have persistent pneumonia on chest x-ray.   Continue with antibiotics and supportive care.  Patient remains clinically improved afebrile hemodynamically stable with mild persistent leukocytosis.  Mixed simple and mucopurulent chronic bronchitis (HCC)  Place  On DuoNeb treatments . place on 5 days of prednisone 40 mg daily.    Appreciate Pulmonology for evaluation  Asked case management to prepare high utilizer plan for her.    Continue azithromycin for the 3 days  Infectious disease and pulmonary input noted.  Patient has been placed on nebulized only treatment for 2 days.  Pulmonology recommended prednisone 40 mg for 5 days.  Type 2 diabetes mellitus without complication, without long-term current use of insulin ([facility])  Lab Results   Component Value Date    HGBA1C 7.4 (H) 05/02/2025       Recent Labs     05/22/25  1530 05/22/25  2134 05/23/25  0709 05/23/25  1106   POCGLU 282* 193* 100 172*       Blood Sugar Average: Last 72 hrs:  (P) 181  Placed on Insulin sliding scale. Blood sugars are controlled    Goals of care, counseling/discussion  Extensive goals of care discussion with patient at bedside.  Explained course of her disease and that she has very advanced COPD with likely untreated mycobacterial infection.  Patient had decided to pursue home hospice.  This was discussed extensively with her per palliative medicine as well.  Hospice consulted.  Palliative care by specialist    Severe protein-calorie malnutrition (HCC)  Malnutrition Findings:   Adult Malnutrition type: Chronic illness  Adult Degree of Malnutrition: Malnutrition of moderate degree  Malnutrition Characteristics: Fat loss, Muscle loss              Continue with nutritional supplementation.  BMI 17.    360 Statement: malnutrition of moderate degree in setting of chronic illness, evidenced by muscle loss/wasting of temporalis muscle, clavicles, deltoids, scapula, body fat/loss os subcutaneous fat/orbitals/triceps, treated with liberal diet and supplements    BMI Findings:  Adult BMI Classifications: Underweight < 18.5        Body mass index is 17.78 kg/m².       VTE Pharmacologic Prophylaxis:   High Risk (Score >/= 5) - Pharmacological DVT Prophylaxis Ordered: enoxaparin (Lovenox). Sequential Compression  Devices Ordered.    Mobility:   Basic Mobility Inpatient Raw Score: 21  JH-HLM Goal: 6: Walk 10 steps or more  JH-HLM Achieved: 7: Walk 25 feet or more  JH-HLM Goal achieved. Continue to encourage appropriate mobility.    Patient Centered Rounds: I performed bedside rounds with nursing staff today.   Discussions with Specialists or Other Care Team Provider: Discussed with case management, palliative medicine and hospice    Education and Discussions with Family / Patient: Updated  (daughter) via phone.    Current Length of Stay: 5 day(s)  Current Patient Status: Inpatient   Certification Statement: The patient will continue to require additional inpatient hospital stay due to await outpatient hospice services set up  Discharge Plan: Anticipate discharge tomorrow to home with home services.    Code Status: Level 3 - DNAR and DNI    Subjective   Seen and examined at bedside.  States some improvement in breathing.  No acute events overnight.    Objective :  Temp:  [97 °F (36.1 °C)-97.3 °F (36.3 °C)] 97.2 °F (36.2 °C)  HR:  [75-96] 75  BP: (119-147)/(75-93) 147/93  Resp:  [17-18] 17  SpO2:  [95 %-99 %] 97 %  O2 Device: Nasal cannula  Nasal Cannula O2 Flow Rate (L/min):  [2 L/min] 2 L/min    Body mass index is 17.78 kg/m².     Input and Output Summary (last 24 hours):     Intake/Output Summary (Last 24 hours) at 5/23/2025 1227  Last data filed at 5/22/2025 1300  Gross per 24 hour   Intake 180 ml   Output --   Net 180 ml       Physical Exam  Constitutional:       Appearance: She is ill-appearing.   HENT:      Head: Normocephalic and atraumatic.      Mouth/Throat:      Mouth: Mucous membranes are moist.     Eyes:      Pupils: Pupils are equal, round, and reactive to light.       Cardiovascular:      Rate and Rhythm: Normal rate and regular rhythm.   Pulmonary:      Effort: Pulmonary effort is normal. No respiratory distress.      Breath sounds: No wheezing or rales.      Comments: Improved air entry bilateral  lung fields  Abdominal:      General: Abdomen is flat.      Palpations: Abdomen is soft.     Musculoskeletal:      Cervical back: Neck supple.      Right lower leg: No edema.      Left lower leg: No edema.     Skin:     General: Skin is warm.     Neurological:      General: No focal deficit present.      Mental Status: She is alert. Mental status is at baseline.           Lines/Drains:              Lab Results: I have reviewed the following results:   Results from last 7 days   Lab Units 05/19/25  0510 05/18/25  1205   WBC Thousand/uL 12.03* 12.55*   HEMOGLOBIN g/dL 11.1* 13.0   HEMATOCRIT % 35.0 41.8   PLATELETS Thousands/uL 223 244   SEGS PCT %  --  85*   LYMPHO PCT %  --  8*   MONO PCT %  --  7   EOS PCT %  --  0     Results from last 7 days   Lab Units 05/19/25  0510   SODIUM mmol/L 141   POTASSIUM mmol/L 4.0   CHLORIDE mmol/L 106   CO2 mmol/L 30   BUN mg/dL 10   CREATININE mg/dL 0.45*   ANION GAP mmol/L 5   CALCIUM mg/dL 8.8   ALBUMIN g/dL 3.1*   TOTAL BILIRUBIN mg/dL 0.29   ALK PHOS U/L 72   ALT U/L 11   AST U/L 7*   GLUCOSE RANDOM mg/dL 148*     Results from last 7 days   Lab Units 05/18/25  1205   INR  1.08     Results from last 7 days   Lab Units 05/23/25  1106 05/23/25  0709 05/22/25  2134 05/22/25  1530 05/22/25  1053 05/22/25  0712 05/21/25  2043 05/21/25  1545 05/21/25  1054 05/21/25  0708 05/20/25  2059 05/20/25  1545   POC GLUCOSE mg/dl 172* 100 193* 282* 150* 105 170* 327* 133 93 188* 267*         Results from last 7 days   Lab Units 05/19/25  0510 05/18/25  1419 05/18/25  1205   LACTIC ACID mmol/L  --   --  1.6   PROCALCITONIN ng/ml 0.15 <0.05 <0.05       Recent Cultures (last 7 days):   Results from last 7 days   Lab Units 05/18/25  1244 05/18/25  1205   BLOOD CULTURE  No Growth After 4 Days. No Growth After 4 Days.             Last 24 Hours Medication List:     Current Facility-Administered Medications:     acetaminophen (TYLENOL) tablet 650 mg, Q6H PRN    aspirin (ECOTRIN LOW STRENGTH) EC tablet  81 mg, Daily    atorvastatin (LIPITOR) tablet 40 mg, Daily    [DISCONTINUED] cefepime (MAXIPIME) IVPB (premix in dextrose) 2,000 mg 50 mL, Q8H, Last Rate: 2,000 mg (05/19/25 0608) **AND** [COMPLETED] vancomycin (VANCOCIN) IVPB (premix in dextrose) 1,000 mg 200 mL, Once, Last Rate: 1,000 mg (05/18/25 1641) **AND** azithromycin (ZITHROMAX) tablet 500 mg, Q24H **AND** [COMPLETED] MRSA culture, Once **AND** [CANCELED] Pharmacy general consult, Once    dicyclomine (BENTYL) capsule 10 mg, TID AC    docusate sodium (COLACE) capsule 100 mg, BID    famotidine (PEPCID) tablet 20 mg, BID    Fluticasone Furoate-Vilanterol 100-25 mcg/actuation 1 puff, Daily **AND** [DISCONTINUED] umeclidinium 62.5 mcg/actuation inhaler AEPB 1 puff, Daily    heparin (porcine) subcutaneous injection 5,000 Units, Q8H MAYCO    insulin lispro (HumALOG/ADMELOG) 100 units/mL subcutaneous injection 1-6 Units, TID AC **AND** Fingerstick Glucose (POCT), TID AC    ipratropium (ATROVENT) 0.02 % inhalation solution 0.5 mg, TID    ipratropium-albuterol (DUO-NEB) 0.5-2.5 mg/3 mL inhalation solution 3 mL, Q6H PRN    levalbuterol (XOPENEX) inhalation solution 1.25 mg, TID    lidocaine (LIDODERM) 5 % patch 1 patch, Daily    nicotine (NICODERM CQ) 21 mg/24 hr TD 24 hr patch 21 mg, Daily    simethicone (MYLICON) chewable tablet 80 mg, Q6H PRN    sucralfate (CARAFATE) tablet 1 g, 4x Daily    Administrative Statements   Today, Patient Was Seen By: Juana Santana MD      **Please Note: This note may have been constructed using a voice recognition system.**

## 2025-05-24 ENCOUNTER — HOME CARE VISIT (OUTPATIENT)
Dept: HOME HEALTH SERVICES | Facility: HOME HEALTHCARE | Age: 65
End: 2025-05-24
Attending: INTERNAL MEDICINE
Payer: MEDICARE

## 2025-05-24 VITALS
DIASTOLIC BLOOD PRESSURE: 71 MMHG | OXYGEN SATURATION: 97 % | HEIGHT: 61 IN | BODY MASS INDEX: 17.76 KG/M2 | RESPIRATION RATE: 18 BRPM | WEIGHT: 94.1 LBS | HEART RATE: 82 BPM | SYSTOLIC BLOOD PRESSURE: 128 MMHG | TEMPERATURE: 96.8 F

## 2025-05-24 VITALS
DIASTOLIC BLOOD PRESSURE: 86 MMHG | RESPIRATION RATE: 18 BRPM | TEMPERATURE: 97.8 F | HEART RATE: 107 BPM | SYSTOLIC BLOOD PRESSURE: 130 MMHG

## 2025-05-24 DIAGNOSIS — Z51.5 HOSPICE CARE PATIENT: ICD-10-CM

## 2025-05-24 DIAGNOSIS — J44.1 COPD WITH ACUTE EXACERBATION (HCC): ICD-10-CM

## 2025-05-24 DIAGNOSIS — R52 PAIN: Primary | ICD-10-CM

## 2025-05-24 DIAGNOSIS — R06.00 DYSPNEA: ICD-10-CM

## 2025-05-24 DIAGNOSIS — Z72.0 TOBACCO USE: ICD-10-CM

## 2025-05-24 LAB
GLUCOSE SERPL-MCNC: 123 MG/DL (ref 65–140)
GLUCOSE SERPL-MCNC: 98 MG/DL (ref 65–140)

## 2025-05-24 PROCEDURE — 94640 AIRWAY INHALATION TREATMENT: CPT

## 2025-05-24 PROCEDURE — G0299 HHS/HOSPICE OF RN EA 15 MIN: HCPCS

## 2025-05-24 PROCEDURE — 99239 HOSP IP/OBS DSCHRG MGMT >30: CPT | Performed by: INTERNAL MEDICINE

## 2025-05-24 PROCEDURE — 94760 N-INVAS EAR/PLS OXIMETRY 1: CPT

## 2025-05-24 PROCEDURE — 82948 REAGENT STRIP/BLOOD GLUCOSE: CPT

## 2025-05-24 RX ORDER — DOCUSATE SODIUM 100 MG/1
100 CAPSULE, LIQUID FILLED ORAL 2 TIMES DAILY
Qty: 20 CAPSULE | Refills: 0 | Status: SHIPPED | OUTPATIENT
Start: 2025-05-24

## 2025-05-24 RX ORDER — IPRATROPIUM BROMIDE AND ALBUTEROL SULFATE 2.5; .5 MG/3ML; MG/3ML
3 SOLUTION RESPIRATORY (INHALATION) EVERY 6 HOURS PRN
Qty: 180 ML | Refills: 0 | Status: SHIPPED | OUTPATIENT
Start: 2025-05-24 | End: 2025-05-24

## 2025-05-24 RX ORDER — FLUTICASONE FUROATE, UMECLIDINIUM BROMIDE AND VILANTEROL TRIFENATATE 100; 62.5; 25 UG/1; UG/1; UG/1
1 POWDER RESPIRATORY (INHALATION) DAILY
Qty: 60 BLISTER | Refills: 0 | Status: SHIPPED | OUTPATIENT
Start: 2025-05-24 | End: 2025-05-24 | Stop reason: SDUPTHER

## 2025-05-24 RX ORDER — FLUTICASONE FUROATE, UMECLIDINIUM BROMIDE AND VILANTEROL TRIFENATATE 100; 62.5; 25 UG/1; UG/1; UG/1
1 POWDER RESPIRATORY (INHALATION) DAILY
Qty: 60 BLISTER | Refills: 0 | Status: SHIPPED | OUTPATIENT
Start: 2025-05-24

## 2025-05-24 RX ORDER — NICOTINE 21 MG/24HR
1 PATCH, TRANSDERMAL 24 HOURS TRANSDERMAL DAILY
Qty: 28 PATCH | Refills: 0 | Status: SHIPPED | OUTPATIENT
Start: 2025-05-24

## 2025-05-24 RX ORDER — PREDNISONE 10 MG/1
TABLET ORAL
Qty: 15 TABLET | Refills: 0 | Status: SHIPPED | OUTPATIENT
Start: 2025-05-24 | End: 2025-05-24

## 2025-05-24 RX ORDER — OXYCODONE HYDROCHLORIDE 5 MG/1
5 TABLET ORAL EVERY 6 HOURS PRN
Qty: 30 TABLET | Refills: 0 | Status: SHIPPED | OUTPATIENT
Start: 2025-05-24

## 2025-05-24 RX ORDER — ACETAMINOPHEN 325 MG/1
650 TABLET ORAL EVERY 6 HOURS PRN
Qty: 30 TABLET | Refills: 0 | Status: SHIPPED | OUTPATIENT
Start: 2025-05-24

## 2025-05-24 RX ORDER — LORAZEPAM 2 MG/ML
0.5 CONCENTRATE ORAL EVERY 6 HOURS PRN
Qty: 30 ML | Refills: 0 | Status: SHIPPED | OUTPATIENT
Start: 2025-05-24 | End: 2025-05-25 | Stop reason: CLARIF

## 2025-05-24 RX ADMIN — HEPARIN SODIUM 5000 UNITS: 5000 INJECTION INTRAVENOUS; SUBCUTANEOUS at 06:13

## 2025-05-24 RX ADMIN — NICOTINE 21 MG: 21 PATCH, EXTENDED RELEASE TRANSDERMAL at 08:00

## 2025-05-24 RX ADMIN — SUCRALFATE 1 G: 1 TABLET ORAL at 11:35

## 2025-05-24 RX ADMIN — DICYCLOMINE HYDROCHLORIDE 10 MG: 10 CAPSULE ORAL at 06:13

## 2025-05-24 RX ADMIN — FAMOTIDINE 20 MG: 20 TABLET, FILM COATED ORAL at 08:00

## 2025-05-24 RX ADMIN — LEVALBUTEROL HYDROCHLORIDE 1.25 MG: 1.25 SOLUTION RESPIRATORY (INHALATION) at 07:06

## 2025-05-24 RX ADMIN — DOCUSATE SODIUM 100 MG: 100 CAPSULE, LIQUID FILLED ORAL at 08:00

## 2025-05-24 RX ADMIN — ASPIRIN 81 MG: 81 TABLET, COATED ORAL at 08:00

## 2025-05-24 RX ADMIN — IPRATROPIUM BROMIDE 0.5 MG: 0.5 SOLUTION RESPIRATORY (INHALATION) at 07:06

## 2025-05-24 RX ADMIN — ATORVASTATIN CALCIUM 40 MG: 40 TABLET, FILM COATED ORAL at 08:00

## 2025-05-24 RX ADMIN — LIDOCAINE 5% 1 PATCH: 700 PATCH TOPICAL at 08:00

## 2025-05-24 RX ADMIN — DICYCLOMINE HYDROCHLORIDE 10 MG: 10 CAPSULE ORAL at 11:35

## 2025-05-24 RX ADMIN — SUCRALFATE 1 G: 1 TABLET ORAL at 08:00

## 2025-05-24 RX ADMIN — FLUTICASONE FUROATE AND VILANTEROL TRIFENATATE 1 PUFF: 100; 25 POWDER RESPIRATORY (INHALATION) at 08:01

## 2025-05-24 NOTE — ASSESSMENT & PLAN NOTE
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  Unable to tolerate therapy due to hepatotoxicity while on rifabutin.  Instead they are monitoring sputum cultures closely off treatment.  CXR persistent pneumonia in right upper lobe   Previous CTA chest: Worsening right upper lobe pneumonia and new superior segment right lower lobe pneumonia  COVID/Flu/RSV negative    Previous AFB negative  Blood Cultures ordered  ID consulted given history of MARIS and recurrent  PNA.  discussed with pulmonology will defer to infectious disease if they want repeat bronchoscopy done and deep cultures are not.  Pulmonary recommending continuing pulmonary toilet, bronchodilators and steroids for underlying COPD exacerbation.  If patient fails to improve, recommendation will be for repeating sputum AFB and bronchoscopy and biopsy. ID recommending outpatient follow-up with pulmonology and infectious disease to consider resuming therapy or graded challenge.  Will also place recommendations for palliative medicine referral regarding advanced COPD and recurrent hospitalization upon discharge.  Ongoing goals of care discussion with patient and family by palliative medicine and myself.  Patient has decided to go for home with home hospice.  Hospice was set up.  Stable for discharge home today.

## 2025-05-24 NOTE — ASSESSMENT & PLAN NOTE
Patient with history of advanced end-stage COPD and recurrent hospitalization.  Presented with worsening shortness of breath   Place On DuoNeb treatments .  Seen by pulmonology and recommended to place on 5 days of prednisone 40 mg daily.  Subsequent prednisone taper on discharge  Appreciate Pulmonology for evaluation  Continue azithromycin for the 3 days  Respiratory status has improved  Patient met with palliative medicine and has decided on home with home hospice.

## 2025-05-24 NOTE — ASSESSMENT & PLAN NOTE
Lab Results   Component Value Date    HGBA1C 7.4 (H) 05/02/2025       Recent Labs     05/23/25  1106 05/23/25  1555 05/23/25 2052 05/24/25  0728   POCGLU 172* 248* 164* 98       Blood Sugar Average: Last 72 hrs:  (P) [id number]  Placed on Insulin sliding scale. Blood sugars are controlled

## 2025-05-24 NOTE — DISCHARGE SUMMARY
Discharge Summary - Hospitalist   Name: Ileana Seaman 65 y.o. female I MRN: [id number]  Unit/Bed#: -01 I Date of Admission: 5/18/2025   Date of Service: 5/24/2025 I Hospital Day: 6     Assessment & Plan  Mycobacterial infection, non-TB  Follows with infectious disease outpatient for MARIS infection.  Unable to tolerate therapy due to hepatotoxicity while on rifabutin.  Instead they are monitoring sputum cultures closely off treatment.  CXR persistent pneumonia in right upper lobe   Previous CTA chest: Worsening right upper lobe pneumonia and new superior segment right lower lobe pneumonia  COVID/Flu/RSV negative    Previous AFB negative  Blood Cultures ordered  ID consulted given history of MARIS and recurrent  PNA.  discussed with pulmonology will defer to infectious disease if they want repeat bronchoscopy done and deep cultures are not.  Pulmonary recommending continuing pulmonary toilet, bronchodilators and steroids for underlying COPD exacerbation.  If patient fails to improve, recommendation will be for repeating sputum AFB and bronchoscopy and biopsy. ID recommending outpatient follow-up with pulmonology and infectious disease to consider resuming therapy or graded challenge.  Will also place recommendations for palliative medicine referral regarding advanced COPD and recurrent hospitalization upon discharge.  Ongoing goals of care discussion with patient and family by palliative medicine and myself.  Patient has decided to go for home with home hospice.  Hospice was set up.  Stable for discharge home today.  Sepsis without acute organ dysfunction (HCC)  Patient presents with low-grade fever, tachycardia, tachypnea and leukocytosis appears to have persistent pneumonia on chest x-ray.   Continue with antibiotics and supportive care.  Patient remains clinically improved afebrile hemodynamically stable with mild persistent leukocytosis.  COPD with acute exacerbation  Patient with history of advanced  end-stage COPD and recurrent hospitalization.  Presented with worsening shortness of breath   Place On DuoNeb treatments .  Seen by pulmonology and recommended to place on 5 days of prednisone 40 mg daily.  Subsequent prednisone taper on discharge  Appreciate Pulmonology for evaluation  Continue azithromycin for the 3 days  Respiratory status has improved  Patient met with palliative medicine and has decided on home with home hospice.  Type 2 diabetes mellitus without complication, without long-term current use of insulin (HCC)  Lab Results   Component Value Date    HGBA1C 7.4 (H) 05/02/2025       Recent Labs     05/23/25  1106 05/23/25  1555 05/23/25  2052 05/24/25  0728   POCGLU 172* 248* 164* 98       Blood Sugar Average: Last 72 hrs:  (P) [id number]  Placed on Insulin sliding scale. Blood sugars are controlled    Goals of care, counseling/discussion  Extensive goals of care discussion with patient at bedside.  Explained course of her disease and that she has very advanced COPD with likely untreated mycobacterial infection.  Patient had decided to pursue home hospice.  This was discussed extensively with her per palliative medicine as well.  Hospice consulted.  Palliative care by specialist  Seen by palliative medicine.  Patient has opted for home with home hospice.  Hospice to start at home today.  Stable for discharge home  Severe protein-calorie malnutrition (HCC)  Malnutrition Findings:   Adult Malnutrition type: Chronic illness  Adult Degree of Malnutrition: Malnutrition of moderate degree  Malnutrition Characteristics: Fat loss, Muscle loss              Continue with nutritional supplementation.  BMI 17.    360 Statement: malnutrition of moderate degree in setting of chronic illness, evidenced by muscle loss/wasting of temporalis muscle, clavicles, deltoids, scapula, body fat/loss os subcutaneous fat/orbitals/triceps, treated with liberal diet and supplements    BMI Findings:  Adult BMI  Classifications: Underweight < 18.5        Body mass index is 17.78 kg/m².        Medical Problems       Resolved Problems  Date Reviewed: 5/18/2025          Resolved    Atrial fibrillation (HCC) 5/18/2025     Resolved by  Modesta Santizo MD    Acute on chronic diastolic (congestive) heart failure (HCC) 5/18/2025     Resolved by  Modesta Santizo MD        Discharging Physician / Practitioner: Juana Santana MD  PCP: Merline Dinero  Admission Date:   Admission Orders (From admission, onward)       Ordered        05/18/25 1324  INPATIENT ADMISSION  Once                          Discharge Date: 05/24/25    Next Steps for Physician/AP Assuming Care:  Patient being discharged home with home hospice    Test Results Pending at Discharge (will require follow up):  NA    Medication Changes for Discharge & Rationale:   Trelegy inhaler once daily  DuoNebs 3 times a day  Lorazepam 0.5 mg as needed   See after visit summary for reconciled discharge medications provided to patient and/or family.     Consultations During Hospital Stay:  Pulmonology  Infectious disease  Palliative medicine  Hospice    Procedures Performed:   NA    Significant Findings / Test Results:   Chest x-ray  Persistent right upper lobe pneumonia.     Right lower lobe nodule corresponding with the CT.        Incidental Findings:   NA       Hospital Course:   Ileana Seaman is a 65 y.o. female patient who originally presented to the hospital on 5/18/2025 due to worsening shortness of breath in the setting of underlying severe emphysema.  Was initially started on IV antibiotics, steroids, scheduled nebulization and treatment.  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  Extensive goals of care conversation with the patient regarding need for bronchoscopy, BAL, triple therapy for MAC.  Risk-benefit of the procedure discussed however patient decided with her advanced COPD to pursue conservative management.  Palliative care was consulted for goals of  "care discussion and patient decided to go home with home hospice.  This was set up by case management and patient with discharge home with hospice services today.          Please see above list of diagnoses and related plan for additional information.     Discharge Day Visit / Exam:   Subjective: Seen and examined at bedside.  Breathing at baseline.  No acute events overnight.  Vitals: Blood Pressure: 128/71 (05/24/25 0702)  Pulse: 82 (05/24/25 0702)  Temperature: (!) 96.8 °F (36 °C) (05/24/25 0702)  Temp Source: Temporal (05/23/25 2054)  Respirations: 18 (05/23/25 2054)  Height: 5' 1\" (154.9 cm) (05/18/25 1407)  Weight - Scale: 42.7 kg (94 lb 1.6 oz) (05/18/25 1407)  SpO2: 97 % (05/24/25 0708)  Physical Exam  Constitutional:       General: She is not in acute distress.     Appearance: She is ill-appearing.   HENT:      Head: Normocephalic.      Nose: Nose normal.     Eyes:      Pupils: Pupils are equal, round, and reactive to light.       Cardiovascular:      Rate and Rhythm: Normal rate and regular rhythm.   Pulmonary:      Comments: Diminished breath sounds bilaterally.  No tachypnea or increased work of breathing.  Abdominal:      General: Abdomen is flat.      Palpations: Abdomen is soft.     Musculoskeletal:      Right lower leg: No edema.      Left lower leg: No edema.     Skin:     General: Skin is warm.     Neurological:      General: No focal deficit present.      Mental Status: She is alert and oriented to person, place, and time. Mental status is at baseline.     Psychiatric:         Mood and Affect: Mood normal.          Discussion with Family: Updated  (daughter) via phone.    Discharge instructions/Information to patient and family:   See after visit summary for information provided to patient and family.      Provisions for Follow-Up Care:  See after visit summary for information related to follow-up care and any pertinent home health orders.      Mobility at time of Discharge:   Basic " Mobility Inpatient Raw Score: 24  JH-HLM Goal: 8: Walk 250 feet or more  JH-HLM Achieved: 8: Walk 250 feet ot more  HLM Goal achieved. Continue to encourage appropriate mobility.     Disposition:   Home    Planned Readmission: no    Administrative Statements   Discharge Statement:  I have spent a total time of >30 minutes in caring for this patient on the day of the visit/encounter. >30 minutes of time was spent on: Diagnostic results, Prognosis, Risks and benefits of tx options, Instructions for management, Patient and family education, Importance of tx compliance, Risk factor reductions, Impressions, Counseling / Coordination of care, Documenting in the medical record, Reviewing / ordering tests, medicine, procedures  , and Communicating with other healthcare professionals .    **Please Note: This note may have been constructed using a voice recognition system**

## 2025-05-24 NOTE — PLAN OF CARE
Problem: PAIN - ADULT  Goal: Verbalizes/displays adequate comfort level or baseline comfort level  Description: Interventions:  - Encourage patient to monitor pain and request assistance  - Assess pain using appropriate pain scale  - Administer analgesics as ordered based on type and severity of pain and evaluate response  - Implement non-pharmacological measures as appropriate and evaluate response  - Consider cultural and social influences on pain and pain management  - Notify physician/advanced practitioner if interventions unsuccessful or patient reports new pain  - Educate patient/family on pain management process including their role and importance of  reporting pain   - Provide non-pharmacologic/complimentary pain relief interventions  Outcome: Progressing     Problem: INFECTION - ADULT  Goal: Absence or prevention of progression during hospitalization  Description: INTERVENTIONS:  - Assess and monitor for signs and symptoms of infection  - Monitor lab/diagnostic results  - Monitor all insertion sites, i.e. indwelling lines, tubes, and drains  - Monitor endotracheal if appropriate and nasal secretions for changes in amount and color  - [place] appropriate cooling/warming therapies per order  - Administer medications as ordered  - Instruct and encourage patient and family to use good hand hygiene technique  - Identify and instruct in appropriate isolation precautions for identified infection/condition  Outcome: Progressing  Goal: Absence of fever/infection during neutropenic period  Description: INTERVENTIONS:  - Monitor WBC  - Perform strict hand hygiene  - Limit to healthy visitors only  - No plants, dried, fresh or silk flowers with garcia in patient room  Outcome: Progressing     Problem: DISCHARGE PLANNING  Goal: Discharge to home or other facility with appropriate resources  Description: INTERVENTIONS:  - Identify barriers to discharge w/patient and caregiver  - Arrange for needed discharge resources  and transportation as appropriate  - Identify discharge learning needs (meds, wound care, etc.)  - Arrange for interpretive services to assist at discharge as needed  - Refer to Case Management Department for coordinating discharge planning if the patient needs post-hospital services based on physician/advanced practitioner order or complex needs related to functional status, cognitive ability, or social support system  Outcome: Progressing     Problem: Knowledge Deficit  Goal: Patient/family/caregiver demonstrates understanding of disease process, treatment plan, medications, and discharge instructions  Description: Complete learning assessment and assess knowledge base.  Interventions:  - Provide teaching at level of understanding  - Provide teaching via preferred learning methods  Outcome: Progressing     Problem: CARDIOVASCULAR - ADULT  Goal: Maintains optimal cardiac output and hemodynamic stability  Description: INTERVENTIONS:  - Monitor I/O, vital signs and rhythm  - Monitor for S/S and trends of decreased cardiac output  - Administer and titrate ordered vasoactive medications to optimize hemodynamic stability  - Assess quality of pulses, skin color and temperature  - Assess for signs of decreased coronary artery perfusion  - Instruct patient to report change in severity of symptoms  Outcome: Progressing  Goal: Absence of cardiac dysrhythmias or at baseline rhythm  Description: INTERVENTIONS:  - Continuous cardiac monitoring, vital signs, obtain 12 lead EKG if ordered  - Administer antiarrhythmic and heart rate control medications as ordered  - Monitor electrolytes and administer replacement therapy as ordered  Outcome: Progressing     Problem: RESPIRATORY - ADULT  Goal: Achieves optimal ventilation and oxygenation  Description: INTERVENTIONS:  - Assess for changes in respiratory status  - Assess for changes in mentation and behavior  - Position to facilitate oxygenation and minimize respiratory effort  -  Oxygen administered by appropriate delivery if ordered  - Initiate smoking cessation education as indicated  - Encourage broncho-pulmonary hygiene including cough, deep breathe, Incentive Spirometry  - Assess the need for suctioning and aspirate as needed  - Assess and instruct to report SOB or any respiratory difficulty  - Respiratory Therapy support as indicated  Outcome: Progressing     Problem: Nutrition/Hydration-ADULT  Goal: Nutrient/Hydration intake appropriate for improving, restoring or maintaining nutritional needs  Description: Monitor and assess patient's nutrition/hydration status for malnutrition. Collaborate with interdisciplinary team and initiate plan and interventions as ordered.  Monitor patient's weight and dietary intake as ordered or per policy. Utilize nutrition screening tool and intervene as necessary. Determine patient's food preferences and provide high-protein, high-caloric foods as appropriate.     INTERVENTIONS:  - Monitor oral intake, urinary output, labs, and treatment plans  - Assess nutrition and hydration status and recommend course of action  - Evaluate amount of meals eaten  - Assist patient with eating if necessary   - Allow adequate time for meals  - Recommend/ encourage appropriate diets, oral nutritional supplements, and vitamin/mineral supplements  - Order, calculate, and assess calorie counts as needed  - Recommend, monitor, and adjust tube feedings and TPN/PPN based on assessed needs  - Assess need for intravenous fluids  - Provide specific nutrition/hydration education as appropriate  - Include patient/family/caregiver in decisions related to nutrition  Outcome: Progressing

## 2025-05-24 NOTE — ASSESSMENT & PLAN NOTE
Seen by palliative medicine.  Patient has opted for home with home hospice.  Hospice to start at home today.  Stable for discharge home

## 2025-05-25 ENCOUNTER — HOME CARE VISIT (OUTPATIENT)
Dept: HOME HOSPICE | Facility: HOSPICE | Age: 65
End: 2025-05-25
Payer: MEDICARE

## 2025-05-25 DIAGNOSIS — F41.9 ANXIETY: Primary | ICD-10-CM

## 2025-05-25 DIAGNOSIS — Z51.5 HOSPICE CARE PATIENT: ICD-10-CM

## 2025-05-25 DIAGNOSIS — J44.9 CHRONIC OBSTRUCTIVE PULMONARY DISEASE, UNSPECIFIED COPD TYPE (HCC): ICD-10-CM

## 2025-05-25 DIAGNOSIS — R06.00 DYSPNEA: ICD-10-CM

## 2025-05-25 RX ORDER — LORAZEPAM 0.5 MG/1
0.5 TABLET ORAL EVERY 6 HOURS PRN
Qty: 30 TABLET | Refills: 0 | Status: SHIPPED | OUTPATIENT
Start: 2025-05-25 | End: 2025-06-02

## 2025-05-25 RX ORDER — PREDNISONE 10 MG/1
10 TABLET ORAL DAILY
Qty: 15 TABLET | Refills: 0 | Status: SHIPPED | OUTPATIENT
Start: 2025-05-25

## 2025-05-27 ENCOUNTER — HOME CARE VISIT (OUTPATIENT)
Dept: HOME HEALTH SERVICES | Facility: HOME HEALTHCARE | Age: 65
End: 2025-05-27
Payer: MEDICARE

## 2025-05-27 LAB
DME PARACHUTE DELIVERY DATE ACTUAL: NORMAL
DME PARACHUTE DELIVERY DATE REQUESTED: NORMAL
DME PARACHUTE ITEM DESCRIPTION: NORMAL
DME PARACHUTE ORDER STATUS: NORMAL
DME PARACHUTE SUPPLIER NAME: NORMAL
DME PARACHUTE SUPPLIER PHONE: NORMAL

## 2025-05-27 PROCEDURE — G0156 HHCP-SVS OF AIDE,EA 15 MIN: HCPCS

## 2025-05-27 PROCEDURE — 10330057 MEDICATION, GENERAL

## 2025-05-27 NOTE — UTILIZATION REVIEW
NOTIFICATION OF ADMISSION DISCHARGE   This is a Notification of Discharge from [facility]. Please be advised that this patient has been discharge from our facility. Below you will find the admission and discharge date and time including the patient’s disposition.   UTILIZATION REVIEW CONTACT:  Utilization Review Assistants  Network Utilization Review Department  Phone: [phone] x carefully listen to the prompts. All voicemails are confidential.  Email: [email or web address]     ADMISSION INFORMATION  PRESENTATION DATE: 5/18/2025 11:55 AM  OBERVATION ADMISSION DATE: N/A  INPATIENT ADMISSION DATE: 5/18/25  1:24 PM   DISCHARGE DATE: 5/24/2025 12:33 PM   DISPOSITION:Home/Self Care    Network Utilization Review Department  ATTENTION: Please call with any questions or concerns to [phone] and carefully listen to the prompts so that you are directed to the right person. All voicemails are confidential.   For Discharge needs, contact Care Management DC Support Team at [phone] opt. 2  Send all requests for admission clinical reviews, approved or denied determinations and any other requests to dedicated fax number below belonging to the campus where the patient is receiving treatment. List of dedicated fax numbers for the Facilities:  FACILITY NAME UR FAX NUMBER   ADMISSION DENIALS (Administrative/Medical Necessity) [phone]   DISCHARGE SUPPORT TEAM ([facility]) [phone]   PARENT CHILD HEALTH (Maternity/NICU/Pediatrics) [phone]   [facility] [phone]   [facility] [phone]   [facility] [phone]   [facility] [phone]   [facility] [phone]   [facility] [phone]   [facility] [phone]   [facility] [phone]   [facility]  [facility] [phone]   [facility] [phone]   [facility] [phone]   [facility] [phone]

## 2025-05-28 ENCOUNTER — HOME CARE VISIT (OUTPATIENT)
Dept: HOME HOSPICE | Facility: HOSPICE | Age: 65
End: 2025-05-28
Payer: MEDICARE

## 2025-05-28 ENCOUNTER — HOME CARE VISIT (OUTPATIENT)
Dept: HOME HEALTH SERVICES | Facility: HOME HEALTHCARE | Age: 65
End: 2025-05-28
Payer: MEDICARE

## 2025-05-28 VITALS
HEART RATE: 103 BPM | DIASTOLIC BLOOD PRESSURE: 60 MMHG | RESPIRATION RATE: 16 BRPM | TEMPERATURE: 98.2 F | SYSTOLIC BLOOD PRESSURE: 107 MMHG

## 2025-05-28 PROCEDURE — G0299 HHS/HOSPICE OF RN EA 15 MIN: HCPCS

## 2025-05-29 ENCOUNTER — HOME CARE VISIT (OUTPATIENT)
Dept: HOME HEALTH SERVICES | Facility: HOME HEALTHCARE | Age: 65
End: 2025-05-29
Payer: MEDICARE

## 2025-05-29 PROCEDURE — G0155 HHCP-SVS OF CSW,EA 15 MIN: HCPCS

## 2025-05-29 PROCEDURE — G0156 HHCP-SVS OF AIDE,EA 15 MIN: HCPCS

## 2025-05-30 ENCOUNTER — HOME CARE VISIT (OUTPATIENT)
Dept: HOME HEALTH SERVICES | Facility: HOME HEALTHCARE | Age: 65
End: 2025-05-30
Payer: MEDICARE

## 2025-05-30 PROBLEM — A41.9 SEPSIS (HCC): Status: RESOLVED | Noted: 2025-04-05 | Resolved: 2025-05-30

## 2025-05-30 PROBLEM — J18.9 PNEUMONIA OF RIGHT LOWER LOBE DUE TO INFECTIOUS ORGANISM: Status: RESOLVED | Noted: 2025-04-30 | Resolved: 2025-05-30

## 2025-05-30 PROCEDURE — G0299 HHS/HOSPICE OF RN EA 15 MIN: HCPCS

## 2025-05-30 PROCEDURE — G0156 HHCP-SVS OF AIDE,EA 15 MIN: HCPCS

## 2025-06-02 ENCOUNTER — HOME CARE VISIT (OUTPATIENT)
Dept: HOME HEALTH SERVICES | Facility: HOME HEALTHCARE | Age: 65
End: 2025-06-02
Payer: MEDICARE

## 2025-06-02 VITALS
TEMPERATURE: 97.6 F | DIASTOLIC BLOOD PRESSURE: 82 MMHG | SYSTOLIC BLOOD PRESSURE: 120 MMHG | HEART RATE: 109 BPM | RESPIRATION RATE: 20 BRPM

## 2025-06-02 PROCEDURE — G0299 HHS/HOSPICE OF RN EA 15 MIN: HCPCS

## 2025-06-03 LAB
MYCOBACTERIUM SPEC CULT: NORMAL
RHODAMINE-AURAMINE STN SPEC: NORMAL

## 2025-06-04 ENCOUNTER — HOME CARE VISIT (OUTPATIENT)
Dept: HOME HEALTH SERVICES | Facility: HOME HEALTHCARE | Age: 65
End: 2025-06-04
Payer: MEDICARE

## 2025-06-04 VITALS
DIASTOLIC BLOOD PRESSURE: 80 MMHG | TEMPERATURE: 98.5 F | SYSTOLIC BLOOD PRESSURE: 130 MMHG | HEART RATE: 111 BPM | RESPIRATION RATE: 16 BRPM

## 2025-06-04 PROCEDURE — G0156 HHCP-SVS OF AIDE,EA 15 MIN: HCPCS

## 2025-06-04 PROCEDURE — 10330057 MEDICATION, GENERAL

## 2025-06-04 PROCEDURE — G0299 HHS/HOSPICE OF RN EA 15 MIN: HCPCS

## 2025-06-05 ENCOUNTER — HOME CARE VISIT (OUTPATIENT)
Dept: HOME HOSPICE | Facility: HOSPICE | Age: 65
End: 2025-06-05
Payer: MEDICARE

## 2025-06-06 ENCOUNTER — HOME CARE VISIT (OUTPATIENT)
Dept: HOME HEALTH SERVICES | Facility: HOME HEALTHCARE | Age: 65
End: 2025-06-06
Payer: MEDICARE

## 2025-06-06 VITALS
SYSTOLIC BLOOD PRESSURE: 110 MMHG | HEART RATE: 106 BPM | RESPIRATION RATE: 16 BRPM | TEMPERATURE: 97.6 F | DIASTOLIC BLOOD PRESSURE: 82 MMHG

## 2025-06-06 PROCEDURE — G0156 HHCP-SVS OF AIDE,EA 15 MIN: HCPCS

## 2025-06-06 PROCEDURE — G0299 HHS/HOSPICE OF RN EA 15 MIN: HCPCS

## 2025-06-08 ENCOUNTER — HOME CARE VISIT (OUTPATIENT)
Dept: HOME HEALTH SERVICES | Facility: HOME HEALTHCARE | Age: 65
End: 2025-06-08
Payer: MEDICARE

## 2025-06-08 PROCEDURE — G0156 HHCP-SVS OF AIDE,EA 15 MIN: HCPCS

## 2025-06-09 ENCOUNTER — HOME CARE VISIT (OUTPATIENT)
Dept: HOME HEALTH SERVICES | Facility: HOME HEALTHCARE | Age: 65
End: 2025-06-09
Payer: MEDICARE

## 2025-06-09 VITALS
DIASTOLIC BLOOD PRESSURE: 70 MMHG | TEMPERATURE: 97.3 F | RESPIRATION RATE: 18 BRPM | SYSTOLIC BLOOD PRESSURE: 90 MMHG | HEART RATE: 98 BPM

## 2025-06-09 PROCEDURE — G0299 HHS/HOSPICE OF RN EA 15 MIN: HCPCS

## 2025-06-09 PROCEDURE — G0156 HHCP-SVS OF AIDE,EA 15 MIN: HCPCS

## 2025-06-10 ENCOUNTER — HOME CARE VISIT (OUTPATIENT)
Dept: HOME HEALTH SERVICES | Facility: HOME HEALTHCARE | Age: 65
End: 2025-06-10
Payer: MEDICARE

## 2025-06-10 LAB
MYCOBACTERIUM SPEC CULT: NORMAL
RHODAMINE-AURAMINE STN SPEC: NORMAL

## 2025-06-10 PROCEDURE — G0299 HHS/HOSPICE OF RN EA 15 MIN: HCPCS

## 2025-06-11 ENCOUNTER — HOME CARE VISIT (OUTPATIENT)
Dept: HOME HEALTH SERVICES | Facility: HOME HEALTHCARE | Age: 65
End: 2025-06-11
Payer: MEDICARE

## 2025-06-11 VITALS
DIASTOLIC BLOOD PRESSURE: 83 MMHG | SYSTOLIC BLOOD PRESSURE: 102 MMHG | RESPIRATION RATE: 28 BRPM | TEMPERATURE: 99.2 F | HEART RATE: 111 BPM

## 2025-06-11 VITALS
SYSTOLIC BLOOD PRESSURE: 99 MMHG | RESPIRATION RATE: 28 BRPM | HEART RATE: 121 BPM | TEMPERATURE: 98.5 F | DIASTOLIC BLOOD PRESSURE: 60 MMHG

## 2025-06-11 PROCEDURE — G0299 HHS/HOSPICE OF RN EA 15 MIN: HCPCS

## 2025-06-11 PROCEDURE — G0156 HHCP-SVS OF AIDE,EA 15 MIN: HCPCS

## 2025-06-12 ENCOUNTER — HOME CARE VISIT (OUTPATIENT)
Dept: HOME HEALTH SERVICES | Facility: HOME HEALTHCARE | Age: 65
End: 2025-06-12
Payer: MEDICARE

## 2025-06-12 VITALS
SYSTOLIC BLOOD PRESSURE: 110 MMHG | RESPIRATION RATE: 18 BRPM | HEART RATE: 96 BPM | TEMPERATURE: 96.5 F | DIASTOLIC BLOOD PRESSURE: 86 MMHG

## 2025-06-12 PROCEDURE — G0299 HHS/HOSPICE OF RN EA 15 MIN: HCPCS

## 2025-06-12 PROCEDURE — G0156 HHCP-SVS OF AIDE,EA 15 MIN: HCPCS

## 2025-06-13 ENCOUNTER — HOME CARE VISIT (OUTPATIENT)
Dept: HOME HEALTH SERVICES | Facility: HOME HEALTHCARE | Age: 65
End: 2025-06-13
Payer: MEDICARE

## 2025-06-13 ENCOUNTER — HOME CARE VISIT (OUTPATIENT)
Dept: HOME HOSPICE | Facility: HOSPICE | Age: 65
End: 2025-06-13
Payer: MEDICARE

## 2025-06-13 PROCEDURE — G0156 HHCP-SVS OF AIDE,EA 15 MIN: HCPCS

## 2025-06-14 ENCOUNTER — HOME CARE VISIT (OUTPATIENT)
Dept: HOME HOSPICE | Facility: HOSPICE | Age: 65
End: 2025-06-14
Payer: MEDICARE

## 2025-06-14 DIAGNOSIS — J98.8 RESPIRATORY INFECTION: ICD-10-CM

## 2025-06-14 DIAGNOSIS — Z51.5 HOSPICE CARE PATIENT: Primary | ICD-10-CM

## 2025-06-14 RX ORDER — AZITHROMYCIN 250 MG/1
TABLET, FILM COATED ORAL
Qty: 6 TABLET | Refills: 0 | Status: SHIPPED | OUTPATIENT
Start: 2025-06-14 | End: 2025-06-18

## 2025-06-14 NOTE — PROGRESS NOTES
6/14/2025 10:44 AM  [facility] home patient requests medication adjusted due to change in patient condition.  Filled electronically via Epic as per [place] State Law.    Requested Prescriptions     Signed Prescriptions Disp Refills    azithromycin (ZITHROMAX) 250 mg tablet 6 tablet 0     Sig: Take 2 tablets (=500mg) by mouth on day 1, THEN 1 tablet (=250mg) every day for 4 days for respiratory infection       Fritz Marin MD  [facility]  Hospice Answering Service: [phone]

## 2025-06-15 ENCOUNTER — HOME CARE VISIT (OUTPATIENT)
Dept: HOME HOSPICE | Facility: HOSPICE | Age: 65
End: 2025-06-15
Payer: MEDICARE

## 2025-06-16 ENCOUNTER — HOME CARE VISIT (OUTPATIENT)
Dept: HOME HOSPICE | Facility: HOSPICE | Age: 65
End: 2025-06-16
Payer: MEDICARE

## 2025-06-16 ENCOUNTER — HOME CARE VISIT (OUTPATIENT)
Dept: HOME HEALTH SERVICES | Facility: HOME HEALTHCARE | Age: 65
End: 2025-06-16
Payer: MEDICARE

## 2025-06-16 VITALS — TEMPERATURE: 97.3 F | DIASTOLIC BLOOD PRESSURE: 60 MMHG | SYSTOLIC BLOOD PRESSURE: 100 MMHG | HEART RATE: 94 BPM

## 2025-06-16 LAB
MYCOBACTERIUM SPEC CULT: NORMAL
RHODAMINE-AURAMINE STN SPEC: NORMAL

## 2025-06-16 PROCEDURE — G0156 HHCP-SVS OF AIDE,EA 15 MIN: HCPCS

## 2025-06-16 PROCEDURE — G0299 HHS/HOSPICE OF RN EA 15 MIN: HCPCS

## 2025-06-17 ENCOUNTER — HOME CARE VISIT (OUTPATIENT)
Dept: HOME HEALTH SERVICES | Facility: HOME HEALTHCARE | Age: 65
End: 2025-06-17
Payer: MEDICARE

## 2025-06-17 LAB — MYCOBACTERIUM SPEC CULT: NORMAL

## 2025-06-17 PROCEDURE — G0156 HHCP-SVS OF AIDE,EA 15 MIN: HCPCS

## 2025-06-18 ENCOUNTER — HOME CARE VISIT (OUTPATIENT)
Dept: HOME HEALTH SERVICES | Facility: HOME HEALTHCARE | Age: 65
End: 2025-06-18
Payer: MEDICARE

## 2025-06-18 VITALS
HEART RATE: 110 BPM | SYSTOLIC BLOOD PRESSURE: 120 MMHG | RESPIRATION RATE: 24 BRPM | DIASTOLIC BLOOD PRESSURE: 82 MMHG | TEMPERATURE: 98.2 F

## 2025-06-18 PROCEDURE — G0299 HHS/HOSPICE OF RN EA 15 MIN: HCPCS

## 2025-06-18 PROCEDURE — G0156 HHCP-SVS OF AIDE,EA 15 MIN: HCPCS

## 2025-06-19 ENCOUNTER — HOME CARE VISIT (OUTPATIENT)
Dept: HOME HEALTH SERVICES | Facility: HOME HEALTHCARE | Age: 65
End: 2025-06-19
Payer: MEDICARE

## 2025-06-19 PROCEDURE — G0156 HHCP-SVS OF AIDE,EA 15 MIN: HCPCS

## 2025-06-20 ENCOUNTER — HOME CARE VISIT (OUTPATIENT)
Dept: HOME HEALTH SERVICES | Facility: HOME HEALTHCARE | Age: 65
End: 2025-06-20
Payer: MEDICARE

## 2025-06-20 VITALS — TEMPERATURE: 101.5 F | RESPIRATION RATE: 30 BRPM

## 2025-06-20 PROCEDURE — G0299 HHS/HOSPICE OF RN EA 15 MIN: HCPCS

## 2025-06-20 PROCEDURE — G0156 HHCP-SVS OF AIDE,EA 15 MIN: HCPCS

## 2025-06-21 ENCOUNTER — HOME CARE VISIT (OUTPATIENT)
Dept: HOME HOSPICE | Facility: HOSPICE | Age: 65
End: 2025-06-21
Payer: MEDICARE

## 2025-06-23 ENCOUNTER — HOME CARE VISIT (OUTPATIENT)
Dept: HOME HEALTH SERVICES | Facility: HOME HEALTHCARE | Age: 65
End: 2025-06-23
Payer: MEDICARE

## 2025-06-23 PROCEDURE — G0299 HHS/HOSPICE OF RN EA 15 MIN: HCPCS

## 2025-06-23 PROCEDURE — G0156 HHCP-SVS OF AIDE,EA 15 MIN: HCPCS

## 2025-06-24 ENCOUNTER — HOME CARE VISIT (OUTPATIENT)
Dept: HOME HEALTH SERVICES | Facility: HOME HEALTHCARE | Age: 65
End: 2025-06-24
Payer: MEDICARE

## 2025-06-24 ENCOUNTER — IN HOME VISIT (OUTPATIENT)
Dept: HOME HOSPICE | Facility: HOSPICE | Age: 65
End: 2025-06-24

## 2025-06-24 VITALS
DIASTOLIC BLOOD PRESSURE: 65 MMHG | RESPIRATION RATE: 18 BRPM | SYSTOLIC BLOOD PRESSURE: 97 MMHG | HEART RATE: 112 BPM | TEMPERATURE: 97.6 F

## 2025-06-24 DIAGNOSIS — J44.9 CHRONIC OBSTRUCTIVE PULMONARY DISEASE, UNSPECIFIED COPD TYPE (HCC): ICD-10-CM

## 2025-06-24 DIAGNOSIS — R06.02 SHORTNESS OF BREATH: ICD-10-CM

## 2025-06-24 DIAGNOSIS — J96.21 ACUTE ON CHRONIC HYPOXIC RESPIRATORY FAILURE (HCC): Primary | ICD-10-CM

## 2025-06-24 DIAGNOSIS — A31.0 MYCOBACTERIUM AVIUM COMPLEX (HCC): ICD-10-CM

## 2025-06-24 DIAGNOSIS — R07.9 CHEST PAIN, UNSPECIFIED TYPE: ICD-10-CM

## 2025-06-24 DIAGNOSIS — R45.1 RESTLESSNESS: ICD-10-CM

## 2025-06-24 DIAGNOSIS — Z51.5 HOSPICE CARE: ICD-10-CM

## 2025-06-24 LAB — MYCOBACTERIUM SPEC CULT: NORMAL

## 2025-06-24 PROCEDURE — G0299 HHS/HOSPICE OF RN EA 15 MIN: HCPCS

## 2025-06-24 PROCEDURE — G0156 HHCP-SVS OF AIDE,EA 15 MIN: HCPCS

## 2025-06-24 PROCEDURE — 99344 HOME/RES VST NEW MOD MDM 60: CPT | Performed by: STUDENT IN AN ORGANIZED HEALTH CARE EDUCATION/TRAINING PROGRAM

## 2025-06-25 ENCOUNTER — HOME CARE VISIT (OUTPATIENT)
Dept: HOME HEALTH SERVICES | Facility: HOME HEALTHCARE | Age: 65
End: 2025-06-25
Payer: MEDICARE

## 2025-06-25 PROCEDURE — G0156 HHCP-SVS OF AIDE,EA 15 MIN: HCPCS

## 2025-06-25 NOTE — PROGRESS NOTES
Name: Ileana Seaman      : 1960      MRN: [id number]  Encounter Provider: Radha Delgado MD  Encounter Date: 2025   Encounter department: [facility]  :  Assessment & Plan  Acute on chronic hypoxic respiratory failure (HCC)         Chronic obstructive pulmonary disease, unspecified COPD type (HCC)         Mycobacterium avium complex (HCC)         Chest pain, unspecified type  Hold methadone given somnolence  Oxycodone 5-10 mg q4h PRN pain/dyspnea       Shortness of breath  Opioids as above  Ativan 1 mg 4h PRN anxiety/restlessness/dyspnea       Restlessness  Continue ativan, add haldol 1 mg q6h prn restlessness/agitation/delusions       Hospice care  Time spent developing patient/family-provider relationship, providing psychosocial support, and validating patient/family experience  Encouraged to call hospice with any questions/concerns  Follow up as needed           History of Present Illness     Ileana Seaman is a 65 y.o. female on home hospice for respiratory failure 2/2 COPD and MAC. Home visit requested by nurse for symptom management. Patient has been oscillating between being restless and ambulatory and being lethargic and minimally interactive. She endorses chest pain with coughing and is visibly working to breathe which improves but does not resolve with administration of comfort meds. Today, Ileana is unable to participate in discussions about her plan of care. Her son and daughter are present and caring for her. They are open to medication adjustments to serve her comfort. She had been prescribed methadone for pain but has not yet taken it as she has been so sleepy. This morning her daughter notes she was distressed as she thought she was going to give birth and was not particularly redirectable until eventually falling asleep at which time they were able to administer ativan and oxycodone for her breathing.    Objective   There were no vitals taken for this  visit.     Physical Exam  Constitutional:       Appearance: She is ill-appearing and toxic-appearing.      Comments: Sitting on side of bed   Pulmonary:      Effort: Tachypnea, accessory muscle usage and respiratory distress present.      Breath sounds: Decreased breath sounds and wheezing present.     Neurological:      General: No focal deficit present.      Mental Status: She is lethargic.      Comments: Follows commands and responds with short answers inconsistently       Administrative Statements   I have spent a total time of 70 minutes in caring for this patient on the day of the visit/encounter including Diagnostic results, Prognosis, Risks and benefits of tx options, Instructions for management, Patient and family education, Impressions, Counseling / Coordination of care, Documenting in the medical record, Reviewing/placing orders in the medical record (including tests, medications, and/or procedures), Obtaining or reviewing history  , and Communicating with other healthcare professionals .

## 2025-06-26 ENCOUNTER — HOME CARE VISIT (OUTPATIENT)
Dept: HOME HEALTH SERVICES | Facility: HOME HEALTHCARE | Age: 65
End: 2025-06-26
Payer: MEDICARE

## 2025-06-26 PROBLEM — Z51.5 HOSPICE CARE: Status: ACTIVE | Noted: 2025-05-21

## 2025-06-26 PROBLEM — R06.02 SHORTNESS OF BREATH: Status: ACTIVE | Noted: 2025-06-26

## 2025-06-26 PROBLEM — A31.0 MYCOBACTERIUM AVIUM COMPLEX (HCC): Status: ACTIVE | Noted: 2024-10-04

## 2025-06-26 PROCEDURE — G0155 HHCP-SVS OF CSW,EA 15 MIN: HCPCS

## 2025-06-26 PROCEDURE — G0156 HHCP-SVS OF AIDE,EA 15 MIN: HCPCS

## 2025-06-26 PROCEDURE — G0299 HHS/HOSPICE OF RN EA 15 MIN: HCPCS

## 2025-06-26 NOTE — ASSESSMENT & PLAN NOTE
Time spent developing patient/family-provider relationship, providing psychosocial support, and validating patient/family experience  Encouraged to call hospice with any questions/concerns  Follow up as needed

## 2025-06-27 ENCOUNTER — HOME CARE VISIT (OUTPATIENT)
Dept: HOME HEALTH SERVICES | Facility: HOME HEALTHCARE | Age: 65
End: 2025-06-27
Payer: MEDICARE

## 2025-06-27 PROCEDURE — G0156 HHCP-SVS OF AIDE,EA 15 MIN: HCPCS

## 2025-06-30 ENCOUNTER — HOME CARE VISIT (OUTPATIENT)
Dept: HOME HEALTH SERVICES | Facility: HOME HEALTHCARE | Age: 65
End: 2025-06-30
Payer: MEDICARE

## 2025-06-30 PROCEDURE — G0156 HHCP-SVS OF AIDE,EA 15 MIN: HCPCS

## 2025-07-01 ENCOUNTER — HOME CARE VISIT (OUTPATIENT)
Dept: HOME HEALTH SERVICES | Facility: HOME HEALTHCARE | Age: 65
End: 2025-07-01
Payer: MEDICARE

## 2025-07-01 VITALS
TEMPERATURE: 99.3 F | HEART RATE: 93 BPM | RESPIRATION RATE: 30 BRPM | DIASTOLIC BLOOD PRESSURE: 53 MMHG | SYSTOLIC BLOOD PRESSURE: 90 MMHG

## 2025-07-01 LAB — MYCOBACTERIUM SPEC CULT: ABNORMAL

## 2025-07-01 PROCEDURE — G0299 HHS/HOSPICE OF RN EA 15 MIN: HCPCS

## 2025-07-03 ENCOUNTER — HOME CARE VISIT (OUTPATIENT)
Dept: HOME HEALTH SERVICES | Facility: HOME HEALTHCARE | Age: 65
End: 2025-07-03
Payer: MEDICARE

## 2025-07-03 ENCOUNTER — HOME CARE VISIT (OUTPATIENT)
Dept: HOME HOSPICE | Facility: HOSPICE | Age: 65
End: 2025-07-03
Payer: MEDICARE

## 2025-07-03 PROCEDURE — G0156 HHCP-SVS OF AIDE,EA 15 MIN: HCPCS

## 2025-07-04 ENCOUNTER — HOME CARE VISIT (OUTPATIENT)
Dept: HOME HOSPICE | Facility: HOSPICE | Age: 65
End: 2025-07-04
Payer: MEDICARE

## 2025-07-04 ENCOUNTER — TELEPHONE (OUTPATIENT)
Dept: OTHER | Facility: OTHER | Age: 65
End: 2025-07-04

## 2025-07-04 ENCOUNTER — DOCUMENTATION (OUTPATIENT)
Dept: PALLIATIVE MEDICINE | Facility: HOSPITAL | Age: 65
End: 2025-07-04

## 2025-07-04 DIAGNOSIS — R60.0 LOWER EXTREMITY EDEMA: Primary | ICD-10-CM

## 2025-07-04 RX ORDER — FUROSEMIDE 20 MG/1
20 TABLET ORAL DAILY
Qty: 10 TABLET | Refills: 0 | Status: SHIPPED | OUTPATIENT
Start: 2025-07-04 | End: 2025-07-04 | Stop reason: SDUPTHER

## 2025-07-04 RX ORDER — FUROSEMIDE 20 MG/1
20 TABLET ORAL DAILY
Qty: 10 TABLET | Refills: 0 | Status: SHIPPED | OUTPATIENT
Start: 2025-07-04 | End: 2025-07-08

## 2025-07-04 NOTE — TELEPHONE ENCOUNTER
Reason for Call: legs swelling, urge to urinate but can't go since this morning.    Caller's Name: Ayde    Caller's Relationship to Patient: daughter    Caller's Callback Number: [phone]    Home Health OR Hospice Patient:  Hospice

## 2025-07-05 ENCOUNTER — HOME CARE VISIT (OUTPATIENT)
Dept: HOME HOSPICE | Facility: HOSPICE | Age: 65
End: 2025-07-05
Payer: MEDICARE

## 2025-07-05 NOTE — PROGRESS NOTES
7/5/2025 9:19 AM  [facility] home patient requests medication adjusted due to change in patient condition.  Filled electronically via Epic as per [place] State Law.    Requested Prescriptions     Signed Prescriptions Disp Refills    furosemide (LASIX) 20 mg tablet 10 tablet 0     Sig: Take 1 tablet (20 mg total) by mouth in the morning.       Radha Delgado MD  [facility]  Hospice Answering Service: [phone]

## 2025-07-07 ENCOUNTER — HOME CARE VISIT (OUTPATIENT)
Dept: HOME HEALTH SERVICES | Facility: HOME HEALTHCARE | Age: 65
End: 2025-07-07
Payer: MEDICARE

## 2025-07-07 VITALS
TEMPERATURE: 98.7 F | DIASTOLIC BLOOD PRESSURE: 70 MMHG | SYSTOLIC BLOOD PRESSURE: 110 MMHG | HEART RATE: 110 BPM | RESPIRATION RATE: 20 BRPM

## 2025-07-07 PROCEDURE — G0156 HHCP-SVS OF AIDE,EA 15 MIN: HCPCS

## 2025-07-07 PROCEDURE — G0299 HHS/HOSPICE OF RN EA 15 MIN: HCPCS

## 2025-07-08 ENCOUNTER — HOME CARE VISIT (OUTPATIENT)
Dept: HOME HEALTH SERVICES | Facility: HOME HEALTHCARE | Age: 65
End: 2025-07-08
Payer: MEDICARE

## 2025-07-09 ENCOUNTER — HOME CARE VISIT (OUTPATIENT)
Dept: HOME HEALTH SERVICES | Facility: HOME HEALTHCARE | Age: 65
End: 2025-07-09
Payer: MEDICARE

## 2025-07-09 VITALS — HEART RATE: 107 BPM | DIASTOLIC BLOOD PRESSURE: 65 MMHG | TEMPERATURE: 98.9 F | SYSTOLIC BLOOD PRESSURE: 93 MMHG

## 2025-07-09 LAB
M AVIUM CMPLX DNA SPEC QL NAA+PROBE: NORMAL
M TB CMPLX DNA ISLT/SPM QL: NORMAL
MICROORGANISM ISLT MS.MALDI-TOF: ABNORMAL
MICROORGANISM ISLT MS.MALDI-TOF: NORMAL
REFLEX ID BY MALDI: NORMAL
UNIDENT CELLS NFR BLD MANUAL: NORMAL %

## 2025-07-09 PROCEDURE — G0299 HHS/HOSPICE OF RN EA 15 MIN: HCPCS

## 2025-07-11 ENCOUNTER — HOME CARE VISIT (OUTPATIENT)
Dept: HOME HEALTH SERVICES | Facility: HOME HEALTHCARE | Age: 65
End: 2025-07-11
Payer: MEDICARE

## 2025-07-11 LAB
MYCOBACTERIUM SPEC CULT: ABNORMAL
MYCOBACTERIUM SPEC CULT: ABNORMAL

## 2025-07-11 PROCEDURE — G0156 HHCP-SVS OF AIDE,EA 15 MIN: HCPCS

## 2025-07-12 ENCOUNTER — TELEPHONE (OUTPATIENT)
Dept: OTHER | Facility: OTHER | Age: 65
End: 2025-07-12

## 2025-07-12 ENCOUNTER — DOCUMENTATION (OUTPATIENT)
Dept: OTHER | Facility: HOSPICE | Age: 65
End: 2025-07-12

## 2025-07-12 ENCOUNTER — HOME CARE VISIT (OUTPATIENT)
Dept: HOME HOSPICE | Facility: HOSPICE | Age: 65
End: 2025-07-12
Payer: MEDICARE

## 2025-07-12 NOTE — TELEPHONE ENCOUNTER
a)Reason for Call: Pt is having stoamch issues and needs to speak to nurse    b)Caller’s name: Ileana    c)Caller’s relationship to patient:self    d)Caller’s callback number: [phone]    e) Hospice patient

## 2025-07-13 ENCOUNTER — HOME CARE VISIT (OUTPATIENT)
Dept: HOME HEALTH SERVICES | Facility: HOME HEALTHCARE | Age: 65
End: 2025-07-13
Payer: MEDICARE

## 2025-07-13 PROCEDURE — G0156 HHCP-SVS OF AIDE,EA 15 MIN: HCPCS

## 2025-07-15 ENCOUNTER — HOME CARE VISIT (OUTPATIENT)
Dept: HOME HOSPICE | Facility: HOSPICE | Age: 65
End: 2025-07-15
Payer: MEDICARE

## 2025-07-15 ENCOUNTER — TELEPHONE (OUTPATIENT)
Dept: HOME HEALTH SERVICES | Facility: HOME HEALTHCARE | Age: 65
End: 2025-07-15

## 2025-07-15 ENCOUNTER — HOME CARE VISIT (OUTPATIENT)
Dept: HOME HEALTH SERVICES | Facility: HOME HEALTHCARE | Age: 65
End: 2025-07-15
Payer: MEDICARE

## 2025-07-15 VITALS
OXYGEN SATURATION: 95 % | HEART RATE: 102 BPM | SYSTOLIC BLOOD PRESSURE: 117 MMHG | RESPIRATION RATE: 18 BRPM | DIASTOLIC BLOOD PRESSURE: 72 MMHG | TEMPERATURE: 97.7 F

## 2025-07-15 PROCEDURE — G0299 HHS/HOSPICE OF RN EA 15 MIN: HCPCS

## 2025-07-16 ENCOUNTER — HOME CARE VISIT (OUTPATIENT)
Dept: HOME HEALTH SERVICES | Facility: HOME HEALTHCARE | Age: 65
End: 2025-07-16
Payer: MEDICARE

## 2025-07-16 PROCEDURE — G0156 HHCP-SVS OF AIDE,EA 15 MIN: HCPCS

## 2025-07-17 ENCOUNTER — HOME CARE VISIT (OUTPATIENT)
Dept: HOME HEALTH SERVICES | Facility: HOME HEALTHCARE | Age: 65
End: 2025-07-17
Payer: MEDICARE

## 2025-07-17 PROCEDURE — G0156 HHCP-SVS OF AIDE,EA 15 MIN: HCPCS

## 2025-07-18 ENCOUNTER — HOME CARE VISIT (OUTPATIENT)
Dept: HOME HOSPICE | Facility: HOSPICE | Age: 65
End: 2025-07-18
Payer: MEDICARE

## 2025-07-18 ENCOUNTER — HOME CARE VISIT (OUTPATIENT)
Dept: HOME HEALTH SERVICES | Facility: HOME HEALTHCARE | Age: 65
End: 2025-07-18
Payer: MEDICARE

## 2025-07-18 PROCEDURE — G0156 HHCP-SVS OF AIDE,EA 15 MIN: HCPCS

## 2025-07-21 ENCOUNTER — HOME CARE VISIT (OUTPATIENT)
Dept: HOME HEALTH SERVICES | Facility: HOME HEALTHCARE | Age: 65
End: 2025-07-21
Payer: MEDICARE

## 2025-07-21 PROCEDURE — G0156 HHCP-SVS OF AIDE,EA 15 MIN: HCPCS

## 2025-07-22 ENCOUNTER — HOME CARE VISIT (OUTPATIENT)
Dept: HOME HEALTH SERVICES | Facility: HOME HEALTHCARE | Age: 65
End: 2025-07-22
Payer: MEDICARE

## 2025-07-22 PROCEDURE — G0156 HHCP-SVS OF AIDE,EA 15 MIN: HCPCS

## 2025-07-23 ENCOUNTER — HOME CARE VISIT (OUTPATIENT)
Dept: HOME HEALTH SERVICES | Facility: HOME HEALTHCARE | Age: 65
End: 2025-07-23
Payer: MEDICARE

## 2025-07-23 VITALS
DIASTOLIC BLOOD PRESSURE: 75 MMHG | HEART RATE: 90 BPM | RESPIRATION RATE: 18 BRPM | TEMPERATURE: 98.1 F | SYSTOLIC BLOOD PRESSURE: 110 MMHG

## 2025-07-23 PROCEDURE — G0156 HHCP-SVS OF AIDE,EA 15 MIN: HCPCS

## 2025-07-23 PROCEDURE — G0299 HHS/HOSPICE OF RN EA 15 MIN: HCPCS

## 2025-07-24 ENCOUNTER — HOME CARE VISIT (OUTPATIENT)
Dept: HOME HOSPICE | Facility: HOSPICE | Age: 65
End: 2025-07-24
Payer: MEDICARE

## 2025-07-25 ENCOUNTER — HOME CARE VISIT (OUTPATIENT)
Dept: HOME HEALTH SERVICES | Facility: HOME HEALTHCARE | Age: 65
End: 2025-07-25
Payer: MEDICARE

## 2025-07-25 PROCEDURE — G0299 HHS/HOSPICE OF RN EA 15 MIN: HCPCS

## 2025-07-25 PROCEDURE — G0156 HHCP-SVS OF AIDE,EA 15 MIN: HCPCS

## 2025-07-28 ENCOUNTER — HOME CARE VISIT (OUTPATIENT)
Dept: HOME HEALTH SERVICES | Facility: HOME HEALTHCARE | Age: 65
End: 2025-07-28
Payer: MEDICARE

## 2025-07-28 PROCEDURE — G0156 HHCP-SVS OF AIDE,EA 15 MIN: HCPCS

## 2025-07-29 ENCOUNTER — HOME CARE VISIT (OUTPATIENT)
Dept: HOME HEALTH SERVICES | Facility: HOME HEALTHCARE | Age: 65
End: 2025-07-29
Payer: MEDICARE

## 2025-07-29 ENCOUNTER — HOME CARE VISIT (OUTPATIENT)
Dept: HOME HOSPICE | Facility: HOSPICE | Age: 65
End: 2025-07-29
Payer: MEDICARE

## 2025-07-29 VITALS
RESPIRATION RATE: 22 BRPM | SYSTOLIC BLOOD PRESSURE: 116 MMHG | HEART RATE: 106 BPM | DIASTOLIC BLOOD PRESSURE: 80 MMHG | TEMPERATURE: 97.8 F

## 2025-07-29 DIAGNOSIS — Z51.5 HOSPICE CARE: Primary | ICD-10-CM

## 2025-07-29 PROCEDURE — G0156 HHCP-SVS OF AIDE,EA 15 MIN: HCPCS

## 2025-07-29 PROCEDURE — G0299 HHS/HOSPICE OF RN EA 15 MIN: HCPCS

## 2025-07-29 RX ORDER — OXYCODONE HYDROCHLORIDE 5 MG/1
5 TABLET ORAL EVERY 4 HOURS PRN
Qty: 12 TABLET | Refills: 0 | Status: SHIPPED | OUTPATIENT
Start: 2025-07-29

## 2025-07-30 ENCOUNTER — HOME CARE VISIT (OUTPATIENT)
Dept: HOME HEALTH SERVICES | Facility: HOME HEALTHCARE | Age: 65
End: 2025-07-30
Payer: MEDICARE

## 2025-07-30 PROCEDURE — G0156 HHCP-SVS OF AIDE,EA 15 MIN: HCPCS

## 2025-07-31 ENCOUNTER — HOME CARE VISIT (OUTPATIENT)
Dept: HOME HEALTH SERVICES | Facility: HOME HEALTHCARE | Age: 65
End: 2025-07-31
Payer: MEDICARE

## 2025-07-31 VITALS — DIASTOLIC BLOOD PRESSURE: 72 MMHG | SYSTOLIC BLOOD PRESSURE: 94 MMHG | TEMPERATURE: 96.8 F | HEART RATE: 92 BPM

## 2025-07-31 PROCEDURE — G0299 HHS/HOSPICE OF RN EA 15 MIN: HCPCS

## 2025-07-31 PROCEDURE — G0156 HHCP-SVS OF AIDE,EA 15 MIN: HCPCS

## 2025-08-01 ENCOUNTER — HOME CARE VISIT (OUTPATIENT)
Dept: HOME HEALTH SERVICES | Facility: HOME HEALTHCARE | Age: 65
End: 2025-08-01
Payer: MEDICARE

## 2025-08-01 PROCEDURE — G0156 HHCP-SVS OF AIDE,EA 15 MIN: HCPCS

## 2025-08-04 ENCOUNTER — HOME CARE VISIT (OUTPATIENT)
Dept: HOME HEALTH SERVICES | Facility: HOME HEALTHCARE | Age: 65
End: 2025-08-04
Payer: MEDICARE

## 2025-08-04 PROCEDURE — G0156 HHCP-SVS OF AIDE,EA 15 MIN: HCPCS

## 2025-08-05 ENCOUNTER — HOME CARE VISIT (OUTPATIENT)
Dept: HOME HEALTH SERVICES | Facility: HOME HEALTHCARE | Age: 65
End: 2025-08-05
Payer: MEDICARE

## 2025-08-05 PROCEDURE — G0156 HHCP-SVS OF AIDE,EA 15 MIN: HCPCS

## 2025-08-06 ENCOUNTER — HOME CARE VISIT (OUTPATIENT)
Dept: HOME HEALTH SERVICES | Facility: HOME HEALTHCARE | Age: 65
End: 2025-08-06
Payer: MEDICARE

## 2025-08-06 ENCOUNTER — HOME CARE VISIT (OUTPATIENT)
Dept: HOME HOSPICE | Facility: HOSPICE | Age: 65
End: 2025-08-06
Payer: MEDICARE

## 2025-08-06 VITALS
DIASTOLIC BLOOD PRESSURE: 70 MMHG | SYSTOLIC BLOOD PRESSURE: 110 MMHG | HEART RATE: 111 BPM | RESPIRATION RATE: 24 BRPM | TEMPERATURE: 98.4 F

## 2025-08-06 PROCEDURE — G0299 HHS/HOSPICE OF RN EA 15 MIN: HCPCS

## 2025-08-07 ENCOUNTER — HOME CARE VISIT (OUTPATIENT)
Dept: HOME HEALTH SERVICES | Facility: HOME HEALTHCARE | Age: 65
End: 2025-08-07
Payer: MEDICARE

## 2025-08-07 PROCEDURE — G0156 HHCP-SVS OF AIDE,EA 15 MIN: HCPCS

## 2025-08-08 ENCOUNTER — HOME CARE VISIT (OUTPATIENT)
Dept: HOME HEALTH SERVICES | Facility: HOME HEALTHCARE | Age: 65
End: 2025-08-08
Payer: MEDICARE

## 2025-08-08 PROCEDURE — G0156 HHCP-SVS OF AIDE,EA 15 MIN: HCPCS

## 2025-08-11 ENCOUNTER — HOME CARE VISIT (OUTPATIENT)
Dept: HOME HEALTH SERVICES | Facility: HOME HEALTHCARE | Age: 65
End: 2025-08-11
Payer: MEDICARE

## 2025-08-12 ENCOUNTER — HOME CARE VISIT (OUTPATIENT)
Dept: HOME HEALTH SERVICES | Facility: HOME HEALTHCARE | Age: 65
End: 2025-08-12
Payer: MEDICARE

## 2025-08-13 ENCOUNTER — HOME CARE VISIT (OUTPATIENT)
Dept: HOME HEALTH SERVICES | Facility: HOME HEALTHCARE | Age: 65
End: 2025-08-13
Payer: MEDICARE

## 2025-08-14 ENCOUNTER — HOME CARE VISIT (OUTPATIENT)
Dept: HOME HEALTH SERVICES | Facility: HOME HEALTHCARE | Age: 65
End: 2025-08-14
Payer: MEDICARE

## 2025-08-15 ENCOUNTER — HOME CARE VISIT (OUTPATIENT)
Dept: HOME HEALTH SERVICES | Facility: HOME HEALTHCARE | Age: 65
End: 2025-08-15
Payer: MEDICARE

## 2025-08-17 ENCOUNTER — HOME CARE VISIT (OUTPATIENT)
Dept: HOME HEALTH SERVICES | Facility: HOME HEALTHCARE | Age: 65
End: 2025-08-17
Payer: MEDICARE

## 2025-08-17 PROCEDURE — G0156 HHCP-SVS OF AIDE,EA 15 MIN: HCPCS

## 2025-08-18 ENCOUNTER — HOME CARE VISIT (OUTPATIENT)
Dept: HOME HEALTH SERVICES | Facility: HOME HEALTHCARE | Age: 65
End: 2025-08-18
Payer: MEDICARE

## 2025-08-18 VITALS
DIASTOLIC BLOOD PRESSURE: 60 MMHG | HEART RATE: 117 BPM | RESPIRATION RATE: 20 BRPM | SYSTOLIC BLOOD PRESSURE: 95 MMHG | TEMPERATURE: 97.8 F

## 2025-08-18 PROCEDURE — G0156 HHCP-SVS OF AIDE,EA 15 MIN: HCPCS

## 2025-08-18 PROCEDURE — G0299 HHS/HOSPICE OF RN EA 15 MIN: HCPCS

## 2025-08-21 ENCOUNTER — HOME CARE VISIT (OUTPATIENT)
Dept: HOME HEALTH SERVICES | Facility: HOME HEALTHCARE | Age: 65
End: 2025-08-21
Payer: MEDICARE

## 2025-08-21 PROCEDURE — G0156 HHCP-SVS OF AIDE,EA 15 MIN: HCPCS

## 2025-08-22 ENCOUNTER — HOME CARE VISIT (OUTPATIENT)
Dept: HOME HEALTH SERVICES | Facility: HOME HEALTHCARE | Age: 65
End: 2025-08-22
Payer: MEDICARE

## 2025-08-22 VITALS
HEART RATE: 96 BPM | DIASTOLIC BLOOD PRESSURE: 63 MMHG | RESPIRATION RATE: 18 BRPM | TEMPERATURE: 97.9 F | SYSTOLIC BLOOD PRESSURE: 99 MMHG

## 2025-08-22 PROCEDURE — G0156 HHCP-SVS OF AIDE,EA 15 MIN: HCPCS

## 2025-08-22 PROCEDURE — G0299 HHS/HOSPICE OF RN EA 15 MIN: HCPCS
